# Patient Record
Sex: MALE | Race: WHITE | NOT HISPANIC OR LATINO | Employment: OTHER | URBAN - METROPOLITAN AREA
[De-identification: names, ages, dates, MRNs, and addresses within clinical notes are randomized per-mention and may not be internally consistent; named-entity substitution may affect disease eponyms.]

---

## 2017-01-03 ENCOUNTER — ANTICOAGULATION VISIT (OUTPATIENT)
Dept: PHARMACY | Facility: HOSPITAL | Age: 72
End: 2017-01-03

## 2017-01-03 DIAGNOSIS — I48.91 ATRIAL FIBRILLATION, UNSPECIFIED TYPE (HCC): ICD-10-CM

## 2017-01-03 LAB — INR PPP: 1.5 (ref 0.9–1.1)

## 2017-01-03 PROCEDURE — 36416 COLLJ CAPILLARY BLOOD SPEC: CPT

## 2017-01-03 PROCEDURE — G0463 HOSPITAL OUTPT CLINIC VISIT: HCPCS

## 2017-01-03 PROCEDURE — 85610 PROTHROMBIN TIME: CPT

## 2017-01-03 NOTE — PROGRESS NOTES
Anticoagulation Clinic Progress Note  Indication: atrial fibrillation  Referring Provider: del  Initial Warfarin Start Date: 2008  Goal INR: 2-3  Current Drug Interactions: fluoxetine, trazodone, glimepiride    Anticoagulation Clinic INR History:  Date 9/26 10/25 11/7 11/30 12/21 1/3      Total Weekly Dose 20mg held doses for procedure 20mg 20 mg 17.5 mg 12.5 mg (2 held doses)      INR 2.6 1.3 2.7 3.5 2.0 1.5          Clinic Interview:  Tablet Strength: pt has 5mg tablets  Current Dose: pt verified dose of 2.5mg daily        Plan:  1. INR is subtherapeutic today, likely due to 2 held doses last week (missed 2.5mg + 5mg). Will instruct pt to BOOST his dose today (5mg), then continue 2.5mg daily except 5mg on Wednesdays.  2. RTC in 2 weeks.  3. D/C'd amoxicillin from pt's med list -- no other changes reported.  4. Pt declines refills.  5. Verbal and written information provided. Pt expresses understanding and has no further questions at this time.      Ann Cowden, PharmD  1/3/2017  12:22 PM

## 2017-01-03 NOTE — MR AVS SNAPSHOT
Tony Mcclendonquiana   1/3/2017 11:45 AM   Anticoagulation Visit    Dept Phone:  [phone]   Encounter #:  [id number]    Provider:  [facility]   Department:  [facility] ANTICOAGULATION CLINIC                Your Full Care Plan              Today's Medication Changes          These changes are accurate as of: 1/3/17 12:33 PM.  If you have any questions, ask your nurse or doctor.               Stop taking medication(s)listed here:     amoxicillin 875 MG tablet   Commonly known as:  AMOXIL                      Your Updated Medication List          This list is accurate as of: 1/3/17 12:33 PM.  Always use your most recent med list.                amLODIPine 10 MG tablet   Commonly known as:  NORVASC   TAKE ONE TABLET BY MOUTH DAILY AS DIRECTED       colestipol 1 G tablet   Commonly known as:  COLESTID       FLUoxetine 40 MG capsule   Commonly known as:  PROzac       glimepiride 2 MG tablet   Commonly known as:  AMARYL   TAKE ONE TABLET BY MOUTH DAILY       hydrOXYzine 50 MG capsule   Commonly known as:  VISTARIL       lisinopril 40 MG tablet   Commonly known as:  PRINIVIL,ZESTRIL   TAKE ONE TABLET BY MOUTH DAILY       MELATONIN PO       metoprolol succinate  MG 24 hr tablet   Commonly known as:  TOPROL-XL   TAKE ONE TABLET BY MOUTH TWICE A DAY       pravastatin 20 MG tablet   Commonly known as:  PRAVACHOL   TAKE ONE TABLET BY MOUTH EVERY NIGHT AT BEDTIME       SITagliptin 100 MG tablet   Commonly known as:  JANUVIA       traZODone 50 MG tablet   Commonly known as:  DESYREL       Vitamin D-3 1000 UNITS capsule       warfarin 5 MG tablet   Commonly known as:  COUMADIN   Take 0.5 to 1 tablet by mouth daily as directed by the anticoagulation pharmacist.               We Performed the Following     POC INR       You Were Diagnosed With        Codes Comments    Atrial fibrillation, unspecified type     ICD-10-CM: I48.91  ICD-9-CM: 427.31       Instructions     None    Patient  Instructions History      Anticoagulation Summary as of 1/3/2017     INR goal 2.0-3.0   Today's INR 1.50!   Next INR check 2017    Indications   Atrial fibrillation [I48.91]         2017 Details    Sun Mon  Thu Fri Sat     1               2               3      5 mg   See details      4      5 mg         5      2.5 mg         6      2.5 mg         7      2.5 mg           8      2.5 mg         9      2.5 mg         10      2.5 mg         11      5 mg         12      2.5 mg         13      2.5 mg         14      2.5 mg           15      2.5 mg         16      2.5 mg         17      2.5 mg         18               19               20               21                 22               23               24               25               26               27               28                 29               30               31                    Date Details    This INR check       Date of next INR:  2017           Upcoming Appointments     Visit Type Date Time Department    ANTI COAG - FOLLOW UP 1/3/2017 11:45 AM  [facility]    ANTI COAG - FOLLOW UP 2017  9:30 AM  [facility]    FOLLOW UP 2017 11:30 AM [facility]    FOLLOW UP 2017  2:30 PM [facility]      [facility] Signup     [facility] CADsurf allows you to send messages to your doctor, view your test results, renew your prescriptions, schedule appointments, and more. To sign up, go to CTMG and click on the Sign Up Now link in the New User? box. Enter your CADsurf Activation Code exactly as it appears below along with the last four digits of your Social Security Number and your Date of Birth () to complete the sign-up process. If you do not sign up before the expiration date, you must request a new code.    CADsurf Activation Code: [email or web address]  Expires: 2017  2:23 PM    If you have questions, you can email [email or web address] or call [phone] to  talk to our MyChart staff. Remember, MyChart is NOT to be used for urgent needs. For medical emergencies, dial 911.               Other Info from Your Visit           Your Appointments     Jan 17, 2017  9:30 AM EST   Anti Coag - Follow Up with ANTI COAG CLINIC   [facility] ANTICOAGULATION CLINIC (--)    [address]  [facility] [address]   [phone]            Jan 23, 2017 11:30 AM EST   Follow Up with Kay Lopez DO   [facility] INTERNAL MEDICINE AND ENDOCRINOLOGY [place] (--)    [address]  [facility] [address]   [phone]           Arrive 15 minutes prior to appointment.            Aug 02, 2017  2:30 PM EDT   Follow Up with Sammy Lassiter MD   [facility] [place] CARDIOLOGY (--)    [address]  [facility] [address]   [phone]           Arrive 15 minutes prior to appointment.              Allergies     Aspirin      Gemfibrozil        Vital Signs     Smoking Status                   Never Smoker           Problems and Diagnoses Noted     Atrial fibrillation (irregular heartbeat)      Results     POC INR      Component Value Standard Range & Units    INR 1.50 0.9 - 1.1

## 2017-01-04 RX ORDER — PRAVASTATIN SODIUM 20 MG
20 TABLET ORAL EVERY EVENING
Qty: 30 TABLET | Refills: 11 | Status: SHIPPED | OUTPATIENT
Start: 2017-01-04 | End: 2018-01-04

## 2017-01-17 ENCOUNTER — ANTICOAGULATION VISIT (OUTPATIENT)
Dept: PHARMACY | Facility: HOSPITAL | Age: 72
End: 2017-01-17

## 2017-01-17 DIAGNOSIS — I48.91 ATRIAL FIBRILLATION, UNSPECIFIED TYPE (HCC): Primary | ICD-10-CM

## 2017-01-17 LAB — INR PPP: 2.3 (ref 0.9–1.1)

## 2017-01-17 PROCEDURE — 85610 PROTHROMBIN TIME: CPT

## 2017-01-17 PROCEDURE — G0463 HOSPITAL OUTPT CLINIC VISIT: HCPCS

## 2017-01-17 PROCEDURE — 36416 COLLJ CAPILLARY BLOOD SPEC: CPT

## 2017-01-17 NOTE — MR AVS SNAPSHOT
Tony Mcclendonquiana   1/17/2017 9:30 AM   Anticoagulation Visit    Dept Phone:  [phone]   Encounter #:  [id number]    Provider:  [facility]   Department:  [facility]TICO[facility]                Your Full Care Plan              Today's Medication Changes          These changes are accurate as of: 1/17/17 10:07 AM.  If you have any questions, ask your nurse or doctor.               Medication(s)that have changed:     pravastatin 20 MG tablet   Commonly known as:  PRAVACHOL   Take 1 tablet by mouth Every Evening.   What changed:  Another medication with the same name was removed. Continue taking this medication, and follow the directions you see here.                  Your Updated Medication List          This list is accurate as of: 1/17/17 10:07 AM.  Always use your most recent med list.                amLODIPine 10 MG tablet   Commonly known as:  NORVASC   TAKE ONE TABLET BY MOUTH DAILY AS DIRECTED       colestipol 1 G tablet   Commonly known as:  COLESTID       FLUoxetine 40 MG capsule   Commonly known as:  PROzac       glimepiride 2 MG tablet   Commonly known as:  AMARYL   TAKE ONE TABLET BY MOUTH DAILY       hydrOXYzine 50 MG capsule   Commonly known as:  VISTARIL       lisinopril 40 MG tablet   Commonly known as:  PRINIVIL,ZESTRIL   TAKE ONE TABLET BY MOUTH DAILY       MELATONIN PO       metoprolol succinate  MG 24 hr tablet   Commonly known as:  TOPROL-XL   TAKE ONE TABLET BY MOUTH TWICE A DAY       pravastatin 20 MG tablet   Commonly known as:  PRAVACHOL   Take 1 tablet by mouth Every Evening.       SITagliptin 100 MG tablet   Commonly known as:  JANUVIA       traZODone 50 MG tablet   Commonly known as:  DESYREL       Vitamin D-3 1000 UNITS capsule       warfarin 5 MG tablet   Commonly known as:  COUMADIN   Take 0.5 to 1 tablet by mouth daily as directed by the anticoagulation pharmacist.               We Performed the Following     POC INR          Instructions     None    Patient Instructions History      Anticoagulation Summary as of 1/17/2017     INR goal 2.0-3.0   Today's INR 2.30   Next INR check 2/7/2017    Indications   Atrial fibrillation [I48.91]         January 2017 Details    Sun Mon Tue Wed Thu Fri Sat     1               2               3               4               5               6               7                 8               9               10               11               12               13               14                 15               16               17      2.5 mg   See details      18      5 mg         19      2.5 mg         20      2.5 mg         21      2.5 mg           22      2.5 mg         23      2.5 mg         24      2.5 mg         25      5 mg         26      2.5 mg         27      2.5 mg         28      2.5 mg           29      2.5 mg         30      2.5 mg         31      2.5 mg              Date Details   01/17 This INR check                 February 2017 Details    Sun Mon Tue Wed Thu Fri Sat        1      5 mg         2      2.5 mg         3      2.5 mg         4      2.5 mg           5      2.5 mg         6      2.5 mg         7      2.5 mg         8               9               10               11                 12               13               14               15               16               17               18                 19               20               21               22               23               24               25                 26               27               28                    Date Details   No additional details    Date of next INR:  2/7/2017           Upcoming Appointments     Visit Type Date Time Department    ANTI COAG - FOLLOW UP 1/17/2017  9:30 AM  [facility]    FOLLOW UP 1/23/2017 11:30 AM [facility]    ANTI COAG - FOLLOW UP 2/7/2017  1:30 PM  [facility]    FOLLOW UP 8/2/2017  2:30 PM [facility]      [facility]  Loopt allows you to send messages to your doctor, view your test results, renew your prescriptions, schedule appointments, and more. To sign up, go to MadeClose and click on the Sign Up Now link in the New User? box. Enter your Loopt Activation Code exactly as it appears below along with the last four digits of your Social Security Number and your Date of Birth () to complete the sign-up process. If you do not sign up before the expiration date, you must request a new code.    Loopt Activation Code: [email or web address]  Expires: 2017  2:28 PM    If you have questions, you can email [email or web address] or call [phone] to talk to our Loopt staff. Remember, Loopt is NOT to be used for urgent needs. For medical emergencies, dial 911.               Other Info from Your Visit           Your Appointments     2017 11:30 AM EST   Follow Up with Kay Lopez DO   [facility] INTERNAL MEDICINE AND ENDOCRINOLOGY [place] (--)    [address]  [facility] [address] [phone]           Arrive 15 minutes prior to appointment.            2017  1:30 PM EST   Anti Coag - Follow Up with ANTI COAG CLINIC   [facility] ANTICOAGULATION CLINIC (--)    [address]  [place] [id number]   [phone]            Aug 02, 2017  2:30 PM EDT   Follow Up with Sammy Lassiter MD   [facility] [place] CARDIOLOGY (--)    [address]  [facility] [address] [phone]           Arrive 15 minutes prior to appointment.              Allergies     Aspirin      Gemfibrozil        Vital Signs     Smoking Status                   Never Smoker           Results     POC INR      Component Value Standard Range & Units    INR 2.30 0.9 - 1.1

## 2017-01-17 NOTE — PROGRESS NOTES
Anticoagulation Clinic Progress Note  Indication: atrial fibrillation  Referring Provider: del  Initial Warfarin Start Date: 2008  Goal INR: 2-3  Current Drug Interactions: fluoxetine, trazodone, glimepiride    Anticoagulation Clinic INR History:  Date 9/26 10/25 11/7 11/30 12/21 1/3 1/17     Total Weekly Dose 20mg held doses for procedure 20mg 20 mg 17.5 mg 12.5 mg (2 held doses) 20mg     INR 2.6 1.3 2.7 3.5 2.0 1.5 2.3         Clinic Interview:  Tablet Strength: pt has 5mg tablets  Current Dose: pt verified dose of 2.5mg daily  Patient Findings      Negatives Signs/symptoms of thrombosis, Signs/symptoms of bleeding, Laboratory test error suspected, Change in health, Change in alcohol use, Change in activity, Upcoming invasive procedure, Emergency department visit, Upcoming dental procedure, Missed doses, Extra doses, Change in medications, Change in diet/appetite, Hospital admission, Bruising, Other complaints     Comments Pt was on amoxicillin- stopped 3-4 days ago. Pt has completed his dental appointments associated with the tooth pull.            Plan:  1. INR is therapeutic today. Continue 2.5mg daily except 5mg on Wednesdays.  2. RTC in 3 weeks.  3. Pt was on amoxicillin but discontinued 3-4 days ago. Other pt medications have not changed. We discussed that the pt should call if have any changes in medications.   4. Pt declines refills. Pt will call if needs refills.   5. Verbal and written information provided. Pt expresses understanding and has no further questions at this time.      Ama Mccloud, PharmD  1/17/2017  9:54 AM

## 2017-01-23 ENCOUNTER — OFFICE VISIT (OUTPATIENT)
Dept: INTERNAL MEDICINE | Facility: CLINIC | Age: 72
End: 2017-01-23

## 2017-01-23 VITALS
WEIGHT: 249 LBS | OXYGEN SATURATION: 98 % | SYSTOLIC BLOOD PRESSURE: 120 MMHG | HEART RATE: 79 BPM | DIASTOLIC BLOOD PRESSURE: 80 MMHG | BODY MASS INDEX: 33.77 KG/M2

## 2017-01-23 DIAGNOSIS — Z11.59 NEED FOR HEPATITIS C SCREENING TEST: ICD-10-CM

## 2017-01-23 DIAGNOSIS — IMO0001 UNCONTROLLED TYPE 2 DIABETES MELLITUS WITHOUT COMPLICATION, WITHOUT LONG-TERM CURRENT USE OF INSULIN: Primary | ICD-10-CM

## 2017-01-23 DIAGNOSIS — F41.1 GENERALIZED ANXIETY DISORDER: ICD-10-CM

## 2017-01-23 DIAGNOSIS — I48.20 CHRONIC ATRIAL FIBRILLATION (HCC): ICD-10-CM

## 2017-01-23 DIAGNOSIS — I10 ESSENTIAL HYPERTENSION: ICD-10-CM

## 2017-01-23 DIAGNOSIS — E78.5 HYPERLIPIDEMIA, UNSPECIFIED HYPERLIPIDEMIA TYPE: ICD-10-CM

## 2017-01-23 DIAGNOSIS — K21.9 GASTROESOPHAGEAL REFLUX DISEASE, ESOPHAGITIS PRESENCE NOT SPECIFIED: ICD-10-CM

## 2017-01-23 DIAGNOSIS — G47.33 OBSTRUCTIVE SLEEP APNEA: ICD-10-CM

## 2017-01-23 LAB
ALBUMIN SERPL-MCNC: 4.4 G/DL (ref 3.2–4.8)
ALBUMIN/GLOB SERPL: 2 G/DL (ref 1.5–2.5)
ALP SERPL-CCNC: 59 U/L (ref 25–100)
ALT SERPL W P-5'-P-CCNC: 40 U/L (ref 7–40)
ANION GAP SERPL CALCULATED.3IONS-SCNC: 15 MMOL/L (ref 3–11)
ARTICHOKE IGE QN: 96 MG/DL (ref 0–130)
AST SERPL-CCNC: 36 U/L (ref 0–33)
BILIRUB SERPL-MCNC: 0.9 MG/DL (ref 0.3–1.2)
BUN BLD-MCNC: 11 MG/DL (ref 9–23)
BUN/CREAT SERPL: 13.8 (ref 7–25)
CALCIUM SPEC-SCNC: 9.7 MG/DL (ref 8.7–10.4)
CHLORIDE SERPL-SCNC: 97 MMOL/L (ref 99–109)
CHOLEST SERPL-MCNC: 165 MG/DL (ref 0–200)
CO2 SERPL-SCNC: 31 MMOL/L (ref 20–31)
CREAT BLD-MCNC: 0.8 MG/DL (ref 0.6–1.3)
GFR SERPL CREATININE-BSD FRML MDRD: 96 ML/MIN/1.73
GLOBULIN UR ELPH-MCNC: 2.2 GM/DL
GLUCOSE BLD-MCNC: 124 MG/DL (ref 70–100)
GLUCOSE BLDC GLUCOMTR-MCNC: 139 MG/DL (ref 70–130)
HBA1C MFR BLD: 7.1 %
HCV AB SER DONR QL: NORMAL
HDLC SERPL-MCNC: 38 MG/DL (ref 40–60)
POTASSIUM BLD-SCNC: 4.9 MMOL/L (ref 3.5–5.5)
PROT SERPL-MCNC: 6.6 G/DL (ref 5.7–8.2)
SODIUM BLD-SCNC: 143 MMOL/L (ref 132–146)
TRIGL SERPL-MCNC: 207 MG/DL (ref 0–150)

## 2017-01-23 PROCEDURE — 80061 LIPID PANEL: CPT | Performed by: INTERNAL MEDICINE

## 2017-01-23 PROCEDURE — 80053 COMPREHEN METABOLIC PANEL: CPT | Performed by: INTERNAL MEDICINE

## 2017-01-23 PROCEDURE — 99214 OFFICE O/P EST MOD 30 MIN: CPT | Performed by: INTERNAL MEDICINE

## 2017-01-23 PROCEDURE — 83036 HEMOGLOBIN GLYCOSYLATED A1C: CPT | Performed by: INTERNAL MEDICINE

## 2017-01-23 PROCEDURE — 82947 ASSAY GLUCOSE BLOOD QUANT: CPT | Performed by: INTERNAL MEDICINE

## 2017-01-23 PROCEDURE — 86803 HEPATITIS C AB TEST: CPT | Performed by: INTERNAL MEDICINE

## 2017-01-23 RX ORDER — GLIMEPIRIDE 2 MG/1
2 TABLET ORAL NIGHTLY
Qty: 30 TABLET | Refills: 3 | Status: SHIPPED | OUTPATIENT
Start: 2017-01-23 | End: 2017-07-18 | Stop reason: SDUPTHER

## 2017-01-23 NOTE — MR AVS SNAPSHOT
Tony Mcclendonquiana   1/23/2017 11:30 AM   Office Visit    Dept Phone:  [phone]   Encounter #:  [id number]    Provider:  Kay Lopez DO   Department:  [facility] INTERNAL MEDICINE AND ENDOCRINOLOGY [place]                Your Full Care Plan              Today's Medication Changes          These changes are accurate as of: 1/23/17 12:05 PM.  If you have any questions, ask your nurse or doctor.               Medication(s)that have changed:     glimepiride 2 MG tablet   Commonly known as:  AMARYL   Take 1 tablet by mouth Every Night.   What changed:  See the new instructions.   Changed by:  Kay Lopez DO         Stop taking medication(s)listed here:     MELATONIN PO   Stopped by:  Kay Lopez DO                Where to Get Your Medications      These medications were sent to [facility] [address] [place] [address] - [address] [facility] [phone] [facility] [phone]   [address] [facility] [address]    Hours:  24-hours Phone:  [phone]     glimepiride 2 MG tablet                  Your Updated Medication List          This list is accurate as of: 1/23/17 12:05 PM.  Always use your most recent med list.                amLODIPine 10 MG tablet   Commonly known as:  NORVASC   TAKE ONE TABLET BY MOUTH DAILY AS DIRECTED       colestipol 1 G tablet   Commonly known as:  COLESTID       FLUoxetine 40 MG capsule   Commonly known as:  PROzac       glimepiride 2 MG tablet   Commonly known as:  AMARYL   Take 1 tablet by mouth Every Night.       hydrOXYzine 50 MG capsule   Commonly known as:  VISTARIL       lisinopril 40 MG tablet   Commonly known as:  PRINIVIL,ZESTRIL   TAKE ONE TABLET BY MOUTH DAILY       metoprolol succinate  MG 24 hr tablet   Commonly known as:  TOPROL-XL   TAKE ONE TABLET BY MOUTH TWICE A DAY       pravastatin 20 MG tablet   Commonly known as:  PRAVACHOL   Take 1 tablet by mouth Every Evening.       SITagliptin 100 MG tablet   Commonly known as:  JANUVIA       traZODone 50 MG tablet   Commonly known as:  DESYREL       Vitamin D-3 1000 UNITS capsule       warfarin 5 MG tablet   Commonly known as:  COUMADIN   Take 0.5 to 1 tablet by mouth daily as directed by the anticoagulation pharmacist.               We Performed the Following     Comprehensive Metabolic Panel     Hepatitis C Antibody     Lipid Panel     POC Glucose Fingerstick     POC Glycated Hemoglobin, Total       You Were Diagnosed With        Codes Comments    Uncontrolled type 2 diabetes mellitus without complication, without long-term current use of insulin    -  Primary ICD-10-CM: E11.65  ICD-9-CM: 250.02     Chronic atrial fibrillation     ICD-10-CM: I48.2  ICD-9-CM: 427.31     Hyperlipidemia, unspecified hyperlipidemia type     ICD-10-CM: E78.5  ICD-9-CM: 272.4     Obstructive sleep apnea     ICD-10-CM: G47.33  ICD-9-CM: 327.23     Gastroesophageal reflux disease, esophagitis presence not specified     ICD-10-CM: K21.9  ICD-9-CM: 530.81     Generalized anxiety disorder     ICD-10-CM: F41.1  ICD-9-CM: 300.02     Need for hepatitis C screening test     ICD-10-CM: Z11.59  ICD-9-CM: V73.89     Essential hypertension     ICD-10-CM: I10  ICD-9-CM: 401.9       Instructions     None    Patient Instructions History      Upcoming Appointments     Visit Type Date Time Department    FOLLOW UP 1/23/2017 11:30 AM [facility]    ANTI COAG - FOLLOW UP 2/7/2017  1:30 PM [facility]    FOLLOW UP 4/24/2017 11:30 AM [facility]    FOLLOW UP 8/2/2017  2:30 PM [facility]      [facility] Signup     [facility] pluriSelect allows you to send messages to your doctor, view your test results, renew your prescriptions, schedule appointments, and more. To sign up, go to AsicAhead and click on the Sign Up Now link in the New User? box. Enter your pluriSelect Activation Code exactly as it appears below along with the last four digits of your  Social Security Number and your Date of Birth () to complete the sign-up process. If you do not sign up before the expiration date, you must request a new code.    BDNA Activation Code: [email or web address]  Expires: 2017  2:28 PM    If you have questions, you can email [email or web address] or call [phone] to talk to our [employer] staff. Remember, [employer] is NOT to be used for urgent needs. For medical emergencies, dial 911.               Other Info from Your Visit           Your Appointments     2017  1:30 PM EST   Anti Coag - Follow Up with ANTI COAG CLINIC   [facility] ANTICOAGULATION CLINIC (--)    [address]  [facility] [address]   [phone]            2017 11:30 AM EDT   Follow Up with Kay Lopez DO   [facility] INTERNAL MEDICINE AND ENDOCRINOLOGY [place] (--)    [address]  [facility] [address] [phone]           Arrive 15 minutes prior to appointment.            Aug 02, 2017  2:30 PM EDT   Follow Up with Sammy Lassiter MD   [facility] [place] CARDIOLOGY (--)    [address]  [facility] [id number] [phone]           Arrive 15 minutes prior to appointment.              Allergies     Aspirin      Gemfibrozil        Reason for Visit     Hypertension     Atrial Fibrillation     Heartburn     Sleep Apnea     Diabetes LAST A1C 6.9      Vital Signs     Blood Pressure Pulse Weight Oxygen Saturation Body Mass Index Smoking Status    120/80 79 249 lb (113 kg) 98% 33.77 kg/m2 Never Smoker      Problems and Diagnoses Noted     Atrial fibrillation (irregular heartbeat)    High blood pressure    Generalized anxiety disorder    Acid reflux disease    High cholesterol or triglycerides    Sleep apnea    Type II diabetes mellitus without control    Need for hepatitis C screening test          Results     POC Glucose Fingerstick      Component Value Standard Range & Units     Glucose 139 70 - 130 mg/dL                POC Glycated Hemoglobin, Total      Component Value Standard Range & Units    Hemoglobin A1C 7.1 %

## 2017-01-23 NOTE — PROGRESS NOTES
Subjective   Tony Wolf is a 71 y.o. male.   Chief Complaint   Patient presents with   • Hypertension   • Atrial Fibrillation   • Heartburn   • Sleep Apnea   • Diabetes     LAST A1C 6.9       Hypertension   This is a chronic problem. The current episode started more than 1 year ago. Pertinent negatives include no chest pain, headaches, neck pain, palpitations or shortness of breath. There are no compliance problems.  Identifiable causes of hypertension include sleep apnea.   Atrial Fibrillation   Presents for follow-up visit. Symptoms are negative for chest pain, palpitations, shortness of breath and syncope. Past medical history includes atrial fibrillation.   Heartburn   He reports no abdominal pain, no chest pain, no coughing, no nausea, no sore throat, no tooth decay, no water brash or no wheezing. This is a chronic problem. The current episode started more than 1 year ago. Pertinent negatives include no fatigue.   Sleep Apnea   This is a chronic problem. The current episode started more than 1 year ago. Pertinent negatives include no abdominal pain, arthralgias, chest pain, chills, congestion, coughing, diaphoresis, fatigue, fever, headaches, myalgias, nausea, neck pain, numbness, rash, sore throat or vomiting.   Diabetes   He presents for his follow-up diabetic visit. He has type 2 diabetes mellitus. Pertinent negatives for hypoglycemia include no confusion, headaches, nervousness/anxiousness, pallor, seizures or speech difficulty. Pertinent negatives for diabetes include no chest pain, no fatigue, no polydipsia and no polyphagia. An ACE inhibitor/angiotensin II receptor blocker is being taken. He does not see a podiatrist.Eye exam is current.        The following portions of the patient's history were reviewed and updated as appropriate: allergies, current medications, past family history, past medical history, past social history, past surgical history and problem list.    Review of Systems    Constitutional: Negative for activity change, appetite change, chills, diaphoresis, fatigue, fever and unexpected weight change.   HENT: Negative for congestion, ear discharge, ear pain, mouth sores, nosebleeds, sinus pressure, sneezing and sore throat.    Eyes: Negative for pain, discharge and itching.   Respiratory: Negative for cough, chest tightness, shortness of breath and wheezing.    Cardiovascular: Negative for chest pain, palpitations, leg swelling and syncope.   Gastrointestinal: Negative for abdominal pain, constipation, diarrhea, nausea and vomiting.   Endocrine: Negative for cold intolerance, heat intolerance, polydipsia and polyphagia.   Genitourinary: Negative for dysuria, flank pain, frequency, hematuria and urgency.   Musculoskeletal: Negative for arthralgias, back pain, gait problem, myalgias, neck pain and neck stiffness.   Skin: Negative for color change, pallor and rash.   Neurological: Negative for seizures, speech difficulty, numbness and headaches.   Psychiatric/Behavioral: Negative for agitation, confusion, decreased concentration and sleep disturbance. The patient is not nervous/anxious.      Visit Vitals   • /80   • Pulse 79   • Wt 249 lb (113 kg)   • SpO2 98%   • BMI 33.77 kg/m2       Objective   Physical Exam   Constitutional: He is oriented to person, place, and time. He appears well-developed.   HENT:   Head: Normocephalic.   Right Ear: External ear normal.   Left Ear: External ear normal.   Nose: Nose normal.   Mouth/Throat: Oropharynx is clear and moist.   Eyes: Conjunctivae are normal. Pupils are equal, round, and reactive to light.   Neck: No JVD present. No thyromegaly present.   Cardiovascular: Normal rate, regular rhythm and normal heart sounds.  Exam reveals no friction rub.    No murmur heard.  Pulmonary/Chest: Effort normal and breath sounds normal. No respiratory distress. He has no wheezes. He has no rales.   Abdominal: Soft. Bowel sounds are normal. He exhibits no  distension. There is no tenderness. There is no guarding.   Musculoskeletal: He exhibits no edema or tenderness.      During the foot exam he had a monofilament test performed.    Neurological Sensory Findings - Unaltered hot/cold right ankle/foot discrimination and unaltered hot/cold left ankle/foot discrimination. Unaltered sharp/dull left ankle/foot discrimination. No right ankle/foot altered proprioception and no left ankle/foot altered proprioception    Vascular Status -  His exam exhibits right foot vasculature normal. His exam exhibits no right foot edema. His exam exhibits left foot vasculature normal. His exam exhibits no left foot edema.   Foot Structure and Biomechanics -  His right foot has no hallux valgus and no pes cavus present. His left foot has no hallux valgus and no pes cavus.      Lymphadenopathy:     He has no cervical adenopathy.   Neurological: He is alert and oriented to person, place, and time. He has normal reflexes. He displays normal reflexes. No cranial nerve deficit.   Skin: No rash noted.   Psychiatric: He has a normal mood and affect. His behavior is normal.   Nursing note and vitals reviewed.      Assessment/Plan   Tony was seen today for hypertension, atrial fibrillation, heartburn, sleep apnea and diabetes.    Diagnoses and all orders for this visit:    Uncontrolled type 2 diabetes mellitus without complication, without long-term current use of insulin  -     POC Glucose Fingerstick  -     POC Glycated Hemoglobin, Total  A1c is 7.1 Eye  Exam UTD.   Chronic atrial fibrillation  Managed by cardio. 5mg coumadin on wed and 2.5 mg the rest of the week.  Hyperlipidemia, unspecified hyperlipidemia type  -     Lipid Panel  -     Comprehensive Metabolic Panel    Obstructive sleep apnea  stable  Gastroesophageal reflux disease, esophagitis presence not specified  stable  Generalized anxiety disorder  stable  Need for hepatitis C screening test  -     Hepatitis C Antibody    Other orders  -      glimepiride (AMARYL) 2 MG tablet; Take 1 tablet by mouth Every Night.

## 2017-01-25 ENCOUNTER — TELEPHONE (OUTPATIENT)
Dept: CARDIOLOGY | Facility: CLINIC | Age: 72
End: 2017-01-25

## 2017-01-25 NOTE — TELEPHONE ENCOUNTER
Patient called requesting clearance for a knee replacement. How many days do you want him to hold warfarin prior to surgery?                  Dr. Interiano  (f) [phone]

## 2017-02-02 RX ORDER — METOPROLOL SUCCINATE 100 MG/1
TABLET, EXTENDED RELEASE ORAL
Qty: 180 TABLET | Refills: 3 | Status: SHIPPED | OUTPATIENT
Start: 2017-02-02 | End: 2017-05-24

## 2017-02-07 ENCOUNTER — ANTICOAGULATION VISIT (OUTPATIENT)
Dept: PHARMACY | Facility: HOSPITAL | Age: 72
End: 2017-02-07

## 2017-02-07 DIAGNOSIS — I48.91 ATRIAL FIBRILLATION, UNSPECIFIED TYPE (HCC): ICD-10-CM

## 2017-02-07 LAB — INR PPP: 2.4 (ref 0.9–1.1)

## 2017-02-07 PROCEDURE — 85610 PROTHROMBIN TIME: CPT

## 2017-02-07 PROCEDURE — G0463 HOSPITAL OUTPT CLINIC VISIT: HCPCS

## 2017-02-07 PROCEDURE — 36416 COLLJ CAPILLARY BLOOD SPEC: CPT

## 2017-02-07 NOTE — PROGRESS NOTES
Anticoagulation Clinic Progress Note  Indication: atrial fibrillation  Referring Provider: del  Initial Warfarin Start Date: 2008  Goal INR: 2-3  Current Drug Interactions: fluoxetine, trazodone, glimepiride    Anticoagulation Clinic INR History:  Date 9/26 10/25 11/7 11/30 12/21 1/3 1/17 2/7    Total Weekly Dose 20mg held doses for procedure 20mg 20 mg 17.5 mg 12.5 mg (2 held doses) 20mg 20mg    INR 2.6 1.3 2.7 3.5 2.0 1.5 2.3 2.4        Clinic Interview:  Tablet Strength: pt has 5mg tablets  Current Dose: pt verified dose of 2.5mg daily except 5mg Wednesdays  Patient Findings      Positives Upcoming invasive procedure     Negatives Signs/symptoms of thrombosis, Signs/symptoms of bleeding, Laboratory test error suspected, Change in health, Change in alcohol use, Change in activity, Emergency department visit, Upcoming dental procedure, Missed doses, Extra doses, Change in medications, Change in diet/appetite, Hospital admission, Bruising, Other complaints     Comments TKA planned end of March, 3/28 -- will hold warfarin x4 days prior. No recent changes reported.         Plan:  1. INR is therapeutic again today. Continue 2.5mg daily except 5mg on Wednesdays.  2. RTC in 4 weeks.  3. No recent changes to medications. Provided pt with pill splitter (50mg sitagliptin from 100mg tab).  4. Pt declines refills.   5. Verbal and written information provided. Pt expresses understanding and has no further questions at this time.      Ann Cowden, PharmD  2/7/2017  1:12 PM

## 2017-02-23 ENCOUNTER — TELEPHONE (OUTPATIENT)
Dept: INTERNAL MEDICINE | Facility: CLINIC | Age: 72
End: 2017-02-23

## 2017-02-23 NOTE — TELEPHONE ENCOUNTER
----- Message from Ling Valadez sent at 2/23/2017  2:57 PM EST -----  PATIENT IS REQUESTING REFERRAL FOR ENT    PLEASE SEND TO [facility] #[phone]     PATIENT IS GOING TO HAVE EARS CLEANED OUT

## 2017-02-24 ENCOUNTER — TELEPHONE (OUTPATIENT)
Dept: INTERNAL MEDICINE | Facility: CLINIC | Age: 72
End: 2017-02-24

## 2017-02-24 NOTE — TELEPHONE ENCOUNTER
Amber called ENT and said he just needs to call and they will get him in, Amber called pt to let him know.

## 2017-02-24 NOTE — TELEPHONE ENCOUNTER
----- Message from Isabela Tineo sent at 2/24/2017  2:43 PM EST -----  Contact: PATIENT  PATIENT CALLED THE OTHER DAY WANTING A REFERRAL FOR ENT . HE HAS NOT HEARD ANYTHING BACK FROM [facility] AND IS WANTING TO GET SOMETHING SCHEDULED SOON WITH ENT. HE WANTS TO HAVE HIS EARS CLEANED BUT WANTS IT DONE BY A ENT DOCTOR. [facility] IS WHERE HE WANTS TO BE REFERRED. THEIR PHONE NUMBER [phone].  YOU CAN REACH HIM [phone]

## 2017-03-07 ENCOUNTER — ANTICOAGULATION VISIT (OUTPATIENT)
Dept: PHARMACY | Facility: HOSPITAL | Age: 72
End: 2017-03-07

## 2017-03-07 DIAGNOSIS — I48.91 ATRIAL FIBRILLATION, UNSPECIFIED TYPE (HCC): ICD-10-CM

## 2017-03-07 LAB
INR PPP: 2 (ref 0.91–1.09)
PROTHROMBIN TIME: 24.2 SECONDS (ref 10–13.8)

## 2017-03-07 PROCEDURE — G0463 HOSPITAL OUTPT CLINIC VISIT: HCPCS

## 2017-03-07 PROCEDURE — 36416 COLLJ CAPILLARY BLOOD SPEC: CPT

## 2017-03-07 PROCEDURE — 85610 PROTHROMBIN TIME: CPT

## 2017-03-07 NOTE — PROGRESS NOTES
Anticoagulation Clinic Progress Note  Indication: atrial fibrillation  Referring Provider: del  Initial Warfarin Start Date: 2008  Goal INR: 2-3  Current Drug Interactions: fluoxetine, trazodone, glimepiride    Anticoagulation Clinic INR History:  Date 9/26 10/25 11/7 11/30 12/21 1/3 1/17 2/7 3/7   Total Weekly Dose 20mg held doses for procedure 20mg 20 mg 17.5 mg 12.5 mg (2 held doses) 20mg 20mg 20mg   INR 2.6 1.3 2.7 3.5 2.0 1.5 2.3 2.4 2.0       Clinic Interview:  Tablet Strength: pt has 5mg tablets  Current Dose: pt verified dose of 2.5mg daily except 5mg Wednesdays  Patient Findings      Positives Upcoming invasive procedure     Negatives Signs/symptoms of thrombosis, Signs/symptoms of bleeding, Laboratory test error suspected, Change in health, Change in alcohol use, Change in activity, Emergency department visit, Upcoming dental procedure, Missed doses, Extra doses, Change in medications, Change in diet/appetite, Hospital admission, Bruising, Other complaints     Comments TKA scheduled 3/28 -- will hold warfarin x4 days prior. No recent changes reported.         Plan:  1. INR is therapeutic again today, at the low end of goal. Pt scheduled for TKA on 3/28. He will hold his warfarin for 4 days, then recommend BOOSTING his dose x2 days (7.5mg). Otherwise, continue warfarin 2.5mg daily except 5mg on Wednesdays.  2. RTC in 4 weeks (1 week after surgery). Pt may go to [facility] for a week or two after discharge following TKA, in which case we will provide orders for remote lab.  3. No recent changes to medications.  4. Pt declines refills.   5. Verbal and written information provided. Pt expresses understanding and has no further questions at this time.      Ann Cowden, PharmD  3/7/2017  12:19 PM

## 2017-03-21 ENCOUNTER — APPOINTMENT (OUTPATIENT)
Dept: PREADMISSION TESTING | Facility: HOSPITAL | Age: 72
End: 2017-03-21

## 2017-03-21 ENCOUNTER — HOSPITAL ENCOUNTER (OUTPATIENT)
Dept: GENERAL RADIOLOGY | Facility: HOSPITAL | Age: 72
Discharge: HOME | End: 2017-03-21
Admitting: ORTHOPAEDIC SURGERY

## 2017-03-21 VITALS — HEIGHT: 73 IN | BODY MASS INDEX: 33.1 KG/M2 | WEIGHT: 249.78 LBS

## 2017-03-21 LAB
ANION GAP SERPL CALCULATED.3IONS-SCNC: 3 MMOL/L (ref 3–11)
APTT PPP: 34.4 SECONDS (ref 24–31)
BACTERIA UR QL AUTO: ABNORMAL /HPF
BASOPHILS # BLD AUTO: 0.04 10*3/MM3 (ref 0–0.2)
BASOPHILS NFR BLD AUTO: 0.5 % (ref 0–1)
BILIRUB UR QL STRIP: NEGATIVE
BUN BLD-MCNC: 11 MG/DL (ref 9–23)
BUN/CREAT SERPL: 11 (ref 7–25)
CALCIUM SPEC-SCNC: 9.1 MG/DL (ref 8.7–10.4)
CHLORIDE SERPL-SCNC: 105 MMOL/L (ref 99–109)
CLARITY UR: CLEAR
CO2 SERPL-SCNC: 32 MMOL/L (ref 20–31)
COLOR UR: YELLOW
CREAT BLD-MCNC: 1 MG/DL (ref 0.6–1.3)
CRP SERPL-MCNC: 1.1 MG/DL (ref 0–10)
DEPRECATED RDW RBC AUTO: 48.8 FL (ref 37–54)
EOSINOPHIL # BLD AUTO: 0.19 10*3/MM3 (ref 0.1–0.3)
EOSINOPHIL NFR BLD AUTO: 2.2 % (ref 0–3)
ERYTHROCYTE [DISTWIDTH] IN BLOOD BY AUTOMATED COUNT: 13.9 % (ref 11.3–14.5)
ERYTHROCYTE [SEDIMENTATION RATE] IN BLOOD: 5 MM/HR (ref 0–20)
GFR SERPL CREATININE-BSD FRML MDRD: 74 ML/MIN/1.73
GLUCOSE BLD-MCNC: 254 MG/DL (ref 70–100)
GLUCOSE UR STRIP-MCNC: ABNORMAL MG/DL
HBA1C MFR BLD: 8.3 % (ref 4.8–5.6)
HCT VFR BLD AUTO: 48.9 % (ref 38.9–50.9)
HGB BLD-MCNC: 16.1 G/DL (ref 13.1–17.5)
HGB UR QL STRIP.AUTO: NEGATIVE
HYALINE CASTS UR QL AUTO: ABNORMAL /LPF
IMM GRANULOCYTES # BLD: 0.02 10*3/MM3 (ref 0–0.03)
IMM GRANULOCYTES NFR BLD: 0.2 % (ref 0–0.6)
INR PPP: 2.42
KETONES UR QL STRIP: NEGATIVE
LEUKOCYTE ESTERASE UR QL STRIP.AUTO: NEGATIVE
LYMPHOCYTES # BLD AUTO: 1.66 10*3/MM3 (ref 0.6–4.8)
LYMPHOCYTES NFR BLD AUTO: 19.5 % (ref 24–44)
MCH RBC QN AUTO: 31.2 PG (ref 27–31)
MCHC RBC AUTO-ENTMCNC: 32.9 G/DL (ref 32–36)
MCV RBC AUTO: 94.8 FL (ref 80–99)
MONOCYTES # BLD AUTO: 0.47 10*3/MM3 (ref 0–1)
MONOCYTES NFR BLD AUTO: 5.5 % (ref 0–12)
NEUTROPHILS # BLD AUTO: 6.13 10*3/MM3 (ref 1.5–8.3)
NEUTROPHILS NFR BLD AUTO: 72.1 % (ref 41–71)
NITRITE UR QL STRIP: NEGATIVE
PH UR STRIP.AUTO: 6 [PH] (ref 5–8)
PLATELET # BLD AUTO: 163 10*3/MM3 (ref 150–450)
PMV BLD AUTO: 11.6 FL (ref 6–12)
POTASSIUM BLD-SCNC: 3.7 MMOL/L (ref 3.5–5.5)
PROT UR QL STRIP: ABNORMAL
PROTHROMBIN TIME: 27.1 SECONDS (ref 9.6–11.5)
RBC # BLD AUTO: 5.16 10*6/MM3 (ref 4.2–5.76)
RBC # UR: ABNORMAL /HPF
REF LAB TEST METHOD: ABNORMAL
SODIUM BLD-SCNC: 140 MMOL/L (ref 132–146)
SP GR UR STRIP: 1.02 (ref 1–1.03)
SQUAMOUS #/AREA URNS HPF: ABNORMAL /HPF
UROBILINOGEN UR QL STRIP: ABNORMAL
WBC NRBC COR # BLD: 8.51 10*3/MM3 (ref 3.5–10.8)
WBC UR QL AUTO: ABNORMAL /HPF

## 2017-03-21 PROCEDURE — 85730 THROMBOPLASTIN TIME PARTIAL: CPT | Performed by: ORTHOPAEDIC SURGERY

## 2017-03-21 PROCEDURE — 36415 COLL VENOUS BLD VENIPUNCTURE: CPT

## 2017-03-21 PROCEDURE — 86140 C-REACTIVE PROTEIN: CPT | Performed by: ORTHOPAEDIC SURGERY

## 2017-03-21 PROCEDURE — 85025 COMPLETE CBC W/AUTO DIFF WBC: CPT | Performed by: ORTHOPAEDIC SURGERY

## 2017-03-21 PROCEDURE — 83036 HEMOGLOBIN GLYCOSYLATED A1C: CPT | Performed by: ORTHOPAEDIC SURGERY

## 2017-03-21 PROCEDURE — 81001 URINALYSIS AUTO W/SCOPE: CPT | Performed by: ORTHOPAEDIC SURGERY

## 2017-03-21 PROCEDURE — 85610 PROTHROMBIN TIME: CPT | Performed by: ORTHOPAEDIC SURGERY

## 2017-03-21 PROCEDURE — 71020 HC CHEST PA AND LATERAL: CPT

## 2017-03-21 PROCEDURE — 80048 BASIC METABOLIC PNL TOTAL CA: CPT | Performed by: ORTHOPAEDIC SURGERY

## 2017-03-21 PROCEDURE — 93005 ELECTROCARDIOGRAM TRACING: CPT

## 2017-03-21 PROCEDURE — 93010 ELECTROCARDIOGRAM REPORT: CPT | Performed by: INTERNAL MEDICINE

## 2017-03-21 PROCEDURE — 85652 RBC SED RATE AUTOMATED: CPT | Performed by: ORTHOPAEDIC SURGERY

## 2017-03-21 RX ORDER — CELECOXIB 200 MG/1
200 CAPSULE ORAL ONCE
Status: CANCELLED | OUTPATIENT
Start: 2017-03-28

## 2017-03-21 NOTE — PAT
T&s too early  Please draw in preop and do blood permit as well.      Pt plans to attend joint class today.       Measured for TEDS/SCDS in PAT-     calf measurement    16      Length measurement  22

## 2017-03-22 NOTE — PAT
Spoke with Dr. Interiano re: A1C 8.3; PT/INR 27.1/2.42; PTT 34.4.  Also, left message for office for Dr. Interiano to note CXR results.

## 2017-03-23 ENCOUNTER — TRANSCRIBE ORDERS (OUTPATIENT)
Dept: ADMINISTRATIVE | Facility: HOSPITAL | Age: 72
End: 2017-03-23

## 2017-03-23 ENCOUNTER — ANTICOAGULATION VISIT (OUTPATIENT)
Dept: PHARMACY | Facility: HOSPITAL | Age: 72
End: 2017-03-23

## 2017-03-23 DIAGNOSIS — I48.91 ATRIAL FIBRILLATION, UNSPECIFIED TYPE (HCC): ICD-10-CM

## 2017-03-23 DIAGNOSIS — J98.4 LUNG DENSITY ON X-RAY: Primary | ICD-10-CM

## 2017-03-23 NOTE — PROGRESS NOTES
Anticoagulation Clinic Progress Note  Indication: atrial fibrillation  Referring Provider: del  Initial Warfarin Start Date: 2008  Goal INR: 2-3  Current Drug Interactions: fluoxetine, trazodone, glimepiride    Anticoagulation Clinic INR History:  Date 9/26 10/25 11/7 11/30 12/21 1/3 1/17 2/7 3/7   Total Weekly Dose 20mg held doses for procedure 20mg 20 mg 17.5 mg 12.5 mg (2 held doses) 20mg 20mg 20mg   INR 2.6 1.3 2.7 3.5 2.0 1.5 2.3 2.4 2.0     Date 3/21           Total Weekly Dose 20mg           INR 2.42 (lab)             Clinic Interview:  Tablet Strength: pt has 5mg tablets  Current Dose: pt verified dose of 2.5mg daily except 5mg Wednesdays  Patient Findings      Negatives Signs/symptoms of thrombosis, Signs/symptoms of bleeding, Laboratory test error suspected, Change in health, Change in alcohol use, Change in activity, Upcoming invasive procedure, Emergency department visit, Upcoming dental procedure, Missed doses, Extra doses, Change in medications, Change in diet/appetite, Hospital admission, Bruising, Other complaints     Comments Pt was scheduled for TKA next Tuesday 3/28, but it has now been postponed 2/2 preop test results. Pt will keep our clinic updated with any additional changes.         Plan:  1. Lab completed in preop testing -- INR is therapeutic. Will instruct pt to continue warfarin 2.5mg daily except 5mg on Wednesdays.  2. RTC in 4 weeks, unless surgery rescheduled or other changes occur.  3. Verbal information provided over the phone. Pt expresses understanding and has no further questions at this time.      Ann Cowden, PharmD  3/23/2017  2:38 PM

## 2017-03-24 ENCOUNTER — TELEPHONE (OUTPATIENT)
Dept: INTERNAL MEDICINE | Facility: CLINIC | Age: 72
End: 2017-03-24

## 2017-03-24 ENCOUNTER — HOSPITAL ENCOUNTER (OUTPATIENT)
Dept: CT IMAGING | Facility: HOSPITAL | Age: 72
Discharge: HOME | End: 2017-03-24
Attending: ORTHOPAEDIC SURGERY | Admitting: ORTHOPAEDIC SURGERY

## 2017-03-24 DIAGNOSIS — J98.4 LUNG DENSITY ON X-RAY: ICD-10-CM

## 2017-03-24 PROCEDURE — 71250 CT THORAX DX C-: CPT

## 2017-03-24 NOTE — TELEPHONE ENCOUNTER
PT HAD PRE ADMISSION TESTING FOR TOTAL KNEE REPLACEMENT AND DID NOT PASS THE CHEST XRAY AND A1C WAS 8.3 AND NOW THEY WONT DO THE SURGERY, HE IS LIM[facility] FOR TODAY AT [facility] TO DO CT OF CHEST AND WANTED TO KNOW WHAT CAN BE DONE ABOUT HIS A1C, HE WAS ASKING TO SEE [facility], PER , THEY ARE BOOKED OUT 6 MONTHS AND WANTED HIM TO COME IN SO WE CAN ADJUST HIS MEDS, STATED HE WILL CALL BACK AND LET US KNOW IF HE WANTS TO MAKE AN APPOINTMENT, WIFE WAS SAYING IN THE BACKGROUND THEY WOULD MAKE THEIR OWN APPT WITH [facility]

## 2017-03-24 NOTE — TELEPHONE ENCOUNTER
----- Message from Salena Alford sent at 3/23/2017 10:32 AM EDT -----  Contact: PATIENT  PATIENT WOULD LIKE TO SPEAK WITH YOU REGARDING A CANCELED KNEE SURGERY. HE IS HAVING A CT SCAN TOMORROW. I THINK THE CONCERN WAS THAT HIS BLOOD SUGAR WS TOO HIGH. PLEASE RETURN CALL    CALL BACK #[phone]

## 2017-03-29 ENCOUNTER — OFFICE VISIT (OUTPATIENT)
Dept: INTERNAL MEDICINE | Facility: CLINIC | Age: 72
End: 2017-03-29

## 2017-03-29 VITALS
HEART RATE: 96 BPM | BODY MASS INDEX: 32.94 KG/M2 | WEIGHT: 249.7 LBS | OXYGEN SATURATION: 98 % | SYSTOLIC BLOOD PRESSURE: 130 MMHG | DIASTOLIC BLOOD PRESSURE: 100 MMHG

## 2017-03-29 DIAGNOSIS — IMO0001 UNCONTROLLED TYPE 2 DIABETES MELLITUS WITHOUT COMPLICATION, WITHOUT LONG-TERM CURRENT USE OF INSULIN: Primary | ICD-10-CM

## 2017-03-29 LAB — GLUCOSE BLDC GLUCOMTR-MCNC: 166 MG/DL (ref 70–130)

## 2017-03-29 PROCEDURE — 99213 OFFICE O/P EST LOW 20 MIN: CPT | Performed by: INTERNAL MEDICINE

## 2017-03-29 PROCEDURE — 82947 ASSAY GLUCOSE BLOOD QUANT: CPT | Performed by: INTERNAL MEDICINE

## 2017-03-29 NOTE — PROGRESS NOTES
Subjective   Tony Wolf is a 71 y.o. male.   Chief Complaint   Patient presents with   • Diabetes       Diabetes   He presents for his follow-up diabetic visit. He has type 2 diabetes mellitus. Pertinent negatives for hypoglycemia include no confusion, headaches, nervousness/anxiousness, pallor, seizures or speech difficulty. Pertinent negatives for diabetes include no chest pain, no fatigue, no polydipsia and no polyphagia. He is compliant with treatment most of the time. An ACE inhibitor/angiotensin II receptor blocker is being taken.      He was suppose to have knee surgery and his A1c was 7.1 in Jan and repeat in Marh for preop was 8.3 so Dr. Interiano put surgery on hold.  The following portions of the patient's history were reviewed and updated as appropriate: allergies, current medications, past family history, past medical history, past social history, past surgical history and problem list.    Review of Systems   Constitutional: Negative for activity change, appetite change, chills, diaphoresis, fatigue, fever and unexpected weight change.   HENT: Negative for congestion, ear discharge, ear pain, mouth sores, nosebleeds, sinus pressure, sneezing and sore throat.    Eyes: Negative for pain, discharge and itching.   Respiratory: Negative for cough, chest tightness, shortness of breath and wheezing.    Cardiovascular: Negative for chest pain, palpitations and leg swelling.   Gastrointestinal: Negative for abdominal pain, constipation, diarrhea, nausea and vomiting.   Endocrine: Negative for cold intolerance, heat intolerance, polydipsia and polyphagia.   Genitourinary: Negative for dysuria, flank pain, frequency, hematuria and urgency.   Musculoskeletal: Negative for arthralgias, back pain, gait problem, myalgias, neck pain and neck stiffness.        Knee pain   Skin: Negative for color change, pallor and rash.   Neurological: Negative for seizures, speech difficulty, numbness and headaches.    Psychiatric/Behavioral: Negative for agitation, confusion, decreased concentration and sleep disturbance. The patient is not nervous/anxious.      /100  Pulse 96  Wt 249 lb 11.2 oz (113 kg)  SpO2 98%  BMI 32.94 kg/m2    Objective   Physical Exam   Constitutional: He appears well-developed.   HENT:   Head: Normocephalic.   Right Ear: External ear normal.   Left Ear: External ear normal.   Nose: Nose normal.   Mouth/Throat: Oropharynx is clear and moist.   Eyes: Conjunctivae are normal. Pupils are equal, round, and reactive to light.   Neck: No JVD present. No thyromegaly present.   Cardiovascular: Normal rate, regular rhythm and normal heart sounds.  Exam reveals no friction rub.    No murmur heard.  Pulmonary/Chest: Effort normal and breath sounds normal. No respiratory distress. He has no wheezes. He has no rales.   Abdominal: Soft. Bowel sounds are normal. He exhibits no distension. There is no tenderness. There is no guarding.   Musculoskeletal: He exhibits no edema or tenderness.   Crepitus knees   Lymphadenopathy:     He has no cervical adenopathy.   Neurological: He displays normal reflexes. No cranial nerve deficit.   Skin: No rash noted.   Psychiatric: His behavior is normal.   Nursing note and vitals reviewed.      Assessment/Plan   Tony was seen today for diabetes.    Diagnoses and all orders for this visit:    Uncontrolled type 2 diabetes mellitus without complication, without long-term current use of insulin  -     POC Glucose Fingerstick    Will add lantus 5 units sq in addition to his amaryl and januivia.  Will go up 2 units q 3 days until BS < 150. 3 week f/u and recheck A1c at that visit.

## 2017-03-30 ENCOUNTER — TELEPHONE (OUTPATIENT)
Dept: INTERNAL MEDICINE | Facility: CLINIC | Age: 72
End: 2017-03-30

## 2017-03-30 NOTE — TELEPHONE ENCOUNTER
Spoke with pt and explained to him on how to use his pen needles with his insulin, he did not know the pen needles were in the box with the insulin.

## 2017-03-30 NOTE — TELEPHONE ENCOUNTER
----- Message from Nu SIMPSON MA sent at 3/30/2017 10:58 AM EDT -----      ----- Message -----     From: Jordan Longoria     Sent: 3/30/2017  10:52 AM       To: Amanda Gee [facility]    [phone]    PT IS ASKING TO SPEAK WITH A[facility]ULKADIR PT    HE WAS GIVEN SOME MEDS BUT UNSURE ON HOW TO START THEM

## 2017-04-18 ENCOUNTER — ANTICOAGULATION VISIT (OUTPATIENT)
Dept: PHARMACY | Facility: HOSPITAL | Age: 72
End: 2017-04-18

## 2017-04-18 LAB
INR PPP: 1.6 (ref 0.91–1.09)
PROTHROMBIN TIME: 18.8 SECONDS (ref 10–13.8)

## 2017-04-18 PROCEDURE — 85610 PROTHROMBIN TIME: CPT

## 2017-04-18 PROCEDURE — G0463 HOSPITAL OUTPT CLINIC VISIT: HCPCS

## 2017-04-18 PROCEDURE — 36416 COLLJ CAPILLARY BLOOD SPEC: CPT

## 2017-04-18 RX ORDER — INSULIN GLARGINE 100 [IU]/ML
8 INJECTION, SOLUTION SUBCUTANEOUS NIGHTLY
COMMUNITY
End: 2017-05-24

## 2017-04-18 NOTE — PROGRESS NOTES
Anticoagulation Clinic Progress Note  Indication: atrial fibrillation  Referring Provider: del  Initial Warfarin Start Date: 2008  Goal INR: 2-3  Current Drug Interactions: fluoxetine, trazodone, glimepiride      Anticoagulation Clinic INR History:  Date 9/26 10/25 11/7 11/30 12/21 1/3 1/17 2/7 3/7   Total Weekly Dose 20mg held doses for procedure 20mg 20 mg 17.5 mg 12.5 mg (2 held doses) 20mg 20mg 20mg   INR 2.6 1.3 2.7 3.5 2.0 1.5 2.3 2.4 2.0     Date 3/21 4/18          Total Weekly Dose 20mg 20mg          INR 2.42 (lab) 1.6          Notes  cooked spinach            Clinic Interview:  Tablet Strength: pt has 5mg tablets  Current Dose: pt verified dose of 2.5mg daily except 5mg Wednesdays  Patient Findings      Positives Change in diet/appetite     Negatives Signs/symptoms of thrombosis, Signs/symptoms of bleeding, Laboratory test error suspected, Change in health, Change in alcohol use, Change in activity, Upcoming invasive procedure, Emergency department visit, Upcoming dental procedure, Missed doses, Extra doses, Change in medications, Hospital admission, Bruising, Other complaints     Comments Pt has been eating more GLV recently (cooked spinach twice this past weekend/last night). Provided pt and his wife a list of foods and vitamin K content and discussed the importance of avoiding cooked leafy green vegetables due to risk of influence on INR -- they both verbalize understanding. Pt now on nightly Lantus in an effort to improve A1c prior to TKA. To see Dr Lopez this Friday for follow up.         Plan:  1. INR is subtherapeutic today, likely a result of recent diet (see above). Will instruct pt to BOOST his dose today (5mg), then continue warfarin 2.5mg daily except 5mg on Wednesdays.  2. RTC in 2 weeks to ensure INR back WNL.  3. Pt now on Lantus -- no other medication changes reported. Pt to let us know if TKA rescheduled once A1c improved.  4. Pt declines refills.   5. Verbal and written information  provided. Pt and wife express understanding and have no further questions at this time.      Ann Cowden, PharmD  4/18/2017  3:05 PM

## 2017-04-21 ENCOUNTER — OFFICE VISIT (OUTPATIENT)
Dept: INTERNAL MEDICINE | Facility: CLINIC | Age: 72
End: 2017-04-21

## 2017-04-21 VITALS
WEIGHT: 245.9 LBS | DIASTOLIC BLOOD PRESSURE: 80 MMHG | BODY MASS INDEX: 32.44 KG/M2 | HEART RATE: 77 BPM | OXYGEN SATURATION: 98 % | SYSTOLIC BLOOD PRESSURE: 100 MMHG

## 2017-04-21 DIAGNOSIS — IMO0001 UNCONTROLLED TYPE 2 DIABETES MELLITUS WITHOUT COMPLICATION, WITH LONG-TERM CURRENT USE OF INSULIN: Primary | ICD-10-CM

## 2017-04-21 LAB
GLUCOSE BLDC GLUCOMTR-MCNC: 72 MG/DL (ref 70–130)
HBA1C MFR BLD: 7.2 %

## 2017-04-21 PROCEDURE — 99213 OFFICE O/P EST LOW 20 MIN: CPT | Performed by: INTERNAL MEDICINE

## 2017-04-21 PROCEDURE — 83036 HEMOGLOBIN GLYCOSYLATED A1C: CPT | Performed by: INTERNAL MEDICINE

## 2017-04-21 PROCEDURE — 82947 ASSAY GLUCOSE BLOOD QUANT: CPT | Performed by: INTERNAL MEDICINE

## 2017-04-21 NOTE — PROGRESS NOTES
Subjective   Tony Wolf is a 71 y.o. male.   Chief Complaint   Patient presents with   • Diabetes       Diabetes   He presents for his follow-up diabetic visit. He has type 2 diabetes mellitus. Pertinent negatives for hypoglycemia include no confusion, headaches, nervousness/anxiousness, pallor, seizures or speech difficulty. Pertinent negatives for diabetes include no chest pain, no fatigue, no polydipsia and no polyphagia. His home blood glucose trend is decreasing steadily. An ACE inhibitor/angiotensin II receptor blocker is being taken. He does not see a podiatrist.Eye exam is current.        The following portions of the patient's history were reviewed and updated as appropriate: allergies, current medications, past family history, past medical history, past social history, past surgical history and problem list.  history    Review of Systems   Constitutional: Negative for activity change, appetite change, chills, diaphoresis, fatigue, fever and unexpected weight change.   HENT: Negative for congestion, ear discharge, ear pain, mouth sores, nosebleeds, sinus pressure, sneezing and sore throat.    Eyes: Negative for pain, discharge and itching.   Respiratory: Negative for cough, chest tightness, shortness of breath and wheezing.    Cardiovascular: Negative for chest pain, palpitations and leg swelling.   Gastrointestinal: Negative for abdominal pain, constipation, diarrhea, nausea and vomiting.   Endocrine: Negative for cold intolerance, heat intolerance, polydipsia and polyphagia.   Genitourinary: Negative for dysuria, flank pain, frequency, hematuria and urgency.   Musculoskeletal: Negative for arthralgias, back pain, gait problem, myalgias, neck pain and neck stiffness.        Knee pain   Skin: Negative for color change, pallor and rash.   Neurological: Negative for seizures, speech difficulty, numbness and headaches.   Psychiatric/Behavioral: Negative for agitation, confusion, decreased concentration and  sleep disturbance. The patient is not nervous/anxious.    /80  Pulse 77  Wt 245 lb 14.4 oz (112 kg)  SpO2 98%  BMI 32.44 kg/m2      Objective   Physical Exam   Constitutional: He is oriented to person, place, and time. He appears well-developed.   HENT:   Head: Normocephalic.   Right Ear: External ear normal.   Left Ear: External ear normal.   Nose: Nose normal.   Mouth/Throat: Oropharynx is clear and moist.   Eyes: Conjunctivae are normal. Pupils are equal, round, and reactive to light.   Neck: No JVD present. No thyromegaly present.   Cardiovascular: Normal rate, regular rhythm and normal heart sounds.  Exam reveals no friction rub.    No murmur heard.  Pulmonary/Chest: Effort normal and breath sounds normal. No respiratory distress. He has no wheezes. He has no rales.   Abdominal: Soft. Bowel sounds are normal. He exhibits no distension. There is no tenderness. There is no guarding.   Musculoskeletal: He exhibits no edema or tenderness.   Crepitus rt knee   Lymphadenopathy:     He has no cervical adenopathy.   Neurological: He is oriented to person, place, and time. He displays normal reflexes. No cranial nerve deficit.   Skin: No rash noted.   Psychiatric: His behavior is normal.   Nursing note and vitals reviewed.      Assessment/Plan   Tony was seen today for diabetes.    Diagnoses and all orders for this visit:    Uncontrolled type 2 diabetes mellitus without complication, with long-term current use of insulin  -     POC Glucose Fingerstick      On lantus 5 units at bedtime. Sugar today 72.  Increase lantus 8 units. A1c is now 7.2. May now proceed with knee surgery with Dr. Interiano.

## 2017-04-24 RX ORDER — AMLODIPINE BESYLATE 10 MG/1
TABLET ORAL
Qty: 30 TABLET | Refills: 11 | Status: SHIPPED | OUTPATIENT
Start: 2017-04-24 | End: 2017-05-24

## 2017-05-02 ENCOUNTER — APPOINTMENT (OUTPATIENT)
Dept: PHARMACY | Facility: HOSPITAL | Age: 72
End: 2017-05-02

## 2017-05-03 ENCOUNTER — ANTICOAGULATION VISIT (OUTPATIENT)
Dept: PHARMACY | Facility: HOSPITAL | Age: 72
End: 2017-05-03

## 2017-05-03 DIAGNOSIS — I48.91 ATRIAL FIBRILLATION, UNSPECIFIED TYPE (HCC): ICD-10-CM

## 2017-05-03 LAB
INR PPP: 2.4 (ref 0.91–1.09)
PROTHROMBIN TIME: 28.5 SECONDS (ref 10–13.8)

## 2017-05-03 PROCEDURE — 36416 COLLJ CAPILLARY BLOOD SPEC: CPT

## 2017-05-03 PROCEDURE — G0463 HOSPITAL OUTPT CLINIC VISIT: HCPCS

## 2017-05-03 PROCEDURE — 85610 PROTHROMBIN TIME: CPT

## 2017-05-08 RX ORDER — LISINOPRIL 40 MG/1
TABLET ORAL
Qty: 30 TABLET | Refills: 6 | Status: SHIPPED | OUTPATIENT
Start: 2017-05-08 | End: 2017-05-24

## 2017-05-24 ENCOUNTER — TELEPHONE (OUTPATIENT)
Dept: INTERNAL MEDICINE | Facility: CLINIC | Age: 72
End: 2017-05-24

## 2017-05-24 ENCOUNTER — APPOINTMENT (OUTPATIENT)
Dept: PREADMISSION TESTING | Facility: HOSPITAL | Age: 72
End: 2017-05-24

## 2017-05-24 VITALS — BODY MASS INDEX: 33.74 KG/M2 | HEIGHT: 72 IN | WEIGHT: 249.12 LBS

## 2017-05-24 LAB
ANION GAP SERPL CALCULATED.3IONS-SCNC: 2 MMOL/L (ref 3–11)
APTT PPP: 29.5 SECONDS (ref 24–31)
BACTERIA UR QL AUTO: ABNORMAL /HPF
BASOPHILS # BLD AUTO: 0.04 10*3/MM3 (ref 0–0.2)
BASOPHILS NFR BLD AUTO: 0.6 % (ref 0–1)
BILIRUB UR QL STRIP: NEGATIVE
BUN BLD-MCNC: 14 MG/DL (ref 9–23)
BUN/CREAT SERPL: 14 (ref 7–25)
CALCIUM SPEC-SCNC: 9.4 MG/DL (ref 8.7–10.4)
CHLORIDE SERPL-SCNC: 106 MMOL/L (ref 99–109)
CLARITY UR: CLEAR
CO2 SERPL-SCNC: 35 MMOL/L (ref 20–31)
COLOR UR: YELLOW
CREAT BLD-MCNC: 1 MG/DL (ref 0.6–1.3)
CRP SERPL-MCNC: 0.08 MG/DL (ref 0–1)
DEPRECATED RDW RBC AUTO: 51.1 FL (ref 37–54)
EOSINOPHIL # BLD AUTO: 0.17 10*3/MM3 (ref 0.1–0.3)
EOSINOPHIL NFR BLD AUTO: 2.5 % (ref 0–3)
ERYTHROCYTE [DISTWIDTH] IN BLOOD BY AUTOMATED COUNT: 14.6 % (ref 11.3–14.5)
ERYTHROCYTE [SEDIMENTATION RATE] IN BLOOD: 3 MM/HR (ref 0–20)
GFR SERPL CREATININE-BSD FRML MDRD: 74 ML/MIN/1.73
GLUCOSE BLD-MCNC: 225 MG/DL (ref 70–100)
GLUCOSE UR STRIP-MCNC: NEGATIVE MG/DL
HBA1C MFR BLD: 6.7 % (ref 4.8–5.6)
HCT VFR BLD AUTO: 50.1 % (ref 38.9–50.9)
HGB BLD-MCNC: 16.3 G/DL (ref 13.1–17.5)
HGB UR QL STRIP.AUTO: NEGATIVE
HYALINE CASTS UR QL AUTO: ABNORMAL /LPF
IMM GRANULOCYTES # BLD: 0.01 10*3/MM3 (ref 0–0.03)
IMM GRANULOCYTES NFR BLD: 0.1 % (ref 0–0.6)
INR PPP: 1.69
KETONES UR QL STRIP: NEGATIVE
LEUKOCYTE ESTERASE UR QL STRIP.AUTO: NEGATIVE
LYMPHOCYTES # BLD AUTO: 1.87 10*3/MM3 (ref 0.6–4.8)
LYMPHOCYTES NFR BLD AUTO: 27.2 % (ref 24–44)
MCH RBC QN AUTO: 30.8 PG (ref 27–31)
MCHC RBC AUTO-ENTMCNC: 32.5 G/DL (ref 32–36)
MCV RBC AUTO: 94.7 FL (ref 80–99)
MONOCYTES # BLD AUTO: 0.59 10*3/MM3 (ref 0–1)
MONOCYTES NFR BLD AUTO: 8.6 % (ref 0–12)
NEUTROPHILS # BLD AUTO: 4.19 10*3/MM3 (ref 1.5–8.3)
NEUTROPHILS NFR BLD AUTO: 61 % (ref 41–71)
NITRITE UR QL STRIP: NEGATIVE
PH UR STRIP.AUTO: 6.5 [PH] (ref 5–8)
PLATELET # BLD AUTO: 142 10*3/MM3 (ref 150–450)
PMV BLD AUTO: 11.5 FL (ref 6–12)
POTASSIUM BLD-SCNC: 4.3 MMOL/L (ref 3.5–5.5)
PROT UR QL STRIP: ABNORMAL
PROTHROMBIN TIME: 18.7 SECONDS (ref 9.6–11.5)
RBC # BLD AUTO: 5.29 10*6/MM3 (ref 4.2–5.76)
RBC # UR: ABNORMAL /HPF
REF LAB TEST METHOD: ABNORMAL
SODIUM BLD-SCNC: 143 MMOL/L (ref 132–146)
SP GR UR STRIP: 1.02 (ref 1–1.03)
SQUAMOUS #/AREA URNS HPF: ABNORMAL /HPF
UROBILINOGEN UR QL STRIP: ABNORMAL
WBC NRBC COR # BLD: 6.87 10*3/MM3 (ref 3.5–10.8)
WBC UR QL AUTO: ABNORMAL /HPF

## 2017-05-24 PROCEDURE — 85025 COMPLETE CBC W/AUTO DIFF WBC: CPT | Performed by: ORTHOPAEDIC SURGERY

## 2017-05-24 PROCEDURE — 85610 PROTHROMBIN TIME: CPT | Performed by: ORTHOPAEDIC SURGERY

## 2017-05-24 PROCEDURE — 83036 HEMOGLOBIN GLYCOSYLATED A1C: CPT | Performed by: ORTHOPAEDIC SURGERY

## 2017-05-24 PROCEDURE — 85652 RBC SED RATE AUTOMATED: CPT | Performed by: ORTHOPAEDIC SURGERY

## 2017-05-24 PROCEDURE — 86140 C-REACTIVE PROTEIN: CPT | Performed by: ORTHOPAEDIC SURGERY

## 2017-05-24 PROCEDURE — 81001 URINALYSIS AUTO W/SCOPE: CPT | Performed by: ORTHOPAEDIC SURGERY

## 2017-05-24 PROCEDURE — 36415 COLL VENOUS BLD VENIPUNCTURE: CPT

## 2017-05-24 PROCEDURE — 80048 BASIC METABOLIC PNL TOTAL CA: CPT | Performed by: ORTHOPAEDIC SURGERY

## 2017-05-24 PROCEDURE — 85730 THROMBOPLASTIN TIME PARTIAL: CPT | Performed by: ORTHOPAEDIC SURGERY

## 2017-05-24 RX ORDER — AMLODIPINE BESYLATE 10 MG/1
10 TABLET ORAL DAILY
COMMUNITY
End: 2018-05-03 | Stop reason: SDUPTHER

## 2017-05-24 RX ORDER — ACETAMINOPHEN 500 MG
1000 TABLET ORAL EVERY 6 HOURS PRN
COMMUNITY

## 2017-05-24 RX ORDER — METOPROLOL SUCCINATE 100 MG/1
100 TABLET, EXTENDED RELEASE ORAL 2 TIMES DAILY
COMMUNITY
End: 2018-02-23 | Stop reason: SDUPTHER

## 2017-05-24 RX ORDER — INSULIN GLARGINE 100 [IU]/ML
10 INJECTION, SOLUTION SUBCUTANEOUS NIGHTLY
Qty: 9 ML | Refills: 3 | Status: SHIPPED | OUTPATIENT
Start: 2017-05-24 | End: 2017-05-26

## 2017-05-24 RX ORDER — LISINOPRIL 40 MG/1
40 TABLET ORAL DAILY
COMMUNITY
End: 2018-01-10 | Stop reason: SDUPTHER

## 2017-05-25 DIAGNOSIS — I48.20 CHRONIC ATRIAL FIBRILLATION (HCC): ICD-10-CM

## 2017-05-25 RX ORDER — WARFARIN SODIUM 5 MG/1
TABLET ORAL
Qty: 60 TABLET | Refills: 2 | Status: SHIPPED | OUTPATIENT
Start: 2017-05-25 | End: 2017-06-02 | Stop reason: HOSPADM

## 2017-05-30 ENCOUNTER — ANESTHESIA EVENT (OUTPATIENT)
Dept: PERIOP | Facility: HOSPITAL | Age: 72
End: 2017-05-30

## 2017-05-30 ENCOUNTER — APPOINTMENT (OUTPATIENT)
Dept: GENERAL RADIOLOGY | Facility: HOSPITAL | Age: 72
End: 2017-05-30

## 2017-05-30 ENCOUNTER — ANESTHESIA (OUTPATIENT)
Dept: PERIOP | Facility: HOSPITAL | Age: 72
End: 2017-05-30

## 2017-05-30 ENCOUNTER — HOSPITAL ENCOUNTER (INPATIENT)
Facility: HOSPITAL | Age: 72
LOS: 3 days | Discharge: NURSING, REHAB OR LONG-TERM CARE | End: 2017-06-02
Attending: ORTHOPAEDIC SURGERY | Admitting: ORTHOPAEDIC SURGERY

## 2017-05-30 DIAGNOSIS — Z74.09 IMPAIRED MOBILITY AND ADLS: ICD-10-CM

## 2017-05-30 DIAGNOSIS — I48.20 CHRONIC ATRIAL FIBRILLATION (HCC): ICD-10-CM

## 2017-05-30 DIAGNOSIS — Z78.9 IMPAIRED MOBILITY AND ADLS: ICD-10-CM

## 2017-05-30 DIAGNOSIS — Z74.09 IMPAIRED FUNCTIONAL MOBILITY, BALANCE, GAIT, AND ENDURANCE: Primary | ICD-10-CM

## 2017-05-30 DIAGNOSIS — R13.12 OROPHARYNGEAL DYSPHAGIA: ICD-10-CM

## 2017-05-30 PROBLEM — Z96.651 STATUS POST RIGHT KNEE REPLACEMENT: Status: ACTIVE | Noted: 2017-05-30

## 2017-05-30 PROBLEM — M17.11 ARTHRITIS OF KNEE, RIGHT: Status: ACTIVE | Noted: 2017-05-30

## 2017-05-30 LAB
ABO GROUP BLD: NORMAL
BLD GP AB SCN SERPL QL: NEGATIVE
GLUCOSE BLDC GLUCOMTR-MCNC: 121 MG/DL (ref 70–130)
GLUCOSE BLDC GLUCOMTR-MCNC: 186 MG/DL (ref 70–130)
INR PPP: 1.36
POTASSIUM BLDA-SCNC: 4.21 MMOL/L (ref 3.5–5.3)
PROTHROMBIN TIME: 15 SECONDS (ref 9.6–11.5)
RH BLD: POSITIVE

## 2017-05-30 PROCEDURE — 25010000002 FENTANYL CITRATE (PF) 100 MCG/2ML SOLUTION: Performed by: NURSE ANESTHETIST, CERTIFIED REGISTERED

## 2017-05-30 PROCEDURE — 25010000002 DEXAMETHASONE PER 1 MG: Performed by: NURSE ANESTHETIST, CERTIFIED REGISTERED

## 2017-05-30 PROCEDURE — 84132 ASSAY OF SERUM POTASSIUM: CPT | Performed by: ANESTHESIOLOGY

## 2017-05-30 PROCEDURE — 82962 GLUCOSE BLOOD TEST: CPT

## 2017-05-30 PROCEDURE — 85610 PROTHROMBIN TIME: CPT | Performed by: ANESTHESIOLOGY

## 2017-05-30 PROCEDURE — C1713 ANCHOR/SCREW BN/BN,TIS/BN: HCPCS | Performed by: ORTHOPAEDIC SURGERY

## 2017-05-30 PROCEDURE — 25010000002 MORPHINE PER 10 MG: Performed by: ORTHOPAEDIC SURGERY

## 2017-05-30 PROCEDURE — 25010000002 PROPOFOL 10 MG/ML EMULSION: Performed by: NURSE ANESTHETIST, CERTIFIED REGISTERED

## 2017-05-30 PROCEDURE — 25010000002 ROPIVACAINE PER 1 MG: Performed by: ORTHOPAEDIC SURGERY

## 2017-05-30 PROCEDURE — 25010000003 MORPHINE PER 10 MG: Performed by: ORTHOPAEDIC SURGERY

## 2017-05-30 PROCEDURE — C1776 JOINT DEVICE (IMPLANTABLE): HCPCS | Performed by: ORTHOPAEDIC SURGERY

## 2017-05-30 PROCEDURE — 86901 BLOOD TYPING SEROLOGIC RH(D): CPT | Performed by: ORTHOPAEDIC SURGERY

## 2017-05-30 PROCEDURE — 73560 X-RAY EXAM OF KNEE 1 OR 2: CPT

## 2017-05-30 PROCEDURE — 0SRC0J9 REPLACEMENT OF RIGHT KNEE JOINT WITH SYNTHETIC SUBSTITUTE, CEMENTED, OPEN APPROACH: ICD-10-PCS | Performed by: ORTHOPAEDIC SURGERY

## 2017-05-30 PROCEDURE — 25010000003 CEFAZOLIN IN DEXTROSE 2-4 GM/100ML-% SOLUTION: Performed by: ORTHOPAEDIC SURGERY

## 2017-05-30 PROCEDURE — 25010000002 ONDANSETRON PER 1 MG: Performed by: NURSE ANESTHETIST, CERTIFIED REGISTERED

## 2017-05-30 PROCEDURE — 86900 BLOOD TYPING SEROLOGIC ABO: CPT | Performed by: ORTHOPAEDIC SURGERY

## 2017-05-30 PROCEDURE — 63710000001 INSULIN LISPRO (HUMAN) PER 5 UNITS: Performed by: NURSE PRACTITIONER

## 2017-05-30 PROCEDURE — 25010000002 ONDANSETRON PER 1 MG: Performed by: NURSE PRACTITIONER

## 2017-05-30 PROCEDURE — 86850 RBC ANTIBODY SCREEN: CPT | Performed by: ORTHOPAEDIC SURGERY

## 2017-05-30 PROCEDURE — 25010000002 NEOSTIGMINE 10 MG/10ML SOLUTION: Performed by: NURSE ANESTHETIST, CERTIFIED REGISTERED

## 2017-05-30 PROCEDURE — 25010000002 ROPIVACAINE PER 1 MG: Performed by: NURSE ANESTHETIST, CERTIFIED REGISTERED

## 2017-05-30 PROCEDURE — 25010000002 HYDROMORPHONE PER 4 MG: Performed by: NURSE ANESTHETIST, CERTIFIED REGISTERED

## 2017-05-30 DEVICE — ATTUNE PATELLA MEDIALIZED ANATOMIC 41MM CEMENTED AOX
Type: IMPLANTABLE DEVICE | Status: FUNCTIONAL
Brand: ATTUNE

## 2017-05-30 DEVICE — ATTUNE KNEE SYSTEM FEMORAL POSTERIOR STABILIZED SIZE 7 RIGHT CEMENTED
Type: IMPLANTABLE DEVICE | Status: FUNCTIONAL
Brand: ATTUNE

## 2017-05-30 DEVICE — ATTUNE KNEE SYSTEM TIBIAL BASE ROTATING PLATFORM SIZE 7 CEMENTED
Type: IMPLANTABLE DEVICE | Status: FUNCTIONAL
Brand: ATTUNE

## 2017-05-30 DEVICE — TOTL KN ATTUNE DEPUY 9527038: Type: IMPLANTABLE DEVICE | Status: FUNCTIONAL

## 2017-05-30 DEVICE — ATTUNE KNEE SYSTEM TIBIAL INSERT ROTATING PLATFORM POSTERIOR STABILIZED 7 8MM AOX
Type: IMPLANTABLE DEVICE | Status: FUNCTIONAL
Brand: ATTUNE

## 2017-05-30 DEVICE — CMT BONE SIMPLEX/P FULL DOSE 10/PK: Type: IMPLANTABLE DEVICE | Site: KNEE | Status: FUNCTIONAL

## 2017-05-30 RX ORDER — OXYCODONE HCL 10 MG/1
10 TABLET, FILM COATED, EXTENDED RELEASE ORAL ONCE
Status: COMPLETED | OUTPATIENT
Start: 2017-05-30 | End: 2017-05-30

## 2017-05-30 RX ORDER — BISACODYL 5 MG/1
10 TABLET, DELAYED RELEASE ORAL DAILY PRN
Status: DISCONTINUED | OUTPATIENT
Start: 2017-05-30 | End: 2017-06-02 | Stop reason: HOSPADM

## 2017-05-30 RX ORDER — SODIUM CHLORIDE 0.9 % (FLUSH) 0.9 %
1-10 SYRINGE (ML) INJECTION AS NEEDED
Status: DISCONTINUED | OUTPATIENT
Start: 2017-05-30 | End: 2017-06-02 | Stop reason: HOSPADM

## 2017-05-30 RX ORDER — GLYCOPYRROLATE 0.2 MG/ML
INJECTION INTRAMUSCULAR; INTRAVENOUS AS NEEDED
Status: DISCONTINUED | OUTPATIENT
Start: 2017-05-30 | End: 2017-05-30 | Stop reason: SURG

## 2017-05-30 RX ORDER — SENNA AND DOCUSATE SODIUM 50; 8.6 MG/1; MG/1
2 TABLET, FILM COATED ORAL 2 TIMES DAILY
Status: DISCONTINUED | OUTPATIENT
Start: 2017-05-30 | End: 2017-06-02 | Stop reason: HOSPADM

## 2017-05-30 RX ORDER — PROPOFOL 10 MG/ML
VIAL (ML) INTRAVENOUS AS NEEDED
Status: DISCONTINUED | OUTPATIENT
Start: 2017-05-30 | End: 2017-05-30 | Stop reason: SURG

## 2017-05-30 RX ORDER — HYDROMORPHONE HYDROCHLORIDE 1 MG/ML
0.5 INJECTION, SOLUTION INTRAMUSCULAR; INTRAVENOUS; SUBCUTANEOUS
Status: DISCONTINUED | OUTPATIENT
Start: 2017-05-30 | End: 2017-06-02 | Stop reason: HOSPADM

## 2017-05-30 RX ORDER — FENTANYL CITRATE 50 UG/ML
INJECTION, SOLUTION INTRAMUSCULAR; INTRAVENOUS AS NEEDED
Status: DISCONTINUED | OUTPATIENT
Start: 2017-05-30 | End: 2017-05-30 | Stop reason: SURG

## 2017-05-30 RX ORDER — ACETAMINOPHEN 500 MG
1000 TABLET ORAL ONCE
Status: COMPLETED | OUTPATIENT
Start: 2017-05-30 | End: 2017-05-30

## 2017-05-30 RX ORDER — MAGNESIUM HYDROXIDE 1200 MG/15ML
LIQUID ORAL AS NEEDED
Status: DISCONTINUED | OUTPATIENT
Start: 2017-05-30 | End: 2017-05-30 | Stop reason: HOSPADM

## 2017-05-30 RX ORDER — SODIUM CHLORIDE 0.9 % (FLUSH) 0.9 %
1-10 SYRINGE (ML) INJECTION AS NEEDED
Status: DISCONTINUED | OUTPATIENT
Start: 2017-05-30 | End: 2017-05-30 | Stop reason: HOSPADM

## 2017-05-30 RX ORDER — AMLODIPINE BESYLATE 10 MG/1
10 TABLET ORAL DAILY
Status: DISCONTINUED | OUTPATIENT
Start: 2017-05-31 | End: 2017-06-02 | Stop reason: HOSPADM

## 2017-05-30 RX ORDER — ONDANSETRON 2 MG/ML
INJECTION INTRAMUSCULAR; INTRAVENOUS AS NEEDED
Status: DISCONTINUED | OUTPATIENT
Start: 2017-05-30 | End: 2017-05-30 | Stop reason: SURG

## 2017-05-30 RX ORDER — ONDANSETRON 2 MG/ML
4 INJECTION INTRAMUSCULAR; INTRAVENOUS ONCE AS NEEDED
Status: DISCONTINUED | OUTPATIENT
Start: 2017-05-30 | End: 2017-05-30 | Stop reason: SDUPTHER

## 2017-05-30 RX ORDER — NALOXONE HCL 0.4 MG/ML
0.4 VIAL (ML) INJECTION
Status: DISCONTINUED | OUTPATIENT
Start: 2017-05-30 | End: 2017-06-02 | Stop reason: HOSPADM

## 2017-05-30 RX ORDER — NALOXONE HCL 0.4 MG/ML
0.1 VIAL (ML) INJECTION
Status: DISCONTINUED | OUTPATIENT
Start: 2017-05-30 | End: 2017-06-02 | Stop reason: HOSPADM

## 2017-05-30 RX ORDER — FAMOTIDINE 10 MG/ML
20 INJECTION, SOLUTION INTRAVENOUS ONCE
Status: CANCELLED | OUTPATIENT
Start: 2017-05-30 | End: 2017-05-30

## 2017-05-30 RX ORDER — NEOSTIGMINE METHYLSULFATE 1 MG/ML
INJECTION, SOLUTION INTRAVENOUS AS NEEDED
Status: DISCONTINUED | OUTPATIENT
Start: 2017-05-30 | End: 2017-05-30 | Stop reason: SURG

## 2017-05-30 RX ORDER — CEFAZOLIN SODIUM 2 G/100ML
2 INJECTION, SOLUTION INTRAVENOUS ONCE
Status: COMPLETED | OUTPATIENT
Start: 2017-05-30 | End: 2017-05-30

## 2017-05-30 RX ORDER — DEXTROSE MONOHYDRATE 25 G/50ML
25 INJECTION, SOLUTION INTRAVENOUS
Status: DISCONTINUED | OUTPATIENT
Start: 2017-05-30 | End: 2017-06-02 | Stop reason: HOSPADM

## 2017-05-30 RX ORDER — DEXTROSE MONOHYDRATE 25 G/50ML
25 INJECTION, SOLUTION INTRAVENOUS
Status: DISCONTINUED | OUTPATIENT
Start: 2017-05-30 | End: 2017-05-30 | Stop reason: SDUPTHER

## 2017-05-30 RX ORDER — WARFARIN SODIUM 5 MG/1
5 TABLET ORAL WEEKLY
Status: DISCONTINUED | OUTPATIENT
Start: 2017-05-31 | End: 2017-05-31

## 2017-05-30 RX ORDER — NICOTINE POLACRILEX 4 MG
15 LOZENGE BUCCAL
Status: DISCONTINUED | OUTPATIENT
Start: 2017-05-30 | End: 2017-06-02 | Stop reason: HOSPADM

## 2017-05-30 RX ORDER — DOCUSATE SODIUM 100 MG/1
100 CAPSULE, LIQUID FILLED ORAL 2 TIMES DAILY PRN
Status: DISCONTINUED | OUTPATIENT
Start: 2017-05-30 | End: 2017-06-02 | Stop reason: HOSPADM

## 2017-05-30 RX ORDER — ONDANSETRON 2 MG/ML
4 INJECTION INTRAMUSCULAR; INTRAVENOUS EVERY 6 HOURS PRN
Status: DISCONTINUED | OUTPATIENT
Start: 2017-05-30 | End: 2017-06-02 | Stop reason: HOSPADM

## 2017-05-30 RX ORDER — HYDRALAZINE HYDROCHLORIDE 20 MG/ML
10 INJECTION INTRAMUSCULAR; INTRAVENOUS EVERY 6 HOURS PRN
Status: DISCONTINUED | OUTPATIENT
Start: 2017-05-30 | End: 2017-06-02 | Stop reason: HOSPADM

## 2017-05-30 RX ORDER — SODIUM CHLORIDE, SODIUM LACTATE, POTASSIUM CHLORIDE, CALCIUM CHLORIDE 600; 310; 30; 20 MG/100ML; MG/100ML; MG/100ML; MG/100ML
100 INJECTION, SOLUTION INTRAVENOUS CONTINUOUS
Status: DISCONTINUED | OUTPATIENT
Start: 2017-05-30 | End: 2017-05-30 | Stop reason: SDUPTHER

## 2017-05-30 RX ORDER — ONDANSETRON 2 MG/ML
4 INJECTION INTRAMUSCULAR; INTRAVENOUS EVERY 6 HOURS PRN
Status: DISCONTINUED | OUTPATIENT
Start: 2017-05-30 | End: 2017-05-30 | Stop reason: SDUPTHER

## 2017-05-30 RX ORDER — FLUOXETINE HYDROCHLORIDE 20 MG/1
80 CAPSULE ORAL NIGHTLY
Status: DISCONTINUED | OUTPATIENT
Start: 2017-05-30 | End: 2017-06-02 | Stop reason: HOSPADM

## 2017-05-30 RX ORDER — HYDROCODONE BITARTRATE AND ACETAMINOPHEN 7.5; 325 MG/1; MG/1
1 TABLET ORAL EVERY 4 HOURS PRN
Status: DISCONTINUED | OUTPATIENT
Start: 2017-05-30 | End: 2017-06-02 | Stop reason: HOSPADM

## 2017-05-30 RX ORDER — SODIUM CHLORIDE, SODIUM LACTATE, POTASSIUM CHLORIDE, CALCIUM CHLORIDE 600; 310; 30; 20 MG/100ML; MG/100ML; MG/100ML; MG/100ML
9 INJECTION, SOLUTION INTRAVENOUS CONTINUOUS
Status: DISCONTINUED | OUTPATIENT
Start: 2017-05-30 | End: 2017-05-30

## 2017-05-30 RX ORDER — CEFAZOLIN SODIUM 2 G/100ML
2 INJECTION, SOLUTION INTRAVENOUS EVERY 8 HOURS
Status: COMPLETED | OUTPATIENT
Start: 2017-05-31 | End: 2017-05-31

## 2017-05-30 RX ORDER — SODIUM CHLORIDE 9 MG/ML
120 INJECTION, SOLUTION INTRAVENOUS CONTINUOUS
Status: DISCONTINUED | OUTPATIENT
Start: 2017-05-30 | End: 2017-06-02 | Stop reason: HOSPADM

## 2017-05-30 RX ORDER — LIDOCAINE HYDROCHLORIDE 10 MG/ML
0.5 INJECTION, SOLUTION EPIDURAL; INFILTRATION; INTRACAUDAL; PERINEURAL ONCE AS NEEDED
Status: COMPLETED | OUTPATIENT
Start: 2017-05-30 | End: 2017-05-30

## 2017-05-30 RX ORDER — NICOTINE POLACRILEX 4 MG
15 LOZENGE BUCCAL
Status: DISCONTINUED | OUTPATIENT
Start: 2017-05-30 | End: 2017-05-30 | Stop reason: SDUPTHER

## 2017-05-30 RX ORDER — MEPERIDINE HYDROCHLORIDE 25 MG/ML
12.5 INJECTION INTRAMUSCULAR; INTRAVENOUS; SUBCUTANEOUS
Status: ACTIVE | OUTPATIENT
Start: 2017-05-30 | End: 2017-05-31

## 2017-05-30 RX ORDER — LISINOPRIL 40 MG/1
40 TABLET ORAL DAILY
Status: DISCONTINUED | OUTPATIENT
Start: 2017-05-31 | End: 2017-06-02 | Stop reason: HOSPADM

## 2017-05-30 RX ORDER — FAMOTIDINE 20 MG/1
20 TABLET, FILM COATED ORAL ONCE
Status: COMPLETED | OUTPATIENT
Start: 2017-05-30 | End: 2017-05-30

## 2017-05-30 RX ORDER — TRAZODONE HYDROCHLORIDE 50 MG/1
25 TABLET ORAL NIGHTLY PRN
Status: DISCONTINUED | OUTPATIENT
Start: 2017-05-30 | End: 2017-06-02 | Stop reason: HOSPADM

## 2017-05-30 RX ORDER — ROPIVACAINE HYDROCHLORIDE 2 MG/ML
12 INJECTION, SOLUTION EPIDURAL; INFILTRATION CONTINUOUS
Status: DISCONTINUED | OUTPATIENT
Start: 2017-05-30 | End: 2017-06-02 | Stop reason: HOSPADM

## 2017-05-30 RX ORDER — LIDOCAINE HYDROCHLORIDE 10 MG/ML
INJECTION, SOLUTION EPIDURAL; INFILTRATION; INTRACAUDAL; PERINEURAL AS NEEDED
Status: DISCONTINUED | OUTPATIENT
Start: 2017-05-30 | End: 2017-05-30 | Stop reason: SURG

## 2017-05-30 RX ORDER — PREGABALIN 75 MG/1
75 CAPSULE ORAL ONCE
Status: COMPLETED | OUTPATIENT
Start: 2017-05-30 | End: 2017-05-30

## 2017-05-30 RX ORDER — METOPROLOL SUCCINATE 100 MG/1
100 TABLET, EXTENDED RELEASE ORAL DAILY
Status: DISCONTINUED | OUTPATIENT
Start: 2017-05-31 | End: 2017-06-02 | Stop reason: HOSPADM

## 2017-05-30 RX ORDER — ATRACURIUM BESYLATE 10 MG/ML
INJECTION, SOLUTION INTRAVENOUS AS NEEDED
Status: DISCONTINUED | OUTPATIENT
Start: 2017-05-30 | End: 2017-05-30 | Stop reason: SURG

## 2017-05-30 RX ORDER — DEXAMETHASONE SODIUM PHOSPHATE 4 MG/ML
INJECTION, SOLUTION INTRA-ARTICULAR; INTRALESIONAL; INTRAMUSCULAR; INTRAVENOUS; SOFT TISSUE AS NEEDED
Status: DISCONTINUED | OUTPATIENT
Start: 2017-05-30 | End: 2017-05-30 | Stop reason: SURG

## 2017-05-30 RX ORDER — WARFARIN SODIUM 2.5 MG/1
2.5 TABLET ORAL
Status: DISCONTINUED | OUTPATIENT
Start: 2017-05-30 | End: 2017-05-31 | Stop reason: DRUGHIGH

## 2017-05-30 RX ORDER — MORPHINE SULFATE 4 MG/ML
4 INJECTION, SOLUTION INTRAMUSCULAR; INTRAVENOUS
Status: DISCONTINUED | OUTPATIENT
Start: 2017-05-30 | End: 2017-06-02 | Stop reason: HOSPADM

## 2017-05-30 RX ORDER — BISACODYL 10 MG
10 SUPPOSITORY, RECTAL RECTAL DAILY PRN
Status: DISCONTINUED | OUTPATIENT
Start: 2017-05-30 | End: 2017-06-02 | Stop reason: HOSPADM

## 2017-05-30 RX ORDER — FENTANYL CITRATE 50 UG/ML
50 INJECTION, SOLUTION INTRAMUSCULAR; INTRAVENOUS
Status: DISCONTINUED | OUTPATIENT
Start: 2017-05-30 | End: 2017-06-02 | Stop reason: HOSPADM

## 2017-05-30 RX ORDER — INSULIN GLARGINE 100 [IU]/ML
10 INJECTION, SOLUTION SUBCUTANEOUS NIGHTLY
Status: DISCONTINUED | OUTPATIENT
Start: 2017-05-30 | End: 2017-05-31

## 2017-05-30 RX ADMIN — SODIUM CHLORIDE, POTASSIUM CHLORIDE, SODIUM LACTATE AND CALCIUM CHLORIDE: 600; 310; 30; 20 INJECTION, SOLUTION INTRAVENOUS at 15:48

## 2017-05-30 RX ADMIN — FENTANYL CITRATE 50 MCG: 50 INJECTION, SOLUTION INTRAMUSCULAR; INTRAVENOUS at 16:10

## 2017-05-30 RX ADMIN — HYDROMORPHONE HYDROCHLORIDE 0.5 MG: 1 INJECTION, SOLUTION INTRAMUSCULAR; INTRAVENOUS; SUBCUTANEOUS at 18:35

## 2017-05-30 RX ADMIN — SODIUM CHLORIDE 120 ML/HR: 9 INJECTION, SOLUTION INTRAVENOUS at 20:51

## 2017-05-30 RX ADMIN — FLUOXETINE HYDROCHLORIDE 80 MG: 20 CAPSULE ORAL at 21:56

## 2017-05-30 RX ADMIN — SODIUM CHLORIDE, POTASSIUM CHLORIDE, SODIUM LACTATE AND CALCIUM CHLORIDE 9 ML/HR: 600; 310; 30; 20 INJECTION, SOLUTION INTRAVENOUS at 14:00

## 2017-05-30 RX ADMIN — FENTANYL CITRATE 100 MCG: 50 INJECTION, SOLUTION INTRAMUSCULAR; INTRAVENOUS at 15:57

## 2017-05-30 RX ADMIN — ONDANSETRON 4 MG: 2 INJECTION INTRAMUSCULAR; INTRAVENOUS at 17:27

## 2017-05-30 RX ADMIN — HYDROMORPHONE HYDROCHLORIDE 0.5 MG: 1 INJECTION, SOLUTION INTRAMUSCULAR; INTRAVENOUS; SUBCUTANEOUS at 18:20

## 2017-05-30 RX ADMIN — FAMOTIDINE 20 MG: 20 TABLET, FILM COATED ORAL at 14:05

## 2017-05-30 RX ADMIN — PROPOFOL 150 MG: 10 INJECTION, EMULSION INTRAVENOUS at 15:57

## 2017-05-30 RX ADMIN — FENTANYL CITRATE 50 MCG: 50 INJECTION, SOLUTION INTRAMUSCULAR; INTRAVENOUS at 16:23

## 2017-05-30 RX ADMIN — DEXAMETHASONE SODIUM PHOSPHATE 8 MG: 4 INJECTION, SOLUTION INTRAMUSCULAR; INTRAVENOUS at 16:05

## 2017-05-30 RX ADMIN — FENTANYL CITRATE 50 MCG: 50 INJECTION INTRAMUSCULAR; INTRAVENOUS at 18:05

## 2017-05-30 RX ADMIN — FENTANYL CITRATE 50 MCG: 50 INJECTION INTRAMUSCULAR; INTRAVENOUS at 18:15

## 2017-05-30 RX ADMIN — CEFAZOLIN SODIUM 2 G: 2 INJECTION, SOLUTION INTRAVENOUS at 15:48

## 2017-05-30 RX ADMIN — ROPIVACAINE HYDROCHLORIDE 12 ML/HR: 2 INJECTION, SOLUTION EPIDURAL; INFILTRATION at 18:26

## 2017-05-30 RX ADMIN — LIDOCAINE HYDROCHLORIDE 50 MG: 10 INJECTION, SOLUTION EPIDURAL; INFILTRATION; INTRACAUDAL; PERINEURAL at 15:57

## 2017-05-30 RX ADMIN — MUPIROCIN: 20 OINTMENT TOPICAL at 14:22

## 2017-05-30 RX ADMIN — ONDANSETRON 4 MG: 2 INJECTION INTRAMUSCULAR; INTRAVENOUS at 21:09

## 2017-05-30 RX ADMIN — ROBINUL 0.4 MG: 0.2 INJECTION INTRAMUSCULAR; INTRAVENOUS at 17:27

## 2017-05-30 RX ADMIN — PREGABALIN 75 MG: 75 CAPSULE ORAL at 14:05

## 2017-05-30 RX ADMIN — ACETAMINOPHEN 1000 MG: 500 TABLET ORAL at 14:05

## 2017-05-30 RX ADMIN — NEOSTIGMINE METHYLSULFATE 3 MG: 1 INJECTION, SOLUTION INTRAVENOUS at 17:27

## 2017-05-30 RX ADMIN — TRANEXAMIC ACID 1000 MG: 100 INJECTION, SOLUTION INTRAVENOUS at 16:09

## 2017-05-30 RX ADMIN — FENTANYL CITRATE 50 MCG: 50 INJECTION, SOLUTION INTRAMUSCULAR; INTRAVENOUS at 16:15

## 2017-05-30 RX ADMIN — LIDOCAINE HYDROCHLORIDE 0.5 ML: 10 INJECTION, SOLUTION EPIDURAL; INFILTRATION; INTRACAUDAL; PERINEURAL at 14:00

## 2017-05-30 RX ADMIN — OXYCODONE HYDROCHLORIDE 10 MG: 10 TABLET, FILM COATED, EXTENDED RELEASE ORAL at 14:05

## 2017-05-30 RX ADMIN — WARFARIN SODIUM 2.5 MG: 2.5 TABLET ORAL at 21:56

## 2017-05-30 RX ADMIN — DOCUSATE SODIUM AND SENNOSIDES 2 TABLET: 8.6; 5 TABLET, FILM COATED ORAL at 21:56

## 2017-05-30 RX ADMIN — MORPHINE SULFATE 4 MG: 4 INJECTION, SOLUTION INTRAMUSCULAR; INTRAVENOUS at 23:48

## 2017-05-30 RX ADMIN — INSULIN LISPRO 2 UNITS: 100 INJECTION, SOLUTION INTRAVENOUS; SUBCUTANEOUS at 21:57

## 2017-05-30 RX ADMIN — ATRACURIUM BESYLATE 50 MG: 10 INJECTION, SOLUTION INTRAVENOUS at 15:57

## 2017-05-31 PROBLEM — D69.6 THROMBOCYTOPENIA: Status: ACTIVE | Noted: 2017-05-31

## 2017-05-31 PROBLEM — D72.829 LEUKOCYTOSIS: Status: ACTIVE | Noted: 2017-05-31

## 2017-05-31 LAB
ANION GAP SERPL CALCULATED.3IONS-SCNC: 6 MMOL/L (ref 3–11)
BUN BLD-MCNC: 13 MG/DL (ref 9–23)
BUN/CREAT SERPL: 14.4 (ref 7–25)
CALCIUM SPEC-SCNC: 8.7 MG/DL (ref 8.7–10.4)
CHLORIDE SERPL-SCNC: 106 MMOL/L (ref 99–109)
CO2 SERPL-SCNC: 28 MMOL/L (ref 20–31)
CREAT BLD-MCNC: 0.9 MG/DL (ref 0.6–1.3)
DEPRECATED RDW RBC AUTO: 53.2 FL (ref 37–54)
ERYTHROCYTE [DISTWIDTH] IN BLOOD BY AUTOMATED COUNT: 15.1 % (ref 11.3–14.5)
GFR SERPL CREATININE-BSD FRML MDRD: 84 ML/MIN/1.73
GLUCOSE BLD-MCNC: 225 MG/DL (ref 70–100)
GLUCOSE BLDC GLUCOMTR-MCNC: 200 MG/DL (ref 70–130)
GLUCOSE BLDC GLUCOMTR-MCNC: 203 MG/DL (ref 70–130)
GLUCOSE BLDC GLUCOMTR-MCNC: 229 MG/DL (ref 70–130)
GLUCOSE BLDC GLUCOMTR-MCNC: 268 MG/DL (ref 70–130)
HCT VFR BLD AUTO: 43.8 % (ref 38.9–50.9)
HGB BLD-MCNC: 14 G/DL (ref 13.1–17.5)
INR PPP: 1.22
MCH RBC QN AUTO: 30.8 PG (ref 27–31)
MCHC RBC AUTO-ENTMCNC: 32 G/DL (ref 32–36)
MCV RBC AUTO: 96.3 FL (ref 80–99)
PLATELET # BLD AUTO: 126 10*3/MM3 (ref 150–450)
PMV BLD AUTO: 11.8 FL (ref 6–12)
POTASSIUM BLD-SCNC: 4.5 MMOL/L (ref 3.5–5.5)
PROTHROMBIN TIME: 13.4 SECONDS (ref 9.6–11.5)
RBC # BLD AUTO: 4.55 10*6/MM3 (ref 4.2–5.76)
SODIUM BLD-SCNC: 140 MMOL/L (ref 132–146)
WBC NRBC COR # BLD: 14.32 10*3/MM3 (ref 3.5–10.8)

## 2017-05-31 PROCEDURE — 92610 EVALUATE SWALLOWING FUNCTION: CPT

## 2017-05-31 PROCEDURE — 97110 THERAPEUTIC EXERCISES: CPT

## 2017-05-31 PROCEDURE — 85027 COMPLETE CBC AUTOMATED: CPT | Performed by: ORTHOPAEDIC SURGERY

## 2017-05-31 PROCEDURE — 25010000003 CEFAZOLIN IN DEXTROSE 2-4 GM/100ML-% SOLUTION: Performed by: ORTHOPAEDIC SURGERY

## 2017-05-31 PROCEDURE — 94799 UNLISTED PULMONARY SVC/PX: CPT

## 2017-05-31 PROCEDURE — 82962 GLUCOSE BLOOD TEST: CPT

## 2017-05-31 PROCEDURE — 25010000002 MORPHINE PER 10 MG: Performed by: ORTHOPAEDIC SURGERY

## 2017-05-31 PROCEDURE — 85610 PROTHROMBIN TIME: CPT

## 2017-05-31 PROCEDURE — 25010000002 HYDROMORPHONE PER 4 MG: Performed by: ORTHOPAEDIC SURGERY

## 2017-05-31 PROCEDURE — 97165 OT EVAL LOW COMPLEX 30 MIN: CPT

## 2017-05-31 PROCEDURE — 97162 PT EVAL MOD COMPLEX 30 MIN: CPT

## 2017-05-31 PROCEDURE — 63710000001 INSULIN DETEMIR PER 5 UNITS: Performed by: NURSE PRACTITIONER

## 2017-05-31 PROCEDURE — 80048 BASIC METABOLIC PNL TOTAL CA: CPT | Performed by: NURSE PRACTITIONER

## 2017-05-31 PROCEDURE — 25010000002 ROPIVACAINE PER 1 MG: Performed by: NURSE ANESTHETIST, CERTIFIED REGISTERED

## 2017-05-31 PROCEDURE — 97116 GAIT TRAINING THERAPY: CPT

## 2017-05-31 PROCEDURE — P9612 CATHETERIZE FOR URINE SPEC: HCPCS

## 2017-05-31 RX ORDER — WARFARIN SODIUM 5 MG/1
5 TABLET ORAL
Status: DISCONTINUED | OUTPATIENT
Start: 2017-05-31 | End: 2017-06-02 | Stop reason: HOSPADM

## 2017-05-31 RX ORDER — TAMSULOSIN HYDROCHLORIDE 0.4 MG/1
0.4 CAPSULE ORAL DAILY
Status: DISCONTINUED | OUTPATIENT
Start: 2017-05-31 | End: 2017-06-02 | Stop reason: HOSPADM

## 2017-05-31 RX ORDER — POLYETHYLENE GLYCOL 3350 17 G/17G
17 POWDER, FOR SOLUTION ORAL DAILY
Status: DISCONTINUED | OUTPATIENT
Start: 2017-06-01 | End: 2017-06-02 | Stop reason: HOSPADM

## 2017-05-31 RX ORDER — INSULIN GLARGINE 100 [IU]/ML
15 INJECTION, SOLUTION SUBCUTANEOUS NIGHTLY
Status: DISCONTINUED | OUTPATIENT
Start: 2017-05-31 | End: 2017-06-02 | Stop reason: HOSPADM

## 2017-05-31 RX ADMIN — INSULIN LISPRO 3 UNITS: 100 INJECTION, SOLUTION INTRAVENOUS; SUBCUTANEOUS at 11:54

## 2017-05-31 RX ADMIN — DOCUSATE SODIUM AND SENNOSIDES 2 TABLET: 8.6; 5 TABLET, FILM COATED ORAL at 17:00

## 2017-05-31 RX ADMIN — SODIUM CHLORIDE 120 ML/HR: 9 INJECTION, SOLUTION INTRAVENOUS at 12:38

## 2017-05-31 RX ADMIN — HYDROCODONE BITARTRATE AND ACETAMINOPHEN 1 TABLET: 7.5; 325 TABLET ORAL at 09:21

## 2017-05-31 RX ADMIN — ROPIVACAINE HYDROCHLORIDE 12 ML/HR: 2 INJECTION, SOLUTION EPIDURAL; INFILTRATION at 20:14

## 2017-05-31 RX ADMIN — LINAGLIPTIN 5 MG: 5 TABLET, FILM COATED ORAL at 11:01

## 2017-05-31 RX ADMIN — FLUOXETINE HYDROCHLORIDE 80 MG: 20 CAPSULE ORAL at 21:15

## 2017-05-31 RX ADMIN — INSULIN DETEMIR 10 UNITS: 100 INJECTION, SOLUTION SUBCUTANEOUS at 21:17

## 2017-05-31 RX ADMIN — AMLODIPINE BESYLATE 10 MG: 10 TABLET ORAL at 08:06

## 2017-05-31 RX ADMIN — HYDROCODONE BITARTRATE AND ACETAMINOPHEN 1 TABLET: 7.5; 325 TABLET ORAL at 00:32

## 2017-05-31 RX ADMIN — INSULIN LISPRO 3 UNITS: 100 INJECTION, SOLUTION INTRAVENOUS; SUBCUTANEOUS at 22:35

## 2017-05-31 RX ADMIN — WARFARIN SODIUM 5 MG: 5 TABLET ORAL at 17:01

## 2017-05-31 RX ADMIN — INSULIN LISPRO 3 UNITS: 100 INJECTION, SOLUTION INTRAVENOUS; SUBCUTANEOUS at 08:05

## 2017-05-31 RX ADMIN — HYDROCODONE BITARTRATE AND ACETAMINOPHEN 1 TABLET: 7.5; 325 TABLET ORAL at 17:00

## 2017-05-31 RX ADMIN — DOCUSATE SODIUM AND SENNOSIDES 2 TABLET: 8.6; 5 TABLET, FILM COATED ORAL at 08:04

## 2017-05-31 RX ADMIN — HYDROCODONE BITARTRATE AND ACETAMINOPHEN 1 TABLET: 7.5; 325 TABLET ORAL at 12:59

## 2017-05-31 RX ADMIN — INSULIN LISPRO 3 UNITS: 100 INJECTION, SOLUTION INTRAVENOUS; SUBCUTANEOUS at 17:01

## 2017-05-31 RX ADMIN — HYDROMORPHONE HYDROCHLORIDE 0.5 MG: 1 INJECTION, SOLUTION INTRAMUSCULAR; INTRAVENOUS; SUBCUTANEOUS at 02:31

## 2017-05-31 RX ADMIN — CEFAZOLIN SODIUM 2 G: 2 INJECTION, SOLUTION INTRAVENOUS at 01:07

## 2017-05-31 RX ADMIN — ROPIVACAINE HYDROCHLORIDE 12 ML/HR: 2 INJECTION, SOLUTION EPIDURAL; INFILTRATION at 03:43

## 2017-05-31 RX ADMIN — HYDROCODONE BITARTRATE AND ACETAMINOPHEN 1 TABLET: 7.5; 325 TABLET ORAL at 21:15

## 2017-05-31 RX ADMIN — DOCUSATE SODIUM 100 MG: 100 CAPSULE, LIQUID FILLED ORAL at 17:00

## 2017-05-31 RX ADMIN — TAMSULOSIN HYDROCHLORIDE 0.4 MG: 0.4 CAPSULE ORAL at 21:14

## 2017-05-31 RX ADMIN — LISINOPRIL 40 MG: 40 TABLET ORAL at 08:04

## 2017-05-31 RX ADMIN — MORPHINE SULFATE 4 MG: 4 INJECTION, SOLUTION INTRAMUSCULAR; INTRAVENOUS at 06:33

## 2017-05-31 RX ADMIN — METOPROLOL SUCCINATE 100 MG: 100 TABLET, EXTENDED RELEASE ORAL at 08:07

## 2017-05-31 RX ADMIN — CEFAZOLIN SODIUM 2 G: 2 INJECTION, SOLUTION INTRAVENOUS at 08:05

## 2017-05-31 RX ADMIN — SODIUM CHLORIDE 120 ML/HR: 9 INJECTION, SOLUTION INTRAVENOUS at 04:17

## 2017-06-01 PROBLEM — N99.89 POSTOPERATIVE URINARY RETENTION: Status: ACTIVE | Noted: 2017-06-01

## 2017-06-01 PROBLEM — R33.8 POSTOPERATIVE URINARY RETENTION: Status: ACTIVE | Noted: 2017-06-01

## 2017-06-01 LAB
ANION GAP SERPL CALCULATED.3IONS-SCNC: 5 MMOL/L (ref 3–11)
BUN BLD-MCNC: 10 MG/DL (ref 9–23)
BUN/CREAT SERPL: 12.5 (ref 7–25)
CALCIUM SPEC-SCNC: 8.8 MG/DL (ref 8.7–10.4)
CHLORIDE SERPL-SCNC: 103 MMOL/L (ref 99–109)
CO2 SERPL-SCNC: 29 MMOL/L (ref 20–31)
CREAT BLD-MCNC: 0.8 MG/DL (ref 0.6–1.3)
DEPRECATED RDW RBC AUTO: 52.4 FL (ref 37–54)
ERYTHROCYTE [DISTWIDTH] IN BLOOD BY AUTOMATED COUNT: 15 % (ref 11.3–14.5)
GFR SERPL CREATININE-BSD FRML MDRD: 96 ML/MIN/1.73
GLUCOSE BLD-MCNC: 191 MG/DL (ref 70–100)
GLUCOSE BLDC GLUCOMTR-MCNC: 157 MG/DL (ref 70–130)
GLUCOSE BLDC GLUCOMTR-MCNC: 164 MG/DL (ref 70–130)
GLUCOSE BLDC GLUCOMTR-MCNC: 185 MG/DL (ref 70–130)
GLUCOSE BLDC GLUCOMTR-MCNC: 209 MG/DL (ref 70–130)
HCT VFR BLD AUTO: 40.2 % (ref 38.9–50.9)
HGB BLD-MCNC: 12.9 G/DL (ref 13.1–17.5)
INR PPP: 1.52
MCH RBC QN AUTO: 30.6 PG (ref 27–31)
MCHC RBC AUTO-ENTMCNC: 32.1 G/DL (ref 32–36)
MCV RBC AUTO: 95.3 FL (ref 80–99)
PLATELET # BLD AUTO: 108 10*3/MM3 (ref 150–450)
PMV BLD AUTO: 12.2 FL (ref 6–12)
POTASSIUM BLD-SCNC: 3.7 MMOL/L (ref 3.5–5.5)
PROTHROMBIN TIME: 16.8 SECONDS (ref 9.6–11.5)
RBC # BLD AUTO: 4.22 10*6/MM3 (ref 4.2–5.76)
SODIUM BLD-SCNC: 137 MMOL/L (ref 132–146)
WBC NRBC COR # BLD: 12.69 10*3/MM3 (ref 3.5–10.8)

## 2017-06-01 PROCEDURE — 80048 BASIC METABOLIC PNL TOTAL CA: CPT | Performed by: NURSE PRACTITIONER

## 2017-06-01 PROCEDURE — 85610 PROTHROMBIN TIME: CPT

## 2017-06-01 PROCEDURE — 97535 SELF CARE MNGMENT TRAINING: CPT

## 2017-06-01 PROCEDURE — 97530 THERAPEUTIC ACTIVITIES: CPT

## 2017-06-01 PROCEDURE — 97116 GAIT TRAINING THERAPY: CPT

## 2017-06-01 PROCEDURE — 97110 THERAPEUTIC EXERCISES: CPT

## 2017-06-01 PROCEDURE — 25010000002 HYDROMORPHONE PER 4 MG: Performed by: ORTHOPAEDIC SURGERY

## 2017-06-01 PROCEDURE — 85027 COMPLETE CBC AUTOMATED: CPT | Performed by: ORTHOPAEDIC SURGERY

## 2017-06-01 PROCEDURE — 82962 GLUCOSE BLOOD TEST: CPT

## 2017-06-01 RX ADMIN — METOPROLOL SUCCINATE 100 MG: 100 TABLET, EXTENDED RELEASE ORAL at 09:24

## 2017-06-01 RX ADMIN — INSULIN LISPRO 3 UNITS: 100 INJECTION, SOLUTION INTRAVENOUS; SUBCUTANEOUS at 12:00

## 2017-06-01 RX ADMIN — DOCUSATE SODIUM AND SENNOSIDES 2 TABLET: 8.6; 5 TABLET, FILM COATED ORAL at 09:24

## 2017-06-01 RX ADMIN — HYDROCODONE BITARTRATE AND ACETAMINOPHEN 1 TABLET: 7.5; 325 TABLET ORAL at 03:25

## 2017-06-01 RX ADMIN — LISINOPRIL 40 MG: 40 TABLET ORAL at 09:24

## 2017-06-01 RX ADMIN — INSULIN LISPRO 2 UNITS: 100 INJECTION, SOLUTION INTRAVENOUS; SUBCUTANEOUS at 16:57

## 2017-06-01 RX ADMIN — INSULIN LISPRO 2 UNITS: 100 INJECTION, SOLUTION INTRAVENOUS; SUBCUTANEOUS at 21:58

## 2017-06-01 RX ADMIN — INSULIN LISPRO 2 UNITS: 100 INJECTION, SOLUTION INTRAVENOUS; SUBCUTANEOUS at 09:25

## 2017-06-01 RX ADMIN — DOCUSATE SODIUM AND SENNOSIDES 2 TABLET: 8.6; 5 TABLET, FILM COATED ORAL at 16:57

## 2017-06-01 RX ADMIN — HYDROMORPHONE HYDROCHLORIDE 0.5 MG: 1 INJECTION, SOLUTION INTRAMUSCULAR; INTRAVENOUS; SUBCUTANEOUS at 12:00

## 2017-06-01 RX ADMIN — WARFARIN SODIUM 5 MG: 5 TABLET ORAL at 16:57

## 2017-06-01 RX ADMIN — SODIUM CHLORIDE 120 ML/HR: 9 INJECTION, SOLUTION INTRAVENOUS at 10:06

## 2017-06-01 RX ADMIN — POLYETHYLENE GLYCOL 3350 17 G: 17 POWDER, FOR SOLUTION ORAL at 09:25

## 2017-06-01 RX ADMIN — TAMSULOSIN HYDROCHLORIDE 0.4 MG: 0.4 CAPSULE ORAL at 09:24

## 2017-06-01 RX ADMIN — FLUOXETINE HYDROCHLORIDE 80 MG: 20 CAPSULE ORAL at 20:10

## 2017-06-01 RX ADMIN — HYDROCODONE BITARTRATE AND ACETAMINOPHEN 1 TABLET: 7.5; 325 TABLET ORAL at 10:27

## 2017-06-01 RX ADMIN — AMLODIPINE BESYLATE 10 MG: 10 TABLET ORAL at 09:24

## 2017-06-01 RX ADMIN — HYDROCODONE BITARTRATE AND ACETAMINOPHEN 1 TABLET: 7.5; 325 TABLET ORAL at 16:57

## 2017-06-01 RX ADMIN — LINAGLIPTIN 5 MG: 5 TABLET, FILM COATED ORAL at 09:24

## 2017-06-02 ENCOUNTER — APPOINTMENT (OUTPATIENT)
Dept: GENERAL RADIOLOGY | Facility: HOSPITAL | Age: 72
End: 2017-06-02
Attending: INTERNAL MEDICINE

## 2017-06-02 ENCOUNTER — TELEPHONE (OUTPATIENT)
Dept: INTERNAL MEDICINE | Facility: CLINIC | Age: 72
End: 2017-06-02

## 2017-06-02 VITALS
HEIGHT: 72 IN | BODY MASS INDEX: 33.72 KG/M2 | TEMPERATURE: 98 F | OXYGEN SATURATION: 95 % | HEART RATE: 95 BPM | RESPIRATION RATE: 18 BRPM | SYSTOLIC BLOOD PRESSURE: 143 MMHG | WEIGHT: 249 LBS | DIASTOLIC BLOOD PRESSURE: 99 MMHG

## 2017-06-02 PROBLEM — E87.6 HYPOKALEMIA: Status: ACTIVE | Noted: 2017-06-02

## 2017-06-02 LAB
ANION GAP SERPL CALCULATED.3IONS-SCNC: 2 MMOL/L (ref 3–11)
BUN BLD-MCNC: 9 MG/DL (ref 9–23)
BUN/CREAT SERPL: 12.9 (ref 7–25)
CALCIUM SPEC-SCNC: 9 MG/DL (ref 8.7–10.4)
CHLORIDE SERPL-SCNC: 102 MMOL/L (ref 99–109)
CO2 SERPL-SCNC: 33 MMOL/L (ref 20–31)
CREAT BLD-MCNC: 0.7 MG/DL (ref 0.6–1.3)
DEPRECATED RDW RBC AUTO: 51.7 FL (ref 37–54)
ERYTHROCYTE [DISTWIDTH] IN BLOOD BY AUTOMATED COUNT: 15 % (ref 11.3–14.5)
GFR SERPL CREATININE-BSD FRML MDRD: 112 ML/MIN/1.73
GLUCOSE BLD-MCNC: 163 MG/DL (ref 70–100)
GLUCOSE BLDC GLUCOMTR-MCNC: 150 MG/DL (ref 70–130)
GLUCOSE BLDC GLUCOMTR-MCNC: 223 MG/DL (ref 70–130)
HCT VFR BLD AUTO: 39.4 % (ref 38.9–50.9)
HGB BLD-MCNC: 13 G/DL (ref 13.1–17.5)
INR PPP: 1.78
MCH RBC QN AUTO: 31 PG (ref 27–31)
MCHC RBC AUTO-ENTMCNC: 33 G/DL (ref 32–36)
MCV RBC AUTO: 93.8 FL (ref 80–99)
PLATELET # BLD AUTO: 115 10*3/MM3 (ref 150–450)
PMV BLD AUTO: 12.4 FL (ref 6–12)
POTASSIUM BLD-SCNC: 3.4 MMOL/L (ref 3.5–5.5)
PROTHROMBIN TIME: 19.8 SECONDS (ref 9.6–11.5)
RBC # BLD AUTO: 4.2 10*6/MM3 (ref 4.2–5.76)
SODIUM BLD-SCNC: 137 MMOL/L (ref 132–146)
WBC NRBC COR # BLD: 11.78 10*3/MM3 (ref 3.5–10.8)

## 2017-06-02 PROCEDURE — 74240 X-RAY XM UPR GI TRC 1CNTRST: CPT

## 2017-06-02 PROCEDURE — 25010000002 HYDROMORPHONE PER 4 MG: Performed by: ORTHOPAEDIC SURGERY

## 2017-06-02 PROCEDURE — 97110 THERAPEUTIC EXERCISES: CPT

## 2017-06-02 PROCEDURE — 74230 X-RAY XM SWLNG FUNCJ C+: CPT

## 2017-06-02 PROCEDURE — 92611 MOTION FLUOROSCOPY/SWALLOW: CPT

## 2017-06-02 PROCEDURE — 63710000001 INSULIN LISPRO (HUMAN) PER 5 UNITS: Performed by: NURSE PRACTITIONER

## 2017-06-02 PROCEDURE — 82962 GLUCOSE BLOOD TEST: CPT

## 2017-06-02 PROCEDURE — 85610 PROTHROMBIN TIME: CPT

## 2017-06-02 PROCEDURE — 80048 BASIC METABOLIC PNL TOTAL CA: CPT | Performed by: NURSE PRACTITIONER

## 2017-06-02 PROCEDURE — 94799 UNLISTED PULMONARY SVC/PX: CPT

## 2017-06-02 PROCEDURE — 97116 GAIT TRAINING THERAPY: CPT

## 2017-06-02 PROCEDURE — 85027 COMPLETE CBC AUTOMATED: CPT | Performed by: ORTHOPAEDIC SURGERY

## 2017-06-02 RX ORDER — TAMSULOSIN HYDROCHLORIDE 0.4 MG/1
0.4 CAPSULE ORAL DAILY
Qty: 30 CAPSULE
Start: 2017-06-02 | End: 2017-06-02 | Stop reason: HOSPADM

## 2017-06-02 RX ORDER — POTASSIUM CHLORIDE 750 MG/1
40 CAPSULE, EXTENDED RELEASE ORAL ONCE
Status: COMPLETED | OUTPATIENT
Start: 2017-06-02 | End: 2017-06-02

## 2017-06-02 RX ORDER — PSEUDOEPHEDRINE HCL 30 MG
100 TABLET ORAL 2 TIMES DAILY PRN
Refills: 0
Start: 2017-06-02 | End: 2017-11-16

## 2017-06-02 RX ORDER — HYDROCODONE BITARTRATE AND ACETAMINOPHEN 7.5; 325 MG/1; MG/1
1 TABLET ORAL EVERY 4 HOURS PRN
Refills: 0
Start: 2017-06-02 | End: 2017-06-09

## 2017-06-02 RX ORDER — WARFARIN SODIUM 5 MG/1
TABLET ORAL
Start: 2017-06-02 | End: 2019-01-17 | Stop reason: ALTCHOICE

## 2017-06-02 RX ADMIN — LISINOPRIL 40 MG: 40 TABLET ORAL at 08:37

## 2017-06-02 RX ADMIN — POLYETHYLENE GLYCOL 3350 17 G: 17 POWDER, FOR SOLUTION ORAL at 08:37

## 2017-06-02 RX ADMIN — HYDROCODONE BITARTRATE AND ACETAMINOPHEN 1 TABLET: 7.5; 325 TABLET ORAL at 06:41

## 2017-06-02 RX ADMIN — INSULIN LISPRO 3 UNITS: 100 INJECTION, SOLUTION INTRAVENOUS; SUBCUTANEOUS at 12:11

## 2017-06-02 RX ADMIN — HYDROCODONE BITARTRATE AND ACETAMINOPHEN 1 TABLET: 7.5; 325 TABLET ORAL at 14:40

## 2017-06-02 RX ADMIN — BARIUM SULFATE 55 ML: 0.81 POWDER, FOR SUSPENSION ORAL at 14:09

## 2017-06-02 RX ADMIN — INSULIN LISPRO 2 UNITS: 100 INJECTION, SOLUTION INTRAVENOUS; SUBCUTANEOUS at 09:05

## 2017-06-02 RX ADMIN — POTASSIUM CHLORIDE 40 MEQ: 750 CAPSULE, EXTENDED RELEASE ORAL at 14:40

## 2017-06-02 RX ADMIN — DOCUSATE SODIUM AND SENNOSIDES 2 TABLET: 8.6; 5 TABLET, FILM COATED ORAL at 08:36

## 2017-06-02 RX ADMIN — AMLODIPINE BESYLATE 10 MG: 10 TABLET ORAL at 08:36

## 2017-06-02 RX ADMIN — BARIUM SULFATE 20 ML: 400 PASTE ORAL at 14:08

## 2017-06-02 RX ADMIN — HYDROMORPHONE HYDROCHLORIDE 0.5 MG: 1 INJECTION, SOLUTION INTRAMUSCULAR; INTRAVENOUS; SUBCUTANEOUS at 09:04

## 2017-06-02 RX ADMIN — BARIUM SULFATE 100 ML: 0.81 POWDER, FOR SUSPENSION ORAL at 14:08

## 2017-06-02 RX ADMIN — METOPROLOL SUCCINATE 100 MG: 100 TABLET, EXTENDED RELEASE ORAL at 08:36

## 2017-06-02 RX ADMIN — LINAGLIPTIN 5 MG: 5 TABLET, FILM COATED ORAL at 08:36

## 2017-06-02 NOTE — TELEPHONE ENCOUNTER
[facility] CALLED TO INFORM DR GUILLAUME THAT MR GREENE WAS ADMITTED FOR REHAB FOLLOWING RIGHT KNEE REPAIR.

## 2017-06-14 ENCOUNTER — TELEPHONE (OUTPATIENT)
Dept: PHARMACY | Facility: HOSPITAL | Age: 72
End: 2017-06-14

## 2017-06-14 DIAGNOSIS — I48.91 ATRIAL FIBRILLATION, UNSPECIFIED TYPE (HCC): Primary | ICD-10-CM

## 2017-06-14 NOTE — TELEPHONE ENCOUNTER
Mr. Wolf has been discharged from [facility] and is now home with . His last INR at [facility] 6/12/17 = 2.5. Will send order for pt to have labs per [facility] until he is discharged from their care.

## 2017-06-16 ENCOUNTER — TELEPHONE (OUTPATIENT)
Dept: INTERNAL MEDICINE | Facility: CLINIC | Age: 72
End: 2017-06-16

## 2017-06-16 NOTE — TELEPHONE ENCOUNTER
Zuleyma from [facility] called requesting order for a waker with wheels, pt had a right knee replacement.  Please fax to [phone].

## 2017-06-19 ENCOUNTER — ANTICOAGULATION VISIT (OUTPATIENT)
Dept: PHARMACY | Facility: HOSPITAL | Age: 72
End: 2017-06-19

## 2017-06-19 LAB — INR PPP: 4.5

## 2017-06-19 RX ORDER — HYDROCODONE BITARTRATE AND ACETAMINOPHEN 7.5; 325 MG/1; MG/1
1 TABLET ORAL EVERY 8 HOURS PRN
COMMUNITY
End: 2017-11-16

## 2017-06-19 NOTE — PROGRESS NOTES
Anticoagulation Clinic Progress Note  Indication: atrial fibrillation  Referring Provider: del  Initial Warfarin Start Date: 2008  Goal INR: 2-3  Current Drug Interactions: fluoxetine, trazodone, glimepiride, melatonin      Anticoagulation Clinic INR History:  Date 9/26 10/25 11/7 11/30 12/21 1/3 1/17 2/7 3/7   Total Weekly Dose 20mg held doses for procedure 20mg 20 mg 17.5 mg 12.5 mg (2 held doses) 20mg 20mg 20mg   INR 2.6 1.3 2.7 3.5 2.0 1.5 2.3 2.4 2.0     Date 3/21 4/18 5/3 6/19        Total Weekly Dose 20mg 20mg 20mg 20mg        INR 2.42 (lab) 1.6 2.4 4.5        Notes  cooked spinach            Clinic Interview:  Tablet Strength: pt has 5mg tablets  Current Dose: pt verified dose of 2.5mg daily except 5mg Wednesdays     Patient Findings      Negatives Signs/symptoms of thrombosis, Signs/symptoms of bleeding, Laboratory test error suspected, Change in health, Change in alcohol use, Change in activity, Upcoming invasive procedure, Emergency department visit, Upcoming dental procedure, Missed doses, Extra doses, Change in medications, Change in diet/appetite, Hospital admission, Bruising, Other complaints     Comments Updated med list -- added HC/APAP (new) + melatonin (not new). Pt reports no recent bleeding or other complications.          Plan:  1. INR is supratherapeutic today, uncertain the root cause -- pt's wife reports no recent significant med or diet changes. Will instruct pt to HOLD his warfarin tonight, then lower his dose to 2.5mg daily (12.5% dose decr.).  2. HH to repeat INR on Friday (@1500).  3. Pt has stopped insulin injections and is now on Lortab for pain. Added melatonin to his med list, although this is not a new medication.   4. Verbal information provided to HH RN and pt's wife over the phone. They express understanding and have no further questions at this time.       Ann Cowden, PharmD  6/19/2017  1:37 PM

## 2017-06-23 ENCOUNTER — TELEPHONE (OUTPATIENT)
Dept: PHARMACY | Facility: HOSPITAL | Age: 72
End: 2017-06-23

## 2017-06-23 ENCOUNTER — ANTICOAGULATION VISIT (OUTPATIENT)
Dept: PHARMACY | Facility: HOSPITAL | Age: 72
End: 2017-06-23

## 2017-06-23 LAB — INR PPP: 2.1 (ref 0.9–1.1)

## 2017-06-23 PROCEDURE — 36416 COLLJ CAPILLARY BLOOD SPEC: CPT | Performed by: PHARMACIST

## 2017-06-23 PROCEDURE — 85610 PROTHROMBIN TIME: CPT | Performed by: PHARMACIST

## 2017-06-23 NOTE — PROGRESS NOTES
Anticoagulation Clinic Progress Note  Indication: atrial fibrillation  Referring Provider: del  Initial Warfarin Start Date: 2008  Goal INR: 2-3  Current Drug Interactions: fluoxetine, trazodone, glimepiride, melatonin      Anticoagulation Clinic INR History:  Date 9/26 10/25 11/7 11/30 12/21 1/3 1/17 2/7 3/7   Total Weekly Dose 20mg held doses for procedure 20mg 20 mg 17.5 mg 12.5 mg (2 held doses) 20mg 20mg 20mg   INR 2.6 1.3 2.7 3.5 2.0 1.5 2.3 2.4 2.0     Date 3/21 4/18 5/3 6/19 6/23       Total Weekly Dose 20mg 20mg 20mg 20mg 15mg after held dose       INR 2.42 (lab) 1.6 2.4 4.5 2.1       Notes  cooked spinach  [facility]         Clinic Interview:  Tablet Strength: pt has 5mg tablets  Current Dose: pt verified dose of 2.5mg daily except 5mg Wednesdays    It is his birthday today and he is eating well--not just because of his birthday.    Plan:  1. INR is therapeutic today (2.1)  Will instruct pt to continue with warfarin 2.5 mg daily after HOLDing his warfarin on 6/19, and the dose reduction from 20 mg/week to 17.5 mg weekly due to a supratherapeutic INR on 6/19.  2. HH to repeat INR on Friday (@1500).  3.medications not reviewed  4. Verbal information provided to  RN. She expresses understanding and have no further questions at this time.     Simon Real [facility]  6/23/2017  4:26 PM

## 2017-06-27 ENCOUNTER — TELEPHONE (OUTPATIENT)
Dept: ENDOCRINOLOGY | Facility: CLINIC | Age: 72
End: 2017-06-27

## 2017-06-27 DIAGNOSIS — M25.561 CHRONIC PAIN OF RIGHT KNEE: Primary | ICD-10-CM

## 2017-06-27 DIAGNOSIS — G89.29 CHRONIC PAIN OF RIGHT KNEE: Primary | ICD-10-CM

## 2017-06-30 ENCOUNTER — ANTICOAGULATION VISIT (OUTPATIENT)
Dept: CARDIOLOGY | Facility: CLINIC | Age: 72
End: 2017-06-30

## 2017-06-30 LAB — INR PPP: 1.9

## 2017-07-06 ENCOUNTER — OFFICE VISIT (OUTPATIENT)
Dept: INTERNAL MEDICINE | Facility: CLINIC | Age: 72
End: 2017-07-06

## 2017-07-06 VITALS
WEIGHT: 242.2 LBS | DIASTOLIC BLOOD PRESSURE: 90 MMHG | BODY MASS INDEX: 32.85 KG/M2 | OXYGEN SATURATION: 98 % | HEART RATE: 76 BPM | SYSTOLIC BLOOD PRESSURE: 130 MMHG

## 2017-07-06 DIAGNOSIS — Z00.00 MEDICARE WELCOME EXAM: ICD-10-CM

## 2017-07-06 DIAGNOSIS — D36.9 TUBULAR ADENOMA: ICD-10-CM

## 2017-07-06 DIAGNOSIS — R35.0 URINARY FREQUENCY: ICD-10-CM

## 2017-07-06 DIAGNOSIS — E11.9 CONTROLLED TYPE 2 DIABETES MELLITUS WITHOUT COMPLICATION, WITHOUT LONG-TERM CURRENT USE OF INSULIN (HCC): Primary | ICD-10-CM

## 2017-07-06 LAB
BILIRUB BLD-MCNC: NEGATIVE MG/DL
CLARITY, POC: ABNORMAL
COLOR UR: YELLOW
GLUCOSE BLDC GLUCOMTR-MCNC: 113 MG/DL (ref 70–130)
GLUCOSE UR STRIP-MCNC: NEGATIVE MG/DL
KETONES UR QL: NEGATIVE
LEUKOCYTE EST, POC: NEGATIVE
NITRITE UR-MCNC: NEGATIVE MG/ML
PH UR: 6 [PH] (ref 5–8)
PROT UR STRIP-MCNC: ABNORMAL MG/DL
PSA SERPL-MCNC: 2.63 NG/ML (ref 0–4)
RBC # UR STRIP: NEGATIVE /UL
SP GR UR: 1.01 (ref 1–1.03)
UROBILINOGEN UR QL: NORMAL

## 2017-07-06 PROCEDURE — G0444 DEPRESSION SCREEN ANNUAL: HCPCS | Performed by: INTERNAL MEDICINE

## 2017-07-06 PROCEDURE — G0439 PPPS, SUBSEQ VISIT: HCPCS | Performed by: INTERNAL MEDICINE

## 2017-07-06 PROCEDURE — 81003 URINALYSIS AUTO W/O SCOPE: CPT | Performed by: INTERNAL MEDICINE

## 2017-07-06 PROCEDURE — 87086 URINE CULTURE/COLONY COUNT: CPT | Performed by: INTERNAL MEDICINE

## 2017-07-06 PROCEDURE — 82947 ASSAY GLUCOSE BLOOD QUANT: CPT | Performed by: INTERNAL MEDICINE

## 2017-07-06 PROCEDURE — 84153 ASSAY OF PSA TOTAL: CPT | Performed by: INTERNAL MEDICINE

## 2017-07-06 NOTE — PROGRESS NOTES
QUICK REFERENCE INFORMATION:  The ABCs of the Annual Wellness Visit    Subsequent Medicare Wellness Visit    HEALTH RISK ASSESSMENT    1945    Recent Hospitalizations:  Recently treated at the following:  [facility].        Current Medical Providers:  Patient Care Team:  Kay Lopez DO as PCP - General  Kay Lopez DO as PCP - Family Medicine  Gomez Fuentes MD as PCP - Claims Attributed  Elise Portillo [facility] as Pharmacist (Pharmacy)  Tona SwansonD as Pharmacist (Pharmacy)  Sammy Lassiter MD as Consulting Physician (Cardiology)        Smoking Status:  History   Smoking Status   • Never Smoker   Smokeless Tobacco   • Never Used       Alcohol Consumption:  History   Alcohol Use No       Depression Screen:   PHQ-9 Depression Screening 7/6/2017   Little interest or pleasure in doing things 0   Feeling down, depressed, or hopeless 1   Trouble falling or staying asleep, or sleeping too much -   Feeling tired or having little energy -   Poor appetite or overeating -   Feeling bad about yourself - or that you are a failure or have let yourself or your family down -   Trouble concentrating on things, such as reading the newspaper or watching television -   Moving or speaking so slowly that other people could have noticed. Or the opposite - being so fidgety or restless that you have been moving around a lot more than usual -   Thoughts that you would be better off dead, or of hurting yourself in some way -   PHQ-9 Total Score 1   If you checked off any problems, how difficult have these problems made it for you to do your work, take care of things at home, or get along with other people? -       Health Habits and Functional and Cognitive Screening:  Functional & Cognitive Status 7/6/2017   Do you have difficulty preparing food and eating? Yes   Do you have difficulty bathing yourself? No   Do you have difficulty getting dressed? No   Do you have difficulty using the toilet? No   Do  you have difficulty moving around from place to place? Yes   In the past year have you fallen or experienced a near fall? Yes   Do you need help using the phone?  No   Are you deaf or do you have serious difficulty hearing?  No   Do you need help with transportation? Yes   Do you need help shopping? No   Do you need help preparing meals?  Yes   Do you need help with housework?  Yes   Do you need help with laundry? No   Do you need help taking your medications? No   Do you need help managing money? No       Health Habits  Current Diet: Diabetic Diet  Dental Exam: Up to date  Eye Exam: Up to date  Exercise (times per week): 7 times per week  Current Exercise Activities Include: Weightlifting      Does the patient have evidence of cognitive impairment? Yes    Aspirin use counseling: Contraindicated from taking ASA      Recent Lab Results:  CMP:  Lab Results   Component Value Date    BUN 9 06/02/2017    CREATININE 0.70 06/02/2017    EGFRIFNONA 112 06/02/2017    BCR 12.9 06/02/2017     06/02/2017    K 3.4 (L) 06/02/2017    CO2 33.0 (H) 06/02/2017    CALCIUM 9.0 06/02/2017    ALBUMIN 4.40 01/23/2017    LABIL2 2.0 01/23/2017    BILITOT 0.9 01/23/2017    ALKPHOS 59 01/23/2017    AST 36 (H) 01/23/2017    ALT 40 01/23/2017     Lipid Panel:  Lab Results   Component Value Date    CHOL 165 01/23/2017    TRIG 207 (H) 01/23/2017    HDL 38 (L) 01/23/2017    LDLDIRECT 96 01/23/2017     HbA1c:  Lab Results   Component Value Date    HGBA1C 6.70 (H) 05/24/2017       Visual Acuity:  No exam data present    Age-appropriate Screening Schedule:  Refer to the list below for future screening recommendations based on patient's age, sex and/or medical conditions. Orders for these recommended tests are listed in the plan section. The patient has been provided with a written plan.    Health Maintenance   Topic Date Due   • TDAP/TD VACCINES (1 - Tdap) 06/23/1966   • COLONOSCOPY  01/20/2019 (Originally 7/28/2016)   • PNEUMOCOCCAL VACCINES  (65+ LOW/MEDIUM RISK) (1 of 2 - PCV13) 01/20/2020 (Originally 6/23/2012)   • ZOSTER VACCINE  01/20/2020 (Originally 7/26/2016)   • URINE MICROALBUMIN  07/28/2017   • INFLUENZA VACCINE  08/01/2017   • HEMOGLOBIN A1C  11/24/2017   • DIABETIC FOOT EXAM  01/23/2018   • LIPID PANEL  01/23/2018   • DIABETIC EYE EXAM  01/23/2018        Subjective   History of Present Illness    Tony Wolf is a 72 y.o. male who presents for an Subsequent Wellness Visit.    The following portions of the patient's history were reviewed and updated as appropriate: allergies, current medications, past family history, past medical history, past social history, past surgical history and problem list.    Outpatient Medications Prior to Visit   Medication Sig Dispense Refill   • acetaminophen (TYLENOL) 500 MG tablet Take 1,000 mg by mouth Every 6 (Six) Hours As Needed for Mild Pain (1-3).     • amLODIPine (NORVASC) 10 MG tablet Take 10 mg by mouth Daily.     • Cholecalciferol (VITAMIN D-3) 1000 UNITS capsule Take 1,000 Units by mouth Daily.     • colestipol (COLESTID) 1 G tablet Take 1 g by mouth Daily.     • docusate sodium 100 MG capsule Take 100 mg by mouth 2 (Two) Times a Day As Needed for Constipation.  0   • FLUoxetine (PROzac) 40 MG capsule Take 80 mg by mouth Daily.     • glimepiride (AMARYL) 2 MG tablet Take 1 tablet by mouth Every Night. 30 tablet 3   • HYDROcodone-acetaminophen (NORCO) 7.5-325 MG per tablet Take 1 tablet by mouth Every 8 (Eight) Hours As Needed for Moderate Pain (4-6).     • hydrOXYzine (VISTARIL) 50 MG capsule Take 50 mg by mouth Daily.     • lisinopril (PRINIVIL,ZESTRIL) 40 MG tablet Take 40 mg by mouth Daily.     • Melatonin 2.5 MG chewable tablet Chew 1 tablet At Night As Needed.     • metoprolol succinate XL (TOPROL-XL) 100 MG 24 hr tablet Take 100 mg by mouth Daily.     • pravastatin (PRAVACHOL) 20 MG tablet Take 1 tablet by mouth Every Evening. 30 tablet 11   • sitaGLIPtin (JANUVIA) 100 MG tablet Take 50 mg by  mouth Daily.     • traZODone (DESYREL) 50 MG tablet Take 100 mg by mouth Every Night.     • warfarin (COUMADIN) 5 MG tablet 1 tablet daily. INR sunday       No facility-administered medications prior to visit.        Patient Active Problem List   Diagnosis   • GERD (gastroesophageal reflux disease)   • Essential hypertension   • Obesity   • Obstructive sleep apnea   • Type 2 diabetes mellitus, uncontrolled   • Generalized anxiety disorder   • Atrial fibrillation   • Screening for prostate cancer   • PVC's (premature ventricular contractions)   • Hyperlipidemia   • Left ventricular hypertrophy   • Atypical atrial flutter   • Arthritis of knee, right   • Status post right knee replacement   • Leukocytosis, mild, likely reactive   • Thrombocytopenia   • Postoperative urinary retention   • Hypokalemia, replaced       Advance Care Planning:  has an advance directive - a copy has been provided and is in file    Identification of Risk Factors:  Risk factors include: weight , cardiovascular risk, inactivity, increased fall risk and chronic pain.    Review of Systems   Constitutional: Negative for activity change, appetite change, chills, diaphoresis, fatigue, fever and unexpected weight change.   HENT: Negative for congestion, ear discharge, ear pain, mouth sores, nosebleeds, sinus pressure, sneezing and sore throat.    Eyes: Negative for pain, discharge and itching.   Respiratory: Negative for cough, chest tightness, shortness of breath and wheezing.    Cardiovascular: Negative for chest pain, palpitations and leg swelling.   Gastrointestinal: Negative for abdominal pain, constipation, diarrhea, nausea and vomiting.   Endocrine: Negative for cold intolerance, heat intolerance, polydipsia and polyphagia.   Genitourinary: Negative for dysuria, flank pain, frequency, hematuria and urgency.   Musculoskeletal: Negative for arthralgias, back pain, gait problem, myalgias, neck pain and neck stiffness.        Chronic knee and hip  pain   Skin: Negative for color change, pallor and rash.   Neurological: Negative for seizures, speech difficulty, numbness and headaches.   Psychiatric/Behavioral: Negative for agitation, confusion, decreased concentration and sleep disturbance. The patient is not nervous/anxious.        Compared to one year ago, the patient feels his physical health is the same.  Compared to one year ago, the patient feels his mental health is the same.    Objective     Physical Exam   Constitutional: He appears well-developed.   HENT:   Head: Normocephalic.   Right Ear: External ear normal.   Left Ear: External ear normal.   Nose: Nose normal.   Mouth/Throat: Oropharynx is clear and moist.   Eyes: Conjunctivae are normal. Pupils are equal, round, and reactive to light.   Neck: No JVD present. No thyromegaly present.   Cardiovascular: Normal rate, regular rhythm and normal heart sounds.  Exam reveals no friction rub.    No murmur heard.  Pulmonary/Chest: Effort normal and breath sounds normal. No respiratory distress. He has no wheezes. He has no rales.   Abdominal: Soft. Bowel sounds are normal. He exhibits no distension. There is no tenderness. There is no guarding.   Musculoskeletal: He exhibits no edema or tenderness.   Lymphadenopathy:     He has no cervical adenopathy.   Neurological: He displays normal reflexes. No cranial nerve deficit.   Skin: No rash noted.   Psychiatric: His behavior is normal.   Nursing note and vitals reviewed.      Vitals:    07/06/17 0911   BP: 130/90   Pulse: 76   SpO2: 98%   Weight: 242 lb 3.2 oz (110 kg)       Body mass index is 32.85 kg/(m^2).  Discussed the patient's BMI with him. The BMI is above average; BMI management plan is completed.    Assessment/Plan   Patient Self-Management and Personalized Health Advice  The patient has been provided with information about: diet, exercise, weight management, prevention of cardiac or vascular disease and fall prevention and preventive services  including:   · Colorectal cancer screening, colonoscopy referral placed, Exercise counseling provided, Fall Risk assessment done, Fall Risk plan of care done.    Visit Diagnoses:    ICD-10-CM ICD-9-CM   1. Controlled type 2 diabetes mellitus without complication, without long-term current use of insulin E11.9 250.00   2. Tubular adenoma D36.9 229.9   3. Medicare welcome exam Z00.00 V70.0   4. Urinary frequency R35.0 788.41       Orders Placed This Encounter   Procedures   • PSA   • Ambulatory Referral For Screening Colonoscopy     Referral Priority:   Routine     Referral Type:   Diagnostic Medical     Referral Reason:   Specialty Services Required     Number of Visits Requested:   1   • POC Glucose Fingerstick       Outpatient Encounter Prescriptions as of 7/6/2017   Medication Sig Dispense Refill   • acetaminophen (TYLENOL) 500 MG tablet Take 1,000 mg by mouth Every 6 (Six) Hours As Needed for Mild Pain (1-3).     • amLODIPine (NORVASC) 10 MG tablet Take 10 mg by mouth Daily.     • Cholecalciferol (VITAMIN D-3) 1000 UNITS capsule Take 1,000 Units by mouth Daily.     • colestipol (COLESTID) 1 G tablet Take 1 g by mouth Daily.     • docusate sodium 100 MG capsule Take 100 mg by mouth 2 (Two) Times a Day As Needed for Constipation.  0   • FLUoxetine (PROzac) 40 MG capsule Take 80 mg by mouth Daily.     • glimepiride (AMARYL) 2 MG tablet Take 1 tablet by mouth Every Night. 30 tablet 3   • HYDROcodone-acetaminophen (NORCO) 7.5-325 MG per tablet Take 1 tablet by mouth Every 8 (Eight) Hours As Needed for Moderate Pain (4-6).     • hydrOXYzine (VISTARIL) 50 MG capsule Take 50 mg by mouth Daily.     • lisinopril (PRINIVIL,ZESTRIL) 40 MG tablet Take 40 mg by mouth Daily.     • Melatonin 2.5 MG chewable tablet Chew 1 tablet At Night As Needed.     • metoprolol succinate XL (TOPROL-XL) 100 MG 24 hr tablet Take 100 mg by mouth Daily.     • pravastatin (PRAVACHOL) 20 MG tablet Take 1 tablet by mouth Every Evening. 30 tablet 11    • sitaGLIPtin (JANUVIA) 100 MG tablet Take 50 mg by mouth Daily.     • traZODone (DESYREL) 50 MG tablet Take 100 mg by mouth Every Night.     • warfarin (COUMADIN) 5 MG tablet 1 tablet daily. INR sunday       No facility-administered encounter medications on file as of 7/6/2017.        Reviewed use of high risk medication in the elderly: yes  Reviewed for potential of harmful drug interactions in the elderly: yes    Follow Up:  Return in about 1 year (around 7/6/2018) for Medicare Wellness.     An After Visit Summary and PPPS with all of these plans were given to the patient.   3month ov f/u

## 2017-07-07 ENCOUNTER — ANTICOAGULATION VISIT (OUTPATIENT)
Dept: PHARMACY | Facility: HOSPITAL | Age: 72
End: 2017-07-07

## 2017-07-07 DIAGNOSIS — I48.91 ATRIAL FIBRILLATION, UNSPECIFIED TYPE (HCC): ICD-10-CM

## 2017-07-07 LAB — INR PPP: 2.8

## 2017-07-07 NOTE — PROGRESS NOTES
Anticoagulation Clinic Progress Note  Indication: atrial fibrillation  Referring Provider: del  Initial Warfarin Start Date: 2008  Goal INR: 2-3  Current Drug Interactions: fluoxetine, trazodone, glimepiride, melatonin      Anticoagulation Clinic INR History:  Date 9/26 10/25 11/7 11/30 12/21 1/3 1/17 2/7 3/7   Total Weekly Dose 20mg held doses for procedure 20mg 20 mg 17.5 mg 12.5 mg (2 held doses) 20mg 20mg 20mg   INR 2.6 1.3 2.7 3.5 2.0 1.5 2.3 2.4 2.0     Date 3/21 4/18 5/3 6/19 6/23 6/30 7/7     Total Weekly Dose 20mg 20mg 20mg 20mg 15mg after held dose 17.5 mg 20mg     INR 2.42 (lab) 1.6 2.4 4.5 2.1 1.9 2.8     Notes  cooked spinach  [facility]       Clinic Interview:  Tablet Strength: pt has 5mg tablets  Current Dose: [facility] RN, Helen verified dose of 2.5mg daily except 5mg Wednesdays        Plan:  1. INR is therapeutic today. Instructed Helen to continue pt's current dose: warfarin 2.5mg daily except 5mg Wednesdays.  2.  to repeat INR next Friday.  3. Mr Wolf saw PCP yesterday with no medication changes. Verified with Helen.  4. Verbal information provided to  RN. She expresses understanding and have no further questions at this time.       Elise Portillo [facility]  7/7/2017  2:14 PM

## 2017-07-08 LAB — BACTERIA SPEC AEROBE CULT: NORMAL

## 2017-07-14 ENCOUNTER — ANTICOAGULATION VISIT (OUTPATIENT)
Dept: PHARMACY | Facility: HOSPITAL | Age: 72
End: 2017-07-14

## 2017-07-14 DIAGNOSIS — I48.91 ATRIAL FIBRILLATION, UNSPECIFIED TYPE (HCC): ICD-10-CM

## 2017-07-14 LAB — INR PPP: 3.4

## 2017-07-14 NOTE — PROGRESS NOTES
Anticoagulation Clinic Progress Note  Indication: atrial fibrillation  Referring Provider: del  Initial Warfarin Start Date: 2008  Goal INR: 2-3  Current Drug Interactions: fluoxetine, trazodone, glimepiride, melatonin      Anticoagulation Clinic INR History:  Date 9/26 10/25 11/7 11/30 12/21 1/3 1/17 2/7 3/7   Total Weekly Dose 20mg held doses for procedure 20mg 20 mg 17.5 mg 12.5 mg (2 held doses) 20mg 20mg 20mg   INR 2.6 1.3 2.7 3.5 2.0 1.5 2.3 2.4 2.0     Date 3/21 4/18 5/3 6/19 6/23 6/30 7/7 7/14    Total Weekly Dose 20mg 20mg 20mg 20mg 15mg after held dose 17.5 mg 20mg 20mg 17.5 mg   INR 2.42 (lab) 1.6 2.4 4.5 2.1 1.9 2.8 3.4    Notes  cooked spinach  [facility]      Clinic Interview:  Tablet Strength: pt has 5mg tablets  Current Dose: [facility] RN, Helen verified dose of 2.5mg daily except 5mg Wednesdays        Plan:  1. INR is therapeutic today. Instructed Helen to continue pt's current dose: warfarin 2.5mg daily except 5mg Wednesdays.  2.  to repeat INR next Friday.  3. Mr Wolf saw PCP yesterday with no medication changes. Verified with Helen.  4. Verbal information provided to  RN. She expresses understanding and have no further questions at this time.       Elise Portillo [facility]  7/14/2017  2:38 PM

## 2017-07-17 ENCOUNTER — OUTSIDE FACILITY SERVICE (OUTPATIENT)
Dept: INTERNAL MEDICINE | Facility: CLINIC | Age: 72
End: 2017-07-17

## 2017-07-17 PROCEDURE — G0179 MD RECERTIFICATION HHA PT: HCPCS | Performed by: INTERNAL MEDICINE

## 2017-07-19 RX ORDER — GLIMEPIRIDE 2 MG/1
TABLET ORAL
Qty: 30 TABLET | Refills: 0 | Status: SHIPPED | OUTPATIENT
Start: 2017-07-19 | End: 2017-08-15 | Stop reason: SDUPTHER

## 2017-07-20 ENCOUNTER — ANTICOAGULATION VISIT (OUTPATIENT)
Dept: PHARMACY | Facility: HOSPITAL | Age: 72
End: 2017-07-20

## 2017-07-20 DIAGNOSIS — I48.91 ATRIAL FIBRILLATION, UNSPECIFIED TYPE (HCC): ICD-10-CM

## 2017-07-20 LAB — INR PPP: 2.7

## 2017-07-20 NOTE — PROGRESS NOTES
Anticoagulation Clinic Progress Note  Indication: atrial fibrillation  Referring Provider: del  Initial Warfarin Start Date: 2008  Goal INR: 2-3  Current Drug Interactions: fluoxetine, trazodone, glimepiride, melatonin      Anticoagulation Clinic INR History:  Date 9/26 10/25 11/7 11/30 12/21 1/3 1/17 2/7 3/7   Total Weekly Dose 20mg held doses for procedure 20mg 20 mg 17.5 mg 12.5 mg (2 held doses) 20mg 20mg 20mg   INR 2.6 1.3 2.7 3.5 2.0 1.5 2.3 2.4 2.0     Date 3/21 4/18 5/3 6/19 6/23 6/30 7/7 7/14 7/20   Total Weekly Dose 20mg 20mg 20mg 20mg 15mg after held dose 17.5 mg 20mg 20mg 17.5 mg   INR 2.42 (lab) 1.6 2.4 4.5 2.1 1.9 2.8 3.4 2.7   Notes  cooked spinach  [facility]     Clinic Interview:  Tablet Strength: pt has 5mg tablets  Current Dose: [facility] R[facility] verified dose of 2.5mg daily  Patient Findings      Negatives Signs/symptoms of thrombosis, Signs/symptoms of bleeding, Laboratory test error suspected, Change in health, Change in alcohol use, Change in activity, Upcoming invasive procedure, Emergency department visit, Upcoming dental procedure, Missed doses, Extra doses, Change in medications, Change in diet/appetite, Hospital admission, Bruising, Other complaints         Plan:  1. INR is therapeutic today. Instructed Dawood ([phone]) and Mrs. Wolf to continue pt's current dose, warfarin 2.5mg daily.  2. RTC in ~2 weeks, before appt with Dr Lassiter on 8/2.  3. Verbal information provided. Dawood and Mrs. Wolf express understanding and have no further questions at this time.       Elise Portillo [facility]  7/20/2017  3:46 PM

## 2017-07-31 ENCOUNTER — TELEPHONE (OUTPATIENT)
Dept: INTERNAL MEDICINE | Facility: CLINIC | Age: 72
End: 2017-07-31

## 2017-08-02 ENCOUNTER — OFFICE VISIT (OUTPATIENT)
Dept: CARDIOLOGY | Facility: CLINIC | Age: 72
End: 2017-08-02

## 2017-08-02 ENCOUNTER — ANTICOAGULATION VISIT (OUTPATIENT)
Dept: PHARMACY | Facility: HOSPITAL | Age: 72
End: 2017-08-02

## 2017-08-02 VITALS
HEIGHT: 72 IN | BODY MASS INDEX: 32.61 KG/M2 | SYSTOLIC BLOOD PRESSURE: 128 MMHG | DIASTOLIC BLOOD PRESSURE: 86 MMHG | WEIGHT: 240.8 LBS | HEART RATE: 90 BPM

## 2017-08-02 DIAGNOSIS — I48.4 ATYPICAL ATRIAL FLUTTER (HCC): Primary | ICD-10-CM

## 2017-08-02 DIAGNOSIS — I10 ESSENTIAL HYPERTENSION: ICD-10-CM

## 2017-08-02 LAB
INR PPP: 2.5 (ref 0.91–1.09)
PROTHROMBIN TIME: 29.6 SECONDS (ref 10–13.8)

## 2017-08-02 PROCEDURE — 36416 COLLJ CAPILLARY BLOOD SPEC: CPT

## 2017-08-02 PROCEDURE — 85610 PROTHROMBIN TIME: CPT

## 2017-08-02 PROCEDURE — G0463 HOSPITAL OUTPT CLINIC VISIT: HCPCS

## 2017-08-02 PROCEDURE — 99213 OFFICE O/P EST LOW 20 MIN: CPT | Performed by: INTERNAL MEDICINE

## 2017-08-02 NOTE — PROGRESS NOTES
Anticoagulation Clinic Progress Note  Indication: atrial fibrillation  Referring Provider: del  Initial Warfarin Start Date: 2008  Goal INR: 2-3  Current Drug Interactions: fluoxetine, trazodone, glimepiride, melatonin      Anticoagulation Clinic INR History:  Date 9/26 10/25 11/7 11/30 12/21 1/3 1/17 2/7 3/7   Total Weekly Dose 20mg held doses for procedure 20mg 20 mg 17.5 mg 12.5 mg (2 held doses) 20mg 20mg 20mg   INR 2.6 1.3 2.7 3.5 2.0 1.5 2.3 2.4 2.0     Date 3/21 4/18 5/3 6/19 6/23 6/30 7/7 7/14 7/20   Total Weekly Dose 20mg 20mg 20mg 20mg 15mg after held dose 17.5 mg 20mg 20mg 17.5 mg   INR 2.42 (lab) 1.6 2.4 4.5 2.1 1.9 2.8 3.4 2.7   Notes  cooked spinach  [facility]     Date 8/2           Total Weekly Dose 17.5mg           INR 2.5           Notes              Clinic Interview:  Tablet Strength: pt has 5mg tablets  Current Dose: Wife verified 2.5mg daily  Patient Findings      Positives Other complaints     Negatives Signs/symptoms of thrombosis, Signs/symptoms of bleeding, Laboratory test error suspected, Change in health, Change in alcohol use, Change in activity, Upcoming invasive procedure, Emergency department visit, Upcoming dental procedure, Missed doses, Extra doses, Change in medications, Change in diet/appetite, Hospital admission, Bruising     Comments Patient complains of constipation.       Plan:  1. INR is therapeutic today. Instructed patient and patient's wife to continue pt's current dose, warfarin 2.5mg daily.  2. RTC in ~3 weeks, before appt with Dr Lassiter on 8/2.  3. No medication changes at this time.  4. Pt declines refills.   5. Verbal and written information provided. Patient voiced understanding and has no further questions at this time.       Ama Mccloud, PharmD  8/2/2017  1:30 PM

## 2017-08-03 RX ORDER — LANCETS
EACH MISCELLANEOUS
Qty: 102 EACH | Refills: 1 | Status: SHIPPED | OUTPATIENT
Start: 2017-08-03 | End: 2018-01-08 | Stop reason: SDUPTHER

## 2017-08-03 RX ORDER — BLOOD-GLUCOSE METER
EACH MISCELLANEOUS
Qty: 1 KIT | Refills: 0 | Status: SHIPPED | OUTPATIENT
Start: 2017-08-03 | End: 2018-02-27 | Stop reason: SDUPTHER

## 2017-08-15 RX ORDER — GLIMEPIRIDE 2 MG/1
TABLET ORAL
Qty: 30 TABLET | Refills: 2 | Status: SHIPPED | OUTPATIENT
Start: 2017-08-15 | End: 2017-11-16 | Stop reason: SDUPTHER

## 2017-08-23 ENCOUNTER — ANTICOAGULATION VISIT (OUTPATIENT)
Dept: PHARMACY | Facility: HOSPITAL | Age: 72
End: 2017-08-23

## 2017-08-23 DIAGNOSIS — I48.91 ATRIAL FIBRILLATION, UNSPECIFIED TYPE (HCC): ICD-10-CM

## 2017-08-23 LAB
INR PPP: 2.3 (ref 0.91–1.09)
PROTHROMBIN TIME: 27.8 SECONDS (ref 10–13.8)

## 2017-08-23 PROCEDURE — 85610 PROTHROMBIN TIME: CPT

## 2017-08-23 PROCEDURE — 36416 COLLJ CAPILLARY BLOOD SPEC: CPT

## 2017-08-23 PROCEDURE — G0463 HOSPITAL OUTPT CLINIC VISIT: HCPCS

## 2017-08-23 RX ORDER — FLUOXETINE HYDROCHLORIDE 20 MG/1
80 CAPSULE ORAL DAILY
COMMUNITY
End: 2018-07-06 | Stop reason: ALTCHOICE

## 2017-08-23 NOTE — PROGRESS NOTES
Anticoagulation Clinic Progress Note  Indication: atrial fibrillation  Referring Provider: del  Initial Warfarin Start Date: 2008  Goal INR: 2-3  Current Drug Interactions: fluoxetine, trazodone, glimepiride, melatonin      Anticoagulation Clinic INR History:  Date 3/21 4/18 5/3 6/19 6/23 6/30 7/7 7/14 7/20   Total Weekly Dose 20mg 20mg 20mg 20mg 15mg after held dose 17.5 mg 20mg 20mg 17.5 mg   INR 2.42 (lab) 1.6 2.4 4.5 2.1 1.9 2.8 3.4 2.7   Notes  cooked spinach  [facility]     Date 8/2 8/23          Total Weekly Dose 17.5mg 17.5 mg          INR 2.5 2.3          Notes                Clinic Interview:  Tablet Strength: pt has 5mg tablets  Current Dose: 2.5mg daily  Patient Findings      Positives Change in medications, Other complaints     Negatives Signs/symptoms of thrombosis, Signs/symptoms of bleeding, Laboratory test error suspected, Change in health, Change in alcohol use, Change in activity, Upcoming invasive procedure, Emergency department visit, Upcoming dental procedure, Missed doses, Extra doses, Change in diet/appetite, Hospital admission, Bruising     Comments Mr. Wolf complains of recent hyperhydrosis. One of his other providers thought it might be related to his fluoxetine therapy, so about two weeks ago, she lowered his dose from 80mg daily to 20mg daily. He does not think that this has helped. He also complains of frequent urination but conversely reports dry/burning eyes + ongoing dry mouth. He complained last visit of constipation -- is on multiple medications that can contribute to anticholinergic/cholinergic side effects such as these (fluoxetine + hydroxyzine + trazodone + previous Norco). He has stopped taking Januvia, and he had taken some samples of Invokana but has run out of/stopped these, as well.       Plan:  1. INR is therapeutic again today. Instructed pt and his wife to continue current dose, warfarin 2.5mg daily.  2. RTC in 4 weeks.  3. See above re: medications.   4.  Pt declines warfarin refills.   5. Verbal and written information provided. Patient voiced understanding and has no further questions at this time.       Elise Portillo RPH  8/23/2017  1:27 PM

## 2017-09-20 ENCOUNTER — ANTICOAGULATION VISIT (OUTPATIENT)
Dept: PHARMACY | Facility: HOSPITAL | Age: 72
End: 2017-09-20

## 2017-09-20 DIAGNOSIS — I48.91 ATRIAL FIBRILLATION, UNSPECIFIED TYPE (HCC): ICD-10-CM

## 2017-09-20 LAB
INR PPP: 2.3 (ref 0.91–1.09)
PROTHROMBIN TIME: 27.4 SECONDS (ref 10–13.8)

## 2017-09-20 PROCEDURE — 85610 PROTHROMBIN TIME: CPT

## 2017-09-20 PROCEDURE — G0463 HOSPITAL OUTPT CLINIC VISIT: HCPCS

## 2017-09-20 PROCEDURE — 36416 COLLJ CAPILLARY BLOOD SPEC: CPT

## 2017-09-20 NOTE — PROGRESS NOTES
Anticoagulation Clinic Progress Note  Indication: atrial fibrillation  Referring Provider: del  Initial Warfarin Start Date: 2008  Goal INR: 2-3  Current Drug Interactions: fluoxetine, trazodone, glimepiride, melatonin    Anticoagulation Clinic INR History:  Date 3/21 4/18 5/3 6/19 6/23 6/30 7/7 7/14 7/20   Total Weekly Dose 20mg 20mg 20mg 20mg 15mg after held dose 17.5 mg 20mg 20mg 17.5 mg   INR 2.42 (lab) 1.6 2.4 4.5 2.1 1.9 2.8 3.4 2.7   Notes  cooked spinach  [facility]     Date 8/2 8/23 9/20         Total Weekly Dose 17.5mg 17.5 mg 17.5 mg         INR 2.5 2.3 2.3         Notes              Clinic Interview:  Tablet Strength: pt has 5mg tablets  Current Dose: 2.5mg daily  Patient Findings      Positives Change in medications     Negatives Signs/symptoms of thrombosis, Signs/symptoms of bleeding, Laboratory test error suspected, Change in health, Change in alcohol use, Change in activity, Upcoming invasive procedure, Emergency department visit, Upcoming dental procedure, Missed doses, Extra doses, Change in diet/appetite, Hospital admission, Bruising, Other complaints     Comments Still having issues with hyperhydrosis. Another provider has asked him to increase his trazodone to 100mg QHS, but he has not yet started this higher dose (although our records indicate he has been taking 2x 50mg tablets). Requested that he review our med list and reconcile with his med bottles at home -- let us know of changes that we need to make to our list at follow up.     Plan:  1. INR is therapeutic again today. Instructed Mr. Wolf to continue current dose, warfarin 2.5mg daily.  2. RTC in 3 weeks before appt with Dr. Interiano ([id number]).  3. See above re: medications.   4. Pt declines warfarin refills.   5. Verbal and written information provided. Patient voiced understanding and has no further questions at this time.     Elise Portillo, [facility]  9/20/2017  1:29 PM

## 2017-10-11 ENCOUNTER — ANTICOAGULATION VISIT (OUTPATIENT)
Dept: PHARMACY | Facility: HOSPITAL | Age: 72
End: 2017-10-11

## 2017-10-11 LAB
INR PPP: 2 (ref 0.91–1.09)
PROTHROMBIN TIME: 24.4 SECONDS (ref 10–13.8)

## 2017-10-11 PROCEDURE — 85610 PROTHROMBIN TIME: CPT

## 2017-10-11 PROCEDURE — G0463 HOSPITAL OUTPT CLINIC VISIT: HCPCS

## 2017-10-11 PROCEDURE — 36416 COLLJ CAPILLARY BLOOD SPEC: CPT

## 2017-10-11 NOTE — PROGRESS NOTES
Anticoagulation Clinic Progress Note  Indication: atrial fibrillation  Referring Provider: del  Initial Warfarin Start Date: 2008  Goal INR: 2-3  Current Drug Interactions: fluoxetine, trazodone, glimepiride, melatonin    Anticoagulation Clinic INR History:  Date 3/21 4/18 5/3 6/19 6/23 6/30 7/7 7/14 7/20   Total Weekly Dose 20mg 20mg 20mg 20mg 15mg after held dose 17.5 mg 20mg 20mg 17.5 mg   INR 2.42 (lab) 1.6 2.4 4.5 2.1 1.9 2.8 3.4 2.7   Notes  cooked spinach  [facility]     Date 8/2 8/23 9/20 10/11        Total Weekly Dose 17.5mg 17.5 mg 17.5 mg 17.5mg        INR 2.5 2.3 2.3 2.0        Notes              Clinic Interview:  Tablet Strength: pt has 5mg tablets  Current Dose: 2.5mg daily  Patient Findings      Negatives Signs/symptoms of thrombosis, Signs/symptoms of bleeding, Laboratory test error suspected, Change in health, Change in alcohol use, Change in activity, Upcoming invasive procedure, Emergency department visit, Upcoming dental procedure, Missed doses, Extra doses, Change in medications, Change in diet/appetite, Hospital admission, Bruising, Other complaints     Comments Still having issues with hyperhydrosis. Patient was told by his retail pharmacist that it could be trazodone, however, has not stopped yet. He is still taking fluoxetine and metoprolol and was told that these can also cause hyperhydrosis. Patient is taking 100mg of trazodone at night. Patient has lost weight 238lbs ( from 245lbs) according to appointment at endocrinologist office 3 days ago. Patient did not look at medication list with his pill bottles at home as instructed to at last appointment. Patient plans to today with the medication list we send him home with in his AVS. Patient will meet with Dr. Bhatti to determine if he will have to have injections in his back. Patient will call us if he is scheduled for an appointment.  Patient had broccoli last night, however, is typical for the patient.     Plan:  1. INR is  therapeutic again today however, have seen dose decrease steadily. Instructed Mr. Wolf to continue current dose, warfarin 2.5mg daily.  2. RTC in 4 weeks, per patient preference.  3. See above regarding medication changes.  4. Pt declines warfarin refills.   5. Verbal and written information provided. Patient voiced understanding and has no further questions at this time.     Ama Mccloud, PharmD  10/11/2017  2:30 PM

## 2017-10-13 ENCOUNTER — TELEPHONE (OUTPATIENT)
Dept: CARDIOLOGY | Facility: CLINIC | Age: 72
End: 2017-10-13

## 2017-10-16 ENCOUNTER — OFFICE VISIT (OUTPATIENT)
Dept: INTERNAL MEDICINE | Facility: CLINIC | Age: 72
End: 2017-10-16

## 2017-10-16 VITALS
OXYGEN SATURATION: 96 % | DIASTOLIC BLOOD PRESSURE: 87 MMHG | SYSTOLIC BLOOD PRESSURE: 139 MMHG | HEART RATE: 76 BPM | BODY MASS INDEX: 32.5 KG/M2 | WEIGHT: 239.6 LBS

## 2017-10-16 DIAGNOSIS — E78.5 HYPERLIPIDEMIA LDL GOAL <100: ICD-10-CM

## 2017-10-16 DIAGNOSIS — I10 ESSENTIAL HYPERTENSION: ICD-10-CM

## 2017-10-16 DIAGNOSIS — E11.9 CONTROLLED TYPE 2 DIABETES MELLITUS WITHOUT COMPLICATION, WITHOUT LONG-TERM CURRENT USE OF INSULIN (HCC): Primary | ICD-10-CM

## 2017-10-16 DIAGNOSIS — Z86.2 HISTORY OF LEUKOCYTOSIS: ICD-10-CM

## 2017-10-16 DIAGNOSIS — G47.33 OBSTRUCTIVE SLEEP APNEA: ICD-10-CM

## 2017-10-16 DIAGNOSIS — K21.9 GASTROESOPHAGEAL REFLUX DISEASE, ESOPHAGITIS PRESENCE NOT SPECIFIED: ICD-10-CM

## 2017-10-16 LAB
ALBUMIN SERPL-MCNC: 4.4 G/DL (ref 3.2–4.8)
ALBUMIN/GLOB SERPL: 2.2 G/DL (ref 1.5–2.5)
ALP SERPL-CCNC: 66 U/L (ref 25–100)
ALT SERPL W P-5'-P-CCNC: 25 U/L (ref 7–40)
ANION GAP SERPL CALCULATED.3IONS-SCNC: 5 MMOL/L (ref 3–11)
ARTICHOKE IGE QN: 109 MG/DL (ref 0–130)
AST SERPL-CCNC: 31 U/L (ref 0–33)
BASOPHILS # BLD AUTO: 0.08 10*3/MM3 (ref 0–0.2)
BASOPHILS NFR BLD AUTO: 0.9 % (ref 0–1)
BILIRUB SERPL-MCNC: 1.1 MG/DL (ref 0.3–1.2)
BUN BLD-MCNC: 11 MG/DL (ref 9–23)
BUN/CREAT SERPL: 12.2 (ref 7–25)
CALCIUM SPEC-SCNC: 9.5 MG/DL (ref 8.7–10.4)
CHLORIDE SERPL-SCNC: 105 MMOL/L (ref 99–109)
CHOLEST SERPL-MCNC: 166 MG/DL (ref 0–200)
CO2 SERPL-SCNC: 31 MMOL/L (ref 20–31)
CREAT BLD-MCNC: 0.9 MG/DL (ref 0.6–1.3)
DEPRECATED RDW RBC AUTO: 51.1 FL (ref 37–54)
EOSINOPHIL # BLD AUTO: 0.25 10*3/MM3 (ref 0–0.3)
EOSINOPHIL NFR BLD AUTO: 2.8 % (ref 0–3)
ERYTHROCYTE [DISTWIDTH] IN BLOOD BY AUTOMATED COUNT: 15.3 % (ref 11.3–14.5)
GFR SERPL CREATININE-BSD FRML MDRD: 83 ML/MIN/1.73
GLOBULIN UR ELPH-MCNC: 2 GM/DL
GLUCOSE BLD-MCNC: 96 MG/DL (ref 70–100)
GLUCOSE BLDC GLUCOMTR-MCNC: 116 MG/DL (ref 70–130)
HBA1C MFR BLD: 6.6 %
HCT VFR BLD AUTO: 48.4 % (ref 38.9–50.9)
HDLC SERPL-MCNC: 35 MG/DL (ref 40–60)
HGB BLD-MCNC: 16.1 G/DL (ref 13.1–17.5)
IMM GRANULOCYTES # BLD: 0.03 10*3/MM3 (ref 0–0.03)
IMM GRANULOCYTES NFR BLD: 0.3 % (ref 0–0.6)
LYMPHOCYTES # BLD AUTO: 2.48 10*3/MM3 (ref 0.6–4.8)
LYMPHOCYTES NFR BLD AUTO: 28.1 % (ref 24–44)
MCH RBC QN AUTO: 30 PG (ref 27–31)
MCHC RBC AUTO-ENTMCNC: 33.3 G/DL (ref 32–36)
MCV RBC AUTO: 90.3 FL (ref 80–99)
MONOCYTES # BLD AUTO: 0.91 10*3/MM3 (ref 0–1)
MONOCYTES NFR BLD AUTO: 10.3 % (ref 0–12)
NEUTROPHILS # BLD AUTO: 5.09 10*3/MM3 (ref 1.5–8.3)
NEUTROPHILS NFR BLD AUTO: 57.6 % (ref 41–71)
PLATELET # BLD AUTO: 135 10*3/MM3 (ref 150–450)
PMV BLD AUTO: 11.2 FL (ref 6–12)
POTASSIUM BLD-SCNC: 3.9 MMOL/L (ref 3.5–5.5)
PROT SERPL-MCNC: 6.4 G/DL (ref 5.7–8.2)
RBC # BLD AUTO: 5.36 10*6/MM3 (ref 4.2–5.76)
SODIUM BLD-SCNC: 141 MMOL/L (ref 132–146)
TRIGL SERPL-MCNC: 284 MG/DL (ref 0–150)
WBC NRBC COR # BLD: 8.84 10*3/MM3 (ref 3.5–10.8)

## 2017-10-16 PROCEDURE — 80061 LIPID PANEL: CPT | Performed by: INTERNAL MEDICINE

## 2017-10-16 PROCEDURE — 82947 ASSAY GLUCOSE BLOOD QUANT: CPT | Performed by: INTERNAL MEDICINE

## 2017-10-16 PROCEDURE — G0008 ADMIN INFLUENZA VIRUS VAC: HCPCS | Performed by: INTERNAL MEDICINE

## 2017-10-16 PROCEDURE — 99214 OFFICE O/P EST MOD 30 MIN: CPT | Performed by: INTERNAL MEDICINE

## 2017-10-16 PROCEDURE — 85025 COMPLETE CBC W/AUTO DIFF WBC: CPT | Performed by: INTERNAL MEDICINE

## 2017-10-16 PROCEDURE — 90662 IIV NO PRSV INCREASED AG IM: CPT | Performed by: INTERNAL MEDICINE

## 2017-10-16 PROCEDURE — 80053 COMPREHEN METABOLIC PANEL: CPT | Performed by: INTERNAL MEDICINE

## 2017-10-16 PROCEDURE — 83036 HEMOGLOBIN GLYCOSYLATED A1C: CPT | Performed by: INTERNAL MEDICINE

## 2017-10-16 NOTE — PROGRESS NOTES
Subjective   Tony Wolf is a 72 y.o. male.   Chief Complaint   Patient presents with   • Heartburn   • Hyperlipidemia   • Hypertension   • Diabetes     LAST A1C 6.7       Heartburn   He reports no abdominal pain, no chest pain, no coughing, no nausea, no sore throat, no stridor, no tooth decay, no water brash or no wheezing. This is a chronic problem. Pertinent negatives include no fatigue.   Hyperlipidemia   This is a chronic problem. The current episode started more than 1 year ago. Pertinent negatives include no chest pain, myalgias or shortness of breath.   Hypertension   This is a chronic problem. The current episode started more than 1 year ago. Pertinent negatives include no chest pain, headaches, neck pain, palpitations or shortness of breath.   Diabetes   He presents for his follow-up diabetic visit. He has type 2 diabetes mellitus. Pertinent negatives for hypoglycemia include no confusion, headaches, nervousness/anxiousness, pallor, seizures or speech difficulty. Pertinent negatives for diabetes include no chest pain, no fatigue, no polydipsia and no polyphagia. An ACE inhibitor/angiotensin II receptor blocker is being taken.        The following portions of the patient's history were reviewed and updated as appropriate: allergies, current medications, past family history, past medical history, past social history, past surgical history and problem list.    Review of Systems   Constitutional: Negative for activity change, appetite change, chills, diaphoresis, fatigue, fever and unexpected weight change.   HENT: Negative for congestion, ear discharge, ear pain, mouth sores, nosebleeds, sinus pressure, sneezing and sore throat.    Eyes: Negative for pain, discharge and itching.   Respiratory: Negative for cough, chest tightness, shortness of breath and wheezing.    Cardiovascular: Negative for chest pain, palpitations and leg swelling.   Gastrointestinal: Negative for abdominal pain, constipation,  diarrhea, nausea and vomiting.   Endocrine: Negative for cold intolerance, heat intolerance, polydipsia and polyphagia.   Genitourinary: Negative for dysuria, flank pain, frequency, hematuria and urgency.   Musculoskeletal: Negative for arthralgias, back pain, gait problem, myalgias, neck pain and neck stiffness.   Skin: Negative for color change, pallor and rash.   Neurological: Negative for seizures, speech difficulty, numbness and headaches.   Psychiatric/Behavioral: Negative for agitation, confusion, decreased concentration and sleep disturbance. The patient is not nervous/anxious.      /87  Pulse 76  Wt 239 lb 9.6 oz (109 kg)  SpO2 96%  BMI 32.5 kg/m2    Objective   Physical Exam   Constitutional: He appears well-developed.   HENT:   Head: Normocephalic.   Right Ear: External ear normal.   Left Ear: External ear normal.   Nose: Nose normal.   Mouth/Throat: Oropharynx is clear and moist.   Eyes: Conjunctivae are normal. Pupils are equal, round, and reactive to light.   Neck: No JVD present. No thyromegaly present.   Cardiovascular: Normal rate, regular rhythm and normal heart sounds.  Exam reveals no friction rub.    No murmur heard.  Pulmonary/Chest: Effort normal and breath sounds normal. No respiratory distress. He has no wheezes. He has no rales.   Abdominal: Soft. Bowel sounds are normal. He exhibits no distension. There is no tenderness. There is no guarding.   Musculoskeletal: He exhibits no edema or tenderness.   Lymphadenopathy:     He has no cervical adenopathy.   Neurological: He displays normal reflexes. No cranial nerve deficit.   Skin: No rash noted.   Psychiatric: He has a normal mood and affect. His behavior is normal.   Nursing note and vitals reviewed.      Assessment/Plan   Tony was seen today for heartburn, hyperlipidemia, hypertension and diabetes.    Diagnoses and all orders for this visit:    Controlled type 2 diabetes mellitus without complication, without long-term current use  of insulin  -     POC Glucose Fingerstick  -     POC Glycosylated Hemoglobin (Hb A1C)  -     Comprehensive Metabolic Panel  -     Lipid Panel  -     POC Microalbumin    Essential hypertension  -     Comprehensive Metabolic Panel  -     Lipid Panel    Hyperlipidemia LDL goal <100  Lipids today  Obstructive sleep apnea  Did not tolerate cpap  Gastroesophageal reflux disease, esophagitis presence not specified  stable  History of leukocytosis  -     CBC & Differential  -     Lipid Panel    Other orders  -     Flu Vaccine High Dose PF 65YR+ ([id number])    50% of visit spent counseling    Last colonoscopy about 10 years ago.

## 2017-10-16 NOTE — TELEPHONE ENCOUNTER
Pt. Wife aware of risk and ok to hold for 4 days only. She will have Dr. Bhatti's office call us if they need clearance.

## 2017-10-17 ENCOUNTER — TELEPHONE (OUTPATIENT)
Dept: INTERNAL MEDICINE | Facility: CLINIC | Age: 72
End: 2017-10-17

## 2017-10-17 ENCOUNTER — TELEPHONE (OUTPATIENT)
Dept: PHARMACY | Facility: HOSPITAL | Age: 72
End: 2017-10-17

## 2017-10-17 NOTE — TELEPHONE ENCOUNTER
Mr. Wolf is scheduled for a lumbar epidural steroid injection on 11/8 with Dr. Bhatti. He has been instructed to hold his warfarin x4 days, with goal INR 1.1 prior to the procedure. Mr. Wolf's CHADS-VASc = 3 (age, HTN, DM), and he had a similar procedure last October for which held warfarin x5 days prior, so he should not require perioperative bridge therapy. He will RTC for repeat INR on 11/7. Please advise of any additional recommendations.      Phone: [phone]  Fax: [phone] (Attn: Tasha)    Ann Cowden Mayer, PharmD  10/17/2017  3:48 PM

## 2017-10-17 NOTE — TELEPHONE ENCOUNTER
MR JC CALLED TO REQUEST A REFERRAL FOR UROLOGY. HE STATES HE FORGOT TO MENTION THIS DURING HIS APPT YESTERDAY. HE WOULD LIKE TO SEE SOMEONE NEAR THE [facility], IF POSSIBLE.    CALL BACK [phone]

## 2017-10-18 DIAGNOSIS — N40.1 BPH ASSOCIATED WITH NOCTURIA: Primary | ICD-10-CM

## 2017-10-18 DIAGNOSIS — R35.1 BPH ASSOCIATED WITH NOCTURIA: Primary | ICD-10-CM

## 2017-11-07 ENCOUNTER — ANTICOAGULATION VISIT (OUTPATIENT)
Dept: PHARMACY | Facility: HOSPITAL | Age: 72
End: 2017-11-07

## 2017-11-07 LAB
INR PPP: 1.2 (ref 0.91–1.09)
PROTHROMBIN TIME: 14.9 SECONDS (ref 10–13.8)

## 2017-11-07 PROCEDURE — G0463 HOSPITAL OUTPT CLINIC VISIT: HCPCS

## 2017-11-07 PROCEDURE — 85610 PROTHROMBIN TIME: CPT

## 2017-11-07 PROCEDURE — 36416 COLLJ CAPILLARY BLOOD SPEC: CPT

## 2017-11-07 NOTE — PROGRESS NOTES
Anticoagulation Clinic Progress Note  Indication: atrial fibrillation  Referring Provider: del  Initial Warfarin Start Date: 2008  Goal INR: 2-3  Current Drug Interactions: fluoxetine, trazodone, glimepiride, melatonin    Anticoagulation Clinic INR History:  Date 3/21 4/18 5/3 6/19 6/23 6/30 7/7 7/14 7/20   Total Weekly Dose 20mg 20mg 20mg 20mg 15mg after held dose 17.5 mg 20mg 20mg 17.5 mg   INR 2.42 (lab) 1.6 2.4 4.5 2.1 1.9 2.8 3.4 2.7   Notes  cooked spinach  [facility]     Date 8/2 8/23 9/20 10/11 11/7       Total Weekly Dose 17.5mg 17.5 mg 17.5 mg 17.5mg 10mg       INR 2.5 2.3 2.3 2.0 1.2       Notes     Pre-lumbar inj         Clinic Interview:  Tablet Strength: pt has 5mg tablets  Current Dose: 2.5mg daily  Patient Findings      Negatives Signs/symptoms of thrombosis, Signs/symptoms of bleeding, Laboratory test error suspected, Change in health, Change in alcohol use, Change in activity, Upcoming invasive procedure, Emergency department visit, Upcoming dental procedure, Missed doses, Extra doses, Change in medications, Change in diet/appetite, Hospital admission, Bruising, Other complaints     Comments Still having issues with hyperhydrosis. He is still taking fluoxetine and metoprolol and was told that these can also cause hyperhydrosis. Patient was told by his retail pharmacist that it could be trazodone, however, has not stopped yet. Patient is taking 100mg of trazodone at night. Patient did not look at medication list with his pill bottles at home as instructed to at last two appointments. Spoke with Patient's wife and she plans to do this within the next few days. Patient is scheduled for lumbar spinal injection tomorrow.          Previous plan for procedure: Mr. Wolf is scheduled for a lumbar epidural steroid injection on 11/8 with Dr. Bhatti. He has been instructed to hold his warfarin x4 days, with goal INR 1.1 prior to the procedure. Mr. Wolf's CHADS-VASc = 3 (age, HTN, DM), and he  had a similar procedure last October for which held warfarin x5 days prior, so he should not require perioperative bridge therapy. He will RTC for repeat INR on 11/7. Please advise of any additional recommendations.      Phone: [phone]  Fax: [phone] (Attn: Tasha)        Plan:  1. INR is SUBtherapeutic today (1.2) prior to lumbar epidural steroid injection tomorrow.  Patient was unstructed to have INR under 1.1 therefore will instruct Mr. Wolf to eat some spinach tonight and then hold dose per directions. Instructed him to reinitiate warfarin tomorrow- unless otherwise directed to 2.5mg then take 5mg ThuFri and resume previous dose of 2.5mg daily.  2. RTC in 1.5 weeks.  3. See above regarding medication changes.  4. Pt declines warfarin refills.   5. Verbal and written information provided. Patient voiced understanding and has no further questions at this time.     Ama Mccloud, PharmD  11/7/2017  1:15 PM

## 2017-11-08 ENCOUNTER — APPOINTMENT (OUTPATIENT)
Dept: LAB | Facility: HOSPITAL | Age: 72
End: 2017-11-08

## 2017-11-08 ENCOUNTER — TRANSCRIBE ORDERS (OUTPATIENT)
Dept: LAB | Facility: HOSPITAL | Age: 72
End: 2017-11-08

## 2017-11-08 ENCOUNTER — TELEPHONE (OUTPATIENT)
Dept: PHARMACY | Facility: HOSPITAL | Age: 72
End: 2017-11-08

## 2017-11-08 DIAGNOSIS — Z79.01 LONG TERM CURRENT USE OF ANTICOAGULANT THERAPY: ICD-10-CM

## 2017-11-08 DIAGNOSIS — Z79.899 DRUG THERAPY: ICD-10-CM

## 2017-11-08 DIAGNOSIS — M48.061 SPINAL STENOSIS, LUMBAR REGION, WITHOUT NEUROGENIC CLAUDICATION: Primary | ICD-10-CM

## 2017-11-08 LAB
INR PPP: 1.12
PROTHROMBIN TIME: 12.3 SECONDS (ref 9.6–11.5)

## 2017-11-08 PROCEDURE — 36415 COLL VENOUS BLD VENIPUNCTURE: CPT | Performed by: ORTHOPAEDIC SURGERY

## 2017-11-08 PROCEDURE — 85610 PROTHROMBIN TIME: CPT | Performed by: ORTHOPAEDIC SURGERY

## 2017-11-16 RX ORDER — GLIMEPIRIDE 2 MG/1
TABLET ORAL
Qty: 30 TABLET | Refills: 0 | Status: SHIPPED | OUTPATIENT
Start: 2017-11-16 | End: 2017-12-18 | Stop reason: SDUPTHER

## 2017-11-17 ENCOUNTER — ANTICOAGULATION VISIT (OUTPATIENT)
Dept: PHARMACY | Facility: HOSPITAL | Age: 72
End: 2017-11-17

## 2017-11-17 LAB
INR PPP: 1.7 (ref 0.91–1.09)
PROTHROMBIN TIME: 20.6 SECONDS (ref 10–13.8)

## 2017-11-17 PROCEDURE — G0463 HOSPITAL OUTPT CLINIC VISIT: HCPCS

## 2017-11-17 PROCEDURE — 36416 COLLJ CAPILLARY BLOOD SPEC: CPT

## 2017-11-17 PROCEDURE — 85610 PROTHROMBIN TIME: CPT

## 2017-11-17 NOTE — PROGRESS NOTES
Anticoagulation Clinic Progress Note  Indication: atrial fibrillation  Referring Provider: del  Initial Warfarin Start Date: 2008  Goal INR: 2-3  Current Drug Interactions: fluoxetine, trazodone, glimepiride, melatonin, Quetiapine?   CHADS-VASc = 3 (age, HTN, DM)    Anticoagulation Clinic INR History:  Date 3/21 4/18 5/3 6/19 6/23 6/30 7/7 7/14 7/20   Total Weekly Dose 20mg 20mg 20mg 20mg 15mg after held dose 17.5 mg 20mg 20mg 17.5 mg   INR 2.42 (lab) 1.6 2.4 4.5 2.1 1.9 2.8 3.4 2.7   Notes  cooked spinach   [facility]     Date 8/2 8/23 9/20 10/11 11/7 11/17      Total Weekly Dose 17.5mg 17.5 mg 17.5 mg 17.5mg 10mg 17.5      INR 2.5 2.3 2.3 2.0 1.2 1.7      Notes     Pre-lumbar inj Off quietiapine?        Clinic Interview:  Tablet Strength: pt has 5mg tablets  Current Dose: 2.5mg daily  Patient Findings      Negatives Signs/symptoms of thrombosis, Signs/symptoms of bleeding, Laboratory test error suspected, Change in health, Change in alcohol use, Change in activity, Upcoming invasive procedure, Emergency department visit, Upcoming dental procedure, Missed doses, Extra doses, Change in medications, Change in diet/appetite, Hospital admission, Bruising, Other complaints     Comments Still having issues with hyperhydrosis. He is still taking fluoxetine and metoprolol and was told that these can also cause hyperhydrosis. Patient was told by his retail pharmacist that it could be trazodone, however, has not stopped yet. Patient is taking 100mg of trazodone at night. Patient may start taking vistaril. Patient did not look at medication list with his pill bottles at home as instructed to at last three appointments. Patient states procedure went well but is still experiencing a little pain. Discussed cranberry's interact with warfarin.    Reviewed medications with pharmacist at [facility].She stated she is confused about if patient should take quetiapine or not. (Dr. Mary Downey- at [facility]).  Called  and spoke with Qian who sent message to Mary New- she will follow up with patient. Left message with qian and Mary new will call if she has any questions.     Phone: [phone]  Fax: [phone] (Attn: Tasha)    Plan:  1. INR is SUBtherapeutic today (1.7) after hold for lumbar injection.  Instructed patient to boost dose tonight to 5mg then resume previous dose of 2.5mg daily.  2. RTC in 1.5 weeks.  3. See above regarding medication changes.  4. Pt declines warfarin refills.   5. Verbal and written information provided. Patient voiced understanding and has no further questions at this time.     Ama Mccloud, PharmD  11/17/2017  1:29 PM

## 2017-11-28 ENCOUNTER — ANTICOAGULATION VISIT (OUTPATIENT)
Dept: PHARMACY | Facility: HOSPITAL | Age: 72
End: 2017-11-28

## 2017-11-28 LAB
INR PPP: 1.5 (ref 0.91–1.09)
PROTHROMBIN TIME: 18.5 SECONDS (ref 10–13.8)

## 2017-11-28 PROCEDURE — G0463 HOSPITAL OUTPT CLINIC VISIT: HCPCS

## 2017-11-28 PROCEDURE — 36416 COLLJ CAPILLARY BLOOD SPEC: CPT

## 2017-11-28 PROCEDURE — 85610 PROTHROMBIN TIME: CPT

## 2017-11-28 RX ORDER — TRAZODONE HYDROCHLORIDE 100 MG/1
TABLET ORAL
Refills: 2 | COMMUNITY
Start: 2017-09-30 | End: 2018-04-27 | Stop reason: ALTCHOICE

## 2017-11-28 NOTE — PROGRESS NOTES
Anticoagulation Clinic Progress Note  Indication: atrial fibrillation  Referring Provider: del  Initial Warfarin Start Date: 2008  Goal INR: 2-3  Current Drug Interactions: fluoxetine, trazodone, glimepiride, melatonin, Quetiapine?   CHADS-VASc = 3 (age, HTN, DM)    Anticoagulation Clinic INR History:  Date 3/21 4/18 5/3 6/19 6/23 6/30 7/7 7/14 7/20   Total Weekly Dose 20mg 20mg 20mg 20mg 15mg after held dose 17.5 mg 20mg 20mg 17.5 mg   INR 2.42 (lab) 1.6 2.4 4.5 2.1 1.9 2.8 3.4 2.7   Notes  cooked spinach  [facility]     Date 8/2 8/23 9/20 10/11 11/7 11/17 11/28     Total Weekly Dose 17.5mg 17.5 mg 17.5 mg 17.5mg 10mg 17.5 mg 17.5 mg 20mg    INR 2.5 2.3 2.3 2.0 1.2 1.7 1.5     Notes     Pre-lumbar inj         Clinic Interview:  Tablet Strength: pt has 5mg tablets  Current Dose: 2.5mg daily  Patient Findings      Negatives Signs/symptoms of thrombosis, Signs/symptoms of bleeding, Laboratory test error suspected, Change in health, Change in alcohol use, Change in activity, Upcoming invasive procedure, Emergency department visit, Upcoming dental procedure, Missed doses, Extra doses, Change in medications, Change in diet/appetite, Hospital admission, Bruising, Other complaints     Comments Mr. Wolf is still on trazodone for now, but his MD is wanting him to try switching to quetiapine. His wife thinks he has been on quetiapine before and doesn't think it suited him well, but he may try it again.      Phone: [phone]  Fax: [phone] (Attn: Tasha)    Plan:  1. INR is SUBtherapeutic again today. Instructed Mr. Wolf to increase his dose to warfarin 2.5mg daily except 5mg on Tuesdays.  2. RTC in 2 weeks to reassess.   3. See above regarding medications.  4. Verbal and written information provided in clinic.  and Mrs. Wolf voiced understanding with teach back and has no further questions at this time.     Elise Portillo [facility]  11/28/2017  1:09 PM

## 2017-12-04 ENCOUNTER — OFFICE VISIT (OUTPATIENT)
Dept: ORTHOPEDIC SURGERY | Facility: CLINIC | Age: 72
End: 2017-12-04

## 2017-12-04 VITALS
WEIGHT: 233.69 LBS | SYSTOLIC BLOOD PRESSURE: 137 MMHG | HEIGHT: 69 IN | HEART RATE: 68 BPM | DIASTOLIC BLOOD PRESSURE: 95 MMHG | BODY MASS INDEX: 34.61 KG/M2

## 2017-12-04 DIAGNOSIS — Z96.651 STATUS POST TOTAL RIGHT KNEE REPLACEMENT: Primary | ICD-10-CM

## 2017-12-04 DIAGNOSIS — Z09 POSTOPERATIVE EXAMINATION: ICD-10-CM

## 2017-12-04 PROCEDURE — 99213 OFFICE O/P EST LOW 20 MIN: CPT | Performed by: ORTHOPAEDIC SURGERY

## 2017-12-04 RX ORDER — NYSTATIN 100000 [USP'U]/G
POWDER TOPICAL
Refills: 2 | COMMUNITY
Start: 2017-11-30 | End: 2018-12-18

## 2017-12-04 RX ORDER — QUETIAPINE FUMARATE 25 MG/1
TABLET, FILM COATED ORAL
COMMUNITY
Start: 2017-10-12 | End: 2017-12-13

## 2017-12-04 NOTE — PROGRESS NOTES
[facility] [place] [facility] Note    Subjective     Chief Complaint   Patient presents with   • Right Knee - Follow-up     (R) Total Knee Arthroplasty 5/30/17        HPI    Tony Wolf is a 72 y.o. male. He follows up today for his right total knee arthroplasty.  He is now 6 months out from surgery, with mild pain in the knee, overall improved compared to his preoperative symptoms.      Patient Active Problem List   Diagnosis   • GERD (gastroesophageal reflux disease)   • Essential hypertension   • Obesity   • Obstructive sleep apnea   • Type 2 diabetes mellitus, uncontrolled   • Generalized anxiety disorder   • Atrial fibrillation   • Screening for prostate cancer   • PVC's (premature ventricular contractions)   • Hyperlipidemia LDL goal <100   • Left ventricular hypertrophy   • Atypical atrial flutter   • Arthritis of knee, right   • Status post right knee replacement   • Leukocytosis, mild, likely reactive   • Thrombocytopenia   • Postoperative urinary retention   • Hypokalemia, replaced     Past Medical History:   Diagnosis Date   • Acute bronchitis    • Anxiety    • Arthritis     right knee   • Atrial fibrillation    • Back pain    • Cancer     skin cancer removed from face    • Carpal tunnel syndrome    • Derangement, knee internal    • Diabetes mellitus     DX'D TYPE II APPROX 15 YEARS AGO, DOES NOT CHECK BLOOD SUGARS AT HOME, STARTED INSULIN IN MARCH AFTER SURGERY CANCELLED FOR ELEVATED A1C   • Drug dependence    • GERD (gastroesophageal reflux disease)    • Heart disease    • Hyperlipidemia 11/14/2016   • Hypertension    • IBS (irritable bowel syndrome)    • Knee pain, right    • Left ventricular hypertrophy 11/14/2016   • Neuropathy, lumbosacral (radicular)    • Osteoporosis    • Premature ventricular contractions    • PVC's (premature ventricular contractions) 11/14/2016   • Scoliosis    • Sinusitis    • Sleep apnea    • SOB (shortness of breath)    • Spinal stenosis, lumbar region with  neurogenic claudication    • Trigger middle finger of left hand    • Trigger middle finger of right hand    • Wears glasses     readers      Past Surgical History:   Procedure Laterality Date   • BACK SURGERY      injections for back    • CHOLECYSTECTOMY     • COLONOSCOPY      LESS THAN 5 YEARS    • ENDOSCOPY      x3   • FRACTURE SURGERY      right heel   • HERNIA REPAIR      both side inguinal    • LUMBAR EPIDURAL INJECTION     • [place] TOTAL KNEE ARTHROPLASTY Right 5/30/2017    Procedure: RIGHT TOTAL KNEE ARTHROPLASTY;  Surgeon: Simon Interiano MD;  Location: [facility];  Service: Orthopedics   • TOTAL KNEE ARTHROPLASTY REVISION      left   • WRIST SURGERY      carpal tunnel bilat       Family History   Problem Relation Age of Onset   • Cancer Other    • Anemia Other    • Heart attack Other    • Cancer Mother    • Heart disease Mother    • Hypertension Mother    • Heart attack Mother    • Diabetes Mother    • Deep vein thrombosis Father    • Cancer Other      Social History     Social History   • Marital status:      Spouse name: N/A   • Number of children: N/A   • Years of education: N/A     Occupational History   • Not on file.     Social History Main Topics   • Smoking status: Never Smoker   • Smokeless tobacco: Never Used   • Alcohol use No   • Drug use: No   • Sexual activity: Defer     Other Topics Concern   • Not on file     Social History Narrative      Current Outpatient Prescriptions on File Prior to Visit   Medication Sig Dispense Refill   • ACCU-CHEK FASTCLIX LANCETS misc Test 1-2 times daily 102 each 1   • acetaminophen (TYLENOL) 500 MG tablet Take 1,000 mg by mouth Every 6 (Six) Hours As Needed for Mild Pain (1-3).     • amLODIPine (NORVASC) 10 MG tablet Take 10 mg by mouth Daily.     • Blood Glucose Monitoring Suppl (ACCU-CHEK FREDERICK PLUS) W/DEVICE kit Use once daily as directed 1 kit 0   • Cholecalciferol (VITAMIN D-3) 1000 UNITS capsule Take 1,000 Units by mouth Daily.     • colestipol (COLESTID)  1 G tablet Take 1 g by mouth Daily.     • FLUoxetine (PROzac) 20 MG capsule Take 60 mg by mouth Daily.     • glimepiride (AMARYL) 2 MG tablet TAKE 1 TABLET BY MOUTH EVERY DAY 30 tablet 0   • glucose blood (ACCU-CHEK FREDERICK) test strip Test 1-2 times daily 100 each 1   • hydrOXYzine (ATARAX) 50 MG tablet Take  by mouth Take As Directed.     • lisinopril (PRINIVIL,ZESTRIL) 40 MG tablet Take 40 mg by mouth Daily.     • Melatonin 2.5 MG chewable tablet Chew 1 tablet At Night As Needed.     • metoprolol succinate XL (TOPROL-XL) 100 MG 24 hr tablet Take 100 mg by mouth 2 (Two) Times a Day.     • pravastatin (PRAVACHOL) 20 MG tablet Take 1 tablet by mouth Every Evening. 30 tablet 11   • traZODone (DESYREL) 100 MG tablet TK 1-2 T PO AT BEDTIME  2   • warfarin (COUMADIN) 5 MG tablet 1 tablet daily. INR sunday       No current facility-administered medications on file prior to visit.       Allergies   Allergen Reactions   • Aspirin Anaphylaxis     Other reaction(s): Hypotension        Review of Systems   Constitutional: Negative for activity change, appetite change, chills, diaphoresis, fatigue, fever and unexpected weight change.   HENT: Negative for congestion, dental problem, drooling, ear discharge, ear pain, facial swelling, hearing loss, mouth sores, nosebleeds, postnasal drip, rhinorrhea, sinus pressure, sneezing, sore throat, tinnitus, trouble swallowing and voice change.    Eyes: Negative for photophobia, pain, discharge, redness, itching and visual disturbance.   Respiratory: Negative for apnea, cough, choking, chest tightness, shortness of breath, wheezing and stridor.    Cardiovascular: Negative for chest pain, palpitations and leg swelling.   Gastrointestinal: Negative for abdominal distention, abdominal pain, anal bleeding, blood in stool, constipation, diarrhea, nausea, rectal pain and vomiting.   Endocrine: Negative for cold intolerance, heat intolerance, polydipsia, polyphagia and polyuria.   Genitourinary:  "Negative for decreased urine volume, difficulty urinating, dysuria, enuresis, flank pain, frequency, genital sores, hematuria and urgency.   Musculoskeletal: Negative for arthralgias, back pain, gait problem, joint swelling, myalgias, neck pain and neck stiffness.        Joint Pain    Skin: Negative for color change, pallor, rash and wound.   Allergic/Immunologic: Negative for environmental allergies, food allergies and immunocompromised state.   Neurological: Negative for dizziness, tremors, seizures, syncope, facial asymmetry, speech difficulty, weakness, light-headedness, numbness and headaches.   Hematological: Negative for adenopathy. Does not bruise/bleed easily.   Psychiatric/Behavioral: Negative for agitation, behavioral problems, confusion, decreased concentration, dysphoric mood, hallucinations, self-injury, sleep disturbance and suicidal ideas. The patient is not nervous/anxious and is not hyperactive.         Objective      Physical Exam  /95  Pulse 68  Ht 69.02\" (175.3 cm)  Wt 233 lb 11 oz (106 kg)  BMI 34.49 kg/m2    Body mass index is 34.49 kg/(m^2).    General:   Mental Status:  Alert   Appearance: Cooperative, in no acute distress   Build and Nutrition: Overweight male   Orientation: Alert and oriented to person, place and time   Posture: Normal   Gait: Walking with a cane for balance    Integument:   Right knee: Wound is well-healed with no signs of infection    Lower Extremities:   Right Knee:    Tenderness:  Mild medial and lateral joint line tenderness    Effusion:  None    Swelling: None    Crepitus:  None    Range of motion:  Extension: 5°       Flexion: 120°  Instability:  No varus laxity, no valgus laxity, negative anterior drawer  Deformities:  None      Imaging/Studies  Imaging Results (last 24 hours)     Procedure Component Value Units Date/Time    XR Knee 3+ View With [place] Right [[id number]] Resulted:  12/04/17 1358     Updated:  12/04/17 1359    Narrative:       Right Knee " Radiographs  Indication: status-post right total knee arthroplasty  Views: AP, lateral, and sunrise views of the right knee    Comparison: no change compared to prior study    Findings:   The components are well aligned, with no signs of loosening or failure.    Calcifications in the quadriceps tendon, as well as some mild heterotopic   bone posteriorly off of the tibia seen on the lateral view.            Assessment and Plan     Tony was seen today for follow-up.    Diagnoses and all orders for this visit:    Status post total right knee replacement  -     XR Knee 3+ View With [place] Right    Postoperative examination        I reviewed my findings with patient today.  His right total knee arthroplasties functioning well.  I'll see him back in 6 months with repeat x-rays, but sooner for any problems.  He is using a cane, but it's primarily for balance for other issues.    Return in about 6 months (around 6/4/2018) for Recheck with X-Rays.      Medical Decision Making  Data/Risk: radiology tests and independent visualization of imaging, lab tests, or EMG/NCV      Simon Interiano MD  12/04/17  2:04 PM

## 2017-12-13 ENCOUNTER — ANTICOAGULATION VISIT (OUTPATIENT)
Dept: PHARMACY | Facility: HOSPITAL | Age: 72
End: 2017-12-13

## 2017-12-13 LAB
INR PPP: 1.7 (ref 0.91–1.09)
PROTHROMBIN TIME: 20.4 SECONDS (ref 10–13.8)

## 2017-12-13 PROCEDURE — 36416 COLLJ CAPILLARY BLOOD SPEC: CPT

## 2017-12-13 PROCEDURE — G0463 HOSPITAL OUTPT CLINIC VISIT: HCPCS

## 2017-12-13 PROCEDURE — 85610 PROTHROMBIN TIME: CPT

## 2017-12-13 NOTE — PROGRESS NOTES
Anticoagulation Clinic Progress Note  Indication: atrial fibrillation  Referring Provider: del  Initial Warfarin Start Date: 2008  Goal INR: 2-3  Current Drug Interactions: fluoxetine, trazodone, glimepiride, melatonin  CHADS-VASc = 3 (age, HTN, DM)    Anticoagulation Clinic INR History:  Date 3/21 4/18 5/3 6/19 6/23 6/30 7/7 7/14 7/20   Total Weekly Dose 20mg 20mg 20mg 20mg 15mg after held dose 17.5 mg 20mg 20mg 17.5 mg   INR 2.42 (lab) 1.6 2.4 4.5 2.1 1.9 2.8 3.4 2.7   Notes  cooked spinach  [facility]     Date 8/2 8/23 9/20 10/11 11/7 11/17 11/28 12/13    Total Weekly Dose 17.5mg 17.5 mg 17.5 mg 17.5mg 10mg 17.5 mg 17.5 mg 20mg 22.5 mg   INR 2.5 2.3 2.3 2.0 1.2 1.7 1.5 1.7    Notes     Pre- lumbar inj         Clinic Interview:  Tablet Strength: pt has 5mg tablets  Current Dose: 2.5mg daily  Patient Findings      Positives Other complaints     Negatives Signs/symptoms of thrombosis, Signs/symptoms of bleeding, Laboratory test error suspected, Change in health, Change in alcohol use, Change in activity, Upcoming invasive procedure, Emergency department visit, Upcoming dental procedure, Missed doses, Extra doses, Change in medications, Change in diet/appetite, Hospital admission, Bruising     Comments Mr. Wolf complains of severe arthritis pain (hands, shoulders).     Phone: [phone]  Fax: [phone] (Attn: Tasha)    Plan:  1. INR is subtherapeutic again today despite recent dose increase -- question if 2/2 effects of trazodone dose changes (incr. from 25mg to 200mg over the last 5-6 months)? Instructed Mr. Wolf to increase his dose (12.5%) to warfarin 2.5mg daily except 5mg MonFri.   2. RTC in 2 weeks to reassess.   3. Verbal and written information provided in clinic.  and Mrs. Wolf voiced understanding with teach back and has no further questions at this time.     Elise Portillo [facility]  12/13/2017  1:14 PM

## 2017-12-18 RX ORDER — LISINOPRIL 40 MG/1
TABLET ORAL
Qty: 30 TABLET | Refills: 6 | Status: SHIPPED | OUTPATIENT
Start: 2017-12-18 | End: 2018-05-24 | Stop reason: SDUPTHER

## 2017-12-18 RX ORDER — GLIMEPIRIDE 2 MG/1
TABLET ORAL
Qty: 30 TABLET | Refills: 0 | Status: SHIPPED | OUTPATIENT
Start: 2017-12-18 | End: 2018-01-08 | Stop reason: SDUPTHER

## 2017-12-27 ENCOUNTER — ANTICOAGULATION VISIT (OUTPATIENT)
Dept: PHARMACY | Facility: HOSPITAL | Age: 72
End: 2017-12-27

## 2017-12-27 LAB
INR PPP: 2.6 (ref 0.91–1.09)
PROTHROMBIN TIME: 31.2 SECONDS (ref 10–13.8)

## 2017-12-27 PROCEDURE — G0463 HOSPITAL OUTPT CLINIC VISIT: HCPCS

## 2017-12-27 PROCEDURE — 36416 COLLJ CAPILLARY BLOOD SPEC: CPT

## 2017-12-27 PROCEDURE — 85610 PROTHROMBIN TIME: CPT

## 2017-12-27 NOTE — PROGRESS NOTES
Anticoagulation Clinic Progress Note  Indication: atrial fibrillation  Referring Provider: del  Initial Warfarin Start Date: 2008  Goal INR: 2-3  Current Drug Interactions: fluoxetine, trazodone, glimepiride, melatonin  CHADS-VASc = 3 (age, HTN, DM)    Anticoagulation Clinic INR History:  Date 3/21 4/18 5/3 6/19 6/23 6/30 7/7 7/14 7/20   Total Weekly Dose 20mg 20mg 20mg 20mg 15mg after held dose 17.5 mg 20mg 20mg 17.5 mg   INR 2.42 (lab) 1.6 2.4 4.5 2.1 1.9 2.8 3.4 2.7   Notes  cooked spinach  [facility]     Date 8/2 8/23 9/20 10/11 11/7 11/17 11/28 12/13 12/27   Total Weekly Dose 17.5mg 17.5 mg 17.5 mg 17.5mg 10mg 17.5 mg 17.5 mg 20mg 22.5 mg   INR 2.5 2.3 2.3 2.0 1.2 1.7 1.5 1.7 2.6   Notes     Pre- lumbar inj         Clinic Interview:  Tablet Strength: pt has 5mg tablets  Current Dose: 2.5mg daily  Patient Findings      Negatives Signs/symptoms of thrombosis, Signs/symptoms of bleeding, Laboratory test error suspected, Change in health, Change in alcohol use, Change in activity, Upcoming invasive procedure, Emergency department visit, Upcoming dental procedure, Missed doses, Extra doses, Change in medications, Change in diet/appetite, Hospital admission, Bruising, Other complaints     Phone: [phone]  Fax: [phone] (Attn: Tasha)    Plan:  1. INR is therapeutic today following dose increase. Will therefore instruct Mr. Wolf to continue warfarin 2.5mg daily except 5mg MonFri.   2. RTC in 2 weeks to reassess. Will consider pushing appointments out if remains WNL.  3. Verbal and written information provided in clinic.  and Mrs. Wolf voiced understanding with teach back and has no further questions at this time.     Ama Mccloud, PharmD  12/27/2017  1:12 PM

## 2018-01-08 RX ORDER — PRAVASTATIN SODIUM 20 MG
20 TABLET ORAL DAILY
Qty: 30 TABLET | Refills: 5 | Status: SHIPPED | OUTPATIENT
Start: 2018-01-08 | End: 2018-01-16 | Stop reason: SDUPTHER

## 2018-01-08 RX ORDER — LANCETS
EACH MISCELLANEOUS
Qty: 102 EACH | Refills: 1 | Status: SHIPPED | OUTPATIENT
Start: 2018-01-08 | End: 2018-02-27 | Stop reason: SDUPTHER

## 2018-01-08 RX ORDER — GLIMEPIRIDE 2 MG/1
2 TABLET ORAL DAILY
Qty: 30 TABLET | Refills: 0 | Status: SHIPPED | OUTPATIENT
Start: 2018-01-08 | End: 2018-01-22 | Stop reason: SDUPTHER

## 2018-01-10 ENCOUNTER — ANTICOAGULATION VISIT (OUTPATIENT)
Dept: PHARMACY | Facility: HOSPITAL | Age: 73
End: 2018-01-10

## 2018-01-10 DIAGNOSIS — I48.91 ATRIAL FIBRILLATION, UNSPECIFIED TYPE (HCC): Primary | ICD-10-CM

## 2018-01-10 LAB
INR PPP: 2.6 (ref 0.91–1.09)
PROTHROMBIN TIME: 31.4 SECONDS (ref 10–13.8)

## 2018-01-10 PROCEDURE — G0463 HOSPITAL OUTPT CLINIC VISIT: HCPCS

## 2018-01-10 PROCEDURE — 85610 PROTHROMBIN TIME: CPT

## 2018-01-10 PROCEDURE — 36416 COLLJ CAPILLARY BLOOD SPEC: CPT

## 2018-01-10 RX ORDER — WARFARIN SODIUM 2.5 MG/1
TABLET ORAL
Qty: 120 TABLET | Refills: 1 | OUTPATIENT
Start: 2018-01-10 | End: 2018-08-13 | Stop reason: SDUPTHER

## 2018-01-10 NOTE — PROGRESS NOTES
Anticoagulation Clinic Progress Note  Indication: atrial fibrillation  Referring Provider: del  Initial Warfarin Start Date: 2008  Goal INR: 2-3  Current Drug Interactions: fluoxetine, trazodone, glimepiride, melatonin  CHADS-VASc = 3 (age, HTN, DM)    Anticoagulation Clinic INR History:  Date 3/21 4/18 5/3 6/19 6/23 6/30 7/7 7/14 7/20   Total Weekly Dose 20mg 20mg 20mg 20mg 15mg after held dose 17.5 mg 20mg 20mg 17.5 mg   INR 2.42 (lab) 1.6 2.4 4.5 2.1 1.9 2.8 3.4 2.7   Notes  cooked spinach  [facility]     Date 8/2 8/23 9/20 10/11 11/7 11/17 11/28 12/13 12/27   Total Weekly Dose 17.5mg 17.5 mg 17.5 mg 17.5mg 10mg 17.5 mg 17.5 mg 20mg 22.5 mg   INR 2.5 2.3 2.3 2.0 1.2 1.7 1.5 1.7 2.6   Notes     Pre- lumbar inj         Date 1/10/18           Total Weekly Dose            INR            Notes              Clinic Interview:  Tablet Strength: pt has 5mg tablets  Current Dose: 2.5mg daily  Patient Findings      Positives Change in medications     Negatives Signs/symptoms of thrombosis, Signs/symptoms of bleeding, Laboratory test error suspected, Change in health, Change in alcohol use, Change in activity, Upcoming invasive procedure, Emergency department visit, Upcoming dental procedure, Missed doses, Extra doses, Change in diet/appetite, Hospital admission, Bruising, Other complaints     Comments Mr. Wolf has started using [place] Dream topical cream for arthritis related pain (right hand, wrist, and shoulder). He thinks it helped today, and plans to continue using it. Main ingredient: histamine dihydrochloride + non-active ingredients (glucosamine, chondroitin).      Phone: [phone]  Fax: [phone] (Attn: Tasha)    Plan:  1. INR is therapeutic again today, so instructed Mr. Wolf to continue warfarin 2.5mg daily except 5mg MonFri.   2. RTC in 3 weeks.  3. Verbal and written information provided in clinic.  and Mrs. Hamlyn voiced understanding with teach back and has no further questions  at this time.     Elise Portillo [facility]  1/10/2018  1:23 PM

## 2018-01-16 RX ORDER — PRAVASTATIN SODIUM 20 MG
20 TABLET ORAL DAILY
Qty: 30 TABLET | Refills: 3 | Status: SHIPPED | OUTPATIENT
Start: 2018-01-16 | End: 2018-05-15 | Stop reason: SDUPTHER

## 2018-01-22 ENCOUNTER — OFFICE VISIT (OUTPATIENT)
Dept: INTERNAL MEDICINE | Facility: CLINIC | Age: 73
End: 2018-01-22

## 2018-01-22 VITALS
DIASTOLIC BLOOD PRESSURE: 84 MMHG | HEIGHT: 69 IN | OXYGEN SATURATION: 98 % | HEART RATE: 75 BPM | SYSTOLIC BLOOD PRESSURE: 138 MMHG | WEIGHT: 239.3 LBS | BODY MASS INDEX: 35.44 KG/M2

## 2018-01-22 DIAGNOSIS — I10 ESSENTIAL HYPERTENSION: ICD-10-CM

## 2018-01-22 DIAGNOSIS — E78.5 HYPERLIPIDEMIA LDL GOAL <100: ICD-10-CM

## 2018-01-22 DIAGNOSIS — M89.9 DISORDER OF BONE: ICD-10-CM

## 2018-01-22 DIAGNOSIS — IMO0001 UNCONTROLLED DIABETES MELLITUS TYPE 2 WITHOUT COMPLICATIONS, UNSPECIFIED LONG TERM INSULIN USE STATUS: Primary | ICD-10-CM

## 2018-01-22 DIAGNOSIS — K21.9 GASTROESOPHAGEAL REFLUX DISEASE, ESOPHAGITIS PRESENCE NOT SPECIFIED: ICD-10-CM

## 2018-01-22 DIAGNOSIS — Z13.820 OSTEOPOROSIS SCREENING: ICD-10-CM

## 2018-01-22 LAB
ALBUMIN SERPL-MCNC: 4.5 G/DL (ref 3.2–4.8)
ALBUMIN/GLOB SERPL: 2.6 G/DL (ref 1.5–2.5)
ALP SERPL-CCNC: 57 U/L (ref 25–100)
ALT SERPL W P-5'-P-CCNC: 23 U/L (ref 7–40)
ANION GAP SERPL CALCULATED.3IONS-SCNC: 6 MMOL/L (ref 3–11)
ARTICHOKE IGE QN: 100 MG/DL (ref 0–130)
AST SERPL-CCNC: 36 U/L (ref 0–33)
BILIRUB SERPL-MCNC: 1 MG/DL (ref 0.3–1.2)
BUN BLD-MCNC: 15 MG/DL (ref 9–23)
BUN/CREAT SERPL: 12.5 (ref 7–25)
CALCIUM SPEC-SCNC: 9.3 MG/DL (ref 8.7–10.4)
CHLORIDE SERPL-SCNC: 105 MMOL/L (ref 99–109)
CHOLEST SERPL-MCNC: 145 MG/DL (ref 0–200)
CO2 SERPL-SCNC: 30 MMOL/L (ref 20–31)
CREAT BLD-MCNC: 1.2 MG/DL (ref 0.6–1.3)
GFR SERPL CREATININE-BSD FRML MDRD: 60 ML/MIN/1.73
GLOBULIN UR ELPH-MCNC: 1.7 GM/DL
GLUCOSE BLD-MCNC: 122 MG/DL (ref 70–100)
GLUCOSE BLDC GLUCOMTR-MCNC: 155 MG/DL (ref 70–130)
HBA1C MFR BLD: 6.3 %
HDLC SERPL-MCNC: 30 MG/DL (ref 40–60)
POTASSIUM BLD-SCNC: 4.5 MMOL/L (ref 3.5–5.5)
PROT SERPL-MCNC: 6.2 G/DL (ref 5.7–8.2)
SODIUM BLD-SCNC: 141 MMOL/L (ref 132–146)
TRIGL SERPL-MCNC: 419 MG/DL (ref 0–150)

## 2018-01-22 PROCEDURE — 82947 ASSAY GLUCOSE BLOOD QUANT: CPT | Performed by: INTERNAL MEDICINE

## 2018-01-22 PROCEDURE — 83036 HEMOGLOBIN GLYCOSYLATED A1C: CPT | Performed by: INTERNAL MEDICINE

## 2018-01-22 PROCEDURE — 99214 OFFICE O/P EST MOD 30 MIN: CPT | Performed by: INTERNAL MEDICINE

## 2018-01-22 PROCEDURE — 80053 COMPREHEN METABOLIC PANEL: CPT | Performed by: INTERNAL MEDICINE

## 2018-01-22 PROCEDURE — 80061 LIPID PANEL: CPT | Performed by: INTERNAL MEDICINE

## 2018-01-22 RX ORDER — GLIMEPIRIDE 2 MG/1
2 TABLET ORAL DAILY
Qty: 30 TABLET | Refills: 0 | Status: SHIPPED | OUTPATIENT
Start: 2018-01-22 | End: 2018-04-23 | Stop reason: SDUPTHER

## 2018-01-22 RX ORDER — FLUOXETINE HYDROCHLORIDE 40 MG/1
CAPSULE ORAL
COMMUNITY
Start: 2018-01-08 | End: 2018-01-31

## 2018-01-22 RX ORDER — MIRTAZAPINE 15 MG/1
TABLET, FILM COATED ORAL NIGHTLY
COMMUNITY
Start: 2018-01-18 | End: 2019-01-17 | Stop reason: ALTCHOICE

## 2018-01-22 NOTE — PROGRESS NOTES
Subjective   Tony Wolf is a 72 y.o. male.   Chief Complaint   Patient presents with   • Diabetes     F/u for type 2 diabetes, testing 1x qd (fasting)       Diabetes   He presents for his follow-up diabetic visit. He has type 2 diabetes mellitus. Pertinent negatives for hypoglycemia include no confusion, headaches, nervousness/anxiousness, pallor, seizures or speech difficulty. Pertinent negatives for diabetes include no chest pain, no fatigue, no polydipsia and no polyphagia. An ACE inhibitor/angiotensin II receptor blocker is being taken.   Heartburn   He reports no abdominal pain, no chest pain, no coughing, no nausea, no sore throat, no stridor, no tooth decay, no water brash or no wheezing. This is a chronic problem. Pertinent negatives include no fatigue.   Hyperlipidemia   This is a chronic problem. The current episode started more than 1 year ago. Pertinent negatives include no chest pain, myalgias or shortness of breath.   Hypertension   This is a chronic problem. The current episode started more than 1 year ago. Pertinent negatives include no chest pain, headaches, neck pain, palpitations or shortness of breath.        The following portions of the patient's history were reviewed and updated as appropriate: allergies, current medications, past family history, past medical history, past social history, past surgical history and problem list.    Review of Systems   Constitutional: Negative for activity change, appetite change, chills, diaphoresis, fatigue, fever and unexpected weight change.   HENT: Negative for congestion, ear discharge, ear pain, mouth sores, nosebleeds, sinus pressure, sneezing and sore throat.    Eyes: Negative for pain, discharge and itching.   Respiratory: Negative for cough, chest tightness, shortness of breath and wheezing.    Cardiovascular: Negative for chest pain, palpitations and leg swelling.   Gastrointestinal: Negative for abdominal pain, constipation, diarrhea, nausea and  "vomiting.   Endocrine: Negative for cold intolerance, heat intolerance, polydipsia and polyphagia.   Genitourinary: Negative for dysuria, flank pain, frequency, hematuria and urgency.   Musculoskeletal: Negative for arthralgias, back pain, gait problem, myalgias, neck pain and neck stiffness.   Skin: Negative for color change, pallor and rash.   Neurological: Negative for seizures, speech difficulty, numbness and headaches.   Psychiatric/Behavioral: Negative for agitation, confusion, decreased concentration and sleep disturbance. The patient is not nervous/anxious.      /84  Pulse 75  Ht 175.3 cm (69.02\")  Wt 109 kg (239 lb 4.8 oz)  SpO2 98%  BMI 35.32 kg/m2    Objective   Physical Exam   Constitutional: He appears well-developed.   HENT:   Head: Normocephalic.   Right Ear: External ear normal.   Left Ear: External ear normal.   Nose: Nose normal.   Mouth/Throat: Oropharynx is clear and moist.   Eyes: Conjunctivae are normal. Pupils are equal, round, and reactive to light.   Neck: No JVD present. No thyromegaly present.   Cardiovascular: Normal rate, regular rhythm and normal heart sounds.  Exam reveals no friction rub.    No murmur heard.  Pulmonary/Chest: Effort normal and breath sounds normal. No respiratory distress. He has no wheezes. He has no rales.   Abdominal: Soft. Bowel sounds are normal. He exhibits no distension. There is no tenderness. There is no guarding.   Musculoskeletal: He exhibits no edema or tenderness.   Lymphadenopathy:     He has no cervical adenopathy.   Neurological: He displays normal reflexes. No cranial nerve deficit.   Skin: No rash noted.   Psychiatric: He has a normal mood and affect. His behavior is normal.   Nursing note and vitals reviewed.      Assessment/Plan   Tony was seen today for heartburn, hyperlipidemia, hypertension and diabetes.    Diagnoses and all orders for this visit:    Controlled type 2 diabetes mellitus without complication, without long-term current " use of insulin  -     POC Glucose Fingerstick  -     POC Glycosylated Hemoglobin (Hb A1C)  -     Comprehensive Metabolic Panel  -     Lipid Panel  -     POC Microalbumin  6.3  Essential hypertension  -     Comprehensive Metabolic Panel  -     Lipid Panel    Hyperlipidemia LDL goal <100  Lipids today  Obstructive sleep apnea  Did not tolerate cpap  Gastroesophageal reflux disease, esophagitis presence not specified  stable  History of leukocytosis  -     CBC & Differential  -     Lipid Panel        50% of visit spent counseling

## 2018-01-31 ENCOUNTER — ANTICOAGULATION VISIT (OUTPATIENT)
Dept: PHARMACY | Facility: HOSPITAL | Age: 73
End: 2018-01-31

## 2018-01-31 LAB
INR PPP: 2.5 (ref 0.91–1.09)
PROTHROMBIN TIME: 29.9 SECONDS (ref 10–13.8)

## 2018-01-31 PROCEDURE — 36416 COLLJ CAPILLARY BLOOD SPEC: CPT

## 2018-01-31 PROCEDURE — G0463 HOSPITAL OUTPT CLINIC VISIT: HCPCS

## 2018-01-31 PROCEDURE — 85610 PROTHROMBIN TIME: CPT

## 2018-01-31 RX ORDER — ALFUZOSIN HYDROCHLORIDE 10 MG/1
10 TABLET, EXTENDED RELEASE ORAL DAILY
COMMUNITY
End: 2019-01-17 | Stop reason: ALTCHOICE

## 2018-01-31 NOTE — PROGRESS NOTES
Anticoagulation Clinic Progress Note  Indication: atrial fibrillation  Referring Provider: del  Initial Warfarin Start Date: 2008  Goal INR: 2-3  Current Drug Interactions: fluoxetine, trazodone, glimepiride, melatonin  CHADS-VASc = 3 (age, HTN, DM)    Anticoagulation Clinic INR History:  Date 8/2 8/23 9/20 10/11 11/7 11/17 11/28 12/13 12/27   Total Weekly Dose 17.5mg 17.5 mg 17.5 mg 17.5mg 10mg 17.5 mg 17.5 mg 20mg 22.5 mg   INR 2.5 2.3 2.3 2.0 1.2 1.7 1.5 1.7 2.6   Notes     Pre- lumbar inj         Date 1/10/18 1/31          Total Weekly Dose 22.5 mg 22.5 mg          INR 2.6 2.5          Notes              Clinic Interview:  Tablet Strength: pt has 5mg and 2.5mg tablets  Current Dose: 2.5mg daily except 5mg MonFri  Phone: [phone]  Fax: [phone] (Attn: Tasha)  Patient Findings      Positives Change in medications     Negatives Signs/symptoms of thrombosis, Signs/symptoms of bleeding, Laboratory test error suspected, Change in health, Change in alcohol use, Change in activity, Upcoming invasive procedure, Emergency department visit, Upcoming dental procedure, Missed doses, Extra doses, Change in diet/appetite, Hospital admission, Bruising, Other complaints     Comments Mr. Wolf is not taking trazodone right now, but he is instead taking mirtazapine 7.5mg QHS. He doesn't think this is working, however, and wants to consider switching back to trazodone. He has also started taking alfuzosin.     Of note: coadministration of mirtazapine (30 mg daily) with warfarin treatment caused a small but significant INR increase of 0.2. If mirtazapine is coadministered with warfarin, INR should be monitored closely.     Plan:  1. INR is therapeutic, so instructed Mr. Wolf to continue warfarin 2.5mg daily except 5mg MonFri.   2. RTC in 4 weeks.  3. Verbal and written information provided in clinic.  and Mrs. Wolf voiced understanding with teach back and has no further questions at this time.     Elise Portillo,  [facility]  1/31/2018  1:19 PM

## 2018-02-06 ENCOUNTER — HOSPITAL ENCOUNTER (OUTPATIENT)
Dept: BONE DENSITY | Facility: HOSPITAL | Age: 73
Discharge: HOME | End: 2018-02-06
Attending: INTERNAL MEDICINE | Admitting: INTERNAL MEDICINE

## 2018-02-06 DIAGNOSIS — Z13.820 OSTEOPOROSIS SCREENING: ICD-10-CM

## 2018-02-06 DIAGNOSIS — M89.9 DISORDER OF BONE: ICD-10-CM

## 2018-02-06 PROCEDURE — 77080 DXA BONE DENSITY AXIAL: CPT

## 2018-02-09 ENCOUNTER — TELEPHONE (OUTPATIENT)
Dept: INTERNAL MEDICINE | Facility: CLINIC | Age: 73
End: 2018-02-09

## 2018-02-23 RX ORDER — METOPROLOL SUCCINATE 100 MG/1
100 TABLET, EXTENDED RELEASE ORAL DAILY
Qty: 90 TABLET | Refills: 2 | Status: SHIPPED | OUTPATIENT
Start: 2018-02-23 | End: 2018-02-26 | Stop reason: SDUPTHER

## 2018-02-26 RX ORDER — METOPROLOL SUCCINATE 100 MG/1
100 TABLET, EXTENDED RELEASE ORAL 2 TIMES DAILY
Qty: 180 TABLET | Refills: 2 | Status: SHIPPED | OUTPATIENT
Start: 2018-02-26 | End: 2018-12-03 | Stop reason: SDUPTHER

## 2018-02-27 RX ORDER — BLOOD-GLUCOSE METER
EACH MISCELLANEOUS
Qty: 1 KIT | Refills: 0 | Status: SHIPPED | OUTPATIENT
Start: 2018-02-27 | End: 2018-04-11

## 2018-02-27 RX ORDER — LANCETS
EACH MISCELLANEOUS
Qty: 102 EACH | Refills: 1 | Status: SHIPPED | OUTPATIENT
Start: 2018-02-27 | End: 2018-04-11

## 2018-02-28 ENCOUNTER — ANTICOAGULATION VISIT (OUTPATIENT)
Dept: PHARMACY | Facility: HOSPITAL | Age: 73
End: 2018-02-28

## 2018-02-28 LAB
INR PPP: 2.1 (ref 0.91–1.09)
PROTHROMBIN TIME: 25.7 SECONDS (ref 10–13.8)

## 2018-02-28 PROCEDURE — 36416 COLLJ CAPILLARY BLOOD SPEC: CPT

## 2018-02-28 PROCEDURE — 85610 PROTHROMBIN TIME: CPT

## 2018-02-28 PROCEDURE — G0463 HOSPITAL OUTPT CLINIC VISIT: HCPCS

## 2018-02-28 NOTE — PROGRESS NOTES
Anticoagulation Clinic Progress Note  Indication: atrial fibrillation  Referring Provider: del  Initial Warfarin Start Date: 2008  Goal INR: 2-3  Current Drug Interactions: fluoxetine, trazodone, glimepiride, melatonin  CHADS-VASc = 3 (age, HTN, DM)    Anticoagulation Clinic INR History:  Date 8/2 8/23 9/20 10/11 11/7 11/17 11/28 12/13 12/27   Total Weekly Dose 17.5mg 17.5 mg 17.5 mg 17.5mg 10mg 17.5 mg 17.5 mg 20mg 22.5 mg   INR 2.5 2.3 2.3 2.0 1.2 1.7 1.5 1.7 2.6   Notes     Pre- lumbar inj         Date 1/10/18 1/31 2/28         Total Weekly Dose 22.5 mg 22.5 mg 22.5         INR 2.6 2.5 2.1         Notes              Clinic Interview:  Tablet Strength: pt has 5mg and 2.5mg tablets  Current Dose: 2.5mg daily except 5mg MonFri  Phone: [phone]  Fax: [phone] (Attn: Tasha)  Patient Findings      Negatives Signs/symptoms of thrombosis, Signs/symptoms of bleeding, Laboratory test error suspected, Change in health, Change in alcohol use, Change in activity, Upcoming invasive procedure, Emergency department visit, Upcoming dental procedure, Missed doses, Extra doses, Change in medications, Change in diet/appetite, Hospital admission, Bruising, Other complaints     Comments Mr. Wolf continues to take Mirtazipine QHS. He denies any changes in medication, health, or diet.     Plan:  1. INR is therapeutic again at 2.1, so instructed Mr. Wolf to continue warfarin 2.5mg daily except 5mg MonFri.   2. RTC in 4 weeks.  3. Patient declines refills of Warfarin.  4. Verbal and written information provided in clinic.  and Mrs. Wolf voiced understanding with teach back and has no further questions at this time.     Corrina Mendoza, Pharmacy Intern  2/28/2018  1:12 PM     Ama SOLO, PharmD, have reviewed the note in full and agree with the assessment and plan.  02/28/18  3:41 PM

## 2018-03-26 RX ORDER — GLIMEPIRIDE 2 MG/1
TABLET ORAL
Qty: 30 TABLET | Refills: 0 | Status: SHIPPED | OUTPATIENT
Start: 2018-03-26 | End: 2018-04-11 | Stop reason: SDUPTHER

## 2018-03-28 ENCOUNTER — ANTICOAGULATION VISIT (OUTPATIENT)
Dept: PHARMACY | Facility: HOSPITAL | Age: 73
End: 2018-03-28

## 2018-03-28 DIAGNOSIS — I48.91 ATRIAL FIBRILLATION, UNSPECIFIED TYPE (HCC): ICD-10-CM

## 2018-03-28 LAB
INR PPP: 2.4 (ref 0.91–1.09)
PROTHROMBIN TIME: 28.9 SECONDS (ref 10–13.8)

## 2018-03-28 PROCEDURE — 85610 PROTHROMBIN TIME: CPT

## 2018-03-28 PROCEDURE — 36416 COLLJ CAPILLARY BLOOD SPEC: CPT

## 2018-03-28 PROCEDURE — G0463 HOSPITAL OUTPT CLINIC VISIT: HCPCS

## 2018-03-28 NOTE — PROGRESS NOTES
Anticoagulation Clinic Progress Note  Indication: atrial fibrillation  Referring Provider: del  Initial Warfarin Start Date: 2008  Goal INR: 2-3  Current Drug Interactions: fluoxetine, trazodone, glimepiride, melatonin  CHADS-VASc: 3 (age, HTN, DM)    Anticoagulation Clinic INR History:  Date 8/2 8/23 9/20 10/11 11/7 11/17 11/28 12/13 12/27   Total Weekly Dose 17.5mg 17.5 mg 17.5 mg 17.5mg 10mg 17.5 mg 17.5 mg 20mg 22.5 mg   INR 2.5 2.3 2.3 2.0 1.2 1.7 1.5 1.7 2.6   Notes     Pre- lumbar inj         Date 1/10/18 1/31 2/28 3/28        Total Weekly Dose 22.5 mg 22.5 mg 22.5 mg 22.5 mg        INR 2.6 2.5 2.1 2.4        Notes              Clinic Interview:  Tablet Strength: pt has 5mg and 2.5mg tablets  Current Dose: 2.5mg daily except 5mg MonFri  Phone: [phone]  Fax: [phone] (Attn: Tasha)    Patient Findings:  Negatives Signs/symptoms of thrombosis, Signs/symptoms of bleeding, Laboratory test error suspected, Change in health, Change in alcohol use, Change in activity, Upcoming invasive procedure, Emergency department visit, Upcoming dental procedure, Missed doses, Extra doses, Change in medications, Change in diet/appetite, Hospital admission, Bruising, Other complaints     Plan:  1. INR is therapeutic again, so instructed Mr. Wolf to continue warfarin 2.5mg daily except 5mg MonFri.   2. RTC in 4 weeks.  3. Verbal and written information provided in clinic.  and Mrs. Wolf voiced understanding with teach back and has no further questions at this time.     Elise Portillo [facility]  3/28/2018  1:04 PM

## 2018-04-11 ENCOUNTER — OFFICE VISIT (OUTPATIENT)
Dept: CARDIOLOGY | Facility: CLINIC | Age: 73
End: 2018-04-11

## 2018-04-11 VITALS
HEIGHT: 72 IN | BODY MASS INDEX: 32.64 KG/M2 | WEIGHT: 241 LBS | DIASTOLIC BLOOD PRESSURE: 78 MMHG | SYSTOLIC BLOOD PRESSURE: 108 MMHG | HEART RATE: 85 BPM

## 2018-04-11 DIAGNOSIS — I49.3 PVC'S (PREMATURE VENTRICULAR CONTRACTIONS): ICD-10-CM

## 2018-04-11 DIAGNOSIS — I48.4 ATYPICAL ATRIAL FLUTTER (HCC): Primary | ICD-10-CM

## 2018-04-11 DIAGNOSIS — I10 ESSENTIAL HYPERTENSION: ICD-10-CM

## 2018-04-11 PROCEDURE — 99213 OFFICE O/P EST LOW 20 MIN: CPT | Performed by: INTERNAL MEDICINE

## 2018-04-11 NOTE — PROGRESS NOTES
Tony DUNHAM Maryann  1945  [phone]      04/11/2018    [facility] CARDIOLOGY     Kay Lopez, DO  [address] [id number]    Chief Complaint   Patient presents with   • Atrial Flutter     Problem List:  1.  Atrial fibrillation/atrial tachycardia/atrial flutter:  a. Diagnosed in June 2004 by physical examination. Coumadin initiated in June 2004.  b. Echocardiogram on 06/30/2004: LA 5.8 with normal LV size and function.  c. Nuclear GXT on 06/30/2004: Negative study.  d. External cardioversion and maintenance to normal sinus rhythm with sotalol on 08/30/2004.  e. Event recorder in March 2005 showing normal sinus rhythm with occasional PVCs.  f. Echocardiogram/bubble study on 04/08/2005: Septum 1.6, mild MR, trace to mild TR, PI, negative bubble study, EF 68%.  g. Holter monitor, September 2008, with 35 PVCs, 157 PACs.   h. Cardiolite, 12/29/2008, with no ischemia, EF 47%.  i. Tikosyn initiated, 01/04/2010.   j. Event recorder, 01/22/2010, with nonsustained atrial tachycardia and PACs.   k. Stress Cardiolite, 03/31/2011: LVEF (59%), no ischemia.   l. EP study with radiofrequency ablation of left atrial posterior wall atrial tachycardia, 04/28/2014.  m. BRIDGET-guided external cardioversion, 11/25/2014 with BRIDGET demonstrating normal LV size and function and mild MR/TR.   2. Premature ventricular contractions:  a. Event recorder, June 2007, consistent with PVCs with subsequent increase of sotalol therapy to 120 mg p.o. b.i.d.    3. History of dyspnea:  a. Dobutamine Cardiolite in September 1998 with inferolateral reversibility.   b. Left heart catheterization on 02/29/2000, showing normal coronary arteries, normal EF.  c. Echocardiogram, March 2010: Normal LV size and function, moderate left ventricular hypertrophy, mild MR, and AI.   d. Echocardiogram, 01/20/2016: EF 55% to 60%. Mild MR. Mild aortic cusp sclerosis. Trace TR.  e. CT scan of the chest with and  without contrast, 01/20/2016, shows cardiomegaly; no pulmonary embolism, mild pulmonary vascular congestion.  4. Hypertension.  5. Hyperlipidemia.  6. Concentric left ventricular hypertrophy.  7. Diabetes mellitus.  8. Surgical history:  a. Left knee replacement in 2002.  b. Left hand carpal tunnel in 2000.                C.   Double hernia in 1966.    Allergies  Allergies   Allergen Reactions   • Aspirin Anaphylaxis     Other reaction(s): Hypotension       Current Medications    Current Outpatient Prescriptions:   •  acetaminophen (TYLENOL) 500 MG tablet, Take 1,000 mg by mouth Every 6 (Six) Hours As Needed for Mild Pain (1-3)., Disp: , Rfl:   •  alfuzosin (UROXATRAL) 10 MG 24 hr tablet, Take 10 mg by mouth Daily., Disp: , Rfl:   •  amLODIPine (NORVASC) 10 MG tablet, Take 10 mg by mouth Daily., Disp: , Rfl:   •  Cholecalciferol (VITAMIN D-3) 1000 UNITS capsule, Take 1,000 Units by mouth Daily., Disp: , Rfl:   •  colestipol (COLESTID) 1 G tablet, Take 1 g by mouth Daily., Disp: , Rfl:   •  FLUoxetine (PROzac) 20 MG capsule, Take 60 mg by mouth Daily., Disp: , Rfl:   •  glimepiride (AMARYL) 2 MG tablet, Take 1 tablet by mouth Daily., Disp: 30 tablet, Rfl: 0  •  Histamine Dihydrochloride (AUSTRALIAN DREAM ARTHRITIS) 0.025 % cream, Apply  topically Daily., Disp: , Rfl:   •  lisinopril (PRINIVIL,ZESTRIL) 40 MG tablet, TAKE 1 TABLET BY MOUTH EVERY DAY, Disp: 30 tablet, Rfl: 6  •  Melatonin 2.5 MG chewable tablet, Chew 1 tablet At Night As Needed., Disp: , Rfl:   •  metoprolol succinate XL (TOPROL-XL) 100 MG 24 hr tablet, Take 1 tablet by mouth 2 (Two) Times a Day., Disp: 180 tablet, Rfl: 2  •  mirtazapine (REMERON) 15 MG tablet, 15 mg., Disp: , Rfl:   •  NYSTATIN 630057 UNIT/GM powder, , Disp: , Rfl: 2  •  pravastatin (PRAVACHOL) 20 MG tablet, Take 1 tablet by mouth Daily., Disp: 30 tablet, Rfl: 3  •  traZODone (DESYREL) 100 MG tablet, TK 1-2 T PO AT BEDTIME, Disp: , Rfl: 2  •  warfarin (COUMADIN) 2.5 MG tablet, Take 1  "to 2 tablet(s) by mouth daily, as directed by the anticoagulation pharmacist., Disp: 120 tablet, Rfl: 1  •  warfarin (COUMADIN) 5 MG tablet, 1 tablet daily. INR sunday, Disp: , Rfl:     History of Present Illness   HPI    Pt presents for follow up of AF/HTN. Since we last saw the pt, pt denies any palps, SOB, CP, LH, and dizziness. Denies any ER visits, bleeding, or TIA/CVA symptoms. Overall feels well. Does not check BP at home. Had right knee replacement in May    ROS:  General:  + fatigue, No weight gain or loss  Cardiovascular:  Denies CP, PND, syncope, near syncope, edema or palpitations.  Pulmonary:  + mild chronic JOSEPH, No cough, or wheezing      Vitals:    04/11/18 1438   BP: 108/78   BP Location: Right arm   Patient Position: Sitting   Pulse: 85   Weight: 109 kg (241 lb)   Height: 182.9 cm (72\")     Body mass index is 32.69 kg/m².  PE:  General: NAD  Neck: no JVD, no carotid bruits, no TM  Heart Irreg irreg rate and rhythm NL S1, S2, , no rubs, murmurs  Lungs: CTA, no wheezes, rhonchi, or rales  Abd: soft, non-tender, NL BS  Ext: No musculoskeletal deformities, no edema, cyanosis, or clubbing  Psych: normal mood and affect    Diagnostic Data:      Procedures    1. Atypical atrial flutter    2. Essential hypertension    3. PVC's (premature ventricular contractions)          Plan:  1) AFlutter- persistent and asymptomatic, rate controlled on Metoprolol.  Continue present medications.   2) Anticoagulation  Continue warfarin.   3) HTN controlled  Wt loss, exercise, salt reduction    F/up in 9 months      "

## 2018-04-23 ENCOUNTER — OFFICE VISIT (OUTPATIENT)
Dept: INTERNAL MEDICINE | Facility: CLINIC | Age: 73
End: 2018-04-23

## 2018-04-23 VITALS
BODY MASS INDEX: 32.69 KG/M2 | OXYGEN SATURATION: 97 % | SYSTOLIC BLOOD PRESSURE: 120 MMHG | HEART RATE: 73 BPM | DIASTOLIC BLOOD PRESSURE: 86 MMHG | WEIGHT: 241 LBS

## 2018-04-23 DIAGNOSIS — R53.83 OTHER FATIGUE: ICD-10-CM

## 2018-04-23 DIAGNOSIS — E11.9 CONTROLLED TYPE 2 DIABETES MELLITUS WITHOUT COMPLICATION, WITHOUT LONG-TERM CURRENT USE OF INSULIN (HCC): ICD-10-CM

## 2018-04-23 DIAGNOSIS — G47.33 OBSTRUCTIVE SLEEP APNEA: ICD-10-CM

## 2018-04-23 DIAGNOSIS — K21.9 GASTROESOPHAGEAL REFLUX DISEASE, ESOPHAGITIS PRESENCE NOT SPECIFIED: ICD-10-CM

## 2018-04-23 DIAGNOSIS — E78.5 HYPERLIPIDEMIA LDL GOAL <100: ICD-10-CM

## 2018-04-23 DIAGNOSIS — I48.20 CHRONIC ATRIAL FIBRILLATION (HCC): ICD-10-CM

## 2018-04-23 DIAGNOSIS — I10 ESSENTIAL HYPERTENSION: Primary | ICD-10-CM

## 2018-04-23 LAB
ALBUMIN SERPL-MCNC: 4.5 G/DL (ref 3.2–4.8)
ALBUMIN/GLOB SERPL: 2.1 G/DL (ref 1.5–2.5)
ALP SERPL-CCNC: 68 U/L (ref 25–100)
ALT SERPL W P-5'-P-CCNC: 40 U/L (ref 7–40)
ANION GAP SERPL CALCULATED.3IONS-SCNC: 9 MMOL/L (ref 3–11)
ARTICHOKE IGE QN: 117 MG/DL (ref 0–130)
AST SERPL-CCNC: 45 U/L (ref 0–33)
BASOPHILS # BLD AUTO: 0.05 10*3/MM3 (ref 0–0.2)
BASOPHILS NFR BLD AUTO: 0.5 % (ref 0–1)
BILIRUB SERPL-MCNC: 1.4 MG/DL (ref 0.3–1.2)
BUN BLD-MCNC: 17 MG/DL (ref 9–23)
BUN/CREAT SERPL: 15.5 (ref 7–25)
CALCIUM SPEC-SCNC: 9.4 MG/DL (ref 8.7–10.4)
CHLORIDE SERPL-SCNC: 104 MMOL/L (ref 99–109)
CHOLEST SERPL-MCNC: 169 MG/DL (ref 0–200)
CO2 SERPL-SCNC: 27 MMOL/L (ref 20–31)
CREAT BLD-MCNC: 1.1 MG/DL (ref 0.6–1.3)
DEPRECATED RDW RBC AUTO: 48.9 FL (ref 37–54)
EOSINOPHIL # BLD AUTO: 0.18 10*3/MM3 (ref 0–0.3)
EOSINOPHIL NFR BLD AUTO: 1.7 % (ref 0–3)
ERYTHROCYTE [DISTWIDTH] IN BLOOD BY AUTOMATED COUNT: 14.3 % (ref 11.3–14.5)
GFR SERPL CREATININE-BSD FRML MDRD: 66 ML/MIN/1.73
GLOBULIN UR ELPH-MCNC: 2.1 GM/DL
GLUCOSE BLD-MCNC: 78 MG/DL (ref 70–100)
GLUCOSE BLDC GLUCOMTR-MCNC: 73 MG/DL (ref 70–130)
HBA1C MFR BLD: 6.3 %
HCT VFR BLD AUTO: 50.2 % (ref 38.9–50.9)
HDLC SERPL-MCNC: 34 MG/DL (ref 40–60)
HGB BLD-MCNC: 16.9 G/DL (ref 13.1–17.5)
IMM GRANULOCYTES # BLD: 0.04 10*3/MM3 (ref 0–0.03)
IMM GRANULOCYTES NFR BLD: 0.4 % (ref 0–0.6)
LYMPHOCYTES # BLD AUTO: 3.53 10*3/MM3 (ref 0.6–4.8)
LYMPHOCYTES NFR BLD AUTO: 33.8 % (ref 24–44)
MCH RBC QN AUTO: 31.6 PG (ref 27–31)
MCHC RBC AUTO-ENTMCNC: 33.7 G/DL (ref 32–36)
MCV RBC AUTO: 94 FL (ref 80–99)
MONOCYTES # BLD AUTO: 1.21 10*3/MM3 (ref 0–1)
MONOCYTES NFR BLD AUTO: 11.6 % (ref 0–12)
NEUTROPHILS # BLD AUTO: 5.46 10*3/MM3 (ref 1.5–8.3)
NEUTROPHILS NFR BLD AUTO: 52.4 % (ref 41–71)
PLATELET # BLD AUTO: 162 10*3/MM3 (ref 150–450)
PMV BLD AUTO: 12.8 FL (ref 6–12)
POTASSIUM BLD-SCNC: 4.2 MMOL/L (ref 3.5–5.5)
PROT SERPL-MCNC: 6.6 G/DL (ref 5.7–8.2)
RBC # BLD AUTO: 5.34 10*6/MM3 (ref 4.2–5.76)
SODIUM BLD-SCNC: 140 MMOL/L (ref 132–146)
TRIGL SERPL-MCNC: 206 MG/DL (ref 0–150)
WBC NRBC COR # BLD: 10.43 10*3/MM3 (ref 3.5–10.8)

## 2018-04-23 PROCEDURE — 80053 COMPREHEN METABOLIC PANEL: CPT | Performed by: INTERNAL MEDICINE

## 2018-04-23 PROCEDURE — 84443 ASSAY THYROID STIM HORMONE: CPT | Performed by: INTERNAL MEDICINE

## 2018-04-23 PROCEDURE — 99214 OFFICE O/P EST MOD 30 MIN: CPT | Performed by: INTERNAL MEDICINE

## 2018-04-23 PROCEDURE — 82947 ASSAY GLUCOSE BLOOD QUANT: CPT | Performed by: INTERNAL MEDICINE

## 2018-04-23 PROCEDURE — 83036 HEMOGLOBIN GLYCOSYLATED A1C: CPT | Performed by: INTERNAL MEDICINE

## 2018-04-23 PROCEDURE — 85025 COMPLETE CBC W/AUTO DIFF WBC: CPT | Performed by: INTERNAL MEDICINE

## 2018-04-23 PROCEDURE — 80061 LIPID PANEL: CPT | Performed by: INTERNAL MEDICINE

## 2018-04-23 RX ORDER — GLIMEPIRIDE 2 MG/1
2 TABLET ORAL DAILY
Qty: 30 TABLET | Refills: 3 | Status: SHIPPED | OUTPATIENT
Start: 2018-04-23 | End: 2018-08-21 | Stop reason: SDUPTHER

## 2018-04-23 NOTE — PROGRESS NOTES
Subjective   Tony Wolf is a 72 y.o. male.   No chief complaint on file.      Heartburn   He reports no abdominal pain, no chest pain, no coughing, no nausea, no sore throat, no stridor, no tooth decay, no water brash or no wheezing. This is a chronic problem. Associated symptoms include fatigue.   Hyperlipidemia   This is a chronic problem. The current episode started more than 1 year ago. Pertinent negatives include no chest pain, myalgias or shortness of breath.   Hypertension   This is a chronic problem. The current episode started more than 1 year ago. Pertinent negatives include no chest pain, headaches, neck pain, palpitations or shortness of breath.   Diabetes   He presents for his follow-up diabetic visit. He has type 2 diabetes mellitus. Pertinent negatives for hypoglycemia include no confusion, headaches, nervousness/anxiousness, pallor, seizures or speech difficulty. Associated symptoms include fatigue. Pertinent negatives for diabetes include no chest pain, no polydipsia and no polyphagia. An ACE inhibitor/angiotensin II receptor blocker is being taken.        The following portions of the patient's history were reviewed and updated as appropriate: allergies, current medications, past family history, past medical history, past social history, past surgical history and problem list.    Review of Systems   Constitutional: Positive for fatigue. Negative for activity change, appetite change, chills, diaphoresis, fever and unexpected weight change.   HENT: Negative for congestion, ear discharge, ear pain, mouth sores, nosebleeds, sinus pressure, sneezing and sore throat.    Eyes: Negative for pain, discharge and itching.   Respiratory: Negative for cough, chest tightness, shortness of breath and wheezing.    Cardiovascular: Negative for chest pain, palpitations and leg swelling.   Gastrointestinal: Negative for abdominal pain, constipation, diarrhea, nausea and vomiting.   Endocrine: Negative for cold  intolerance, heat intolerance, polydipsia and polyphagia.   Genitourinary: Negative for dysuria, flank pain, frequency, hematuria and urgency.   Musculoskeletal: Negative for arthralgias, back pain, gait problem, myalgias, neck pain and neck stiffness.   Skin: Negative for color change, pallor and rash.   Neurological: Negative for seizures, speech difficulty, numbness and headaches.   Psychiatric/Behavioral: Negative for agitation, confusion, decreased concentration and sleep disturbance. The patient is not nervous/anxious.      There were no vitals taken for this visit.    Objective   Physical Exam   Constitutional: He appears well-developed.   HENT:   Head: Normocephalic.   Right Ear: External ear normal.   Left Ear: External ear normal.   Nose: Nose normal.   Mouth/Throat: Oropharynx is clear and moist.   Eyes: Conjunctivae are normal. Pupils are equal, round, and reactive to light.   Neck: No JVD present. No thyromegaly present.   Cardiovascular: Normal rate, regular rhythm and normal heart sounds.  Exam reveals no friction rub.    No murmur heard.  Pulmonary/Chest: Effort normal and breath sounds normal. No respiratory distress. He has no wheezes. He has no rales.   Abdominal: Soft. Bowel sounds are normal. He exhibits no distension. There is no tenderness. There is no guarding.   Musculoskeletal: He exhibits no edema or tenderness.   Lymphadenopathy:     He has no cervical adenopathy.   Neurological: He displays normal reflexes. No cranial nerve deficit.   Skin: No rash noted.   Psychiatric: He has a normal mood and affect. His behavior is normal.   Nursing note and vitals reviewed.      Assessment/Plan   Tony was seen today for heartburn, hyperlipidemia, hypertension and diabetes.    Diagnoses and all orders for this visit:    Controlled type 2 diabetes mellitus without complication, without long-term current use of insulin  -     POC Glucose Fingerstick  -     POC Glycosylated Hemoglobin (Hb A1C)  -      Comprehensive Metabolic Panel  -     Lipid Panel  -     POC Microalbumin    Essential hypertension  -     Comprehensive Metabolic Panel  -     Lipid Panel    Hyperlipidemia LDL goal <100  Lipids today  Obstructive sleep apnea  Did not tolerate cpap  Gastroesophageal reflux disease, esophagitis presence not specified  stable  History of leukocytosis  -     CBC & Differential  -     Lipid Panel        50% of visit spent counseling

## 2018-04-25 ENCOUNTER — TELEPHONE (OUTPATIENT)
Dept: INTERNAL MEDICINE | Facility: CLINIC | Age: 73
End: 2018-04-25

## 2018-04-25 DIAGNOSIS — R17 ELEVATED BILIRUBIN: Primary | ICD-10-CM

## 2018-04-25 LAB — TSH SERPL DL<=0.05 MIU/L-ACNC: 2.05 MIU/ML (ref 0.35–5.35)

## 2018-04-27 ENCOUNTER — ANTICOAGULATION VISIT (OUTPATIENT)
Dept: PHARMACY | Facility: HOSPITAL | Age: 73
End: 2018-04-27

## 2018-04-27 DIAGNOSIS — I48.20 CHRONIC ATRIAL FIBRILLATION (HCC): ICD-10-CM

## 2018-04-27 LAB
INR PPP: 2.4 (ref 0.91–1.09)
PROTHROMBIN TIME: 29.2 SECONDS (ref 10–13.8)

## 2018-04-27 PROCEDURE — 36416 COLLJ CAPILLARY BLOOD SPEC: CPT

## 2018-04-27 PROCEDURE — G0463 HOSPITAL OUTPT CLINIC VISIT: HCPCS

## 2018-04-27 PROCEDURE — 85610 PROTHROMBIN TIME: CPT

## 2018-04-27 NOTE — PROGRESS NOTES
Anticoagulation Clinic Progress Note  Indication: atrial fibrillation  Referring Provider: del  Initial Warfarin Start Date: 2008  Goal INR: 2-3  Current Drug Interactions: fluoxetine, trazodone, glimepiride, melatonin  CHADS-VASc: 3 (age, HTN, DM)    Anticoagulation Clinic INR History:  Date 8/2 8/23 9/20 10/11 11/7 11/17 11/28 12/13 12/27   Total Weekly Dose 17.5mg 17.5 mg 17.5 mg 17.5mg 10mg 17.5 mg 17.5 mg 20mg 22.5 mg   INR 2.5 2.3 2.3 2.0 1.2 1.7 1.5 1.7 2.6   Notes     Pre- lumbar inj         Date 1/10/18 1/31 2/28 3/28 4/27       Total Weekly Dose 22.5 mg 22.5 mg 22.5 mg 22.5 mg 22.5 mg       INR 2.6 2.5 2.1 2.4 2.4       Notes              Clinic Interview:  Tablet Strength: pt has 5mg and 2.5mg tablets  Current Dose: 2.5mg daily except 5mg MonFri  Phone: [phone]  Fax: [phone] (Attn: Tasha)    Patient Findings:  Positives Change in medications, Other complaints   Negatives Signs/symptoms of thrombosis, Signs/symptoms of bleeding, Laboratory test error suspected, Change in health, Change in alcohol use, Change in activity, Upcoming invasive procedure, Emergency department visit, Upcoming dental procedure, Missed doses, Extra doses, Change in diet/appetite, Hospital admission, Bruising   Comments Mr. Wolf has seen a provider again recently about his antidepressant / anti-anxiety medications. She had suggested consideration for changing from Prozac to Pristiq, and he inquired about differences between the two. She noted fewer DDIs with Pristiq, although it appears they do have similar side effect profiles. Reviewed this in detail with Mr. Wolf -- he is complaining most about ongoing hyperhydrosis / diaphoresis (meds have been adjusted with no improvement) + difficulty sleeping (pt currently taking only mirtazapine, off trazodone) + constant fatigue and hunger. Recommended non-pharmacologic, sleep hygiene, which he reports he has tried in the past. Also suggested improving his exercise  routine (walking) to improve sleep and mood. For now, he has resumed his higher-dose Prozac (80mg) -- should he decided to switch, advised him that he needs to taper off Prozac, rather than stopping all at once.      Plan:  1. INR is therapeutic, so instructed Mr. Wolf to continue warfarin 2.5mg daily except 5mg MonFri.   2. RTC in 4.5 weeks.  3. Verbal and written information provided in clinic.  and Mrs. Wolf voiced understanding with teach back and has no further questions at this time.     Elise Portillo [facility]  4/27/2018  1:18 PM

## 2018-05-03 RX ORDER — AMLODIPINE BESYLATE 10 MG/1
TABLET ORAL
Qty: 30 TABLET | Refills: 6 | Status: SHIPPED | OUTPATIENT
Start: 2018-05-03 | End: 2018-11-28 | Stop reason: SDUPTHER

## 2018-05-04 ENCOUNTER — LAB (OUTPATIENT)
Dept: INTERNAL MEDICINE | Facility: CLINIC | Age: 73
End: 2018-05-04

## 2018-05-04 DIAGNOSIS — R17 ELEVATED BILIRUBIN: ICD-10-CM

## 2018-05-04 LAB
BILIRUB CONJ SERPL-MCNC: 0.3 MG/DL (ref 0–0.2)
BILIRUB INDIRECT SERPL-MCNC: 1 MG/DL (ref 0.1–1.1)
BILIRUB SERPL-MCNC: 1.3 MG/DL (ref 0.3–1.2)

## 2018-05-04 PROCEDURE — 82248 BILIRUBIN DIRECT: CPT | Performed by: INTERNAL MEDICINE

## 2018-05-04 PROCEDURE — 82247 BILIRUBIN TOTAL: CPT | Performed by: INTERNAL MEDICINE

## 2018-05-09 ENCOUNTER — TELEPHONE (OUTPATIENT)
Dept: INTERNAL MEDICINE | Facility: CLINIC | Age: 73
End: 2018-05-09

## 2018-05-15 RX ORDER — PRAVASTATIN SODIUM 20 MG
TABLET ORAL
Qty: 30 TABLET | Refills: 2 | Status: SHIPPED | OUTPATIENT
Start: 2018-05-15 | End: 2018-08-13 | Stop reason: SDUPTHER

## 2018-05-24 RX ORDER — LISINOPRIL 40 MG/1
TABLET ORAL
Qty: 90 TABLET | Refills: 4 | Status: SHIPPED | OUTPATIENT
Start: 2018-05-24 | End: 2018-12-19 | Stop reason: SDUPTHER

## 2018-05-29 ENCOUNTER — OFFICE VISIT (OUTPATIENT)
Dept: INTERNAL MEDICINE | Facility: CLINIC | Age: 73
End: 2018-05-29

## 2018-05-29 VITALS
SYSTOLIC BLOOD PRESSURE: 120 MMHG | DIASTOLIC BLOOD PRESSURE: 80 MMHG | HEART RATE: 73 BPM | OXYGEN SATURATION: 97 % | WEIGHT: 242 LBS | BODY MASS INDEX: 32.82 KG/M2

## 2018-05-29 DIAGNOSIS — R17 HIGH BILIRUBIN: Primary | ICD-10-CM

## 2018-05-29 PROCEDURE — 82247 BILIRUBIN TOTAL: CPT | Performed by: INTERNAL MEDICINE

## 2018-05-29 PROCEDURE — 99213 OFFICE O/P EST LOW 20 MIN: CPT | Performed by: INTERNAL MEDICINE

## 2018-05-29 PROCEDURE — 82248 BILIRUBIN DIRECT: CPT | Performed by: INTERNAL MEDICINE

## 2018-05-29 NOTE — PROGRESS NOTES
Subjective   Tony Wolf is a 72 y.o. male.   Chief Complaint   Patient presents with   • Follow up on abnormal bilirubin levels       History of Present Illness   Bilirubin has been high. Needs repeat levls. No nausea, vomiting, or abdominal pain.  No yellowing of the skin.  The following portions of the patient's history were reviewed and updated as appropriate: allergies, current medications, past family history, past medical history, past social history, past surgical history and problem list.    Review of Systems   Constitutional: Negative for activity change, appetite change, chills, diaphoresis, fatigue, fever and unexpected weight change.   HENT: Negative for congestion, ear discharge, ear pain, mouth sores, nosebleeds, sinus pressure, sneezing and sore throat.    Eyes: Negative for pain, discharge and itching.   Respiratory: Negative for cough, chest tightness, shortness of breath and wheezing.    Cardiovascular: Negative for chest pain, palpitations and leg swelling.   Gastrointestinal: Negative for abdominal pain, constipation, diarrhea, nausea and vomiting.   Endocrine: Negative for cold intolerance, heat intolerance, polydipsia and polyphagia.   Genitourinary: Negative for dysuria, flank pain, frequency, hematuria and urgency.   Musculoskeletal: Negative for arthralgias, back pain, gait problem, myalgias, neck pain and neck stiffness.   Skin: Negative for color change, pallor and rash.   Neurological: Negative for seizures, speech difficulty, numbness and headaches.   Psychiatric/Behavioral: Negative for agitation, confusion, decreased concentration and sleep disturbance. The patient is not nervous/anxious.      /80   Pulse 73   Wt 110 kg (242 lb)   SpO2 97%   BMI 32.82 kg/m²     Objective   Physical Exam   Constitutional: He appears well-developed.   HENT:   Head: Normocephalic.   Right Ear: External ear normal.   Left Ear: External ear normal.   Nose: Nose normal.   Mouth/Throat:  Oropharynx is clear and moist.   Eyes: Conjunctivae are normal. Pupils are equal, round, and reactive to light.   Neck: No JVD present. No thyromegaly present.   Cardiovascular: Normal rate, regular rhythm and normal heart sounds.  Exam reveals no friction rub.    No murmur heard.  Pulmonary/Chest: Effort normal and breath sounds normal. No respiratory distress. He has no wheezes. He has no rales.   Abdominal: Soft. Bowel sounds are normal. He exhibits no distension. There is no tenderness. There is no guarding.   Musculoskeletal: He exhibits no edema or tenderness.   Lymphadenopathy:     He has no cervical adenopathy.   Neurological: He displays normal reflexes. No cranial nerve deficit.   Skin: No rash noted.   Psychiatric: His behavior is normal.   Nursing note and vitals reviewed.      Assessment/Plan   There are no diagnoses linked to this encounter.

## 2018-05-30 ENCOUNTER — ANTICOAGULATION VISIT (OUTPATIENT)
Dept: PHARMACY | Facility: HOSPITAL | Age: 73
End: 2018-05-30

## 2018-05-30 DIAGNOSIS — I48.20 CHRONIC ATRIAL FIBRILLATION (HCC): ICD-10-CM

## 2018-05-30 LAB
INR PPP: 2.6 (ref 0.91–1.09)
PROTHROMBIN TIME: 30.9 SECONDS (ref 10–13.8)

## 2018-05-30 PROCEDURE — 36416 COLLJ CAPILLARY BLOOD SPEC: CPT

## 2018-05-30 PROCEDURE — 85610 PROTHROMBIN TIME: CPT

## 2018-05-30 PROCEDURE — G0463 HOSPITAL OUTPT CLINIC VISIT: HCPCS

## 2018-05-30 NOTE — PROGRESS NOTES
Anticoagulation Clinic Progress Note  Indication: atrial fibrillation  Referring Provider: del  Initial Warfarin Start Date: 2008  Goal INR: 2-3  Current Drug Interactions: fluoxetine, glimepiride, melatonin (PRN)  CHADS-VASc: 3 (age, HTN, DM)    Anticoagulation Clinic INR History:  Date 8/2 8/23 9/20 10/11 11/7 11/17 11/28 12/13 12/27   Total Weekly Dose 17.5mg 17.5 mg 17.5 mg 17.5mg 10mg 17.5 mg 17.5 mg 20mg 22.5 mg   INR 2.5 2.3 2.3 2.0 1.2 1.7 1.5 1.7 2.6   Notes     Pre- lumbar inj         Date 1/10/18 1/31 2/28 3/28 4/27 5/30      Total Weekly Dose 22.5 mg 22.5 mg 22.5 mg 22.5 mg 22.5 mg 22.5mg      INR 2.6 2.5 2.1 2.4 2.4 2.6      Notes              Clinic Interview:  Tablet Strength: pt has 5mg and 2.5mg tablets  Current Dose: 2.5mg daily except 5mg MonFri  Phone: [phone]  Fax: [phone] (Attn: Tasha)    Patient Findings   Positives:   Other complaints   Negatives:   Signs/symptoms of thrombosis, Signs/symptoms of bleeding, Laboratory test error suspected, Change in health, Change in alcohol use, Change in activity, Upcoming invasive procedure, Emergency department visit, Upcoming dental procedure, Missed doses, Extra doses, Change in medications, Change in diet/appetite, Hospital admission, Bruising   Comments:   Mr. Wolf reports he is still taking prozac and has not changed any medications at this time. He reports he is sleeping better.      Plan:  1. INR is therapeutic. Instructed Mr. Wolf to continue warfarin 2.5mg daily except 5mg MonFri.   2. RTC in 4 weeks on 6/27- of note- patient's 51th wedding anniversary is on 6/28.  3. Verbal and written information provided in clinic.  and Mrs. Wolf voiced understanding with teach back and has no further questions at this time.     Ama Mccloud, PharmD  5/30/2018  1:13 PM

## 2018-06-06 DIAGNOSIS — R17 ELEVATED BILIRUBIN: Primary | ICD-10-CM

## 2018-06-11 ENCOUNTER — TELEPHONE (OUTPATIENT)
Dept: INTERNAL MEDICINE | Facility: CLINIC | Age: 73
End: 2018-06-11

## 2018-06-11 NOTE — TELEPHONE ENCOUNTER
PATIENT CALLED TO SEE IF HE REALLY NEEDS TO COME IN FOR NEXT SCHEDULED APPT ON 7/23/18. HE STATES THAT HE IS CURRENTLY SEEING AN ENDOCRINOLOGIST AND IS UNSURE WHY HE WOULD NEED TO SEE DR GUILLAUME SO SOON AFTER HIS LAST APPT. HE WOULD ALSO LIKE A RETURN CALL TO FOLLOW UP ON THE APPT WITH DR IVONE ARMSTRONG THAT WAS SUPPOSED TO BE SCHEDULED.    CLL BACK [phone]

## 2018-06-12 NOTE — TELEPHONE ENCOUNTER
Tried calling pt, no answer or vm, can cecily his appointment out 3 months, number for Dr Hawthorne [phone], referral has been made, just needs to call Dr Hawthorne if he hasnt heard anything

## 2018-06-18 ENCOUNTER — OFFICE VISIT (OUTPATIENT)
Dept: ORTHOPEDIC SURGERY | Facility: CLINIC | Age: 73
End: 2018-06-18

## 2018-06-18 VITALS — OXYGEN SATURATION: 95 % | BODY MASS INDEX: 32.25 KG/M2 | HEART RATE: 47 BPM | HEIGHT: 72 IN | WEIGHT: 238.1 LBS

## 2018-06-18 DIAGNOSIS — Z09 POSTOPERATIVE EXAMINATION: ICD-10-CM

## 2018-06-18 DIAGNOSIS — Z96.651 STATUS POST TOTAL RIGHT KNEE REPLACEMENT: Primary | ICD-10-CM

## 2018-06-18 PROCEDURE — 99213 OFFICE O/P EST LOW 20 MIN: CPT | Performed by: ORTHOPAEDIC SURGERY

## 2018-06-18 NOTE — PROGRESS NOTES
[facility] [place] [facility]opa[facility] Surgery Clinic Note    Subjective     Chief Complaint   Patient presents with   • Right Knee - Follow-up     Right Total Knee Arthroplasty 5/30/17; 6 month f/u        HPI    Tony Wolf is a 72 y.o. male. He follows up today for his right total knee arthroplasty.  He's doing well today.  No pain.  He uses a cane because of his back.  He is pleased with results.  95% improvement compared to his preoperative symptoms.      Patient Active Problem List   Diagnosis   • GERD (gastroesophageal reflux disease)   • Essential hypertension   • Obesity   • Obstructive sleep apnea   • Type 2 diabetes mellitus, uncontrolled   • Generalized anxiety disorder   • Atrial fibrillation   • Screening for prostate cancer   • PVC's (premature ventricular contractions)   • Hyperlipidemia LDL goal <100   • Left ventricular hypertrophy   • Atypical atrial flutter   • Arthritis of knee, right   • Status post right knee replacement   • Leukocytosis, mild, likely reactive   • Thrombocytopenia   • Postoperative urinary retention   • Hypokalemia, replaced     Past Medical History:   Diagnosis Date   • Acute bronchitis    • Anxiety    • Arthritis     right knee   • Atrial fibrillation    • Back pain    • Cancer     skin cancer removed from face    • Carpal tunnel syndrome    • Derangement, knee internal    • Diabetes mellitus     DX'D TYPE II APPROX 15 YEARS AGO, DOES NOT CHECK BLOOD SUGARS AT HOME, STARTED INSULIN IN MARCH AFTER SURGERY CANCELLED FOR ELEVATED A1C   • Drug dependence    • GERD (gastroesophageal reflux disease)    • Heart disease    • Hyperlipidemia 11/14/2016   • Hypertension    • IBS (irritable bowel syndrome)    • Knee pain, right    • Left ventricular hypertrophy 11/14/2016   • Neuropathy, lumbosacral (radicular)    • Osteoporosis    • Premature ventricular contractions    • PVC's (premature ventricular contractions) 11/14/2016   • Scoliosis    • Sinusitis    • Sleep apnea    • SOB (shortness of breath)     • Spinal stenosis, lumbar region with neurogenic claudication    • Trigger middle finger of left hand    • Trigger middle finger of right hand    • Wears glasses     readers      Past Surgical History:   Procedure Laterality Date   • BACK SURGERY      injections for back    • CHOLECYSTECTOMY     • COLONOSCOPY      LESS THAN 5 YEARS    • ENDOSCOPY      x3   • FRACTURE SURGERY      right heel   • HERNIA REPAIR      both side inguinal    • LUMBAR EPIDURAL INJECTION     • ND TOTAL KNEE ARTHROPLASTY Right 5/30/2017    Procedure: RIGHT TOTAL KNEE ARTHROPLASTY;  Surgeon: Simon Interiano MD;  Location: [facility];  Service: Orthopedics   • TOTAL KNEE ARTHROPLASTY REVISION      left   • WRIST SURGERY      carpal tunnel bilat       Family History   Problem Relation Age of Onset   • Cancer Other    • Anemia Other    • Heart attack Other    • Cancer Mother    • Heart disease Mother    • Hypertension Mother    • Heart attack Mother    • Diabetes Mother    • Deep vein thrombosis Father    • Cancer Other      Social History     Social History   • Marital status:      Spouse name: N/A   • Number of children: N/A   • Years of education: N/A     Occupational History   • Not on file.     Social History Main Topics   • Smoking status: Never Smoker   • Smokeless tobacco: Never Used   • Alcohol use No   • Drug use: No   • Sexual activity: Defer     Other Topics Concern   • Not on file     Social History Narrative   • No narrative on file      Current Outpatient Prescriptions on File Prior to Visit   Medication Sig Dispense Refill   • acetaminophen (TYLENOL) 500 MG tablet Take 1,000 mg by mouth Every 6 (Six) Hours As Needed for Mild Pain (1-3).     • alfuzosin (UROXATRAL) 10 MG 24 hr tablet Take 10 mg by mouth Daily.     • amLODIPine (NORVASC) 10 MG tablet TAKE ONE TABLET BY MOUTH DAILY AS DIRECTED 30 tablet 6   • Cholecalciferol (VITAMIN D-3) 1000 UNITS capsule Take 1,000 Units by mouth Daily.     • colestipol (COLESTID) 1 G  tablet Take 1 g by mouth Daily.     • FLUoxetine (PROzac) 20 MG capsule Take 80 mg by mouth Daily.     • glimepiride (AMARYL) 2 MG tablet Take 1 tablet by mouth Daily. 30 tablet 3   • Histamine Dihydrochloride (AUSTRALIAN DREAM ARTHRITIS) 0.025 % cream Apply  topically Daily.     • lisinopril (PRINIVIL,ZESTRIL) 40 MG tablet TAKE ONE TABLET BY MOUTH DAILY 90 tablet 4   • Melatonin 2.5 MG chewable tablet Chew 1 tablet At Night As Needed.     • metoprolol succinate XL (TOPROL-XL) 100 MG 24 hr tablet Take 1 tablet by mouth 2 (Two) Times a Day. 180 tablet 2   • mirtazapine (REMERON) 15 MG tablet Take  by mouth Every Night. 7.5mg to 15mg     • NYSTATIN 243912 UNIT/GM powder   2   • pravastatin (PRAVACHOL) 20 MG tablet TAKE ONE TABLET BY MOUTH DAILY 30 tablet 2   • warfarin (COUMADIN) 2.5 MG tablet Take 1 to 2 tablet(s) by mouth daily, as directed by the anticoagulation pharmacist. 120 tablet 1   • warfarin (COUMADIN) 5 MG tablet 1 tablet daily. INR sunday       No current facility-administered medications on file prior to visit.       Allergies   Allergen Reactions   • Aspirin Anaphylaxis     Other reaction(s): Hypotension        Review of Systems   Constitutional: Negative for activity change, appetite change, chills, diaphoresis, fatigue, fever and unexpected weight change.   HENT: Negative for congestion, dental problem, drooling, ear discharge, ear pain, facial swelling, hearing loss, mouth sores, nosebleeds, postnasal drip, rhinorrhea, sinus pressure, sneezing, sore throat, tinnitus, trouble swallowing and voice change.    Eyes: Negative for photophobia, pain, discharge, redness, itching and visual disturbance.   Respiratory: Negative for apnea, cough, choking, chest tightness, shortness of breath, wheezing and stridor.    Cardiovascular: Negative for chest pain, palpitations and leg swelling.   Gastrointestinal: Negative for abdominal distention, abdominal pain, anal bleeding, blood in stool, constipation, diarrhea,  "nausea, rectal pain and vomiting.   Endocrine: Negative for cold intolerance, heat intolerance, polydipsia, polyphagia and polyuria.   Genitourinary: Negative for decreased urine volume, difficulty urinating, dysuria, enuresis, flank pain, frequency, genital sores, hematuria and urgency.   Musculoskeletal: Positive for joint swelling. Negative for arthralgias, back pain, gait problem, myalgias, neck pain and neck stiffness.   Skin: Negative for color change, pallor, rash and wound.   Allergic/Immunologic: Negative for environmental allergies, food allergies and immunocompromised state.   Neurological: Negative for dizziness, tremors, seizures, syncope, facial asymmetry, speech difficulty, weakness, light-headedness, numbness and headaches.   Hematological: Negative for adenopathy. Does not bruise/bleed easily.   Psychiatric/Behavioral: Negative for agitation, behavioral problems, confusion, decreased concentration, dysphoric mood, hallucinations, self-injury, sleep disturbance and suicidal ideas. The patient is not nervous/anxious and is not hyperactive.         Objective      Physical Exam  Pulse (!) 47   Ht 182.9 cm (72.01\")   Wt 108 kg (238 lb 1.6 oz)   SpO2 95%   BMI 32.28 kg/m²     Body mass index is 32.28 kg/m².    General:   Mental Status:  Alert   Appearance: Cooperative, in no acute distress   Build and Nutrition: Well-nourished and well developed male   Orientation: Alert and oriented to person, place and time   Posture: Normal   Gait: Slow and cautious with a cane    Integument:   Right knee: Wound is well-healed with no signs of infection    Lower Extremities:   Right Knee:    Tenderness:  None    Effusion:  None    Swelling: None    Crepitus:  None    Range of motion:  Extension: 0°       Flexion: 120°  Instability:  No varus laxity, no valgus laxity, negative anterior drawer  Deformities:  None    Imaging/Studies  Imaging Results (last 24 hours)     Procedure Component Value Units Date/Time    XR " Knee 3+ View With Fairborn Right [[id number]] Resulted:  06/18/18 1531     Updated:  06/18/18 1534    Narrative:       Right Knee Radiographs  Indication: status-post right total knee arthroplasty  Views: AP, lateral, and sunrise views of the right knee    Comparison: 12/4/2017    Findings:   Components are well aligned, with no gross signs of loosening or failure.    Calcification is seen in the quadricep, as well as at the proximal pole of   the patella, with no significant migration of the patella proximally or   distally.            Assessment and Plan     Tony was seen today for follow-up.    Diagnoses and all orders for this visit:    Status post total right knee replacement  -     XR Knee 3+ View With Fairborn Right    Postoperative examination        I reviewed my findings with patient and his wife.  His right total knee arthroplasty is functioning well.  I will see him back in 6 months with an x-ray, but sooner for any problems.  That will be a one-year follow-up.    Return in about 6 months (around 12/18/2018) for Recheck with X-Rays.      Medical Decision Making  Data/Risk: radiology tests and independent visualization of imaging, lab tests, or EMG/NCV      Simon Interiano MD  06/18/18  3:59 PM

## 2018-06-27 ENCOUNTER — ANTICOAGULATION VISIT (OUTPATIENT)
Dept: PHARMACY | Facility: HOSPITAL | Age: 73
End: 2018-06-27

## 2018-06-27 LAB
INR PPP: 3.5 (ref 0.91–1.09)
PROTHROMBIN TIME: 42.5 SECONDS (ref 10–13.8)

## 2018-06-27 PROCEDURE — 85610 PROTHROMBIN TIME: CPT

## 2018-06-27 PROCEDURE — 36416 COLLJ CAPILLARY BLOOD SPEC: CPT

## 2018-06-27 PROCEDURE — G0463 HOSPITAL OUTPT CLINIC VISIT: HCPCS

## 2018-06-27 NOTE — PROGRESS NOTES
Anticoagulation Clinic Progress Note  Indication: atrial fibrillation  Referring Provider: Sravani  Initial Warfarin Start Date: 2008  Goal INR: 2-3  Current Drug Interactions: fluoxetine, glimepiride, melatonin (PRN)  CHADS-VASc: 3 (age, HTN, DM)    Anticoagulation Clinic INR History:  Date 8/2 8/23 9/20 10/11 11/7 11/17 11/28 12/13 12/27   Total Weekly Dose 17.5mg 17.5 mg 17.5 mg 17.5mg 10mg 17.5 mg 17.5 mg 20mg 22.5 mg   INR 2.5 2.3 2.3 2.0 1.2 1.7 1.5 1.7 2.6   Notes     Pre- lumbar inj         Date 1/10/18 1/31 2/28 3/28 4/27 5/30 6/27     Total Weekly Dose 22.5 mg 22.5 mg 22.5 mg 22.5 mg 22.5 mg 22.5 mg 22.5 mg     INR 2.6 2.5 2.1 2.4 2.4 2.6 3.5     Notes              Clinic Interview:  Verbal Release Authorization signed on 6/27/18 -- may speak with Sussy (wife), Nikolai (son)  Tablet Strength: pt has 5mg and 2.5mg tablets  Current Dose: 2.5mg daily except 5mg MonFri  Phone: [phone]  Fax: [phone] (Attn: Tasha)    Patient Findings   Positives:   Other complaints   Negatives:   Signs/symptoms of thrombosis, Signs/symptoms of bleeding, Laboratory test error suspected, Change in health, Change in alcohol use, Change in activity, Upcoming invasive procedure, Emergency department visit, Upcoming dental procedure, Missed doses, Extra doses, Change in medications, Change in diet/appetite, Hospital admission, Bruising   Comments:   Mr. Wolf reports he is still taking Prozac and has not changed any medications at this time. He reports he is sleeping better. Patient denies any changes.      Plan:  1. INR is supratherapeutic today, uncertain the cause. Given recent stability, instructed Mr. Wolf to decrease Friday's (6/29) dose to 2.5mg (11% dose decr.), and otherwise continue warfarin 2.5mg daily except 5mg MonFri.   2. RTC in 2 weeks to ensure INR back WNL. If elevated again, will reduce maintenance dose at that time.   3. Verbal and written information was provided in clinic. Mr. Wolf voiced  understanding with teach back and has no further questions at this time.     Job Ambriz, Pharmacy Intern  6/27/2018  1:24 PM     IElise, [facility], have reviewed the note in full and agree with the assessment and plan.  06/27/18  2:08 PM

## 2018-07-05 ENCOUNTER — TELEPHONE (OUTPATIENT)
Dept: INTERNAL MEDICINE | Facility: CLINIC | Age: 73
End: 2018-07-05

## 2018-07-05 NOTE — TELEPHONE ENCOUNTER
PT WANTED TO KNOW IF DR GUILLAUME WOULD CALL IN PHENERGAN AND ALSO AN OINTMENT TO HELP CLEAR UP CUTS-SINCE HE IS A DIABETIC   PT USES [facility]    PTS NUMBER [phone]

## 2018-07-06 ENCOUNTER — OFFICE VISIT (OUTPATIENT)
Dept: INTERNAL MEDICINE | Facility: CLINIC | Age: 73
End: 2018-07-06

## 2018-07-06 VITALS
DIASTOLIC BLOOD PRESSURE: 100 MMHG | OXYGEN SATURATION: 98 % | HEART RATE: 71 BPM | BODY MASS INDEX: 33.06 KG/M2 | SYSTOLIC BLOOD PRESSURE: 130 MMHG | WEIGHT: 243.8 LBS

## 2018-07-06 DIAGNOSIS — N39.41 BENIGN PROSTATIC HYPERPLASIA (BPH) WITH URINARY URGE INCONTINENCE: Primary | ICD-10-CM

## 2018-07-06 DIAGNOSIS — N40.1 BENIGN PROSTATIC HYPERPLASIA (BPH) WITH URINARY URGE INCONTINENCE: Primary | ICD-10-CM

## 2018-07-06 DIAGNOSIS — L03.115 CELLULITIS OF RIGHT FOOT: Primary | ICD-10-CM

## 2018-07-06 PROCEDURE — 99214 OFFICE O/P EST MOD 30 MIN: CPT | Performed by: INTERNAL MEDICINE

## 2018-07-06 RX ORDER — FLUOXETINE HYDROCHLORIDE 40 MG/1
40 CAPSULE ORAL 2 TIMES DAILY
COMMUNITY
Start: 2018-06-21 | End: 2020-05-11

## 2018-07-06 RX ORDER — CEPHALEXIN 500 MG/1
500 CAPSULE ORAL 2 TIMES DAILY
Qty: 20 CAPSULE | Refills: 0 | Status: SHIPPED | OUTPATIENT
Start: 2018-07-06 | End: 2018-08-01

## 2018-07-06 RX ORDER — ONDANSETRON 4 MG/1
4 TABLET, FILM COATED ORAL EVERY 8 HOURS PRN
Qty: 20 TABLET | Refills: 0 | Status: SHIPPED | OUTPATIENT
Start: 2018-07-06 | End: 2018-12-18

## 2018-07-06 NOTE — PROGRESS NOTES
Subjective   Tony Wolf is a 73 y.o. male.   Chief Complaint   Patient presents with   • red foot       History of Present Illness   2 weeks hx of red on top of right foot. No fever or chills. Not getting any better.   The following portions of the patient's history were reviewed and updated as appropriate: allergies, current medications, past family history, past medical history, past social history, past surgical history and problem list.    Review of Systems   Constitutional: Negative for activity change, appetite change, chills, diaphoresis, fatigue, fever and unexpected weight change.   HENT: Negative for congestion, ear discharge, ear pain, mouth sores, nosebleeds, sinus pressure, sneezing and sore throat.    Eyes: Negative for pain, discharge and itching.   Respiratory: Negative for cough, chest tightness, shortness of breath and wheezing.    Cardiovascular: Negative for chest pain, palpitations and leg swelling.   Gastrointestinal: Negative for abdominal pain, constipation, diarrhea, nausea and vomiting.   Endocrine: Negative for cold intolerance, heat intolerance, polydipsia and polyphagia.   Genitourinary: Negative for dysuria, flank pain, frequency, hematuria and urgency.   Musculoskeletal: Negative for arthralgias, back pain, gait problem, myalgias, neck pain and neck stiffness.   Skin: Negative for color change, pallor and rash.        Red foot   Neurological: Negative for seizures, speech difficulty, numbness and headaches.   Psychiatric/Behavioral: Negative for agitation, confusion, decreased concentration and sleep disturbance. The patient is not nervous/anxious.      /100   Pulse 71   Wt 111 kg (243 lb 12.8 oz)   SpO2 98%   BMI 33.06 kg/m²     Objective   Physical Exam   Constitutional: He is oriented to person, place, and time. He appears well-developed and well-nourished.   HENT:   Head: Normocephalic and atraumatic.   Right Ear: External ear normal.   Left Ear: External ear normal.    Nose: Nose normal.   Mouth/Throat: Oropharynx is clear and moist.   Eyes: Conjunctivae and EOM are normal. Pupils are equal, round, and reactive to light.   Neck: Normal range of motion. Neck supple. No JVD present. No thyromegaly present.   Cardiovascular: Normal rate, regular rhythm, normal heart sounds and intact distal pulses.  Exam reveals no gallop and no friction rub.    No murmur heard.  Pulmonary/Chest: Effort normal and breath sounds normal. No respiratory distress. He has no wheezes. He has no rales. He exhibits no tenderness.   Abdominal: Soft. Bowel sounds are normal. He exhibits no distension and no mass. There is no tenderness. There is no rebound and no guarding. No hernia.   Genitourinary: Rectum normal, prostate normal and penis normal.        Lymphadenopathy:     He has no cervical adenopathy.   Neurological: He is oriented to person, place, and time. He displays normal reflexes. No cranial nerve deficit. He exhibits normal muscle tone. Coordination normal.   Skin: No rash noted. No erythema. No pallor.   Psychiatric: He has a normal mood and affect.       Assessment/Plan   Diagnoses and all orders for this visit:    Cellulitis of right foot  -     cephalexin (KEFLEX) 500 MG capsule; Take 1 capsule by mouth 2 (Two) Times a Day.

## 2018-07-06 NOTE — TELEPHONE ENCOUNTER
What kind of cuts. Those need to be looked at. Would prefer zofran 4mg po q 8 prn nausea 20 no refills.

## 2018-07-11 ENCOUNTER — ANTICOAGULATION VISIT (OUTPATIENT)
Dept: PHARMACY | Facility: HOSPITAL | Age: 73
End: 2018-07-11

## 2018-07-11 LAB
INR PPP: 3.4 (ref 0.91–1.09)
PROTHROMBIN TIME: 41.3 SECONDS (ref 10–13.8)

## 2018-07-11 PROCEDURE — 85610 PROTHROMBIN TIME: CPT

## 2018-07-11 PROCEDURE — G0463 HOSPITAL OUTPT CLINIC VISIT: HCPCS

## 2018-07-11 PROCEDURE — 36416 COLLJ CAPILLARY BLOOD SPEC: CPT

## 2018-07-11 NOTE — PROGRESS NOTES
Anticoagulation Clinic Progress Note  Indication: atrial fibrillation  Referring Provider: Sravani  Initial Warfarin Start Date: 2008  Goal INR: 2-3  Current Drug Interactions: fluoxetine, glimepiride, melatonin (PRN)  CHADS-VASc: 3 (age, HTN, DM)    Anticoagulation Clinic INR History:  Date 8/2 8/23 9/20 10/11 11/7 11/17 11/28 12/13 12/27   Total Weekly Dose 17.5mg 17.5 mg 17.5 mg 17.5mg 10mg 17.5 mg 17.5 mg 20mg 22.5 mg   INR 2.5 2.3 2.3 2.0 1.2 1.7 1.5 1.7 2.6   Notes     Pre- lumbar inj         Date 1/10/18 1/31 2/28 3/28 4/27 5/30 6/27 7/11    Total Weekly Dose 22.5 mg 22.5 mg 22.5 mg 22.5 mg 22.5 mg 22.5 mg 22.5 mg 22.5 mg    INR 2.6 2.5 2.1 2.4 2.4 2.6 3.5 3.4    Notes        Keflex      Clinic Interview:  Verbal Release Authorization signed on 6/27/18 -- may speak with Sussy (wife), Nikolai (son)  Tablet Strength: pt has 2.5mg tablets  Current Dose: 2.5mg daily except 5mg MonFri  Phone: [phone]  Fax: [phone] (Attn: Tasha)    Patient Findings:    Positives:   Change in medications   Negatives:   Signs/symptoms of thrombosis, Signs/symptoms of bleeding, Laboratory test error suspected, Change in health, Change in alcohol use, Change in activity, Upcoming invasive procedure, Emergency department visit, Upcoming dental procedure, Missed doses, Extra doses, Change in diet/appetite, Hospital admission, Bruising, Other complaints   Comments:   Patient stated he started taking Keflex starting on 7/6 for diabetic foot ulcer. Pt was prescribed 10 day course, and started taking on 7/7. Patient also states that he had been taking fish oil occasionally.     Plan:  1. INR is supratherapeutic again today, likely affected by patient starting Keflex on 7/7. Due to supratherapetuic result and interaction with Keflex, instructed Mr. Wolf to decrease today's (7/11) dose to 1.25 mg and decrease Friday's (7/13) dose to 3.75 mg (12% dose decrease). Patient will otherwise continue warfarin 2.5 mg daily except 5 mg  Kari.  2. RTC on 7/23 (1.5 weeks) to ensure INR back WNL.  3. Verbal and written information was provided in clinic. Mr. Wolf voiced understanding with teach back and has no further questions at this time.     Kerwin Perez [facility]  7/11/2018  2:15 PM

## 2018-07-11 NOTE — PROGRESS NOTES
Anticoagulation Clinic Progress Note  Indication: atrial fibrillation  Referring Provider: Sravani  Initial Warfarin Start Date: 2008  Goal INR: 2-3  Current Drug Interactions: fluoxetine, glimepiride, melatonin (PRN)  CHADS-VASc: 3 (age, HTN, DM)    Anticoagulation Clinic INR History:  Date 8/2 8/23 9/20 10/11 11/7 11/17 11/28 12/13 12/27   Total Weekly Dose 17.5mg 17.5 mg 17.5 mg 17.5mg 10mg 17.5 mg 17.5 mg 20mg 22.5 mg   INR 2.5 2.3 2.3 2.0 1.2 1.7 1.5 1.7 2.6   Notes     Pre- lumbar inj         Date 1/10/18 1/31 2/28 3/28 4/27 5/30 6/27     Total Weekly Dose 22.5 mg 22.5 mg 22.5 mg 22.5 mg 22.5 mg 22.5 mg 22.5 mg     INR 2.6 2.5 2.1 2.4 2.4 2.6 3.5     Notes              Clinic Interview:  Verbal Release Authorization signed on 6/27/18 -- may speak with Sussy (wife), Nikolai (son)  Tablet Strength: pt has 5mg and 2.5mg tablets  Current Dose: 2.5mg daily except 5mg MonFri  Phone: [phone]  Fax: [phone] (Attn: Tasha)    Patient Findings   Positives:   Other complaints   Negatives:   Signs/symptoms of thrombosis, Signs/symptoms of bleeding, Laboratory test error suspected, Change in health, Change in alcohol use, Change in activity, Upcoming invasive procedure, Emergency department visit, Upcoming dental procedure, Missed doses, Extra doses, Change in medications, Change in diet/appetite, Hospital admission, Bruising   Comments:   Mr. Wolf reports he is still taking Prozac and has not changed any medications at this time. He reports he is sleeping better. Patient denies any changes.      Plan:  1. INR is supratherapeutic today, uncertain the cause. Given recent stability, instructed Mr. Wolf to decrease Friday's (6/29) dose to 2.5mg (11% dose decr.), and otherwise continue warfarin 2.5mg daily except 5mg MonFri.   2. RTC in 2 weeks to ensure INR back WNL. If elevated again, will reduce maintenance dose at that time.   3. Verbal and written information was provided in clinic. Mr. Wolf voiced  understanding with teach back and has no further questions at this time.     Ama Mccloud, PharmD  7/11/2018  1:16 PM     I, Ama Mccloud, PharmD, have reviewed the note in full and agree with the assessment and plan.  07/11/18  1:16 PM

## 2018-07-13 ENCOUNTER — TELEPHONE (OUTPATIENT)
Dept: INTERNAL MEDICINE | Facility: CLINIC | Age: 73
End: 2018-07-13

## 2018-07-13 NOTE — TELEPHONE ENCOUNTER
PATIENT CALLED TO REPORT THAT THE CEPHALEXIN PRESCRIBED BY DR GUILLAUME HAS CAUSED AN UPSET STOMACH AND HE IS UNABLE TO EAT. HE WOULD LIKE TO KNOW IF SOMETHING ELSE SHOULD BE ORDERED OR JUST TO STOP TAKING THE MEDICATION. HE REPORTS THAT HE HAS SIX PILLS REMAINING.    CALL BACK [phone]

## 2018-07-13 NOTE — TELEPHONE ENCOUNTER
Patient stated that he is eating when taking the medication and he does not want to come back and be seen since he was just here to see Dr. Lopez.

## 2018-07-16 NOTE — TELEPHONE ENCOUNTER
Spoke with patients wife, she stated that patient is feeling better, and his leg is much better as well.

## 2018-07-17 ENCOUNTER — TELEPHONE (OUTPATIENT)
Dept: INTERNAL MEDICINE | Facility: CLINIC | Age: 73
End: 2018-07-17

## 2018-07-17 ENCOUNTER — TRANSCRIBE ORDERS (OUTPATIENT)
Dept: ADMINISTRATIVE | Facility: HOSPITAL | Age: 73
End: 2018-07-17

## 2018-07-17 DIAGNOSIS — R74.01 ELEVATED SGOT (AST): Primary | ICD-10-CM

## 2018-07-17 NOTE — TELEPHONE ENCOUNTER
JUANY WITH DR. ARMSTRONG'S OFFICE CALLED IN REGARDS TO PT... HE WAS SEEN TODAY AND THEY ARE WONDERING IF PATIENT HAS HAD A HEPATITIS PANEL. IF SO, PLEASE FAX RESULTS [phone]. THEY HAVE LOOKED IN PATIENTS CHART AND SEE HE HAD THE HEP C- BUT DIDN'T KNOW IF THEY OVERLOOKED ANYTHING. PLEASE ADVISE!

## 2018-07-23 ENCOUNTER — ANTICOAGULATION VISIT (OUTPATIENT)
Dept: PHARMACY | Facility: HOSPITAL | Age: 73
End: 2018-07-23

## 2018-07-23 LAB
INR PPP: 3.6 (ref 0.91–1.09)
PROTHROMBIN TIME: 43.7 SECONDS (ref 10–13.8)

## 2018-07-23 PROCEDURE — 85610 PROTHROMBIN TIME: CPT

## 2018-07-23 PROCEDURE — 36416 COLLJ CAPILLARY BLOOD SPEC: CPT

## 2018-07-23 PROCEDURE — G0463 HOSPITAL OUTPT CLINIC VISIT: HCPCS

## 2018-07-23 NOTE — PROGRESS NOTES
Anticoagulation Clinic Progress Note  Indication: atrial fibrillation  Referring Provider: Sravani  Initial Warfarin Start Date: 2008  Goal INR: 2-3  Current Drug Interactions: fluoxetine, glimepiride, melatonin (PRN)  CHADS-VASc: 3 (age, HTN, DM)    Anticoagulation Clinic INR History:  Date 8/2 8/23 9/20 10/11 11/7 11/17 11/28 12/13 12/27   Total Weekly Dose 17.5mg 17.5 mg 17.5 mg 17.5mg 10mg 17.5 mg 17.5 mg 20mg 22.5 mg   INR 2.5 2.3 2.3 2.0 1.2 1.7 1.5 1.7 2.6   Notes     Pre- lumbar inj         Date 1/10/18 1/31 2/28 3/28 4/27 5/30 6/27 7/11 7/23   Total Weekly Dose 22.5 mg 22.5 mg 22.5 mg 22.5 mg 22.5 mg 22.5 mg 22.5 mg 22.5 mg 22.5mg   INR 2.6 2.5 2.1 2.4 2.4 2.6 3.5 3.4 3.6   Notes        Keflex      Clinic Interview:  Verbal Release Authorization signed on 6/27/18 -- may speak with Sussy (wife), Nikolai (son)  Tablet Strength: pt has 2.5mg tablets  Current Dose: 2.5mg daily except 5mg MonFri  Phone: [phone]  Fax: [phone] (Attn: Tasha)    Patient Findings     Positives:   Change in activity, Change in diet/appetite   Negatives:   Signs/symptoms of thrombosis, Signs/symptoms of bleeding, Laboratory test error suspected, Change in health, Change in alcohol use, Upcoming invasive procedure, Emergency department visit, Upcoming dental procedure, Missed doses, Extra doses, Change in medications, Hospital admission, Bruising, Other complaints   Comments:   Patient may have been stressed due to power outage for past 2 days. Patient has increase intake of GLV lately. He has pain, possibly due to arthritis, in his pointer finger. Reminded him to take APAP for pain if any OTC pain relievers.      Plan:  1. INR is SUPRA therapeutic again today. Given supratherapeutic trend, instructed patient to decrease warfarin to 2.5 mg daily except 5 mg Fri (11% decrease).  2. RTC on 8/1 (1.5 weeks) to ensure INR back WNL. Will come in after appt at [facility].  3. Verbal and written information was provided in  clinic. Mr. Wolf voiced understanding with teach back and has no further questions at this time.     Job Ambriz, Pharmacy Intern  7/23/2018  1:17 PM     ISariah, PharmD, have reviewed the note in full and agree with the assessment and plan.  Mr. Wolf was reportedly experiencing n/v with cephalexin which may have affected oral intake.  Cephalexin therapy should have been complete on  7/17.  07/23/18  3:39 PM

## 2018-08-01 ENCOUNTER — ANTICOAGULATION VISIT (OUTPATIENT)
Dept: PHARMACY | Facility: HOSPITAL | Age: 73
End: 2018-08-01

## 2018-08-01 ENCOUNTER — HOSPITAL ENCOUNTER (OUTPATIENT)
Dept: ULTRASOUND IMAGING | Facility: HOSPITAL | Age: 73
Discharge: HOME | End: 2018-08-01
Admitting: INTERNAL MEDICINE

## 2018-08-01 DIAGNOSIS — R74.01 ELEVATED SGOT (AST): ICD-10-CM

## 2018-08-01 LAB
INR PPP: 4.9 (ref 0.91–1.09)
INR PPP: 5.3 (ref 0.91–1.09)
PROTHROMBIN TIME: 58.5 SECONDS (ref 10–13.8)
PROTHROMBIN TIME: 63.9 SECONDS (ref 10–13.8)

## 2018-08-01 PROCEDURE — 36416 COLLJ CAPILLARY BLOOD SPEC: CPT

## 2018-08-01 PROCEDURE — G0463 HOSPITAL OUTPT CLINIC VISIT: HCPCS

## 2018-08-01 PROCEDURE — 85610 PROTHROMBIN TIME: CPT

## 2018-08-01 PROCEDURE — 76705 ECHO EXAM OF ABDOMEN: CPT

## 2018-08-01 RX ORDER — CALCIUM CARBONATE 200(500)MG
2 TABLET,CHEWABLE ORAL DAILY PRN
COMMUNITY
End: 2022-03-09

## 2018-08-01 NOTE — PROGRESS NOTES
Anticoagulation Clinic Progress Note  Indication: atrial fibrillation  Referring Provider: Sravani  Initial Warfarin Start Date: 2008  Goal INR: 2-3  Current Drug Interactions: fluoxetine, glimepiride, melatonin (PRN)  CHADS-VASc: 3 (age, HTN, DM)  EtOH: none    Anticoagulation Clinic INR History:  Date 8/2 8/23 9/20 10/11 11/7 11/17 11/28 12/13 12/27   Total Weekly Dose 17.5mg 17.5 mg 17.5 mg 17.5mg 10mg 17.5 mg 17.5 mg 20mg 22.5 mg   INR 2.5 2.3 2.3 2.0 1.2 1.7 1.5 1.7 2.6   Notes     Pre- lumbar inj         Date 1/10/18 1/31 2/28 3/28 4/27 5/30 6/27 7/11 7/23 8/1   Total Weekly Dose 22.5 mg 22.5 mg 22.5 mg 22.5 mg 22.5 mg 22.5 mg 22.5 mg 22.5 mg 22.5 mg 20mg   INR 2.6 2.5 2.1 2.4 2.4 2.6 3.5 3.4 3.6 5.3, 4.9   Notes        Keflex       Clinic Interview:  Verbal Release Authorization signed on 6/27/18 -- may speak with Sussy (wife), Nikolai (son)  Tablet Strength: pt has 2.5mg tablets  Current Dose: 2.5mg daily except 5mg MonFri  Phone: [phone]  Fax: [phone] (Attn: Tasha)    Patient Findings:  Positives:   Change in medications, Change in diet/appetite   Negatives:   Signs/symptoms of thrombosis, Signs/symptoms of bleeding, Laboratory test error suspected, Change in health, Change in alcohol use, Change in activity, Upcoming invasive procedure, Emergency department visit, Upcoming dental procedure, Missed doses, Extra doses, Hospital admission, Bruising, Other complaints   Comments:   Mr. Wolf's stomach has been bothering him -- he has had some diarrhea, and he has maybe been eating less than usual the past week or so. He denies signs of blood in his stool, and he denies having multiple, watery BMs. He had a liver study today, and he will likely be scheduled for a colonoscopy this fall. He has also taken some Tums, Tylenol Sinus, and Zantac recently.     Plan:  1. INR is supratherapeutic again today -- increased further despite dose decrease. Instructed Mr. Wolf to HOLD his warfarin x2 nights, then  take warfarin 2.5mg daily until recheck.  2. RTC on Monday to ensure INR is back WNL.   3. Verbal and written information was provided in clinic. Mr. Wolf voiced understanding with teach back and has no further questions at this time.     Elise Portillo [facility]  8/1/2018  12:37 PM

## 2018-08-06 ENCOUNTER — ANTICOAGULATION VISIT (OUTPATIENT)
Dept: PHARMACY | Facility: HOSPITAL | Age: 73
End: 2018-08-06

## 2018-08-06 DIAGNOSIS — I48.91 ATRIAL FIBRILLATION, UNSPECIFIED TYPE (HCC): ICD-10-CM

## 2018-08-06 LAB
INR PPP: 1.9 (ref 0.91–1.09)
PROTHROMBIN TIME: 23.1 SECONDS (ref 10–13.8)

## 2018-08-06 PROCEDURE — G0463 HOSPITAL OUTPT CLINIC VISIT: HCPCS | Performed by: PHARMACIST

## 2018-08-06 PROCEDURE — 36416 COLLJ CAPILLARY BLOOD SPEC: CPT

## 2018-08-06 PROCEDURE — 85610 PROTHROMBIN TIME: CPT

## 2018-08-06 NOTE — PROGRESS NOTES
Anticoagulation Clinic Progress Note  Indication: atrial fibrillation  Referring Provider: Sravani  Initial Warfarin Start Date: 2008  Goal INR: 2-3  Current Drug Interactions: fluoxetine, glimepiride, melatonin (PRN)  CHADS-VASc: 3 (age, HTN, DM)  EtOH: none    Anticoagulation Clinic INR History:  Date 8/2 8/23 9/20 10/11 11/7 11/17 11/28 12/13 12/27   Total Weekly Dose 17.5mg 17.5 mg 17.5 mg 17.5mg 10mg 17.5 mg 17.5 mg 20mg 22.5 mg   INR 2.5 2.3 2.3 2.0 1.2 1.7 1.5 1.7 2.6   Notes     Pre- lumbar inj         Date 1/10/18 1/31 2/28 3/28 4/27 5/30 6/27 7/11 7/23 8/1 8/6   Total Weekly Dose 22.5 mg 22.5 mg 22.5 mg 22.5 mg 22.5 mg 22.5 mg 22.5 mg 22.5 mg 22.5 mg 20mg 12.5 mg   INR 2.6 2.5 2.1 2.4 2.4 2.6 3.5 3.4 3.6 5.3, 4.9 1.9   Notes        Keflex        Clinic Interview:  Verbal Release Authorization signed on 6/27/18 -- may speak with Sussy (wife), Nikolai (son)  Tablet Strength: pt has 2.5mg tablets  Current Dose: 2.5mg daily except 5mg MonFri  Phone: [phone]  Fax: [phone] (Attn: Tasha)    Patient Findings     Negatives:   Signs/symptoms of thrombosis, Signs/symptoms of bleeding, Laboratory test error suspected, Change in health, Change in alcohol use, Change in activity, Upcoming invasive procedure, Emergency department visit, Upcoming dental procedure, Missed doses, Extra doses, Change in medications, Change in diet/appetite, Hospital admission, Bruising, Other complaints   Comments:   Broccoli and cauliflower steamed yesterday with meatloaf. He usually does this about twice a week.       Plan:  1. INR is subtherapeutic today -- at 1.9 after a two day hold last week. Recommend him to avoid the green vegetables until next week. Instructed Mr. Wolf to continue take warfarin 2.5mg daily until recheck. Anticipate that this is the low spot for his INR, so will continue with the 2.5 mg and will not boost for now.  2. RTC on Monday to ensure INR is back WNL.   3. Verbal and written information was  provided in clinic. Mr. Wolf voiced understanding with teach back and has no further questions at this time.     Simon Real RP  8/6/2018  1:09 PM

## 2018-08-13 ENCOUNTER — ANTICOAGULATION VISIT (OUTPATIENT)
Dept: PHARMACY | Facility: HOSPITAL | Age: 73
End: 2018-08-13

## 2018-08-13 DIAGNOSIS — I48.91 ATRIAL FIBRILLATION, UNSPECIFIED TYPE (HCC): ICD-10-CM

## 2018-08-13 LAB
INR PPP: 2.7 (ref 0.91–1.09)
PROTHROMBIN TIME: 32.4 SECONDS (ref 10–13.8)

## 2018-08-13 PROCEDURE — 85610 PROTHROMBIN TIME: CPT

## 2018-08-13 PROCEDURE — 36416 COLLJ CAPILLARY BLOOD SPEC: CPT

## 2018-08-13 PROCEDURE — G0463 HOSPITAL OUTPT CLINIC VISIT: HCPCS

## 2018-08-13 RX ORDER — PRAVASTATIN SODIUM 20 MG
TABLET ORAL
Qty: 30 TABLET | Refills: 1 | Status: SHIPPED | OUTPATIENT
Start: 2018-08-13 | End: 2018-11-13 | Stop reason: SDUPTHER

## 2018-08-13 RX ORDER — WARFARIN SODIUM 2.5 MG/1
TABLET ORAL
Qty: 120 TABLET | Refills: 0 | Status: SHIPPED | OUTPATIENT
Start: 2018-08-13 | End: 2019-03-27 | Stop reason: SDUPTHER

## 2018-08-13 NOTE — PROGRESS NOTES
Anticoagulation Clinic Progress Note  Indication: atrial fibrillation  Referring Provider: Sravani  Initial Warfarin Start Date: 2008  Goal INR: 2-3  Current Drug Interactions: fluoxetine, glimepiride, melatonin (PRN)  CHADS-VASc: 3 (age, HTN, DM)  EtOH: none    Anticoagulation Clinic INR History:  Date 8/2 8/23 9/20 10/11 11/7 11/17 11/28 12/13 12/27   Total Weekly Dose 17.5mg 17.5 mg 17.5 mg 17.5mg 10mg 17.5 mg 17.5 mg 20mg 22.5 mg   INR 2.5 2.3 2.3 2.0 1.2 1.7 1.5 1.7 2.6   Notes     Pre- lumbar inj         Date 1/10/18 1/31 2/28 3/28 4/27 5/30 6/27 7/11 7/23 8/1 8/6   Total Weekly Dose 22.5 mg 22.5 mg 22.5 mg 22.5 mg 22.5 mg 22.5 mg 22.5 mg 22.5 mg 22.5 mg 20mg 12.5 mg   INR 2.6 2.5 2.1 2.4 2.4 2.6 3.5 3.4 3.6 5.3, 4.9 1.9   Notes        Keflex        Clinic Interview:  Verbal Release Authorization signed on 6/27/18 -- may speak with Sussy (wife), Nikolai (son)  Tablet Strength: pt has 2.5mg tablets  Current Dose: 2.5mg daily except 5mg MonFri  Phone: [phone]  Fax: [phone] (Attn: Tasha)    Patient Findings     Negatives:   Signs/symptoms of thrombosis, Signs/symptoms of bleeding, Laboratory test error suspected, Change in health, Change in alcohol use, Change in activity, Upcoming invasive procedure, Emergency department visit, Upcoming dental procedure, Missed doses, Extra doses, Change in medications, Change in diet/appetite, Hospital admission, Bruising, Other complaints   Comments:   Broccoli and cauliflower steamed yesterday with meatloaf. He usually does this about twice a week.       Plan:  1. INR is subtherapeutic today -- at 1.9 after a two day hold last week. Recommend him to avoid the green vegetables until next week. Instructed Mr. Wolf to continue take warfarin 2.5mg daily until recheck. Anticipate that this is the low spot for his INR, so will continue with the 2.5 mg and will not boost for now.  2. RTC on Monday to ensure INR remains WNL.   3. Verbal and written information was  provided in clinic. Mr. Wolf voiced understanding with teach back and has no further questions at this time.     Ama Mccloud, PharmD  8/13/2018  1:38 PM

## 2018-08-13 NOTE — PROGRESS NOTES
Anticoagulation Clinic Progress Note  Indication: atrial fibrillation  Referring Provider: Sravani  Initial Warfarin Start Date: 2008  Goal INR: 2-3  Current Drug Interactions: fluoxetine, glimepiride, melatonin (PRN)  CHADS-VASc: 3 (age, HTN, DM)  EtOH: none    Anticoagulation Clinic INR History:  Date 8/2 8/23 9/20 10/11 11/7 11/17 11/28 12/13 12/27   Total Weekly Dose 17.5mg 17.5 mg 17.5 mg 17.5mg 10mg 17.5 mg 17.5 mg 20mg 22.5 mg   INR 2.5 2.3 2.3 2.0 1.2 1.7 1.5 1.7 2.6   Notes     Pre- lumbar inj         Date 1/10/18 1/31 2/28 3/28 4/27 5/30 6/27 7/11 7/23 8/1 8/6 8/13   Total Weekly Dose 22.5 mg 22.5 mg 22.5 mg 22.5 mg 22.5 mg 22.5 mg 22.5 mg 22.5 mg 22.5 mg 20mg 12.5 mg 17.5 mg   INR 2.6 2.5 2.1 2.4 2.4 2.6 3.5 3.4 3.6 5.3, 4.9 1.9 2.7   Notes        Keflex         Clinic Interview:  Verbal Release Authorization signed on 6/27/18 -- may speak with Sussy (wife), Nikolai (son)  Tablet Strength: pt has 2.5mg tablets  Current Dose: 2.5mg daily except 5mg MonFri  Phone: [phone]  Fax: [phone] (Attn: Tasha)    Patient Findings   Positives:   Signs/symptoms of bleeding, Other complaints   Negatives:   Signs/symptoms of thrombosis, Laboratory test error suspected, Change in health, Change in alcohol use, Change in activity, Upcoming invasive procedure, Emergency department visit, Upcoming dental procedure, Missed doses, Extra doses, Change in medications, Change in diet/appetite, Hospital admission, Bruising   Comments:   While speaking with Dr. Wolf in clinic he said that he has had a small amount of BRBPR. He said that this has subsided but I told him to make sure his GI doctor is aware of this. I also urged him to contact his provider or the anticaog clinic if this occurs again or is a concerning amount. He has a colonoscopy scheduled for October. His doctor is suggesting a 3 day Warfarin hold before the procedure. Patient also has complaints of bladder frequency (he has to use the restroom very  often) and night sweats (from diabetes). He has an appointment with his PCP to discuss these issues and I encouraged him to let the antico clinic know if there are any medication changes made. He denied any missed doses, extra doses, changes in medications, and changes in diet since he was last week.      Plan:  1. INR is therapeutic today -- at 2.7. Instructed Mr. Wolf to continue take warfarin 2.5mg daily.  2. RTC on Monday (8/20/18) to ensure a 2nd therapeutic INR. Will evaluate less frequent INR checks after next Monday's reading.   3. Verbal and written information was provided in clinic. Mr. Wolf voiced understanding with teach back and has no further questions at this time.     Precious Carter, Pharmacy Intern  8/13/2018  1:45 PM     I, Ama Mccloud, PharmD, have reviewed the note in full and agree with the assessment and plan.  08/13/18  5:10 PM

## 2018-08-20 ENCOUNTER — ANTICOAGULATION VISIT (OUTPATIENT)
Dept: PHARMACY | Facility: HOSPITAL | Age: 73
End: 2018-08-20

## 2018-08-20 LAB
INR PPP: 2.3 (ref 0.91–1.09)
PROTHROMBIN TIME: 27.3 SECONDS (ref 10–13.8)

## 2018-08-20 PROCEDURE — 85610 PROTHROMBIN TIME: CPT

## 2018-08-20 PROCEDURE — 36416 COLLJ CAPILLARY BLOOD SPEC: CPT

## 2018-08-20 PROCEDURE — G0463 HOSPITAL OUTPT CLINIC VISIT: HCPCS

## 2018-08-20 NOTE — PROGRESS NOTES
Anticoagulation Clinic Progress Note  Indication: atrial fibrillation  Referring Provider: Sravani  Initial Warfarin Start Date: 2008  Goal INR: 2 - 3  Current Drug Interactions: fluoxetine, glimepiride, melatonin (PRN)  CHADS-VASc: 3 (age, HTN, DM)  EtOH: none    Anticoagulation Clinic INR History:  Date 8/2 8/23 9/20 10/11 11/7 11/17 11/28 12/13 12/27   Total Weekly Dose 17.5mg 17.5 mg 17.5 mg 17.5mg 10mg 17.5 mg 17.5 mg 20mg 22.5 mg   INR 2.5 2.3 2.3 2.0 1.2 1.7 1.5 1.7 2.6   Notes     Pre- lumbar inj         Date 1/10/18 1/31 2/28 3/28 4/27 5/30 6/27 7/11 7/23 8/1 8/6 8/13   Total Weekly Dose 22.5 mg 22.5 mg 22.5 mg 22.5 mg 22.5 mg 22.5 mg 22.5 mg 22.5 mg 22.5 mg 20mg 12.5 mg 17.5 mg   INR 2.6 2.5 2.1 2.4 2.4 2.6 3.5 3.4 3.6 5.3, 4.9 1.9 2.7   Notes        Keflex         Date 8/20/18            Total Weekly Dose 17.5 mg            INR 2.3            Notes               Clinic Interview:  Verbal Release Authorization signed on 6/27/18 -- may speak with Sussy (wife), Nikolai (son)  Tablet Strength: pt has 2.5mg tablets  Current Dose: 2.5mg daily except 5mg MonFri  Phone: [phone]  Fax: [phone] (Attn: Tasha)    Patient Findings   Positives:   Change in medications   Comments:   He was started on an antibiotic a couple weeks ago but stopped taking due to stomach issues. He denied any missed doses, extra doses, changes in medications, and changes in diet since he was last week.     Of note: He has a colonoscopy scheduled for October. His doctor is suggesting a 3 day Warfarin hold before the procedure.        Plan:  1. INR is therapeutic today at 2.3. Instructed Mr. Wolf to continue take warfarin 2.5mg daily.  2. RTC on Tuesday (9/4/18). He would like to get back to q4week INR check.   3. Verbal and written information was provided in clinic. Mr. Wolf voiced understanding with teach back and has no further questions at this time.     Sariah Almodovar, PharmD  8/20/2018  1:33 PM

## 2018-08-21 RX ORDER — GLIMEPIRIDE 2 MG/1
TABLET ORAL
Qty: 30 TABLET | Refills: 2 | Status: SHIPPED | OUTPATIENT
Start: 2018-08-21 | End: 2018-11-20 | Stop reason: SDUPTHER

## 2018-09-04 ENCOUNTER — ANTICOAGULATION VISIT (OUTPATIENT)
Dept: PHARMACY | Facility: HOSPITAL | Age: 73
End: 2018-09-04

## 2018-09-04 ENCOUNTER — TELEPHONE (OUTPATIENT)
Dept: PHARMACY | Facility: HOSPITAL | Age: 73
End: 2018-09-04

## 2018-09-04 LAB
INR PPP: 2.3 (ref 0.91–1.09)
PROTHROMBIN TIME: 27 SECONDS (ref 10–13.8)

## 2018-09-04 PROCEDURE — 85610 PROTHROMBIN TIME: CPT

## 2018-09-04 PROCEDURE — G0463 HOSPITAL OUTPT CLINIC VISIT: HCPCS

## 2018-09-04 PROCEDURE — 36416 COLLJ CAPILLARY BLOOD SPEC: CPT

## 2018-09-04 NOTE — TELEPHONE ENCOUNTER
Dr. Lassiter,    Mr. Wolf is scheduled for a colonoscopy with Dr. Hawthorne on 10/8/2018. He was instructed by Dr. Hawthorne's office to hold his warfarin 3 days prior to procedure. Will plan to boost patient x 2 days starting night of colonoscopy unless otherwise instructed by Dr. Hawthorne.     Please advise if you are agreeable with Mr. Wolf's anticoagulation plan.    Thank you,    Ama Mccloud, PharmD  [facility] Anticoagulation clinic

## 2018-09-04 NOTE — PROGRESS NOTES
Anticoagulation Clinic Progress Note  Indication: atrial fibrillation  Referring Provider: Sravani  Initial Warfarin Start Date: 2008  Goal INR: 2 - 3  Current Drug Interactions: fluoxetine, glimepiride, melatonin (PRN)  CHADS-VASc: 3 (age, HTN, DM)  EtOH: none    Anticoagulation Clinic INR History:  Date 8/2 8/23 9/20 10/11 11/7 11/17 11/28 12/13 12/27   Total Weekly Dose 17.5mg 17.5 mg 17.5 mg 17.5mg 10mg 17.5 mg 17.5 mg 20mg 22.5 mg   INR 2.5 2.3 2.3 2.0 1.2 1.7 1.5 1.7 2.6   Notes     Pre- lumbar inj         Date 1/10/18 1/31 2/28 3/28 4/27 5/30 6/27 7/11 7/23 8/1 8/6 8/13   Total Weekly Dose 22.5 mg 22.5 mg 22.5 mg 22.5 mg 22.5 mg 22.5 mg 22.5 mg 22.5 mg 22.5 mg 20mg 12.5 mg 17.5 mg   INR 2.6 2.5 2.1 2.4 2.4 2.6 3.5 3.4 3.6 5.3, 4.9 1.9 2.7   Notes        Keflex         Date 8/20/18 9/4           Total Weekly Dose 17.5 mg 17.5mg           INR 2.3 2.3           Notes               Clinic Interview:  Verbal Release Authorization signed on 6/27/18 -- may speak with Sussy (wife), Nikolai (son)  Tablet Strength: pt has 2.5mg tablets  Current Dose: 2.5mg daily except 5mg MonFri  Phone: [phone]  Fax: [phone] (Attn: Tasha)    Patient Findings   Positives:   Upcoming invasive procedure   Negatives:   Signs/symptoms of thrombosis, Signs/symptoms of bleeding, Laboratory test error suspected, Change in health, Change in alcohol use, Change in activity, Emergency department visit, Upcoming dental procedure, Missed doses, Extra doses, Change in medications, Change in diet/appetite, Hospital admission, Bruising, Other complaints   Comments:   Of note: He has a colonoscopy scheduled for October 8th with Dr. Hawthorne. His doctor is suggesting a 3 day Warfarin hold before the procedure. Will clear with Dr. Lassiter.    Mr. Wolf may start taking Co-Q10- discussed DDI and he will plan to call us to discuss if he starts this.      Plan:  1. INR is therapeutic today at 2.3. Instructed Mr. Wolf to continue take  warfarin 2.5mg daily.  2. Would prefer Mr. Wolf come back in 3 weeks however, he prefers 4 weeks. Will comply- he voices understanding of supra/sub therapeutic INR.  3. Verbal and written information was provided in clinic. Mr. Wolf voiced understanding with teach back and has no further questions at this time.     Ama Mccloud, PharmD  9/4/2018  1:06 PM

## 2018-09-14 ENCOUNTER — OFFICE VISIT (OUTPATIENT)
Dept: INTERNAL MEDICINE | Facility: CLINIC | Age: 73
End: 2018-09-14

## 2018-09-14 VITALS
RESPIRATION RATE: 18 BRPM | OXYGEN SATURATION: 97 % | SYSTOLIC BLOOD PRESSURE: 122 MMHG | BODY MASS INDEX: 33.16 KG/M2 | TEMPERATURE: 98.3 F | HEART RATE: 83 BPM | DIASTOLIC BLOOD PRESSURE: 74 MMHG | WEIGHT: 244.8 LBS | HEIGHT: 72 IN

## 2018-09-14 DIAGNOSIS — Z00.00 MEDICARE ANNUAL WELLNESS VISIT, SUBSEQUENT: Primary | ICD-10-CM

## 2018-09-14 DIAGNOSIS — Z23 NEED FOR HEPATITIS A VACCINATION: ICD-10-CM

## 2018-09-14 DIAGNOSIS — Z23 NEED FOR INFLUENZA VACCINATION: ICD-10-CM

## 2018-09-14 PROCEDURE — G0008 ADMIN INFLUENZA VIRUS VAC: HCPCS | Performed by: INTERNAL MEDICINE

## 2018-09-14 PROCEDURE — 90632 HEPA VACCINE ADULT IM: CPT | Performed by: INTERNAL MEDICINE

## 2018-09-14 PROCEDURE — 90662 IIV NO PRSV INCREASED AG IM: CPT | Performed by: INTERNAL MEDICINE

## 2018-09-14 PROCEDURE — 90471 IMMUNIZATION ADMIN: CPT | Performed by: INTERNAL MEDICINE

## 2018-09-14 PROCEDURE — G0439 PPPS, SUBSEQ VISIT: HCPCS | Performed by: INTERNAL MEDICINE

## 2018-09-14 NOTE — PROGRESS NOTES
QUICK REFERENCE INFORMATION:  The ABCs of the Annual Wellness Visit    Subsequent Medicare Wellness Visit    HEALTH RISK ASSESSMENT    1945    Recent Hospitalizations:  No hospitalization(s) within the last year..        Current Medical Providers:  Patient Care Team:  Kay Lopez DO as PCP - General  Kay Lopez DO as PCP - Family Medicine  Elise Portillo, RPH as Pharmacist (Pharmacy)  Ama Mccloud, PharmD as Pharmacist (Pharmacy)  Sammy Lassiter MD as Consulting Physician (Cardiology)        Smoking Status:  History   Smoking Status   • Never Smoker   Smokeless Tobacco   • Never Used       Alcohol Consumption:  History   Alcohol Use No       Depression Screen:   PHQ-2/PHQ-9 Depression Screening 9/14/2018   Little interest or pleasure in doing things 0   Feeling down, depressed, or hopeless 0   Trouble falling or staying asleep, or sleeping too much -   Feeling tired or having little energy -   Poor appetite or overeating -   Feeling bad about yourself - or that you are a failure or have let yourself or your family down -   Trouble concentrating on things, such as reading the newspaper or watching television -   Moving or speaking so slowly that other people could have noticed. Or the opposite - being so fidgety or restless that you have been moving around a lot more than usual -   Thoughts that you would be better off dead, or of hurting yourself in some way -   Total Score 0   If you checked off any problems, how difficult have these problems made it for you to do your work, take care of things at home, or get along with other people? -       Health Habits and Functional and Cognitive Screening:  Functional & Cognitive Status 9/14/2018   Do you have difficulty preparing food and eating? No   Do you have difficulty bathing yourself, getting dressed or grooming yourself? No   Do you have difficulty using the toilet? No   Do you have difficulty moving around from place to place? No   Do  you have trouble with steps or getting out of a bed or a chair? Yes   In the past year have you fallen or experienced a near fall? No   Current Diet Well Balanced Diet   Dental Exam Up to date   Eye Exam Up to date   Exercise (times per week) 7 times per week   Current Exercise Activities Include Weightlifting   Do you need help using the phone?  No   Are you deaf or do you have serious difficulty hearing?  Yes   Do you need help with transportation? Yes   Do you need help shopping? No   Do you need help preparing meals?  No   Do you need help with housework?  No   Do you need help with laundry? No   Do you need help taking your medications? No   Do you need help managing money? No   Do you ever drive or ride in a car without wearing a seat belt? No   Have you felt unusual stress, anger or loneliness in the last month? No   Who do you live with? Spouse   If you need help, do you have trouble finding someone available to you? No   Have you been bothered in the last four weeks by sexual problems? No   Do you have difficulty concentrating, remembering or making decisions? No           Does the patient have evidence of cognitive impairment? No    Aspirin use counseling: Contraindicated from taking ASA      Recent Lab Results:  CMP:  Lab Results   Component Value Date    BUN 17 04/23/2018    CREATININE 1.10 04/23/2018    EGFRIFNONA 66 04/23/2018    BCR 15.5 04/23/2018     04/23/2018    K 4.2 04/23/2018    CO2 27.0 04/23/2018    CALCIUM 9.4 04/23/2018    ALBUMIN 4.50 04/23/2018    BILITOT 1.3 (H) 05/29/2018    ALKPHOS 68 04/23/2018    AST 45 (H) 04/23/2018    ALT 40 04/23/2018     Lipid Panel:  Lab Results   Component Value Date    CHOL 169 04/23/2018    TRIG 206 (H) 04/23/2018    HDL 34 (L) 04/23/2018     HbA1c:  Lab Results   Component Value Date    HGBA1C 6.3 04/23/2018       Visual Acuity:  No exam data present    Age-appropriate Screening Schedule:  Refer to the list below for future screening recommendations  based on patient's age, sex and/or medical conditions. Orders for these recommended tests are listed in the plan section. The patient has been provided with a written plan.    Health Maintenance   Topic Date Due   • COLONOSCOPY  01/20/2019 (Originally 7/28/2016)   • PNEUMOCOCCAL VACCINES (65+ LOW/MEDIUM RISK) (1 of 2 - PCV13) 01/20/2020 (Originally 6/23/2012)   • ZOSTER VACCINE (1 of 2) 01/20/2020 (Originally 6/23/1997)   • URINE MICROALBUMIN  10/16/2018   • HEMOGLOBIN A1C  10/23/2018   • DIABETIC EYE EXAM  02/03/2019   • DIABETIC FOOT EXAM  04/23/2019   • LIPID PANEL  04/23/2019   • DXA SCAN  02/06/2020   • TDAP/TD VACCINES (2 - Td) 07/06/2027   • INFLUENZA VACCINE  Completed        Subjective   History of Present Illness    Tony Wolf is a 73 y.o. male who presents for an Subsequent Wellness Visit.    The following portions of the patient's history were reviewed and updated as appropriate: allergies, current medications, past family history, past medical history, past social history, past surgical history and problem list.    Outpatient Medications Prior to Visit   Medication Sig Dispense Refill   • acetaminophen (TYLENOL) 500 MG tablet Take 1,000 mg by mouth Every 6 (Six) Hours As Needed for Mild Pain (1-3).     • alfuzosin (UROXATRAL) 10 MG 24 hr tablet Take 10 mg by mouth Daily.     • amLODIPine (NORVASC) 10 MG tablet TAKE ONE TABLET BY MOUTH DAILY AS DIRECTED 30 tablet 6   • calcium carbonate (TUMS) 500 MG chewable tablet Chew 2 tablets Daily As Needed for Indigestion or Heartburn.     • Cholecalciferol (VITAMIN D-3) 1000 UNITS capsule Take 1,000 Units by mouth Daily.     • colestipol (COLESTID) 1 G tablet Take 1 g by mouth Daily.     • FLUoxetine (PROzac) 40 MG capsule      • glimepiride (AMARYL) 2 MG tablet TAKE ONE TABLET BY MOUTH DAILY 30 tablet 2   • lisinopril (PRINIVIL,ZESTRIL) 40 MG tablet TAKE ONE TABLET BY MOUTH DAILY 90 tablet 4   • Melatonin 2.5 MG chewable tablet Chew 1 tablet At Night As  Needed.     • metoprolol succinate XL (TOPROL-XL) 100 MG 24 hr tablet Take 1 tablet by mouth 2 (Two) Times a Day. 180 tablet 2   • Mirabegron ER (MYRBETRIQ) 25 MG tablet sustained-release 24 hour 24 hr tablet Take 1 tablet by mouth Daily. 30 tablet 0   • mirtazapine (REMERON) 15 MG tablet Take  by mouth Every Night. 7.5mg to 15mg     • pravastatin (PRAVACHOL) 20 MG tablet TAKE ONE TABLET BY MOUTH DAILY 30 tablet 1   • Pseudoephedrine-Acetaminophen (TYLENOL SINUS MAX ST PO) Take  by mouth Daily As Needed.     • warfarin (COUMADIN) 2.5 MG tablet TAKE ONE TO TWO TABLETS BY MOUTH DAILY AS DIRECTED BY ANTICOAGULATION PHARMACIST 120 tablet 0   • Histamine Dihydrochloride (AUSTRALIAN DREAM ARTHRITIS) 0.025 % cream Apply  topically Daily.     • NYSTATIN [id number] UNIT/GM powder   2   • ondansetron (ZOFRAN) 4 MG tablet Take 1 tablet by mouth Every 8 (Eight) Hours As Needed for Nausea or Vomiting. 20 tablet 0   • RaNITidine HCl (RANITIDINE ACID REDUCER PO) Take  by mouth 2 (Two) Times a Day As Needed.     • warfarin (COUMADIN) 5 MG tablet 1 tablet daily. INR sunday       No facility-administered medications prior to visit.        Patient Active Problem List   Diagnosis   • GERD (gastroesophageal reflux disease)   • Essential hypertension   • Obesity   • Obstructive sleep apnea   • Type 2 diabetes mellitus, uncontrolled (CMS/HCC)   • Generalized anxiety disorder   • Atrial fibrillation (CMS/HCC)   • Screening for prostate cancer   • PVC's (premature ventricular contractions)   • Hyperlipidemia LDL goal <100   • Left ventricular hypertrophy   • Atypical atrial flutter (CMS/HCC)   • Arthritis of knee, right   • Status post right knee replacement   • Leukocytosis, mild, likely reactive   • Thrombocytopenia (CMS/HCC)   • Postoperative urinary retention   • Hypokalemia, replaced       Advance Care Planning:  has an advance directive - a copy has been provided and is in file    Identification of Risk Factors:  Risk factors include:  "weight , unhealthy diet and inactivity.    Review of Systems   Constitutional: Negative for activity change, appetite change, chills, diaphoresis, fatigue, fever and unexpected weight change.   HENT: Negative for congestion, ear discharge, ear pain, mouth sores, nosebleeds, sinus pressure, sneezing and sore throat.    Eyes: Negative for pain, discharge and itching.   Respiratory: Negative for cough, chest tightness, shortness of breath and wheezing.    Cardiovascular: Negative for chest pain, palpitations and leg swelling.   Gastrointestinal: Negative for abdominal pain, constipation, diarrhea, nausea and vomiting.   Endocrine: Negative for cold intolerance, heat intolerance, polydipsia and polyphagia.   Genitourinary: Negative for dysuria, flank pain, frequency, hematuria and urgency.   Musculoskeletal: Positive for arthralgias. Negative for back pain, gait problem, myalgias, neck pain and neck stiffness.   Skin: Negative for color change, pallor and rash.   Neurological: Negative for seizures, speech difficulty, numbness and headaches.   Psychiatric/Behavioral: Negative for agitation, confusion, decreased concentration and sleep disturbance. The patient is not nervous/anxious.        Compared to one year ago, the patient feels his physical health is the same.  Compared to one year ago, the patient feels his mental health is the same.  /74 (BP Location: Left arm, Patient Position: Sitting, Cuff Size: Adult)   Pulse 83   Temp 98.3 °F (36.8 °C) (Temporal Artery )   Resp 18   Ht 182.9 cm (72.01\")   Wt 111 kg (244 lb 12.8 oz)   SpO2 97%   BMI 33.19 kg/m²     Objective     Physical Exam   Constitutional: He appears well-developed and well-nourished.   HENT:   Head: Normocephalic and atraumatic.   Right Ear: External ear normal.   Left Ear: External ear normal.   Nose: Nose normal.   Mouth/Throat: Oropharynx is clear and moist.   Eyes: Pupils are equal, round, and reactive to light. Conjunctivae and EOM are " "normal.   Neck: Normal range of motion. Neck supple. No JVD present. No thyromegaly present.   Cardiovascular: Normal rate, regular rhythm, normal heart sounds and intact distal pulses.  Exam reveals no gallop and no friction rub.    No murmur heard.  Pulmonary/Chest: Effort normal and breath sounds normal. No respiratory distress. He has no wheezes. He has no rales. He exhibits no tenderness.   Abdominal: Soft. Bowel sounds are normal. He exhibits no distension and no mass. There is no tenderness. There is no rebound and no guarding. No hernia.   Genitourinary: Rectum normal, prostate normal and penis normal.   Musculoskeletal:   Cane use   Lymphadenopathy:     He has no cervical adenopathy.   Neurological: He displays normal reflexes. No cranial nerve deficit. He exhibits normal muscle tone. Coordination normal.   Skin: No rash noted. No erythema. No pallor.   Psychiatric: He has a normal mood and affect.       Vitals:    09/14/18 1338   BP: 122/74   BP Location: Left arm   Patient Position: Sitting   Cuff Size: Adult   Pulse: 83   Resp: 18   Temp: 98.3 °F (36.8 °C)   TempSrc: Temporal Artery    SpO2: 97%   Weight: 111 kg (244 lb 12.8 oz)   Height: 182.9 cm (72.01\")       Patient's Body mass index is 33.19 kg/m². BMI is above normal parameters. Recommendations include: educational material and exercise counseling.      Assessment/Plan   Patient Self-Management and Personalized Health Advice  The patient has been provided with information about: diet, exercise, weight management and fall prevention and preventive services including:   · Fall Risk assessment done, Fall Risk plan of care done, Influenza vaccine.    Visit Diagnoses:    ICD-10-CM ICD-9-CM   1. Medicare annual wellness visit, subsequent Z00.00 V70.0   2. Need for influenza vaccination Z23 V04.81   3. Need for hepatitis A vaccination Z23 V05.3       Orders Placed This Encounter   Procedures   • Flu Vaccine High Dose PF 65YR+ ([id number])   • Hepatitis A " Vaccine Adult IM       Outpatient Encounter Prescriptions as of 9/14/2018   Medication Sig Dispense Refill   • acetaminophen (TYLENOL) 500 MG tablet Take 1,000 mg by mouth Every 6 (Six) Hours As Needed for Mild Pain (1-3).     • alfuzosin (UROXATRAL) 10 MG 24 hr tablet Take 10 mg by mouth Daily.     • amLODIPine (NORVASC) 10 MG tablet TAKE ONE TABLET BY MOUTH DAILY AS DIRECTED 30 tablet 6   • calcium carbonate (TUMS) 500 MG chewable tablet Chew 2 tablets Daily As Needed for Indigestion or Heartburn.     • Cholecalciferol (VITAMIN D-3) 1000 UNITS capsule Take 1,000 Units by mouth Daily.     • colestipol (COLESTID) 1 G tablet Take 1 g by mouth Daily.     • FLUoxetine (PROzac) 40 MG capsule      • glimepiride (AMARYL) 2 MG tablet TAKE ONE TABLET BY MOUTH DAILY 30 tablet 2   • lisinopril (PRINIVIL,ZESTRIL) 40 MG tablet TAKE ONE TABLET BY MOUTH DAILY 90 tablet 4   • Melatonin 2.5 MG chewable tablet Chew 1 tablet At Night As Needed.     • metoprolol succinate XL (TOPROL-XL) 100 MG 24 hr tablet Take 1 tablet by mouth 2 (Two) Times a Day. 180 tablet 2   • Mirabegron ER (MYRBETRIQ) 25 MG tablet sustained-release 24 hour 24 hr tablet Take 1 tablet by mouth Daily. 30 tablet 0   • mirtazapine (REMERON) 15 MG tablet Take  by mouth Every Night. 7.5mg to 15mg     • pravastatin (PRAVACHOL) 20 MG tablet TAKE ONE TABLET BY MOUTH DAILY 30 tablet 1   • Pseudoephedrine-Acetaminophen (TYLENOL SINUS MAX ST PO) Take  by mouth Daily As Needed.     • warfarin (COUMADIN) 2.5 MG tablet TAKE ONE TO TWO TABLETS BY MOUTH DAILY AS DIRECTED BY ANTICOAGULATION PHARMACIST 120 tablet 0   • Histamine Dihydrochloride (AUSTRALIAN DREAM ARTHRITIS) 0.025 % cream Apply  topically Daily.     • NYSTATIN [id number] UNIT/GM powder   2   • ondansetron (ZOFRAN) 4 MG tablet Take 1 tablet by mouth Every 8 (Eight) Hours As Needed for Nausea or Vomiting. 20 tablet 0   • RaNITidine HCl (RANITIDINE ACID REDUCER PO) Take  by mouth 2 (Two) Times a Day As Needed.     •  warfarin (COUMADIN) 5 MG tablet 1 tablet daily. INR sunday       No facility-administered encounter medications on file as of 9/14/2018.        Reviewed use of high risk medication in the elderly: yes  Reviewed for potential of harmful drug interactions in the elderly: no  Reviewed for potential harmful drug interactions in the elederly  Follow Up:  Return in about 1 year (around 9/14/2019) for Medicare Wellness.     An After Visit Summary and PPPS with all of these plans were given to the patient.    Now sees Dav for his diabetes management. Has colonoscopy mary next month with Dr. Hawthorne

## 2018-10-02 ENCOUNTER — TELEPHONE (OUTPATIENT)
Dept: INTERNAL MEDICINE | Facility: CLINIC | Age: 73
End: 2018-10-02

## 2018-10-02 ENCOUNTER — ANTICOAGULATION VISIT (OUTPATIENT)
Dept: PHARMACY | Facility: HOSPITAL | Age: 73
End: 2018-10-02

## 2018-10-02 DIAGNOSIS — I48.91 ATRIAL FIBRILLATION, UNSPECIFIED TYPE (HCC): ICD-10-CM

## 2018-10-02 LAB
INR PPP: 2.2 (ref 0.91–1.09)
PROTHROMBIN TIME: 26.4 SECONDS (ref 10–13.8)

## 2018-10-02 PROCEDURE — G0463 HOSPITAL OUTPT CLINIC VISIT: HCPCS | Performed by: PHARMACIST

## 2018-10-02 PROCEDURE — 36416 COLLJ CAPILLARY BLOOD SPEC: CPT

## 2018-10-02 PROCEDURE — 85610 PROTHROMBIN TIME: CPT

## 2018-10-02 NOTE — PROGRESS NOTES
Anticoagulation Clinic Progress Note  Indication: atrial fibrillation  Referring Provider: Sravani  Initial Warfarin Start Date: 2008  Goal INR: 2 - 3  Current Drug Interactions: fluoxetine, glimepiride, melatonin (PRN)  CHADS-VASc: 3 (age, HTN, DM)  EtOH: none    Anticoagulation Clinic INR History:  Date 8/2 8/23 9/20 10/11 11/7 11/17 11/28 12/13 12/27   Total Weekly Dose 17.5mg 17.5 mg 17.5 mg 17.5mg 10mg 17.5 mg 17.5 mg 20mg 22.5 mg   INR 2.5 2.3 2.3 2.0 1.2 1.7 1.5 1.7 2.6   Notes     Pre- lumbar inj         Date 1/10/18 1/31 2/28 3/28 4/27 5/30 6/27 7/11 7/23 8/1 8/6 8/13   Total Weekly Dose 22.5 mg 22.5 mg 22.5 mg 22.5 mg 22.5 mg 22.5 mg 22.5 mg 22.5 mg 22.5 mg 20mg 12.5 mg 17.5 mg   INR 2.6 2.5 2.1 2.4 2.4 2.6 3.5 3.4 3.6 5.3, 4.9 1.9 2.7   Notes        Keflex         Date 8/20/18 9/4 10/2          Total Weekly Dose 17.5 mg 17.5mg 17.5mg          INR 2.3 2.3 2.2          Notes               Clinic Interview:  Verbal Release Authorization signed on 6/27/18 -- may speak with Sussy (wife), Nikolai (son)  Tablet Strength: pt has 2.5mg tablets  Current Dose: 2.5mg daily except 5mg MonFri  Phone: [phone]  Fax: [phone] (Attn: Tasha)    Patient Findings   Positives:   Change in health   Negatives:   Signs/symptoms of thrombosis, Signs/symptoms of bleeding, Laboratory test error suspected, Change in alcohol use, Change in activity, Upcoming invasive procedure, Emergency department visit, Upcoming dental procedure, Missed doses, Extra doses, Change in medications, Change in diet/appetite, Hospital admission, Bruising, Other complaints   Comments:   Pt has new onset hip pain that has worsened. Considering canceling his upcoming colonoscopy on the 10th, and pursuing treatment for his hip.       Plan:  1. INR is therapeutic today at 2.2. Pt has upcoming colonoscopy on 10/10, is not aware of what GI would like him to do in regards to his warfarin, but is also unsure if he will keep the appointment given his new  hip pain. Asked Mr. Laureano to call us as soon as he decides so we can make a plan for his warfarin. Instructed Mr. Wolf to continue take warfarin 2.5mg daily.  2. Consider contacting  GI to determine plan for scope.  3. RTC in 4 weeks, 10/30 unless pt has colonoscopy 10/10, will need to see pt back sooner.  4. Verbal and written information was provided in clinic. Mr. Wolf voiced understanding with teach back and has no further questions at this time.     Nikolai David, PharmD Candidate 2019  10/2/2018  1:14 PM     a note from PCP indicates new date of colonoscopy is 11/2/18.  I, Simon Real, [facility], have reviewed the note in full and agree with the assessment and plan.  10/02/18  4:14 PM

## 2018-10-02 NOTE — TELEPHONE ENCOUNTER
PATIENT WANTED YOU TO BE AWARE THAT HE RESCHEDULED HIS COLONOSCOPY APPOINTMENT 10/10/2018 AND RESCHEDULED THAT TO 11/02/2018. HE IS HAVING BACK AND HIP ISSUES THAT HE IS TRYING TO TAKE CARE OF.   I OFFERED PATIENT AN APPOINTMENT TO GET HIS BACK AND HIP EVALUATED.HE WILL SEE ROSSI POWER 10/03/2018

## 2018-10-03 ENCOUNTER — TELEPHONE (OUTPATIENT)
Dept: PHARMACY | Facility: HOSPITAL | Age: 73
End: 2018-10-03

## 2018-10-03 ENCOUNTER — OFFICE VISIT (OUTPATIENT)
Dept: INTERNAL MEDICINE | Facility: CLINIC | Age: 73
End: 2018-10-03

## 2018-10-03 VITALS
OXYGEN SATURATION: 98 % | DIASTOLIC BLOOD PRESSURE: 82 MMHG | BODY MASS INDEX: 33.05 KG/M2 | WEIGHT: 244 LBS | HEIGHT: 72 IN | HEART RATE: 72 BPM | SYSTOLIC BLOOD PRESSURE: 126 MMHG

## 2018-10-03 DIAGNOSIS — M54.50 ACUTE RIGHT-SIDED LOW BACK PAIN WITHOUT SCIATICA: Primary | ICD-10-CM

## 2018-10-03 DIAGNOSIS — M25.551 PAIN OF BOTH HIP JOINTS: ICD-10-CM

## 2018-10-03 DIAGNOSIS — M25.552 PAIN OF BOTH HIP JOINTS: ICD-10-CM

## 2018-10-03 DIAGNOSIS — E11.65 TYPE 2 DIABETES MELLITUS WITH HYPERGLYCEMIA, WITHOUT LONG-TERM CURRENT USE OF INSULIN (HCC): ICD-10-CM

## 2018-10-03 PROCEDURE — 99213 OFFICE O/P EST LOW 20 MIN: CPT | Performed by: NURSE PRACTITIONER

## 2018-10-03 RX ORDER — TRAMADOL HYDROCHLORIDE 50 MG/1
50 TABLET ORAL EVERY 6 HOURS PRN
Qty: 12 TABLET | Refills: 0 | Status: SHIPPED | OUTPATIENT
Start: 2018-10-03 | End: 2018-12-18

## 2018-10-03 NOTE — PROGRESS NOTES
Subjective   Tony Wolf is a 73 y.o. male.   Chief Complaint   Patient presents with   • Back Pain   • Hip Pain      History of Present Illness Back and right hip pain.  Onset 3-4 days ago. Having trouble walking RT pain.  No falls.  No dysuria.  No fever or chills.  Mild SOA-no worse than usual.  No CP, abd pain.  No blood in urine or stool.  Today is using a walker to help with ambulation-usually uses a caen.       The following portions of the patient's history were reviewed and updated as appropriate: allergies, current medications, past family history, past medical history, past social history, past surgical history and problem list.    Current Outpatient Prescriptions:   •  acetaminophen (TYLENOL) 500 MG tablet, Take 1,000 mg by mouth Every 6 (Six) Hours As Needed for Mild Pain (1-3)., Disp: , Rfl:   •  alfuzosin (UROXATRAL) 10 MG 24 hr tablet, Take 10 mg by mouth Daily., Disp: , Rfl:   •  amLODIPine (NORVASC) 10 MG tablet, TAKE ONE TABLET BY MOUTH DAILY AS DIRECTED, Disp: 30 tablet, Rfl: 6  •  calcium carbonate (TUMS) 500 MG chewable tablet, Chew 2 tablets Daily As Needed for Indigestion or Heartburn., Disp: , Rfl:   •  Cholecalciferol (VITAMIN D-3) 1000 UNITS capsule, Take 1,000 Units by mouth Daily., Disp: , Rfl:   •  colestipol (COLESTID) 1 G tablet, Take 1 g by mouth Daily., Disp: , Rfl:   •  FLUoxetine (PROzac) 40 MG capsule, , Disp: , Rfl:   •  glimepiride (AMARYL) 2 MG tablet, TAKE ONE TABLET BY MOUTH DAILY, Disp: 30 tablet, Rfl: 2  •  Histamine Dihydrochloride (AUSTRALIAN DREAM ARTHRITIS) 0.025 % cream, Apply  topically Daily., Disp: , Rfl:   •  lisinopril (PRINIVIL,ZESTRIL) 40 MG tablet, TAKE ONE TABLET BY MOUTH DAILY, Disp: 90 tablet, Rfl: 4  •  Melatonin 2.5 MG chewable tablet, Chew 1 tablet At Night As Needed., Disp: , Rfl:   •  metoprolol succinate XL (TOPROL-XL) 100 MG 24 hr tablet, Take 1 tablet by mouth 2 (Two) Times a Day., Disp: 180 tablet, Rfl: 2  •  Mirabegron ER (MYRBETRIQ) 25 MG  "tablet sustained-release 24 hour 24 hr tablet, Take 1 tablet by mouth Daily., Disp: 30 tablet, Rfl: 0  •  mirtazapine (REMERON) 15 MG tablet, Take  by mouth Every Night. 7.5mg to 15mg, Disp: , Rfl:   •  NYSTATIN [id number] UNIT/GM powder, , Disp: , Rfl: 2  •  ondansetron (ZOFRAN) 4 MG tablet, Take 1 tablet by mouth Every 8 (Eight) Hours As Needed for Nausea or Vomiting., Disp: 20 tablet, Rfl: 0  •  pravastatin (PRAVACHOL) 20 MG tablet, TAKE ONE TABLET BY MOUTH DAILY, Disp: 30 tablet, Rfl: 1  •  Pseudoephedrine-Acetaminophen (TYLENOL SINUS MAX ST PO), Take  by mouth Daily As Needed., Disp: , Rfl:   •  RaNITidine HCl (RANITIDINE ACID REDUCER PO), Take  by mouth 2 (Two) Times a Day As Needed., Disp: , Rfl:   •  warfarin (COUMADIN) 2.5 MG tablet, TAKE ONE TO TWO TABLETS BY MOUTH DAILY AS DIRECTED BY ANTICOAGULATION PHARMACIST, Disp: 120 tablet, Rfl: 0  •  warfarin (COUMADIN) 5 MG tablet, 1 tablet daily. INR sunday, Disp: , Rfl:   •  traMADol (ULTRAM) 50 MG tablet, Take 1 tablet by mouth Every 6 (Six) Hours As Needed for Moderate Pain ., Disp: 12 tablet, Rfl: 0    Review of Systems Consitutional, HEENT, Respiratory, CV, GI, , Skin, Musculoskeletal, Neuro-mental, Endocrinological, Hematological were reviewed.  Positives were discussed in the HPI, otherwise ROS was negative   /82   Pulse 72   Ht 182.9 cm (72\")   Wt 111 kg (244 lb)   SpO2 98%   BMI 33.09 kg/m²     Objective   Allergies   Allergen Reactions   • Aspirin Anaphylaxis     Other reaction(s): Hypotension       Physical Exam   Constitutional: He is oriented to person, place, and time. He appears well-developed and well-nourished.   Appears chronically ill and uncomfortable.   Neck: Neck supple.   Cardiovascular: Normal rate, normal heart sounds and intact distal pulses.  Exam reveals no gallop and no friction rub.    No murmur heard.  Irregular rhythm consistent with atrial fibrillation   Pulmonary/Chest: Effort normal and breath sounds normal. No " respiratory distress.   Abdominal: Soft. There is no tenderness.   Musculoskeletal:   He is tender to both hips and lower back midline.  There is no step-off or deformity. Decreased range of motion in both strength and back.  Had trouble getting up from sitting to standing position   Lymphadenopathy:     He has no cervical adenopathy.   Neurological: He is alert and oriented to person, place, and time.   Skin: Skin is warm and dry. Capillary refill takes less than 2 seconds.   Nursing note and vitals reviewed.      Procedures    Assessment/Plan   Tony was seen today for back pain and hip pain.    Diagnoses and all orders for this visit:    Acute right-sided low back pain without sciatica  -     XR Spine Lumbar 4+ View  -     XR Hip With or Without Pelvis 2 - 3 View Right  -     Urinalysis With Microscopic - Urine, Clean Catch  -     CBC & Differential  -     traMADol (ULTRAM) 50 MG tablet; Take 1 tablet by mouth Every 6 (Six) Hours As Needed for Moderate Pain .    Type 2 diabetes mellitus with hyperglycemia, without long-term current use of insulin (CMS/[facility])  -     Comprehensive metabolic panel    Pain of both hip joints          Patient Instructions   X-rays and labs as discussed.  We will x-ray the right hip only as he says that is when he is have the most trouble with.  Controlled substance agreement obtained.  Aris is appropriate.  We will use tramadol for short-term use.  He is to follow up with Dr. Lopez if he is not improving.      EMR Dragon/transcription disclaimer:  Please note that portions of this note were completed with a voice recognition program.  Electronic transcription of the voice recognition program may permit erroneous words or phrases to be inadvertently transcribed.  Although I have reviewed the note for such errors, some may still exist in this documentation   Agustina Reina, YUDITH

## 2018-10-03 NOTE — TELEPHONE ENCOUNTER
Patient called to report he has rescheduled his colonoscopy with Dr Leo Hawthorne until November, at this time pt couldn't remember exactly what day.    Spoke with staff at [facility], they report his colonoscopy has been rescheduled to 11/5@0800    Also speaking to Dr Hawthorne assistant, Sita, she states that patient would need to hold warfarin 3 days prior to procedure and resume evening of procedure if no polyps are found or evening after if polyps are found. Dr Hawthorne will tell patient when to restart warfarin upon discharge.

## 2018-10-04 ENCOUNTER — HOSPITAL ENCOUNTER (OUTPATIENT)
Dept: GENERAL RADIOLOGY | Facility: HOSPITAL | Age: 73
Discharge: HOME | End: 2018-10-04
Admitting: NURSE PRACTITIONER

## 2018-10-04 ENCOUNTER — APPOINTMENT (OUTPATIENT)
Dept: LAB | Facility: HOSPITAL | Age: 73
End: 2018-10-04

## 2018-10-04 LAB
ALBUMIN SERPL-MCNC: 4.39 G/DL (ref 3.2–4.8)
ALBUMIN/GLOB SERPL: 2.4 G/DL (ref 1.5–2.5)
ALP SERPL-CCNC: 63 U/L (ref 25–100)
ALT SERPL W P-5'-P-CCNC: 28 U/L (ref 7–40)
ANION GAP SERPL CALCULATED.3IONS-SCNC: 12 MMOL/L (ref 3–11)
AST SERPL-CCNC: 35 U/L (ref 0–33)
BASOPHILS # BLD AUTO: 0.04 10*3/MM3 (ref 0–0.2)
BASOPHILS NFR BLD AUTO: 0.4 % (ref 0–1)
BILIRUB SERPL-MCNC: 1.1 MG/DL (ref 0.3–1.2)
BUN BLD-MCNC: 18 MG/DL (ref 9–23)
BUN/CREAT SERPL: 16.1 (ref 7–25)
CALCIUM SPEC-SCNC: 9 MG/DL (ref 8.7–10.4)
CHLORIDE SERPL-SCNC: 103 MMOL/L (ref 99–109)
CO2 SERPL-SCNC: 26 MMOL/L (ref 20–31)
CREAT BLD-MCNC: 1.12 MG/DL (ref 0.6–1.3)
DEPRECATED RDW RBC AUTO: 51.7 FL (ref 37–54)
EOSINOPHIL # BLD AUTO: 0.23 10*3/MM3 (ref 0–0.3)
EOSINOPHIL NFR BLD AUTO: 2.2 % (ref 0–3)
ERYTHROCYTE [DISTWIDTH] IN BLOOD BY AUTOMATED COUNT: 14.8 % (ref 11.3–14.5)
GFR SERPL CREATININE-BSD FRML MDRD: 65 ML/MIN/1.73
GLOBULIN UR ELPH-MCNC: 1.8 GM/DL
GLUCOSE BLD-MCNC: 76 MG/DL (ref 70–100)
HCT VFR BLD AUTO: 50.3 % (ref 38.9–50.9)
HGB BLD-MCNC: 16.6 G/DL (ref 13.1–17.5)
IMM GRANULOCYTES # BLD: 0.03 10*3/MM3 (ref 0–0.03)
IMM GRANULOCYTES NFR BLD: 0.3 % (ref 0–0.6)
LYMPHOCYTES # BLD AUTO: 2.33 10*3/MM3 (ref 0.6–4.8)
LYMPHOCYTES NFR BLD AUTO: 22.2 % (ref 24–44)
MCH RBC QN AUTO: 31.4 PG (ref 27–31)
MCHC RBC AUTO-ENTMCNC: 33 G/DL (ref 32–36)
MCV RBC AUTO: 95.3 FL (ref 80–99)
MONOCYTES # BLD AUTO: 1.11 10*3/MM3 (ref 0–1)
MONOCYTES NFR BLD AUTO: 10.6 % (ref 0–12)
NEUTROPHILS # BLD AUTO: 6.77 10*3/MM3 (ref 1.5–8.3)
NEUTROPHILS NFR BLD AUTO: 64.6 % (ref 41–71)
PLATELET # BLD AUTO: 177 10*3/MM3 (ref 150–450)
PMV BLD AUTO: 12.6 FL (ref 6–12)
POTASSIUM BLD-SCNC: 4 MMOL/L (ref 3.5–5.5)
PROT SERPL-MCNC: 6.2 G/DL (ref 5.7–8.2)
RBC # BLD AUTO: 5.28 10*6/MM3 (ref 4.2–5.76)
SODIUM BLD-SCNC: 141 MMOL/L (ref 132–146)
WBC NRBC COR # BLD: 10.48 10*3/MM3 (ref 3.5–10.8)

## 2018-10-04 PROCEDURE — 36415 COLL VENOUS BLD VENIPUNCTURE: CPT | Performed by: NURSE PRACTITIONER

## 2018-10-04 PROCEDURE — 85025 COMPLETE CBC W/AUTO DIFF WBC: CPT | Performed by: NURSE PRACTITIONER

## 2018-10-04 PROCEDURE — 72110 X-RAY EXAM L-2 SPINE 4/>VWS: CPT

## 2018-10-04 PROCEDURE — 80053 COMPREHEN METABOLIC PANEL: CPT | Performed by: NURSE PRACTITIONER

## 2018-10-04 PROCEDURE — 73502 X-RAY EXAM HIP UNI 2-3 VIEWS: CPT

## 2018-10-04 NOTE — PROGRESS NOTES
Please call patient random sugar was normal at 76.  Your kidney function is normal.  Your liver enzymes except for your AST was slightly elevated.  We want it to be less than 33 and your's was 35.  Nothing to do at this point except monitor.  Your CBC reveals a normal white blood count and hemoglobin and hematocrit.  Please remember to follow up with Dr. Lopez

## 2018-10-04 NOTE — TELEPHONE ENCOUNTER
Dr. Lassiter,    We were recently informed that Tony Wolf is undergoing a colonoscopy on 5 November 2018 with Leo Hawthorne MD, at [facility]. Dr. Hawthorne requested that the patient hold warfarin 3 days prior to procedure and resume warfarin evening of procedure if no polyps are found or likely the next evening if polyps are found. Dr Hawthorne will instruct the patient when exactly to restart warfarin upon discharge.    Tony Wolf is a 73 y.o. male on warfarin for atrial fibrillation, therefore bridge therapy is unnecessary.    CHADS-VASc = 3 (age, HTN, DM)    11/2: Hold warfarin 3 days  11/5: procedure w/Dr Hawthorne  resume warfarin** BOOST 5mg  11/6: BOOST warfarin 5mg  11/7: resume maintenance dose of warfarin    11/12: Recheck INR in clinic    **If no polyps are found, otherwise Dr Hawthorne will instruct patient when to resume warfarin    Please advise if you are agreeable to plan above or if you prefer an alternative approach to Tony Wolf's anticoagulation plan for the upcoming procedure.    Thank you,    Simon Boggs    [facility] Anticoagulation Team  (t) [phone]  (f) [phone]

## 2018-10-04 NOTE — PATIENT INSTRUCTIONS
X-rays and labs as discussed.  We will x-ray the right hip only as he says that is when he is have the most trouble with.  Controlled substance agreement obtained.  Aris is appropriate.  We will use tramadol for short-term use.  He is to follow up with Dr. Lopez if he is not improving.

## 2018-10-05 ENCOUNTER — TELEPHONE (OUTPATIENT)
Dept: INTERNAL MEDICINE | Facility: CLINIC | Age: 73
End: 2018-10-05

## 2018-10-05 NOTE — TELEPHONE ENCOUNTER
Call him and see if has any metal in body? He will need either mri or ct to determine if compression deformity of the vertebrae is new or old. Any recent Falls or trauma to the back?

## 2018-10-05 NOTE — TELEPHONE ENCOUNTER
Spoke with patient he stated he has a knee Replacement and not sure if it has metal or not but he stated he was find with either one

## 2018-10-05 NOTE — TELEPHONE ENCOUNTER
Please call patient back x-ray shows advanced degenerative changes.  There is evidence of an age indeterminate compression deformity of the T11.  Please make sure you follow up with Dr. Lopez in the next week or so to discuss

## 2018-10-08 ENCOUNTER — APPOINTMENT (OUTPATIENT)
Dept: LAB | Facility: HOSPITAL | Age: 73
End: 2018-10-08

## 2018-10-08 ENCOUNTER — HOSPITAL ENCOUNTER (OUTPATIENT)
Dept: CT IMAGING | Facility: HOSPITAL | Age: 73
Discharge: HOME | End: 2018-10-08
Attending: INTERNAL MEDICINE | Admitting: NURSE PRACTITIONER

## 2018-10-08 DIAGNOSIS — S22.000A THORACIC COMPRESSION FRACTURE, CLOSED, INITIAL ENCOUNTER (HCC): Primary | ICD-10-CM

## 2018-10-08 LAB
BACTERIA UR QL AUTO: NORMAL /HPF
BILIRUB UR QL STRIP: NEGATIVE
CLARITY UR: CLEAR
COLOR UR: ABNORMAL
GLUCOSE UR STRIP-MCNC: NEGATIVE MG/DL
HGB UR QL STRIP.AUTO: ABNORMAL
HYALINE CASTS UR QL AUTO: NORMAL /LPF
KETONES UR QL STRIP: NEGATIVE
LEUKOCYTE ESTERASE UR QL STRIP.AUTO: NEGATIVE
NITRITE UR QL STRIP: NEGATIVE
PH UR STRIP.AUTO: 7.5 [PH] (ref 5–8)
PROT UR QL STRIP: NEGATIVE
RBC # UR: NORMAL /HPF
REF LAB TEST METHOD: NORMAL
SP GR UR STRIP: <=1.005 (ref 1–1.03)
SQUAMOUS #/AREA URNS HPF: NORMAL /HPF
UROBILINOGEN UR QL STRIP: ABNORMAL
WBC UR QL AUTO: NORMAL /HPF

## 2018-10-08 PROCEDURE — 81001 URINALYSIS AUTO W/SCOPE: CPT | Performed by: NURSE PRACTITIONER

## 2018-10-08 PROCEDURE — 72128 CT CHEST SPINE W/O DYE: CPT

## 2018-10-09 ENCOUNTER — TELEPHONE (OUTPATIENT)
Dept: INTERNAL MEDICINE | Facility: CLINIC | Age: 73
End: 2018-10-09

## 2018-10-09 NOTE — TELEPHONE ENCOUNTER
PT HAD A CT SCAN DONE YESTERDAY AND WOULD LIKE TO KNOW IF HIS RESULTS ARE BACK YET; IF SO PLEASE CALL AND DISCUSS

## 2018-10-10 NOTE — TELEPHONE ENCOUNTER
PT WANTS TO KNOW SINCE NO FRACTURE SHOWED UP ON CT SCAN AND HE IS STILL HAVING PAIN, WHAT DOES  RECOMMEND NOW; PLEASE CALL AND ADVISE PT [phone]

## 2018-10-11 DIAGNOSIS — M54.50 ACUTE MIDLINE LOW BACK PAIN WITHOUT SCIATICA: Primary | ICD-10-CM

## 2018-10-24 ENCOUNTER — TELEPHONE (OUTPATIENT)
Dept: PHARMACY | Facility: HOSPITAL | Age: 73
End: 2018-10-24

## 2018-10-24 NOTE — TELEPHONE ENCOUNTER
PATIENT WANTED TO GIVE DR. GUILLAUME AN UPDATE. DR. POLLACK FOUND A SMALL FRACTURE AT HIS HIP AND TAILBONE. HE HAS TO GO OFF HIS COUMADIN FOR 4 DAYS PRIOR TO SURGERY ON 11/07/2018

## 2018-10-24 NOTE — TELEPHONE ENCOUNTER
Dr. Lassiter,    Mr. Wolf is a 73yoM on warfarin for afib with CHADS-VASc of 3 (age, HTN, DM). He has been having a lot of back pain and has discovered he has a vertebral fracture. As a first step to help with this, he has been scheduled for a lumbar epidural steroid injection on 11/7 with Dr. Bhatti. He had this same procedure exactly one year ago, for which he was approved to hold his warfarin x4 days.     Are you agreeable to this plan, without Lovenox bridge? Please advise.     Thank you,  Ann Cowden Mayer, PharmD  [facility]

## 2018-10-29 ENCOUNTER — ANTICOAGULATION VISIT (OUTPATIENT)
Dept: PHARMACY | Facility: HOSPITAL | Age: 73
End: 2018-10-29

## 2018-10-29 DIAGNOSIS — I48.91 ATRIAL FIBRILLATION, UNSPECIFIED TYPE (HCC): ICD-10-CM

## 2018-10-29 LAB
INR PPP: 2.2 (ref 0.91–1.09)
PROTHROMBIN TIME: 26.5 SECONDS (ref 10–13.8)

## 2018-10-29 PROCEDURE — 36416 COLLJ CAPILLARY BLOOD SPEC: CPT

## 2018-10-29 PROCEDURE — G0463 HOSPITAL OUTPT CLINIC VISIT: HCPCS | Performed by: PHARMACIST

## 2018-10-29 PROCEDURE — 85610 PROTHROMBIN TIME: CPT

## 2018-10-29 NOTE — PROGRESS NOTES
Anticoagulation Clinic Progress Note  Indication: atrial fibrillation  Referring Provider: Sravani  Initial Warfarin Start Date: 2008  Goal INR: 2 - 3  Current Drug Interactions: fluoxetine, glimepiride, melatonin (PRN)  CHADS-VASc: 3 (age, HTN, DM)  EtOH: none    Anticoagulation Clinic INR History:  Date 8/2 8/23 9/20 10/11 11/7 11/17 11/28 12/13 12/27   Total Weekly Dose 17.5mg 17.5 mg 17.5 mg 17.5mg 10mg 17.5 mg 17.5 mg 20mg 22.5 mg   INR 2.5 2.3 2.3 2.0 1.2 1.7 1.5 1.7 2.6   Notes     Pre- lumbar inj         Date 1/10/18 1/31 2/28 3/28 4/27 5/30 6/27 7/11 7/23 8/1 8/6 8/13   Total Weekly Dose 22.5 mg 22.5 mg 22.5 mg 22.5 mg 22.5 mg 22.5 mg 22.5 mg 22.5 mg 22.5 mg 20mg 12.5 mg 17.5 mg   INR 2.6 2.5 2.1 2.4 2.4 2.6 3.5 3.4 3.6 5.3, 4.9 1.9 2.7   Notes        Keflex         Date 8/20/18 9/4 10/2          Total Weekly Dose 17.5 mg 17.5mg 17.5mg          INR 2.3 2.3 2.2          Notes               Clinic Interview:  Verbal Release Authorization signed on 6/27/18 -- may speak with Sussy (wife), Nikolai (son)  Tablet Strength: pt has 2.5mg tablets  Current Dose: 2.5mg daily except 5mg MonFri  Phone: [phone]  Fax: [phone] (Attn: Tasha)    Patient Findings     Negatives:   Signs/symptoms of thrombosis, Signs/symptoms of bleeding, Laboratory test error suspected, Change in health, Change in alcohol use, Change in activity, Upcoming invasive procedure, Emergency department visit, Upcoming dental procedure, Missed doses, Extra doses, Change in medications, Change in diet/appetite, Hospital admission, Bruising, Other complaints   Comments:   He will have the lumbar epidural on 11/7. And recheck INR on 11/6 to verify it is < 1.3       Plan:  1. INR is therapeutic today at 2.2. Pt has upcoming epidural on 11/7.  Instructed Mr. Wolf to continue take warfarin 2.5mg daily until Friday and then begin holding warfarin.  2.. RTC in next week 11/6, to verify INR is baseline for epidural by Dr. Bhatti on 11/7. We will  need to fax the result to Dr. Bhatti's office.  4. Verbal and written information was provided in clinic. Mr. Wolf voiced understanding with teach back and has no further questions at this time.     Simon Real Rph  10/29/2018  1:38 PM

## 2018-10-30 ENCOUNTER — APPOINTMENT (OUTPATIENT)
Dept: PHARMACY | Facility: HOSPITAL | Age: 73
End: 2018-10-30

## 2018-11-06 ENCOUNTER — ANTICOAGULATION VISIT (OUTPATIENT)
Dept: PHARMACY | Facility: HOSPITAL | Age: 73
End: 2018-11-06

## 2018-11-06 DIAGNOSIS — I48.91 ATRIAL FIBRILLATION, UNSPECIFIED TYPE (HCC): ICD-10-CM

## 2018-11-06 LAB
INR PPP: 1.3 (ref 0.91–1.09)
PROTHROMBIN TIME: 15.1 SECONDS (ref 10–13.8)

## 2018-11-06 PROCEDURE — 36416 COLLJ CAPILLARY BLOOD SPEC: CPT

## 2018-11-06 PROCEDURE — 85610 PROTHROMBIN TIME: CPT

## 2018-11-06 PROCEDURE — G0463 HOSPITAL OUTPT CLINIC VISIT: HCPCS

## 2018-11-06 NOTE — PROGRESS NOTES
Anticoagulation Clinic Progress Note  Indication: atrial fibrillation  Referring Provider: Sravani  Initial Warfarin Start Date: 2008  Goal INR: 2 - 3  Current Drug Interactions: fluoxetine, glimepiride, melatonin (PRN)  CHADS-VASc: 3 (age, HTN, DM)  EtOH: none    Anticoagulation Clinic INR History:  Date 8/2 8/23 9/20 10/11 11/7 11/17 11/28 12/13 12/27   Total Weekly Dose 17.5mg 17.5 mg 17.5 mg 17.5mg 10mg 17.5 mg 17.5 mg 20mg 22.5 mg   INR 2.5 2.3 2.3 2.0 1.2 1.7 1.5 1.7 2.6   Notes     Pre- lumbar inj         Date 1/10/18 1/31 2/28 3/28 4/27 5/30 6/27 7/11 7/23 8/1 8/6 8/13   Total Weekly Dose 22.5 mg 22.5 mg 22.5 mg 22.5 mg 22.5 mg 22.5 mg 22.5 mg 22.5 mg 22.5 mg 20mg 12.5 mg 17.5 mg   INR 2.6 2.5 2.1 2.4 2.4 2.6 3.5 3.4 3.6 5.3, 4.9 1.9 2.7   Notes        Keflex         Date 8/20/18 9/4 10/2 11/6         Total Weekly Dose 17.5 mg 17.5mg 17.5mg 10mg         INR 2.3 2.3 2.2 1.3         Notes    Pre- epidural           Clinic Interview:  Verbal Release Authorization signed on 6/27/18 -- may speak with Sussy (wife), Nikolai (son)  Tablet Strength: pt has 2.5mg tablets  Current Dose: 2.5mg daily except 5mg MonFri  Phone: [phone]  Fax: [phone] (Attn: Tasha)    Patient Findings     Negatives:   Signs/symptoms of thrombosis, Signs/symptoms of bleeding, Laboratory test error suspected, Change in health, Change in alcohol use, Change in activity, Upcoming invasive procedure, Emergency department visit, Upcoming dental procedure, Missed doses, Extra doses, Change in medications, Change in diet/appetite, Hospital admission, Bruising, Other complaints   Comments:   He will have the lumbar epidural on 11/7. And recheck INR on 11/6 to verify it is < 1.3     Plan:  1. INR is baseline today prior to epidural with Dr. Bhatti. Instructed Mr. Wolf to resume warfarin tomorrow after epidural of 5mg x 2 doses then continue 2.5mg daily.  2. RTC in next Thur 11/15.  Faxed result to Dr. Bhatti's office. Spoke with Tasha.  Fax: [phone]  3. Verbal and written information was provided in clinic. Mr. Wolf voiced understanding with teach back and has no further questions at this time.     Ama Mccloud, PharmD  11/6/2018  9:29 AM

## 2018-11-07 ENCOUNTER — TRANSCRIBE ORDERS (OUTPATIENT)
Dept: LAB | Facility: HOSPITAL | Age: 73
End: 2018-11-07

## 2018-11-07 ENCOUNTER — LAB (OUTPATIENT)
Dept: LAB | Facility: HOSPITAL | Age: 73
End: 2018-11-07

## 2018-11-07 DIAGNOSIS — Z3A.32 32 WEEKS GESTATION OF PREGNANCY: Primary | ICD-10-CM

## 2018-11-07 DIAGNOSIS — D68.9 BLOOD CLOTTING DISORDER (HCC): Primary | ICD-10-CM

## 2018-11-07 DIAGNOSIS — D68.9 BLOOD CLOTTING DISORDER (HCC): ICD-10-CM

## 2018-11-07 DIAGNOSIS — Z34.83 PRENATAL CARE, SUBSEQUENT PREGNANCY, THIRD TRIMESTER: ICD-10-CM

## 2018-11-07 LAB
INR PPP: 1.12 (ref 0.91–1.09)
PROTHROMBIN TIME: 11.8 SECONDS (ref 9.6–11.5)

## 2018-11-07 PROCEDURE — 36415 COLL VENOUS BLD VENIPUNCTURE: CPT

## 2018-11-07 PROCEDURE — 85610 PROTHROMBIN TIME: CPT

## 2018-11-13 RX ORDER — PRAVASTATIN SODIUM 20 MG
TABLET ORAL
Qty: 30 TABLET | Refills: 1 | Status: SHIPPED | OUTPATIENT
Start: 2018-11-13 | End: 2019-01-08 | Stop reason: SDUPTHER

## 2018-11-15 ENCOUNTER — ANTICOAGULATION VISIT (OUTPATIENT)
Dept: PHARMACY | Facility: HOSPITAL | Age: 73
End: 2018-11-15

## 2018-11-15 LAB
INR PPP: 2.3 (ref 0.91–1.09)
PROTHROMBIN TIME: 27.3 SECONDS (ref 10–13.8)

## 2018-11-15 PROCEDURE — G0463 HOSPITAL OUTPT CLINIC VISIT: HCPCS

## 2018-11-15 PROCEDURE — 85610 PROTHROMBIN TIME: CPT

## 2018-11-15 PROCEDURE — 36416 COLLJ CAPILLARY BLOOD SPEC: CPT

## 2018-11-15 NOTE — PROGRESS NOTES
Anticoagulation Clinic Progress Note  Indication: atrial fibrillation  Referring Provider: Sravani  Initial Warfarin Start Date: 2008  Goal INR: 2 - 3  Current Drug Interactions: fluoxetine, glimepiride, melatonin (PRN)  CHADS-VASc: 3 (age, HTN, DM)  EtOH: none    Anticoagulation Clinic INR History:  Date 8/2 8/23 9/20 10/11 11/7 11/17 11/28 12/13 12/27   Total Weekly Dose 17.5mg 17.5 mg 17.5 mg 17.5mg 10mg 17.5 mg 17.5 mg 20mg 22.5 mg   INR 2.5 2.3 2.3 2.0 1.2 1.7 1.5 1.7 2.6   Notes     Pre- lumbar inj         Date 1/10/18 1/31 2/28 3/28 4/27 5/30 6/27 7/11 7/23 8/1 8/6 8/13   Total Weekly Dose 22.5 mg 22.5 mg 22.5 mg 22.5 mg 22.5 mg 22.5 mg 22.5 mg 22.5 mg 22.5 mg 20mg 12.5 mg 17.5 mg   INR 2.6 2.5 2.1 2.4 2.4 2.6 3.5 3.4 3.6 5.3, 4.9 1.9 2.7   Notes        Keflex         Date 8/20/18 9/4 10/2 11/6 11/15        Total Weekly Dose 17.5 mg 17.5mg 17.5mg 10mg 20mg        INR 2.3 2.3 2.2 1.3 2.3        Notes    Pre- epidural           Clinic Interview:  Verbal Release Authorization signed on 6/27/18 -- may speak with Sussy (wife), Nikolai (son)  Tablet Strength: pt has 2.5mg tablets  Current Dose: 2.5mg daily except 5mg MonFri  Phone: [phone]  Fax: [phone] (Attn: Tasha)    Patient Findings   Negatives:  Signs/symptoms of thrombosis, Signs/symptoms of bleeding, Laboratory test error suspected, Change in health, Change in alcohol use, Change in activity, Upcoming invasive procedure, Emergency department visit, Upcoming dental procedure, Missed doses, Extra doses, Change in medications, Change in diet/appetite, Hospital admission, Bruising, Other complaints   Comments:  He had a lumbar epidural on 11/7. He reports he is feeling slightly better. He denies future procedures.      Plan:  1. INR is baseline today prior to epidural with Dr. Bhatti. Instructed Mr. Wolf to resume warfarin tomorrow after epidural of 5mg x 2 doses then continue 2.5mg daily.  2. RTC in two weeks. If patient returns therapeutic,  consider repeat INR in 4 weeks.  3. Verbal and written information was provided in clinic. Mr. Wolf voiced understanding with teach back and has no further questions at this time.     Ama Mccloud, PharmD  11/15/2018  1:38 PM

## 2018-11-20 RX ORDER — GLIMEPIRIDE 2 MG/1
TABLET ORAL
Qty: 30 TABLET | Refills: 1 | Status: SHIPPED | OUTPATIENT
Start: 2018-11-20 | End: 2018-12-14 | Stop reason: SDUPTHER

## 2018-11-28 RX ORDER — AMLODIPINE BESYLATE 10 MG/1
TABLET ORAL
Qty: 30 TABLET | Refills: 5 | Status: SHIPPED | OUTPATIENT
Start: 2018-11-28 | End: 2019-05-29 | Stop reason: SDUPTHER

## 2018-11-29 ENCOUNTER — ANTICOAGULATION VISIT (OUTPATIENT)
Dept: PHARMACY | Facility: HOSPITAL | Age: 73
End: 2018-11-29

## 2018-11-29 LAB
INR PPP: 2.2 (ref 0.91–1.09)
PROTHROMBIN TIME: 26.3 SECONDS (ref 10–13.8)

## 2018-11-29 PROCEDURE — 36416 COLLJ CAPILLARY BLOOD SPEC: CPT

## 2018-11-29 PROCEDURE — G0463 HOSPITAL OUTPT CLINIC VISIT: HCPCS

## 2018-11-29 PROCEDURE — 85610 PROTHROMBIN TIME: CPT

## 2018-11-29 NOTE — PROGRESS NOTES
Anticoagulation Clinic Progress Note  Indication: atrial fibrillation  Referring Provider: Sravani  Initial Warfarin Start Date: 2008  Goal INR: 2 - 3  Current Drug Interactions: fluoxetine, glimepiride, melatonin (PRN)  CHADS-VASc: 3 (age, HTN, DM)  EtOH: none    Anticoagulation Clinic INR History:  Date 8/2 8/23 9/20 10/11 11/7 11/17 11/28 12/13 12/27   Total Weekly Dose 17.5mg 17.5 mg 17.5 mg 17.5mg 10mg 17.5 mg 17.5 mg 20mg 22.5 mg   INR 2.5 2.3 2.3 2.0 1.2 1.7 1.5 1.7 2.6   Notes     Pre- lumbar inj         Date 1/10/18 1/31 2/28 3/28 4/27 5/30 6/27 7/11 7/23 8/1 8/6 8/13   Total Weekly Dose 22.5 mg 22.5 mg 22.5 mg 22.5 mg 22.5 mg 22.5 mg 22.5 mg 22.5 mg 22.5 mg 20mg 12.5 mg 17.5 mg   INR 2.6 2.5 2.1 2.4 2.4 2.6 3.5 3.4 3.6 5.3, 4.9 1.9 2.7   Notes        Keflex         Date 8/20/18 9/4 10/2 11/6 11/15 11/29       Total Weekly Dose 17.5 mg 17.5mg 17.5mg 10mg 20mg        INR 2.3 2.3 2.2 1.3 2.3 2.2       Notes    Pre- epidural           Clinic Interview:  Verbal Release Authorization signed on 6/27/18 -- may speak with Sussy (wife), Nikolai (son)  Tablet Strength: pt has 2.5mg tablets  Phone: [phone]  Fax: [phone] (Attn: Tasha)    Patient Findings   Negatives:  Signs/symptoms of thrombosis, Signs/symptoms of bleeding, Laboratory test error suspected, Change in health, Change in alcohol use, Change in activity, Upcoming invasive procedure, Emergency department visit, Upcoming dental procedure, Missed doses, Extra doses, Change in medications, Change in diet/appetite, Hospital admission, Bruising, Other complaints   Comments:  Mr. Wolf denies recent changes. His sister has recently passed away from breast cancer.      Plan:  1. INR is therapeutic today so instructed Mr. Wolf to continue warfarin 2.5mg daily.   2. RTC in 4 weeks.  3. Verbal and written information was provided in clinic. Mr. Wolf voiced understanding with teach back and has no further questions at this time.     Ann Cowden Mayer,  PharmD  11/29/2018  12:31 PM

## 2018-12-04 RX ORDER — METOPROLOL SUCCINATE 100 MG/1
TABLET, EXTENDED RELEASE ORAL
Qty: 180 TABLET | Refills: 1 | Status: SHIPPED | OUTPATIENT
Start: 2018-12-04 | End: 2019-06-03 | Stop reason: SDUPTHER

## 2018-12-05 RX ORDER — LANCETS
EACH MISCELLANEOUS
Qty: 100 EACH | Refills: 3 | Status: SHIPPED | OUTPATIENT
Start: 2018-12-05 | End: 2020-06-30

## 2018-12-14 DIAGNOSIS — E11.9 TYPE 2 DIABETES MELLITUS WITHOUT COMPLICATION, WITHOUT LONG-TERM CURRENT USE OF INSULIN (HCC): Primary | ICD-10-CM

## 2018-12-14 RX ORDER — GLIMEPIRIDE 2 MG/1
2 TABLET ORAL DAILY
Qty: 30 TABLET | Refills: 2 | Status: SHIPPED | OUTPATIENT
Start: 2018-12-14 | End: 2019-03-18 | Stop reason: SDUPTHER

## 2018-12-18 ENCOUNTER — OFFICE VISIT (OUTPATIENT)
Dept: INTERNAL MEDICINE | Facility: CLINIC | Age: 73
End: 2018-12-18

## 2018-12-18 VITALS
WEIGHT: 244.7 LBS | HEART RATE: 55 BPM | SYSTOLIC BLOOD PRESSURE: 100 MMHG | DIASTOLIC BLOOD PRESSURE: 80 MMHG | OXYGEN SATURATION: 98 % | BODY MASS INDEX: 33.19 KG/M2

## 2018-12-18 DIAGNOSIS — K21.9 GASTROESOPHAGEAL REFLUX DISEASE, ESOPHAGITIS PRESENCE NOT SPECIFIED: ICD-10-CM

## 2018-12-18 DIAGNOSIS — Z12.5 PROSTATE CANCER SCREENING: ICD-10-CM

## 2018-12-18 DIAGNOSIS — E78.5 HYPERLIPIDEMIA LDL GOAL <100: ICD-10-CM

## 2018-12-18 DIAGNOSIS — I48.20 CHRONIC ATRIAL FIBRILLATION (HCC): ICD-10-CM

## 2018-12-18 DIAGNOSIS — I10 ESSENTIAL HYPERTENSION: Primary | ICD-10-CM

## 2018-12-18 DIAGNOSIS — E11.9 CONTROLLED TYPE 2 DIABETES MELLITUS WITHOUT COMPLICATION, WITHOUT LONG-TERM CURRENT USE OF INSULIN (HCC): ICD-10-CM

## 2018-12-18 DIAGNOSIS — D69.6 THROMBOCYTOPENIA (HCC): ICD-10-CM

## 2018-12-18 LAB
ALBUMIN SERPL-MCNC: 4.52 G/DL (ref 3.2–4.8)
ALBUMIN/GLOB SERPL: 2.9 G/DL (ref 1.5–2.5)
ALP SERPL-CCNC: 80 U/L (ref 25–100)
ALT SERPL W P-5'-P-CCNC: 36 U/L (ref 7–40)
ANION GAP SERPL CALCULATED.3IONS-SCNC: 9 MMOL/L (ref 3–11)
ARTICHOKE IGE QN: 113 MG/DL (ref 0–130)
AST SERPL-CCNC: 43 U/L (ref 0–33)
BILIRUB SERPL-MCNC: 1.1 MG/DL (ref 0.3–1.2)
BUN BLD-MCNC: 13 MG/DL (ref 9–23)
BUN/CREAT SERPL: 13.8 (ref 7–25)
CALCIUM SPEC-SCNC: 9.5 MG/DL (ref 8.7–10.4)
CHLORIDE SERPL-SCNC: 103 MMOL/L (ref 99–109)
CHOLEST SERPL-MCNC: 169 MG/DL (ref 0–200)
CO2 SERPL-SCNC: 28 MMOL/L (ref 20–31)
CREAT BLD-MCNC: 0.94 MG/DL (ref 0.6–1.3)
GFR SERPL CREATININE-BSD FRML MDRD: 79 ML/MIN/1.73
GLOBULIN UR ELPH-MCNC: 1.6 GM/DL
GLUCOSE BLD-MCNC: 109 MG/DL (ref 70–100)
GLUCOSE BLDC GLUCOMTR-MCNC: 123 MG/DL (ref 70–130)
HBA1C MFR BLD: 6.9 %
HDLC SERPL-MCNC: 34 MG/DL (ref 40–60)
POTASSIUM BLD-SCNC: 4.2 MMOL/L (ref 3.5–5.5)
PROT SERPL-MCNC: 6.1 G/DL (ref 5.7–8.2)
PSA SERPL-MCNC: 0.69 NG/ML (ref 0–4)
SODIUM BLD-SCNC: 140 MMOL/L (ref 132–146)
TRIGL SERPL-MCNC: 232 MG/DL (ref 0–150)

## 2018-12-18 PROCEDURE — 82947 ASSAY GLUCOSE BLOOD QUANT: CPT | Performed by: INTERNAL MEDICINE

## 2018-12-18 PROCEDURE — G0103 PSA SCREENING: HCPCS | Performed by: INTERNAL MEDICINE

## 2018-12-18 PROCEDURE — 83036 HEMOGLOBIN GLYCOSYLATED A1C: CPT | Performed by: INTERNAL MEDICINE

## 2018-12-18 PROCEDURE — 99214 OFFICE O/P EST MOD 30 MIN: CPT | Performed by: INTERNAL MEDICINE

## 2018-12-18 PROCEDURE — 80061 LIPID PANEL: CPT | Performed by: INTERNAL MEDICINE

## 2018-12-18 PROCEDURE — 80053 COMPREHEN METABOLIC PANEL: CPT | Performed by: INTERNAL MEDICINE

## 2018-12-18 NOTE — PROGRESS NOTES
Subjective   Tony Wolf is a 73 y.o. male.   Chief Complaint   Patient presents with   • Diabetes     Follow Up   • Heartburn   • Hyperlipidemia   • Hypertension   • Generalized Anxiety Disorder       Heartburn   He reports no abdominal pain, no chest pain, no coughing, no nausea, no sore throat, no stridor, no tooth decay, no water brash or no wheezing. This is a chronic problem. Pertinent negatives include no fatigue.   Hyperlipidemia   This is a chronic problem. The current episode started more than 1 year ago. Pertinent negatives include no chest pain, myalgias or shortness of breath.   Hypertension   This is a chronic problem. The current episode started more than 1 year ago. Pertinent negatives include no chest pain, headaches, neck pain, palpitations or shortness of breath.   Diabetes   He presents for his follow-up diabetic visit. He has type 2 diabetes mellitus. Pertinent negatives for hypoglycemia include no confusion, headaches, nervousness/anxiousness, pallor, seizures or speech difficulty. Pertinent negatives for diabetes include no chest pain, no fatigue, no polydipsia and no polyphagia. An ACE inhibitor/angiotensin II receptor blocker is being taken.      Mild decrease in platelets  The following portions of the patient's history were reviewed and updated as appropriate: allergies, current medications, past family history, past medical history, past social history, past surgical history and problem list.    Review of Systems   Constitutional: Negative for activity change, appetite change, chills, diaphoresis, fatigue, fever and unexpected weight change.   HENT: Negative for congestion, ear discharge, ear pain, mouth sores, nosebleeds, sinus pressure, sneezing and sore throat.    Eyes: Negative for pain, discharge and itching.   Respiratory: Negative for cough, chest tightness, shortness of breath and wheezing.    Cardiovascular: Negative for chest pain, palpitations and leg swelling.    Gastrointestinal: Negative for abdominal pain, constipation, diarrhea, nausea and vomiting.   Endocrine: Negative for cold intolerance, heat intolerance, polydipsia and polyphagia.   Genitourinary: Negative for dysuria, flank pain, frequency, hematuria and urgency.   Musculoskeletal: Positive for back pain. Negative for arthralgias, gait problem, myalgias, neck pain and neck stiffness.   Skin: Negative for color change, pallor and rash.   Neurological: Negative for seizures, speech difficulty, numbness and headaches.   Psychiatric/Behavioral: Negative for agitation, confusion, decreased concentration and sleep disturbance. The patient is not nervous/anxious.      /80   Pulse 55   Wt 111 kg (244 lb 11.2 oz)   SpO2 98%   BMI 33.19 kg/m²     Objective   Physical Exam   Constitutional: He appears well-developed.   HENT:   Head: Normocephalic.   Right Ear: External ear normal.   Left Ear: External ear normal.   Nose: Nose normal.   Mouth/Throat: Oropharynx is clear and moist.   Eyes: Conjunctivae are normal. Pupils are equal, round, and reactive to light.   Neck: No JVD present. No thyromegaly present.   Cardiovascular: Normal rate, regular rhythm and normal heart sounds. Exam reveals no friction rub.   No murmur heard.  Pulmonary/Chest: Effort normal and breath sounds normal. No respiratory distress. He has no wheezes. He has no rales.   Abdominal: Soft. Bowel sounds are normal. He exhibits no distension. There is no tenderness. There is no guarding.   Musculoskeletal: He exhibits no edema or tenderness.   Lymphadenopathy:     He has no cervical adenopathy.   Neurological: He displays normal reflexes. No cranial nerve deficit.   Skin: No rash noted.   Psychiatric: He has a normal mood and affect. His behavior is normal.   Nursing note and vitals reviewed.      Assessment/Plan   Tony was seen today for heartburn, hyperlipidemia, hypertension and diabetes.    Diagnoses and all orders for this  visit:    Controlled type 2 diabetes mellitus without complication, without long-term current use of insulin  -     POC Glucose Fingerstick  -     POC Glycosylated Hemoglobin (Hb A1C)  -     Comprehensive Metabolic Panel  -     Lipid Panel    A1c 6.9. Now seeing Endo  Essential hypertension  -     Comprehensive Metabolic Panel  -     Lipid Panel    Hyperlipidemia LDL goal <100  Lipids today  Obstructive sleep apnea  Did not tolerate cpap  Gastroesophageal reflux disease, esophagitis presence not specified  stable  Thrombocytopenia  -     CBC & Differential  -     Lipid Panel  Prostate screen  psa level. No longer seeing Urology

## 2018-12-19 ENCOUNTER — OFFICE VISIT (OUTPATIENT)
Dept: ORTHOPEDIC SURGERY | Facility: CLINIC | Age: 73
End: 2018-12-19

## 2018-12-19 VITALS — WEIGHT: 244.71 LBS | OXYGEN SATURATION: 95 % | HEART RATE: 63 BPM | HEIGHT: 72 IN | BODY MASS INDEX: 33.15 KG/M2

## 2018-12-19 DIAGNOSIS — Z96.651 STATUS POST TOTAL RIGHT KNEE REPLACEMENT: Primary | ICD-10-CM

## 2018-12-19 DIAGNOSIS — Z09 POSTOPERATIVE EXAMINATION: ICD-10-CM

## 2018-12-19 PROCEDURE — 99213 OFFICE O/P EST LOW 20 MIN: CPT | Performed by: ORTHOPAEDIC SURGERY

## 2018-12-19 RX ORDER — LISINOPRIL 40 MG/1
TABLET ORAL
Qty: 30 TABLET | Refills: 11 | Status: SHIPPED | OUTPATIENT
Start: 2018-12-19 | End: 2019-12-12 | Stop reason: SDUPTHER

## 2018-12-19 NOTE — PROGRESS NOTES
[facility] Note    Subjective     Chief Complaint   Patient presents with   • Right Knee - Follow-up     6 months- 19 months s/p (R) TKA 05/30/2017        HPI    Tony Wolf is a 73 y.o. male.  He follows up today for his right total knee arthroplasty.  He is doing well today, with 80% relief compared to his preoperative symptoms.  He is ambulatory using a walker, secondary to his back.      Patient Active Problem List   Diagnosis   • GERD (gastroesophageal reflux disease)   • Essential hypertension   • Obesity   • Obstructive sleep apnea   • Controlled type 2 diabetes mellitus without complication, without long-term current use of insulin (CMS/HCC)   • Generalized anxiety disorder   • Chronic atrial fibrillation (CMS/HCC)   • Screening for prostate cancer   • PVC's (premature ventricular contractions)   • Hyperlipidemia LDL goal <100   • Left ventricular hypertrophy   • Atypical atrial flutter (CMS/HCC)   • Arthritis of knee, right   • Status post right knee replacement   • Leukocytosis, mild, likely reactive   • Thrombocytopenia (CMS/HCC)   • Postoperative urinary retention   • Hypokalemia, replaced     Past Medical History:   Diagnosis Date   • Acute bronchitis    • Anxiety    • Arthritis     right knee   • Atrial fibrillation (CMS/HCC)    • Back pain    • Cancer (CMS/HCC)     skin cancer removed from face    • Carpal tunnel syndrome    • Derangement, knee internal    • Diabetes mellitus (CMS/HCC)     DX'D TYPE II APPROX 15 YEARS AGO, DOES NOT CHECK BLOOD SUGARS AT HOME, STARTED INSULIN IN MARCH AFTER SURGERY CANCELLED FOR ELEVATED A1C   • Drug dependence (CMS/HCC)    • GERD (gastroesophageal reflux disease)    • Heart disease    • Hyperlipidemia 11/14/2016   • Hypertension    • IBS (irritable bowel syndrome)    • Knee pain, right    • Left ventricular hypertrophy 11/14/2016   • Neuropathy, lumbosacral (radicular)    • Osteoporosis    • Premature ventricular contractions    • PVC's  (premature ventricular contractions) 11/14/2016   • Scoliosis    • Sinusitis    • Sleep apnea    • SOB (shortness of breath)    • Spinal stenosis, lumbar region with neurogenic claudication    • Trigger middle finger of left hand    • Trigger middle finger of right hand    • Wears glasses     readers      Past Surgical History:   Procedure Laterality Date   • BACK SURGERY      injections for back    • CHOLECYSTECTOMY     • COLONOSCOPY      LESS THAN 5 YEARS    • ENDOSCOPY      x3   • FRACTURE SURGERY      right heel   • HERNIA REPAIR      both side inguinal    • LUMBAR EPIDURAL INJECTION     • IL TOTAL KNEE ARTHROPLASTY Right 5/30/2017    Procedure: RIGHT TOTAL KNEE ARTHROPLASTY;  Surgeon: Simon Interiano MD;  Location: [facility];  Service: Orthopedics   • TOTAL KNEE ARTHROPLASTY REVISION      left   • WRIST SURGERY      carpal tunnel bilat       Family History   Problem Relation Age of Onset   • Cancer Other    • Anemia Other    • Heart attack Other    • Cancer Mother    • Heart disease Mother    • Hypertension Mother    • Heart attack Mother    • Diabetes Mother    • Deep vein thrombosis Father    • Cancer Other      Social History     Socioeconomic History   • Marital status:      Spouse name: Not on file   • Number of children: Not on file   • Years of education: Not on file   • Highest education level: Not on file   Social Needs   • Financial resource strain: Not on file   • Food insecurity - worry: Not on file   • Food insecurity - inability: Not on file   • Transportation needs - medical: Not on file   • Transportation needs - non-medical: Not on file   Occupational History   • Not on file   Tobacco Use   • Smoking status: Never Smoker   • Smokeless tobacco: Never Used   Substance and Sexual Activity   • Alcohol use: No   • Drug use: No   • Sexual activity: Defer   Other Topics Concern   • Not on file   Social History Narrative   • Not on file      Current Outpatient Medications on File Prior to Visit    Medication Sig Dispense Refill   • ACCU-CHEK SOFTCLIX LANCETS lancets TEST THREE TIMES A  each 3   • acetaminophen (TYLENOL) 500 MG tablet Take 1,000 mg by mouth Every 6 (Six) Hours As Needed for Mild Pain (1-3).     • alfuzosin (UROXATRAL) 10 MG 24 hr tablet Take 10 mg by mouth Daily.     • amLODIPine (NORVASC) 10 MG tablet TAKE ONE TABLET BY MOUTH DAILY AS DIRECTED 30 tablet 5   • calcium carbonate (TUMS) 500 MG chewable tablet Chew 2 tablets Daily As Needed for Indigestion or Heartburn.     • Cholecalciferol (VITAMIN D-3) 1000 UNITS capsule Take 1,000 Units by mouth Daily.     • colestipol (COLESTID) 1 G tablet Take 1 g by mouth Daily.     • FLUoxetine (PROzac) 40 MG capsule      • glimepiride (AMARYL) 2 MG tablet Take 1 tablet by mouth Daily. 30 tablet 2   • glucose blood (ACCU-CHEK FREDERICK PLUS) test strip TEST THREE TIMES A  each 1   • Histamine Dihydrochloride (AUSTRALIAN DREAM ARTHRITIS) 0.025 % cream Apply  topically Daily.     • Melatonin 2.5 MG chewable tablet Chew 1 tablet At Night As Needed.     • metoprolol succinate XL (TOPROL-XL) 100 MG 24 hr tablet TAKE ONE TABLET BY MOUTH TWICE A  tablet 1   • mirtazapine (REMERON) 15 MG tablet Take  by mouth Every Night. 7.5mg to 15mg     • pravastatin (PRAVACHOL) 20 MG tablet TAKE ONE TABLET BY MOUTH DAILY 30 tablet 1   • warfarin (COUMADIN) 2.5 MG tablet TAKE ONE TO TWO TABLETS BY MOUTH DAILY AS DIRECTED BY ANTICOAGULATION PHARMACIST 120 tablet 0   • warfarin (COUMADIN) 5 MG tablet 1 tablet daily. INR sunday     • [DISCONTINUED] lisinopril (PRINIVIL,ZESTRIL) 40 MG tablet TAKE ONE TABLET BY MOUTH DAILY 90 tablet 4     No current facility-administered medications on file prior to visit.       Allergies   Allergen Reactions   • Aspirin Anaphylaxis     Other reaction(s): Hypotension        Review of Systems   Constitutional: Negative.  Negative for activity change, appetite change, chills, diaphoresis, fatigue, fever and unexpected weight  "change.   HENT: Negative.  Negative for congestion, dental problem, drooling, ear discharge, ear pain, facial swelling, hearing loss, mouth sores, nosebleeds, postnasal drip, rhinorrhea, sinus pressure, sneezing, sore throat, tinnitus, trouble swallowing and voice change.    Eyes: Negative.  Negative for photophobia, pain, discharge, redness, itching and visual disturbance.   Respiratory: Negative.  Negative for apnea, cough, choking, chest tightness, shortness of breath, wheezing and stridor.    Cardiovascular: Negative.  Negative for chest pain, palpitations and leg swelling.   Gastrointestinal: Negative.  Negative for abdominal distention, abdominal pain, anal bleeding, blood in stool, constipation, diarrhea, nausea, rectal pain and vomiting.   Endocrine: Negative.  Negative for cold intolerance, heat intolerance, polydipsia, polyphagia and polyuria.   Genitourinary: Negative.  Negative for decreased urine volume, difficulty urinating, dysuria, enuresis, flank pain, frequency, genital sores, hematuria and urgency.   Musculoskeletal: Positive for arthralgias. Negative for back pain, gait problem, joint swelling, myalgias, neck pain and neck stiffness.   Skin: Negative.  Negative for color change, pallor, rash and wound.   Allergic/Immunologic: Negative.  Negative for environmental allergies, food allergies and immunocompromised state.   Neurological: Negative.  Negative for dizziness, tremors, seizures, syncope, facial asymmetry, speech difficulty, weakness, light-headedness, numbness and headaches.   Hematological: Negative.  Negative for adenopathy. Does not bruise/bleed easily.   Psychiatric/Behavioral: Negative.  Negative for agitation, behavioral problems, confusion, decreased concentration, dysphoric mood, hallucinations, self-injury, sleep disturbance and suicidal ideas. The patient is not nervous/anxious and is not hyperactive.         Objective      Physical Exam  Pulse 63   Ht 182.9 cm (72.01\")   Wt " 111 kg (244 lb 11.4 oz)   SpO2 95%   BMI 33.18 kg/m²     Body mass index is 33.18 kg/m².    General:   Mental Status:  Alert   Appearance: Cooperative, in no acute distress   Build and Nutrition: Well-nourished and well developed male   Orientation: Alert and oriented to person, place and time   Posture: Normal   Gait: Normal, with a walker    Integument:   Right knee: Wound is well-healed with no signs of infection    Lower Extremities:   Right Knee:    Tenderness:  None    Effusion:  None    Swelling: None    Crepitus:  None    Range of motion:  Extension: 0°       Flexion: 120°  Instability:  No varus laxity, no valgus laxity, negative anterior drawer  Deformities:  None    Imaging/Studies      Imaging Results (last 24 hours)     Procedure Component Value Units Date/Time    XR Knee 3+ View With Hilton Right [[id number]] Resulted:  12/19/18 1440     Updated:  12/19/18 1440    Narrative:       Right Knee Radiographs  Indication: status-post right total knee arthroplasty  Views: AP, lateral, and sunrise views of the right knee    Comparison: no change compared to prior study, 6/18/2018    Findings:   The components are well aligned, with no signs of loosening or failure.          Assessment and Plan     Tony was seen today for follow-up.    Diagnoses and all orders for this visit:    Status post total right knee replacement  -     XR Knee 3+ View With Hilton Right    Postoperative examination        I reviewed my findings with patient today.  His right total knee arthroplasty is functioning well, and he is pleased with results.  I will see him back in 4 years with an x-ray, but sooner for any problems.    Return in about 4 years (around 12/19/2022).      Medical Decision Making  Data/Risk: radiology tests and independent visualization of imaging, lab tests, or EMG/NCV      Simon Interiano MD  12/19/18  2:52 PM

## 2018-12-27 ENCOUNTER — APPOINTMENT (OUTPATIENT)
Dept: PHARMACY | Facility: HOSPITAL | Age: 73
End: 2018-12-27

## 2018-12-31 ENCOUNTER — ANTICOAGULATION VISIT (OUTPATIENT)
Dept: PHARMACY | Facility: HOSPITAL | Age: 73
End: 2018-12-31

## 2018-12-31 DIAGNOSIS — I48.20 CHRONIC ATRIAL FIBRILLATION (HCC): ICD-10-CM

## 2018-12-31 LAB
INR PPP: 1.7 (ref 0.91–1.09)
PROTHROMBIN TIME: 20.2 SECONDS (ref 10–13.8)

## 2018-12-31 PROCEDURE — 85610 PROTHROMBIN TIME: CPT

## 2018-12-31 PROCEDURE — 36416 COLLJ CAPILLARY BLOOD SPEC: CPT

## 2018-12-31 PROCEDURE — G0463 HOSPITAL OUTPT CLINIC VISIT: HCPCS

## 2018-12-31 NOTE — PROGRESS NOTES
Anticoagulation Clinic Progress Note  Indication: atrial fibrillation  Referring Provider: Sravani  Initial Warfarin Start Date: 2008  Goal INR: 2 - 3  Current Drug Interactions: fluoxetine, glimepiride, melatonin (PRN)  CHADS-VASc: 3 (age, HTN, DM)  EtOH: none    Anticoagulation Clinic INR History:  Date 8/2 8/23 9/20 10/11 11/7 11/17 11/28 12/13 12/27   Total Weekly Dose 17.5mg 17.5 mg 17.5 mg 17.5mg 10mg 17.5 mg 17.5 mg 20mg 22.5 mg   INR 2.5 2.3 2.3 2.0 1.2 1.7 1.5 1.7 2.6   Notes     Pre- lumbar inj         Date 1/10/18 1/31 2/28 3/28 4/27 5/30 6/27 7/11 7/23 8/1 8/6 8/13   Total Weekly Dose 22.5 mg 22.5 mg 22.5 mg 22.5 mg 22.5 mg 22.5 mg 22.5 mg 22.5 mg 22.5 mg 20mg 12.5 mg 17.5 mg   INR 2.6 2.5 2.1 2.4 2.4 2.6 3.5 3.4 3.6 5.3, 4.9 1.9 2.7   Notes        Keflex         Date 8/20/18 9/4 10/2 11/6 11/15 11/29 12/31      Total Weekly Dose 17.5 mg 17.5mg 17.5mg 10mg 20mg 17.5mg 17.5mg      INR 2.3 2.3 2.2 1.3 2.3 2.2 1.7      Notes    pre- epidural   missed dose?        Clinic Interview:  Verbal Release Authorization signed on 6/27/18 -- may speak with Sussy (wife), Nikolai (son)  Tablet Strength: pt has 2.5mg tablets  Phone: [phone] (Home), [phone] <- [facility]  Fax: [phone] (Attn: Tasha)    Patient Findings   Positives:  Missed doses, Change in medications, Change in diet/appetite   Negatives:  Signs/symptoms of thrombosis, Signs/symptoms of bleeding, Laboratory test error suspected, Change in health, Change in alcohol use, Change in activity, Upcoming invasive procedure, Emergency department visit, Upcoming dental procedure, Extra doses, Hospital admission, Bruising, Other complaints   Comments:  Patient states that his wife thinks he missed a dose in the past week, but is unsure what day. Eaten more GLV than normal, had asparagus last night and more greens over the holidays. Patient taking mirtazapine 45mg daily instead of just 15mg daily, increased from last time.     Plan:    1. INR is subtherapeutic today at 1.7 so instructed Mr. Wolf and Mrs. Wolf to increase his dose to 3.75mg today and continue warfarin 2.5mg daily, as he has 2.5mg tablets at home.  2. RTC in 1 week.   3. Verbal and written information was provided in clinic. Mr. Wolf voiced understanding with teach back and has no further questions at this time.     Stella Lam, Pharmacy Intern  12/31/2018  1:05 PM     IElise, [facility], have reviewed the note in full and agree with the assessment and plan.  12/31/18  4:36 PM

## 2019-01-07 ENCOUNTER — ANTICOAGULATION VISIT (OUTPATIENT)
Dept: PHARMACY | Facility: HOSPITAL | Age: 74
End: 2019-01-07

## 2019-01-07 DIAGNOSIS — I48.20 CHRONIC ATRIAL FIBRILLATION (HCC): ICD-10-CM

## 2019-01-07 LAB
INR PPP: 1.9 (ref 0.91–1.09)
PROTHROMBIN TIME: 22.3 SECONDS (ref 10–13.8)

## 2019-01-07 PROCEDURE — G0463 HOSPITAL OUTPT CLINIC VISIT: HCPCS | Performed by: PHARMACIST

## 2019-01-07 PROCEDURE — 85610 PROTHROMBIN TIME: CPT

## 2019-01-07 PROCEDURE — 36416 COLLJ CAPILLARY BLOOD SPEC: CPT

## 2019-01-07 RX ORDER — UBIDECARENONE 100 MG
100 CAPSULE ORAL DAILY
COMMUNITY
End: 2019-02-26

## 2019-01-07 NOTE — PROGRESS NOTES
Anticoagulation Clinic Progress Note  Indication: atrial fibrillation  Referring Provider: Sravani  Initial Warfarin Start Date: 2008  Goal INR: 2 - 3  Current Drug Interactions: fluoxetine, glimepiride, melatonin (PRN)  CHADS-VASc: 3 (age, HTN, DM)  EtOH: none    Anticoagulation Clinic INR History:  Date 8/2 8/23 9/20 10/11 11/7 11/17 11/28 12/13 12/27   Total Weekly Dose 17.5mg 17.5 mg 17.5 mg 17.5mg 10mg 17.5 mg 17.5 mg 20mg 22.5 mg   INR 2.5 2.3 2.3 2.0 1.2 1.7 1.5 1.7 2.6   Notes     Pre- lumbar inj         Date 1/10/18 1/31 2/28 3/28 4/27 5/30 6/27 7/11 7/23 8/1 8/6 8/13   Total Weekly Dose 22.5 mg 22.5 mg 22.5 mg 22.5 mg 22.5 mg 22.5 mg 22.5 mg 22.5 mg 22.5 mg 20mg 12.5 mg 17.5 mg   INR 2.6 2.5 2.1 2.4 2.4 2.6 3.5 3.4 3.6 5.3, 4.9 1.9 2.7   Notes        Keflex         Date 8/20/18 9/4 10/2 11/6 11/15 11/29 12/31 1/7/19     Total Weekly Dose 17.5 mg 17.5mg 17.5mg 10mg 20mg 17.5mg 17.5mg 18.75 mg     INR 2.3 2.3 2.2 1.3 2.3 2.2 1.7 1.9     Notes    pre- epidural   missed dose?        Clinic Interview:  Verbal Release Authorization signed on 6/27/18 -- may speak with Sussy (wife), Nikolai (son)  Tablet Strength: pt has 2.5mg tablets  Phone: [phone] (Home), [phone] <- [facility]  Fax: [phone] (Attn: Tasha)    Patient Findings   Positives:  Extra doses   Negatives:  Signs/symptoms of thrombosis, Signs/symptoms of bleeding, Laboratory test error suspected, Change in health, Change in alcohol use, Change in activity, Upcoming invasive procedure, Emergency department visit, Upcoming dental procedure, Missed doses, Change in medications, Change in diet/appetite, Hospital admission, Bruising, Other complaints   Comments:  Took the instructed dose of 3.75 mg last Monday.   He says that he is always hungry even 15-30 minutes after eating; last A1C = 6.9       Plan:   1. INR is subtherapeutic today at 1.9 so instructed Mr. Wolf and Mrs. Wolf to increase his dose to 3.75mg today and  continue warfarin 2.5mg daily, as he has 2.5mg tablets at home. Will boost again considering it almost yielded a goal range INR after last week's BOOSt.  2. RTC in 1.5 weeks to accompany his appointment with Dr. Lassiter.   3. Verbal and written information was provided in clinic. Mr. Wolf voiced understanding with teach back and has no further questions at this time.     Simon eRal Rph  1/7/2019  1:11 PM

## 2019-01-09 RX ORDER — PRAVASTATIN SODIUM 20 MG
TABLET ORAL
Qty: 30 TABLET | Refills: 0 | Status: SHIPPED | OUTPATIENT
Start: 2019-01-09 | End: 2019-02-07 | Stop reason: SDUPTHER

## 2019-01-16 ENCOUNTER — APPOINTMENT (OUTPATIENT)
Dept: PHARMACY | Facility: HOSPITAL | Age: 74
End: 2019-01-16

## 2019-01-17 ENCOUNTER — ANTICOAGULATION VISIT (OUTPATIENT)
Dept: PHARMACY | Facility: HOSPITAL | Age: 74
End: 2019-01-17

## 2019-01-17 ENCOUNTER — OFFICE VISIT (OUTPATIENT)
Dept: CARDIOLOGY | Facility: CLINIC | Age: 74
End: 2019-01-17

## 2019-01-17 VITALS
HEART RATE: 52 BPM | DIASTOLIC BLOOD PRESSURE: 80 MMHG | HEIGHT: 72 IN | SYSTOLIC BLOOD PRESSURE: 108 MMHG | BODY MASS INDEX: 33.35 KG/M2 | OXYGEN SATURATION: 95 % | WEIGHT: 246.2 LBS

## 2019-01-17 DIAGNOSIS — I48.4 ATYPICAL ATRIAL FLUTTER (HCC): ICD-10-CM

## 2019-01-17 DIAGNOSIS — I48.20 CHRONIC ATRIAL FIBRILLATION (HCC): Primary | ICD-10-CM

## 2019-01-17 DIAGNOSIS — I10 ESSENTIAL HYPERTENSION: ICD-10-CM

## 2019-01-17 DIAGNOSIS — R06.09 DYSPNEA ON EXERTION: ICD-10-CM

## 2019-01-17 DIAGNOSIS — I49.3 PVC'S (PREMATURE VENTRICULAR CONTRACTIONS): ICD-10-CM

## 2019-01-17 DIAGNOSIS — I48.20 CHRONIC ATRIAL FIBRILLATION (HCC): ICD-10-CM

## 2019-01-17 LAB
INR PPP: 1.9 (ref 0.91–1.09)
PROTHROMBIN TIME: 23 SECONDS (ref 10–13.8)

## 2019-01-17 PROCEDURE — 36416 COLLJ CAPILLARY BLOOD SPEC: CPT

## 2019-01-17 PROCEDURE — 99213 OFFICE O/P EST LOW 20 MIN: CPT | Performed by: NURSE PRACTITIONER

## 2019-01-17 PROCEDURE — 85610 PROTHROMBIN TIME: CPT

## 2019-01-17 PROCEDURE — G0463 HOSPITAL OUTPT CLINIC VISIT: HCPCS

## 2019-01-17 RX ORDER — MIRTAZAPINE 45 MG/1
45 TABLET, ORALLY DISINTEGRATING ORAL NIGHTLY
COMMUNITY
End: 2019-03-27

## 2019-01-17 NOTE — PROGRESS NOTES
Anticoagulation Clinic Progress Note  Indication: atrial fibrillation  Referring Provider: Sravani  Initial Warfarin Start Date: 2008  Goal INR: 2 - 3  Current Drug Interactions: fluoxetine, glimepiride, melatonin (PRN)  CHADS-VASc: 3 (age, HTN, DM)  EtOH: none    Anticoagulation Clinic INR History:  Date 8/2 8/23 9/20 10/11 11/7 11/17 11/28 12/13 12/27   Total Weekly Dose 17.5mg 17.5 mg 17.5 mg 17.5mg 10mg 17.5 mg 17.5 mg 20mg 22.5 mg   INR 2.5 2.3 2.3 2.0 1.2 1.7 1.5 1.7 2.6   Notes     Pre- lumbar inj         Date 1/10/18 1/31 2/28 3/28 4/27 5/30 6/27 7/11 7/23 8/1 8/6 8/13   Total Weekly Dose 22.5 mg 22.5 mg 22.5 mg 22.5 mg 22.5 mg 22.5 mg 22.5 mg 22.5 mg 22.5 mg 20mg 12.5 mg 17.5 mg   INR 2.6 2.5 2.1 2.4 2.4 2.6 3.5 3.4 3.6 5.3, 4.9 1.9 2.7   Notes        Keflex         Date 8/20/18 9/4 10/2 11/6 11/15 11/29 12/31 1/7/19 1/17    Total Weekly Dose 17.5 mg 17.5mg 17.5mg 10mg 20mg 17.5mg 17.5mg 18.75 mg 17.5 mg 18.75mg   INR 2.3 2.3 2.2 1.3 2.3 2.2 1.7 1.9 1.9    Notes    pre- epidural   missed dose?        Clinic Interview:  Verbal Release Authorization signed on 6/27/18 -- may speak with Sussy (wife), Nikolai (son)  Tablet Strength: pt has 2.5mg tablets  Phone: [phone] (Home), [phone] <- [facility]  Fax: [phone] (Attn: Tasha)    Patient Findings:  Positives:  Change in medications   Negatives:  Signs/symptoms of thrombosis, Signs/symptoms of bleeding, Laboratory test error suspected, Change in health, Change in alcohol use, Change in activity, Upcoming invasive procedure, Emergency department visit, Upcoming dental procedure, Missed doses, Extra doses, Change in diet/appetite, Hospital admission, Bruising, Other complaints   Comments:  His wife brought a medication list for review.  He is no longer taking alfuzosin or colestipol and has not taken in quite some time.  His dose of mirtazipine was increased to 45 mg oral nightly a month or two ago. (DDI: may result in an increased  INR)   He states that he tries to be consistent with his intake of GLV.        Plan:   1. INR is subtherapeutic again today at 1.9 so instructed Mr. Wolf and Mrs. Wolf to increase his dose to 3.75mg today and then adjust his maintenance warfarin oral 2.5 mg daily except warfarin 3.75 mg qThursdays.  (he has 2.5mg tablets at home).  (7.1% increase weekly)  2. RTC in 1.5 weeks on 1/29   3. Verbal and written information was provided in clinic. Mr. Wolf voiced understanding with teach back and has no further questions at this time.     Sariah Almodovar, PharmD  1/17/2019  2:12 PM

## 2019-01-22 ENCOUNTER — TELEPHONE (OUTPATIENT)
Dept: INTERNAL MEDICINE | Facility: CLINIC | Age: 74
End: 2019-01-22

## 2019-01-22 NOTE — TELEPHONE ENCOUNTER
Pt states he went to cardiology the other day and they would like for him to have PFT's , can you order this for him?

## 2019-01-22 NOTE — TELEPHONE ENCOUNTER
PATIENT CALLED AND WOULD LIKE FOR SERJIO TO GIVE HIM A CALL WHEN SHE CAN. PATIENT DID NOT STATE WHAT IT WAS IN REFERENCE TOO. PLEASE CALL [phone]

## 2019-01-23 DIAGNOSIS — R06.09 DYSPNEA ON EXERTION: Primary | ICD-10-CM

## 2019-01-29 ENCOUNTER — ANTICOAGULATION VISIT (OUTPATIENT)
Dept: PHARMACY | Facility: HOSPITAL | Age: 74
End: 2019-01-29

## 2019-01-29 DIAGNOSIS — I48.20 CHRONIC ATRIAL FIBRILLATION (HCC): ICD-10-CM

## 2019-01-29 LAB
INR PPP: 1.9 (ref 0.91–1.09)
PROTHROMBIN TIME: 22.8 SECONDS (ref 10–13.8)

## 2019-01-29 PROCEDURE — 36416 COLLJ CAPILLARY BLOOD SPEC: CPT

## 2019-01-29 PROCEDURE — G0463 HOSPITAL OUTPT CLINIC VISIT: HCPCS

## 2019-01-29 PROCEDURE — 85610 PROTHROMBIN TIME: CPT

## 2019-01-29 NOTE — PROGRESS NOTES
Anticoagulation Clinic Progress Note  Indication: atrial fibrillation  Referring Provider: Sravani  Initial Warfarin Start Date: 2008  Goal INR: 2 - 3  Current Drug Interactions: fluoxetine, glimepiride, melatonin (PRN)  CHADS-VASc: 3 (age, HTN, DM)  EtOH: none    Anticoagulation Clinic INR History:  Date 8/2 8/23 9/20 10/11 11/7 11/17 11/28 12/13 12/27   Total Weekly Dose 17.5mg 17.5 mg 17.5 mg 17.5mg 10mg 17.5 mg 17.5 mg 20mg 22.5 mg   INR 2.5 2.3 2.3 2.0 1.2 1.7 1.5 1.7 2.6   Notes     Pre- lumbar inj         Date 1/10/18 1/31 2/28 3/28 4/27 5/30 6/27 7/11 7/23 8/1 8/6 8/13   Total Weekly Dose 22.5 mg 22.5 mg 22.5 mg 22.5 mg 22.5 mg 22.5 mg 22.5 mg 22.5 mg 22.5 mg 20mg 12.5 mg 17.5 mg   INR 2.6 2.5 2.1 2.4 2.4 2.6 3.5 3.4 3.6 5.3, 4.9 1.9 2.7   Notes        Keflex         Date 8/20/18 9/4 10/2 11/6 11/15 11/29 12/31 1/7/19 1/17 1/29    Total Weekly Dose 17.5 mg 17.5mg 17.5mg 10mg 20mg 17.5mg 17.5mg 18.75 mg 17.5 mg 18.75 mg 20mg   INR 2.3 2.3 2.2 1.3 2.3 2.2 1.7 1.9 1.9 1.9    Notes    pre- epidural   missed dose?         Clinic Interview:  Verbal Release Authorization signed on 6/27/18 -- may speak with Sussy (wife), Nikolai (son)  Tablet Strength: pt has 2.5mg tablets  Phone: [phone] (Home), [phone]   Fax: [phone] (Attn: Tasha) [facility]    Patient Findings:  Negatives:  Signs/symptoms of thrombosis, Signs/symptoms of bleeding, Laboratory test error suspected, Change in health, Change in alcohol use, Change in activity, Upcoming invasive procedure, Emergency department visit, Upcoming dental procedure, Missed doses, Extra doses, Change in medications, Change in diet/appetite, Hospital admission, Bruising, Other complaints     Plan:   1. INR is subtherapeutic again today. Instructed Mr. Wolf to increase his dose (6.7%) to warfarin 2.5 mg daily except warfarin 3.75 mg TuesThurs.  2. RTC in 2 weeks to reassess.   3. Verbal and written information was provided in clinic. Mr. Wolf  voiced understanding with teach back and has no further questions at this time.     Ann Cowden Mayer, PharmD  1/29/2019  11:10 AM

## 2019-02-07 RX ORDER — PRAVASTATIN SODIUM 20 MG
TABLET ORAL
Qty: 30 TABLET | Refills: 2 | Status: SHIPPED | OUTPATIENT
Start: 2019-02-07 | End: 2019-05-13 | Stop reason: SDUPTHER

## 2019-02-11 ENCOUNTER — OFFICE VISIT (OUTPATIENT)
Dept: PULMONOLOGY | Facility: CLINIC | Age: 74
End: 2019-02-11

## 2019-02-11 DIAGNOSIS — R06.09 DYSPNEA ON EXERTION: ICD-10-CM

## 2019-02-11 PROCEDURE — 94375 RESPIRATORY FLOW VOLUME LOOP: CPT | Performed by: INTERNAL MEDICINE

## 2019-02-11 PROCEDURE — 94729 DIFFUSING CAPACITY: CPT | Performed by: INTERNAL MEDICINE

## 2019-02-11 PROCEDURE — 94726 PLETHYSMOGRAPHY LUNG VOLUMES: CPT | Performed by: INTERNAL MEDICINE

## 2019-02-12 ENCOUNTER — ANTICOAGULATION VISIT (OUTPATIENT)
Dept: PHARMACY | Facility: HOSPITAL | Age: 74
End: 2019-02-12

## 2019-02-12 DIAGNOSIS — I48.20 CHRONIC ATRIAL FIBRILLATION (HCC): ICD-10-CM

## 2019-02-12 LAB
INR PPP: 2 (ref 0.91–1.09)
PROTHROMBIN TIME: 24.4 SECONDS (ref 10–13.8)

## 2019-02-12 PROCEDURE — 85610 PROTHROMBIN TIME: CPT

## 2019-02-12 PROCEDURE — 36416 COLLJ CAPILLARY BLOOD SPEC: CPT

## 2019-02-12 PROCEDURE — G0463 HOSPITAL OUTPT CLINIC VISIT: HCPCS

## 2019-02-12 NOTE — PROGRESS NOTES
Anticoagulation Clinic Progress Note  Indication: atrial fibrillation  Referring Provider: Sravani  Initial Warfarin Start Date: 2008  Goal INR: 2 - 3  Current Drug Interactions: fluoxetine, glimepiride, melatonin (PRN)  CHADS-VASc: 3 (age, HTN, DM)  EtOH: none    Anticoagulation Clinic INR History:  Date 8/2 8/23 9/20 10/11 11/7 11/17 11/28 12/13 12/27   Total Weekly Dose 17.5mg 17.5 mg 17.5 mg 17.5mg 10mg 17.5 mg 17.5 mg 20mg 22.5 mg   INR 2.5 2.3 2.3 2.0 1.2 1.7 1.5 1.7 2.6   Notes     Pre- lumbar inj         Date 1/10/18 1/31 2/28 3/28 4/27 5/30 6/27 7/11 7/23 8/1 8/6 8/13   Total Weekly Dose 22.5 mg 22.5 mg 22.5 mg 22.5 mg 22.5 mg 22.5 mg 22.5 mg 22.5 mg 22.5 mg 20mg 12.5 mg 17.5 mg   INR 2.6 2.5 2.1 2.4 2.4 2.6 3.5 3.4 3.6 5.3, 4.9 1.9 2.7   Notes        Keflex         Date 8/20/18 9/4 10/2 11/6 11/15 11/29 12/31 1/7/19 1/17 1/29 2/12   Total Weekly Dose 17.5 mg 17.5mg 17.5mg 10mg 20mg 17.5mg 17.5mg 18.75 mg 17.5 mg 18.75 mg 20mg   INR 2.3 2.3 2.2 1.3 2.3 2.2 1.7 1.9 1.9 1.9 2.0   Notes    pre- epidural   missed dose?         Clinic Interview:  Verbal Release Authorization signed on 6/27/18 -- may speak with Sussy (wife), Nikolai (son)  Tablet Strength: pt has 2.5mg tablets  Phone: [phone] (Home), [phone]   Fax: [phone] (Attn: Tasha) [facility]    Patient Findings:  Negatives:  Signs/symptoms of thrombosis, Signs/symptoms of bleeding, Laboratory test error suspected, Change in health, Change in alcohol use, Change in activity, Upcoming invasive procedure, Emergency department visit, Upcoming dental procedure, Missed doses, Extra doses, Change in medications, Change in diet/appetite, Hospital admission, Bruising, Other complaints   Comments:  No changes.        Plan:   1. INR is therapeutic today at 2.0. Instructed Mr. Wolf to continue warfarin 2.5 mg daily except warfarin 3.75 mg TuesThurs.  2. RTC in 2 weeks 2/27 to reassess. If therapeutic consider retesting in 3-4 weeks per  patient request  3. Verbal and written information was provided in clinic. Mr. Wolf voiced understanding with teach back and has no further questions at this time.       Forest Ross, PharmD Candidate 2019  2/12/2019  11:51 AM     I, Ama Mccloud, PharmD, have reviewed the note in full and agree with the assessment and plan.  02/12/19  2:05 PM

## 2019-02-20 ENCOUNTER — TELEPHONE (OUTPATIENT)
Dept: INTERNAL MEDICINE | Facility: CLINIC | Age: 74
End: 2019-02-20

## 2019-02-26 ENCOUNTER — OFFICE VISIT (OUTPATIENT)
Dept: INTERNAL MEDICINE | Facility: CLINIC | Age: 74
End: 2019-02-26

## 2019-02-26 VITALS
HEIGHT: 72 IN | TEMPERATURE: 98 F | WEIGHT: 246 LBS | DIASTOLIC BLOOD PRESSURE: 76 MMHG | SYSTOLIC BLOOD PRESSURE: 124 MMHG | BODY MASS INDEX: 33.32 KG/M2 | OXYGEN SATURATION: 98 % | HEART RATE: 63 BPM

## 2019-02-26 DIAGNOSIS — R05.9 COUGH: ICD-10-CM

## 2019-02-26 DIAGNOSIS — J00 COMMON COLD: Primary | ICD-10-CM

## 2019-02-26 LAB
EXPIRATION DATE: NORMAL
FLUAV AG NPH QL: NEGATIVE
FLUBV AG NPH QL: NEGATIVE
INTERNAL CONTROL: NORMAL
Lab: NORMAL

## 2019-02-26 PROCEDURE — 87804 INFLUENZA ASSAY W/OPTIC: CPT | Performed by: NURSE PRACTITIONER

## 2019-02-26 PROCEDURE — 99213 OFFICE O/P EST LOW 20 MIN: CPT | Performed by: NURSE PRACTITIONER

## 2019-02-26 RX ORDER — FLUTICASONE PROPIONATE 50 MCG
2 SPRAY, SUSPENSION (ML) NASAL DAILY
Qty: 1 BOTTLE | Refills: 0 | Status: SHIPPED | OUTPATIENT
Start: 2019-02-26 | End: 2019-03-18

## 2019-02-26 RX ORDER — BENZONATATE 100 MG/1
100 CAPSULE ORAL 3 TIMES DAILY PRN
Qty: 30 CAPSULE | Refills: 0 | Status: SHIPPED | OUTPATIENT
Start: 2019-02-26 | End: 2019-03-18

## 2019-02-26 NOTE — PROGRESS NOTES
Chief Complaint   Patient presents with   • Cough   • sinus congestion       History of Present Illness    Tony Wolf is a pleasant 73 y.o. male who is here for a 4 day history of HA, Sinus pressure, sneezing, watery eyes, and non productive cough. He had a sore throat initially but has since improved. He denies fevers, chills, body aches, runny nose, N/V/D, anorexia. Took tylenol w/o relief of symptoms, has not tried any other OTC meds up to his point. No sick contacts recently. Checking FSBG at home with ranges in 90s, has continued taking prescribed medications at indicated.       Review of Systems  Review of Systems   Constitutional: Negative for appetite change, chills, fatigue and fever.   HENT: Positive for congestion, sinus pressure and sneezing. Negative for ear pain, facial swelling, postnasal drip, rhinorrhea and sore throat.    Eyes: Positive for itching.   Respiratory: Positive for cough. Negative for chest tightness, shortness of breath and wheezing.    Cardiovascular: Negative for chest pain.   Gastrointestinal: Negative for abdominal pain, diarrhea, nausea and vomiting.   Musculoskeletal: Negative for myalgias.   Neurological: Positive for headache. Negative for dizziness and confusion.       PMSFH  The following portions of the patient's history were reviewed and updated as appropriate: allergies, current medications, past family history, past medical history, past social history, past surgical history and problem list.       Past Medical History:   Diagnosis Date   • Acute bronchitis    • Anxiety    • Arthritis     right knee   • Atrial fibrillation (CMS/HCC)    • Back pain    • Cancer (CMS/HCC)     skin cancer removed from face    • Carpal tunnel syndrome    • Derangement, knee internal    • Diabetes mellitus (CMS/HCC)     DX'D TYPE II APPROX 15 YEARS AGO, DOES NOT CHECK BLOOD SUGARS AT HOME, STARTED INSULIN IN MARCH AFTER SURGERY CANCELLED FOR ELEVATED A1C   • Drug dependence (CMS/HCC)    •  GERD (gastroesophageal reflux disease)    • Heart disease    • Hyperlipidemia 11/14/2016   • Hypertension    • IBS (irritable bowel syndrome)    • Knee pain, right    • Left ventricular hypertrophy 11/14/2016   • Neuropathy, lumbosacral (radicular)    • Osteoporosis    • Premature ventricular contractions    • PVC's (premature ventricular contractions) 11/14/2016   • Scoliosis    • Sinusitis    • Sleep apnea    • SOB (shortness of breath)    • Spinal stenosis, lumbar region with neurogenic claudication    • Trigger middle finger of left hand    • Trigger middle finger of right hand    • Wears glasses     readers        Allergies   Allergen Reactions   • Aspirin Anaphylaxis     Other reaction(s): Hypotension        Current Outpatient Medications:   •  ACCU-CHEK SOFTCLIX LANCETS lancets, TEST THREE TIMES A DAY, Disp: 100 each, Rfl: 3  •  acetaminophen (TYLENOL) 500 MG tablet, Take 1,000 mg by mouth Every 6 (Six) Hours As Needed for Mild Pain (1-3)., Disp: , Rfl:   •  amLODIPine (NORVASC) 10 MG tablet, TAKE ONE TABLET BY MOUTH DAILY AS DIRECTED, Disp: 30 tablet, Rfl: 5  •  calcium carbonate (TUMS) 500 MG chewable tablet, Chew 2 tablets Daily As Needed for Indigestion or Heartburn., Disp: , Rfl:   •  Cholecalciferol (VITAMIN D-3) 1000 UNITS capsule, Take 1,000 Units by mouth Daily., Disp: , Rfl:   •  FLUoxetine (PROzac) 40 MG capsule, , Disp: , Rfl:   •  glimepiride (AMARYL) 2 MG tablet, Take 1 tablet by mouth Daily., Disp: 30 tablet, Rfl: 2  •  glucose blood (ACCU-CHEK FREDERICK PLUS) test strip, TEST THREE TIMES A DAY, Disp: 100 each, Rfl: 1  •  Histamine Dihydrochloride (AUSTRALIAN DREAM ARTHRITIS) 0.025 % cream, Apply  topically Daily., Disp: , Rfl:   •  lisinopril (PRINIVIL,ZESTRIL) 40 MG tablet, TAKE ONE TABLET BY MOUTH DAILY, Disp: 30 tablet, Rfl: 11  •  Melatonin 2.5 MG chewable tablet, Chew 1 tablet At Night As Needed., Disp: , Rfl:   •  metoprolol succinate XL (TOPROL-XL) 100 MG 24 hr tablet, TAKE ONE TABLET BY  "MOUTH TWICE A DAY, Disp: 180 tablet, Rfl: 1  •  mirtazapine (REMERON SOL-TAB) 45 MG disintegrating tablet, Take 45 mg by mouth Every Night., Disp: , Rfl:   •  pravastatin (PRAVACHOL) 20 MG tablet, TAKE ONE TABLET BY MOUTH DAILY, Disp: 30 tablet, Rfl: 2  •  warfarin (COUMADIN) 2.5 MG tablet, TAKE ONE TO TWO TABLETS BY MOUTH DAILY AS DIRECTED BY ANTICOAGULATION PHARMACIST, Disp: 120 tablet, Rfl: 0    Vitals:  Vitals:    02/26/19 1414   BP: 124/76   Pulse: 63   Temp: 98 °F (36.7 °C)   SpO2: 98%  Comment: ra   Weight: 112 kg (246 lb)   Height: 182.9 cm (72\")   PainSc:   2   PainLoc: Back       Physical Exam  Physical Exam   Constitutional: He is oriented to person, place, and time. He appears well-developed and well-nourished.   HENT:   Head: Normocephalic and atraumatic.   Right Ear: Tympanic membrane and ear canal normal.   Left Ear: Tympanic membrane and ear canal normal.   Nose: Mucosal edema present. No rhinorrhea. Right sinus exhibits no maxillary sinus tenderness and no frontal sinus tenderness. Left sinus exhibits no maxillary sinus tenderness and no frontal sinus tenderness.   Mouth/Throat: Uvula is midline, oropharynx is clear and moist and mucous membranes are normal.   Eyes: Conjunctivae are normal. Pupils are equal, round, and reactive to light.   Neck: Trachea normal. Neck supple.   Cardiovascular: Normal rate, regular rhythm and normal heart sounds.   Pulmonary/Chest: Effort normal and breath sounds normal. No respiratory distress. He has no decreased breath sounds. He has no wheezes.   Abdominal: Soft. Bowel sounds are normal. There is no tenderness.   Lymphadenopathy:     He has no cervical adenopathy.   Neurological: He is alert and oriented to person, place, and time.   Skin: Skin is warm and dry.   Psychiatric: He has a normal mood and affect. His behavior is normal.   Nursing note and vitals reviewed.      Results  Results for orders placed or performed in visit on 02/26/19   POC Influenza A / B "   Result Value Ref Range    Rapid Influenza A Ag Negative Negative    Rapid Influenza B Ag Negative Negative    Internal Control Passed Passed    Lot Number [id number]     Expiration Date 9/21/21        Assessment/Plan    Tony was seen today for cough and sinus congestion.    Diagnoses and all orders for this visit:    Common cold  -     fluticasone (FLONASE) 50 MCG/ACT nasal spray; 2 sprays into the nostril(s) as directed by provider Daily.  -     benzonatate (TESSALON PERLES) 100 MG capsule; Take 1 capsule by mouth 3 (Three) Times a Day As Needed for Cough.    Cough  -     POC Influenza A / B    Discussed Viral vs. Bacterial etiology. Signs and symptoms consistent with viral infection at this time. Advised in symptomatic relief at this time with prescribed medications this visit. Encouraged rest and hydration. Salt water gargles/chloraseptic spray for sore throat prn. Tylenol prn for fevers, HA, body aches. Advised in hygiene measures and time frame of 7-10 days for resolution of viral symptoms. Follow-up with PCP for new, worsening, or persistent symptoms.      Follow-Up  Return if symptoms worsen or fail to improve.      Plan of care reviewed with patient at conclusion of today's visit. Patient education was provided regarding diagnosis, management, and prescribed or recommended OTC medications. Patient was informed to notify office of any new, worsening, or persistent symptoms. Patient verbalized understanding and agreement with plan of care.     Electronically Signed By:  YUDITH Florez

## 2019-02-27 ENCOUNTER — ANTICOAGULATION VISIT (OUTPATIENT)
Dept: PHARMACY | Facility: HOSPITAL | Age: 74
End: 2019-02-27

## 2019-02-27 DIAGNOSIS — I48.20 CHRONIC ATRIAL FIBRILLATION (HCC): ICD-10-CM

## 2019-02-27 LAB
INR PPP: 2.2 (ref 0.91–1.09)
PROTHROMBIN TIME: 26.5 SECONDS (ref 10–13.8)

## 2019-02-27 PROCEDURE — G0463 HOSPITAL OUTPT CLINIC VISIT: HCPCS

## 2019-02-27 PROCEDURE — 85610 PROTHROMBIN TIME: CPT

## 2019-02-27 PROCEDURE — 36416 COLLJ CAPILLARY BLOOD SPEC: CPT

## 2019-02-27 NOTE — PROGRESS NOTES
Anticoagulation Clinic Progress Note  Indication: atrial fibrillation  Referring Provider: Sravani  Initial Warfarin Start Date: 2008  Goal INR: 2 - 3  Current Drug Interactions: fluoxetine, glimepiride, melatonin (PRN)  CHADS-VASc: 3 (age, HTN, DM)  EtOH: none    Anticoagulation Clinic INR History:  Date 8/2 8/23 9/20 10/11 11/7 11/17 11/28 12/13 12/27   Total Weekly Dose 17.5mg 17.5 mg 17.5 mg 17.5mg 10mg 17.5 mg 17.5 mg 20mg 22.5 mg   INR 2.5 2.3 2.3 2.0 1.2 1.7 1.5 1.7 2.6   Notes     Pre- lumbar inj         Date 1/10/18 1/31 2/28 3/28 4/27 5/30 6/27 7/11 7/23 8/1 8/6 8/13   Total Weekly Dose 22.5 mg 22.5 mg 22.5 mg 22.5 mg 22.5 mg 22.5 mg 22.5 mg 22.5 mg 22.5 mg 20mg 12.5 mg 17.5 mg   INR 2.6 2.5 2.1 2.4 2.4 2.6 3.5 3.4 3.6 5.3, 4.9 1.9 2.7   Notes        Keflex         Date 8/20/18 9/4 10/2 11/6 11/15 11/29 12/31 1/7/19 1/17 1/29 2/12   Total Weekly Dose 17.5 mg 17.5mg 17.5mg 10mg 20mg 17.5mg 17.5mg 18.75 mg 17.5 mg 18.75 mg 20mg   INR 2.3 2.3 2.2 1.3 2.3 2.2 1.7 1.9 1.9 1.9 2.0   Notes    pre- epidural   missed dose?           Date 2/27             Total Weekly Dose 20mg             INR 2.2             Notes sick                 Clinic Interview:  Verbal Release Authorization signed on 6/27/18 -- may speak with Sussy (wife), Nikolai (son)  Tablet Strength: pt has 2.5mg tablets  Phone: [phone] (Home), [phone]   Fax: [phone] (Attn: Tasha) [facility]    Patient Findings   Positives:  Change in health, Change in medications   Negatives:  Signs/symptoms of thrombosis, Signs/symptoms of bleeding, Laboratory test error suspected, Change in alcohol use, Change in activity, Upcoming invasive procedure, Emergency department visit, Upcoming dental procedure, Missed doses, Extra doses, Change in diet/appetite, Hospital admission, Bruising, Other complaints   Comments:  Patient has viral infection which started about 4 days ago (2/23). He was seen at the clinic and started benzonatate,  flonase, and loratadine to help with the symptoms of the infection. We talked about how if he does not improve and is started on an antibiotic to call us and let us know.      Plan:   1. INR is therapeutic today at 2.2. Instructed Mr. Wolf to continue warfarin 2.5 mg daily except warfarin 3.75 mg TuesThurs.  2. RTC in 4 weeks 3/27 to reassess per patient request  3. Verbal and written information was provided in clinic. Mr. Wolf voiced understanding with teach back and has no further questions at this time.     Forest Ross, PharmD Candidate 2019  2/27/2019  11:34 AM     I, Ama Mccloud, PharmD, have reviewed the note in full and agree with the assessment and plan.  02/27/19  2:12 PM

## 2019-03-01 ENCOUNTER — TELEPHONE (OUTPATIENT)
Dept: PULMONOLOGY | Facility: CLINIC | Age: 74
End: 2019-03-01

## 2019-03-01 NOTE — TELEPHONE ENCOUNTER
Patient called wanting to talk to someone about PFT results was wanting to know how they turned out can you give him a call thanks

## 2019-03-18 ENCOUNTER — OFFICE VISIT (OUTPATIENT)
Dept: INTERNAL MEDICINE | Facility: CLINIC | Age: 74
End: 2019-03-18

## 2019-03-18 VITALS
SYSTOLIC BLOOD PRESSURE: 100 MMHG | OXYGEN SATURATION: 98 % | HEART RATE: 54 BPM | DIASTOLIC BLOOD PRESSURE: 70 MMHG | BODY MASS INDEX: 33.63 KG/M2 | WEIGHT: 248 LBS

## 2019-03-18 DIAGNOSIS — G47.33 OBSTRUCTIVE SLEEP APNEA: ICD-10-CM

## 2019-03-18 DIAGNOSIS — F41.1 GENERALIZED ANXIETY DISORDER: ICD-10-CM

## 2019-03-18 DIAGNOSIS — E11.9 TYPE 2 DIABETES MELLITUS WITHOUT COMPLICATION, WITHOUT LONG-TERM CURRENT USE OF INSULIN (HCC): ICD-10-CM

## 2019-03-18 DIAGNOSIS — I48.20 CHRONIC ATRIAL FIBRILLATION (HCC): ICD-10-CM

## 2019-03-18 DIAGNOSIS — E11.9 CONTROLLED TYPE 2 DIABETES MELLITUS WITHOUT COMPLICATION, WITHOUT LONG-TERM CURRENT USE OF INSULIN (HCC): ICD-10-CM

## 2019-03-18 DIAGNOSIS — K21.9 GASTROESOPHAGEAL REFLUX DISEASE, ESOPHAGITIS PRESENCE NOT SPECIFIED: ICD-10-CM

## 2019-03-18 DIAGNOSIS — R53.83 OTHER FATIGUE: ICD-10-CM

## 2019-03-18 DIAGNOSIS — E78.5 HYPERLIPIDEMIA LDL GOAL <100: ICD-10-CM

## 2019-03-18 DIAGNOSIS — Z12.11 COLON CANCER SCREENING: ICD-10-CM

## 2019-03-18 DIAGNOSIS — I10 ESSENTIAL HYPERTENSION: ICD-10-CM

## 2019-03-18 DIAGNOSIS — B37.9 CANDIDIASIS: Primary | ICD-10-CM

## 2019-03-18 PROBLEM — R21 RASH: Status: ACTIVE | Noted: 2019-03-18

## 2019-03-18 LAB
BASOPHILS # BLD AUTO: 0.05 10*3/MM3 (ref 0–0.2)
BASOPHILS NFR BLD AUTO: 0.6 % (ref 0–1)
DEPRECATED RDW RBC AUTO: 51.3 FL (ref 37–54)
EOSINOPHIL # BLD AUTO: 0.26 10*3/MM3 (ref 0–0.3)
EOSINOPHIL NFR BLD AUTO: 3.1 % (ref 0–3)
ERYTHROCYTE [DISTWIDTH] IN BLOOD BY AUTOMATED COUNT: 14.5 % (ref 11.3–14.5)
GLUCOSE BLDC GLUCOMTR-MCNC: 115 MG/DL (ref 70–130)
HBA1C MFR BLD: 6.3 %
HCT VFR BLD AUTO: 49.6 % (ref 38.9–50.9)
HGB BLD-MCNC: 16.3 G/DL (ref 13.1–17.5)
IMM GRANULOCYTES # BLD AUTO: 0.02 10*3/MM3 (ref 0–0.05)
IMM GRANULOCYTES NFR BLD AUTO: 0.2 % (ref 0–0.6)
LYMPHOCYTES # BLD AUTO: 2.5 10*3/MM3 (ref 0.6–4.8)
LYMPHOCYTES NFR BLD AUTO: 29.4 % (ref 24–44)
MCH RBC QN AUTO: 31.5 PG (ref 27–31)
MCHC RBC AUTO-ENTMCNC: 32.9 G/DL (ref 32–36)
MCV RBC AUTO: 95.9 FL (ref 80–99)
MONOCYTES # BLD AUTO: 0.84 10*3/MM3 (ref 0–1)
MONOCYTES NFR BLD AUTO: 9.9 % (ref 0–12)
NEUTROPHILS # BLD AUTO: 4.82 10*3/MM3 (ref 1.5–8.3)
NEUTROPHILS NFR BLD AUTO: 56.8 % (ref 41–71)
PLATELET # BLD AUTO: 160 10*3/MM3 (ref 150–450)
PMV BLD AUTO: 12.7 FL (ref 6–12)
RBC # BLD AUTO: 5.17 10*6/MM3 (ref 4.2–5.76)
TSH SERPL DL<=0.05 MIU/L-ACNC: 1.92 MIU/ML (ref 0.35–5.35)
WBC NRBC COR # BLD: 8.49 10*3/MM3 (ref 3.5–10.8)

## 2019-03-18 PROCEDURE — 99213 OFFICE O/P EST LOW 20 MIN: CPT | Performed by: INTERNAL MEDICINE

## 2019-03-18 PROCEDURE — 83036 HEMOGLOBIN GLYCOSYLATED A1C: CPT | Performed by: INTERNAL MEDICINE

## 2019-03-18 PROCEDURE — 84443 ASSAY THYROID STIM HORMONE: CPT | Performed by: INTERNAL MEDICINE

## 2019-03-18 PROCEDURE — 82962 GLUCOSE BLOOD TEST: CPT | Performed by: INTERNAL MEDICINE

## 2019-03-18 PROCEDURE — 85025 COMPLETE CBC W/AUTO DIFF WBC: CPT | Performed by: INTERNAL MEDICINE

## 2019-03-18 RX ORDER — GLIMEPIRIDE 2 MG/1
2 TABLET ORAL DAILY
Qty: 90 TABLET | Refills: 0 | Status: SHIPPED | OUTPATIENT
Start: 2019-03-18 | End: 2019-07-18 | Stop reason: SDUPTHER

## 2019-03-18 RX ORDER — NYSTATIN 100000 [USP'U]/G
POWDER TOPICAL 4 TIMES DAILY
COMMUNITY
End: 2019-03-18 | Stop reason: SDUPTHER

## 2019-03-18 RX ORDER — NYSTATIN 100000 [USP'U]/G
POWDER TOPICAL 4 TIMES DAILY
Qty: 60 G | Refills: 1 | Status: SHIPPED | OUTPATIENT
Start: 2019-03-18 | End: 2019-11-06

## 2019-03-18 NOTE — PROGRESS NOTES
Subjective   Tony Wolf is a 73 y.o. male.   Chief Complaint   Patient presents with   • Diabetes     LAST A1C 6.9   • Hyperlipidemia   • Hypertension     FOLLOW UP   • Sleep Apnea   • Heartburn       Diabetes   He presents for his follow-up diabetic visit. He has type 2 diabetes mellitus. Pertinent negatives for hypoglycemia include no confusion, headaches, nervousness/anxiousness, pallor, seizures or speech difficulty. Associated symptoms include fatigue. Pertinent negatives for diabetes include no chest pain, no polydipsia and no polyphagia. An ACE inhibitor/angiotensin II receptor blocker is being taken.   Heartburn   He reports no abdominal pain, no chest pain, no coughing, no nausea, no sore throat, no stridor, no tooth decay, no water brash or no wheezing. This is a chronic problem. Associated symptoms include fatigue.   Hyperlipidemia   This is a chronic problem. The current episode started more than 1 year ago. Pertinent negatives include no chest pain, myalgias or shortness of breath.   Hypertension   This is a chronic problem. The current episode started more than 1 year ago. Pertinent negatives include no chest pain, headaches, neck pain, palpitations or shortness of breath. Identifiable causes of hypertension include sleep apnea.   Sleep Apnea   Associated symptoms include fatigue and a rash. Pertinent negatives include no abdominal pain, arthralgias, chest pain, chills, congestion, coughing, diaphoresis, fever, headaches, myalgias, nausea, neck pain, numbness, sore throat or vomiting.      Chronic atrial fib that has been stable.  Anxiety stable with current med. Gerd is doing okay. Watching diet helps. Yeast in groin folds    The following portions of the patient's history were reviewed and updated as appropriate: allergies, current medications, past family history, past medical history, past social history, past surgical history and problem list.    Review of Systems   Constitutional: Positive  for fatigue. Negative for activity change, appetite change, chills, diaphoresis, fever and unexpected weight change.   HENT: Negative for congestion, ear discharge, ear pain, mouth sores, nosebleeds, sinus pressure, sneezing and sore throat.    Eyes: Negative for pain, discharge and itching.   Respiratory: Negative for cough, chest tightness, shortness of breath and wheezing.    Cardiovascular: Negative for chest pain, palpitations and leg swelling.   Gastrointestinal: Negative for abdominal pain, constipation, diarrhea, nausea and vomiting.   Endocrine: Negative for cold intolerance, heat intolerance, polydipsia and polyphagia.   Genitourinary: Negative for dysuria, flank pain, frequency, hematuria and urgency.   Musculoskeletal: Negative for arthralgias, back pain, gait problem, myalgias, neck pain and neck stiffness.   Skin: Positive for rash. Negative for color change and pallor.   Neurological: Negative for seizures, speech difficulty, numbness and headaches.   Psychiatric/Behavioral: Negative for agitation, confusion, decreased concentration and sleep disturbance. The patient is not nervous/anxious.      /70   Pulse 54   Wt 112 kg (248 lb)   SpO2 98%   BMI 33.63 kg/m²     Objective   Physical Exam   Constitutional: He is oriented to person, place, and time. He appears well-developed.   HENT:   Head: Normocephalic.   Right Ear: External ear normal.   Left Ear: External ear normal.   Nose: Nose normal.   Mouth/Throat: Oropharynx is clear and moist.   Eyes: Conjunctivae are normal. Pupils are equal, round, and reactive to light.   Neck: No JVD present. No thyromegaly present.   Cardiovascular: Normal rate, regular rhythm and normal heart sounds. Exam reveals no friction rub.   No murmur heard.  Pulmonary/Chest: Effort normal and breath sounds normal. No respiratory distress. He has no wheezes. He has no rales.   Abdominal: Soft. Bowel sounds are normal. He exhibits no distension. There is no tenderness.  There is no guarding.   Musculoskeletal: He exhibits no edema or tenderness.   Lymphadenopathy:     He has no cervical adenopathy.   Neurological: He is oriented to person, place, and time. He displays normal reflexes. No cranial nerve deficit.   Skin: No rash noted.   satellite lesions in groin folds.    Psychiatric: He has a normal mood and affect. His behavior is normal.   Nursing note and vitals reviewed.      Assessment/Plan   Tony was seen today for heartburn, hyperlipidemia, hypertension and diabetes.    Diagnoses and all orders for this visit:    Controlled type 2 diabetes mellitus without complication, without long-term current use of insulin  -     POC Glucose Fingerstick  -     POC Glycosylated Hemoglobin (Hb A1C)  -     Comprehensive Metabolic Panel  -     Lipid Panel  Follows with Endo.   Essential hypertension  -     Comprehensive Metabolic Panel  -     Lipid Panel    Hyperlipidemia LDL goal <100  Lipids today  Obstructive sleep apnea  Did not tolerate cpap  Gastroesophageal reflux disease, esophagitis presence not specified  stable  Anxiety  stable  Chronic a fib  Stable. Coumadin managed by Cardiolog

## 2019-03-27 ENCOUNTER — ANTICOAGULATION VISIT (OUTPATIENT)
Dept: PHARMACY | Facility: HOSPITAL | Age: 74
End: 2019-03-27

## 2019-03-27 ENCOUNTER — TELEPHONE (OUTPATIENT)
Dept: PHARMACY | Facility: HOSPITAL | Age: 74
End: 2019-03-27

## 2019-03-27 DIAGNOSIS — I48.91 ATRIAL FIBRILLATION, UNSPECIFIED TYPE (HCC): ICD-10-CM

## 2019-03-27 DIAGNOSIS — I48.20 CHRONIC ATRIAL FIBRILLATION (HCC): ICD-10-CM

## 2019-03-27 LAB
INR PPP: 1.8 (ref 0.91–1.09)
PROTHROMBIN TIME: 21.8 SECONDS (ref 10–13.8)

## 2019-03-27 PROCEDURE — 85610 PROTHROMBIN TIME: CPT

## 2019-03-27 PROCEDURE — G0463 HOSPITAL OUTPT CLINIC VISIT: HCPCS

## 2019-03-27 PROCEDURE — 36416 COLLJ CAPILLARY BLOOD SPEC: CPT

## 2019-03-27 RX ORDER — MIRTAZAPINE 45 MG/1
45 TABLET, FILM COATED ORAL NIGHTLY
COMMUNITY
Start: 2019-03-04

## 2019-03-27 RX ORDER — WARFARIN SODIUM 2.5 MG/1
TABLET ORAL
Qty: 120 TABLET | Refills: 1 | Status: SHIPPED | OUTPATIENT
Start: 2019-03-27 | End: 2019-10-07 | Stop reason: SDUPTHER

## 2019-03-27 NOTE — TELEPHONE ENCOUNTER
Dr. Lassiter,    Mr. Wolf is a 73yoM on warfarin for afib with a CHADS-VASc of 3 (age, HTN, DM). He has been (re)scheduled for a colonoscopy with Dr. Hawthorne at [facility] on Monday 4/15. Dr. Hawthorne has requested that the patient hold warfarin 3 days pre-scope -- he will be cleared to resume warfarin the evening of his colonoscopy (4/15) if no polyps are found, or the evening after (4/16) if polyps are found.     You have previously approved of this plan (10/3/18), but please let us know if you have any alternative instructions at this time.    Thank you,  Elise

## 2019-03-27 NOTE — PROGRESS NOTES
Anticoagulation Clinic Progress Note  Indication: atrial fibrillation  Referring Provider: Sravani  Initial Warfarin Start Date: 2008  Goal INR: 2 - 3  Current Drug Interactions: fluoxetine, glimepiride, melatonin (PRN)  CHADS-VASc: 3 (age, HTN, DM)  EtOH: none    Anticoagulation Clinic INR History:  Date 8/2 8/23 9/20 10/11 11/7 11/17 11/28 12/13 12/27   Total Weekly Dose 17.5mg 17.5 mg 17.5 mg 17.5mg 10mg 17.5 mg 17.5 mg 20mg 22.5 mg   INR 2.5 2.3 2.3 2.0 1.2 1.7 1.5 1.7 2.6   Notes     Pre- lumbar inj         Date 1/10/18 1/31 2/28 3/28 4/27 5/30 6/27 7/11 7/23 8/1 8/6 8/13   Total Weekly Dose 22.5 mg 22.5 mg 22.5 mg 22.5 mg 22.5 mg 22.5 mg 22.5 mg 22.5 mg 22.5 mg 20mg 12.5 mg 17.5 mg   INR 2.6 2.5 2.1 2.4 2.4 2.6 3.5 3.4 3.6 5.3, 4.9 1.9 2.7   Notes        Keflex         Date 8/20 9/4 10/2 11/6 11/15 11/29 12/31 1/7/19 1/17 1/29 2/12   Total Weekly Dose 17.5  mg 17.5 mg 17.5 mg 10mg 20mg 17.5mg 17.5mg 18.75 mg 17.5 mg 18.75 mg 20mg   INR 2.3 2.3 2.2 1.3 2.3 2.2 1.7 1.9 1.9 1.9 2.0   Notes    pre- epidural   missed dose?           Date 2/27 3/27            Total Weekly Dose 20mg 20mg 21.25 mg           INR 2.2 1.8            Notes sick               Clinic Interview:  Verbal Release Authorization signed on 6/27/18 -- may speak with Sussy (wife), Nikolai (son)  Tablet Strength: pt has 2.5mg tablets  Phone: [phone] (Home), [phone]   Fax: [phone] (Attn: Tasha) [facility]    Patient Findings:  Positives:  Upcoming invasive procedure   Negatives:  Signs/symptoms of thrombosis, Signs/symptoms of bleeding, Laboratory test error suspected, Change in health, Change in alcohol use, Change in activity, Emergency department visit, Upcoming dental procedure, Missed doses, Extra doses, Change in medications, Change in diet/appetite, Hospital admission, Bruising, Other complaints   Comments:  Mr. Wolf has not had as much broccoli as usual in the past few weeks, although this would be more likely  to increase his INR. He has otherwise been scheduled for a colonoscopy on Monday 4/15 -- based on instructions discussed in October, he will need to hold his warfarin x 3 days prior. Will clear a plan through Nan Hawthorne and Sravani.     Plan:   1. INR is slightly subtherapeutic today. Based on recent / historical trend, instructed Mr. Wolf to increase his dose (6%) to warfarin 2.5 mg daily except warfarin 3.75 mg TuesThursSat.  2. RTC in two weeks (4/10) to reassess. Mr. Wolf is scheduled for a colonoscopy the following Monday, 4/15. He will likely need to hold his warfarin x3 days prior -- will verify with Dr. Hawthorne and Dr. Lassiter and provide further instructions to the Connie at follow up.  3. Verbal and written information was provided in clinic. Mr. Wolf voiced understanding with teach back and has no further questions at this time.   4. Refills e-prescribed for warfarin 2.5mg tabs.    Ann Cowden Mayer, PharmD  3/27/2019  11:45 AM

## 2019-04-10 ENCOUNTER — ANTICOAGULATION VISIT (OUTPATIENT)
Dept: PHARMACY | Facility: HOSPITAL | Age: 74
End: 2019-04-10

## 2019-04-10 DIAGNOSIS — I48.20 CHRONIC ATRIAL FIBRILLATION (HCC): ICD-10-CM

## 2019-04-10 LAB
INR PPP: 2.5 (ref 0.91–1.09)
PROTHROMBIN TIME: 29.5 SECONDS (ref 10–13.8)

## 2019-04-10 PROCEDURE — 36416 COLLJ CAPILLARY BLOOD SPEC: CPT

## 2019-04-10 PROCEDURE — G0463 HOSPITAL OUTPT CLINIC VISIT: HCPCS | Performed by: PHARMACIST

## 2019-04-10 PROCEDURE — 85610 PROTHROMBIN TIME: CPT

## 2019-04-10 NOTE — PROGRESS NOTES
Anticoagulation Clinic Progress Note  Indication: atrial fibrillation  Referring Provider: Sravani  Initial Warfarin Start Date: 2008  Goal INR: 2 - 3  Current Drug Interactions: fluoxetine, glimepiride, melatonin (PRN)  CHADS-VASc: 3 (age, HTN, DM)  EtOH: none    Anticoagulation Clinic INR History:  Date 8/2 8/23 9/20 10/11 11/7 11/17 11/28 12/13 12/27   Total Weekly Dose 17.5mg 17.5 mg 17.5 mg 17.5mg 10mg 17.5 mg 17.5 mg 20mg 22.5 mg   INR 2.5 2.3 2.3 2.0 1.2 1.7 1.5 1.7 2.6   Notes     Pre- lumbar inj         Date 1/10/18 1/31 2/28 3/28 4/27 5/30 6/27 7/11 7/23 8/1 8/6 8/13   Total Weekly Dose 22.5 mg 22.5 mg 22.5 mg 22.5 mg 22.5 mg 22.5 mg 22.5 mg 22.5 mg 22.5 mg 20mg 12.5 mg 17.5 mg   INR 2.6 2.5 2.1 2.4 2.4 2.6 3.5 3.4 3.6 5.3, 4.9 1.9 2.7   Notes        Keflex         Date 8/20 9/4 10/2 11/6 11/15 11/29 12/31 1/7/19 1/17 1/29 2/12   Total Weekly Dose 17.5  mg 17.5 mg 17.5 mg 10mg 20mg 17.5mg 17.5mg 18.75 mg 17.5 mg 18.75 mg 20mg   INR 2.3 2.3 2.2 1.3 2.3 2.2 1.7 1.9 1.9 1.9 2.0   Notes    pre- epidural   missed dose?           Date 2/27 3/27 4/10           Total Weekly Dose 20mg 20mg 21.25 mg           INR 2.2 1.8 2.5           Notes sick               Clinic Interview:  Verbal Release Authorization signed on 6/27/18 -- may speak with Sussy (wife), Nikolai (son)  Tablet Strength: pt has 2.5mg tablets  Phone: [phone] (Home), [phone]   Fax: [phone] (Attn: Tasha) [facility]    Patient Findings:  Positives:  Upcoming invasive procedure   Negatives:  Signs/symptoms of thrombosis, Signs/symptoms of bleeding, Laboratory test error suspected, Change in health, Change in alcohol use, Change in activity, Emergency department visit, Upcoming dental procedure, Missed doses, Extra doses, Change in medications, Change in diet/appetite, Hospital admission, Bruising, Other complaints   Comments:  Has colonoscopy scheduled for 4/15 with three day hold prior.       Plan:   1. INR is therapeutic  today at 2.5. He has a colonoscopy scheduled for 4/15 with a preceding 3 day hold. Recommend to continue increased dose of warfarin 2.5 mg daily except warfarin 3.75 mg TuesThursSat. He will resume warfarin on the evening of colonoscopy if acceptable with Dr. Hawthorne.  2. RTC in two weeks (4/23) to reassess.  3. Verbal and written information was provided in clinic. Mr. Wolf voiced understanding with teach back and has no further questions at this time.       Simon Real, PharmD  4/10/2019  1:11 PM

## 2019-04-11 ENCOUNTER — TELEPHONE (OUTPATIENT)
Dept: INTERNAL MEDICINE | Facility: CLINIC | Age: 74
End: 2019-04-11

## 2019-04-11 DIAGNOSIS — R11.0 NAUSEA: Primary | ICD-10-CM

## 2019-04-11 NOTE — TELEPHONE ENCOUNTER
PATIENT STATES THAT HE IS SCHEDULED TO HAVE HIS COLONOSCOPY Monday MORNING 04/15/2019 AND WOULD LIKE TO HAVE DR GUILLAUME CALL HIM IN SOME PHENERGAN BEFORE HE HAS IT DONE TO HIS LOCAL PHARM THAT IS ON FILE.

## 2019-04-12 RX ORDER — PROMETHAZINE HYDROCHLORIDE 12.5 MG/1
12.5 TABLET ORAL EVERY 6 HOURS PRN
Qty: 12 TABLET | Refills: 0 | Status: SHIPPED | OUTPATIENT
Start: 2019-04-12 | End: 2019-05-28

## 2019-04-23 ENCOUNTER — ANTICOAGULATION VISIT (OUTPATIENT)
Dept: PHARMACY | Facility: HOSPITAL | Age: 74
End: 2019-04-23

## 2019-04-23 DIAGNOSIS — I48.20 CHRONIC ATRIAL FIBRILLATION (HCC): ICD-10-CM

## 2019-04-23 LAB
INR PPP: 1.8 (ref 0.91–1.09)
PROTHROMBIN TIME: 21.4 SECONDS (ref 10–13.8)

## 2019-04-23 PROCEDURE — G0463 HOSPITAL OUTPT CLINIC VISIT: HCPCS

## 2019-04-23 PROCEDURE — 36416 COLLJ CAPILLARY BLOOD SPEC: CPT

## 2019-04-23 PROCEDURE — 85610 PROTHROMBIN TIME: CPT

## 2019-04-23 NOTE — PROGRESS NOTES
Anticoagulation Clinic Progress Note  Indication: atrial fibrillation  Referring Provider: Sravani  Initial Warfarin Start Date: 2008  Goal INR: 2 - 3  Current Drug Interactions: fluoxetine, glimepiride, melatonin (PRN)  CHADS-VASc: 3 (age, HTN, DM)  EtOH: none    Anticoagulation Clinic INR History:  Date 8/2 8/23 9/20 10/11 11/7 11/17 11/28 12/13 12/27   Total Weekly Dose 17.5mg 17.5 mg 17.5 mg 17.5mg 10mg 17.5 mg 17.5 mg 20mg 22.5 mg   INR 2.5 2.3 2.3 2.0 1.2 1.7 1.5 1.7 2.6   Notes     Pre- lumbar inj         Date 1/10/18 1/31 2/28 3/28 4/27 5/30 6/27 7/11 7/23 8/1 8/6 8/13   Total Weekly Dose 22.5 mg 22.5 mg 22.5 mg 22.5 mg 22.5 mg 22.5 mg 22.5 mg 22.5 mg 22.5 mg 20mg 12.5 mg 17.5 mg   INR 2.6 2.5 2.1 2.4 2.4 2.6 3.5 3.4 3.6 5.3, 4.9 1.9 2.7   Notes        Keflex         Date 8/20 9/4 10/2 11/6 11/15 11/29 12/31 1/7/19 1/17 1/29 2/12   Total Weekly Dose 17.5  mg 17.5 mg 17.5 mg 10mg 20mg 17.5mg 17.5mg 18.75 mg 17.5 mg 18.75 mg 20mg   INR 2.3 2.3 2.2 1.3 2.3 2.2 1.7 1.9 1.9 1.9 2.0   Notes    pre- epidural   missed dose?           Date 2/27 3/27 4/10 4/23          Total Weekly Dose 20mg 20mg 21.25 mg 21.25 mg          INR 2.2 1.8 2.5 1.8          Notes sick   post- scope            Clinic Interview:  Verbal Release Authorization signed on 6/27/18 -- may speak with Sussy (wife), Nikolai (son)  Tablet Strength: pt has 2.5mg tablets  Phone: [phone] (Home), [phone]   Fax: [phone] (Attn: Tasha) [facility] [place]    Patient Findings:    Plan:   1. INR is slightly subtherapeutic one week post-scope. For now, recommend that Mr. Wolf continue warfarin 2.5 mg daily except warfarin 3.75 mg TuesThursSat.   2. RTC in two weeks.  3. Verbal and written information was provided in clinic. Mr. Wolf voiced understanding with teach back and has no further questions at this time.     Ann Cowden Mayer, PharmD  4/23/2019  1:22 PM

## 2019-05-07 ENCOUNTER — ANTICOAGULATION VISIT (OUTPATIENT)
Dept: PHARMACY | Facility: HOSPITAL | Age: 74
End: 2019-05-07

## 2019-05-07 DIAGNOSIS — I48.20 CHRONIC ATRIAL FIBRILLATION (HCC): ICD-10-CM

## 2019-05-07 PROCEDURE — G0463 HOSPITAL OUTPT CLINIC VISIT: HCPCS

## 2019-05-07 PROCEDURE — 85610 PROTHROMBIN TIME: CPT

## 2019-05-07 PROCEDURE — 36416 COLLJ CAPILLARY BLOOD SPEC: CPT

## 2019-05-07 RX ORDER — LORATADINE 10 MG/1
1 CAPSULE, LIQUID FILLED ORAL DAILY
COMMUNITY

## 2019-05-07 NOTE — PROGRESS NOTES
Anticoagulation Clinic Progress Note  Indication: atrial fibrillation  Referring Provider: Sravani  Initial Warfarin Start Date: 2008  Goal INR: 2 - 3  Current Drug Interactions: fluoxetine, glimepiride, melatonin (PRN)  CHADS-VASc: 3 (age, HTN, DM)  EtOH: none    Anticoagulation Clinic INR History:  Date 8/2 8/23 9/20 10/11 11/7 11/17 11/28 12/13 12/27   Total Weekly Dose 17.5mg 17.5 mg 17.5 mg 17.5mg 10mg 17.5 mg 17.5 mg 20mg 22.5 mg   INR 2.5 2.3 2.3 2.0 1.2 1.7 1.5 1.7 2.6   Notes     Pre- lumbar inj         Date 1/10/18 1/31 2/28 3/28 4/27 5/30 6/27 7/11 7/23 8/1 8/6 8/13   Total Weekly Dose 22.5 mg 22.5 mg 22.5 mg 22.5 mg 22.5 mg 22.5 mg 22.5 mg 22.5 mg 22.5 mg 20mg 12.5 mg 17.5 mg   INR 2.6 2.5 2.1 2.4 2.4 2.6 3.5 3.4 3.6 5.3, 4.9 1.9 2.7   Notes        Keflex         Date 8/20 9/4 10/2 11/6 11/15 11/29 12/31 1/7/19 1/17 1/29 2/12   Total Weekly Dose 17.5  mg 17.5 mg 17.5 mg 10mg 20mg 17.5mg 17.5mg 18.75 mg 17.5 mg 18.75 mg 20mg   INR 2.3 2.3 2.2 1.3 2.3 2.2 1.7 1.9 1.9 1.9 2.0   Notes    pre- epidural   missed dose?         Date 2/27 3/27 4/10 4/23 5/7         Total Weekly Dose 20mg 20mg 21.25 mg 21.25 mg 21.25 mg         INR 2.2 1.8 2.5 1.8 2.4         Notes sick   post- scope            Clinic Interview:  Verbal Release Authorization signed on 6/27/18 -- may speak with Sussy (wife), Nikolai (son)  Tablet Strength: pt has 2.5mg tablets  Phone: [phone] (Home), [phone]   Fax: [phone] (Attn: Tasha) [facility]    Patient Findings:  Negatives:  Signs/symptoms of thrombosis, Signs/symptoms of bleeding, Laboratory test error suspected, Change in health, Change in alcohol use, Change in activity, Upcoming invasive procedure, Emergency department visit, Upcoming dental procedure, Missed doses, Extra doses, Change in medications, Change in diet/appetite, Hospital admission, Bruising, Other complaints   Comments:  Mr. Wolf has started eating some juan pablo candies due to their perceived  effects on his stomach. Advised that higher doses of juan pablo may affect platelet aggregative and thereby increase his risk of bleeding -- he notes not such changes / signs at this time.     Plan:   1. INR is therapeutic today, so instructed Mr. Wolf to continue warfarin 2.5 mg daily except warfarin 3.75 mg TuesThursSat.   2. RTC in three weeks to reassess.   3. Verbal and written information was provided in clinic. Mr. Wolf voiced understanding with teach back and has no further questions at this time.     Ann Cowden Mayer, PharmD  5/7/2019  1:04 PM

## 2019-05-08 LAB
INR PPP: 2.4 (ref 0.91–1.09)
PROTHROMBIN TIME: 28.3 SECONDS (ref 10–13.8)

## 2019-05-13 RX ORDER — PRAVASTATIN SODIUM 20 MG
TABLET ORAL
Qty: 30 TABLET | Refills: 1 | Status: SHIPPED | OUTPATIENT
Start: 2019-05-13 | End: 2019-07-11 | Stop reason: SDUPTHER

## 2019-05-28 ENCOUNTER — ANTICOAGULATION VISIT (OUTPATIENT)
Dept: PHARMACY | Facility: HOSPITAL | Age: 74
End: 2019-05-28

## 2019-05-28 DIAGNOSIS — I48.20 CHRONIC ATRIAL FIBRILLATION (HCC): ICD-10-CM

## 2019-05-28 LAB
INR PPP: 2.4 (ref 0.91–1.09)
PROTHROMBIN TIME: 29.3 SECONDS (ref 10–13.8)

## 2019-05-28 PROCEDURE — 85610 PROTHROMBIN TIME: CPT

## 2019-05-28 PROCEDURE — 36416 COLLJ CAPILLARY BLOOD SPEC: CPT

## 2019-05-28 PROCEDURE — G0463 HOSPITAL OUTPT CLINIC VISIT: HCPCS

## 2019-05-28 NOTE — PROGRESS NOTES
Anticoagulation Clinic Progress Note  Indication: atrial fibrillation  Referring Provider: Sravani  Initial Warfarin Start Date: 2008  Goal INR: 2 - 3  Current Drug Interactions: fluoxetine, glimepiride, melatonin (PRN), mirtazepine  CHADS-VASc: 3 (age, HTN, DM)  EtOH: none    Anticoagulation Clinic INR History:  Date 8/2 8/23 9/20 10/11 11/7 11/17 11/28 12/13 12/27   Total Weekly Dose 17.5mg 17.5 mg 17.5 mg 17.5mg 10mg 17.5 mg 17.5 mg 20mg 22.5 mg   INR 2.5 2.3 2.3 2.0 1.2 1.7 1.5 1.7 2.6   Notes     Pre- lumbar inj         Date 1/10/18 1/31 2/28 3/28 4/27 5/30 6/27 7/11 7/23 8/1 8/6 8/13   Total Weekly Dose 22.5 mg 22.5 mg 22.5 mg 22.5 mg 22.5 mg 22.5 mg 22.5 mg 22.5 mg 22.5 mg 20mg 12.5 mg 17.5 mg   INR 2.6 2.5 2.1 2.4 2.4 2.6 3.5 3.4 3.6 5.3, 4.9 1.9 2.7   Notes        Keflex         Date 8/20 9/4 10/2 11/6 11/15 11/29 12/31 1/7/19 1/17 1/29 2/12   Total Weekly Dose 17.5  mg 17.5 mg 17.5 mg 10mg 20mg 17.5mg 17.5mg 18.75 mg 17.5 mg 18.75 mg 20mg   INR 2.3 2.3 2.2 1.3 2.3 2.2 1.7 1.9 1.9 1.9 2.0   Notes    pre- epidural   missed dose?         Date 2/27 3/27 4/10 4/23 5/7 5/28        Total Weekly Dose 20mg 20mg 21.25 mg 21.25 mg 21.25 mg 21.25mg        INR 2.2 1.8 2.5 1.8 2.4 2.4        Notes sick   post- scope            Clinic Interview:  Verbal Release Authorization signed on 6/27/18 -- may speak with Sussy (wife)Nikolai (son)  Tablet Strength: pt has 2.5mg tablets  Phone: [phone] (Home), [phone]   Fax: [phone] (Attn: Tasha) [facility]    Patient Findings   Negatives:  Signs/symptoms of thrombosis, Signs/symptoms of bleeding, Laboratory test error suspected, Change in health, Change in alcohol use, Change in activity, Upcoming invasive procedure, Emergency department visit, Upcoming dental procedure, Missed doses, Extra doses, Change in medications, Change in diet/appetite, Hospital admission, Bruising, Other complaints     Plan:   1. INR is therapeutic today, so instructed   Maryann to continue warfarin 2.5 mg daily except warfarin 3.75 mg TuesThursSat.   2. RTC in four weeks to reassess.  3. Verbal and written information was provided in clinic. Mr. Wolf voiced understanding with teach back and has no further questions at this time.     Ama Mccloud, PharmD  5/28/2019  1:08 PM

## 2019-05-29 RX ORDER — AMLODIPINE BESYLATE 10 MG/1
TABLET ORAL
Qty: 30 TABLET | Refills: 5 | Status: SHIPPED | OUTPATIENT
Start: 2019-05-29 | End: 2019-11-27 | Stop reason: SDUPTHER

## 2019-06-03 RX ORDER — METOPROLOL SUCCINATE 100 MG/1
TABLET, EXTENDED RELEASE ORAL
Qty: 180 TABLET | Refills: 3 | Status: SHIPPED | OUTPATIENT
Start: 2019-06-03 | End: 2020-05-26

## 2019-06-13 ENCOUNTER — OFFICE VISIT (OUTPATIENT)
Dept: INTERNAL MEDICINE | Facility: CLINIC | Age: 74
End: 2019-06-13

## 2019-06-13 VITALS
BODY MASS INDEX: 32.91 KG/M2 | HEART RATE: 65 BPM | TEMPERATURE: 98.5 F | RESPIRATION RATE: 20 BRPM | DIASTOLIC BLOOD PRESSURE: 86 MMHG | SYSTOLIC BLOOD PRESSURE: 138 MMHG | HEIGHT: 72 IN | OXYGEN SATURATION: 95 % | WEIGHT: 243 LBS

## 2019-06-13 DIAGNOSIS — J06.9 ACUTE URI: Primary | ICD-10-CM

## 2019-06-13 PROCEDURE — 99213 OFFICE O/P EST LOW 20 MIN: CPT | Performed by: NURSE PRACTITIONER

## 2019-06-13 NOTE — PATIENT INSTRUCTIONS
Drink plenty of fluids.  Tylenol for pain.  Robitussin DM for cough unless otherwise directed.  See Dr. Lopez if not steadily improving. Pt verbalizes understanding and agreement with plan of care.

## 2019-06-13 NOTE — PROGRESS NOTES
Subjective   Tony Wolf is a 73 y.o. male.   Chief Complaint   Patient presents with   • Nasal Congestion   • Sore Throat   • Cough   • Headache      History of Present Illness  As above.  Onset last night.  No fever, ear pain.  Ears are stopped up.   Cough is NP.  No shortness of air, chest pain, abdominal pain, nausea vomiting or diarrhea.  Taking over-the-counter without resolution.    The following portions of the patient's history were reviewed and updated as appropriate: allergies, current medications, past family history, past medical history, past social history, past surgical history and problem list.    Current Outpatient Medications:   •  ACCU-CHEK SOFTCLIX LANCETS lancets, TEST THREE TIMES A DAY, Disp: 100 each, Rfl: 3  •  acetaminophen (TYLENOL) 500 MG tablet, Take 1,000 mg by mouth Every 6 (Six) Hours As Needed for Mild Pain (1-3)., Disp: , Rfl:   •  amLODIPine (NORVASC) 10 MG tablet, TAKE ONE TABLET BY MOUTH DAILY AS DIRECTED, Disp: 30 tablet, Rfl: 5  •  calcium carbonate (TUMS) 500 MG chewable tablet, Chew 2 tablets Daily As Needed for Indigestion or Heartburn., Disp: , Rfl:   •  Cholecalciferol (VITAMIN D-3) 1000 UNITS capsule, Take 1,000 Units by mouth Daily., Disp: , Rfl:   •  FLUoxetine (PROzac) 40 MG capsule, , Disp: , Rfl:   •  glimepiride (AMARYL) 2 MG tablet, Take 1 tablet by mouth Daily., Disp: 90 tablet, Rfl: 0  •  glucose blood (ACCU-CHEK FREDERICK PLUS) test strip, TEST THREE TIMES A DAY, Disp: 100 each, Rfl: 1  •  Histamine Dihydrochloride (AUSTRALIAN DREAM ARTHRITIS) 0.025 % cream, Apply  topically Daily., Disp: , Rfl:   •  lisinopril (PRINIVIL,ZESTRIL) 40 MG tablet, TAKE ONE TABLET BY MOUTH DAILY, Disp: 30 tablet, Rfl: 11  •  Loratadine (CLARITIN) 10 MG capsule, Take 1 capsule by mouth Daily., Disp: , Rfl:   •  Melatonin 2.5 MG chewable tablet, Chew 1 tablet At Night As Needed., Disp: , Rfl:   •  metoprolol succinate XL (TOPROL-XL) 100 MG 24 hr tablet, TAKE ONE TABLET BY MOUTH TWICE A  "DAY, Disp: 180 tablet, Rfl: 3  •  mirtazapine (REMERON) 45 MG tablet, Take 45 mg by mouth Every Night., Disp: , Rfl:   •  nystatin (nystatin) 177539 UNIT/GM powder, Apply  topically to the appropriate area as directed 4 (Four) Times a Day., Disp: 60 g, Rfl: 1  •  pravastatin (PRAVACHOL) 20 MG tablet, TAKE ONE TABLET BY MOUTH DAILY, Disp: 30 tablet, Rfl: 1  •  warfarin (COUMADIN) 2.5 MG tablet, Take 1 to 1.5 tablet(s) by mouth daily, as directed by the anticoagulation clinic., Disp: 120 tablet, Rfl: 1    Review of Systems Consitutional, HEENT, Respiratory, CV, GI, , Skin, Musculoskeletal, Neuro-mental, Endocrinological, Hematological were reviewed.  Positives were discussed in the HPI, otherwise ROS was negative   /86   Pulse 65   Temp 98.5 °F (36.9 °C)   Resp 20   Ht 182.9 cm (72\")   Wt 110 kg (243 lb)   SpO2 95%   BMI 32.96 kg/m²     Objective   Allergies   Allergen Reactions   • Aspirin Anaphylaxis     Other reaction(s): Hypotension       Physical Exam   Constitutional: He is oriented to person, place, and time. He appears well-developed and well-nourished. No distress.   HENT:   Head: Normocephalic.   Right Ear: External ear normal.   Left Ear: External ear normal.   Nose: Nose normal.   Mouth/Throat: Oropharynx is clear and moist.   Eyes: Right eye exhibits no discharge. Left eye exhibits no discharge. No scleral icterus.   Neck: Neck supple.   Cardiovascular: Normal rate, regular rhythm, normal heart sounds and intact distal pulses. Exam reveals no gallop and no friction rub.   No murmur heard.  Pulmonary/Chest: Effort normal and breath sounds normal. No stridor. No respiratory distress. He has no wheezes. He has no rales.   Lymphadenopathy:     He has no cervical adenopathy.   Neurological: He is alert and oriented to person, place, and time.   Skin: Skin is warm and dry.   Is pink, no rash    Nursing note and vitals reviewed.      Procedures    LABS  Results for orders placed or performed in " visit on 05/28/19   POC Protime / INR   Result Value Ref Range    Protime 29.3 (H) 10.0 - 13.8 seconds    INR 2.4 (H) 0.91 - 1.09       Assessment/Plan   Tony was seen today for nasal congestion, sore throat, cough and headache.    Diagnoses and all orders for this visit:    Acute URI      Patient Instructions   Drink plenty of fluids.  Tylenol for pain.  Robitussin DM for cough unless otherwise directed.  See Dr. Lopez if not steadily improving. Pt verbalizes understanding and agreement with plan of care.       EMR Dragon/transcription disclaimer:  Please note that portions of this note were completed with a voice recognition program.  Electronic transcription of the voice recognition program may permit erroneous words or phrases to be inadvertently transcribed.  Although I have reviewed the note for such errors, some may still exist in this documentation     Agustina Reina, YUDITH

## 2019-06-21 ENCOUNTER — OFFICE VISIT (OUTPATIENT)
Dept: INTERNAL MEDICINE | Facility: CLINIC | Age: 74
End: 2019-06-21

## 2019-06-21 VITALS
DIASTOLIC BLOOD PRESSURE: 80 MMHG | HEART RATE: 65 BPM | SYSTOLIC BLOOD PRESSURE: 140 MMHG | OXYGEN SATURATION: 98 % | BODY MASS INDEX: 32.9 KG/M2 | WEIGHT: 242.6 LBS

## 2019-06-21 DIAGNOSIS — F41.1 GENERALIZED ANXIETY DISORDER: ICD-10-CM

## 2019-06-21 DIAGNOSIS — I10 ESSENTIAL HYPERTENSION: Primary | ICD-10-CM

## 2019-06-21 DIAGNOSIS — E78.5 HYPERLIPIDEMIA LDL GOAL <100: ICD-10-CM

## 2019-06-21 DIAGNOSIS — E11.9 CONTROLLED TYPE 2 DIABETES MELLITUS WITHOUT COMPLICATION, WITHOUT LONG-TERM CURRENT USE OF INSULIN (HCC): ICD-10-CM

## 2019-06-21 DIAGNOSIS — I48.20 CHRONIC ATRIAL FIBRILLATION (HCC): ICD-10-CM

## 2019-06-21 DIAGNOSIS — K21.00 GASTROESOPHAGEAL REFLUX DISEASE WITH ESOPHAGITIS: ICD-10-CM

## 2019-06-21 PROCEDURE — 99213 OFFICE O/P EST LOW 20 MIN: CPT | Performed by: INTERNAL MEDICINE

## 2019-06-21 NOTE — PROGRESS NOTES
Subjective   Tony Wolf is a 73 y.o. male.   Chief Complaint   Patient presents with   • Hyperlipidemia     Follow Up   • Hypertension   • Heartburn   • Anxiety   • Sleep Apnea       Diabetes   He presents for his follow-up diabetic visit. He has type 2 diabetes mellitus. Pertinent negatives for hypoglycemia include no confusion, headaches, nervousness/anxiousness, pallor, seizures or speech difficulty. Pertinent negatives for diabetes include no chest pain, no fatigue, no polydipsia and no polyphagia. An ACE inhibitor/angiotensin II receptor blocker is being taken.   Hyperlipidemia   This is a chronic problem. The current episode started more than 1 year ago. Pertinent negatives include no chest pain, myalgias or shortness of breath.   Hypertension   This is a chronic problem. The current episode started more than 1 year ago. Associated symptoms include anxiety. Pertinent negatives include no chest pain, headaches, neck pain, palpitations or shortness of breath. Identifiable causes of hypertension include sleep apnea.   Sleep Apnea   Associated symptoms include a rash. Pertinent negatives include no abdominal pain, arthralgias, chest pain, chills, congestion, coughing, diaphoresis, fatigue, fever, headaches, myalgias, nausea, neck pain, numbness, sore throat or vomiting.   Heartburn   He reports no abdominal pain, no chest pain, no coughing, no nausea, no sore throat, no stridor, no tooth decay, no water brash or no wheezing. This is a chronic problem. Pertinent negatives include no fatigue.   Anxiety   Patient reports no chest pain, confusion, decreased concentration, nausea, nervous/anxious behavior, palpitations or shortness of breath.          Chronic atrial fib that has been stable.  Anxiety stable with current med. Gerd is doing okay. Watching diet helps. Yeast in groin folds    The following portions of the patient's history were reviewed and updated as appropriate: allergies, current medications, past  family history, past medical history, past social history, past surgical history and problem list.    Review of Systems   Constitutional: Negative for activity change, appetite change, chills, diaphoresis, fatigue, fever and unexpected weight change.   HENT: Negative for congestion, ear discharge, ear pain, mouth sores, nosebleeds, sinus pressure, sneezing and sore throat.    Eyes: Negative for pain, discharge and itching.   Respiratory: Negative for cough, chest tightness, shortness of breath and wheezing.    Cardiovascular: Negative for chest pain, palpitations and leg swelling.   Gastrointestinal: Negative for abdominal pain, constipation, diarrhea, nausea and vomiting.   Endocrine: Negative for cold intolerance, heat intolerance, polydipsia and polyphagia.   Genitourinary: Negative for dysuria, flank pain, frequency, hematuria and urgency.   Musculoskeletal: Negative for arthralgias, back pain, gait problem, myalgias, neck pain and neck stiffness.   Skin: Positive for rash. Negative for color change and pallor.   Neurological: Negative for seizures, speech difficulty, numbness and headaches.   Psychiatric/Behavioral: Negative for agitation, confusion, decreased concentration and sleep disturbance. The patient is not nervous/anxious.      /80   Pulse 65   Wt 110 kg (242 lb 9.6 oz)   SpO2 98%   BMI 32.90 kg/m²     Objective   Physical Exam   Constitutional: He is oriented to person, place, and time. He appears well-developed.   HENT:   Head: Normocephalic.   Right Ear: External ear normal.   Left Ear: External ear normal.   Nose: Nose normal.   Mouth/Throat: Oropharynx is clear and moist.   Eyes: Conjunctivae are normal. Pupils are equal, round, and reactive to light.   Neck: No JVD present. No thyromegaly present.   Cardiovascular: Normal rate, regular rhythm and normal heart sounds. Exam reveals no friction rub.   No murmur heard.  Pulmonary/Chest: Effort normal and breath sounds normal. No respiratory  distress. He has no wheezes. He has no rales.   Abdominal: Soft. Bowel sounds are normal. He exhibits no distension. There is no tenderness. There is no guarding.   Musculoskeletal: He exhibits no edema or tenderness.   Lymphadenopathy:     He has no cervical adenopathy.   Neurological: He is oriented to person, place, and time. He displays normal reflexes. No cranial nerve deficit.   Skin: No rash noted.   satellite lesions in groin folds.    Psychiatric: He has a normal mood and affect. His behavior is normal.   Nursing note and vitals reviewed.      Assessment/Plan   Tony was seen today for heartburn, hyperlipidemia, hypertension and diabetes.    Diagnoses and all orders for this visit:    Controlled type 2 diabetes mellitus without complication, without long-term current use of insulin  -     POC Glucose Fingerstick  -     POC Glycosylated Hemoglobin (Hb A1C)  -     Comprehensive Metabolic Panel  -     Lipid Panel  Follows with Endo.   Essential hypertension  -     Comprehensive Metabolic Panel  -     Lipid Panel    Hyperlipidemia LDL goal <100  Lipids today  Obstructive sleep apnea  Did not tolerate cpap  Gastroesophageal reflux disease, esophagitis presence not specified  stable  Anxiety  stable  Chronic a fib  Stable. Coumadin managed by Cardiolog             Subjective   Tony Wolf is a 73 y.o. male.   Chief Complaint   Patient presents with   • Hyperlipidemia     Follow Up   • Hypertension   • Heartburn   • Anxiety   • Sleep Apnea       Diabetes   He presents for his follow-up diabetic visit. He has type 2 diabetes mellitus. Pertinent negatives for hypoglycemia include no confusion, headaches, nervousness/anxiousness, pallor, seizures or speech difficulty. Associated symptoms include fatigue. Pertinent negatives for diabetes include no chest pain, no polydipsia and no polyphagia. An ACE inhibitor/angiotensin II receptor blocker is being taken.   Heartburn   He reports no abdominal pain, no chest pain,  no coughing, no nausea, no sore throat, no stridor, no tooth decay, no water brash or no wheezing. This is a chronic problem. Associated symptoms include fatigue.   Hyperlipidemia   This is a chronic problem. The current episode started more than 1 year ago. Pertinent negatives include no chest pain, myalgias or shortness of breath.   Hypertension   This is a chronic problem. The current episode started more than 1 year ago. Pertinent negatives include no chest pain, headaches, neck pain, palpitations or shortness of breath. Identifiable causes of hypertension include sleep apnea.   Sleep Apnea   Associated symptoms include fatigue and a rash. Pertinent negatives include no abdominal pain, arthralgias, chest pain, chills, congestion, coughing, diaphoresis, fever, headaches, myalgias, nausea, neck pain, numbness, sore throat or vomiting.      Chronic atrial fib that has been stable.  Anxiety stable with current med. Gerd is doing okay. Watching diet helps. Yeast in groin folds    The following portions of the patient's history were reviewed and updated as appropriate: allergies, current medications, past family history, past medical history, past social history, past surgical history and problem list.    Review of Systems   Constitutional: Positive for fatigue. Negative for activity change, appetite change, chills, diaphoresis, fever and unexpected weight change.   HENT: Negative for congestion, ear discharge, ear pain, mouth sores, nosebleeds, sinus pressure, sneezing and sore throat.    Eyes: Negative for pain, discharge and itching.   Respiratory: Negative for cough, chest tightness, shortness of breath and wheezing.    Cardiovascular: Negative for chest pain, palpitations and leg swelling.   Gastrointestinal: Negative for abdominal pain, constipation, diarrhea, nausea and vomiting.   Endocrine: Negative for cold intolerance, heat intolerance, polydipsia and polyphagia.   Genitourinary: Negative for dysuria, flank  pain, frequency, hematuria and urgency.   Musculoskeletal: Negative for arthralgias, back pain, gait problem, myalgias, neck pain and neck stiffness.   Skin: Positive for rash. Negative for color change and pallor.   Neurological: Negative for seizures, speech difficulty, numbness and headaches.   Psychiatric/Behavioral: Negative for agitation, confusion, decreased concentration and sleep disturbance. The patient is not nervous/anxious.      /80   Pulse 65   Wt 110 kg (242 lb 9.6 oz)   SpO2 98%   BMI 32.90 kg/m²     Objective   Physical Exam   Constitutional: He is oriented to person, place, and time. He appears well-developed.   HENT:   Head: Normocephalic.   Right Ear: External ear normal.   Left Ear: External ear normal.   Nose: Nose normal.   Mouth/Throat: Oropharynx is clear and moist.   Eyes: Conjunctivae are normal. Pupils are equal, round, and reactive to light.   Neck: No JVD present. No thyromegaly present.   Cardiovascular: Normal rate, regular rhythm and normal heart sounds. Exam reveals no friction rub.   No murmur heard.  Pulmonary/Chest: Effort normal and breath sounds normal. No respiratory distress. He has no wheezes. He has no rales.   Abdominal: Soft. Bowel sounds are normal. He exhibits no distension. There is no tenderness. There is no guarding.   Musculoskeletal: He exhibits no edema or tenderness.   Lymphadenopathy:     He has no cervical adenopathy.   Neurological: He is oriented to person, place, and time. He displays normal reflexes. No cranial nerve deficit.   Skin: No rash noted.   satellite lesions in groin folds.    Psychiatric: He has a normal mood and affect. His behavior is normal.   Nursing note and vitals reviewed.      Assessment/Plan   Tony was seen today for heartburn, hyperlipidemia, hypertension and diabetes.    Diagnoses and all orders for this visit:    Controlled type 2 diabetes mellitus without complication, without long-term current use of insulin      Follows  with Endo.   Essential hypertension  stable    Hyperlipidemia LDL goal <100  Lipids done 6/10 at Endo at goal  Obstructive sleep apnea  Did not tolerate cpap  Gastroesophageal reflux disease, esophagitis presence not specified  stable  Anxiety  stable  Chronic a fib  Stable. Coumadin managed by Cardiolog

## 2019-06-24 ENCOUNTER — TELEPHONE (OUTPATIENT)
Dept: INTERNAL MEDICINE | Facility: CLINIC | Age: 74
End: 2019-06-24

## 2019-06-24 NOTE — TELEPHONE ENCOUNTER
PATIENT IS CALLING WANTING TO TALK TO YOU ABOUT WHAT WAS DISCUSSED AT HIS LAST APPOINTMENT. HE WOULD NOT DISCLOSE WHAT HE WANTS TO TALK TO YOU ABOUT BUT STATES YOU WOULD KNOW WHY HE IS CALLING AND WOULD LIKE A CALL BACK [phone]

## 2019-06-25 ENCOUNTER — ANTICOAGULATION VISIT (OUTPATIENT)
Dept: PHARMACY | Facility: HOSPITAL | Age: 74
End: 2019-06-25

## 2019-06-25 DIAGNOSIS — I48.20 CHRONIC ATRIAL FIBRILLATION (HCC): ICD-10-CM

## 2019-06-25 LAB
INR PPP: 2.5 (ref 0.91–1.09)
PROTHROMBIN TIME: 30.1 SECONDS (ref 10–13.8)

## 2019-06-25 PROCEDURE — 85610 PROTHROMBIN TIME: CPT

## 2019-06-25 PROCEDURE — 36416 COLLJ CAPILLARY BLOOD SPEC: CPT

## 2019-06-25 PROCEDURE — G0463 HOSPITAL OUTPT CLINIC VISIT: HCPCS

## 2019-06-25 NOTE — PROGRESS NOTES
Anticoagulation Clinic Progress Note  Indication: atrial fibrillation  Referring Provider: Sravani  Initial Warfarin Start Date: 2008  Goal INR: 2 - 3  Current Drug Interactions: fluoxetine, glimepiride, melatonin (PRN), mirtazepine  CHADS-VASc: 3 (age, HTN, DM)  EtOH: none    Anticoagulation Clinic INR History:  Date 8/2 8/23 9/20 10/11 11/7 11/17 11/28 12/13 12/27   Total Weekly Dose 17.5mg 17.5 mg 17.5 mg 17.5mg 10mg 17.5 mg 17.5 mg 20mg 22.5 mg   INR 2.5 2.3 2.3 2.0 1.2 1.7 1.5 1.7 2.6   Notes     Pre- lumbar inj         Date 1/10/18 1/31 2/28 3/28 4/27 5/30 6/27 7/11 7/23 8/1 8/6 8/13   Total Weekly Dose 22.5 mg 22.5 mg 22.5 mg 22.5 mg 22.5 mg 22.5 mg 22.5 mg 22.5 mg 22.5 mg 20mg 12.5 mg 17.5 mg   INR 2.6 2.5 2.1 2.4 2.4 2.6 3.5 3.4 3.6 5.3, 4.9 1.9 2.7   Notes        Keflex         Date 8/20 9/4 10/2 11/6 11/15 11/29 12/31 1/7/19 1/17 1/29 2/12   Total Weekly Dose 17.5  mg 17.5 mg 17.5 mg 10mg 20mg 17.5mg 17.5mg 18.75 mg 17.5 mg 18.75 mg 20mg   INR 2.3 2.3 2.2 1.3 2.3 2.2 1.7 1.9 1.9 1.9 2.0   Notes    pre- epidural   missed dose?         Date 2/27 3/27 4/10 4/23 5/7 5/28 6/25       Total Weekly Dose 20mg 20mg 21.25 mg 21.25 mg 21.25 mg 21.25mg 21.25mg       INR 2.2 1.8 2.5 1.8 2.4 2.4 2.5       Notes sick   post- scope            Clinic Interview:  Verbal Release Authorization signed on 6/27/18 -- may speak with Sussy (wife), Nikolai (son)  Tablet Strength: pt has 2.5mg tablets  Phone: [phone] (Home), [phone]   Fax: [phone] (Attn: Tasha) [facility]    Patient Findings   Negatives:  Signs/symptoms of thrombosis, Signs/symptoms of bleeding, Laboratory test error suspected, Change in health, Change in alcohol use, Change in activity, Upcoming invasive procedure, Emergency department visit, Upcoming dental procedure, Missed doses, Extra doses, Change in medications, Change in diet/appetite, Hospital admission, Bruising, Other complaints   Comments:  He saw his endo and PCP recently.   Good reports with no changes.   He and his wife are celebrating their 50th wedding anniversary on Friday.     Plan:   1. INR is therapeutic today at 2.5, so instructed Mr. Wolf to continue warfarin 2.5 mg oral daily except warfarin 3.75 mg TuesThursSat.   2. RTC in four weeks on 7/23.  3. Verbal and written information was provided in clinic. Mr. Wolf voiced understanding with teach back and has no further questions at this time.     Sariah Almodovar, PharmD  6/25/2019  1:02 PM

## 2019-06-26 NOTE — TELEPHONE ENCOUNTER
Spoke with Tony about getting Sussy in to be seen by Dr Lopez on Friday @ 604 or with PA or NP next week for vertigo and nausea, she was outside and he will call back after talking with her and give us a call back

## 2019-07-11 RX ORDER — PRAVASTATIN SODIUM 20 MG
TABLET ORAL
Qty: 30 TABLET | Refills: 3 | Status: SHIPPED | OUTPATIENT
Start: 2019-07-11 | End: 2019-11-12 | Stop reason: SDUPTHER

## 2019-07-18 DIAGNOSIS — E11.9 TYPE 2 DIABETES MELLITUS WITHOUT COMPLICATION, WITHOUT LONG-TERM CURRENT USE OF INSULIN (HCC): ICD-10-CM

## 2019-07-19 RX ORDER — GLIMEPIRIDE 2 MG/1
TABLET ORAL
Qty: 30 TABLET | Refills: 5 | Status: SHIPPED | OUTPATIENT
Start: 2019-07-19 | End: 2020-01-10 | Stop reason: SDUPTHER

## 2019-07-23 ENCOUNTER — APPOINTMENT (OUTPATIENT)
Dept: PHARMACY | Facility: HOSPITAL | Age: 74
End: 2019-07-23

## 2019-07-30 ENCOUNTER — ANTICOAGULATION VISIT (OUTPATIENT)
Dept: PHARMACY | Facility: HOSPITAL | Age: 74
End: 2019-07-30

## 2019-07-30 DIAGNOSIS — I48.20 CHRONIC ATRIAL FIBRILLATION (HCC): ICD-10-CM

## 2019-07-30 LAB
INR PPP: 2.7 (ref 0.91–1.09)
PROTHROMBIN TIME: 32.8 SECONDS (ref 10–13.8)

## 2019-07-30 PROCEDURE — 85610 PROTHROMBIN TIME: CPT

## 2019-07-30 PROCEDURE — G0463 HOSPITAL OUTPT CLINIC VISIT: HCPCS

## 2019-07-30 PROCEDURE — 36416 COLLJ CAPILLARY BLOOD SPEC: CPT

## 2019-07-30 NOTE — PROGRESS NOTES
Anticoagulation Clinic Progress Note  Indication: atrial fibrillation  Referring Provider: Sravani  Initial Warfarin Start Date: 2008  Goal INR: 2 - 3  Current Drug Interactions: fluoxetine, glimepiride, melatonin (PRN), mirtazepine  CHADS-VASc: 3 (age, HTN, DM)  EtOH: none    Anticoagulation Clinic INR History:  Date 8/2 8/23 9/20 10/11 11/7 11/17 11/28 12/13 12/27   Total Weekly Dose 17.5mg 17.5 mg 17.5 mg 17.5mg 10mg 17.5 mg 17.5 mg 20mg 22.5 mg   INR 2.5 2.3 2.3 2.0 1.2 1.7 1.5 1.7 2.6   Notes     Pre- lumbar inj         Date 1/10/18 1/31 2/28 3/28 4/27 5/30 6/27 7/11 7/23 8/1 8/6 8/13   Total Weekly Dose 22.5 mg 22.5 mg 22.5 mg 22.5 mg 22.5 mg 22.5 mg 22.5 mg 22.5 mg 22.5 mg 20mg 12.5 mg 17.5 mg   INR 2.6 2.5 2.1 2.4 2.4 2.6 3.5 3.4 3.6 5.3, 4.9 1.9 2.7   Notes        Keflex         Date 8/20 9/4 10/2 11/6 11/15 11/29 12/31 1/7/19 1/17 1/29 2/12   Total Weekly Dose 17.5  mg 17.5 mg 17.5 mg 10mg 20mg 17.5mg 17.5mg 18.75 mg 17.5 mg 18.75 mg 20mg   INR 2.3 2.3 2.2 1.3 2.3 2.2 1.7 1.9 1.9 1.9 2.0   Notes    pre- epidural   missed dose?         Date 2/27 3/27 4/10 4/23 5/7 5/28 6/25 7/30      Total Weekly Dose 20mg 20mg 21.25 mg 21.25 mg 21.25 mg 21.25mg 21.25mg 21.25mg      INR 2.2 1.8 2.5 1.8 2.4 2.4 2.5 2.7      Notes sick   post- scope            Clinic Interview:  Verbal Release Authorization signed on 6/27/18 -- may speak with Sussy (wife), Nikolai (son)  Tablet Strength: pt has 2.5mg tablets  Phone: [phone] (Home), [phone]   Fax: [phone] (Attn: Tasha) [facility] [place]    Patient Findings:   Negatives:  Signs/symptoms of thrombosis, Signs/symptoms of bleeding, Laboratory test error suspected, Change in health, Change in alcohol use, Change in activity, Upcoming invasive procedure, Emergency department visit, Upcoming dental procedure, Missed doses, Extra doses, Change in medications, Change in diet/appetite, Hospital admission, Bruising, Other complaints   Comments:  No changes since  last encounter. His wife has been having issue with vertigo.      Plan:   1. INR is therapeutic today at 2.7. Instructed Mr. Wolf to continue warfarin 2.5 mg oral daily except warfarin 3.75 mg TuesThursSat.   2. RTC in four weeks on 8/28.  3. Verbal and written information was provided in clinic. Mr. Wolf voiced understanding with teach back and has no further questions at this time.   4. Patient denies the need for refills at this time.     Patti Arechiga, PharmD  7/30/2019  1:24 PM

## 2019-08-28 ENCOUNTER — ANTICOAGULATION VISIT (OUTPATIENT)
Dept: PHARMACY | Facility: HOSPITAL | Age: 74
End: 2019-08-28

## 2019-08-28 DIAGNOSIS — I48.20 CHRONIC ATRIAL FIBRILLATION (HCC): ICD-10-CM

## 2019-08-28 LAB
INR PPP: 2.6 (ref 0.91–1.09)
PROTHROMBIN TIME: 30.8 SECONDS (ref 10–13.8)

## 2019-08-28 PROCEDURE — G0463 HOSPITAL OUTPT CLINIC VISIT: HCPCS

## 2019-08-28 PROCEDURE — 85610 PROTHROMBIN TIME: CPT

## 2019-08-28 PROCEDURE — 36416 COLLJ CAPILLARY BLOOD SPEC: CPT

## 2019-08-28 NOTE — PROGRESS NOTES
Anticoagulation Clinic Progress Note  Indication: atrial fibrillation  Referring Provider: Sravani  Initial Warfarin Start Date: 2008  Goal INR: 2 - 3  Current Drug Interactions: fluoxetine, glimepiride, melatonin (PRN), mirtazepine  CHADS-VASc: 3 (age, HTN, DM)  EtOH: none    Anticoagulation Clinic INR History:  Date 8/2 8/23 9/20 10/11 11/7 11/17 11/28 12/13 12/27   Total Weekly Dose 17.5mg 17.5 mg 17.5 mg 17.5mg 10mg 17.5 mg 17.5 mg 20mg 22.5 mg   INR 2.5 2.3 2.3 2.0 1.2 1.7 1.5 1.7 2.6   Notes     Pre- lumbar inj         Date 1/10/18 1/31 2/28 3/28 4/27 5/30 6/27 7/11 7/23 8/1 8/6 8/13   Total Weekly Dose 22.5 mg 22.5 mg 22.5 mg 22.5 mg 22.5 mg 22.5 mg 22.5 mg 22.5 mg 22.5 mg 20mg 12.5 mg 17.5 mg   INR 2.6 2.5 2.1 2.4 2.4 2.6 3.5 3.4 3.6 5.3, 4.9 1.9 2.7   Notes        Keflex         Date 8/20 9/4 10/2 11/6 11/15 11/29 12/31 1/7/19 1/17 1/29 2/12   Total Weekly Dose 17.5  mg 17.5 mg 17.5 mg 10mg 20mg 17.5mg 17.5mg 18.75 mg 17.5 mg 18.75 mg 20mg   INR 2.3 2.3 2.2 1.3 2.3 2.2 1.7 1.9 1.9 1.9 2.0   Notes    pre- epidural   missed dose?         Date 2/27 3/27 4/10 4/23 5/7 5/28 6/25 7/30 8/28     Total Weekly Dose 20mg 20mg 21.25 mg 21.25 mg 21.25 mg 21.25mg 21.25mg 21.25mg 21.25mg     INR 2.2 1.8 2.5 1.8 2.4 2.4 2.5 2.7 2.6     Notes sick   post- scope            Clinic Interview:  Verbal Release Authorization signed on 6/27/18 -- may speak with Sussy (wife), Nikolai (son)  Tablet Strength: pt has 2.5mg tablets  Phone: [phone] (Home), [phone]   Fax: [phone] (Attn: Tasha) [facility] [place]    Patient Findings   Negatives:  Signs/symptoms of thrombosis, Signs/symptoms of bleeding, Laboratory test error suspected, Change in health, Change in alcohol use, Change in activity, Upcoming invasive procedure, Emergency department visit, Upcoming dental procedure, Missed doses, Extra doses, Change in medications, Change in diet/appetite, Hospital admission, Bruising, Other complaints   Comments:  No  changes since last encounter. Mrs. Wolf reports that she has been experiencing vertigo and weight loss/ decrease appetite. He has started loratadine.      Plan:   1. INR is therapeutic today at 2.6. Instructed Mr. Wolf to continue warfarin 2.5 mg oral daily except warfarin 3.75 mg TuesThursSat.   2. RTC in four weeks on 9/25.  3. Verbal and written information was provided in clinic. Mr. Wolf voiced understanding with teach back and has no further questions at this time.   4. Patient denies the need for refills at this time.     Ama Mccloud, PharmD  8/28/2019  4:06 PM

## 2019-09-25 ENCOUNTER — ANTICOAGULATION VISIT (OUTPATIENT)
Dept: PHARMACY | Facility: HOSPITAL | Age: 74
End: 2019-09-25

## 2019-09-25 DIAGNOSIS — I48.20 CHRONIC ATRIAL FIBRILLATION (HCC): ICD-10-CM

## 2019-09-25 LAB
INR PPP: 3 (ref 0.91–1.09)
PROTHROMBIN TIME: 36.3 SECONDS (ref 10–13.8)

## 2019-09-25 PROCEDURE — 36416 COLLJ CAPILLARY BLOOD SPEC: CPT

## 2019-09-25 PROCEDURE — G0463 HOSPITAL OUTPT CLINIC VISIT: HCPCS

## 2019-09-25 PROCEDURE — 85610 PROTHROMBIN TIME: CPT

## 2019-09-25 NOTE — PROGRESS NOTES
Anticoagulation Clinic Progress Note  Indication: atrial fibrillation  Referring Provider: Sravani  Initial Warfarin Start Date: 2008  Goal INR: 2 - 3  Current Drug Interactions: fluoxetine, glimepiride, melatonin (PRN), mirtazepine  CHADS-VASc: 3 (age, HTN, DM)  EtOH: none    Anticoagulation Clinic INR History:  Date 8/2 8/23 9/20 10/11 11/7 11/17 11/28 12/13 12/27   Total Weekly Dose 17.5mg 17.5 mg 17.5 mg 17.5mg 10mg 17.5 mg 17.5 mg 20mg 22.5 mg   INR 2.5 2.3 2.3 2.0 1.2 1.7 1.5 1.7 2.6   Notes     Pre- lumbar inj         Date 1/10/18 1/31 2/28 3/28 4/27 5/30 6/27 7/11 7/23 8/1 8/6 8/13   Total Weekly Dose 22.5 mg 22.5 mg 22.5 mg 22.5 mg 22.5 mg 22.5 mg 22.5 mg 22.5 mg 22.5 mg 20mg 12.5 mg 17.5 mg   INR 2.6 2.5 2.1 2.4 2.4 2.6 3.5 3.4 3.6 5.3, 4.9 1.9 2.7   Notes        Keflex         Date 8/20 9/4 10/2 11/6 11/15 11/29 12/31 1/7/19 1/17 1/29 2/12   Total Weekly Dose 17.5  mg 17.5 mg 17.5 mg 10mg 20mg 17.5mg 17.5mg 18.75 mg 17.5 mg 18.75 mg 20mg   INR 2.3 2.3 2.2 1.3 2.3 2.2 1.7 1.9 1.9 1.9 2.0   Notes    pre- epidural   missed dose?         Date 2/27 3/27 4/10 4/23 5/7 5/28 6/25 7/30 8/28 9/25    Total Weekly Dose 20mg 20mg 21.25 mg 21.25 mg 21.25 mg 21.25mg 21.25mg 21.25mg 21.25mg 21.25mg    INR 2.2 1.8 2.5 1.8 2.4 2.4 2.5 2.7 2.6 3.0    Notes sick   post- scope            Clinic Interview:  Verbal Release Authorization signed on 6/27/18 -- may speak with Sussy (wife), Nikolai (son)  Tablet Strength: pt has 2.5mg tablets  Phone: [phone] (Home), [phone]   Fax: [phone] (Attn: Tasha) [facility] [place]    Patient Findings   Negatives:  Signs/symptoms of thrombosis, Signs/symptoms of bleeding, Laboratory test error suspected, Change in health, Change in alcohol use, Change in activity, Upcoming invasive procedure, Emergency department visit, Upcoming dental procedure, Missed doses, Extra doses, Change in medications, Change in diet/appetite, Hospital admission, Bruising, Other complaints      Plan:   1. INR is therapeutic today at 3.0. Instructed Mr. Wolf to continue warfarin 2.5 mg oral daily except warfarin 3.75 mg TuesThursSat.   2. RTC in four weeks  3. Verbal and written information was provided in clinic. Mr. Wolf voiced understanding with teach back and has no further questions at this time.   4. Patient denies the need for refills at this time.     Teena Boykin, TonaD.  09/25/19   1:22 PM

## 2019-10-07 DIAGNOSIS — I48.91 ATRIAL FIBRILLATION, UNSPECIFIED TYPE (HCC): ICD-10-CM

## 2019-10-07 RX ORDER — WARFARIN SODIUM 2.5 MG/1
TABLET ORAL
Qty: 120 TABLET | Refills: 1 | Status: SHIPPED | OUTPATIENT
Start: 2019-10-07 | End: 2019-10-07 | Stop reason: SDUPTHER

## 2019-10-07 RX ORDER — WARFARIN SODIUM 2.5 MG/1
TABLET ORAL
Qty: 120 TABLET | Refills: 1 | Status: SHIPPED | OUTPATIENT
Start: 2019-10-07 | End: 2020-01-03 | Stop reason: SDUPTHER

## 2019-10-16 ENCOUNTER — OFFICE VISIT (OUTPATIENT)
Dept: INTERNAL MEDICINE | Facility: CLINIC | Age: 74
End: 2019-10-16

## 2019-10-16 VITALS
WEIGHT: 243.2 LBS | DIASTOLIC BLOOD PRESSURE: 84 MMHG | HEART RATE: 72 BPM | SYSTOLIC BLOOD PRESSURE: 136 MMHG | HEIGHT: 72 IN | BODY MASS INDEX: 32.94 KG/M2 | OXYGEN SATURATION: 95 %

## 2019-10-16 DIAGNOSIS — R53.83 OTHER FATIGUE: ICD-10-CM

## 2019-10-16 DIAGNOSIS — M17.11 ARTHRITIS OF KNEE, RIGHT: ICD-10-CM

## 2019-10-16 DIAGNOSIS — R06.09 DYSPNEA ON EXERTION: ICD-10-CM

## 2019-10-16 DIAGNOSIS — I10 ESSENTIAL HYPERTENSION: ICD-10-CM

## 2019-10-16 DIAGNOSIS — I49.3 PVC'S (PREMATURE VENTRICULAR CONTRACTIONS): ICD-10-CM

## 2019-10-16 DIAGNOSIS — I48.20 CHRONIC ATRIAL FIBRILLATION (HCC): ICD-10-CM

## 2019-10-16 DIAGNOSIS — Z96.651 STATUS POST RIGHT KNEE REPLACEMENT: ICD-10-CM

## 2019-10-16 DIAGNOSIS — E11.9 CONTROLLED TYPE 2 DIABETES MELLITUS WITHOUT COMPLICATION, WITHOUT LONG-TERM CURRENT USE OF INSULIN (HCC): ICD-10-CM

## 2019-10-16 DIAGNOSIS — F41.1 GENERALIZED ANXIETY DISORDER: ICD-10-CM

## 2019-10-16 DIAGNOSIS — K21.9 GASTROESOPHAGEAL REFLUX DISEASE, ESOPHAGITIS PRESENCE NOT SPECIFIED: ICD-10-CM

## 2019-10-16 DIAGNOSIS — E78.5 HYPERLIPIDEMIA LDL GOAL <100: ICD-10-CM

## 2019-10-16 DIAGNOSIS — I10 HYPERTENSION, ESSENTIAL: ICD-10-CM

## 2019-10-16 DIAGNOSIS — Z12.5 PROSTATE CANCER SCREENING: ICD-10-CM

## 2019-10-16 DIAGNOSIS — Z23 NEEDS FLU SHOT: ICD-10-CM

## 2019-10-16 DIAGNOSIS — I51.7 LEFT VENTRICULAR HYPERTROPHY: ICD-10-CM

## 2019-10-16 DIAGNOSIS — G47.33 OBSTRUCTIVE SLEEP APNEA: ICD-10-CM

## 2019-10-16 DIAGNOSIS — Z00.00 MEDICARE ANNUAL WELLNESS VISIT, SUBSEQUENT: ICD-10-CM

## 2019-10-16 DIAGNOSIS — D69.6 THROMBOCYTOPENIA (HCC): ICD-10-CM

## 2019-10-16 DIAGNOSIS — I48.4 ATYPICAL ATRIAL FLUTTER (HCC): ICD-10-CM

## 2019-10-16 PROBLEM — E87.6 HYPOKALEMIA: Status: RESOLVED | Noted: 2017-06-02 | Resolved: 2019-10-16

## 2019-10-16 PROBLEM — N99.89 POSTOPERATIVE URINARY RETENTION: Status: RESOLVED | Noted: 2017-06-01 | Resolved: 2019-10-16

## 2019-10-16 PROBLEM — D72.829 LEUKOCYTOSIS: Status: RESOLVED | Noted: 2017-05-31 | Resolved: 2019-10-16

## 2019-10-16 PROBLEM — R21 RASH: Status: RESOLVED | Noted: 2019-03-18 | Resolved: 2019-10-16

## 2019-10-16 PROBLEM — R33.8 POSTOPERATIVE URINARY RETENTION: Status: RESOLVED | Noted: 2017-06-01 | Resolved: 2019-10-16

## 2019-10-16 LAB
GLUCOSE BLDC GLUCOMTR-MCNC: 180 MG/DL (ref 70–130)
HBA1C MFR BLD: 5.9 %

## 2019-10-16 PROCEDURE — G0439 PPPS, SUBSEQ VISIT: HCPCS | Performed by: INTERNAL MEDICINE

## 2019-10-16 PROCEDURE — G0103 PSA SCREENING: HCPCS | Performed by: INTERNAL MEDICINE

## 2019-10-16 PROCEDURE — G0008 ADMIN INFLUENZA VIRUS VAC: HCPCS | Performed by: INTERNAL MEDICINE

## 2019-10-16 PROCEDURE — 90653 IIV ADJUVANT VACCINE IM: CPT | Performed by: INTERNAL MEDICINE

## 2019-10-16 PROCEDURE — 85025 COMPLETE CBC W/AUTO DIFF WBC: CPT | Performed by: INTERNAL MEDICINE

## 2019-10-16 PROCEDURE — 82947 ASSAY GLUCOSE BLOOD QUANT: CPT | Performed by: INTERNAL MEDICINE

## 2019-10-16 PROCEDURE — 83036 HEMOGLOBIN GLYCOSYLATED A1C: CPT | Performed by: INTERNAL MEDICINE

## 2019-10-16 PROCEDURE — 80053 COMPREHEN METABOLIC PANEL: CPT | Performed by: INTERNAL MEDICINE

## 2019-10-16 PROCEDURE — 80061 LIPID PANEL: CPT | Performed by: INTERNAL MEDICINE

## 2019-10-16 PROCEDURE — 84443 ASSAY THYROID STIM HORMONE: CPT | Performed by: INTERNAL MEDICINE

## 2019-10-16 NOTE — PROGRESS NOTES
The ABCs of the Annual Wellness Visit  Subsequent Medicare Wellness Visit    Chief Complaint   Patient presents with   • Medicare Wellness-subsequent       Subjective   History of Present Illness:  Tony Wolf is a 74 y.o. male who presents for a Subsequent Medicare Wellness Visit.    HEALTH RISK ASSESSMENT    Recent Hospitalizations:  No hospitalization(s) within the last year.    Current Medical Providers:  Patient Care Team:  Kay Lopez DO as PCP - General  Kay Lopez DO as PCP - Family Medicine  Elise Portillo [facility] as Pharmacist (Pharmacy)  Ama Mccloud, PharmD as Pharmacist (Pharmacy)  Sammy Lassiter MD as Consulting Physician (Cardiology)    Smoking Status:  Social History     Tobacco Use   Smoking Status Never Smoker   Smokeless Tobacco Never Used       Alcohol Consumption:  Social History     Substance and Sexual Activity   Alcohol Use No       Depression Screen:   PHQ-2/PHQ-9 Depression Screening 10/16/2019   Little interest or pleasure in doing things 0   Feeling down, depressed, or hopeless 0   Trouble falling or staying asleep, or sleeping too much -   Feeling tired or having little energy -   Poor appetite or overeating -   Feeling bad about yourself - or that you are a failure or have let yourself or your family down -   Trouble concentrating on things, such as reading the newspaper or watching television -   Moving or speaking so slowly that other people could have noticed. Or the opposite - being so fidgety or restless that you have been moving around a lot more than usual -   Thoughts that you would be better off dead, or of hurting yourself in some way -   Total Score 0   If you checked off any problems, how difficult have these problems made it for you to do your work, take care of things at home, or get along with other people? -       Fall Risk Screen:  STEADI Fall Risk Assessment was completed, and patient is at MODERATE risk for falls. Assessment completed  on:10/16/2019    Health Habits and Functional and Cognitive Screening:  Functional & Cognitive Status 10/16/2019   Do you have difficulty preparing food and eating? No   Do you have difficulty bathing yourself, getting dressed or grooming yourself? No   Do you have difficulty using the toilet? No   Do you have difficulty moving around from place to place? No   Do you have trouble with steps or getting out of a bed or a chair? No   Current Diet Well Balanced Diet   Dental Exam Up to date   Eye Exam Up to date   Exercise (times per week) 1 times per week   Current Exercise Activities Include Walking   Do you need help using the phone?  -   Are you deaf or do you have serious difficulty hearing?  No   Do you need help with transportation? Yes   Do you need help shopping? No   Do you need help preparing meals?  Yes   Do you need help with housework?  No   Do you need help with laundry? Yes   Do you need help taking your medications? Yes   Do you need help managing money? Yes   Do you ever drive or ride in a car without wearing a seat belt? Yes   Have you felt unusual stress, anger or loneliness in the last month? No   Who do you live with? Spouse   If you need help, do you have trouble finding someone available to you? No   Have you been bothered in the last four weeks by sexual problems? No   Do you have difficulty concentrating, remembering or making decisions? No         Does the patient have evidence of cognitive impairment? No    Asprin use counseling:Contraindicated from taking ASA    Age-appropriate Screening Schedule:  Refer to the list below for future screening recommendations based on patient's age, sex and/or medical conditions. Orders for these recommended tests are listed in the plan section. The patient has been provided with a written plan.    Health Maintenance   Topic Date Due   • PNEUMOCOCCAL VACCINES (65+ LOW/MEDIUM RISK) (1 of 2 - PCV13) 01/20/2020 (Originally 6/23/2012)   • ZOSTER VACCINE (1 of 2)  01/20/2020 (Originally 6/23/1997)   • URINE MICROALBUMIN  11/14/2019   • DXA SCAN  02/06/2020   • HEMOGLOBIN A1C  04/16/2020   • DIABETIC EYE EXAM  05/06/2020   • DIABETIC FOOT EXAM  10/16/2020   • LIPID PANEL  10/16/2020   • TDAP/TD VACCINES (2 - Td) 07/06/2027   • COLONOSCOPY  04/15/2029   • INFLUENZA VACCINE  Completed          The following portions of the patient's history were reviewed and updated as appropriate: allergies, current medications, past family history, past medical history, past social history, past surgical history and problem list.    Outpatient Medications Prior to Visit   Medication Sig Dispense Refill   • ACCU-CHEK SOFTCLIX LANCETS lancets TEST THREE TIMES A  each 3   • acetaminophen (TYLENOL) 500 MG tablet Take 1,000 mg by mouth Every 6 (Six) Hours As Needed for Mild Pain (1-3).     • amLODIPine (NORVASC) 10 MG tablet TAKE ONE TABLET BY MOUTH DAILY AS DIRECTED 30 tablet 5   • calcium carbonate (TUMS) 500 MG chewable tablet Chew 2 tablets Daily As Needed for Indigestion or Heartburn.     • Cholecalciferol (VITAMIN D-3) 1000 UNITS capsule Take 1,000 Units by mouth Daily.     • FLUoxetine (PROzac) 40 MG capsule      • glimepiride (AMARYL) 2 MG tablet TAKE ONE TABLET BY MOUTH DAILY 30 tablet 5   • glucose blood (ACCU-CHEK FREDERICK PLUS) test strip TEST THREE TIMES A  each 1   • Histamine Dihydrochloride (AUSTRALIAN DREAM ARTHRITIS) 0.025 % cream Apply  topically Daily.     • lisinopril (PRINIVIL,ZESTRIL) 40 MG tablet TAKE ONE TABLET BY MOUTH DAILY 30 tablet 11   • Loratadine (CLARITIN) 10 MG capsule Take 1 capsule by mouth Daily.     • Melatonin 2.5 MG chewable tablet Chew 1 tablet At Night As Needed.     • metoprolol succinate XL (TOPROL-XL) 100 MG 24 hr tablet TAKE ONE TABLET BY MOUTH TWICE A  tablet 3   • mirtazapine (REMERON) 45 MG tablet Take 45 mg by mouth Every Night.     • nystatin (nystatin) 947830 UNIT/GM powder Apply  topically to the appropriate area as directed 4  (Four) Times a Day. 60 g 1   • pravastatin (PRAVACHOL) 20 MG tablet TAKE ONE TABLET BY MOUTH DAILY 30 tablet 3   • warfarin (COUMADIN) 2.5 MG tablet Take 1 to 1.5 tablet(s) by mouth daily, as directed by the anticoagulation clinic. 120 tablet 1     No facility-administered medications prior to visit.        Patient Active Problem List   Diagnosis   • GERD (gastroesophageal reflux disease)   • Essential hypertension   • Obesity   • Obstructive sleep apnea   • Controlled type 2 diabetes mellitus without complication, without long-term current use of insulin (CMS/[facility])   • Generalized anxiety disorder   • Chronic atrial fibrillation   • PVC's (premature ventricular contractions)   • Hyperlipidemia LDL goal <100   • Left ventricular hypertrophy   • Atypical atrial flutter (CMS/HCC)   • Arthritis of knee, right   • Status post right knee replacement   • Thrombocytopenia (CMS/[facility])   • Dyspnea on exertion       Advanced Care Planning:  Patient has an advance directive - a copy has been provided and is visible in patient header    Review of Systems   Constitutional: Positive for fatigue. Negative for activity change, appetite change, chills, diaphoresis, fever and unexpected weight change.   HENT: Negative for congestion, ear discharge, ear pain, mouth sores, nosebleeds, sinus pressure, sneezing and sore throat.    Eyes: Negative for pain, discharge and itching.   Respiratory: Negative for cough, chest tightness, shortness of breath and wheezing.    Cardiovascular: Negative for chest pain, palpitations and leg swelling.   Gastrointestinal: Negative for abdominal pain, constipation, diarrhea, nausea and vomiting.   Endocrine: Negative for cold intolerance, heat intolerance, polydipsia and polyphagia.   Genitourinary: Negative for dysuria, flank pain, frequency, hematuria and urgency.   Musculoskeletal: Negative for arthralgias, back pain, gait problem, myalgias, neck pain and neck stiffness.   Skin: Negative for color change,  "pallor and rash.   Neurological: Negative for seizures, speech difficulty, numbness and headaches.   Psychiatric/Behavioral: Negative for agitation, confusion, decreased concentration and sleep disturbance. The patient is not nervous/anxious.        Compared to one year ago, the patient feels his physical health is the same.  Compared to one year ago, the patient feels his mental health is the same.    Reviewed chart for potential of high risk medication in the elderly: yes  Reviewed chart for potential of harmful drug interactions in the elderly:yes    Objective         Vitals:    10/16/19 1509   BP: 136/84   Pulse: 72   SpO2: 95%   Weight: 110 kg (243 lb 3.2 oz)   Height: 182.9 cm (72\")   PainSc:   2   PainLoc: Hip       Body mass index is 32.98 kg/m².  Discussed the patient's BMI with him. The BMI is in the acceptable range.    Physical Exam   Constitutional: He is oriented to person, place, and time. He appears well-developed and well-nourished.   HENT:   Head: Normocephalic and atraumatic.   Right Ear: External ear normal.   Left Ear: External ear normal.   Nose: Nose normal.   Mouth/Throat: Oropharynx is clear and moist.   Eyes: Conjunctivae and EOM are normal. Pupils are equal, round, and reactive to light.   Neck: Normal range of motion. Neck supple. No JVD present. No thyromegaly present.   Cardiovascular: Normal rate, regular rhythm, normal heart sounds and intact distal pulses. Exam reveals no gallop and no friction rub.   No murmur heard.  Pulmonary/Chest: Effort normal and breath sounds normal. No respiratory distress. He has no wheezes. He has no rales. He exhibits no tenderness.   Abdominal: Soft. Bowel sounds are normal. He exhibits no distension and no mass. There is no tenderness. There is no rebound and no guarding. No hernia.   Genitourinary: Rectum normal, prostate normal and penis normal.   Lymphadenopathy:     He has no cervical adenopathy.   Neurological: He is alert and oriented to person, " place, and time. He displays normal reflexes. No cranial nerve deficit. He exhibits normal muscle tone. Coordination normal.   Skin: No rash noted. No erythema. No pallor.   Psychiatric: He has a normal mood and affect.       Lab Results   Component Value Date    TRIG 263 (H) 10/16/2019    HDL 30 (L) 10/16/2019    LDL 79 10/16/2019    VLDL 52.6 (H) 10/16/2019    HGBA1C 5.9 10/16/2019        Assessment/Plan   Medicare Risks and Personalized Health Plan  CMS Preventative Services Quick Reference  Obesity/Overweight     The above risks/problems have been discussed with the patient.  Pertinent information has been shared with the patient in the After Visit Summary.  Follow up plans and orders are seen below in the Assessment/Plan Section.    Diagnoses and all orders for this visit:    1. Medicare annual wellness visit, subsequent  Done today  2. Hypertension, essential  -     Lipid Panel  -     Comprehensive Metabolic Panel    3. Atypical atrial flutter (CMS/HCC)  stable  4. Chronic atrial fibrillation  stable  5. Essential hypertension  stable  6. Hyperlipidemia LDL goal <100  stable  7. Left ventricular hypertrophy  stable  8. Prostate cancer screening  -     Lipid Panel  -     PSA Screen  -     Cancel: PSA Screen    9. PVC's (premature ventricular contractions)  stable  10. Dyspnea on exertion  stable  11. Gastroesophageal reflux disease, esophagitis presence not specified  stable  12. Obstructive sleep apnea  stable  13. Controlled type 2 diabetes mellitus without complication, without long-term current use of insulin (CMS/[facility])  -     POC Glycosylated Hemoglobin (Hb A1C)  -     POCT Glucose    14. Arthritis of knee, right  stable  15. Thrombocytopenia (CMS/[facility])  -     CBC & Differential  -     CBC Auto Differential    16. Generalized anxiety disorder  stable  17. Status post right knee replacement  stable  18. Other fatigue  -     TSH    19. Needs flu shot  -     Fluad Quad >65 years (5904-0859)      Follow  Up:  Return in about 1 year (around 10/16/2020) for Medicare Wellness.     An After Visit Summary and PPPS were given to the patient.

## 2019-10-17 LAB
ALBUMIN SERPL-MCNC: 4.1 G/DL (ref 3.5–5.2)
ALBUMIN/GLOB SERPL: 1.5 G/DL
ALP SERPL-CCNC: 56 U/L (ref 39–117)
ALT SERPL W P-5'-P-CCNC: 27 U/L (ref 1–41)
ANION GAP SERPL CALCULATED.3IONS-SCNC: 12.9 MMOL/L (ref 5–15)
AST SERPL-CCNC: 31 U/L (ref 1–40)
BASOPHILS # BLD AUTO: 0.08 10*3/MM3 (ref 0–0.2)
BASOPHILS NFR BLD AUTO: 0.9 % (ref 0–1.5)
BILIRUB SERPL-MCNC: 1 MG/DL (ref 0.2–1.2)
BUN BLD-MCNC: 13 MG/DL (ref 8–23)
BUN/CREAT SERPL: 12.3 (ref 7–25)
CALCIUM SPEC-SCNC: 9.2 MG/DL (ref 8.6–10.5)
CHLORIDE SERPL-SCNC: 104 MMOL/L (ref 98–107)
CHOLEST SERPL-MCNC: 162 MG/DL (ref 0–200)
CO2 SERPL-SCNC: 30.1 MMOL/L (ref 22–29)
CREAT BLD-MCNC: 1.06 MG/DL (ref 0.76–1.27)
DEPRECATED RDW RBC AUTO: 49.1 FL (ref 37–54)
EOSINOPHIL # BLD AUTO: 0.16 10*3/MM3 (ref 0–0.4)
EOSINOPHIL NFR BLD AUTO: 1.9 % (ref 0.3–6.2)
ERYTHROCYTE [DISTWIDTH] IN BLOOD BY AUTOMATED COUNT: 13.8 % (ref 12.3–15.4)
GFR SERPL CREATININE-BSD FRML MDRD: 69 ML/MIN/1.73
GLOBULIN UR ELPH-MCNC: 2.7 GM/DL
GLUCOSE BLD-MCNC: 134 MG/DL (ref 65–99)
HCT VFR BLD AUTO: 50.6 % (ref 37.5–51)
HDLC SERPL-MCNC: 30 MG/DL (ref 40–60)
HGB BLD-MCNC: 16.8 G/DL (ref 13–17.7)
IMM GRANULOCYTES # BLD AUTO: 0.03 10*3/MM3 (ref 0–0.05)
IMM GRANULOCYTES NFR BLD AUTO: 0.4 % (ref 0–0.5)
LDLC SERPL CALC-MCNC: 79 MG/DL (ref 0–100)
LDLC/HDLC SERPL: 2.65 {RATIO}
LYMPHOCYTES # BLD AUTO: 1.93 10*3/MM3 (ref 0.7–3.1)
LYMPHOCYTES NFR BLD AUTO: 22.5 % (ref 19.6–45.3)
MCH RBC QN AUTO: 31.8 PG (ref 26.6–33)
MCHC RBC AUTO-ENTMCNC: 33.2 G/DL (ref 31.5–35.7)
MCV RBC AUTO: 95.7 FL (ref 79–97)
MONOCYTES # BLD AUTO: 0.67 10*3/MM3 (ref 0.1–0.9)
MONOCYTES NFR BLD AUTO: 7.8 % (ref 5–12)
NEUTROPHILS # BLD AUTO: 5.69 10*3/MM3 (ref 1.7–7)
NEUTROPHILS NFR BLD AUTO: 66.5 % (ref 42.7–76)
NRBC BLD AUTO-RTO: 0 /100 WBC (ref 0–0.2)
PLATELET # BLD AUTO: 150 10*3/MM3 (ref 140–450)
PMV BLD AUTO: 11.8 FL (ref 6–12)
POTASSIUM BLD-SCNC: 4.2 MMOL/L (ref 3.5–5.2)
PROT SERPL-MCNC: 6.8 G/DL (ref 6–8.5)
PSA SERPL-MCNC: 0.63 NG/ML (ref 0–4)
RBC # BLD AUTO: 5.29 10*6/MM3 (ref 4.14–5.8)
SODIUM BLD-SCNC: 147 MMOL/L (ref 136–145)
TRIGL SERPL-MCNC: 263 MG/DL (ref 0–150)
TSH SERPL DL<=0.05 MIU/L-ACNC: 1.88 UIU/ML (ref 0.27–4.2)
VLDLC SERPL-MCNC: 52.6 MG/DL (ref 5–40)
WBC NRBC COR # BLD: 8.56 10*3/MM3 (ref 3.4–10.8)

## 2019-10-23 ENCOUNTER — ANTICOAGULATION VISIT (OUTPATIENT)
Dept: PHARMACY | Facility: HOSPITAL | Age: 74
End: 2019-10-23

## 2019-10-23 DIAGNOSIS — I48.20 CHRONIC ATRIAL FIBRILLATION (HCC): ICD-10-CM

## 2019-10-23 LAB
INR PPP: 2.9 (ref 0.91–1.09)
PROTHROMBIN TIME: 35 SECONDS (ref 10–13.8)

## 2019-10-23 PROCEDURE — 36416 COLLJ CAPILLARY BLOOD SPEC: CPT

## 2019-10-23 PROCEDURE — G0463 HOSPITAL OUTPT CLINIC VISIT: HCPCS

## 2019-10-23 PROCEDURE — 85610 PROTHROMBIN TIME: CPT

## 2019-10-23 NOTE — PROGRESS NOTES
Anticoagulation Clinic Progress Note  Indication: atrial fibrillation  Referring Provider: Sravani  Initial Warfarin Start Date: 2008  Goal INR: 2 - 3  Current Drug Interactions: fluoxetine, glimepiride, melatonin (PRN), mirtazepine  CHADS-VASc: 3 (age, HTN, DM)  EtOH: none    Anticoagulation Clinic INR History:  Date 8/2 8/23 9/20 10/11 11/7 11/17 11/28 12/13 12/27   Total Weekly Dose 17.5mg 17.5 mg 17.5 mg 17.5mg 10mg 17.5 mg 17.5 mg 20mg 22.5 mg   INR 2.5 2.3 2.3 2.0 1.2 1.7 1.5 1.7 2.6   Notes     Pre- lumbar inj         Date 1/10/18 1/31 2/28 3/28 4/27 5/30 6/27 7/11 7/23 8/1 8/6 8/13   Total Weekly Dose 22.5 mg 22.5 mg 22.5 mg 22.5 mg 22.5 mg 22.5 mg 22.5 mg 22.5 mg 22.5 mg 20mg 12.5 mg 17.5 mg   INR 2.6 2.5 2.1 2.4 2.4 2.6 3.5 3.4 3.6 5.3, 4.9 1.9 2.7   Notes        Keflex         Date 8/20 9/4 10/2 11/6 11/15 11/29 12/31 1/7/19 1/17 1/29 2/12   Total Weekly Dose 17.5  mg 17.5 mg 17.5 mg 10mg 20mg 17.5mg 17.5mg 18.75 mg 17.5 mg 18.75 mg 20mg   INR 2.3 2.3 2.2 1.3 2.3 2.2 1.7 1.9 1.9 1.9 2.0   Notes    pre- epidural   missed dose?         Date 2/27 3/27 4/10 4/23 5/7 5/28 6/25 7/30 8/28 9/25 10/23   Total Weekly Dose 20mg 20mg 21.25 mg 21.25 mg 21.25 mg 21.25mg 21.25mg 21.25mg 21.25mg 21.25mg 21.25mg   INR 2.2 1.8 2.5 1.8 2.4 2.4 2.5 2.7 2.6 3.0 2.9   Notes sick   post- scope            Clinic Interview:  Verbal Release Authorization signed on 6/27/18 -- may speak with Sussy (wife), Nikolai (son)  Tablet Strength: pt has 2.5mg tablets  Phone: [phone] (Home), [phone]   Fax: [phone] (Attn: Tasha) [facility] [place]    Patient Findings   Negatives:  Signs/symptoms of thrombosis, Signs/symptoms of bleeding, Laboratory test error suspected, Change in health, Change in alcohol use, Change in activity, Upcoming invasive procedure, Emergency department visit, Upcoming dental procedure, Missed doses, Extra doses, Change in medications, Change in diet/appetite, Hospital admission, Bruising, Other  complaints   Comments:  Denies all patient findings. He did see Dr. Lopez and everything has remained the same.     Plan:   1. INR is therapeutic today at 2.9. Instructed Mr. Wolf to continue warfarin 2.5 mg oral daily except warfarin 3.75 mg TuesThursSat.   2. RTC in 2 weeks with appointment with Dr. Lassiter.  3. Verbal and written information was provided in clinic. Mr. Wolf voiced understanding with teach back and has no further questions at this time.   4. Patient denies the need for refills at this time.     Ama Mccloud, PharmD  10/23/19   1:28 PM

## 2019-11-05 ENCOUNTER — TRANSCRIBE ORDERS (OUTPATIENT)
Dept: PHARMACY | Facility: HOSPITAL | Age: 74
End: 2019-11-05

## 2019-11-05 DIAGNOSIS — I48.91 ATRIAL FIBRILLATION, UNSPECIFIED TYPE (HCC): Primary | ICD-10-CM

## 2019-11-06 ENCOUNTER — ANTICOAGULATION VISIT (OUTPATIENT)
Dept: PHARMACY | Facility: HOSPITAL | Age: 74
End: 2019-11-06

## 2019-11-06 ENCOUNTER — OFFICE VISIT (OUTPATIENT)
Dept: CARDIOLOGY | Facility: CLINIC | Age: 74
End: 2019-11-06

## 2019-11-06 VITALS
HEIGHT: 72 IN | BODY MASS INDEX: 32.78 KG/M2 | OXYGEN SATURATION: 93 % | SYSTOLIC BLOOD PRESSURE: 126 MMHG | HEART RATE: 56 BPM | DIASTOLIC BLOOD PRESSURE: 64 MMHG | WEIGHT: 242 LBS

## 2019-11-06 DIAGNOSIS — I48.4 ATYPICAL ATRIAL FLUTTER (HCC): ICD-10-CM

## 2019-11-06 DIAGNOSIS — I48.20 CHRONIC ATRIAL FIBRILLATION (HCC): Primary | ICD-10-CM

## 2019-11-06 DIAGNOSIS — I10 ESSENTIAL HYPERTENSION: ICD-10-CM

## 2019-11-06 DIAGNOSIS — I48.20 CHRONIC ATRIAL FIBRILLATION (HCC): ICD-10-CM

## 2019-11-06 LAB
INR PPP: 4.2 (ref 0.91–1.09)
PROTHROMBIN TIME: 50 SECONDS (ref 10–13.8)

## 2019-11-06 PROCEDURE — G0463 HOSPITAL OUTPT CLINIC VISIT: HCPCS

## 2019-11-06 PROCEDURE — 99213 OFFICE O/P EST LOW 20 MIN: CPT | Performed by: NURSE PRACTITIONER

## 2019-11-06 PROCEDURE — 85610 PROTHROMBIN TIME: CPT

## 2019-11-06 PROCEDURE — 36416 COLLJ CAPILLARY BLOOD SPEC: CPT

## 2019-11-06 NOTE — PROGRESS NOTES
Tony Wolf  1945    There is no work phone number on file.      11/06/2019    [facility] CARDIOLOGY     Kay Lopez, DO  [address] [id number]    Chief Complaint   Patient presents with   • Atrial Fibrillation       Problem List:   1.  Atrial fibrillation/atrial tachycardia/atrial flutter:  a. Diagnosed in June 2004 by physical examination. Coumadin initiated in June 2004.  b. Echocardiogram on 06/30/2004: LA 5.8 with normal LV size and function.  c. Nuclear GXT on 06/30/2004: Negative study.  d. External cardioversion and maintenance to normal sinus rhythm with sotalol on 08/30/2004.  e. Event recorder in March 2005 showing normal sinus rhythm with occasional PVCs.  f. Echocardiogram/bubble study on 04/08/2005: Septum 1.6, mild MR, trace to mild TR, PI, negative bubble study, EF 68%.  g. Holter monitor, September 2008, with 35 PVCs, 157 PACs.   h. Cardiolite, 12/29/2008, with no ischemia, EF 47%.  i. Tikosyn initiated, 01/04/2010.   j. Event recorder, 01/22/2010, with nonsustained atrial tachycardia and PACs.   k. Stress Cardiolite, 03/31/2011: LVEF (59%), no ischemia.   l. EP study with radiofrequency ablation of left atrial posterior wall atrial tachycardia, 04/28/2014.  m. BRIDGET-guided external cardioversion, 11/25/2014 with BRIDGET demonstrating normal LV size and function and mild MR/TR.   2. Premature ventricular contractions:  a. Event recorder, June 2007, consistent with PVCs with subsequent increase of sotalol therapy to 120 mg p.o. b.i.d.    3. History of dyspnea:  a. Dobutamine Cardiolite in September 1998 with inferolateral reversibility.   b. Left heart catheterization on 02/29/2000, showing normal coronary arteries, normal EF.  c. Echocardiogram, March 2010: Normal LV size and function, moderate left ventricular hypertrophy, mild MR, and AI.   d. Echocardiogram, 01/20/2016: EF 55% to 60%. Mild MR. Mild aortic cusp sclerosis. Trace  TRJorden garcia. CT scan of the chest with and without contrast, 01/20/2016, shows cardiomegaly; no pulmonary embolism, mild pulmonary vascular congestion.  4. Hypertension.  5. Hyperlipidemia.  6. Concentric left ventricular hypertrophy.  7. Diabetes mellitus.  8. Surgical history:  a. Left knee replacement in 2002.  b. Left hand carpal tunnel in 2000.                                                                       C.   Double hernia in 1966.    Allergies  Allergies   Allergen Reactions   • Aspirin Anaphylaxis     Other reaction(s): Hypotension       Current Medications    Current Outpatient Medications:   •  ACCU-CHEK SOFTCLIX LANCETS lancets, TEST THREE TIMES A DAY, Disp: 100 each, Rfl: 3  •  acetaminophen (TYLENOL) 500 MG tablet, Take 1,000 mg by mouth Every 6 (Six) Hours As Needed for Mild Pain (1-3)., Disp: , Rfl:   •  amLODIPine (NORVASC) 10 MG tablet, TAKE ONE TABLET BY MOUTH DAILY AS DIRECTED, Disp: 30 tablet, Rfl: 5  •  calcium carbonate (TUMS) 500 MG chewable tablet, Chew 2 tablets Daily As Needed for Indigestion or Heartburn., Disp: , Rfl:   •  Cholecalciferol (VITAMIN D-3) 1000 UNITS capsule, Take 1,000 Units by mouth Daily., Disp: , Rfl:   •  FLUoxetine (PROzac) 40 MG capsule, Take 40 mg by mouth 2 (Two) Times a Day., Disp: , Rfl:   •  glimepiride (AMARYL) 2 MG tablet, TAKE ONE TABLET BY MOUTH DAILY, Disp: 30 tablet, Rfl: 5  •  glucose blood (ACCU-CHEK FREDERICK PLUS) test strip, TEST THREE TIMES A DAY, Disp: 100 each, Rfl: 1  •  Histamine Dihydrochloride (AUSTRALIAN DREAM ARTHRITIS) 0.025 % cream, Apply  topically Daily., Disp: , Rfl:   •  lisinopril (PRINIVIL,ZESTRIL) 40 MG tablet, TAKE ONE TABLET BY MOUTH DAILY, Disp: 30 tablet, Rfl: 11  •  Loratadine (CLARITIN) 10 MG capsule, Take 1 capsule by mouth Daily., Disp: , Rfl:   •  Melatonin 2.5 MG chewable tablet, Chew 1 tablet At Night As Needed., Disp: , Rfl:   •  metoprolol succinate XL (TOPROL-XL) 100 MG 24 hr tablet, TAKE ONE TABLET BY MOUTH TWICE A DAY,  "Disp: 180 tablet, Rfl: 3  •  mirtazapine (REMERON) 45 MG tablet, Take 45 mg by mouth Every Night., Disp: , Rfl:   •  pravastatin (PRAVACHOL) 20 MG tablet, TAKE ONE TABLET BY MOUTH DAILY, Disp: 30 tablet, Rfl: 3  •  warfarin (COUMADIN) 2.5 MG tablet, Take 1 to 1.5 tablet(s) by mouth daily, as directed by the anticoagulation clinic., Disp: 120 tablet, Rfl: 1    History of Present Illness   HPI    Pt presents for follow up of atrial fibrillation, HTN. Since we last saw the pt, pt denies any awareness of AF episodes, CP, LH, or dizziness.  Reports he had normal PFT's last year.  SOB has improved.  Denies any hospitalizations, ER visits, bleeding, or TIA/CVA symptoms. Overall feels well.  Not routinely checking BP or HR at home.      ROS:  General:  Denies fatigue, weight gain or loss  Cardiovascular:  Denies CP, PND, syncope, near syncope, edema or palpitations.  Pulmonary:  No JOSEPH, no cough, or wheezing      Vitals:    11/06/19 1432   BP: 126/64   BP Location: Right arm   Patient Position: Sitting   Pulse: 56   SpO2: 93%   Weight: 110 kg (242 lb)   Height: 182.9 cm (72\")     PE:  General: NAD  Neck: no JVD, no carotid bruits, no TM  Heart: Irreg irreg, NL S1, S2, no rubs, murmurs  Lungs: CTA, no wheezes, rhonchi, or rales  Abd: soft, non-tender, NL BS  Ext: No musculoskeletal deformities, no edema, cyanosis, or clubbing  Psych: normal mood and affect    Diagnostic Data:      Procedures    1. Chronic atrial fibrillation    2. Atypical atrial flutter (CMS/HCC)    3. Essential hypertension          Plan:  1) Chronic atrial fibrillation:  - Chronic in nature and asymptomatic.  Rate controlled.  - Continue present medications.   2) Anticoagulation:  - Continue warfarin  3) HTN:  - Well controlled on amlodipine, lisinopril  - Wt loss, exercise, salt reduction    F/up in 9 months    YUDITH [facility]  11/6/2019  3:36 PM          "

## 2019-11-06 NOTE — PROGRESS NOTES
Anticoagulation Clinic Progress Note  Indication: atrial fibrillation  Referring Provider: Sravani  Initial Warfarin Start Date: 2008  Goal INR: 2 - 3  Current Drug Interactions: fluoxetine, glimepiride, melatonin (PRN), mirtazepine  CHADS-VASc: 3 (age, HTN, DM)  EtOH: none    Anticoagulation Clinic INR History:  Date 8/2 8/23 9/20 10/11 11/7 11/17 11/28 12/13 12/27   Total Weekly Dose 17.5mg 17.5 mg 17.5 mg 17.5mg 10mg 17.5 mg 17.5 mg 20mg 22.5 mg   INR 2.5 2.3 2.3 2.0 1.2 1.7 1.5 1.7 2.6   Notes     Pre- lumbar inj         Date 1/10/18 1/31 2/28 3/28 4/27 5/30 6/27 7/11 7/23 8/1 8/6 8/13   Total Weekly Dose 22.5 mg 22.5 mg 22.5 mg 22.5 mg 22.5 mg 22.5 mg 22.5 mg 22.5 mg 22.5 mg 20mg 12.5 mg 17.5 mg   INR 2.6 2.5 2.1 2.4 2.4 2.6 3.5 3.4 3.6 5.3, 4.9 1.9 2.7   Notes        Keflex         Date 8/20 9/4 10/2 11/6 11/15 11/29 12/31 1/7/19 1/17 1/29 2/12   Total Weekly Dose 17.5  mg 17.5 mg 17.5 mg 10mg 20mg 17.5mg 17.5mg 18.75 mg 17.5 mg 18.75 mg 20mg   INR 2.3 2.3 2.2 1.3 2.3 2.2 1.7 1.9 1.9 1.9 2.0   Notes    pre- epidural   missed dose?         Date 2/27 3/27 4/10 4/23 5/7 5/28 6/25 7/30 8/28 9/25 10/23   Total Weekly Dose 20mg 20mg 21.25 mg 21.25 mg 21.25 mg 21.25mg 21.25mg 21.25mg 21.25mg 21.25mg 21.25mg   INR 2.2 1.8 2.5 1.8 2.4 2.4 2.5 2.7 2.6 3.0 2.9   Notes sick   post- scope            Date  11/6             Total Weekly Dose 21.25 mg             INR 4.2             Notes apap                 Clinic Interview:  Verbal Release Authorization signed on 6/27/18 -- may speak with Sussy (wife), Nikolai (son)  Tablet Strength: pt has 2.5mg tablets  Phone: [phone] (Home), [phone]   Fax: [phone] (Attn: Tasha) [facility]    Patient Findings   Positives:  Change in medications   Negatives:  Signs/symptoms of thrombosis, Signs/symptoms of bleeding, Laboratory test error suspected, Change in health, Change in alcohol use, Change in activity, Upcoming invasive procedure, Emergency department  visit, Upcoming dental procedure, Missed doses, Extra doses, Change in diet/appetite, Hospital admission, Bruising, Other complaints   Comments:  He has been eating the same things as usual.  He had a huge salmon salad and some broccoli over the past week.   He has been taking acetaminophen the last few days for ear pain that radiates to his jaw. He is seeing Dr. Lassiter immediately after this and will let him know.   Otherwise, above findings negative     Plan:   1. INR is SUPRA therapeutic today at 4.2. Instructed Mr. Wolf to HOLD warfarin today, then tomorrow resume warfarin 2.5 mg oral daily except warfarin 3.75 mg TuesThursSat.   2. RTC in 1 week, 11/13   3. Verbal and written information was provided in clinic. Mr. Wolf voiced understanding with teach back and has no further questions at this time.   4. Patient denies the need for refills at this time.     Sariah Almodovar, PharmD  11/06/19   1:50 PM

## 2019-11-12 RX ORDER — PRAVASTATIN SODIUM 20 MG
TABLET ORAL
Qty: 30 TABLET | Refills: 2 | Status: SHIPPED | OUTPATIENT
Start: 2019-11-12 | End: 2020-01-10 | Stop reason: SDUPTHER

## 2019-11-13 ENCOUNTER — ANTICOAGULATION VISIT (OUTPATIENT)
Dept: PHARMACY | Facility: HOSPITAL | Age: 74
End: 2019-11-13

## 2019-11-13 DIAGNOSIS — I48.20 CHRONIC ATRIAL FIBRILLATION (HCC): ICD-10-CM

## 2019-11-13 LAB
INR PPP: 3.2 (ref 0.91–1.09)
PROTHROMBIN TIME: 37.8 SECONDS (ref 10–13.8)

## 2019-11-13 PROCEDURE — 85610 PROTHROMBIN TIME: CPT

## 2019-11-13 PROCEDURE — G0463 HOSPITAL OUTPT CLINIC VISIT: HCPCS

## 2019-11-13 PROCEDURE — 36416 COLLJ CAPILLARY BLOOD SPEC: CPT

## 2019-11-13 NOTE — PROGRESS NOTES
Anticoagulation Clinic Progress Note  Indication: atrial fibrillation  Referring Provider: Sravani  Initial Warfarin Start Date: 2008  Goal INR: 2 - 3  Current Drug Interactions: fluoxetine, glimepiride, melatonin (PRN), mirtazepine  CHADS-VASc: 3 (age, HTN, DM)  EtOH: none    Anticoagulation Clinic INR History:  Date 8/2 8/23 9/20 10/11 11/7 11/17 11/28 12/13 12/27   Total Weekly Dose 17.5mg 17.5 mg 17.5 mg 17.5mg 10mg 17.5 mg 17.5 mg 20mg 22.5 mg   INR 2.5 2.3 2.3 2.0 1.2 1.7 1.5 1.7 2.6   Notes     Pre- lumbar inj         Date 1/10/18 1/31 2/28 3/28 4/27 5/30 6/27 7/11 7/23 8/1 8/6 8/13   Total Weekly Dose 22.5 mg 22.5 mg 22.5 mg 22.5 mg 22.5 mg 22.5 mg 22.5 mg 22.5 mg 22.5 mg 20mg 12.5 mg 17.5 mg   INR 2.6 2.5 2.1 2.4 2.4 2.6 3.5 3.4 3.6 5.3, 4.9 1.9 2.7   Notes        Keflex         Date 8/20 9/4 10/2 11/6 11/15 11/29 12/31 1/7/19 1/17 1/29 2/12   Total Weekly Dose 17.5  mg 17.5 mg 17.5 mg 10mg 20mg 17.5mg 17.5mg 18.75 mg 17.5 mg 18.75 mg 20mg   INR 2.3 2.3 2.2 1.3 2.3 2.2 1.7 1.9 1.9 1.9 2.0   Notes    pre- epidural   missed dose?         Date 2/27 3/27 4/10 4/23 5/7 5/28 6/25 7/30 8/28 9/25 10/23   Total Weekly Dose 20mg 20mg 21.25 mg 21.25 mg 21.25 mg 21.25mg 21.25mg 21.25mg 21.25mg 21.25mg 21.25mg   INR 2.2 1.8 2.5 1.8 2.4 2.4 2.5 2.7 2.6 3.0 2.9   Notes sick   post- scope            Date  11/6 11/13            Total Weekly Dose 21.25 mg 18.75 mg            INR 4.2 3.2            Notes apap Hold x1, less GLV                Clinic Interview:  Verbal Release Authorization signed on 6/27/18 -- may speak with Sussy (wife), Nikolai (son)  Tablet Strength: pt has 2.5mg tablets  Phone: [phone] (Home), [phone]   Fax: [phone] (Attn: Tasha) [facility] [place]    Patient Findings   Negatives:  Signs/symptoms of thrombosis, Signs/symptoms of bleeding, Laboratory test error suspected, Change in health, Change in alcohol use, Change in activity, Upcoming invasive procedure, Emergency department  visit, Upcoming dental procedure, Missed doses, Extra doses, Change in medications, Change in diet/appetite, Hospital admission, Bruising, Other complaints   Comments:  His jaw pain has improved.   He had less GLV over the past week as he only had one salad     Plan:   1. INR is SUPRA therapeutic today at 3.2, although improved from last week. Instructed Mr. Wolf to take warfarin 2.5 mg oral daily except warfarin 3.75 mg TuesThursSat. He plans to resume his usual intake of GLV  2. RTC in 3 weeks, 12/4.  They decline to come any sooner.  His INR has been relatively stable on this dose since April.  They agreed to contact the clinic with any changes.  3. Verbal and written information was provided in clinic. Mr. Wolf voiced understanding with teach back and has no further questions at this time.   4. Patient denies the need for refills at this time.     Sariah Almodovar, PharmD  11/13/19   3:18 PM

## 2019-11-27 RX ORDER — AMLODIPINE BESYLATE 10 MG/1
TABLET ORAL
Qty: 30 TABLET | Refills: 11 | Status: SHIPPED | OUTPATIENT
Start: 2019-11-27 | End: 2020-11-13 | Stop reason: SDUPTHER

## 2019-12-04 ENCOUNTER — ANTICOAGULATION VISIT (OUTPATIENT)
Dept: PHARMACY | Facility: HOSPITAL | Age: 74
End: 2019-12-04

## 2019-12-04 DIAGNOSIS — I48.20 CHRONIC ATRIAL FIBRILLATION (HCC): ICD-10-CM

## 2019-12-04 LAB
INR PPP: 2.7 (ref 0.91–1.09)
PROTHROMBIN TIME: 32.1 SECONDS (ref 10–13.8)

## 2019-12-04 PROCEDURE — G0463 HOSPITAL OUTPT CLINIC VISIT: HCPCS

## 2019-12-04 PROCEDURE — 85610 PROTHROMBIN TIME: CPT

## 2019-12-04 PROCEDURE — 36416 COLLJ CAPILLARY BLOOD SPEC: CPT

## 2019-12-04 NOTE — PROGRESS NOTES
Anticoagulation Clinic Progress Note  Indication: atrial fibrillation  Referring Provider: Sravani  Initial Warfarin Start Date: 2008  Goal INR: 2 - 3  Current Drug Interactions: fluoxetine, glimepiride, melatonin (PRN), mirtazepine  CHADS-VASc: 3 (age, HTN, DM)  EtOH: none    Anticoagulation Clinic INR History:  Date 8/2 8/23 9/20 10/11 11/7 11/17 11/28 12/13 12/27   Total Weekly Dose 17.5mg 17.5 mg 17.5 mg 17.5mg 10mg 17.5 mg 17.5 mg 20mg 22.5 mg   INR 2.5 2.3 2.3 2.0 1.2 1.7 1.5 1.7 2.6   Notes     Pre- lumbar inj         Date 1/10/18 1/31 2/28 3/28 4/27 5/30 6/27 7/11 7/23 8/1 8/6 8/13   Total Weekly Dose 22.5 mg 22.5 mg 22.5 mg 22.5 mg 22.5 mg 22.5 mg 22.5 mg 22.5 mg 22.5 mg 20mg 12.5 mg 17.5 mg   INR 2.6 2.5 2.1 2.4 2.4 2.6 3.5 3.4 3.6 5.3, 4.9 1.9 2.7   Notes        Keflex         Date 8/20 9/4 10/2 11/6 11/15 11/29 12/31 1/7/19 1/17 1/29 2/12   Total Weekly Dose 17.5  mg 17.5 mg 17.5 mg 10mg 20mg 17.5mg 17.5mg 18.75 mg 17.5 mg 18.75 mg 20mg   INR 2.3 2.3 2.2 1.3 2.3 2.2 1.7 1.9 1.9 1.9 2.0   Notes    pre- epidural   missed dose?         Date 2/27 3/27 4/10 4/23 5/7 5/28 6/25 7/30 8/28 9/25 10/23   Total Weekly Dose 20mg 20mg 21.25 mg 21.25 mg 21.25 mg 21.25mg 21.25mg 21.25mg 21.25mg 21.25mg 21.25mg   INR 2.2 1.8 2.5 1.8 2.4 2.4 2.5 2.7 2.6 3.0 2.9   Notes sick   post- scope            Date  11/6 11/13 12/4           Total Weekly Dose 21.25 mg 18.75 mg 21.25 mg           INR 4.2 3.2 2.7           Notes apap Hold x1, less GLV Inc in GLV               Clinic Interview:  Verbal Release Authorization signed on 6/27/18 -- may speak with Sussy (wife), Nikolai (son)  Tablet Strength: pt has 2.5mg tablets  Phone: [phone] (Home), [phone]   Fax: [phone] (Attn: Tasha) [facility] [place]    Patient Findings   Negatives:  Signs/symptoms of thrombosis, Signs/symptoms of bleeding, Laboratory test error suspected, Change in health, Change in alcohol use, Change in activity, Upcoming invasive procedure,  Emergency department visit, Upcoming dental procedure, Missed doses, Extra doses, Change in medications, Change in diet/appetite, Hospital admission, Bruising, Other complaints   Comments:  He did have some broccoli and cooked greens over the past few days.      Plan:   1. INR is therapeutic today at 2.7. Instructed Mr. Wolf to continue warfarin 2.5 mg oral daily except warfarin 3.75 mg TuesThursSat.   2. RTC in 4 weeks per patient preference on 1/3/2020. They decline to come any sooner.  They agreed to contact the clinic with any changes.  3. Verbal and written information was provided in clinic. Mr. Wolf voiced understanding with teach back and has no further questions at this time.   4. Patient denies the need for refills at this time.     Ama Mccloud, PharmD  12/04/19   11:31 AM

## 2019-12-10 ENCOUNTER — TELEPHONE (OUTPATIENT)
Dept: PHARMACY | Facility: HOSPITAL | Age: 74
End: 2019-12-10

## 2019-12-10 NOTE — TELEPHONE ENCOUNTER
Mr. Wolf contacted the clinic as he had begun drinking blueberry and pomegranate juice.    Due to potential interactions and the fact that he is not scheduled to RTC until 1/3/2020; recommended that he schedule an appt sooner or hold off until 3 or 4 days prior to appointment.    He verbalized understanding of interactions and will hold off for now.    Sariah Almodovar, PharmD

## 2019-12-12 RX ORDER — LISINOPRIL 40 MG/1
TABLET ORAL
Qty: 30 TABLET | Refills: 10 | Status: SHIPPED | OUTPATIENT
Start: 2019-12-12 | End: 2020-04-09 | Stop reason: SDUPTHER

## 2020-01-03 ENCOUNTER — ANTICOAGULATION VISIT (OUTPATIENT)
Dept: PHARMACY | Facility: HOSPITAL | Age: 75
End: 2020-01-03

## 2020-01-03 DIAGNOSIS — I48.20 CHRONIC ATRIAL FIBRILLATION (HCC): ICD-10-CM

## 2020-01-03 DIAGNOSIS — I48.91 ATRIAL FIBRILLATION, UNSPECIFIED TYPE (HCC): ICD-10-CM

## 2020-01-03 LAB — INR PPP: 2.2 (ref 0.9–1.1)

## 2020-01-03 PROCEDURE — G0463 HOSPITAL OUTPT CLINIC VISIT: HCPCS

## 2020-01-03 PROCEDURE — 85610 PROTHROMBIN TIME: CPT

## 2020-01-03 PROCEDURE — 36416 COLLJ CAPILLARY BLOOD SPEC: CPT

## 2020-01-03 RX ORDER — WARFARIN SODIUM 2.5 MG/1
TABLET ORAL
Qty: 120 TABLET | Refills: 1 | Status: SHIPPED | OUTPATIENT
Start: 2020-01-03 | End: 2020-07-29 | Stop reason: SDUPTHER

## 2020-01-03 NOTE — PROGRESS NOTES
Anticoagulation Clinic Progress Note  Indication: atrial fibrillation  Referring Provider: Sravani  Initial Warfarin Start Date: 2008  Goal INR: 2 - 3  Current Drug Interactions: fluoxetine, glimepiride, melatonin (PRN), mirtazepine  CHADS-VASc: 3 (age, HTN, DM)  EtOH: none    Anticoagulation Clinic INR History:  Date 8/2 8/23 9/20 10/11 11/7 11/17 11/28 12/13 12/27   Total Weekly Dose 17.5mg 17.5 mg 17.5 mg 17.5mg 10mg 17.5 mg 17.5 mg 20mg 22.5 mg   INR 2.5 2.3 2.3 2.0 1.2 1.7 1.5 1.7 2.6   Notes     Pre- lumbar inj         Date 1/10/18 1/31 2/28 3/28 4/27 5/30 6/27 7/11 7/23 8/1 8/6 8/13   Total Weekly Dose 22.5 mg 22.5 mg 22.5 mg 22.5 mg 22.5 mg 22.5 mg 22.5 mg 22.5 mg 22.5 mg 20mg 12.5 mg 17.5 mg   INR 2.6 2.5 2.1 2.4 2.4 2.6 3.5 3.4 3.6 5.3, 4.9 1.9 2.7   Notes        Keflex         Date 8/20 9/4 10/2 11/6 11/15 11/29 12/31 1/7/19 1/17 1/29 2/12   Total Weekly Dose 17.5  mg 17.5 mg 17.5 mg 10mg 20mg 17.5mg 17.5mg 18.75 mg 17.5 mg 18.75 mg 20mg   INR 2.3 2.3 2.2 1.3 2.3 2.2 1.7 1.9 1.9 1.9 2.0   Notes    pre- epidural   missed dose?         Date 2/27 3/27 4/10 4/23 5/7 5/28 6/25 7/30 8/28 9/25 10/23   Total Weekly Dose 20mg 20mg 21.25 mg 21.25 mg 21.25 mg 21.25mg 21.25mg 21.25mg 21.25mg 21.25mg 21.25mg   INR 2.2 1.8 2.5 1.8 2.4 2.4 2.5 2.7 2.6 3.0 2.9   Notes sick   post- scope            Date  11/6 11/13 12/4 1/3          Total Weekly Dose 21.25 mg 18.75 mg 21.25 mg 21.25 mg          INR 4.2 3.2 2.7 2.2          Notes apap Hold x1, less GLV Inc in GLV               Clinic Interview:  Verbal Release Authorization signed on 6/27/18 -- may speak with Sussy (wife), Nikolai (son)  Tablet Strength: pt has 2.5mg tablets  Phone: [phone] (Home), [phone]   Fax: [phone] (Attn: Tasha) [facility]    Patient Findings     Positives:  Change in medications   Negatives:  Signs/symptoms of thrombosis, Signs/symptoms of bleeding, Laboratory test error suspected, Change in health, Change in alcohol  use, Change in activity, Upcoming invasive procedure, Emergency department visit, Upcoming dental procedure, Missed doses, Extra doses, Change in diet/appetite, Hospital admission, Bruising, Other complaints   Comments:  Mr Wolf is having some hip pain and plans to take APAP. Sees PCP Friday, will inform us of any medication changes       Plan:   1. INR is therapeutic today at 2.2. Instructed Mr. Wolf to continue warfarin 2.5 mg oral daily except warfarin 3.75 mg TuesThursSat.   2. RTC in 4 weeks per patient preference on 1/31/2020  3. Verbal and written information was provided in clinic. Mr. Wolf voiced understanding with teach back and has no further questions at this time.   4. Patient requests 2.5mg tablets sent in- sent e-Rx    Teena Boykin, TonaD.  01/03/20   12:08 PM

## 2020-01-10 ENCOUNTER — TELEPHONE (OUTPATIENT)
Dept: PHARMACY | Facility: HOSPITAL | Age: 75
End: 2020-01-10

## 2020-01-10 ENCOUNTER — OFFICE VISIT (OUTPATIENT)
Dept: INTERNAL MEDICINE | Facility: CLINIC | Age: 75
End: 2020-01-10

## 2020-01-10 VITALS
BODY MASS INDEX: 32.78 KG/M2 | DIASTOLIC BLOOD PRESSURE: 70 MMHG | HEART RATE: 62 BPM | OXYGEN SATURATION: 98 % | WEIGHT: 242 LBS | HEIGHT: 72 IN | SYSTOLIC BLOOD PRESSURE: 130 MMHG

## 2020-01-10 DIAGNOSIS — F41.1 GENERALIZED ANXIETY DISORDER: ICD-10-CM

## 2020-01-10 DIAGNOSIS — E11.9 TYPE 2 DIABETES MELLITUS WITHOUT COMPLICATION, WITHOUT LONG-TERM CURRENT USE OF INSULIN (HCC): ICD-10-CM

## 2020-01-10 DIAGNOSIS — K21.9 GASTROESOPHAGEAL REFLUX DISEASE WITHOUT ESOPHAGITIS: ICD-10-CM

## 2020-01-10 DIAGNOSIS — J20.9 ACUTE BRONCHITIS, UNSPECIFIED ORGANISM: ICD-10-CM

## 2020-01-10 DIAGNOSIS — E78.5 HYPERLIPIDEMIA LDL GOAL <100: ICD-10-CM

## 2020-01-10 DIAGNOSIS — I10 ESSENTIAL HYPERTENSION: Primary | ICD-10-CM

## 2020-01-10 DIAGNOSIS — G47.33 OBSTRUCTIVE SLEEP APNEA: ICD-10-CM

## 2020-01-10 PROCEDURE — 99214 OFFICE O/P EST MOD 30 MIN: CPT | Performed by: INTERNAL MEDICINE

## 2020-01-10 RX ORDER — PRAVASTATIN SODIUM 20 MG
20 TABLET ORAL DAILY
Qty: 90 TABLET | Refills: 3 | Status: SHIPPED | OUTPATIENT
Start: 2020-01-10 | End: 2021-01-04

## 2020-01-10 RX ORDER — DOXYCYCLINE HYCLATE 100 MG
100 TABLET ORAL 2 TIMES DAILY
Qty: 20 TABLET | Refills: 0 | Status: SHIPPED | OUTPATIENT
Start: 2020-01-10 | End: 2020-01-20

## 2020-01-10 RX ORDER — GLIMEPIRIDE 2 MG/1
2 TABLET ORAL DAILY
Qty: 90 TABLET | Refills: 3 | Status: SHIPPED | OUTPATIENT
Start: 2020-01-10 | End: 2021-01-04

## 2020-01-10 NOTE — TELEPHONE ENCOUNTER
Patient called to report he will start taking Doxycycline 100mg BID x10 days starting today. Patient should be due to take last dose on 1/20. Patient is agreeable to come into clinic on Wed, 1/15, due to warfarin-doxycyline interaction. Have discussed s/sx to be aware of and request he stay consistent with GLV intake.    Per [employer]:     Warning: Concurrent use of WARFARIN and DOXYCYCLINE may result in an increased risk of bleeding.    Clinical Management: In patients receiving oral anticoagulant therapy with warfarin, the prothrombin time ratio or international normalized ratio (INR) should be monitored with the addition and withdrawal of treatment with doxycycline, and should be reassessed periodically during concurrent therapy. Adjustments of the warfarin dose may be necessary in order to maintain the desired level of anticoagulation.

## 2020-01-10 NOTE — PROGRESS NOTES
Subjective   Tony Wolf is a 74 y.o. male.   No chief complaint on file.      Hyperlipidemia   This is a chronic problem. The current episode started more than 1 year ago. Pertinent negatives include no chest pain, myalgias or shortness of breath.   Hypertension   This is a chronic problem. The current episode started more than 1 year ago. Associated symptoms include anxiety. Pertinent negatives include no chest pain, headaches, neck pain, palpitations or shortness of breath. Identifiable causes of hypertension include sleep apnea.   Heartburn   He reports no abdominal pain, no chest pain, no coughing, no nausea, no sore throat, no stridor, no tooth decay, no water brash or no wheezing. This is a chronic problem. Pertinent negatives include no fatigue.   Anxiety   Patient reports no chest pain, confusion, decreased concentration, nausea, nervous/anxious behavior, palpitations or shortness of breath.       Sleep Apnea   Associated symptoms include a rash. Pertinent negatives include no abdominal pain, arthralgias, chest pain, chills, congestion, coughing, diaphoresis, fatigue, fever, headaches, myalgias, nausea, neck pain, numbness, sore throat or vomiting.   Diabetes   He presents for his follow-up diabetic visit. He has type 2 diabetes mellitus. Pertinent negatives for hypoglycemia include no confusion, headaches, nervousness/anxiousness, pallor, seizures or speech difficulty. Pertinent negatives for diabetes include no chest pain, no fatigue, no polydipsia and no polyphagia. An ACE inhibitor/angiotensin II receptor blocker is being taken.      Productive cough x 3 weeks. No fever or chills. .  Anxiety stable with current med. Gerd is doing okay. Watching diet helps. Yeast in groin folds    The following portions of the patient's history were reviewed and updated as appropriate: allergies, current medications, past family history, past medical history, past social history, past surgical history and problem  list.    Review of Systems   Constitutional: Negative for activity change, appetite change, chills, diaphoresis, fatigue, fever and unexpected weight change.   HENT: Negative for congestion, ear discharge, ear pain, mouth sores, nosebleeds, sinus pressure, sneezing and sore throat.    Eyes: Negative for pain, discharge and itching.   Respiratory: Negative for cough, chest tightness, shortness of breath and wheezing.    Cardiovascular: Negative for chest pain, palpitations and leg swelling.   Gastrointestinal: Negative for abdominal pain, constipation, diarrhea, nausea and vomiting.   Endocrine: Negative for cold intolerance, heat intolerance, polydipsia and polyphagia.   Genitourinary: Negative for dysuria, flank pain, frequency, hematuria and urgency.   Musculoskeletal: Negative for arthralgias, back pain, gait problem, myalgias, neck pain and neck stiffness.   Skin: Positive for rash. Negative for color change and pallor.   Neurological: Negative for seizures, speech difficulty, numbness and headaches.   Psychiatric/Behavioral: Negative for agitation, confusion, decreased concentration and sleep disturbance. The patient is not nervous/anxious.      There were no vitals taken for this visit.    Objective   Physical Exam   Constitutional: He is oriented to person, place, and time. He appears well-developed.   HENT:   Head: Normocephalic.   Right Ear: External ear normal.   Left Ear: External ear normal.   Nose: Nose normal.   Mouth/Throat: Oropharynx is clear and moist.   Eyes: Pupils are equal, round, and reactive to light. Conjunctivae are normal.   Neck: No JVD present. No thyromegaly present.   Cardiovascular: Normal rate, regular rhythm and normal heart sounds. Exam reveals no friction rub.   No murmur heard.  Pulmonary/Chest: Effort normal and breath sounds normal. No respiratory distress. He has no wheezes. He has no rales.   Abdominal: Soft. Bowel sounds are normal. He exhibits no distension. There is no  tenderness. There is no guarding.   Musculoskeletal: He exhibits no edema or tenderness.   Lymphadenopathy:     He has no cervical adenopathy.   Neurological: He is oriented to person, place, and time. He displays normal reflexes. No cranial nerve deficit.   Skin: No rash noted.   satellite lesions in groin folds.    Psychiatric: He has a normal mood and affect. His behavior is normal.   Nursing note and vitals reviewed.      Assessment/Plan   Tony was seen today for heartburn, hyperlipidemia, hypertension and diabetes.    Diagnoses and all orders for this visit:    Controlled type 2 diabetes mellitus without complication, without long-term current use of insulin  -     POC Glucose Fingerstick  -     POC Glycosylated Hemoglobin (Hb A1C)  -     Comprehensive Metabolic Panel  -     Lipid Panel  Follows with Endo.   Essential hypertension  -     Comprehensive Metabolic Panel  -     Lipid Panel    Hyperlipidemia LDL goal <100  Lipids today  Obstructive sleep apnea  Did not tolerate cpap  Gastroesophageal reflux disease, esophagitis presence not specified  stable  Anxiety  stable  Chronic a fib  Stable. Coumadin managed by Cardiolog             Subjective   Tony Wolf is a 74 y.o. male.   No chief complaint on file.      Diabetes   He presents for his follow-up diabetic visit. He has type 2 diabetes mellitus. Pertinent negatives for hypoglycemia include no confusion, headaches, nervousness/anxiousness, pallor, seizures or speech difficulty. Associated symptoms include fatigue. Pertinent negatives for diabetes include no chest pain, no polydipsia and no polyphagia. An ACE inhibitor/angiotensin II receptor blocker is being taken.   Heartburn   He reports no abdominal pain, no chest pain, no coughing, no nausea, no sore throat, no stridor, no tooth decay, no water brash or no wheezing. This is a chronic problem. Associated symptoms include fatigue.   Hyperlipidemia   This is a chronic problem. The current episode  started more than 1 year ago. Pertinent negatives include no chest pain, myalgias or shortness of breath.   Hypertension   This is a chronic problem. The current episode started more than 1 year ago. Pertinent negatives include no chest pain, headaches, neck pain, palpitations or shortness of breath. Identifiable causes of hypertension include sleep apnea.   Sleep Apnea   Associated symptoms include fatigue and a rash. Pertinent negatives include no abdominal pain, arthralgias, chest pain, chills, congestion, coughing, diaphoresis, fever, headaches, myalgias, nausea, neck pain, numbness, sore throat or vomiting.      Chronic atrial fib that has been stable.  Anxiety stable with current med. Gerd is doing okay. Watching diet helps. Yeast in groin folds    The following portions of the patient's history were reviewed and updated as appropriate: allergies, current medications, past family history, past medical history, past social history, past surgical history and problem list.    Review of Systems   Constitutional: Positive for fatigue. Negative for activity change, appetite change, chills, diaphoresis, fever and unexpected weight change.   HENT: Negative for congestion, ear discharge, ear pain, mouth sores, nosebleeds, sinus pressure, sneezing and sore throat.    Eyes: Negative for pain, discharge and itching.   Respiratory: Negative for cough, chest tightness, shortness of breath and wheezing.    Cardiovascular: Negative for chest pain, palpitations and leg swelling.   Gastrointestinal: Negative for abdominal pain, constipation, diarrhea, nausea and vomiting.   Endocrine: Negative for cold intolerance, heat intolerance, polydipsia and polyphagia.   Genitourinary: Negative for dysuria, flank pain, frequency, hematuria and urgency.   Musculoskeletal: Negative for arthralgias, back pain, gait problem, myalgias, neck pain and neck stiffness.   Skin: Positive for rash. Negative for color change and pallor.    Neurological: Negative for seizures, speech difficulty, numbness and headaches.   Psychiatric/Behavioral: Negative for agitation, confusion, decreased concentration and sleep disturbance. The patient is not nervous/anxious.      There were no vitals taken for this visit.    Objective   Physical Exam   Constitutional: He is oriented to person, place, and time. He appears well-developed.   HENT:   Head: Normocephalic.   Right Ear: External ear normal.   Left Ear: External ear normal.   Nose: Nose normal.   Mouth/Throat: Oropharynx is clear and moist.   Eyes: Conjunctivae are normal. Pupils are equal, round, and reactive to light.   Neck: No JVD present. No thyromegaly present.   Cardiovascular: Normal rate, regular rhythm and normal heart sounds. Exam reveals no friction rub.   No murmur heard.  Pulmonary/Chest: Effort normal and breath sounds normal. No respiratory distress. He has no wheezes. He has no rales.   Abdominal: Soft. Bowel sounds are normal. He exhibits no distension. There is no tenderness. There is no guarding.   Musculoskeletal: He exhibits no edema or tenderness.   Lymphadenopathy:     He has no cervical adenopathy.   Neurological: He is oriented to person, place, and time. He displays normal reflexes. No cranial nerve deficit.   Skin: No rash noted.   satellite lesions in groin folds.    Psychiatric: He has a normal mood and affect. His behavior is normal.   Nursing note and vitals reviewed.      Assessment/Plan   Tony was seen today for heartburn, hyperlipidemia, hypertension and diabetes.    Diagnoses and all orders for this visit:    Controlled type 2 diabetes mellitus without complication, without long-term current use of insulin      Follows with Endo.   Essential hypertension  stable    Hyperlipidemia LDL goal <100  Lipids done 6/10 at Endo at goal  Obstructive sleep apnea  Did not tolerate cpap  Gastroesophageal reflux disease, esophagitis presence not  specified  stable  Anxiety  stable  Chronic a fib  Stable. Coumadin managed by Cardiolog

## 2020-01-15 ENCOUNTER — ANTICOAGULATION VISIT (OUTPATIENT)
Dept: PHARMACY | Facility: HOSPITAL | Age: 75
End: 2020-01-15

## 2020-01-15 DIAGNOSIS — I48.20 CHRONIC ATRIAL FIBRILLATION (HCC): ICD-10-CM

## 2020-01-15 LAB
INR PPP: 3.1 (ref 0.91–1.09)
PROTHROMBIN TIME: 37.6 SECONDS (ref 10–13.8)

## 2020-01-15 PROCEDURE — 36416 COLLJ CAPILLARY BLOOD SPEC: CPT

## 2020-01-15 PROCEDURE — G0463 HOSPITAL OUTPT CLINIC VISIT: HCPCS

## 2020-01-15 PROCEDURE — 85610 PROTHROMBIN TIME: CPT

## 2020-01-15 NOTE — PROGRESS NOTES
Anticoagulation Clinic Progress Note  Indication: atrial fibrillation  Referring Provider: Sravani  Initial Warfarin Start Date: 2008  Goal INR: 2 - 3  Current Drug Interactions: fluoxetine, glimepiride, melatonin (PRN), mirtazepine  CHADS-VASc: 3 (age, HTN, DM)  EtOH: none    Anticoagulation Clinic INR History:  Date 8/2 8/23 9/20 10/11 11/7 11/17 11/28 12/13 12/27   Total Weekly Dose 17.5mg 17.5 mg 17.5 mg 17.5mg 10mg 17.5 mg 17.5 mg 20mg 22.5 mg   INR 2.5 2.3 2.3 2.0 1.2 1.7 1.5 1.7 2.6   Notes     Pre- lumbar inj         Date 1/10/18 1/31 2/28 3/28 4/27 5/30 6/27 7/11 7/23 8/1 8/6 8/13   Total Weekly Dose 22.5 mg 22.5 mg 22.5 mg 22.5 mg 22.5 mg 22.5 mg 22.5 mg 22.5 mg 22.5 mg 20mg 12.5 mg 17.5 mg   INR 2.6 2.5 2.1 2.4 2.4 2.6 3.5 3.4 3.6 5.3, 4.9 1.9 2.7   Notes        Keflex         Date 8/20 9/4 10/2 11/6 11/15 11/29 12/31 1/7/19 1/17 1/29 2/12   Total Weekly Dose 17.5  mg 17.5 mg 17.5 mg 10mg 20mg 17.5mg 17.5mg 18.75 mg 17.5 mg 18.75 mg 20mg   INR 2.3 2.3 2.2 1.3 2.3 2.2 1.7 1.9 1.9 1.9 2.0   Notes    pre- epidural   missed dose?         Date 2/27 3/27 4/10 4/23 5/7 5/28 6/25 7/30 8/28 9/25 10/23   Total Weekly Dose 20mg 20mg 21.25 mg 21.25 mg 21.25 mg 21.25mg 21.25mg 21.25mg 21.25mg 21.25mg 21.25mg   INR 2.2 1.8 2.5 1.8 2.4 2.4 2.5 2.7 2.6 3.0 2.9   Notes sick   post- scope            Date  11/6 11/13 12/4 1/3 1/15         Total Weekly Dose 21.25 mg 18.75 mg 21.25 mg 21.25 mg 21.25 mg         INR 4.2 3.2 2.7 2.2 3.1         Notes apap Hold x1, less [employer] in GLV  doxy             Clinic Interview:  Verbal Release Authorization signed on 6/27/18 -- may speak with Sussy (wife), Nikolia (son)  Tablet Strength: pt has 2.5mg tablets  Phone: [phone] (Home), [phone]   Fax: [phone] (Attn: Tasha) [facility]    Patient Findings     Positives:  Change in health, Change in medications, Change in diet/appetite   Negatives:  Signs/symptoms of thrombosis, Signs/symptoms of bleeding,  Laboratory test error suspected, Change in alcohol use, Change in activity, Upcoming invasive procedure, Emergency department visit, Upcoming dental procedure, Missed doses, Extra doses, Hospital admission, Bruising, Other complaints   Comments:  Tony Wolf denies s/sx of bleeding or bruising out of the ordinary. He stated that his diet has decreased while being sick. He reports that he started doxycycline 100 mg BID on 1/10 and will finish the course on 1/20. He is also being weaned off fluoxetine and beginning venlafaxine (both have an increased risk of bleeding). He has been compliant with his warfarin since the last encounter.         Mr. Wolf was seen by Kay Lopez on 1/10/20 for HTN and URI. Here is the assessment and plan: Tony was seen today for heartburn, hyperlipidemia, hypertension and diabetes.     Diagnoses and all orders for this visit:     Controlled type 2 diabetes mellitus without complication, without long-term current use of insulin  -     POC Glucose Fingerstick  -     POC Glycosylated Hemoglobin (Hb A1C)  -     Comprehensive Metabolic Panel  -     Lipid Panel  Follows with Endo.   Essential hypertension  -     Comprehensive Metabolic Panel  -     Lipid Panel     Hyperlipidemia LDL goal <100  Lipids today  Obstructive sleep apnea  Did not tolerate cpap  Gastroesophageal reflux disease, esophagitis presence not specified  stable  Anxiety  stable  Chronic a fib  Stable. Coumadin managed by Cardiolog      Plan:   1. INR is therapeutic today at 3.1. Instructed Mr. Wolf to continue warfarin 2.5 mg oral daily except warfarin 3.75 mg TuesThursSat and have an extra serving of GLV.   2. RTC in 4 days on 1/20 due to ABX  3. Verbal and written information was provided in clinic. Mr. Wolf voiced understanding with teach back and has no further questions at this time.       Sergei Mckeon, Pharmacy Intern   01/15/20   1:30 PM    Considering low CHADs-VASc, INR over ULN and doxy initiation,  prefer to change Thursday's dose to 2.5mg. Called patient and updated tracker.    I, Teena Boykin [facility], have reviewed the note in full and agree with the assessment and plan.  01/15/20  3:04 PM

## 2020-01-20 ENCOUNTER — ANTICOAGULATION VISIT (OUTPATIENT)
Dept: PHARMACY | Facility: HOSPITAL | Age: 75
End: 2020-01-20

## 2020-01-20 DIAGNOSIS — I48.20 CHRONIC ATRIAL FIBRILLATION (HCC): ICD-10-CM

## 2020-01-20 LAB
INR PPP: 3.1 (ref 0.91–1.09)
PROTHROMBIN TIME: 37.2 SECONDS (ref 10–13.8)

## 2020-01-20 PROCEDURE — 36416 COLLJ CAPILLARY BLOOD SPEC: CPT

## 2020-01-20 PROCEDURE — G0463 HOSPITAL OUTPT CLINIC VISIT: HCPCS

## 2020-01-20 PROCEDURE — 85610 PROTHROMBIN TIME: CPT

## 2020-01-31 ENCOUNTER — ANTICOAGULATION VISIT (OUTPATIENT)
Dept: PHARMACY | Facility: HOSPITAL | Age: 75
End: 2020-01-31

## 2020-01-31 DIAGNOSIS — I48.20 CHRONIC ATRIAL FIBRILLATION (HCC): ICD-10-CM

## 2020-01-31 LAB
INR PPP: 2.9 (ref 0.91–1.09)
INR PPP: 2.9 (ref 2–3)
PROTHROMBIN TIME: 34.5 SECONDS (ref 10–13.8)

## 2020-01-31 PROCEDURE — 36416 COLLJ CAPILLARY BLOOD SPEC: CPT

## 2020-01-31 PROCEDURE — 85610 PROTHROMBIN TIME: CPT

## 2020-01-31 PROCEDURE — G0463 HOSPITAL OUTPT CLINIC VISIT: HCPCS

## 2020-01-31 NOTE — PROGRESS NOTES
Anticoagulation Clinic Progress Note  Indication: atrial fibrillation  Referring Provider: Sravani  Initial Warfarin Start Date: 2008  Goal INR: 2 - 3  Current Drug Interactions: fluoxetine, glimepiride, melatonin (PRN), mirtazepine  CHADS-VASc: 3 (age, HTN, DM)  EtOH: none  GLV: Salad (chopped kale salad or garden salad) and serving of broccoli once a week    Anticoagulation Clinic INR History:  Date 8/2 8/23 9/20 10/11 11/7 11/17 11/28 12/13 12/27   Total Weekly Dose 17.5mg 17.5 mg 17.5 mg 17.5mg 10mg 17.5 mg 17.5 mg 20mg 22.5 mg   INR 2.5 2.3 2.3 2.0 1.2 1.7 1.5 1.7 2.6   Notes     Pre- lumbar inj         Date 1/10/18 1/31 2/28 3/28 4/27 5/30 6/27 7/11 7/23 8/1 8/6 8/13   Total Weekly Dose 22.5 mg 22.5 mg 22.5 mg 22.5 mg 22.5 mg 22.5 mg 22.5 mg 22.5 mg 22.5 mg 20mg 12.5 mg 17.5 mg   INR 2.6 2.5 2.1 2.4 2.4 2.6 3.5 3.4 3.6 5.3, 4.9 1.9 2.7   Notes        Keflex         Date 8/20 9/4 10/2 11/6 11/15 11/29 12/31 1/7/19 1/17 1/29 2/12   Total Weekly Dose 17.5  mg 17.5 mg 17.5 mg 10mg 20mg 17.5mg 17.5mg 18.75 mg 17.5 mg 18.75 mg 20mg   INR 2.3 2.3 2.2 1.3 2.3 2.2 1.7 1.9 1.9 1.9 2.0   Notes    pre- epidural   missed dose?         Date 2/27 3/27 4/10 4/23 5/7 5/28 6/25 7/30 8/28 9/25 10/23   Total Weekly Dose 20mg 20mg 21.25 mg 21.25 mg 21.25 mg 21.25mg 21.25mg 21.25mg 21.25mg 21.25mg 21.25mg   INR 2.2 1.8 2.5 1.8 2.4 2.4 2.5 2.7 2.6 3.0 2.9   Notes sick   post- scope            Date  11/6 11/13 12/4 1/3 1/15 1/20 1/31       Total Weekly Dose 21.25 mg 18.75 mg 21.25 mg 21.25 mg 21.25 mg 20 mg 21.25       INR 4.2 3.2 2.7 2.2 3.1 3.1 2.9       Notes apap Hold x1, less [employer] in GLV  doxy doxy            Clinic Interview:  Verbal Release Authorization signed on 6/27/18 -- may speak with Sussy (wife), Nikolai (son)  Tablet Strength: pt has 2.5mg tablets  Phone: [phone] (Home), [phone]   Fax: [phone] (Attn: Tasha) [facility]    Patient Findings     Negatives:  Signs/symptoms of thrombosis,  Signs/symptoms of bleeding, Laboratory test error suspected, Change in health, Change in alcohol use, Change in activity, Upcoming invasive procedure, Emergency department visit, Upcoming dental procedure, Missed doses, Extra doses, Change in medications, Change in diet/appetite, Hospital admission, Bruising, Other complaints   Comments:  Mr. Wolf has been taking warfarin as directed. His antibiotics are complete and he is feeling better. He denies any changes since his last visit. His wife notes he may require dental work in the near future, but will call us with more details.      Plan:   1. INR is therapeutic today at 2.9. Instructed Mr. Wolf to continue warfarin 2.5 mg oral daily except warfarin 3.75 mg TuesThursSat    2. RTC in 4 weeks.  3. Verbal and written information was provided in clinic. Mr. Wolf voiced understanding with teach back and has no further questions at this time.     Kelechi James, [facility]   01/31/20   11:55 AM

## 2020-02-04 ENCOUNTER — TELEPHONE (OUTPATIENT)
Dept: ORTHOPEDIC SURGERY | Facility: CLINIC | Age: 75
End: 2020-02-04

## 2020-02-04 ENCOUNTER — TELEPHONE (OUTPATIENT)
Dept: PHARMACY | Facility: HOSPITAL | Age: 75
End: 2020-02-04

## 2020-02-04 RX ORDER — CEPHALEXIN 500 MG/1
CAPSULE ORAL
Qty: 4 CAPSULE | Refills: 0 | Status: SHIPPED | OUTPATIENT
Start: 2020-02-04 | End: 2020-02-21 | Stop reason: SDUPTHER

## 2020-02-04 NOTE — TELEPHONE ENCOUNTER
Sent the prescription to the patient's pharmacy on file and spoke with him to advise him of this.     Cristela

## 2020-02-04 NOTE — TELEPHONE ENCOUNTER
Mr Wolf called the clinic to let us know he has a dental cleaning tomorrow and wanted to know if he needed to be on an antibiotic. Advised Mr Wolf that he would need to speak with his DMD as it is ultimately up to them on whether or not he needs antibiotics for dental cleanings but told him I doubted it.    He also mentioned they will be putting a crown on his tooth 2/19. Advised Mr Wolf to make sure the DMD office is aware he is on a blood thinner and to ask them if he needs to hold his warfarin. He will call the clinic tomorrow with an update.

## 2020-02-05 ENCOUNTER — TELEPHONE (OUTPATIENT)
Dept: PHARMACY | Facility: HOSPITAL | Age: 75
End: 2020-02-05

## 2020-02-05 NOTE — TELEPHONE ENCOUNTER
Pt taking cephalexin 500mg 4 po 1 hour prior to crown being placed today. Will repeat INR tomorrow, then may need to reschedule next follow up currently for 2/28

## 2020-02-06 ENCOUNTER — APPOINTMENT (OUTPATIENT)
Dept: PHARMACY | Facility: HOSPITAL | Age: 75
End: 2020-02-06

## 2020-02-06 NOTE — TELEPHONE ENCOUNTER
Mr. Wolf had a family emergency and was unable to keep appointment today, rescheduled for Monday, 2/10.

## 2020-02-10 ENCOUNTER — ANTICOAGULATION VISIT (OUTPATIENT)
Dept: PHARMACY | Facility: HOSPITAL | Age: 75
End: 2020-02-10

## 2020-02-10 DIAGNOSIS — I48.20 CHRONIC ATRIAL FIBRILLATION (HCC): ICD-10-CM

## 2020-02-10 LAB
INR PPP: 3.5 (ref 0.91–1.09)
PROTHROMBIN TIME: 41.9 SECONDS (ref 10–13.8)

## 2020-02-10 PROCEDURE — 85610 PROTHROMBIN TIME: CPT

## 2020-02-10 PROCEDURE — 36416 COLLJ CAPILLARY BLOOD SPEC: CPT

## 2020-02-10 PROCEDURE — G0463 HOSPITAL OUTPT CLINIC VISIT: HCPCS

## 2020-02-10 NOTE — PROGRESS NOTES
Anticoagulation Clinic Progress Note  Indication: atrial fibrillation  Referring Provider: Sravani  Initial Warfarin Start Date: 2008  Goal INR: 2 - 3  Current Drug Interactions: fluoxetine, glimepiride, melatonin (PRN), mirtazepine  CHADS-VASc: 3 (age, HTN, DM)  EtOH: none  GLV: Salad (chopped kale salad or garden salad) and serving of broccoli once a week    Anticoagulation Clinic INR History:  Date 8/2 8/23 9/20 10/11 11/7 11/17 11/28 12/13 12/27   Total Weekly Dose 17.5mg 17.5 mg 17.5 mg 17.5mg 10mg 17.5 mg 17.5 mg 20mg 22.5 mg   INR 2.5 2.3 2.3 2.0 1.2 1.7 1.5 1.7 2.6   Notes     Pre- lumbar inj         Date 1/10/18 1/31 2/28 3/28 4/27 5/30 6/27 7/11 7/23 8/1 8/6 8/13   Total Weekly Dose 22.5 mg 22.5 mg 22.5 mg 22.5 mg 22.5 mg 22.5 mg 22.5 mg 22.5 mg 22.5 mg 20mg 12.5 mg 17.5 mg   INR 2.6 2.5 2.1 2.4 2.4 2.6 3.5 3.4 3.6 5.3, 4.9 1.9 2.7   Notes        Keflex         Date 8/20 9/4 10/2 11/6 11/15 11/29 12/31 1/7/19 1/17 1/29 2/12   Total Weekly Dose 17.5  mg 17.5 mg 17.5 mg 10mg 20mg 17.5mg 17.5mg 18.75 mg 17.5 mg 18.75 mg 20mg   INR 2.3 2.3 2.2 1.3 2.3 2.2 1.7 1.9 1.9 1.9 2.0   Notes    pre- epidural   missed dose?         Date 2/27 3/27 4/10 4/23 5/7 5/28 6/25 7/30 8/28 9/25 10/23   Total Weekly Dose 20mg 20mg 21.25 mg 21.25 mg 21.25 mg 21.25mg 21.25mg 21.25mg 21.25mg 21.25mg 21.25mg   INR 2.2 1.8 2.5 1.8 2.4 2.4 2.5 2.7 2.6 3.0 2.9   Notes sick   post- scope            Date  11/6 11/13 12/4 1/3 1/15 1/20 1/31 2/10      Total Weekly Dose 21.25 mg 18.75 mg 21.25 mg 21.25 mg 21.25 mg 20 mg 21.25 21.25      INR 4.2 3.2 2.7 2.2 3.1 3.1 2.9 3.5      Notes apap Hold x1, less [employer] in GLV  doxy doxy  1x dose cephalexin          Clinic Interview:  Verbal Release Authorization signed on 6/27/18 -- may speak with Sussy (wife), Nikolai (son)  Tablet Strength: pt has 2.5mg tablets  Phone: [phone] (Home), [phone]   Fax: [phone] (Attn: Tasha) [facility]    Patient Findings     Positives:   Upcoming dental procedure, Change in medications   Negatives:  Signs/symptoms of thrombosis, Signs/symptoms of bleeding, Laboratory test error suspected, Change in health, Change in alcohol use, Change in activity, Upcoming invasive procedure, Emergency department visit, Missed doses, Extra doses, Change in diet/appetite, Hospital admission, Bruising, Other complaints   Comments:  Took 2g cephalexin prior to dental appt on 2/5, will have a crown placed on 2/12 and will take same pre-meds. No bleeding with dental procedue 2/5. Checking with ortho surgeon Dr Interiano to see how long he will require pre abx meds.           Plan:   1. INR is supratherapeutic today at 3.5. Instructed Mr. Wolf to decrease tonight's dose to 1.25 mg then continue warfarin 2.5 mg oral daily except warfarin 3.75 mg TuesThursSat    2. RTC in on 2/28  3. Verbal and written information was provided in clinic. Mr. Wolf voiced understanding with teach back and has no further questions at this time.     Teena Boykin, PharmD.  02/10/20   2:07 PM

## 2020-02-21 ENCOUNTER — TELEPHONE (OUTPATIENT)
Dept: ORTHOPEDIC SURGERY | Facility: CLINIC | Age: 75
End: 2020-02-21

## 2020-02-21 RX ORDER — CEPHALEXIN 500 MG/1
CAPSULE ORAL
Qty: 4 CAPSULE | Refills: 0 | Status: SHIPPED | OUTPATIENT
Start: 2020-02-21 | End: 2020-05-11

## 2020-02-21 NOTE — TELEPHONE ENCOUNTER
PATIENT CALLED ASKING FOR A ANTIBIOTIC SENT TO [facility] ON [address]. PATIENT IS HAVING A DENTAL PROCEDURE DONE ON Wednesday 2/26/20. PATIENT WOULD LIKE A CALL BACK [phone]

## 2020-02-21 NOTE — TELEPHONE ENCOUNTER
I left a voicemail letting the patient know that his antibiotic has been sent to his pharmacy.    Cristela

## 2020-02-25 ENCOUNTER — TELEPHONE (OUTPATIENT)
Dept: PHARMACY | Facility: HOSPITAL | Age: 75
End: 2020-02-25

## 2020-02-25 NOTE — TELEPHONE ENCOUNTER
Patient called to notify us that he will be taking 2g Keflex tomorrow prior to dental appt. On 2/5, this ABX dose caused patient's INR to jump to 3.5(range 2-3). I have instructed Mr. Wolf to take warfarin 2.5mg oral daily except 3.75mg Thurs until his clinic visit on Friday, 2/28.  This is a 5.8% decrease from his maintenance regimen. He RBV dosing instructions and did not have any further questions/concerns.

## 2020-02-28 ENCOUNTER — ANTICOAGULATION VISIT (OUTPATIENT)
Dept: PHARMACY | Facility: HOSPITAL | Age: 75
End: 2020-02-28

## 2020-02-28 DIAGNOSIS — I48.20 CHRONIC ATRIAL FIBRILLATION (HCC): ICD-10-CM

## 2020-02-28 LAB
INR PPP: 2.4 (ref 0.91–1.09)
PROTHROMBIN TIME: 28.5 SECONDS (ref 10–13.8)

## 2020-02-28 PROCEDURE — 85610 PROTHROMBIN TIME: CPT

## 2020-02-28 PROCEDURE — 36416 COLLJ CAPILLARY BLOOD SPEC: CPT

## 2020-02-28 PROCEDURE — G0463 HOSPITAL OUTPT CLINIC VISIT: HCPCS

## 2020-02-28 NOTE — PROGRESS NOTES
Anticoagulation Clinic Progress Note  Indication: atrial fibrillation  Referring Provider: Sravani  Initial Warfarin Start Date: 2008  Goal INR: 2 - 3  Current Drug Interactions: fluoxetine, glimepiride, melatonin (PRN), mirtazepine  CHADS-VASc: 3 (age, HTN, DM)  EtOH: none  GLV: Salad (chopped kale salad or garden salad) and serving of broccoli once a week    Anticoagulation Clinic INR History:  Date 8/2 8/23 9/20 10/11 11/7 11/17 11/28 12/13 12/27   Total Weekly Dose 17.5mg 17.5 mg 17.5 mg 17.5mg 10mg 17.5 mg 17.5 mg 20mg 22.5 mg   INR 2.5 2.3 2.3 2.0 1.2 1.7 1.5 1.7 2.6   Notes     Pre- lumbar inj         Date 1/10/18 1/31 2/28 3/28 4/27 5/30 6/27 7/11 7/23 8/1 8/6 8/13   Total Weekly Dose 22.5 mg 22.5 mg 22.5 mg 22.5 mg 22.5 mg 22.5 mg 22.5 mg 22.5 mg 22.5 mg 20mg 12.5 mg 17.5 mg   INR 2.6 2.5 2.1 2.4 2.4 2.6 3.5 3.4 3.6 5.3, 4.9 1.9 2.7   Notes        Keflex         Date 8/20 9/4 10/2 11/6 11/15 11/29 12/31 1/7/19 1/17 1/29 2/12   Total Weekly Dose 17.5  mg 17.5 mg 17.5 mg 10mg 20mg 17.5mg 17.5mg 18.75 mg 17.5 mg 18.75 mg 20mg   INR 2.3 2.3 2.2 1.3 2.3 2.2 1.7 1.9 1.9 1.9 2.0   Notes    pre- epidural   missed dose?         Date 2/27 3/27 4/10 4/23 5/7 5/28 6/25 7/30 8/28 9/25 10/23   Total Weekly Dose 20mg 20mg 21.25 mg 21.25 mg 21.25 mg 21.25mg 21.25mg 21.25mg 21.25mg 21.25mg 21.25mg   INR 2.2 1.8 2.5 1.8 2.4 2.4 2.5 2.7 2.6 3.0 2.9   Notes sick   post- scope            Date  11/6 11/13 12/4 1/3 1/15 1/20 1/31 2/10 2/28     Total Weekly Dose 21.25 mg 18.75 mg 21.25 mg 21.25 mg 21.25 mg 20 mg 21.25 21.25 20mg     INR 4.2 3.2 2.7 2.2 3.1 3.1 2.9 3.5 2.4     Notes apap Hold x1, less GLV Inc in GLV  doxy doxy  1x dose cephalexin 1x dose dec; keflex       Clinic Interview:  Verbal Release Authorization signed on 6/27/18 -- may speak with Sussy (wife), Nikolai (son)  Tablet Strength: pt has 2.5mg tablets  Phone: [phone] (Home), [phone]   Fax: [phone] (Attn: Tasha) [facility]    Patient  Findings   Negatives:  Signs/symptoms of thrombosis, Signs/symptoms of bleeding, Laboratory test error suspected, Change in health, Change in alcohol use, Change in activity, Upcoming invasive procedure, Emergency department visit, Upcoming dental procedure, Missed doses, Extra doses, Change in medications, Change in diet/appetite, Hospital admission, Bruising, Other complaints   Comments:  He did take 2g of Cephalexin on Tuesday with crown placement. On March 31st he is going to have another teeth cleaning where he will need 2g cephalexin dose. They report his diet and appetite are normal.      Plan:   1. INR is therapeutic today following dose decrease and cephalexin 2g dose x1. Instructed Mr. Wolf to continue warfarin 2.5 mg oral daily except warfarin 3.75 mg TuesThursSat    2. RTC in on 3/13.   3. Verbal and written information was provided in clinic. Mr. Wolf voiced understanding with teach back and has no further questions at this time.     Ama Mccloud, PharmD  02/28/20   12:03 PM

## 2020-03-09 ENCOUNTER — TELEPHONE (OUTPATIENT)
Dept: PHARMACY | Facility: HOSPITAL | Age: 75
End: 2020-03-09

## 2020-03-13 ENCOUNTER — APPOINTMENT (OUTPATIENT)
Dept: PHARMACY | Facility: HOSPITAL | Age: 75
End: 2020-03-13

## 2020-03-20 ENCOUNTER — APPOINTMENT (OUTPATIENT)
Dept: PHARMACY | Facility: HOSPITAL | Age: 75
End: 2020-03-20

## 2020-04-09 RX ORDER — LISINOPRIL 40 MG/1
40 TABLET ORAL DAILY
Qty: 90 TABLET | Refills: 3 | Status: SHIPPED | OUTPATIENT
Start: 2020-04-09 | End: 2021-04-08

## 2020-04-10 ENCOUNTER — TELEPHONE (OUTPATIENT)
Dept: PHARMACY | Facility: HOSPITAL | Age: 75
End: 2020-04-10

## 2020-04-14 ENCOUNTER — ANTICOAGULATION VISIT (OUTPATIENT)
Dept: PHARMACY | Facility: HOSPITAL | Age: 75
End: 2020-04-14

## 2020-04-14 DIAGNOSIS — I48.20 CHRONIC ATRIAL FIBRILLATION (HCC): ICD-10-CM

## 2020-04-14 LAB
INR PPP: 2.3 (ref 0.91–1.09)
PROTHROMBIN TIME: 27.2 SECONDS (ref 10–13.8)

## 2020-04-14 PROCEDURE — 85610 PROTHROMBIN TIME: CPT

## 2020-04-14 PROCEDURE — G0463 HOSPITAL OUTPT CLINIC VISIT: HCPCS

## 2020-04-14 PROCEDURE — 36416 COLLJ CAPILLARY BLOOD SPEC: CPT

## 2020-04-14 NOTE — PROGRESS NOTES
Anticoagulation Clinic Progress Note  Indication: atrial fibrillation  Referring Provider: Sravani  Initial Warfarin Start Date: 2008  Goal INR: 2 - 3  Current Drug Interactions: fluoxetine, glimepiride, melatonin (PRN), mirtazepine  CHADS-VASc: 3 (age, HTN, DM)  EtOH: none  GLV: Salad (chopped kale salad or garden salad) and serving of broccoli once a week    Anticoagulation Clinic INR History:  Date 8/2 8/23 9/20 10/11 11/7 11/17 11/28 12/13 12/27   Total Weekly Dose 17.5mg 17.5 mg 17.5 mg 17.5mg 10mg 17.5 mg 17.5 mg 20mg 22.5 mg   INR 2.5 2.3 2.3 2.0 1.2 1.7 1.5 1.7 2.6   Notes     Pre- lumbar inj         Date 1/10/18 1/31 2/28 3/28 4/27 5/30 6/27 7/11 7/23 8/1 8/6 8/13   Total Weekly Dose 22.5 mg 22.5 mg 22.5 mg 22.5 mg 22.5 mg 22.5 mg 22.5 mg 22.5 mg 22.5 mg 20mg 12.5 mg 17.5 mg   INR 2.6 2.5 2.1 2.4 2.4 2.6 3.5 3.4 3.6 5.3, 4.9 1.9 2.7   Notes        Keflex         Date 8/20 9/4 10/2 11/6 11/15 11/29 12/31 1/7/19 1/17 1/29 2/12   Total Weekly Dose 17.5  mg 17.5 mg 17.5 mg 10mg 20mg 17.5mg 17.5mg 18.75 mg 17.5 mg 18.75 mg 20mg   INR 2.3 2.3 2.2 1.3 2.3 2.2 1.7 1.9 1.9 1.9 2.0   Notes    pre- epidural   missed dose?         Date 2/27 3/27 4/10 4/23 5/7 5/28 6/25 7/30 8/28 9/25 10/23   Total Weekly Dose 20mg 20mg 21.25 mg 21.25 mg 21.25 mg 21.25mg 21.25mg 21.25mg 21.25mg 21.25mg 21.25mg   INR 2.2 1.8 2.5 1.8 2.4 2.4 2.5 2.7 2.6 3.0 2.9   Notes sick   post- scope            Date  11/6 11/13 12/4 1/3 1/15 1/20 1/31 2/10 2/28 4/14    Total Weekly Dose 21.25 mg 18.75 mg 21.25 mg 21.25 mg 21.25 mg 20 mg 21.25 21.25 20mg 21.25mg    INR 4.2 3.2 2.7 2.2 3.1 3.1 2.9 3.5 2.4 2.3    Notes apap Hold x1, less GLV Inc in GLV  doxy doxy  1x dose cephalexin 1x dose dec; keflex       Clinic Interview:  Verbal Release Authorization signed on 6/27/18 -- may speak with Sussy (wife), Nikolai (son)  Tablet Strength: pt has 2.5mg tablets  Phone: [phone] (Home), [phone]   Fax: [phone] (Attn: Tasha) [facility]  [place]    Patient Findings     Negatives:  Signs/symptoms of thrombosis, Signs/symptoms of bleeding, Laboratory test error suspected, Change in health, Change in alcohol use, Change in activity, Upcoming invasive procedure, Emergency department visit, Upcoming dental procedure, Missed doses, Extra doses, Change in medications, Change in diet/appetite, Hospital admission, Bruising, Other complaints   Comments:  continues broccoli once weekly, has avoided all public places         Plan:   1. INR is therapeutic today. Instructed Mr. Wolf to continue warfarin 2.5 mg oral daily except warfarin 3.75 mg TuesThursSat    2. RTC in 6 weeks due to COVID19 concerns 5/26  3. Verbal and written information was provided from [employer]. Mr. Wolf voiced understanding with teach back and has no further questions at this time.     Teena Boykin, PharmD.  04/14/20   10:28

## 2020-05-11 ENCOUNTER — OFFICE VISIT (OUTPATIENT)
Dept: INTERNAL MEDICINE | Facility: CLINIC | Age: 75
End: 2020-05-11

## 2020-05-11 ENCOUNTER — APPOINTMENT (OUTPATIENT)
Dept: LAB | Facility: HOSPITAL | Age: 75
End: 2020-05-11

## 2020-05-11 VITALS
HEART RATE: 70 BPM | WEIGHT: 249 LBS | OXYGEN SATURATION: 97 % | SYSTOLIC BLOOD PRESSURE: 135 MMHG | HEIGHT: 72 IN | DIASTOLIC BLOOD PRESSURE: 80 MMHG | TEMPERATURE: 96.2 F | BODY MASS INDEX: 33.72 KG/M2

## 2020-05-11 DIAGNOSIS — I10 ESSENTIAL HYPERTENSION: ICD-10-CM

## 2020-05-11 DIAGNOSIS — K21.9 GASTROESOPHAGEAL REFLUX DISEASE, ESOPHAGITIS PRESENCE NOT SPECIFIED: ICD-10-CM

## 2020-05-11 DIAGNOSIS — F41.1 GENERALIZED ANXIETY DISORDER: ICD-10-CM

## 2020-05-11 DIAGNOSIS — I48.20 CHRONIC ATRIAL FIBRILLATION (HCC): Primary | ICD-10-CM

## 2020-05-11 DIAGNOSIS — E11.9 CONTROLLED TYPE 2 DIABETES MELLITUS WITHOUT COMPLICATION, WITHOUT LONG-TERM CURRENT USE OF INSULIN (HCC): ICD-10-CM

## 2020-05-11 DIAGNOSIS — M79.641 RIGHT HAND PAIN: ICD-10-CM

## 2020-05-11 DIAGNOSIS — E78.5 HYPERLIPIDEMIA LDL GOAL <100: ICD-10-CM

## 2020-05-11 DIAGNOSIS — G47.33 OBSTRUCTIVE SLEEP APNEA: ICD-10-CM

## 2020-05-11 LAB
ALBUMIN SERPL-MCNC: 4.1 G/DL (ref 3.5–5.2)
ALBUMIN/GLOB SERPL: 2 G/DL
ALP SERPL-CCNC: 54 U/L (ref 39–117)
ALT SERPL W P-5'-P-CCNC: 23 U/L (ref 1–41)
ANION GAP SERPL CALCULATED.3IONS-SCNC: 12.6 MMOL/L (ref 5–15)
AST SERPL-CCNC: 35 U/L (ref 1–40)
BILIRUB SERPL-MCNC: 0.9 MG/DL (ref 0.2–1.2)
BUN BLD-MCNC: 11 MG/DL (ref 8–23)
BUN/CREAT SERPL: 10.9 (ref 7–25)
CALCIUM SPEC-SCNC: 8.8 MG/DL (ref 8.6–10.5)
CHLORIDE SERPL-SCNC: 102 MMOL/L (ref 98–107)
CHOLEST SERPL-MCNC: 144 MG/DL (ref 0–200)
CHROMATIN AB SERPL-ACNC: <10 IU/ML (ref 0–14)
CO2 SERPL-SCNC: 25.4 MMOL/L (ref 22–29)
CREAT BLD-MCNC: 1.01 MG/DL (ref 0.76–1.27)
ERYTHROCYTE [SEDIMENTATION RATE] IN BLOOD: 3 MM/HR (ref 0–20)
GFR SERPL CREATININE-BSD FRML MDRD: 73 ML/MIN/1.73
GLOBULIN UR ELPH-MCNC: 2.1 GM/DL
GLUCOSE BLD-MCNC: 104 MG/DL (ref 65–99)
HDLC SERPL-MCNC: 29 MG/DL (ref 40–60)
LDLC SERPL CALC-MCNC: 77 MG/DL (ref 0–100)
LDLC/HDLC SERPL: 2.66 {RATIO}
POTASSIUM BLD-SCNC: 3.9 MMOL/L (ref 3.5–5.2)
PROT SERPL-MCNC: 6.2 G/DL (ref 6–8.5)
SODIUM BLD-SCNC: 140 MMOL/L (ref 136–145)
TRIGL SERPL-MCNC: 189 MG/DL (ref 0–150)
VLDLC SERPL-MCNC: 37.8 MG/DL (ref 5–40)

## 2020-05-11 PROCEDURE — 86431 RHEUMATOID FACTOR QUANT: CPT | Performed by: INTERNAL MEDICINE

## 2020-05-11 PROCEDURE — 85652 RBC SED RATE AUTOMATED: CPT | Performed by: INTERNAL MEDICINE

## 2020-05-11 PROCEDURE — 99214 OFFICE O/P EST MOD 30 MIN: CPT | Performed by: INTERNAL MEDICINE

## 2020-05-11 PROCEDURE — 80053 COMPREHEN METABOLIC PANEL: CPT | Performed by: INTERNAL MEDICINE

## 2020-05-11 PROCEDURE — 80061 LIPID PANEL: CPT | Performed by: INTERNAL MEDICINE

## 2020-05-11 RX ORDER — VENLAFAXINE HYDROCHLORIDE 75 MG/1
75 CAPSULE, EXTENDED RELEASE ORAL DAILY
COMMUNITY
Start: 2021-02-22

## 2020-05-11 RX ORDER — VENLAFAXINE HYDROCHLORIDE 75 MG/1
CAPSULE, EXTENDED RELEASE ORAL
COMMUNITY
End: 2020-05-11

## 2020-05-11 RX ORDER — CEPHALEXIN 500 MG/1
CAPSULE ORAL
COMMUNITY
End: 2020-05-11

## 2020-05-11 RX ORDER — DOXYCYCLINE HYCLATE 100 MG
TABLET ORAL
COMMUNITY
End: 2020-05-11

## 2020-05-11 RX ORDER — VENLAFAXINE HYDROCHLORIDE 37.5 MG/1
CAPSULE, EXTENDED RELEASE ORAL
COMMUNITY
End: 2020-05-11

## 2020-05-11 NOTE — PROGRESS NOTES
Subjective   Tony Wolf is a 74 y.o. male.   Chief Complaint   Patient presents with   • Hypertension     follow up   • Hand Pain     Right       Hyperlipidemia   This is a chronic problem. The current episode started more than 1 year ago. Pertinent negatives include no chest pain, myalgias or shortness of breath.   Hypertension   This is a chronic problem. The current episode started more than 1 year ago. Associated symptoms include anxiety. Pertinent negatives include no chest pain, headaches, neck pain, palpitations or shortness of breath. Identifiable causes of hypertension include sleep apnea.   Heartburn   He reports no abdominal pain, no chest pain, no coughing, no nausea, no sore throat, no stridor, no tooth decay, no water brash or no wheezing. This is a chronic problem. Pertinent negatives include no fatigue.   Anxiety   Patient reports no chest pain, confusion, decreased concentration, nausea, nervous/anxious behavior, palpitations or shortness of breath.       Sleep Apnea   Pertinent negatives include no abdominal pain, arthralgias, chest pain, chills, congestion, coughing, diaphoresis, fatigue, fever, headaches, myalgias, nausea, neck pain, numbness, rash, sore throat or vomiting.   Diabetes   He presents for his follow-up diabetic visit. He has type 2 diabetes mellitus. Pertinent negatives for hypoglycemia include no confusion, headaches, nervousness/anxiousness, pallor, seizures or speech difficulty. Pertinent negatives for diabetes include no chest pain, no fatigue, no polydipsia and no polyphagia. An ACE inhibitor/angiotensin II receptor blocker is being taken.      Right hand pain  For few weeks. Topical  meds not helping. No known trauma.     The following portions of the patient's history were reviewed and updated as appropriate: allergies, current medications, past family history, past medical history, past social history, past surgical history and problem list.    Review of Systems  "  Constitutional: Negative for activity change, appetite change, chills, diaphoresis, fatigue, fever and unexpected weight change.   HENT: Negative for congestion, ear discharge, ear pain, mouth sores, nosebleeds, sinus pressure, sneezing and sore throat.    Eyes: Negative for pain, discharge and itching.   Respiratory: Negative for cough, chest tightness, shortness of breath and wheezing.    Cardiovascular: Negative for chest pain, palpitations and leg swelling.   Gastrointestinal: Negative for abdominal pain, constipation, diarrhea, nausea and vomiting.   Endocrine: Negative for cold intolerance, heat intolerance, polydipsia and polyphagia.   Genitourinary: Negative for dysuria, flank pain, frequency, hematuria and urgency.   Musculoskeletal: Negative for arthralgias, back pain, gait problem, myalgias, neck pain and neck stiffness.        Hand  pain   Skin: Negative for color change, pallor and rash.   Neurological: Negative for seizures, speech difficulty, numbness and headaches.   Psychiatric/Behavioral: Negative for agitation, confusion, decreased concentration and sleep disturbance. The patient is not nervous/anxious.      /80   Pulse 70   Temp 96.2 °F (35.7 °C)   Ht 182.9 cm (72\")   Wt 113 kg (249 lb)   SpO2 97%   BMI 33.77 kg/m²     Objective   Physical Exam   Constitutional: He is oriented to person, place, and time. He appears well-developed.   HENT:   Head: Normocephalic.   Right Ear: External ear normal.   Left Ear: External ear normal.   Nose: Nose normal.   Mouth/Throat: Oropharynx is clear and moist.   Eyes: Pupils are equal, round, and reactive to light. Conjunctivae are normal.   Neck: No JVD present. No thyromegaly present.   Cardiovascular: Normal rate, regular rhythm and normal heart sounds. Exam reveals no friction rub.   No murmur heard.  Pulmonary/Chest: Effort normal and breath sounds normal. No respiratory distress. He has no wheezes. He has no rales.   Abdominal: Soft. Bowel " sounds are normal. He exhibits no distension. There is no tenderness. There is no guarding.   Musculoskeletal: He exhibits no edema or tenderness.   Lymphadenopathy:     He has no cervical adenopathy.   Neurological: He is oriented to person, place, and time. He displays normal reflexes. No cranial nerve deficit.   Skin: No rash noted.   satellite lesions in groin folds.    Psychiatric: He has a normal mood and affect. His behavior is normal.   Nursing note and vitals reviewed.      Assessment/Plan   Tony was seen today for heartburn, hyperlipidemia, hypertension and diabetes.    Diagnoses and all orders for this visit:    Controlled type 2 diabetes mellitus without complication, without long-term current use of insulin  -     Comprehensive Metabolic Panel  -     Lipid Panel  Follows with Endo. Eye exam cecily  Essential hypertension  -     Comprehensive Metabolic Panel  -     Lipid Panel    Hyperlipidemia LDL goal <100  Lipids today  Obstructive sleep apnea  Did not tolerate cpap  Gastroesophageal reflux disease, esophagitis presence not specified  stable  Anxiety  stable  Chronic a fib  Stable. Coumadin managed by Cardiolog    Right hand pain  Sed and RF level today

## 2020-05-19 ENCOUNTER — TELEPHONE (OUTPATIENT)
Dept: INTERNAL MEDICINE | Facility: CLINIC | Age: 75
End: 2020-05-19

## 2020-05-19 DIAGNOSIS — M79.641 RIGHT HAND PAIN: Primary | ICD-10-CM

## 2020-05-20 ENCOUNTER — HOSPITAL ENCOUNTER (OUTPATIENT)
Dept: GENERAL RADIOLOGY | Facility: HOSPITAL | Age: 75
Discharge: HOME | End: 2020-05-20
Admitting: INTERNAL MEDICINE

## 2020-05-20 DIAGNOSIS — M79.641 RIGHT HAND PAIN: ICD-10-CM

## 2020-05-20 PROCEDURE — 73130 X-RAY EXAM OF HAND: CPT

## 2020-05-26 ENCOUNTER — ANTICOAGULATION VISIT (OUTPATIENT)
Dept: PHARMACY | Facility: HOSPITAL | Age: 75
End: 2020-05-26

## 2020-05-26 DIAGNOSIS — I48.20 CHRONIC ATRIAL FIBRILLATION (HCC): ICD-10-CM

## 2020-05-26 LAB
INR PPP: 2.5 (ref 0.91–1.09)
PROTHROMBIN TIME: 29.9 SECONDS (ref 10–13.8)

## 2020-05-26 PROCEDURE — 85610 PROTHROMBIN TIME: CPT

## 2020-05-26 PROCEDURE — G0463 HOSPITAL OUTPT CLINIC VISIT: HCPCS

## 2020-05-26 PROCEDURE — 36416 COLLJ CAPILLARY BLOOD SPEC: CPT

## 2020-05-26 RX ORDER — METOPROLOL SUCCINATE 100 MG/1
TABLET, EXTENDED RELEASE ORAL
Qty: 180 TABLET | Refills: 3 | Status: SHIPPED | OUTPATIENT
Start: 2020-05-26 | End: 2021-05-27

## 2020-05-26 NOTE — PROGRESS NOTES
Anticoagulation Clinic Progress Note  Indication: atrial fibrillation  Referring Provider: Sravani  Initial Warfarin Start Date: 2008  Goal INR: 2 - 3  Current Drug Interactions: fluoxetine, glimepiride, melatonin (PRN), mirtazepine  CHADS-VASc: 3 (age, HTN, DM)  EtOH: none  GLV: Salad (chopped kale salad or garden salad) and serving of broccoli once a week    Anticoagulation Clinic INR History:  Date 8/2 8/23 9/20 10/11 11/7 11/17 11/28 12/13 12/27   Total Weekly Dose 17.5mg 17.5 mg 17.5 mg 17.5mg 10mg 17.5 mg 17.5 mg 20mg 22.5 mg   INR 2.5 2.3 2.3 2.0 1.2 1.7 1.5 1.7 2.6   Notes     Pre- lumbar inj         Date 1/10/18 1/31 2/28 3/28 4/27 5/30 6/27 7/11 7/23 8/1 8/6 8/13   Total Weekly Dose 22.5 mg 22.5 mg 22.5 mg 22.5 mg 22.5 mg 22.5 mg 22.5 mg 22.5 mg 22.5 mg 20mg 12.5 mg 17.5 mg   INR 2.6 2.5 2.1 2.4 2.4 2.6 3.5 3.4 3.6 5.3, 4.9 1.9 2.7   Notes        Keflex         Date 8/20 9/4 10/2 11/6 11/15 11/29 12/31 1/7/19 1/17 1/29 2/12   Total Weekly Dose 17.5  mg 17.5 mg 17.5 mg 10mg 20mg 17.5mg 17.5mg 18.75 mg 17.5 mg 18.75 mg 20mg   INR 2.3 2.3 2.2 1.3 2.3 2.2 1.7 1.9 1.9 1.9 2.0   Notes    pre- epidural   missed dose?         Date 2/27 3/27 4/10 4/23 5/7 5/28 6/25 7/30 8/28 9/25 10/23   Total Weekly Dose 20mg 20mg 21.25 mg 21.25 mg 21.25 mg 21.25mg 21.25mg 21.25mg 21.25mg 21.25mg 21.25mg   INR 2.2 1.8 2.5 1.8 2.4 2.4 2.5 2.7 2.6 3.0 2.9   Notes sick   post- scope            Date  11/6 11/13 12/4 1/3 1/15 1/20 1/31 2/10 2/28 4/14 5/26   Total Weekly Dose 21.25 mg 18.75 mg 21.25 mg 21.25 mg 21.25 mg 20 mg 21.25 21.25 20mg 21.25mg 21.25mg   INR 4.2 3.2 2.7 2.2 3.1 3.1 2.9 3.5 2.4 2.3 2.5   Notes apap Hold x1, less GLV Inc in GLV  doxy doxy  1x dose cephalexin 1x dose dec; keflex       Clinic Interview:  Verbal Release Authorization signed on 6/27/18 -- may speak with Sussy (wife), Nikolai (son)  Tablet Strength: pt has 2.5mg tablets  Phone: [phone] (Home), [phone]   Fax: [phone] (Attn: Tasha)  [facility]    Patient Findings   Negatives:  Signs/symptoms of thrombosis, Signs/symptoms of bleeding, Laboratory test error suspected, Change in health, Change in alcohol use, Change in activity, Upcoming invasive procedure, Emergency department visit, Upcoming dental procedure, Missed doses, Extra doses, Change in medications, Change in diet/appetite, Hospital admission, Bruising, Other complaints     Plan:   1. INR is therapeutic today. Instructed Mr. Wolf to continue warfarin 2.5 mg oral daily except warfarin 3.75 mg TuesThursSat    2. RTC in 4 weeks to ensure WNL.   3. Verbal and written information was provided from [employer]. Mr. Wolf voiced understanding with teach back and has no further questions at this time.     Ama Mccloud, PharmD  05/26/20   10:12

## 2020-06-24 ENCOUNTER — ANTICOAGULATION VISIT (OUTPATIENT)
Dept: PHARMACY | Facility: HOSPITAL | Age: 75
End: 2020-06-24

## 2020-06-24 ENCOUNTER — TELEPHONE (OUTPATIENT)
Dept: PHARMACY | Facility: HOSPITAL | Age: 75
End: 2020-06-24

## 2020-06-24 DIAGNOSIS — I48.20 CHRONIC ATRIAL FIBRILLATION (HCC): ICD-10-CM

## 2020-06-24 LAB
INR PPP: 2.4 (ref 0.91–1.09)
PROTHROMBIN TIME: 29 SECONDS (ref 10–13.8)

## 2020-06-24 PROCEDURE — 85610 PROTHROMBIN TIME: CPT

## 2020-06-24 PROCEDURE — G0463 HOSPITAL OUTPT CLINIC VISIT: HCPCS

## 2020-06-24 PROCEDURE — 36416 COLLJ CAPILLARY BLOOD SPEC: CPT

## 2020-06-24 NOTE — TELEPHONE ENCOUNTER
I tried 3 phone numbers we have on Mr. Wolf: [phone], [phone], and [phone]. I removed [phone] from anticoag progress note, this is the wrong number. I left a VM on his home phone to contact us

## 2020-06-24 NOTE — PROGRESS NOTES
Anticoagulation Clinic Progress Note  Indication: atrial fibrillation  Referring Provider: Sravani  Initial Warfarin Start Date: 2008  Goal INR: 2 - 3  Current Drug Interactions: fluoxetine, glimepiride, melatonin (PRN), mirtazepine  CHADS-VASc: 3 (age, HTN, DM)  EtOH: none  GLV: Salad (chopped kale salad or garden salad) and serving of broccoli once a week    Anticoagulation Clinic INR History:  Date 8/2 8/23 9/20 10/11 11/7 11/17 11/28 12/13 12/27   Total Weekly Dose 17.5mg 17.5 mg 17.5 mg 17.5mg 10mg 17.5 mg 17.5 mg 20mg 22.5 mg   INR 2.5 2.3 2.3 2.0 1.2 1.7 1.5 1.7 2.6   Notes     Pre- lumbar inj         Date 1/10/18 1/31 2/28 3/28 4/27 5/30 6/27 7/11 7/23 8/1 8/6 8/13   Total Weekly Dose 22.5 mg 22.5 mg 22.5 mg 22.5 mg 22.5 mg 22.5 mg 22.5 mg 22.5 mg 22.5 mg 20mg 12.5 mg 17.5 mg   INR 2.6 2.5 2.1 2.4 2.4 2.6 3.5 3.4 3.6 5.3, 4.9 1.9 2.7   Notes        Keflex         Date 8/20 9/4 10/2 11/6 11/15 11/29 12/31 1/7/19 1/17 1/29 2/12   Total Weekly Dose 17.5  mg 17.5 mg 17.5 mg 10mg 20mg 17.5mg 17.5mg 18.75 mg 17.5 mg 18.75 mg 20mg   INR 2.3 2.3 2.2 1.3 2.3 2.2 1.7 1.9 1.9 1.9 2.0   Notes    pre- epidural   missed dose?         Date 2/27 3/27 4/10 4/23 5/7 5/28 6/25 7/30 8/28 9/25 10/23   Total Weekly Dose 20mg 20mg 21.25 mg 21.25 mg 21.25 mg 21.25mg 21.25mg 21.25mg 21.25mg 21.25mg 21.25mg   INR 2.2 1.8 2.5 1.8 2.4 2.4 2.5 2.7 2.6 3.0 2.9   Notes sick   post- scope            Date  11/6 11/13 12/4 1/3 1/15 1/20 1/31 2/10 2/28 4/14 5/26   Total Weekly Dose 21.25 mg 18.75 mg 21.25 mg 21.25 mg 21.25 mg 20 mg 21.25 21.25 20mg 21.25mg 21.25mg   INR 4.2 3.2 2.7 2.2 3.1 3.1 2.9 3.5 2.4 2.3 2.5   Notes apap Hold x1, less GLV Inc in GLV  doxy doxy  1x dose cephalexin 1x dose dec; keflex       Date  6/24             Total Weekly Dose 21.25mg             INR 2.4             Notes                Clinic Interview:  Verbal Release Authorization signed on 6/27/18 -- may speak with Sussy (wife), Nikolai (son)  Tablet Strength: pt  has 2.5mg tablets  Phone: [phone] (Home), [phone]   Fax: [phone] (Attn: Tasha) [facility]    Patient Findings   Negatives:  Signs/symptoms of thrombosis, Signs/symptoms of bleeding, Laboratory test error suspected, Change in health, Change in alcohol use, Change in activity, Upcoming invasive procedure, Emergency department visit, Upcoming dental procedure, Missed doses, Extra doses, Change in medications, Change in diet/appetite, Hospital admission, Bruising, Other complaints     Plan:   1. INR is therapeutic today. Instructed Mr. Wolf to continue warfarin 2.5 mg oral daily except warfarin 3.75 mg TuesThursSat    2. RTC in 4 weeks to ensure WNL.   3. Verbal and written information was provided from [employer]. Mr. Wolf voiced understanding with teach back and has no further questions at this time.     Shante Santoyo, Pharmacy Intern  06/24/20   09:57

## 2020-06-30 RX ORDER — LANCETS
EACH MISCELLANEOUS
Qty: 100 EACH | Refills: 2 | Status: SHIPPED | OUTPATIENT
Start: 2020-06-30 | End: 2022-11-07 | Stop reason: SDUPTHER

## 2020-07-02 ENCOUNTER — TELEPHONE (OUTPATIENT)
Dept: INTERNAL MEDICINE | Facility: CLINIC | Age: 75
End: 2020-07-02

## 2020-07-22 ENCOUNTER — ANTICOAGULATION VISIT (OUTPATIENT)
Dept: PHARMACY | Facility: HOSPITAL | Age: 75
End: 2020-07-22

## 2020-07-22 DIAGNOSIS — I48.20 CHRONIC ATRIAL FIBRILLATION (HCC): ICD-10-CM

## 2020-07-22 LAB
INR PPP: 2.4 (ref 0.91–1.09)
PROTHROMBIN TIME: 29.2 SECONDS (ref 10–13.8)

## 2020-07-22 PROCEDURE — G0463 HOSPITAL OUTPT CLINIC VISIT: HCPCS

## 2020-07-22 PROCEDURE — 36416 COLLJ CAPILLARY BLOOD SPEC: CPT

## 2020-07-22 PROCEDURE — 85610 PROTHROMBIN TIME: CPT

## 2020-07-22 NOTE — PROGRESS NOTES
Anticoagulation Clinic Progress Note  Indication: atrial fibrillation  Referring Provider: Sravani  Initial Warfarin Start Date: 2008  Goal INR: 2 - 3  Current Drug Interactions: fluoxetine, glimepiride, melatonin (PRN), mirtazepine  CHADS-VASc: 3 (age, HTN, DM)  EtOH: none  GLV: Salad (chopped kale salad or garden salad) and serving of broccoli once a week    Anticoagulation Clinic INR History:  Date 8/2 8/23 9/20 10/11 11/7 11/17 11/28 12/13 12/27   Total Weekly Dose 17.5mg 17.5 mg 17.5 mg 17.5mg 10mg 17.5 mg 17.5 mg 20mg 22.5 mg   INR 2.5 2.3 2.3 2.0 1.2 1.7 1.5 1.7 2.6   Notes     Pre- lumbar inj         Date 1/10/18 1/31 2/28 3/28 4/27 5/30 6/27 7/11 7/23 8/1 8/6 8/13   Total Weekly Dose 22.5 mg 22.5 mg 22.5 mg 22.5 mg 22.5 mg 22.5 mg 22.5 mg 22.5 mg 22.5 mg 20mg 12.5 mg 17.5 mg   INR 2.6 2.5 2.1 2.4 2.4 2.6 3.5 3.4 3.6 5.3, 4.9 1.9 2.7   Notes        Keflex         Date 8/20 9/4 10/2 11/6 11/15 11/29 12/31 1/7/19 1/17 1/29 2/12   Total Weekly Dose 17.5  mg 17.5 mg 17.5 mg 10mg 20mg 17.5mg 17.5mg 18.75 mg 17.5 mg 18.75 mg 20mg   INR 2.3 2.3 2.2 1.3 2.3 2.2 1.7 1.9 1.9 1.9 2.0   Notes    pre- epidural   missed dose?         Date 2/27 3/27 4/10 4/23 5/7 5/28 6/25 7/30 8/28 9/25 10/23   Total Weekly Dose 20mg 20mg 21.25 mg 21.25 mg 21.25 mg 21.25mg 21.25mg 21.25mg 21.25mg 21.25mg 21.25mg   INR 2.2 1.8 2.5 1.8 2.4 2.4 2.5 2.7 2.6 3.0 2.9   Notes sick   post- scope            Date  11/6 11/13 12/4 1/3 1/15 1/20 1/31 2/10 2/28 4/14 5/26   Total Weekly Dose 21.25 mg 18.75 mg 21.25 mg 21.25 mg 21.25 mg 20 mg 21.25 21.25 20mg 21.25mg 21.25mg   INR 4.2 3.2 2.7 2.2 3.1 3.1 2.9 3.5 2.4 2.3 2.5   Notes apap Hold x1, less GLV Inc in GLV  doxy doxy  1x dose cephalexin 1x dose dec; keflex       Date  6/24 7/22            Total Weekly Dose 21.25mg 21.25            INR 2.4 2.4            Notes                Clinic Interview:  Verbal Release Authorization signed on 6/27/18 -- may speak with Sussy (wife), Nikolai (son)  Tablet  Strength: pt has 2.5mg tablets  Phone: [phone] (Home), [phone]   Fax: [phone] (Attn: Tasha) [facility]    Patient Findings   Negatives:  Signs/symptoms of thrombosis, Signs/symptoms of bleeding, Laboratory test error suspected, Change in health, Change in alcohol use, Change in activity, Upcoming invasive procedure, Emergency department visit, Upcoming dental procedure, Missed doses, Extra doses, Change in medications, Change in diet/appetite, Hospital admission, Bruising, Other complaints   Comments:  Mr Wolf has been taking warfarin as directed. He reports no changes in diet, recently started on latanoprost.        Plan:   1. INR is therapeutic today. Instructed Mr. Wolf to continue warfarin 2.5 mg oral daily except warfarin 3.75 mg TuesThursSat    2. RTC in 4 weeks to ensure WNL.   3. [facility] Mr. Wolf voiced understanding with teach back and has no further questions at this time.     [facility]  7/22/2020  13:31

## 2020-07-29 DIAGNOSIS — I48.91 ATRIAL FIBRILLATION, UNSPECIFIED TYPE (HCC): ICD-10-CM

## 2020-07-29 RX ORDER — WARFARIN SODIUM 2.5 MG/1
TABLET ORAL
Qty: 120 TABLET | Refills: 1 | Status: SHIPPED | OUTPATIENT
Start: 2020-07-29 | End: 2021-02-22 | Stop reason: SDUPTHER

## 2020-07-30 DIAGNOSIS — B37.9 CANDIDIASIS: ICD-10-CM

## 2020-07-30 RX ORDER — NYSTATIN 100000 [USP'U]/G
POWDER TOPICAL
Qty: 60 EACH | Refills: 0 | OUTPATIENT
Start: 2020-07-30

## 2020-08-03 ENCOUNTER — TELEPHONE (OUTPATIENT)
Dept: INTERNAL MEDICINE | Facility: CLINIC | Age: 75
End: 2020-08-03

## 2020-08-03 NOTE — TELEPHONE ENCOUNTER
PT WOULD LIKE DR GUILLAUME TO REFILL HIS NYSTOP NYSTATIN TOPICAL POWDER 100 USP  ORIGINALLY FILLED BY DR ASH UROLOGIST

## 2020-08-12 ENCOUNTER — OFFICE VISIT (OUTPATIENT)
Dept: CARDIOLOGY | Facility: CLINIC | Age: 75
End: 2020-08-12

## 2020-08-12 VITALS
SYSTOLIC BLOOD PRESSURE: 128 MMHG | OXYGEN SATURATION: 97 % | BODY MASS INDEX: 33.97 KG/M2 | HEART RATE: 64 BPM | DIASTOLIC BLOOD PRESSURE: 72 MMHG | HEIGHT: 72 IN | WEIGHT: 250.8 LBS | TEMPERATURE: 98.9 F

## 2020-08-12 DIAGNOSIS — I49.3 PVC'S (PREMATURE VENTRICULAR CONTRACTIONS): ICD-10-CM

## 2020-08-12 DIAGNOSIS — I10 ESSENTIAL HYPERTENSION: ICD-10-CM

## 2020-08-12 DIAGNOSIS — I48.21 PERMANENT ATRIAL FIBRILLATION (HCC): Primary | ICD-10-CM

## 2020-08-12 PROCEDURE — 99213 OFFICE O/P EST LOW 20 MIN: CPT | Performed by: INTERNAL MEDICINE

## 2020-08-12 PROCEDURE — 93000 ELECTROCARDIOGRAM COMPLETE: CPT | Performed by: INTERNAL MEDICINE

## 2020-08-12 RX ORDER — LATANOPROST 50 UG/ML
1 SOLUTION/ DROPS OPHTHALMIC NIGHTLY
COMMUNITY

## 2020-08-12 NOTE — PROGRESS NOTES
Tony CHARLA Wolf  1945  [phone]    08/12/2020    [facility] CARDIOLOGY     Kay Lopez DO  [address] [address]    Chief Complaint   Patient presents with   • Atrial Fibrillation       Problem List:   1.  Atrial fibrillation/atrial tachycardia/atrial flutter:  a. Diagnosed in June 2004 by physical examination. Coumadin initiated in June 2004.  b. Echocardiogram on 06/30/2004: LA 5.8 with normal LV size and function.  c. Nuclear GXT on 06/30/2004: Negative study.  d. External cardioversion and maintenance to normal sinus rhythm with sotalol on 08/30/2004.  e. Event recorder in March 2005 showing normal sinus rhythm with occasional PVCs.  f. Echocardiogram/bubble study on 04/08/2005: Septum 1.6, mild MR, trace to mild TR, PI, negative bubble study, EF 68%.  g. Holter monitor, September 2008, with 35 PVCs, 157 PACs.   h. Cardiolite, 12/29/2008, with no ischemia, EF 47%.  i. Tikosyn initiated, 01/04/2010.   j. Event recorder, 01/22/2010, with nonsustained atrial tachycardia and PACs.   k. Stress Cardiolite, 03/31/2011: LVEF (59%), no ischemia.   l. EP study with radiofrequency ablation of left atrial posterior wall atrial tachycardia, 04/28/2014.  m. BRIDGET-guided external cardioversion, 11/25/2014 with BRIDGET demonstrating normal LV size and function and mild MR/TR.   2. Premature ventricular contractions:  a. Event recorder, June 2007, consistent with PVCs with subsequent increase of sotalol therapy to 120 mg p.o. b.i.d.    3. History of dyspnea:  a. Dobutamine Cardiolite in September 1998 with inferolateral reversibility.   b. Left heart catheterization on 02/29/2000, showing normal coronary arteries, normal EF.  c. Echocardiogram, March 2010: Normal LV size and function, moderate left ventricular hypertrophy, mild MR, and AI.   d. Echocardiogram, 01/20/2016: EF 55% to 60%. Mild MR. Mild aortic cusp sclerosis. Trace TR.  e. CT scan of the chest with and without  contrast, 01/20/2016, shows cardiomegaly; no pulmonary embolism, mild pulmonary vascular congestion.  4. Hypertension.  5. Hyperlipidemia.  6. Concentric left ventricular hypertrophy.  7. Diabetes mellitus.  8. Surgical history:  a. Left knee replacement in 2002.  b. Left hand carpal tunnel in 2000.                                                                       C.   Double hernia in 1966.     Allergies  Allergies   Allergen Reactions   • Aspirin Anaphylaxis     Other reaction(s): Hypotension       Current Medications    Current Outpatient Medications:   •  Accu-Chek Softclix Lancets lancets, USE TO TEST BLOOD SUGAR THREE TIMES A DAY, Disp: 100 each, Rfl: 2  •  acetaminophen (TYLENOL) 500 MG tablet, Take 1,000 mg by mouth Every 6 (Six) Hours As Needed for Mild Pain (1-3)., Disp: , Rfl:   •  amLODIPine (NORVASC) 10 MG tablet, TAKE ONE TABLET BY MOUTH DAILY AS DIRECTED, Disp: 30 tablet, Rfl: 11  •  calcium carbonate (TUMS) 500 MG chewable tablet, Chew 2 tablets Daily As Needed for Indigestion or Heartburn., Disp: , Rfl:   •  Cholecalciferol (VITAMIN D-3) 1000 UNITS capsule, Take 1,000 Units by mouth Daily., Disp: , Rfl:   •  glimepiride (AMARYL) 2 MG tablet, Take 1 tablet by mouth Daily., Disp: 90 tablet, Rfl: 3  •  glucose blood (Accu-Chek Kandis Plus) test strip, USE ONE STRIP TO TEST THREE TIMES A DAY, Disp: 100 each, Rfl: 11  •  Histamine Dihydrochloride (AUSTRALIAN DREAM ARTHRITIS) 0.025 % cream, Apply  topically Daily., Disp: , Rfl:   •  latanoprost (XALATAN) 0.005 % ophthalmic solution, Administer 1 drop to both eyes Every Night., Disp: , Rfl:   •  lisinopril (PRINIVIL,ZESTRIL) 40 MG tablet, Take 1 tablet by mouth Daily., Disp: 90 tablet, Rfl: 3  •  Loratadine (CLARITIN) 10 MG capsule, Take 1 capsule by mouth Daily., Disp: , Rfl:   •  Melatonin 2.5 MG chewable tablet, Chew 1 tablet At Night As Needed., Disp: , Rfl:   •  metoprolol succinate XL (TOPROL-XL) 100 MG 24 hr tablet, TAKE ONE TABLET BY MOUTH TWICE  "A DAY, Disp: 180 tablet, Rfl: 3  •  mirtazapine (REMERON) 45 MG tablet, Take 45 mg by mouth Every Night., Disp: , Rfl:   •  pravastatin (PRAVACHOL) 20 MG tablet, Take 1 tablet by mouth Daily., Disp: 90 tablet, Rfl: 3  •  venlafaxine XR (EFFEXOR-XR) 75 MG 24 hr capsule, , Disp: , Rfl:   •  warfarin (COUMADIN) 2.5 MG tablet, Take 1 to 1.5 tablets by mouth daily or as directed by the anticoagulation clinic., Disp: 120 tablet, Rfl: 1    History of Present Illness     Pt presents for follow up of AF/HTN/PVC. Since we last saw the pt, pt denies any sustained palps, SOB, CP, LH, and dizziness. Denies any hospitalizations, ER visits, bleeding, or TIA/CVA symptoms. Overall feels well. PT INR have been stable.  BP and HR stable at home    ROS:  General:  Denies fatigue, + weight gain  Cardiovascular:  Denies CP, PND, syncope, near syncope, + edema no palpitations.  Pulmonary:  Denies JOSEPH, cough, or wheezing      Vitals:    08/12/20 1329   BP: 128/72   BP Location: Left arm   Patient Position: Sitting   Pulse: 64   Temp: 98.9 °F (37.2 °C)   SpO2: 97%   Weight: 114 kg (250 lb 12.8 oz)   Height: 182.9 cm (72\")     Body mass index is 34.01 kg/m².  PE:  General: NAD  Neck: no JVD, no carotid bruits, no TM  Heart irreg irreg rate and rhythm NL S1, S2,  no rubs, murmurs  Lungs: CTA, no wheezes, rhonchi, or rales  Abd: soft, non-tender, NL BS  Ext: No musculoskeletal deformities,+TR edema, no cyanosis, or clubbing  Psych: normal mood and affect    Diagnostic Data:        ECG 12 Lead  Date/Time: 8/12/2020 2:01 PM  Performed by: Sammy Lassiter MD  Authorized by: Sammy Lassiter MD   Comparison: compared with previous ECG from 3/21/2017  Similar to previous ECG  Rhythm: atrial fibrillation  BPM: 64              1. Permanent atrial fibrillation (CMS/HCC)    2. Essential hypertension    3. PVC's (premature ventricular contractions)          Plan:  1) Chronic atrial fibrillation:  - Chronic in nature and asymptomatic.  Rate " controlled.  - Continue present medications.   2) Anticoagulation:  - Continue warfarin  3) HTN:  - Well controlled on amlodipine, lisinopril  - Wt loss, exercise, salt reduction  4) pvc: ASYMPTOMATIC     F/up in 12 months

## 2020-08-19 ENCOUNTER — APPOINTMENT (OUTPATIENT)
Dept: PHARMACY | Facility: HOSPITAL | Age: 75
End: 2020-08-19

## 2020-08-24 ENCOUNTER — ANTICOAGULATION VISIT (OUTPATIENT)
Dept: PHARMACY | Facility: HOSPITAL | Age: 75
End: 2020-08-24

## 2020-08-24 DIAGNOSIS — I48.20 CHRONIC ATRIAL FIBRILLATION (HCC): ICD-10-CM

## 2020-08-24 LAB
INR PPP: 3 (ref 0.91–1.09)
PROTHROMBIN TIME: 35.6 SECONDS (ref 10–13.8)

## 2020-08-24 PROCEDURE — 36416 COLLJ CAPILLARY BLOOD SPEC: CPT

## 2020-08-24 PROCEDURE — G0463 HOSPITAL OUTPT CLINIC VISIT: HCPCS

## 2020-08-24 PROCEDURE — 85610 PROTHROMBIN TIME: CPT

## 2020-08-24 NOTE — PROGRESS NOTES
Anticoagulation Clinic Progress Note  Indication: atrial fibrillation  Referring Provider: Sravani  Initial Warfarin Start Date: 2008  Goal INR: 2 - 3  Current Drug Interactions: fluoxetine, glimepiride, melatonin (PRN), mirtazepine  CHADS-VASc: 3 (age, HTN, DM)  EtOH: none  GLV: Salad (chopped kale salad or garden salad) and serving of broccoli once a week    Anticoagulation Clinic INR History:  Date 8/2 8/23 9/20 10/11 11/7 11/17 11/28 12/13 12/27   Total Weekly Dose 17.5mg 17.5 mg 17.5 mg 17.5mg 10mg 17.5 mg 17.5 mg 20mg 22.5 mg   INR 2.5 2.3 2.3 2.0 1.2 1.7 1.5 1.7 2.6   Notes     Pre- lumbar inj         Date 1/10/18 1/31 2/28 3/28 4/27 5/30 6/27 7/11 7/23 8/1 8/6 8/13   Total Weekly Dose 22.5 mg 22.5 mg 22.5 mg 22.5 mg 22.5 mg 22.5 mg 22.5 mg 22.5 mg 22.5 mg 20mg 12.5 mg 17.5 mg   INR 2.6 2.5 2.1 2.4 2.4 2.6 3.5 3.4 3.6 5.3, 4.9 1.9 2.7   Notes        Keflex         Date 8/20 9/4 10/2 11/6 11/15 11/29 12/31 1/7/19 1/17 1/29 2/12   Total Weekly Dose 17.5  mg 17.5 mg 17.5 mg 10mg 20mg 17.5mg 17.5mg 18.75 mg 17.5 mg 18.75 mg 20mg   INR 2.3 2.3 2.2 1.3 2.3 2.2 1.7 1.9 1.9 1.9 2.0   Notes    pre- epidural   missed dose?         Date 2/27 3/27 4/10 4/23 5/7 5/28 6/25 7/30 8/28 9/25 10/23   Total Weekly Dose 20mg 20mg 21.25 mg 21.25 mg 21.25 mg 21.25mg 21.25mg 21.25mg 21.25mg 21.25mg 21.25mg   INR 2.2 1.8 2.5 1.8 2.4 2.4 2.5 2.7 2.6 3.0 2.9   Notes sick   post- scope            Date  11/6 11/13 12/4 1/3 1/15 1/20 1/31 2/10 2/28 4/14 5/26   Total Weekly Dose 21.25 mg 18.75 mg 21.25 mg 21.25 mg 21.25 mg 20 mg 21.25 21.25 20mg 21.25mg 21.25mg   INR 4.2 3.2 2.7 2.2 3.1 3.1 2.9 3.5 2.4 2.3 2.5   Notes apap Hold x1, less GLV Inc in GLV  doxy doxy  1x dose cephalexin 1x dose dec; keflex       Date  6/24 7/22 8/24           Total Weekly Dose 21.25mg 21.25 21.25mg           INR 2.4 2.4 3.0           Notes                Clinic Interview:  Verbal Release Authorization signed on 6/27/18 -- may speak with Sussy (wife)Nikolai  (son)  Tablet Strength: pt has 2.5mg tablets  Phone: [phone] (Home), [phone]   Fax: [phone] (Attn: Tasha) [facility]    Patient Findings   Positives:  Change in diet/appetite   Negatives:  Signs/symptoms of thrombosis, Signs/symptoms of bleeding, Laboratory test error suspected, Change in health, Change in alcohol use, Change in activity, Upcoming invasive procedure, Emergency department visit, Upcoming dental procedure, Missed doses, Extra doses, Change in medications, Hospital admission, Bruising, Other complaints   Comments:  Mr. Wolf reported that his wife helps him managing medications, and has been taking warfarin as directed.   He has been having GLV ~ 2- 3x servings, green beans, broccoli weekly.   Have been very sweaty and felt slippery with his hands and feet with diabetes, and is less active due to the fear of falling. Has a walker with him. Have instructed him to walk some steps with the help of walker if he can. Should he fell with a head injury, directed him seek emergency help asap even if he feels ok.     Otherwise, denies all other findings.       Plan:   1. INR is therapeutic today at 3.0, though right at the upper limit of his goal range. Instructed Mr. Wolf to continue warfarin 2.5 mg oral daily except warfarin 3.75 mg TuesThursSat, and encouraged more GLV intakes today with kale, spinach, etc.    2. RTC in 2.5 weeks to ensure WNL, 9/10.   3. Verbal and written information was provided from "[employer]. Mr. Wolf voiced understanding with teach back and has no further questions at this time.     Mena Wilson, Pharmacy Intern  8/24/2020  11:13     I, Teena Boykin, PharmD, have reviewed the note in full and agree with the assessment and plan.  08/24/20  15:53

## 2020-09-10 ENCOUNTER — ANTICOAGULATION VISIT (OUTPATIENT)
Dept: PHARMACY | Facility: HOSPITAL | Age: 75
End: 2020-09-10

## 2020-09-10 DIAGNOSIS — I48.20 CHRONIC ATRIAL FIBRILLATION (HCC): ICD-10-CM

## 2020-09-10 PROBLEM — L97.509 DIABETIC FOOT ULCER: Status: ACTIVE | Noted: 2018-07-10

## 2020-09-10 PROBLEM — E11.621 DIABETIC FOOT ULCER: Status: ACTIVE | Noted: 2018-07-10

## 2020-09-10 LAB
INR PPP: 2.8 (ref 0.91–1.09)
PROTHROMBIN TIME: 33 SECONDS (ref 10–13.8)

## 2020-09-10 PROCEDURE — 85610 PROTHROMBIN TIME: CPT

## 2020-09-10 PROCEDURE — G0463 HOSPITAL OUTPT CLINIC VISIT: HCPCS

## 2020-09-10 PROCEDURE — 36416 COLLJ CAPILLARY BLOOD SPEC: CPT

## 2020-09-10 RX ORDER — KETOCONAZOLE 20 MG/G
CREAM TOPICAL
COMMUNITY
Start: 2020-08-31 | End: 2020-12-15

## 2020-09-10 NOTE — PROGRESS NOTES
Anticoagulation Clinic Progress Note  Indication: atrial fibrillation  Referring Provider: Sravani  Initial Warfarin Start Date: 2008  Goal INR: 2 - 3  Current Drug Interactions: fluoxetine, glimepiride, melatonin (PRN), mirtazepine  CHADS-VASc: 3 (age, HTN, DM)  EtOH: none  GLV: Salad (chopped kale salad or garden salad) and serving of broccoli once a week    Anticoagulation Clinic INR History:  Date 8/2 8/23 9/20 10/11 11/7 11/17 11/28 12/13 12/27   Total Weekly Dose 17.5mg 17.5 mg 17.5 mg 17.5mg 10mg 17.5 mg 17.5 mg 20mg 22.5 mg   INR 2.5 2.3 2.3 2.0 1.2 1.7 1.5 1.7 2.6   Notes     Pre- lumbar inj         Date 1/10/18 1/31 2/28 3/28 4/27 5/30 6/27 7/11 7/23 8/1 8/6 8/13   Total Weekly Dose 22.5 mg 22.5 mg 22.5 mg 22.5 mg 22.5 mg 22.5 mg 22.5 mg 22.5 mg 22.5 mg 20mg 12.5 mg 17.5 mg   INR 2.6 2.5 2.1 2.4 2.4 2.6 3.5 3.4 3.6 5.3, 4.9 1.9 2.7   Notes        Keflex         Date 8/20 9/4 10/2 11/6 11/15 11/29 12/31 1/7/19 1/17 1/29 2/12   Total Weekly Dose 17.5  mg 17.5 mg 17.5 mg 10mg 20mg 17.5mg 17.5mg 18.75 mg 17.5 mg 18.75 mg 20mg   INR 2.3 2.3 2.2 1.3 2.3 2.2 1.7 1.9 1.9 1.9 2.0   Notes    pre- epidural   missed dose?         Date 2/27 3/27 4/10 4/23 5/7 5/28 6/25 7/30 8/28 9/25 10/23   Total Weekly Dose 20mg 20mg 21.25 mg 21.25 mg 21.25 mg 21.25mg 21.25mg 21.25mg 21.25mg 21.25mg 21.25mg   INR 2.2 1.8 2.5 1.8 2.4 2.4 2.5 2.7 2.6 3.0 2.9   Notes sick   post- scope            Date  11/6 11/13 12/4 1/3 1/15 1/20 1/31 2/10 2/28 4/14 5/26   Total Weekly Dose 21.25 mg 18.75 mg 21.25 mg 21.25 mg 21.25 mg 20 mg 21.25 21.25 20mg 21.25mg 21.25mg   INR 4.2 3.2 2.7 2.2 3.1 3.1 2.9 3.5 2.4 2.3 2.5   Notes apap Hold x1, less GLV Inc in GLV  doxy doxy  1x dose cephalexin 1x dose dec; keflex       Date  6/24 7/22 8/24 9/10          Total Weekly Dose 21.25mg 21.25 21.25mg 21.25mg          INR 2.4 2.4 3.0 2.8          Notes                Clinic Interview:  Verbal Release Authorization signed on 6/27/18 -- may speak with Sussy  (wife), Nikolai (son)  Tablet Strength: pt has 2.5mg tablets  Phone: [phone] (Home), [phone]   Fax: [phone] (Attn: Tasha) [facility]    Patient Findings   Negatives:  Signs/symptoms of thrombosis, Signs/symptoms of bleeding, Laboratory test error suspected, Change in health, Change in alcohol use, Change in activity, Upcoming invasive procedure, Emergency department visit, Upcoming dental procedure, Missed doses, Extra doses, Change in medications, Change in diet/appetite, Hospital admission, Bruising, Other complaints   Comments:  Mr Meyer reported following the dosing direction and denies missed or extra doses.   Continued 1x cup of brocolli steamed at least every week, occasionally has some greenbeans, pretty consistent with diet.     Otherwise, above findings negative.       Plan:   1. INR is therapeutic today at 2.8. Instructed Mr. Wolf to continue warfarin 2.5 mg oral daily except warfarin 3.75 mg TuesThursSat.    2. RTC in 4 weeks to ensure WNL, 10/8.   3. [facility]. Mr. Wolf voiced understanding with teach back and has no further questions at this time.     Mena Wilson, Pharmacy Intern  9/10/2020  11:11       I, Herbie Robert, [facility], have reviewed the note in full and agree with the assessment and plan.  09/10/20  12:32

## 2020-09-11 ENCOUNTER — OFFICE VISIT (OUTPATIENT)
Dept: INTERNAL MEDICINE | Facility: CLINIC | Age: 75
End: 2020-09-11

## 2020-09-11 ENCOUNTER — LAB (OUTPATIENT)
Dept: LAB | Facility: HOSPITAL | Age: 75
End: 2020-09-11

## 2020-09-11 VITALS
SYSTOLIC BLOOD PRESSURE: 128 MMHG | BODY MASS INDEX: 33.48 KG/M2 | HEIGHT: 72 IN | HEART RATE: 74 BPM | OXYGEN SATURATION: 97 % | WEIGHT: 247.2 LBS | DIASTOLIC BLOOD PRESSURE: 80 MMHG

## 2020-09-11 DIAGNOSIS — I10 ESSENTIAL HYPERTENSION: ICD-10-CM

## 2020-09-11 DIAGNOSIS — E78.5 HYPERLIPIDEMIA LDL GOAL <100: ICD-10-CM

## 2020-09-11 DIAGNOSIS — R61 EXCESSIVE SWEATING: ICD-10-CM

## 2020-09-11 DIAGNOSIS — K21.9 GASTROESOPHAGEAL REFLUX DISEASE, ESOPHAGITIS PRESENCE NOT SPECIFIED: ICD-10-CM

## 2020-09-11 DIAGNOSIS — F41.1 GENERALIZED ANXIETY DISORDER: ICD-10-CM

## 2020-09-11 DIAGNOSIS — M79.641 RIGHT HAND PAIN: ICD-10-CM

## 2020-09-11 DIAGNOSIS — I10 ESSENTIAL HYPERTENSION: Primary | ICD-10-CM

## 2020-09-11 DIAGNOSIS — E11.9 CONTROLLED TYPE 2 DIABETES MELLITUS WITHOUT COMPLICATION, WITHOUT LONG-TERM CURRENT USE OF INSULIN (HCC): ICD-10-CM

## 2020-09-11 LAB
ALBUMIN SERPL-MCNC: 4.3 G/DL (ref 3.5–5.2)
ALBUMIN/GLOB SERPL: 2 G/DL
ALP SERPL-CCNC: 55 U/L (ref 39–117)
ALT SERPL W P-5'-P-CCNC: 29 U/L (ref 1–41)
ANION GAP SERPL CALCULATED.3IONS-SCNC: 9.7 MMOL/L (ref 5–15)
AST SERPL-CCNC: 40 U/L (ref 1–40)
BASOPHILS # BLD AUTO: 0.07 10*3/MM3 (ref 0–0.2)
BASOPHILS NFR BLD AUTO: 1.1 % (ref 0–1.5)
BILIRUB SERPL-MCNC: 0.9 MG/DL (ref 0–1.2)
BUN SERPL-MCNC: 11 MG/DL (ref 8–23)
BUN/CREAT SERPL: 10.8 (ref 7–25)
CALCIUM SPEC-SCNC: 8.7 MG/DL (ref 8.6–10.5)
CHLORIDE SERPL-SCNC: 104 MMOL/L (ref 98–107)
CHOLEST SERPL-MCNC: 147 MG/DL (ref 0–200)
CO2 SERPL-SCNC: 27.3 MMOL/L (ref 22–29)
CREAT SERPL-MCNC: 1.02 MG/DL (ref 0.76–1.27)
DEPRECATED RDW RBC AUTO: 46.7 FL (ref 37–54)
EOSINOPHIL # BLD AUTO: 0.2 10*3/MM3 (ref 0–0.4)
EOSINOPHIL NFR BLD AUTO: 3 % (ref 0.3–6.2)
ERYTHROCYTE [DISTWIDTH] IN BLOOD BY AUTOMATED COUNT: 13.7 % (ref 12.3–15.4)
GFR SERPL CREATININE-BSD FRML MDRD: 72 ML/MIN/1.73
GLOBULIN UR ELPH-MCNC: 2.1 GM/DL
GLUCOSE SERPL-MCNC: 128 MG/DL (ref 65–99)
HCT VFR BLD AUTO: 50.1 % (ref 37.5–51)
HDLC SERPL-MCNC: 29 MG/DL (ref 40–60)
HGB BLD-MCNC: 16.9 G/DL (ref 13–17.7)
IMM GRANULOCYTES # BLD AUTO: 0.02 10*3/MM3 (ref 0–0.05)
IMM GRANULOCYTES NFR BLD AUTO: 0.3 % (ref 0–0.5)
LDLC SERPL CALC-MCNC: 87 MG/DL (ref 0–100)
LDLC/HDLC SERPL: 3 {RATIO}
LYMPHOCYTES # BLD AUTO: 2.18 10*3/MM3 (ref 0.7–3.1)
LYMPHOCYTES NFR BLD AUTO: 33.1 % (ref 19.6–45.3)
MCH RBC QN AUTO: 31.1 PG (ref 26.6–33)
MCHC RBC AUTO-ENTMCNC: 33.7 G/DL (ref 31.5–35.7)
MCV RBC AUTO: 92.3 FL (ref 79–97)
MONOCYTES # BLD AUTO: 0.66 10*3/MM3 (ref 0.1–0.9)
MONOCYTES NFR BLD AUTO: 10 % (ref 5–12)
NEUTROPHILS NFR BLD AUTO: 3.45 10*3/MM3 (ref 1.7–7)
NEUTROPHILS NFR BLD AUTO: 52.5 % (ref 42.7–76)
NRBC BLD AUTO-RTO: 0 /100 WBC (ref 0–0.2)
PLATELET # BLD AUTO: 147 10*3/MM3 (ref 140–450)
PMV BLD AUTO: 12.3 FL (ref 6–12)
POTASSIUM SERPL-SCNC: 4.3 MMOL/L (ref 3.5–5.2)
PROT SERPL-MCNC: 6.4 G/DL (ref 6–8.5)
RBC # BLD AUTO: 5.43 10*6/MM3 (ref 4.14–5.8)
SODIUM SERPL-SCNC: 141 MMOL/L (ref 136–145)
TRIGL SERPL-MCNC: 155 MG/DL (ref 0–150)
TSH SERPL DL<=0.05 MIU/L-ACNC: 2.17 UIU/ML (ref 0.27–4.2)
VLDLC SERPL-MCNC: 31 MG/DL (ref 5–40)
WBC # BLD AUTO: 6.58 10*3/MM3 (ref 3.4–10.8)

## 2020-09-11 PROCEDURE — 85025 COMPLETE CBC W/AUTO DIFF WBC: CPT | Performed by: INTERNAL MEDICINE

## 2020-09-11 PROCEDURE — 80061 LIPID PANEL: CPT | Performed by: INTERNAL MEDICINE

## 2020-09-11 PROCEDURE — 84443 ASSAY THYROID STIM HORMONE: CPT | Performed by: INTERNAL MEDICINE

## 2020-09-11 PROCEDURE — 80053 COMPREHEN METABOLIC PANEL: CPT | Performed by: INTERNAL MEDICINE

## 2020-09-11 PROCEDURE — 99214 OFFICE O/P EST MOD 30 MIN: CPT | Performed by: INTERNAL MEDICINE

## 2020-09-11 NOTE — PROGRESS NOTES
Subjective   Tony Wolf is a 75 y.o. male.   Chief Complaint   Patient presents with   • Hypertension   • Diabetes   • Hyperlipidemia   • Excessive Sweating   • Hand Pain     R hand pain       Hyperlipidemia   This is a chronic problem. The current episode started more than 1 year ago. Pertinent negatives include no chest pain, myalgias or shortness of breath.   Hypertension   This is a chronic problem. The current episode started more than 1 year ago. Associated symptoms include anxiety. Pertinent negatives include no chest pain, headaches, neck pain, palpitations or shortness of breath. Identifiable causes of hypertension include sleep apnea.   Heartburn   He reports no abdominal pain, no chest pain, no coughing, no nausea, no sore throat, no stridor, no tooth decay, no water brash or no wheezing. This is a chronic problem. Pertinent negatives include no fatigue.   Anxiety   Patient reports no chest pain, confusion, decreased concentration, nausea, nervous/anxious behavior, palpitations or shortness of breath.       Sleep Apnea   Associated symptoms include a rash. Pertinent negatives include no abdominal pain, arthralgias, chest pain, chills, congestion, coughing, diaphoresis, fatigue, fever, headaches, myalgias, nausea, neck pain, numbness, sore throat or vomiting.   Diabetes   He presents for his follow-up diabetic visit. He has type 2 diabetes mellitus. Pertinent negatives for hypoglycemia include no confusion, headaches, nervousness/anxiousness, pallor, seizures or speech difficulty. Pertinent negatives for diabetes include no chest pain, no fatigue, no polydipsia and no polyphagia. An ACE inhibitor/angiotensin II receptor blocker is being taken.   Hand Pain    Pertinent negatives include no chest pain or numbness.        Right hand pain x months. OTC meds help.   The following portions of the patient's history were reviewed and updated as appropriate: allergies, current medications, past family history,  "past medical history, past social history, past surgical history and problem list.    Review of Systems   Constitutional: Negative for activity change, appetite change, chills, diaphoresis, fatigue, fever and unexpected weight change.   HENT: Negative for congestion, ear discharge, ear pain, mouth sores, nosebleeds, sinus pressure, sneezing and sore throat.    Eyes: Negative for pain, discharge and itching.   Respiratory: Negative for cough, chest tightness, shortness of breath and wheezing.    Cardiovascular: Negative for chest pain, palpitations and leg swelling.   Gastrointestinal: Negative for abdominal pain, constipation, diarrhea, nausea and vomiting.   Endocrine: Negative for cold intolerance, heat intolerance, polydipsia and polyphagia.        Sweating   Genitourinary: Negative for dysuria, flank pain, frequency, hematuria and urgency.   Musculoskeletal: Negative for arthralgias, back pain, gait problem, myalgias, neck pain and neck stiffness.        Hand pain   Skin: Positive for rash. Negative for color change and pallor.   Neurological: Negative for seizures, speech difficulty, numbness and headaches.   Psychiatric/Behavioral: Negative for agitation, confusion, decreased concentration and sleep disturbance. The patient is not nervous/anxious.      /80   Pulse 74   Ht 182.9 cm (72\")   Wt 112 kg (247 lb 3.2 oz)   SpO2 97%   BMI 33.53 kg/m²     Objective   Physical Exam   Constitutional: He is oriented to person, place, and time. He appears well-developed.   HENT:   Head: Normocephalic.   Right Ear: External ear normal.   Left Ear: External ear normal.   Nose: Nose normal.   Mouth/Throat: Oropharynx is clear and moist.   Eyes: Pupils are equal, round, and reactive to light. Conjunctivae are normal.   Neck: No JVD present. No thyromegaly present.   Cardiovascular: Normal rate, regular rhythm and normal heart sounds. Exam reveals no friction rub.   No murmur heard.  Pulmonary/Chest: Effort normal " and breath sounds normal. No respiratory distress. He has no wheezes. He has no rales.   Abdominal: Soft. Bowel sounds are normal. He exhibits no distension. There is no tenderness. There is no guarding.   Musculoskeletal: He exhibits no edema or tenderness.   Lymphadenopathy:     He has no cervical adenopathy.   Neurological: He is oriented to person, place, and time. He displays normal reflexes. No cranial nerve deficit.   Skin: No rash noted.   Psychiatric: He has a normal mood and affect. His behavior is normal.   Nursing note and vitals reviewed.      Assessment/Plan   Tony was seen today for heartburn, hyperlipidemia, hypertension and diabetes.    Diagnoses and all orders for this visit:    Controlled type 2 diabetes mellitus without complication, without long-term current use of insulin    Follows with Endo.   Essential hypertension  -     Comprehensive Metabolic Panel  -     Lipid Panel    Hyperlipidemia LDL goal <100  Lipids today  Obstructive sleep apnea  Did not tolerate cpap  Gastroesophageal reflux disease, esophagitis presence not specified  stable  Anxiety  stable  Chronic a fib  Stable. Coumadin managed by Cardiolog    Right hand pain  OTC meds  Excessive sweating  Cbc, tsh

## 2020-10-08 ENCOUNTER — ANTICOAGULATION VISIT (OUTPATIENT)
Dept: PHARMACY | Facility: HOSPITAL | Age: 75
End: 2020-10-08

## 2020-10-08 DIAGNOSIS — I48.20 CHRONIC ATRIAL FIBRILLATION (HCC): ICD-10-CM

## 2020-10-08 LAB
INR PPP: 3 (ref 0.91–1.09)
PROTHROMBIN TIME: 35.6 SECONDS (ref 10–13.8)

## 2020-10-08 PROCEDURE — 85610 PROTHROMBIN TIME: CPT

## 2020-10-08 PROCEDURE — G0463 HOSPITAL OUTPT CLINIC VISIT: HCPCS

## 2020-10-08 PROCEDURE — 36416 COLLJ CAPILLARY BLOOD SPEC: CPT

## 2020-10-08 NOTE — PROGRESS NOTES
Anticoagulation Clinic Progress Note  Indication: atrial fibrillation  Referring Provider: Sravani  Initial Warfarin Start Date: 2008  Goal INR: 2 - 3  Current Drug Interactions: fluoxetine, glimepiride, melatonin (PRN), mirtazepine  CHADS-VASc: 3 (age, HTN, DM)  EtOH: none  GLV: Salad (chopped kale salad or garden salad) and serving of broccoli once a week    Anticoagulation Clinic INR History:  Date 8/2 8/23 9/20 10/11 11/7 11/17 11/28 12/13 12/27   Total Weekly Dose 17.5mg 17.5 mg 17.5 mg 17.5mg 10mg 17.5 mg 17.5 mg 20mg 22.5 mg   INR 2.5 2.3 2.3 2.0 1.2 1.7 1.5 1.7 2.6   Notes     Pre- lumbar inj         Date 1/10/18 1/31 2/28 3/28 4/27 5/30 6/27 7/11 7/23 8/1 8/6 8/13   Total Weekly Dose 22.5 mg 22.5 mg 22.5 mg 22.5 mg 22.5 mg 22.5 mg 22.5 mg 22.5 mg 22.5 mg 20mg 12.5 mg 17.5 mg   INR 2.6 2.5 2.1 2.4 2.4 2.6 3.5 3.4 3.6 5.3, 4.9 1.9 2.7   Notes        Keflex         Date 8/20 9/4 10/2 11/6 11/15 11/29 12/31 1/7/19 1/17 1/29 2/12   Total Weekly Dose 17.5  mg 17.5 mg 17.5 mg 10mg 20mg 17.5mg 17.5mg 18.75 mg 17.5 mg 18.75 mg 20mg   INR 2.3 2.3 2.2 1.3 2.3 2.2 1.7 1.9 1.9 1.9 2.0   Notes    pre- epidural   missed dose?         Date 2/27 3/27 4/10 4/23 5/7 5/28 6/25 7/30 8/28 9/25 10/23   Total Weekly Dose 20mg 20mg 21.25 mg 21.25 mg 21.25 mg 21.25mg 21.25mg 21.25mg 21.25mg 21.25mg 21.25mg   INR 2.2 1.8 2.5 1.8 2.4 2.4 2.5 2.7 2.6 3.0 2.9   Notes sick   post- scope            Date  11/6 11/13 12/4 1/3 1/15 1/20 1/31 2/10 2/28 4/14 5/26   Total Weekly Dose 21.25 mg 18.75 mg 21.25 mg 21.25 mg 21.25 mg 20 mg 21.25 21.25 20mg 21.25mg 21.25mg   INR 4.2 3.2 2.7 2.2 3.1 3.1 2.9 3.5 2.4 2.3 2.5   Notes apap Hold x1, less GLV Inc in GLV  doxy doxy  1x dose cephalexin 1x dose dec; keflex       Date  6/24 7/22 8/24 9/10 10/8         Total Weekly Dose 21.25mg 21.25 21.25mg 21.25mg 21.25mg         INR 2.4 2.4 3.0 2.8 3.0         Notes                Clinic Interview:  Verbal Release Authorization signed on 6/27/18 -- may speak  with Sussy (wife), Nikolai (son)  Tablet Strength: pt has 2.5mg tablets  Phone: [phone] (Home), [phone]   Fax: [phone] (Attn: Tasha) [facility]    Patient Findings     Negatives:  Signs/symptoms of thrombosis, Signs/symptoms of bleeding, Laboratory test error suspected, Change in health, Change in alcohol use, Change in activity, Upcoming invasive procedure, Emergency department visit, Upcoming dental procedure, Missed doses, Extra doses, Change in medications, Change in diet/appetite, Hospital admission, Bruising, Other complaints   Comments:  Plans to add a spinach salad to weekly diet.          Plan:   1. INR is therapeutic today at 3.0 ([facility]). Instructed Mr. Wolf to add one serving GLV weekly and continue warfarin 2.5 mg oral daily except warfarin 3.75 mg TuesThursSat.    2. RTC in 4 weeks to ensure WNL.   3. [facility]bal [facility]. Mr. Wolf voiced understanding with teach back and has no further questions at this time.       Teena Boykin, PharmD.  10/08/20   11:28 EDT

## 2020-10-30 ENCOUNTER — OFFICE VISIT (OUTPATIENT)
Dept: ENDOCRINOLOGY | Facility: CLINIC | Age: 75
End: 2020-10-30

## 2020-10-30 VITALS
SYSTOLIC BLOOD PRESSURE: 136 MMHG | DIASTOLIC BLOOD PRESSURE: 80 MMHG | BODY MASS INDEX: 32.68 KG/M2 | HEART RATE: 67 BPM | HEIGHT: 73 IN | WEIGHT: 246.6 LBS | OXYGEN SATURATION: 97 % | TEMPERATURE: 96.6 F

## 2020-10-30 DIAGNOSIS — I10 ESSENTIAL HYPERTENSION: ICD-10-CM

## 2020-10-30 DIAGNOSIS — E11.9 TYPE 2 DIABETES MELLITUS WITHOUT COMPLICATION, WITHOUT LONG-TERM CURRENT USE OF INSULIN (HCC): Primary | ICD-10-CM

## 2020-10-30 LAB
EXPIRATION DATE: NORMAL
HBA1C MFR BLD: 6.3 %
Lab: NORMAL

## 2020-10-30 PROCEDURE — 99213 OFFICE O/P EST LOW 20 MIN: CPT | Performed by: INTERNAL MEDICINE

## 2020-10-30 PROCEDURE — 83036 HEMOGLOBIN GLYCOSYLATED A1C: CPT | Performed by: INTERNAL MEDICINE

## 2020-10-30 NOTE — PROGRESS NOTES
"     Office Note      Date: 10/30/2020  Patient Name: Tony Wolf  MRN: [id number]  : 1945    Chief Complaint   Patient presents with   • Follow-up   • Diabetes       History of Present Illness:   Tony Wolf is a 75 y.o. male who presents for Diabetes type 2. Diagnosed in: . Treated in past with oral agents. Current treatments: glimepiride. Number of insulin shots per day: none. Checks blood sugar 1 times a day. Has low blood sugar: no. Aspirin use: No - on warfarin. Statin use: Yes. ACE-I/ARB use: Yes. Changes in health since last visit: none. Last eye exam .    Subjective      Diabetic Complications:  Eyes: No  Kidneys: No  Feet: No  Heart: No    Diet and Exercise:  Meals per day: 3  Minutes of exercise per week: 0 mins.    Review of Systems:   Review of Systems   Constitutional: Positive for diaphoresis.   Respiratory: Positive for shortness of breath.    Cardiovascular: Positive for palpitations.   Gastrointestinal: Negative.    Endocrine: Negative.        The following portions of the patient's history were reviewed and updated as appropriate: allergies, current medications, past family history, past medical history, past social history, past surgical history and problem list.    Objective       Visit Vitals  /80   Pulse 67   Temp 96.6 °F (35.9 °C) (Infrared)   Ht 185.4 cm (73\")   Wt 112 kg (246 lb 9.6 oz)   SpO2 97%   BMI 32.53 kg/m²       Physical Exam:  Physical Exam  Constitutional:       Appearance: Normal appearance.   Neurological:      Mental Status: He is alert.         Labs:    HbA1c  Lab Results   Component Value Date    HGBA1C 6.3 10/30/2020       CMP  Lab Results   Component Value Date    GLUCOSE 128 (H) 2020    BUN 11 2020    CREATININE 1.02 2020    EGFRIFNONA 72 2020    BCR 10.8 2020    K 4.3 2020    CO2 27.3 2020    CALCIUM 8.7 2020    AST 40 2020    ALT 29 2020        Lipid Panel  Lab Results   Component " Value Date    CHLPL 172 04/19/2016    HDL 29 (L) 09/11/2020    LDL 87 09/11/2020    TRIG 155 (H) 09/11/2020        TSH  Lab Results   Component Value Date    TSH 2.170 09/11/2020    FREET4 0.98 06/09/2015        Hemoglobin A1C  Lab Results   Component Value Date    HGBA1C 6.3 10/30/2020        Microalbumin/Creatinine  No results found for: MALBCRERATIO, CREATINIURIN, MICROALBUR        Assessment / Plan      Assessment & Plan:  Problem List Items Addressed This Visit        Cardiovascular and Mediastinum    Essential hypertension    Current Assessment & Plan     Hypertension is unchanged.  Continue current treatment regimen.  Blood pressure will be reassessed in 3 months.         Relevant Medications    amLODIPine (NORVASC) 10 MG tablet    lisinopril (PRINIVIL,ZESTRIL) 40 MG tablet    metoprolol succinate XL (TOPROL-XL) 100 MG 24 hr tablet       Endocrine    Type 2 diabetes mellitus without complication, without long-term current use of insulin (CMS/[facility]) - Primary    Current Assessment & Plan     Diabetes is unchanged.   Continue current treatment regimen.  Diabetes will be reassessed in 3 months.         Relevant Medications    glimepiride (AMARYL) 2 MG tablet    Other Relevant Orders    POC Glycosylated Hemoglobin (Hb A1C) (Completed)           Return in about 6 months (around 4/30/2021) for Recheck with A1c.    Brown Gallo MD   10/30/2020

## 2020-11-05 ENCOUNTER — ANTICOAGULATION VISIT (OUTPATIENT)
Dept: PHARMACY | Facility: HOSPITAL | Age: 75
End: 2020-11-05

## 2020-11-05 DIAGNOSIS — I48.20 CHRONIC ATRIAL FIBRILLATION (HCC): ICD-10-CM

## 2020-11-05 LAB
INR PPP: 3.1 (ref 0.91–1.09)
PROTHROMBIN TIME: 37 SECONDS (ref 10–13.8)

## 2020-11-05 PROCEDURE — 36416 COLLJ CAPILLARY BLOOD SPEC: CPT

## 2020-11-05 PROCEDURE — G0463 HOSPITAL OUTPT CLINIC VISIT: HCPCS

## 2020-11-05 PROCEDURE — 85610 PROTHROMBIN TIME: CPT

## 2020-11-05 NOTE — PROGRESS NOTES
Anticoagulation Clinic Progress Note  Indication: atrial fibrillation  Referring Provider: Sravani  Initial Warfarin Start Date: 2008  Goal INR: 2 - 3  Current Drug Interactions: fluoxetine, glimepiride, melatonin (PRN), mirtazepine  CHADS-VASc: 3 (age, HTN, DM)  EtOH: none  GLV: Salad (Spinach salad 1x weekly or garden salad) and serving of broccoli once a week    Anticoagulation Clinic INR History:  Date 8/2 8/23 9/20 10/11 11/7 11/17 11/28 12/13 12/27   Total Weekly Dose 17.5mg 17.5 mg 17.5 mg 17.5mg 10mg 17.5 mg 17.5 mg 20mg 22.5 mg   INR 2.5 2.3 2.3 2.0 1.2 1.7 1.5 1.7 2.6   Notes     Pre- lumbar inj         Date 1/10/18 1/31 2/28 3/28 4/27 5/30 6/27 7/11 7/23 8/1 8/6 8/13   Total Weekly Dose 22.5 mg 22.5 mg 22.5 mg 22.5 mg 22.5 mg 22.5 mg 22.5 mg 22.5 mg 22.5 mg 20mg 12.5 mg 17.5 mg   INR 2.6 2.5 2.1 2.4 2.4 2.6 3.5 3.4 3.6 5.3, 4.9 1.9 2.7   Notes        Keflex         Date 8/20 9/4 10/2 11/6 11/15 11/29 12/31 1/7/19 1/17 1/29 2/12   Total Weekly Dose 17.5  mg 17.5 mg 17.5 mg 10mg 20mg 17.5mg 17.5mg 18.75 mg 17.5 mg 18.75 mg 20mg   INR 2.3 2.3 2.2 1.3 2.3 2.2 1.7 1.9 1.9 1.9 2.0   Notes    pre- epidural   missed dose?         Date 2/27 3/27 4/10 4/23 5/7 5/28 6/25 7/30 8/28 9/25 10/23   Total Weekly Dose 20mg 20mg 21.25 mg 21.25 mg 21.25 mg 21.25mg 21.25mg 21.25mg 21.25mg 21.25mg 21.25mg   INR 2.2 1.8 2.5 1.8 2.4 2.4 2.5 2.7 2.6 3.0 2.9   Notes sick   post- scope            Date  11/6 11/13 12/4 1/3 1/15 1/20 1/31 2/10 2/28 4/14 5/26   Total Weekly Dose 21.25 mg 18.75 mg 21.25 mg 21.25 mg 21.25 mg 20 mg 21.25 21.25 20mg 21.25mg 21.25mg   INR 4.2 3.2 2.7 2.2 3.1 3.1 2.9 3.5 2.4 2.3 2.5   Notes apap Hold x1, less GLV Inc in GLV  doxy doxy  1x dose cephalexin 1x dose dec; keflex       Date  6/24 7/22 8/24 9/10 10/8 11/5        Total Weekly Dose 21.25mg 21.25 21.25mg 21.25mg 21.25mg 21.25mg        INR 2.4 2.4 3.0 2.8 3.0 3.1        Notes      Inc. GLV          Clinic Interview:  Verbal Release Authorization  signed on 6/27/18 -- may speak with Sussy (wife), Nikolai (son)  Tablet Strength: pt has 2.5mg tablets  Phone: [phone] (Home), [phone]   Fax: [phone] (Attn: Tasha) [facility]    Patient Findings   Positives:  Change in diet/appetite   Negatives:  Signs/symptoms of thrombosis, Signs/symptoms of bleeding, Laboratory test error suspected, Change in health, Change in alcohol use, Change in activity, Upcoming invasive procedure, Emergency department visit, Upcoming dental procedure, Missed doses, Extra doses, Change in medications, Hospital admission, Bruising, Other complaints   Comments:  Mr. Wolf added a weekly spinach salad to his diet since last clinic appointment and INR continued to rise to 3.1 this visit. He and Mrs. Wolf stated he has also been eating his 1 cup of broccoli each week as well as several smaller garden salads. Mr. Wolf as a prescription for acetaminophen that he takes as needed for back pain. He does not recollect of his intake of this has changed so advised him to be more aware of this going forward as this can interact with warfarin.      Plan:   1. INR is slightly SUPRAtherapeutic today at 3.1. Mr. Wolf was compliant with adding one serving GLV weekly and his INR continued to rise. Instructed Mr. Wolf and his spouse to decrease warfarin 2.5 mg oral daily except warfarin 3.75 mg TuesSat (5.9% decrease).   2. RTC in 2 weeks, 11/18. May need to consider this as new maintenance regimen since his INR continued to rise with increased GLV intake.  3. Verbal and written information was provided to Mr. Wolf and Mrs. Wolf in clinic. Mr. Wolf voiced understanding with teach back and has no further questions at this time. Provided a copy of the Vitamin K content of GLV to ensure they are keeping this intake consistent.    Anahi Mcgarry, PharmD  Pharmacy Resident  11/5/2020  11:35 EST

## 2020-11-13 RX ORDER — AMLODIPINE BESYLATE 10 MG/1
10 TABLET ORAL DAILY
Qty: 30 TABLET | Refills: 11 | Status: SHIPPED | OUTPATIENT
Start: 2020-11-13 | End: 2020-11-19

## 2020-11-18 ENCOUNTER — ANTICOAGULATION VISIT (OUTPATIENT)
Dept: PHARMACY | Facility: HOSPITAL | Age: 75
End: 2020-11-18

## 2020-11-18 DIAGNOSIS — I48.20 CHRONIC ATRIAL FIBRILLATION (HCC): ICD-10-CM

## 2020-11-18 LAB
INR PPP: 2.5 (ref 0.91–1.09)
PROTHROMBIN TIME: 30.3 SECONDS (ref 10–13.8)

## 2020-11-18 PROCEDURE — G0463 HOSPITAL OUTPT CLINIC VISIT: HCPCS

## 2020-11-18 PROCEDURE — 85610 PROTHROMBIN TIME: CPT

## 2020-11-18 PROCEDURE — 36416 COLLJ CAPILLARY BLOOD SPEC: CPT

## 2020-11-18 NOTE — PROGRESS NOTES
Anticoagulation Clinic Progress Note  Indication: atrial fibrillation  Referring Provider: Sravani  Initial Warfarin Start Date: 2008  Goal INR: 2 - 3  Current Drug Interactions: fluoxetine, glimepiride, melatonin (PRN), mirtazepine  CHADS-VASc: 3 (age, HTN, DM)  EtOH: none  GLV: Salad (Spinach salad 1x weekly or garden salad) and serving of broccoli once a week    Anticoagulation Clinic INR History:  Date 8/2 8/23 9/20 10/11 11/7 11/17 11/28 12/13 12/27   Total Weekly Dose 17.5mg 17.5 mg 17.5 mg 17.5mg 10mg 17.5 mg 17.5 mg 20mg 22.5 mg   INR 2.5 2.3 2.3 2.0 1.2 1.7 1.5 1.7 2.6   Notes     Pre- lumbar inj         Date 1/10/18 1/31 2/28 3/28 4/27 5/30 6/27 7/11 7/23 8/1 8/6 8/13   Total Weekly Dose 22.5 mg 22.5 mg 22.5 mg 22.5 mg 22.5 mg 22.5 mg 22.5 mg 22.5 mg 22.5 mg 20mg 12.5 mg 17.5 mg   INR 2.6 2.5 2.1 2.4 2.4 2.6 3.5 3.4 3.6 5.3, 4.9 1.9 2.7   Notes        Keflex         Date 8/20 9/4 10/2 11/6 11/15 11/29 12/31 1/7/19 1/17 1/29 2/12   Total Weekly Dose 17.5  mg 17.5 mg 17.5 mg 10mg 20mg 17.5mg 17.5mg 18.75 mg 17.5 mg 18.75 mg 20mg   INR 2.3 2.3 2.2 1.3 2.3 2.2 1.7 1.9 1.9 1.9 2.0   Notes    pre- epidural   missed dose?         Date 2/27 3/27 4/10 4/23 5/7 5/28 6/25 7/30 8/28 9/25 10/23   Total Weekly Dose 20mg 20mg 21.25 mg 21.25 mg 21.25 mg 21.25mg 21.25mg 21.25mg 21.25mg 21.25mg 21.25mg   INR 2.2 1.8 2.5 1.8 2.4 2.4 2.5 2.7 2.6 3.0 2.9   Notes sick   post- scope            Date  11/6 11/13 12/4 1/3 1/15 1/20 1/31 2/10 2/28 4/14 5/26   Total Weekly Dose 21.25 mg 18.75 mg 21.25 mg 21.25 mg 21.25 mg 20 mg 21.25 21.25 20mg 21.25mg 21.25mg   INR 4.2 3.2 2.7 2.2 3.1 3.1 2.9 3.5 2.4 2.3 2.5   Notes apap Hold x1, less GLV Inc in GLV  doxy doxy  1x dose cephalexin 1x dose dec; keflex       Date  6/24 7/22 8/24 9/10 10/8 11/5 11/18 12/2      Total Weekly Dose 21.25mg 21.25 21.25mg 21.25mg 21.25mg 21.25mg 20mg       INR 2.4 2.4 3.0 2.8 3.0 3.1 2.5       Notes      Inc. GLV Dose dec         Clinic Interview:  Verbal  Release Authorization signed on 6/27/18 -- may speak with Sussy (wife), Nikolai (son)  Tablet Strength: pt has 2.5mg tablets  Phone: [phone] (Home), [phone]   Fax: [phone] (Attn: Tasha) [facility]    Patient Findings  Negatives:  Signs/symptoms of thrombosis, Signs/symptoms of bleeding, Laboratory test error suspected, Change in health, Change in alcohol use, Change in activity, Upcoming invasive procedure, Emergency department visit, Upcoming dental procedure, Missed doses, Extra doses, Change in medications, Change in diet/appetite, Hospital admission, Bruising, Other complaints   Comments:  Reports APAP use is less than once a week, just as needed for back pain.     Continues to eat spinach salad once weekly, peas/green beans 1-2x week. This is consistent with previous encounter.     Denies all other findings.      Plan:   1. INR is therapeutic today at 2.5 (range 2.0-3.0). Mr. Wolf continued to add one serving spinach (raw) weekly with reduced dose and returned to WNL. Instructed Mr. Wofl and his spouse to continued reduced maintenance dose of warfarin 2.5 mg oral daily except warfarin 3.75 mg TuesSat (5.9% decrease, ) until re-check. Previously stable on 21.25mg per week. Expected need for increased dose with increase GLV intake, however patient's INR increased.    2. RTC in 2 weeks, 12/2, to ensure WNL. Patient requests we consider extending to 3-4 weeks at next encounter if WNL.   3. Verbal and written information was provided to Mr. Wolf and Mrs. Wolf in clinic. Mr. Wolf voiced understanding with teach back and has no further questions at this time.     Laila Rangel, PharmD Candidate 2021 11/18/2020  11:59 EST    Since patient has been on this dose for 2 weeks will continue with this dose.  IAma, PharmD, have reviewed the note in full and agree with the assessment and plan.  11/18/20  12:23 EST

## 2020-11-19 RX ORDER — AMLODIPINE BESYLATE 10 MG/1
TABLET ORAL
Qty: 30 TABLET | Refills: 10 | Status: SHIPPED | OUTPATIENT
Start: 2020-11-19 | End: 2021-12-14

## 2020-11-19 RX ORDER — AMLODIPINE BESYLATE 10 MG/1
TABLET ORAL
Qty: 30 TABLET | Refills: 10 | Status: SHIPPED | OUTPATIENT
Start: 2020-11-19 | End: 2020-11-19

## 2020-12-02 ENCOUNTER — ANTICOAGULATION VISIT (OUTPATIENT)
Dept: PHARMACY | Facility: HOSPITAL | Age: 75
End: 2020-12-02

## 2020-12-02 DIAGNOSIS — I48.20 CHRONIC ATRIAL FIBRILLATION (HCC): ICD-10-CM

## 2020-12-02 LAB
INR PPP: 2.5 (ref 0.91–1.09)
PROTHROMBIN TIME: 30 SECONDS (ref 10–13.8)

## 2020-12-02 PROCEDURE — 36416 COLLJ CAPILLARY BLOOD SPEC: CPT

## 2020-12-02 PROCEDURE — G0463 HOSPITAL OUTPT CLINIC VISIT: HCPCS

## 2020-12-02 PROCEDURE — 85610 PROTHROMBIN TIME: CPT

## 2020-12-02 NOTE — PROGRESS NOTES
Anticoagulation Clinic Progress Note  Indication: atrial fibrillation  Referring Provider: Sravani  Initial Warfarin Start Date: 2008  Goal INR: 2 - 3  Current Drug Interactions: fluoxetine, glimepiride, melatonin (PRN), mirtazepine  CHADS-VASc: 3 (age, HTN, DM)  EtOH: none  GLV: Salad (Spinach salad 1x weekly or garden salad) and serving of broccoli once a week    OTC Pain Relief: Uses APAP prn (~1x/week)    Anticoagulation Clinic INR History:  Date 8/2 8/23 9/20 10/11 11/7 11/17 11/28 12/13 12/27   Total Weekly Dose 17.5mg 17.5 mg 17.5 mg 17.5mg 10mg 17.5 mg 17.5 mg 20mg 22.5 mg   INR 2.5 2.3 2.3 2.0 1.2 1.7 1.5 1.7 2.6   Notes     Pre- lumbar inj         Date 1/10/18 1/31 2/28 3/28 4/27 5/30 6/27 7/11 7/23 8/1 8/6 8/13   Total Weekly Dose 22.5 mg 22.5 mg 22.5 mg 22.5 mg 22.5 mg 22.5 mg 22.5 mg 22.5 mg 22.5 mg 20mg 12.5 mg 17.5 mg   INR 2.6 2.5 2.1 2.4 2.4 2.6 3.5 3.4 3.6 5.3, 4.9 1.9 2.7   Notes        Keflex         Date 8/20 9/4 10/2 11/6 11/15 11/29 12/31 1/7/19 1/17 1/29 2/12   Total Weekly Dose 17.5  mg 17.5 mg 17.5 mg 10mg 20mg 17.5mg 17.5mg 18.75 mg 17.5 mg 18.75 mg 20mg   INR 2.3 2.3 2.2 1.3 2.3 2.2 1.7 1.9 1.9 1.9 2.0   Notes    pre- epidural   missed dose?         Date 2/27 3/27 4/10 4/23 5/7 5/28 6/25 7/30 8/28 9/25 10/23   Total Weekly Dose 20mg 20mg 21.25 mg 21.25 mg 21.25 mg 21.25mg 21.25mg 21.25mg 21.25mg 21.25mg 21.25mg   INR 2.2 1.8 2.5 1.8 2.4 2.4 2.5 2.7 2.6 3.0 2.9   Notes sick   post- scope            Date  11/6 11/13 12/4 1/3 1/15 1/20 1/31 2/10 2/28 4/14 5/26   Total Weekly Dose 21.25 mg 18.75 mg 21.25 mg 21.25 mg 21.25 mg 20 mg 21.25 21.25 20mg 21.25mg 21.25mg   INR 4.2 3.2 2.7 2.2 3.1 3.1 2.9 3.5 2.4 2.3 2.5   Notes apap Hold x1, less GLV Inc in GLV  doxy doxy  1x dose cephalexin 1x dose dec; keflex       Date  6/24 7/22 8/24 9/10 10/8 11/5 11/18 12/2      Total Weekly Dose 21.25mg 21.25 21.25mg 21.25mg 21.25mg 21.25mg 20mg 20mg      INR 2.4 2.4 3.0 2.8 3.0 3.1 2.5 2.5      Notes       Inc. GLV Dose dec         Clinic Interview:  Verbal Release Authorization signed on 6/27/18 -- may speak with Sussy (wife), Nikolai (son)  Tablet Strength: pt has 2.5mg tablets  Phone: [phone] (Home), [phone]   Fax: [phone] (Attn: Tasha) [facility]    Patient Findings    Negatives:  Signs/symptoms of thrombosis, Signs/symptoms of bleeding, Laboratory test error suspected, Change in health, Change in alcohol use, Change in activity, Upcoming invasive procedure, Emergency department visit, Upcoming dental procedure, Missed doses, Extra doses, Change in medications, Change in diet/appetite, Hospital admission, Bruising, Other complaints   Comments:  Mrs. Wolf accompanied Mr. Wolf to appointment. Denies all findings. He is still eating 1 serving of broccoli and 1 spinach salad each week. No other changes.      Plan:   1. INR is therapeutic again today at 2.5 (range 2.0-3.0).  Instructed Mr. Wolf and his spouse to continue reduced maintenance dose of warfarin 2.5 mg oral daily except warfarin 3.75 mg TuesSat until re-check.     2. RTC in 4 weeks, 12/30, per patient preference.   3. Verbal and written information was provided to Mr. Wolf and Mrs. Wolf in clinic. Mr. Wolf voiced understanding with teach back and has no further questions at this time.   4. Declines refills on warfarin at this time.     Laila Rangel, PharmD Candidate 2021 12/02/2020  11:45 Teena SANCHEZ, PharmD, have reviewed the note in full and agree with the assessment and plan.  12/02/20  14:51 EST

## 2020-12-15 ENCOUNTER — OFFICE VISIT (OUTPATIENT)
Dept: INTERNAL MEDICINE | Facility: CLINIC | Age: 75
End: 2020-12-15

## 2020-12-15 VITALS
SYSTOLIC BLOOD PRESSURE: 122 MMHG | BODY MASS INDEX: 32.74 KG/M2 | HEIGHT: 73 IN | DIASTOLIC BLOOD PRESSURE: 80 MMHG | OXYGEN SATURATION: 95 % | WEIGHT: 247 LBS | HEART RATE: 80 BPM

## 2020-12-15 DIAGNOSIS — E78.5 HYPERLIPIDEMIA LDL GOAL <100: ICD-10-CM

## 2020-12-15 DIAGNOSIS — K21.00 GASTROESOPHAGEAL REFLUX DISEASE WITH ESOPHAGITIS WITHOUT HEMORRHAGE: ICD-10-CM

## 2020-12-15 DIAGNOSIS — G47.33 OBSTRUCTIVE SLEEP APNEA: ICD-10-CM

## 2020-12-15 DIAGNOSIS — I10 ESSENTIAL HYPERTENSION: Primary | ICD-10-CM

## 2020-12-15 DIAGNOSIS — E11.9 CONTROLLED TYPE 2 DIABETES MELLITUS WITHOUT COMPLICATION, WITHOUT LONG-TERM CURRENT USE OF INSULIN (HCC): ICD-10-CM

## 2020-12-15 DIAGNOSIS — Z23 NEED FOR PNEUMOCOCCAL VACCINATION: ICD-10-CM

## 2020-12-15 PROCEDURE — G0009 ADMIN PNEUMOCOCCAL VACCINE: HCPCS | Performed by: INTERNAL MEDICINE

## 2020-12-15 PROCEDURE — 99214 OFFICE O/P EST MOD 30 MIN: CPT | Performed by: INTERNAL MEDICINE

## 2020-12-15 PROCEDURE — 90732 PPSV23 VACC 2 YRS+ SUBQ/IM: CPT | Performed by: INTERNAL MEDICINE

## 2020-12-15 NOTE — PROGRESS NOTES
Subjective   Tony Wolf is a 75 y.o. male.   Chief Complaint   Patient presents with   • Hypertension       Diabetes  He presents for his follow-up diabetic visit. He has type 2 diabetes mellitus. Pertinent negatives for hypoglycemia include no confusion, headaches, nervousness/anxiousness, pallor, seizures or speech difficulty. Pertinent negatives for diabetes include no chest pain, no fatigue, no polydipsia and no polyphagia. An ACE inhibitor/angiotensin II receptor blocker is being taken.   Heartburn  He reports no abdominal pain, no chest pain, no coughing, no nausea, no sore throat, no stridor, no tooth decay, no water brash or no wheezing. This is a chronic problem. Pertinent negatives include no fatigue.   Hyperlipidemia  This is a chronic problem. The current episode started more than 1 year ago. Pertinent negatives include no chest pain, myalgias or shortness of breath.   Hypertension  This is a chronic problem. The current episode started more than 1 year ago. Pertinent negatives include no chest pain, headaches, neck pain, palpitations or shortness of breath.        The following portions of the patient's history were reviewed and updated as appropriate: allergies, current medications, past family history, past medical history, past social history, past surgical history and problem list.    Review of Systems   Constitutional: Negative for activity change, appetite change, chills, diaphoresis, fatigue, fever and unexpected weight change.   HENT: Negative for congestion, ear discharge, ear pain, mouth sores, nosebleeds, sinus pressure, sneezing and sore throat.    Eyes: Negative for pain, discharge and itching.   Respiratory: Negative for cough, chest tightness, shortness of breath and wheezing.    Cardiovascular: Negative for chest pain, palpitations and leg swelling.   Gastrointestinal: Negative for abdominal pain, constipation, diarrhea, nausea and vomiting.   Endocrine: Negative for cold  "intolerance, heat intolerance, polydipsia and polyphagia.   Genitourinary: Negative for dysuria, flank pain, frequency, hematuria and urgency.   Musculoskeletal: Negative for arthralgias, back pain, gait problem, myalgias, neck pain and neck stiffness.   Skin: Negative for color change, pallor and rash.   Neurological: Negative for seizures, speech difficulty, numbness and headaches.   Psychiatric/Behavioral: Negative for agitation, confusion, decreased concentration and sleep disturbance. The patient is not nervous/anxious.      /80   Pulse 80   Ht 185.4 cm (73\")   Wt 112 kg (247 lb)   SpO2 95%   BMI 32.59 kg/m²     Objective   Physical Exam   Constitutional: He appears well-developed.   HENT:   Head: Normocephalic.   Right Ear: External ear normal.   Left Ear: External ear normal.   Nose: Nose normal.   Eyes: Pupils are equal, round, and reactive to light. Conjunctivae are normal.   Neck: No JVD present. No thyromegaly present.   Cardiovascular: Normal rate, regular rhythm and normal heart sounds. Exam reveals no friction rub.   No murmur heard.  Pulmonary/Chest: Effort normal and breath sounds normal. No respiratory distress. He has no wheezes. He has no rales.   Abdominal: Soft. Bowel sounds are normal. He exhibits no distension. There is no abdominal tenderness. There is no guarding.   Musculoskeletal: No tenderness.   Lymphadenopathy:     He has no cervical adenopathy.   Neurological: He displays normal reflexes. No cranial nerve deficit.   Skin: No rash noted.   Psychiatric: His behavior is normal.   Nursing note and vitals reviewed.      Assessment/Plan   Tony was seen today for heartburn, hyperlipidemia, hypertension and diabetes.    Diagnoses and all orders for this visit:    Controlled type 2 diabetes mellitus without complication, without long-term current use of insulin  On amaryl and seeing Endo now  Essential hypertension  Stable on lisinopril and norvasc    Hyperlipidemia LDL goal " <100  Lipids today  Obstructive sleep apnea  Did not tolerate cpap  Gastroesophageal reflux disease, esophagitis presence not specified  Stable with reflux precautions            shoulder pain/injury

## 2020-12-30 ENCOUNTER — ANTICOAGULATION VISIT (OUTPATIENT)
Dept: PHARMACY | Facility: HOSPITAL | Age: 75
End: 2020-12-30

## 2020-12-30 DIAGNOSIS — I48.20 CHRONIC ATRIAL FIBRILLATION (HCC): ICD-10-CM

## 2020-12-30 LAB
INR PPP: 2.5 (ref 0.91–1.09)
PROTHROMBIN TIME: 30.2 SECONDS (ref 10–13.8)

## 2020-12-30 PROCEDURE — 36416 COLLJ CAPILLARY BLOOD SPEC: CPT

## 2020-12-30 PROCEDURE — 85610 PROTHROMBIN TIME: CPT

## 2020-12-30 PROCEDURE — G0463 HOSPITAL OUTPT CLINIC VISIT: HCPCS
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
## 2020-12-30 NOTE — PROGRESS NOTES
Anticoagulation Clinic Progress Note  Indication: atrial fibrillation  Referring Provider: Sravani  Initial Warfarin Start Date: 2008  Goal INR: 2 - 3  Current Drug Interactions: fluoxetine, glimepiride, melatonin (PRN), mirtazepine  CHADS-VASc: 3 (age, HTN, DM)  EtOH: none  GLV: Salad (Spinach salad 1x weekly or garden salad) and serving of broccoli once a week 12/30/2020    OTC Pain Relief: Uses APAP prn (~1x/week)    Anticoagulation Clinic INR History:  Date 8/2 8/23 9/20 10/11 11/7 11/17 11/28 12/13 12/27   Total Weekly Dose 17.5mg 17.5 mg 17.5 mg 17.5mg 10mg 17.5 mg 17.5 mg 20mg 22.5 mg   INR 2.5 2.3 2.3 2.0 1.2 1.7 1.5 1.7 2.6   Notes     Pre- lumbar inj         Date 1/10/18 1/31 2/28 3/28 4/27 5/30 6/27 7/11 7/23 8/1 8/6 8/13   Total Weekly Dose 22.5 mg 22.5 mg 22.5 mg 22.5 mg 22.5 mg 22.5 mg 22.5 mg 22.5 mg 22.5 mg 20mg 12.5 mg 17.5 mg   INR 2.6 2.5 2.1 2.4 2.4 2.6 3.5 3.4 3.6 5.3, 4.9 1.9 2.7   Notes        Keflex         Date 8/20 9/4 10/2 11/6 11/15 11/29 12/31 1/7/19 1/17 1/29 2/12   Total Weekly Dose 17.5  mg 17.5 mg 17.5 mg 10mg 20mg 17.5mg 17.5mg 18.75 mg 17.5 mg 18.75 mg 20mg   INR 2.3 2.3 2.2 1.3 2.3 2.2 1.7 1.9 1.9 1.9 2.0   Notes    pre- epidural   missed dose?         Date 2/27 3/27 4/10 4/23 5/7 5/28 6/25 7/30 8/28 9/25 10/23   Total Weekly Dose 20mg 20mg 21.25 mg 21.25 mg 21.25 mg 21.25mg 21.25mg 21.25mg 21.25mg 21.25mg 21.25mg   INR 2.2 1.8 2.5 1.8 2.4 2.4 2.5 2.7 2.6 3.0 2.9   Notes sick   post- scope            Date  11/6 11/13 12/4 1/3 1/15 1/20 1/31 2/10 2/28 4/14 5/26   Total Weekly Dose 21.25 mg 18.75 mg 21.25 mg 21.25 mg 21.25 mg 20 mg 21.25 21.25 20mg 21.25mg 21.25mg   INR 4.2 3.2 2.7 2.2 3.1 3.1 2.9 3.5 2.4 2.3 2.5   Notes apap Hold x1, less GLV Inc in GLV  doxy doxy  1x dose cephalexin 1x dose dec; keflex       Date  6/24 7/22 8/24 9/10 10/8 11/5 11/18 12/2 12/30     Total Weekly Dose 21.25mg 21.25 21.25mg 21.25mg 21.25mg 21.25mg 20mg 20mg 20mg     INR 2.4 2.4 3.0 2.8 3.0 3.1 2.5  2.5 2.5     Notes      Inc. GLV Dose dec         Clinic Interview:  Verbal Release Authorization signed on 6/27/18 -- may speak with Sussy (wife), Nikolai (son)  Tablet Strength: pt has 2.5mg tablets  Phone: [phone] (Home), [phone]   Fax: [phone] (Attn: Tasha) [facility]    Patient Findings  Negatives:  Signs/symptoms of thrombosis, Signs/symptoms of bleeding, Laboratory test error suspected, Change in health, Change in alcohol use, Change in activity, Upcoming invasive procedure, Emergency department visit, Upcoming dental procedure, Missed doses, Extra doses, Change in medications, Change in diet/appetite, Hospital admission, Bruising, Other complaints   Comments:  Mrs. Wolf accompanied Mr. Wolf to appointment. Denies all findings. He is still eating 1 serving of broccoli and 1 spinach salad each week. No other changes.      Plan:   1. INR is therapeutic again today at 2.5 (range 2.0-3.0).  Instructed Mr. Wolf and his spouse to continue maintenance dose of warfarin 2.5 mg oral daily except warfarin 3.75 mg TuesSat until re-check.     2. RTC in 4 weeks.  3. Verbal and written information was provided to Mr. Wolf and Mrs. Wolf in clinic. Mr. Wolf voiced understanding with teach back and has no further questions at this time.   4. Declines refills on warfarin at this time.     Ama Mccloud, PharmD  12/30/20  11:17 EST

## 2021-01-04 DIAGNOSIS — E11.9 TYPE 2 DIABETES MELLITUS WITHOUT COMPLICATION, WITHOUT LONG-TERM CURRENT USE OF INSULIN (HCC): ICD-10-CM

## 2021-01-04 RX ORDER — PRAVASTATIN SODIUM 20 MG
TABLET ORAL
Qty: 90 TABLET | Refills: 2 | Status: SHIPPED | OUTPATIENT
Start: 2021-01-04 | End: 2021-10-01 | Stop reason: SDUPTHER

## 2021-01-04 RX ORDER — GLIMEPIRIDE 2 MG/1
TABLET ORAL
Qty: 90 TABLET | Refills: 2 | Status: SHIPPED | OUTPATIENT
Start: 2021-01-04 | End: 2021-10-05 | Stop reason: SDUPTHER

## 2021-01-04 NOTE — TELEPHONE ENCOUNTER
Last Office Visit: 12/15/20  Next Office Visit: 03/16/21    Labs completed in past 6 months? yes  Labs completed in past year? yes    Last Refill Date: 01/10/20  Quantity:90  Refills:3    Pharmacy:

## 2021-01-27 ENCOUNTER — ANTICOAGULATION VISIT (OUTPATIENT)
Dept: PHARMACY | Facility: HOSPITAL | Age: 76
End: 2021-01-27

## 2021-01-27 DIAGNOSIS — I48.20 CHRONIC ATRIAL FIBRILLATION (HCC): ICD-10-CM

## 2021-01-27 LAB
INR PPP: 2.6 (ref 0.91–1.09)
PROTHROMBIN TIME: 31.3 SECONDS (ref 10–13.8)

## 2021-01-27 PROCEDURE — 36416 COLLJ CAPILLARY BLOOD SPEC: CPT

## 2021-01-27 PROCEDURE — G0463 HOSPITAL OUTPT CLINIC VISIT: HCPCS

## 2021-01-27 PROCEDURE — 85610 PROTHROMBIN TIME: CPT

## 2021-01-27 NOTE — PROGRESS NOTES
Anticoagulation Clinic Progress Note  Indication: atrial fibrillation  Referring Provider: Sravani  Initial Warfarin Start Date: 2008  Goal INR: 2 - 3  Current Drug Interactions: fluoxetine, glimepiride, melatonin (PRN), mirtazepine  CHADS-VASc: 3 (age, HTN, DM)  EtOH: none  GLV: Salad (Spinach salad 1x weekly or garden salad) and serving of broccoli once a week 1/27/2021    OTC Pain Relief: Uses APAP prn (~1x/week)    Anticoagulation Clinic INR History:  Date 8/2 8/23 9/20 10/11 11/7 11/17 11/28 12/13 12/27   Total Weekly Dose 17.5mg 17.5 mg 17.5 mg 17.5mg 10mg 17.5 mg 17.5 mg 20mg 22.5 mg   INR 2.5 2.3 2.3 2.0 1.2 1.7 1.5 1.7 2.6   Notes     Pre- lumbar inj         Date 1/10/18 1/31 2/28 3/28 4/27 5/30 6/27 7/11 7/23 8/1 8/6 8/13   Total Weekly Dose 22.5 mg 22.5 mg 22.5 mg 22.5 mg 22.5 mg 22.5 mg 22.5 mg 22.5 mg 22.5 mg 20mg 12.5 mg 17.5 mg   INR 2.6 2.5 2.1 2.4 2.4 2.6 3.5 3.4 3.6 5.3, 4.9 1.9 2.7   Notes        Keflex         Date 8/20 9/4 10/2 11/6 11/15 11/29 12/31 1/7/19 1/17 1/29 2/12   Total Weekly Dose 17.5  mg 17.5 mg 17.5 mg 10mg 20mg 17.5mg 17.5mg 18.75 mg 17.5 mg 18.75 mg 20mg   INR 2.3 2.3 2.2 1.3 2.3 2.2 1.7 1.9 1.9 1.9 2.0   Notes    pre- epidural   missed dose?         Date 2/27 3/27 4/10 4/23 5/7 5/28 6/25 7/30 8/28 9/25 10/23   Total Weekly Dose 20mg 20mg 21.25 mg 21.25 mg 21.25 mg 21.25mg 21.25mg 21.25mg 21.25mg 21.25mg 21.25mg   INR 2.2 1.8 2.5 1.8 2.4 2.4 2.5 2.7 2.6 3.0 2.9   Notes sick   post- scope            Date  11/6 11/13 12/4 1/3 1/15 1/20 1/31 2/10 2/28 4/14 5/26   Total Weekly Dose 21.25 mg 18.75 mg 21.25 mg 21.25 mg 21.25 mg 20 mg 21.25 21.25 20mg 21.25mg 21.25mg   INR 4.2 3.2 2.7 2.2 3.1 3.1 2.9 3.5 2.4 2.3 2.5   Notes apap Hold x1, less GLV Inc in GLV  doxy doxy  1x dose cephalexin 1x dose dec; keflex       Date  6/24 7/22 8/24 9/10 10/8 11/5 11/18 12/2 12/30 1/26    Total Weekly Dose 21.25mg 21.25 21.25mg 21.25mg 21.25mg 21.25mg 20mg 20mg 20mg 20mg     INR 2.4 2.4 3.0 2.8 3.0  3.1 2.5 2.5 2.5 2.6    Notes      Inc. GLV Dose dec         Clinic Interview:  Verbal Release Authorization signed on 6/27/18 -- may speak with Sussy (wife), Nikolai (son)  Tablet Strength: pt has 2.5mg tablets  Phone: [phone] (Home), [phone]   Fax: [phone] (Attn: Tasha) [facility]    Patient Findings  Negatives:  Signs/symptoms of thrombosis, Signs/symptoms of bleeding, Laboratory test error suspected, Change in health, Change in alcohol use, Change in activity, Upcoming invasive procedure, Emergency department visit, Upcoming dental procedure, Missed doses, Extra doses, Change in medications, Change in diet/appetite, Hospital admission, Bruising, Other complaints     Plan:   1. INR is therapeutic again today at 2.6 (range 2.0-3.0).  Instructed Mr. Wolf and his spouse to continue maintenance dose of warfarin 2.5 mg oral daily except warfarin 3.75 mg TuesSat until re-check.     2. RTC on 2/23 to ensure INR remains therapeutic.  3. Verbal and written information was provided to Mr. Wolf and Mrs. Wolf in clinic. Mr. Wolf voiced understanding with teach back and has no further questions at this time.   4. Declines refills on warfarin at this time.     Mague Mendoza, PharmD  Pharmacy Resident   1/27/2021 11:08 EST

## 2021-01-28 ENCOUNTER — TELEPHONE (OUTPATIENT)
Dept: PHARMACY | Facility: HOSPITAL | Age: 76
End: 2021-01-28

## 2021-01-28 ENCOUNTER — TELEPHONE (OUTPATIENT)
Dept: INTERNAL MEDICINE | Facility: CLINIC | Age: 76
End: 2021-01-28

## 2021-01-28 RX ORDER — DESVENLAFAXINE SUCCINATE 50 MG/1
50 TABLET, EXTENDED RELEASE ORAL DAILY
COMMUNITY
Start: 2021-01-21 | End: 2021-04-30 | Stop reason: SDDI

## 2021-01-28 NOTE — TELEPHONE ENCOUNTER
PATIENT CALLED TO ADVISE HE HAS BEEN PLACED ON A NEW MEDICATION.  HE IS TAKING desvenlafaxine (PRISTIQ) 50 MG 24 hr tablet.    HE WAS SCHEDULED TO DO A PRO-TIME.  THEY SAID IT COULD INTERACT WITH THE PRO-TIME NUMBERS SO HE HAS TO GO BACK IN 2 WEEKS.    PLEASE CONTACT PATIENT IF YOU HAVE ANY QUESTIONS.    CALLBACK:  [phone]

## 2021-01-28 NOTE — TELEPHONE ENCOUNTER
Patient called to report that approx. 1 week ago he started taking Desvenlafaxine 50mg QD. Have discussed warfarin-desvenlafaxine interaction and s/sx to be aware of. Per Teena Boykin, PharmD, instructed patient to continue with current warfarin dosing but he will RTC on 2/11 instead of previous date of 2/23. Patient voiced understanding and had no further questions at this time.

## 2021-01-29 ENCOUNTER — IMMUNIZATION (OUTPATIENT)
Dept: VACCINE CLINIC | Facility: HOSPITAL | Age: 76
End: 2021-01-29

## 2021-01-29 PROCEDURE — 0001A: CPT | Performed by: INTERNAL MEDICINE

## 2021-01-29 PROCEDURE — 91300 HC SARSCOV02 VAC 30MCG/0.3ML IM: CPT | Performed by: INTERNAL MEDICINE

## 2021-02-11 ENCOUNTER — APPOINTMENT (OUTPATIENT)
Dept: PHARMACY | Facility: HOSPITAL | Age: 76
End: 2021-02-11

## 2021-02-12 ENCOUNTER — APPOINTMENT (OUTPATIENT)
Dept: PHARMACY | Facility: HOSPITAL | Age: 76
End: 2021-02-12
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
## 2021-02-22 ENCOUNTER — ANTICOAGULATION VISIT (OUTPATIENT)
Dept: PHARMACY | Facility: HOSPITAL | Age: 76
End: 2021-02-22

## 2021-02-22 DIAGNOSIS — I48.20 CHRONIC ATRIAL FIBRILLATION (HCC): ICD-10-CM

## 2021-02-22 DIAGNOSIS — I48.91 ATRIAL FIBRILLATION, UNSPECIFIED TYPE (HCC): ICD-10-CM

## 2021-02-22 LAB
INR PPP: 2.9 (ref 0.91–1.09)
PROTHROMBIN TIME: 34.9 SECONDS (ref 10–13.8)

## 2021-02-22 PROCEDURE — 36416 COLLJ CAPILLARY BLOOD SPEC: CPT

## 2021-02-22 PROCEDURE — 85610 PROTHROMBIN TIME: CPT

## 2021-02-22 PROCEDURE — G0463 HOSPITAL OUTPT CLINIC VISIT: HCPCS

## 2021-02-22 RX ORDER — WARFARIN SODIUM 2.5 MG/1
TABLET ORAL
Qty: 120 TABLET | Refills: 1 | Status: SHIPPED | OUTPATIENT
Start: 2021-02-22 | End: 2021-09-29 | Stop reason: SDUPTHER

## 2021-02-22 NOTE — PROGRESS NOTES
Anticoagulation Clinic Progress Note  Indication: atrial fibrillation  Referring Provider: Sravani (8/24/20)  Initial Warfarin Start Date: 2008  Goal INR: 2 - 3  Current Drug Interactions: fluoxetine, glimepiride, melatonin (PRN), mirtazepine  CHADS-VASc: 3 (age, HTN, DM)    EtOH: none  GLV: Salad (Spinach salad 1x weekly or garden salad) and serving of broccoli once a week 1/27/2021  OTC Pain Relief: Uses APAP prn (~1x/week)    Anticoagulation Clinic INR History:  Date 8/2 8/23 9/20 10/11 11/7 11/17 11/28 12/13 12/27   Total Weekly Dose 17.5mg 17.5 mg 17.5 mg 17.5mg 10mg 17.5 mg 17.5 mg 20mg 22.5 mg   INR 2.5 2.3 2.3 2.0 1.2 1.7 1.5 1.7 2.6   Notes     Pre- lumbar inj         Date 1/10/18 1/31 2/28 3/28 4/27 5/30 6/27 7/11 7/23 8/1 8/6 8/13   Total Weekly Dose 22.5 mg 22.5 mg 22.5 mg 22.5 mg 22.5 mg 22.5 mg 22.5 mg 22.5 mg 22.5 mg 20mg 12.5 mg 17.5 mg   INR 2.6 2.5 2.1 2.4 2.4 2.6 3.5 3.4 3.6 5.3, 4.9 1.9 2.7   Notes        Keflex         Date 8/20 9/4 10/2 11/6 11/15 11/29 12/31 1/7/19 1/17 1/29 2/12   Total Weekly Dose 17.5  mg 17.5 mg 17.5 mg 10mg 20mg 17.5mg 17.5mg 18.75 mg 17.5 mg 18.75 mg 20mg   INR 2.3 2.3 2.2 1.3 2.3 2.2 1.7 1.9 1.9 1.9 2.0   Notes    pre- epidural   missed dose?         Date 2/27 3/27 4/10 4/23 5/7 5/28 6/25 7/30 8/28 9/25 10/23   Total Weekly Dose 20mg 20mg 21.25 mg 21.25 mg 21.25 mg 21.25mg 21.25mg 21.25mg 21.25mg 21.25mg 21.25mg   INR 2.2 1.8 2.5 1.8 2.4 2.4 2.5 2.7 2.6 3.0 2.9   Notes sick   post- scope            Date  11/6 11/13 12/4 1/3 1/15 1/20 1/31 2/10 2/28 4/14 5/26   Total Weekly Dose 21.25 mg 18.75 mg 21.25 mg 21.25 mg 21.25 mg 20 mg 21.25 21.25 20mg 21.25mg 21.25mg   INR 4.2 3.2 2.7 2.2 3.1 3.1 2.9 3.5 2.4 2.3 2.5   Notes apap Hold x1, less GLV Inc in GLV  doxy doxy  1x dose cephalexin 1x dose dec; keflex       Date  6/24 7/22 8/24 9/10 10/8 11/5 11/18 12/2 12/30 1/26 2/22   Total Weekly Dose 21.25mg 21.25 21.25mg 21.25mg 21.25mg 21.25mg 20mg 20mg 20mg 20mg  20mg   INR  2.4 2.4 3.0 2.8 3.0 3.1 2.5 2.5 2.5 2.6 2.9   Notes      Inc. GLV Dose dec    desvenlafaxine       Clinic Interview:  Verbal Release Authorization signed on 6/27/18 -- may speak with Sussy (wife), Nikolai (son)  Tablet Strength: pt has 2.5mg tablets  Phone: [phone] (Home), [phone]   Fax: [phone] (Attn: Tasha) [facility]    Patient Findings  Positives:  Change in medications   Negatives:  Signs/symptoms of thrombosis, Signs/symptoms of bleeding, Laboratory test error suspected, Change in health, Change in alcohol use, Change in activity, Upcoming invasive procedure, Emergency department visit, Upcoming dental procedure, Missed doses, Extra doses, Change in diet/appetite, Hospital admission, Bruising, Other complaints   Comments:  Started Desvenlafaxine 50mg QD end of January, stopped on Sunday, resumes venlafaxine 75mg qd today which he was on prior to desvenlafaxine. Continues normal GLV       Plan:   1. INR is therapeutic again today at 2.9 (range 2.0-3.0).  Instructed Mr. Wolf and his spouse to continue maintenance dose of warfarin 2.5 mg oral daily except warfarin 3.75 mg TuesSat until re-check.     2. RTC in 4 weeks  3. Verbal and written information was provided to Mr. Wolf and Mrs. Wolf in clinic. Mr. Wolf voiced understanding with teach back and has no further questions at this time.   4. Refill sent per pt request    Teena Boykin, TonaD.  02/22/21   14:31 EST

## 2021-02-24 ENCOUNTER — IMMUNIZATION (OUTPATIENT)
Dept: VACCINE CLINIC | Facility: HOSPITAL | Age: 76
End: 2021-02-24

## 2021-02-24 PROCEDURE — 0002A: CPT | Performed by: INTERNAL MEDICINE

## 2021-02-24 PROCEDURE — 91300 HC SARSCOV02 VAC 30MCG/0.3ML IM: CPT | Performed by: INTERNAL MEDICINE

## 2021-03-16 ENCOUNTER — LAB (OUTPATIENT)
Dept: LAB | Facility: HOSPITAL | Age: 76
End: 2021-03-16

## 2021-03-16 ENCOUNTER — OFFICE VISIT (OUTPATIENT)
Dept: INTERNAL MEDICINE | Facility: CLINIC | Age: 76
End: 2021-03-16

## 2021-03-16 VITALS
SYSTOLIC BLOOD PRESSURE: 124 MMHG | DIASTOLIC BLOOD PRESSURE: 80 MMHG | HEIGHT: 73 IN | TEMPERATURE: 97.5 F | BODY MASS INDEX: 32.47 KG/M2 | HEART RATE: 77 BPM | WEIGHT: 245 LBS | OXYGEN SATURATION: 96 %

## 2021-03-16 DIAGNOSIS — E78.5 HYPERLIPIDEMIA LDL GOAL <100: ICD-10-CM

## 2021-03-16 DIAGNOSIS — I48.20 CHRONIC ATRIAL FIBRILLATION (HCC): ICD-10-CM

## 2021-03-16 DIAGNOSIS — K21.00 GASTROESOPHAGEAL REFLUX DISEASE WITH ESOPHAGITIS WITHOUT HEMORRHAGE: ICD-10-CM

## 2021-03-16 DIAGNOSIS — R26.81 GAIT INSTABILITY: ICD-10-CM

## 2021-03-16 DIAGNOSIS — F41.1 GENERALIZED ANXIETY DISORDER: ICD-10-CM

## 2021-03-16 DIAGNOSIS — I10 ESSENTIAL HYPERTENSION: Primary | ICD-10-CM

## 2021-03-16 DIAGNOSIS — I10 ESSENTIAL HYPERTENSION: ICD-10-CM

## 2021-03-16 LAB
ALBUMIN SERPL-MCNC: 4.3 G/DL (ref 3.5–5.2)
ALBUMIN/GLOB SERPL: 1.8 G/DL
ALP SERPL-CCNC: 55 U/L (ref 39–117)
ALT SERPL W P-5'-P-CCNC: 34 U/L (ref 1–41)
ANION GAP SERPL CALCULATED.3IONS-SCNC: 9.1 MMOL/L (ref 5–15)
AST SERPL-CCNC: 42 U/L (ref 1–40)
BASOPHILS # BLD AUTO: 0.07 10*3/MM3 (ref 0–0.2)
BASOPHILS NFR BLD AUTO: 1 % (ref 0–1.5)
BILIRUB SERPL-MCNC: 1 MG/DL (ref 0–1.2)
BUN SERPL-MCNC: 13 MG/DL (ref 8–23)
BUN/CREAT SERPL: 13.4 (ref 7–25)
CALCIUM SPEC-SCNC: 9.1 MG/DL (ref 8.6–10.5)
CHLORIDE SERPL-SCNC: 103 MMOL/L (ref 98–107)
CHOLEST SERPL-MCNC: 160 MG/DL (ref 0–200)
CO2 SERPL-SCNC: 29.9 MMOL/L (ref 22–29)
CREAT SERPL-MCNC: 0.97 MG/DL (ref 0.76–1.27)
DEPRECATED RDW RBC AUTO: 47.2 FL (ref 37–54)
EOSINOPHIL # BLD AUTO: 0.17 10*3/MM3 (ref 0–0.4)
EOSINOPHIL NFR BLD AUTO: 2.4 % (ref 0.3–6.2)
ERYTHROCYTE [DISTWIDTH] IN BLOOD BY AUTOMATED COUNT: 13.7 % (ref 12.3–15.4)
GFR SERPL CREATININE-BSD FRML MDRD: 76 ML/MIN/1.73
GLOBULIN UR ELPH-MCNC: 2.4 GM/DL
GLUCOSE SERPL-MCNC: 116 MG/DL (ref 65–99)
HCT VFR BLD AUTO: 52.6 % (ref 37.5–51)
HDLC SERPL-MCNC: 29 MG/DL (ref 40–60)
HGB BLD-MCNC: 17.5 G/DL (ref 13–17.7)
IMM GRANULOCYTES # BLD AUTO: 0.02 10*3/MM3 (ref 0–0.05)
IMM GRANULOCYTES NFR BLD AUTO: 0.3 % (ref 0–0.5)
LDLC SERPL CALC-MCNC: 91 MG/DL (ref 0–100)
LDLC/HDLC SERPL: 2.88 {RATIO}
LYMPHOCYTES # BLD AUTO: 2.36 10*3/MM3 (ref 0.7–3.1)
LYMPHOCYTES NFR BLD AUTO: 33.9 % (ref 19.6–45.3)
MCH RBC QN AUTO: 31.3 PG (ref 26.6–33)
MCHC RBC AUTO-ENTMCNC: 33.3 G/DL (ref 31.5–35.7)
MCV RBC AUTO: 93.9 FL (ref 79–97)
MONOCYTES # BLD AUTO: 0.75 10*3/MM3 (ref 0.1–0.9)
MONOCYTES NFR BLD AUTO: 10.8 % (ref 5–12)
NEUTROPHILS NFR BLD AUTO: 3.6 10*3/MM3 (ref 1.7–7)
NEUTROPHILS NFR BLD AUTO: 51.6 % (ref 42.7–76)
NRBC BLD AUTO-RTO: 0 /100 WBC (ref 0–0.2)
PLATELET # BLD AUTO: 144 10*3/MM3 (ref 140–450)
PMV BLD AUTO: 12.2 FL (ref 6–12)
POTASSIUM SERPL-SCNC: 4.3 MMOL/L (ref 3.5–5.2)
PROT SERPL-MCNC: 6.7 G/DL (ref 6–8.5)
RBC # BLD AUTO: 5.6 10*6/MM3 (ref 4.14–5.8)
SODIUM SERPL-SCNC: 142 MMOL/L (ref 136–145)
TRIGL SERPL-MCNC: 237 MG/DL (ref 0–150)
VLDLC SERPL-MCNC: 40 MG/DL (ref 5–40)
WBC # BLD AUTO: 6.97 10*3/MM3 (ref 3.4–10.8)

## 2021-03-16 PROCEDURE — 80053 COMPREHEN METABOLIC PANEL: CPT

## 2021-03-16 PROCEDURE — 85025 COMPLETE CBC W/AUTO DIFF WBC: CPT

## 2021-03-16 PROCEDURE — 99214 OFFICE O/P EST MOD 30 MIN: CPT | Performed by: INTERNAL MEDICINE

## 2021-03-16 PROCEDURE — 80061 LIPID PANEL: CPT

## 2021-03-16 RX ORDER — BIMATOPROST 0.01 %
1 DROPS OPHTHALMIC (EYE) NIGHTLY
COMMUNITY

## 2021-03-16 NOTE — PROGRESS NOTES
Subjective   Tony Wolf is a 75 y.o. male.   Chief Complaint   Patient presents with   • Hypertension   • Hyperlipidemia   • Anxiety   • Atrial Fibrillation       History of Present Illness   Here for f/u on chronic conditions: htn, hlp, anxiety, and a fib. Htn has been controlled with norvasc and lisinopril. No CP or SOA. No HA.  Anxiety has been stable with effexor.  Afib is controlled with b blocker and coumadin. No nosebleeds or bleeding from gums.   Gait instability for last few years. More at home in his bathrom, has to grab on to things for stability. Using a a walker.   The following portions of the patient's history were reviewed and updated as appropriate: allergies, current medications, past family history, past medical history, past social history, past surgical history and problem list.  Past Medical History:   Diagnosis Date   • Acute bronchitis    • Anxiety    • Arthritis     right knee   • Atrial fibrillation (CMS/HCC)    • Back pain    • Cancer (CMS/HCC)     skin cancer removed from face    • Carpal tunnel syndrome    • Derangement, knee internal    • Diabetes mellitus (CMS/HCC)     DX'D TYPE II APPROX 15 YEARS AGO, DOES NOT CHECK BLOOD SUGARS AT HOME, STARTED INSULIN IN MARCH AFTER SURGERY CANCELLED FOR ELEVATED A1C   • Drug dependence (CMS/HCC)    • GERD (gastroesophageal reflux disease)    • Glaucoma    • Heart disease    • Hyperlipidemia 11/14/2016   • Hypertension    • Hypokalemia, replaced 6/2/2017   • IBS (irritable bowel syndrome)    • Knee pain, right    • Left ventricular hypertrophy 11/14/2016   • Leukocytosis, mild, likely reactive 5/31/2017   • Neuropathy, lumbosacral (radicular)    • Osteoporosis    • Postoperative urinary retention 6/1/2017   • Premature ventricular contractions    • PVC's (premature ventricular contractions) 11/14/2016   • Rash 3/18/2019   • Scoliosis    • Screening for prostate cancer 7/28/2016   • Sinusitis    • Sleep apnea    • SOB (shortness of breath)    •  Spinal stenosis, lumbar region with neurogenic claudication    • Trigger middle finger of left hand    • Trigger middle finger of right hand    • Wears glasses     readers     Past Surgical History:   Procedure Laterality Date   • BACK SURGERY      injections for back    • CHOLECYSTECTOMY     • COLONOSCOPY      LESS THAN 5 YEARS    • ENDOSCOPY      x3   • FRACTURE SURGERY      right heel   • HERNIA REPAIR      both side inguinal    • LUMBAR EPIDURAL INJECTION     • ME TOTAL KNEE ARTHROPLASTY Right 5/30/2017    Procedure: RIGHT TOTAL KNEE ARTHROPLASTY;  Surgeon: Simon Interiano MD;  [facility];  Service: Orthopedics   • TOTAL KNEE ARTHROPLASTY REVISION      left   • WRIST SURGERY      carpal tunnel bilat      .  Family History   Problem Relation Age of Onset   • Cancer Other    • Anemia Other    • Heart attack Other    • Cancer Mother    • Heart disease Mother    • Hypertension Mother    • Heart attack Mother    • Diabetes Mother    • Deep vein thrombosis Father    • Cancer Other      .smed    Review of Systems   Constitutional: Negative for activity change, appetite change, chills, diaphoresis, fatigue, fever and unexpected weight change.   HENT: Negative for congestion, dental problem, drooling, ear discharge, ear pain, facial swelling, hearing loss, mouth sores, nosebleeds, postnasal drip, rhinorrhea, sinus pressure, sneezing, sore throat, tinnitus, trouble swallowing and voice change.    Eyes: Negative for photophobia, pain, discharge, itching and visual disturbance.   Respiratory: Negative for cough, choking, chest tightness, shortness of breath, wheezing and stridor.    Cardiovascular: Negative for palpitations and leg swelling.   Gastrointestinal: Negative for abdominal distention, abdominal pain, anal bleeding, blood in stool, constipation, diarrhea, nausea and vomiting.   Endocrine: Negative for cold intolerance, heat intolerance, polydipsia and polyphagia.   Genitourinary: Negative for decreased  "urine volume, discharge, dysuria, enuresis, flank pain, frequency, genital sores, hematuria, scrotal swelling, testicular pain and urgency.   Musculoskeletal: Positive for arthralgias and gait problem. Negative for back pain, joint swelling, myalgias, neck pain and neck stiffness.   Skin: Negative for color change, pallor, rash and wound.   Allergic/Immunologic: Negative for environmental allergies, food allergies and immunocompromised state.   Neurological: Negative for dizziness, tremors, seizures, syncope, facial asymmetry, speech difficulty, weakness, light-headedness, numbness and headaches.   Hematological: Negative for adenopathy. Does not bruise/bleed easily.   Psychiatric/Behavioral: Negative for agitation, behavioral problems, confusion, dysphoric mood, hallucinations, sleep disturbance and suicidal ideas. The patient is not nervous/anxious and is not hyperactive.    /80   Pulse 77   Temp 97.5 °F (36.4 °C)   Ht 185.4 cm (73\")   Wt 111 kg (245 lb)   SpO2 96%   BMI 32.32 kg/m²       Objective   Physical Exam  Vitals and nursing note reviewed.   Constitutional:       Appearance: Normal appearance. He is well-developed.   HENT:      Head: Normocephalic and atraumatic.      Right Ear: External ear normal.      Left Ear: External ear normal.      Nose: Nose normal.   Eyes:      Conjunctiva/sclera: Conjunctivae normal.      Pupils: Pupils are equal, round, and reactive to light.   Neck:      Thyroid: No thyromegaly.      Vascular: No JVD.   Cardiovascular:      Rate and Rhythm: Normal rate and regular rhythm.      Heart sounds: Normal heart sounds. No murmur. No friction rub. No gallop.    Pulmonary:      Effort: No respiratory distress.      Breath sounds: No wheezing or rales.   Chest:      Chest wall: No tenderness.   Abdominal:      General: There is no distension.      Palpations: Abdomen is soft. There is no mass.      Tenderness: There is no abdominal tenderness. There is no guarding or " rebound.      Hernia: No hernia is present.   Genitourinary:     Penis: Normal.       Prostate: Normal.      Rectum: Normal.   Musculoskeletal:      Cervical back: Normal range of motion and neck supple.   Lymphadenopathy:      Cervical: No cervical adenopathy.   Skin:     Coloration: Skin is not pale.      Findings: No erythema or rash.   Neurological:      General: No focal deficit present.      Mental Status: He is alert and oriented to person, place, and time.      Cranial Nerves: No cranial nerve deficit.      Motor: No abnormal muscle tone.      Coordination: Coordination normal.      Deep Tendon Reflexes: Reflexes normal.   Psychiatric:         Mood and Affect: Mood normal.         Behavior: Behavior normal.         Assessment/Plan   Diagnoses and all orders for this visit:    1. Essential hypertension (Primary)  -     Comprehensive Metabolic Panel; Future  -     CBC & Differential; Future  Controlled with lisinopril and norvasc.   2. Hyperlipidemia LDL goal <100  -     Lipid Panel; Future  Controlled with Pravastatin 20 mg po qhs.   3. Chronic atrial fibrillation (CMS/HCC)  On coumadin 2.5 mg po qd, coumadin now managed in anticoagulation clinic. Continue metoprolol. 100 mg po qd.  4. Gastroesophageal reflux disease with esophagitis without hemorrhage  Controlled with Protonix 40 mg po qd  5. Generalized anxiety disorder  Stable on Effexor 75 mg po qd.   Other orders  -     Cancel: POC Glycosylated Hemoglobin (Hb A1C)

## 2021-03-22 ENCOUNTER — ANTICOAGULATION VISIT (OUTPATIENT)
Dept: PHARMACY | Facility: HOSPITAL | Age: 76
End: 2021-03-22

## 2021-03-22 DIAGNOSIS — I48.20 CHRONIC ATRIAL FIBRILLATION (HCC): ICD-10-CM

## 2021-03-22 LAB
INR PPP: 2 (ref 0.91–1.09)
PROTHROMBIN TIME: 24.6 SECONDS (ref 10–13.8)

## 2021-03-22 PROCEDURE — 85610 PROTHROMBIN TIME: CPT

## 2021-03-22 PROCEDURE — G0463 HOSPITAL OUTPT CLINIC VISIT: HCPCS

## 2021-03-22 PROCEDURE — 36416 COLLJ CAPILLARY BLOOD SPEC: CPT

## 2021-03-22 NOTE — PROGRESS NOTES
Anticoagulation Clinic Progress Note  Indication: atrial fibrillation  Referring Provider: Sravani (8/24/20)  Initial Warfarin Start Date: 2008  Goal INR: 2 - 3  Current Drug Interactions: fluoxetine, glimepiride, melatonin (PRN), mirtazepine  CHADS-VASc: 3 (age, HTN, DM)    EtOH: none  GLV: Salad (Spinach salad 1x weekly or garden salad) and serving of broccoli once a week 1/27/2021  OTC Pain Relief: Uses APAP prn (~1x/week)    Anticoagulation Clinic INR History:  Date 8/2 8/23 9/20 10/11 11/7 11/17 11/28 12/13 12/27   Total Weekly Dose 17.5mg 17.5 mg 17.5 mg 17.5mg 10mg 17.5 mg 17.5 mg 20mg 22.5 mg   INR 2.5 2.3 2.3 2.0 1.2 1.7 1.5 1.7 2.6   Notes     Pre- lumbar inj         Date 1/10/18 1/31 2/28 3/28 4/27 5/30 6/27 7/11 7/23 8/1 8/6 8/13   Total Weekly Dose 22.5 mg 22.5 mg 22.5 mg 22.5 mg 22.5 mg 22.5 mg 22.5 mg 22.5 mg 22.5 mg 20mg 12.5 mg 17.5 mg   INR 2.6 2.5 2.1 2.4 2.4 2.6 3.5 3.4 3.6 5.3, 4.9 1.9 2.7   Notes        Keflex         Date 8/20 9/4 10/2 11/6 11/15 11/29 12/31 1/7/19 1/17 1/29 2/12   Total Weekly Dose 17.5  mg 17.5 mg 17.5 mg 10mg 20mg 17.5mg 17.5mg 18.75 mg 17.5 mg 18.75 mg 20mg   INR 2.3 2.3 2.2 1.3 2.3 2.2 1.7 1.9 1.9 1.9 2.0   Notes    pre- epidural   missed dose?         Date 2/27 3/27 4/10 4/23 5/7 5/28 6/25 7/30 8/28 9/25 10/23   Total Weekly Dose 20mg 20mg 21.25 mg 21.25 mg 21.25 mg 21.25mg 21.25mg 21.25mg 21.25mg 21.25mg 21.25mg   INR 2.2 1.8 2.5 1.8 2.4 2.4 2.5 2.7 2.6 3.0 2.9   Notes sick   post- scope            Date  11/6 11/13 12/4 1/3 1/15 1/20 1/31 2/10 2/28 4/14 5/26   Total Weekly Dose 21.25 mg 18.75 mg 21.25 mg 21.25 mg 21.25 mg 20 mg 21.25 21.25 20mg 21.25mg 21.25mg   INR 4.2 3.2 2.7 2.2 3.1 3.1 2.9 3.5 2.4 2.3 2.5   Notes apap Hold x1, less GLV Inc in GLV  doxy doxy  1x dose cephalexin 1x dose dec; keflex       Date  6/24 7/22 8/24 9/10 10/8 11/5 11/18 12/2 12/30 1/26 2/22 3/22    Total Weekly Dose 21.25mg 21.25 21.25mg 21.25mg 21.25mg 21.25mg 20mg 20mg 20mg 20mg  20mg 20  mg    INR 2.4 2.4 3.0 2.8 3.0 3.1 2.5 2.5 2.5 2.6 2.9  2    Notes      Inc. GLV Dose dec    desvenlafaxine broccoli        Clinic Interview:  Verbal Release Authorization signed on 6/27/18 -- may speak with Sussy (wife), Nikolai (son)  Tablet Strength: pt has 2.5mg tablets  Phone: [phone] (Home), [phone]   Fax: [phone] (Attn: Tasha) [facility]    Patient Findings  Positives:  Change in medications, Change in diet/appetite   Negatives:  Signs/symptoms of thrombosis, Signs/symptoms of bleeding, Laboratory test error suspected, Change in health, Change in alcohol use, Change in activity, Upcoming invasive procedure, Emergency department visit, Upcoming dental procedure, Missed doses, Extra doses, Hospital admission, Bruising, Other complaints   Comments:  He may have had an extra serving of broccoli.  He did not have any spinach salad last week.   He took acetaminophen for pain in his neck and shoulder.   He has completed his COVID19 vaccination.   Otherwise, above findings negative     Plan:   1. INR is therapeutic today at 2 (range 2 to 3) (LLN).  Instructed Mr. Wolf and his spouse to continue warfarin 2.5 mg oral daily except warfarin 3.75 mg TuesSat until re-check.   He will resume usual intake of GLV  2. RTC in 4 weeks, 4/19  3. Verbal and written information was provided to Mr. Wolf and Mrs. Wolf in clinic. Mr. Wolf voiced understanding with teach back and has no further questions at this time.     Sariah Almodovar, PharmD  03/22/21   11:37 EDT

## 2021-03-26 ENCOUNTER — TELEPHONE (OUTPATIENT)
Dept: INTERNAL MEDICINE | Facility: CLINIC | Age: 76
End: 2021-03-26

## 2021-03-26 NOTE — TELEPHONE ENCOUNTER
PATIENT STATED HE IS SCHEDULED TO SEE PHYSICAL THERAPY 4/5 @ 930A.      PATIENT STATED HE IS WONDERING IF HE SHOULD HAVE AN XRAY DONE.     PATIENT STATED HAVING SEVERE RIGHT SHOULDER PAIN, NO BALANCE, FEET SWEATING; FEELS LIKE HIS FEET ARE SLIPPING AROUND.  PATIENT STATED DIFFICULTY USING RIGHT ARM DUE TO THE SEVERITY OF PAIN IN HIS SHOULDER.    PATIENT THINKING MIGHT BE ARTHRITIS PAIN.          PLEASE ADVISE:  [phone]

## 2021-03-27 DIAGNOSIS — M25.511 ACUTE PAIN OF RIGHT SHOULDER: Primary | ICD-10-CM

## 2021-03-29 ENCOUNTER — HOSPITAL ENCOUNTER (OUTPATIENT)
Dept: GENERAL RADIOLOGY | Facility: HOSPITAL | Age: 76
Discharge: HOME | End: 2021-03-29
Admitting: INTERNAL MEDICINE

## 2021-03-29 DIAGNOSIS — M25.511 ACUTE PAIN OF RIGHT SHOULDER: ICD-10-CM

## 2021-03-29 PROCEDURE — 73030 X-RAY EXAM OF SHOULDER: CPT

## 2021-03-30 DIAGNOSIS — M25.511 ACUTE PAIN OF RIGHT SHOULDER: Primary | ICD-10-CM

## 2021-04-08 RX ORDER — LISINOPRIL 40 MG/1
TABLET ORAL
Qty: 90 TABLET | Refills: 2 | Status: SHIPPED | OUTPATIENT
Start: 2021-04-08 | End: 2022-01-03

## 2021-04-19 ENCOUNTER — ANTICOAGULATION VISIT (OUTPATIENT)
Dept: PHARMACY | Facility: HOSPITAL | Age: 76
End: 2021-04-19

## 2021-04-19 DIAGNOSIS — I48.20 CHRONIC ATRIAL FIBRILLATION (HCC): Primary | ICD-10-CM

## 2021-04-19 LAB
INR PPP: 2.2
INR PPP: 2.2 (ref 0.91–1.09)
PROTHROMBIN TIME: 26.3 SECONDS (ref 10–13.8)

## 2021-04-19 PROCEDURE — 85610 PROTHROMBIN TIME: CPT

## 2021-04-19 PROCEDURE — G0463 HOSPITAL OUTPT CLINIC VISIT: HCPCS

## 2021-04-19 PROCEDURE — 36416 COLLJ CAPILLARY BLOOD SPEC: CPT

## 2021-04-19 NOTE — PROGRESS NOTES
Anticoagulation Clinic Progress Note  Indication: atrial fibrillation  Referring Provider: Sravani (8/24/20)  Initial Warfarin Start Date: 2008  Goal INR: 2 - 3  Current Drug Interactions: fluoxetine, glimepiride, melatonin (PRN), mirtazepine  CHADS-VASc: 3 (age, HTN, DM)    EtOH: none  GLV: Salad (Spinach salad 1x weekly or garden salad) and serving of broccoli once a week 1/27/2021  OTC Pain Relief: Uses APAP prn (~1x/week)    Anticoagulation Clinic INR History:  Date 8/2 8/23 9/20 10/11 11/7 11/17 11/28 12/13 12/27   Total Weekly Dose 17.5mg 17.5 mg 17.5 mg 17.5mg 10mg 17.5 mg 17.5 mg 20mg 22.5 mg   INR 2.5 2.3 2.3 2.0 1.2 1.7 1.5 1.7 2.6   Notes     Pre- lumbar inj         Date 1/10/18 1/31 2/28 3/28 4/27 5/30 6/27 7/11 7/23 8/1 8/6 8/13   Total Weekly Dose 22.5 mg 22.5 mg 22.5 mg 22.5 mg 22.5 mg 22.5 mg 22.5 mg 22.5 mg 22.5 mg 20mg 12.5 mg 17.5 mg   INR 2.6 2.5 2.1 2.4 2.4 2.6 3.5 3.4 3.6 5.3, 4.9 1.9 2.7   Notes        Keflex         Date 8/20 9/4 10/2 11/6 11/15 11/29 12/31 1/7/19 1/17 1/29 2/12   Total Weekly Dose 17.5  mg 17.5 mg 17.5 mg 10mg 20mg 17.5mg 17.5mg 18.75 mg 17.5 mg 18.75 mg 20mg   INR 2.3 2.3 2.2 1.3 2.3 2.2 1.7 1.9 1.9 1.9 2.0   Notes    pre- epidural   missed dose?         Date 2/27 3/27 4/10 4/23 5/7 5/28 6/25 7/30 8/28 9/25 10/23   Total Weekly Dose 20mg 20mg 21.25 mg 21.25 mg 21.25 mg 21.25mg 21.25mg 21.25mg 21.25mg 21.25mg 21.25mg   INR 2.2 1.8 2.5 1.8 2.4 2.4 2.5 2.7 2.6 3.0 2.9   Notes sick   post- scope            Date  11/6 11/13 12/4 1/3 1/15 1/20 1/31 2/10 2/28 4/14 5/26   Total Weekly Dose 21.25 mg 18.75 mg 21.25 mg 21.25 mg 21.25 mg 20 mg 21.25 21.25 20mg 21.25mg 21.25mg   INR 4.2 3.2 2.7 2.2 3.1 3.1 2.9 3.5 2.4 2.3 2.5   Notes apap Hold x1, less GLV Inc in GLV  doxy doxy  1x dose cephalexin 1x dose dec; keflex       Date  6/24 7/22 8/24 9/10 10/8 11/5 11/18 12/2 12/30 1/26 2/22 3/22 4/19   Total Weekly Dose 21.25mg 21.25 21.25mg 21.25mg 21.25mg 21.25mg 20mg 20mg 20mg 20mg   20mg 20 mg 20mg   INR 2.4 2.4 3.0 2.8 3.0 3.1 2.5 2.5 2.5 2.6 2.9  2 2.2   Notes      Inc. GLV Dose dec    desvenlafaxine [facility]        Clinic Interview:  Verbal Release Authorization signed on 6/27/18 -- may speak with Sussy (wife), Nikolai (son)  Tablet Strength: pt has 2.5mg tablets  Phone: [phone] (Home), [phone]   Fax: [phone] (Attn: Tasha) [facility] [place]    Patient Findings  Negatives:  Signs/symptoms of thrombosis, Signs/symptoms of bleeding, Laboratory test error suspected, Change in health, Change in alcohol use, Change in activity, Upcoming invasive procedure, Emergency department visit, Upcoming dental procedure, Missed doses, Extra doses, Change in medications, Change in diet/appetite, Hospital admission, Bruising, Other complaints   Comments:  Above findings negative     Plan:   1. INR is therapeutic today at 2.2 (range 2 to 3) (LLN).  Instructed Mr. Wolf and his spouse to continue warfarin 2.5 mg oral daily except warfarin 3.75 mg TuesSat until re-check.   He will resume usual intake of GLV  2. RTC in 4 weeks, 5/17/2021.  3. Verbal and written information was provided to Mr. Wolf and Mrs. Wolf in clinic. Mr. Wolf voiced understanding with teach back and has no further questions at this time.     Ama Mccloud, PharmD  04/19/21   11:28 EDT

## 2021-04-26 ENCOUNTER — OFFICE VISIT (OUTPATIENT)
Dept: ORTHOPEDIC SURGERY | Facility: CLINIC | Age: 76
End: 2021-04-26

## 2021-04-26 VITALS
DIASTOLIC BLOOD PRESSURE: 60 MMHG | HEIGHT: 73 IN | HEART RATE: 80 BPM | WEIGHT: 243.2 LBS | SYSTOLIC BLOOD PRESSURE: 142 MMHG | BODY MASS INDEX: 32.23 KG/M2

## 2021-04-26 DIAGNOSIS — M25.511 CHRONIC RIGHT SHOULDER PAIN: Primary | ICD-10-CM

## 2021-04-26 DIAGNOSIS — G89.29 CHRONIC RIGHT SHOULDER PAIN: Primary | ICD-10-CM

## 2021-04-26 PROCEDURE — 99214 OFFICE O/P EST MOD 30 MIN: CPT | Performed by: ORTHOPAEDIC SURGERY

## 2021-04-26 NOTE — PROGRESS NOTES
[facility] Note    Subjective     Chief Complaint   Patient presents with   • Right Shoulder - Pain        HPI    Tony Wolf is a 75 y.o. male who presents with new problem of right shoulder pain. The patient reports his right shoulder pain onset approximately 1 month ago. However, the patient's wife accompanies him for the visit today and notes a recent incident where the patient was holding on to the towel bar in their bathroom and pulled it out of place. She noticed that the patient began complaining of pain after this incident.    During today's visit, the patient localizes his pain to the posterior aspect of his right shoulder. He uses a walker for balance.    I have reviewed the following portions of the patient's history and agree with: History of Present Illness and Review of Systems    Patient Active Problem List   Diagnosis   • GERD (gastroesophageal reflux disease)   • Essential hypertension   • Obesity   • Obstructive sleep apnea   • Controlled type 2 diabetes mellitus without complication, without long-term current use of insulin (CMS/HCC)   • Generalized anxiety disorder   • Chronic atrial fibrillation (CMS/HCC)   • PVC's (premature ventricular contractions)   • Hyperlipidemia LDL goal <100   • Left ventricular hypertrophy   • Atypical atrial flutter (CMS/HCC)   • Arthritis of knee, right   • Status post right knee replacement   • Thrombocytopenia (CMS/HCC)   • Dyspnea on exertion   • Diabetic foot ulcer (CMS/HCC)   • Type 2 diabetes mellitus without complication, without long-term current use of insulin (CMS/HCC)     Past Medical History:   Diagnosis Date   • Acute bronchitis    • Anxiety    • Arthritis     right knee   • Atrial fibrillation (CMS/HCC)    • Back pain    • Cancer (CMS/HCC)     skin cancer removed from face    • Carpal tunnel syndrome    • Depression    • Derangement, knee internal    • Diabetes mellitus (CMS/HCC)     DX'D TYPE II APPROX 15 YEARS AGO, DOES  NOT CHECK BLOOD SUGARS AT HOME, STARTED INSULIN IN MARCH AFTER SURGERY CANCELLED FOR ELEVATED A1C   • Diabetic foot ulcer (CMS/HCC)    • Drug dependence (CMS/HCC)    • GERD (gastroesophageal reflux disease)    • Glaucoma    • Heart disease    • Hyperlipidemia 11/14/2016   • Hypertension    • Hypertensive disorder    • Hypokalemia, replaced 6/2/2017   • IBS (irritable bowel syndrome)    • Knee pain, right    • Left ventricular hypertrophy 11/14/2016   • Leukocytosis, mild, likely reactive 5/31/2017   • Mixed hyperlipidemia    • Neuropathy, lumbosacral (radicular)    • Obesity     body mass index 30+-obesity   • Osteoporosis    • Postoperative urinary retention 6/1/2017   • Premature ventricular contractions    • PVC's (premature ventricular contractions) 11/14/2016   • Rash 3/18/2019   • Scoliosis    • Screening for prostate cancer 7/28/2016   • Sinusitis    • Sleep apnea    • SOB (shortness of breath)    • Spinal stenosis, lumbar region with neurogenic claudication    • Trigger middle finger of left hand    • Trigger middle finger of right hand    • Type 2 diabetes mellitus (CMS/HCC)    • Wears glasses     readers      Past Surgical History:   Procedure Laterality Date   • BACK SURGERY      injections for back    • CARPAL TUNNEL RELEASE     • CHOLECYSTECTOMY     • COLONOSCOPY      LESS THAN 5 YEARS    • ENDOSCOPY      x3   • FRACTURE SURGERY      right heel   • HERNIA REPAIR      both side inguinal    • LUMBAR EPIDURAL INJECTION     • [place] TOTAL KNEE ARTHROPLASTY Right 5/30/2017    Procedure: RIGHT TOTAL KNEE ARTHROPLASTY;  Surgeon: Simon Interiano MD;  Location: [facility];  Service: Orthopedics   • TOTAL KNEE ARTHROPLASTY REVISION      left   • WRIST SURGERY      carpal tunnel bilat       Family History   Problem Relation Age of Onset   • Cancer Other    • Anemia Other    • Heart attack Other    • Cancer Mother    • Heart disease Mother    • Hypertension Mother    • Heart attack Mother    • Diabetes Mother    • Deep  vein thrombosis Father    • Heart disease Father    • Hypertension Father    • Cancer Other      Social History     Socioeconomic History   • Marital status:      Spouse name: Not on file   • Number of children: Not on file   • Years of education: Not on file   • Highest education level: Not on file   Tobacco Use   • Smoking status: Never Smoker   • Smokeless tobacco: Never Used   Substance and Sexual Activity   • Alcohol use: No   • Drug use: No   • Sexual activity: Defer      Current Outpatient Medications on File Prior to Visit   Medication Sig Dispense Refill   • Accu-Chek Softclix Lancets lancets USE TO TEST BLOOD SUGAR THREE TIMES A  each 2   • acetaminophen (TYLENOL) 500 MG tablet Take 1,000 mg by mouth Every 6 (Six) Hours As Needed for Mild Pain (1-3).     • amLODIPine (NORVASC) 10 MG tablet TAKE ONE TABLET BY MOUTH DAILY AS DIRECTED 30 tablet 10   • bimatoprost (Lumigan) 0.01 % ophthalmic drops Lumigan 0.01 % eye drops     • calcium carbonate (TUMS) 500 MG chewable tablet Chew 2 tablets Daily As Needed for Indigestion or Heartburn.     • Cholecalciferol (VITAMIN D-3) 1000 UNITS capsule Take 1,000 Units by mouth Daily.     • glimepiride (AMARYL) 2 MG tablet TAKE ONE TABLET BY MOUTH DAILY 90 tablet 2   • glucose blood (Accu-Chek Kandis Plus) test strip USE ONE STRIP TO TEST THREE TIMES A  each 11   • latanoprost (XALATAN) 0.005 % ophthalmic solution Administer 1 drop to both eyes Every Night.     • lisinopril (PRINIVIL,ZESTRIL) 40 MG tablet TAKE ONE TABLET BY MOUTH DAILY 90 tablet 2   • Loratadine (CLARITIN) 10 MG capsule Take 1 capsule by mouth Daily.     • Melatonin 2.5 MG chewable tablet Chew 1 tablet At Night As Needed.     • metoprolol succinate XL (TOPROL-XL) 100 MG 24 hr tablet TAKE ONE TABLET BY MOUTH TWICE A  tablet 3   • mirtazapine (REMERON) 45 MG tablet Take 45 mg by mouth Every Night.     • pravastatin (PRAVACHOL) 20 MG tablet TAKE ONE TABLET BY MOUTH DAILY 90 tablet 2    • venlafaxine XR (EFFEXOR-XR) 75 MG 24 hr capsule Take 75 mg by mouth Daily.     • warfarin (COUMADIN) 2.5 MG tablet Take 1 to 1.5 tablets by mouth daily or as directed by the anticoagulation clinic. 120 tablet 1   • desvenlafaxine (PRISTIQ) 50 MG 24 hr tablet Take 50 mg by mouth Daily.       No current facility-administered medications on file prior to visit.      Allergies   Allergen Reactions   • Aspirin Anaphylaxis     Other reaction(s): Hypotension        Review of Systems   Constitutional: Negative.  Negative for activity change, appetite change, chills, diaphoresis, fatigue, fever and unexpected weight change.   HENT: Negative.  Negative for congestion, dental problem, drooling, ear discharge, ear pain, facial swelling, hearing loss, mouth sores, nosebleeds, postnasal drip, rhinorrhea, sinus pressure, sneezing, sore throat, tinnitus, trouble swallowing and voice change.    Eyes: Negative.  Negative for photophobia, pain, discharge, redness, itching and visual disturbance.   Respiratory: Negative.  Negative for apnea, cough, choking, chest tightness, shortness of breath, wheezing and stridor.    Cardiovascular: Negative.  Negative for chest pain, palpitations and leg swelling.   Gastrointestinal: Negative.  Negative for abdominal distention, abdominal pain, anal bleeding, blood in stool, constipation, diarrhea, nausea, rectal pain and vomiting.   Endocrine: Negative.  Negative for cold intolerance, heat intolerance, polydipsia, polyphagia and polyuria.   Genitourinary: Negative.  Negative for decreased urine volume, difficulty urinating, dysuria, enuresis, flank pain, frequency, genital sores, hematuria and urgency.   Musculoskeletal: Positive for arthralgias. Negative for back pain, gait problem, joint swelling, myalgias, neck pain and neck stiffness.   Skin: Negative.  Negative for color change, pallor, rash and wound.   Allergic/Immunologic: Negative.  Negative for environmental allergies, food allergies  "and immunocompromised state.   Neurological: Negative.  Negative for dizziness, tremors, seizures, syncope, facial asymmetry, speech difficulty, weakness, light-headedness, numbness and headaches.   Hematological: Negative.  Negative for adenopathy. Does not bruise/bleed easily.   Psychiatric/Behavioral: Negative.  Negative for agitation, behavioral problems, confusion, decreased concentration, dysphoric mood, hallucinations, self-injury, sleep disturbance and suicidal ideas. The patient is not nervous/anxious and is not hyperactive.         Objective      Physical Exam  /60   Pulse 80   Ht 185.4 cm (72.99\")   Wt 110 kg (243 lb 3.2 oz)   BMI 32.09 kg/m²     Body mass index is 32.09 kg/m².    General:   Mental Status:  Alert   Appearance: Cooperative, in no acute distress   Build and Nutrition: Overweight male   Orientation: Alert and oriented to person, place and time   Posture: Normal   Gait: With a walker, unsteady (balance issues)      Integument       • Right shoulder: No skin lesions, rash, or ecchymosis.    Neurologic  • Sensation:       • Right hand: Intact to light touch in the digits     Motor       • Right upper extremity: 5/5 deltoid, biceps, triceps, wrist flexors, wrist extensors, and interossei     Vascular       • Right upper extremity: 2+ radial pulse. Prompt capillary refill.    Upper Extremities  • Right Shoulder:        • Tenderness: None       • Swelling: None        • Crepitus: None       • Atrophy: None       • Range of motion:             • External rotation: 45°             • Forward flexion: 140°              • Abduction: 100°       • Instability: None       • Deformities: None       •  Functional Testing:             • Drop Arm: Negative             • Lift off: Negative.    Imaging/Studies      Imaging Results (Last 24 Hours)     ** No results found for the last 24 hours. **      EXAMINATION: XR SHOULDER 2+ VW, RIGHT-03/29/2021:      INDICATION: Right shoulder pain; [id number] " in right shoulder.      COMPARISON: NONE.     FINDINGS: Internal rotation, external rotation and scapular-Y views of  the right shoulder reveal moderate-to-severe DJD of the right AC joint  along with degenerative changes at the greater tuberosity humerus level,  however, no acute fracture is visualized. The visualized portions of the  right hemithorax are grossly clear.     IMPRESSION:  Advanced degenerative changes of the AC joint along with  degenerative changes of the greater tuberosity humerus level, however,  no acute fracture of the right shoulder.     D:  03/29/2021  E:  03/29/2021     This report was finalized on 3/29/2021 4:40 PM by Dr. Leroy Milner.     I personally reviewed the radiographs and agree with the results.    Assessment and Plan     Diagnoses and all orders for this visit:    1. Chronic right shoulder pain (Primary)  -     MRI Shoulder Right Without Contrast; Future        1. Chronic right shoulder pain        Plan  The patient's symptoms have been ongoing for at least 1 month with a report of trauma, so I recommend obtaining a right shoulder MRI for evaluation of the rotator cuff.        Return for After Imaging Study.      Scribed for Simon Interiano MD by Beatris Suarez.  04/26/21   12:22 EDT    I have personally performed the services described in this document as scribed by the above individual, and it is both accurate and complete.  Siomn Interiano MD  4/26/2021  12:27 EDT

## 2021-04-30 ENCOUNTER — OFFICE VISIT (OUTPATIENT)
Dept: ENDOCRINOLOGY | Facility: CLINIC | Age: 76
End: 2021-04-30

## 2021-04-30 VITALS
TEMPERATURE: 96.9 F | DIASTOLIC BLOOD PRESSURE: 78 MMHG | WEIGHT: 246.8 LBS | OXYGEN SATURATION: 96 % | HEART RATE: 58 BPM | BODY MASS INDEX: 33.43 KG/M2 | SYSTOLIC BLOOD PRESSURE: 120 MMHG | HEIGHT: 72 IN

## 2021-04-30 DIAGNOSIS — I10 ESSENTIAL HYPERTENSION: ICD-10-CM

## 2021-04-30 DIAGNOSIS — E78.5 HYPERLIPIDEMIA LDL GOAL <100: ICD-10-CM

## 2021-04-30 DIAGNOSIS — E11.9 TYPE 2 DIABETES MELLITUS WITHOUT COMPLICATION, WITHOUT LONG-TERM CURRENT USE OF INSULIN (HCC): Primary | ICD-10-CM

## 2021-04-30 LAB
EXPIRATION DATE: NORMAL
HBA1C MFR BLD: 7 %
Lab: NORMAL

## 2021-04-30 PROCEDURE — 99214 OFFICE O/P EST MOD 30 MIN: CPT | Performed by: INTERNAL MEDICINE

## 2021-04-30 PROCEDURE — 83036 HEMOGLOBIN GLYCOSYLATED A1C: CPT | Performed by: INTERNAL MEDICINE

## 2021-04-30 RX ORDER — KETOCONAZOLE 20 MG/G
CREAM TOPICAL AS NEEDED
COMMUNITY
Start: 2021-04-28 | End: 2022-03-09

## 2021-04-30 NOTE — ASSESSMENT & PLAN NOTE
Diabetes is worsening. But still acceptable.  Continue current treatment regimen.  Diabetes will be reassessed in 3 months.

## 2021-04-30 NOTE — PROGRESS NOTES
"     Office Note      Date: 2021  Patient Name: Tony Wolf  MRN: [id number]  : 1945    Chief Complaint   Patient presents with   • Follow-up   • Diabetes     type2       History of Present Illness:   Tony Wolf is a 75 y.o. male who presents for Diabetes type 2. Diagnosed in: . Treated in past with oral agents. Current treatments: glimepiride. Number of insulin shots per day: none. Checks blood sugar 1 times a day. Has low blood sugar: no. Aspirin use: No - on warfarin. Statin use: Yes. ACE-I/ARB use: Yes. Changes in health since last visit: trouble with balance. Last eye exam .    Subjective      Diabetic Complications:  Eyes: No  Kidneys: No  Feet: No  Heart: No    Diet and Exercise:   Meals per day: 3  Minutes of exercise per week: 0 mins.    Review of Systems:   Review of Systems   Constitutional: Negative.    Cardiovascular: Positive for palpitations.   Gastrointestinal: Negative.    Endocrine: Negative.        The following portions of the patient's history were reviewed and updated as appropriate: allergies, current medications, past family history, past medical history, past social history, past surgical history and problem list.    Objective       Visit Vitals  /78   Pulse 58   Temp 96.9 °F (36.1 °C) (Infrared)   Ht 182.9 cm (72\")   Wt 112 kg (246 lb 12.8 oz)   SpO2 96%   BMI 33.47 kg/m²       Physical Exam:  Physical Exam  Constitutional:       Appearance: Normal appearance.   Neurological:      Mental Status: He is alert.         Labs:    HbA1c  Lab Results   Component Value Date    HGBA1C 7.0 2021       CMP  Lab Results   Component Value Date    GLUCOSE 116 (H) 2021    BUN 13 2021    CREATININE 0.97 2021    EGFRIFNONA 76 2021    BCR 13.4 2021    K 4.3 2021    CO2 29.9 (H) 2021    CALCIUM 9.1 2021    AST 42 (H) 2021    ALT 34 2021        Lipid Panel  Lab Results   Component Value Date    CHLPL 172 " 04/19/2016    HDL 29 (L) 03/16/2021    LDL 91 03/16/2021    TRIG 237 (H) 03/16/2021        TSH  Lab Results   Component Value Date    TSH 2.170 09/11/2020    FREET4 0.98 06/09/2015        Hemoglobin A1C  Lab Results   Component Value Date    HGBA1C 7.0 04/30/2021        Microalbumin/Creatinine  No results found for: MALBCRERATIO, CREATINIURIN, MICROALBUR        Assessment / Plan      Assessment & Plan:  Diagnoses and all orders for this visit:    1. Type 2 diabetes mellitus without complication, without long-term current use of insulin (CMS/[facility]) (Primary)  Assessment & Plan:  Diabetes is worsening. But still acceptable.  Continue current treatment regimen.  Diabetes will be reassessed in 3 months.    Orders:  -     POC Glycosylated Hemoglobin (Hb A1C)    2. Essential hypertension  Assessment & Plan:  Hypertension is unchanged.  Continue current treatment regimen.  Blood pressure will be reassessed at the next regular appointment.      3. Hyperlipidemia LDL goal <100  Assessment & Plan:  Continue statin.        Return in about 3 months (around 7/30/2021) for Recheck with A1c.    Brown Gallo MD   04/30/2021

## 2021-05-10 ENCOUNTER — APPOINTMENT (OUTPATIENT)
Dept: MRI IMAGING | Facility: HOSPITAL | Age: 76
End: 2021-05-10

## 2021-05-12 ENCOUNTER — HOSPITAL ENCOUNTER (OUTPATIENT)
Dept: MRI IMAGING | Facility: HOSPITAL | Age: 76
Discharge: HOME | End: 2021-05-12
Admitting: ORTHOPAEDIC SURGERY

## 2021-05-12 DIAGNOSIS — G89.29 CHRONIC RIGHT SHOULDER PAIN: ICD-10-CM

## 2021-05-12 DIAGNOSIS — M25.511 CHRONIC RIGHT SHOULDER PAIN: ICD-10-CM

## 2021-05-12 PROCEDURE — 73221 MRI JOINT UPR EXTREM W/O DYE: CPT

## 2021-05-17 ENCOUNTER — APPOINTMENT (OUTPATIENT)
Dept: PHARMACY | Facility: HOSPITAL | Age: 76
End: 2021-05-17

## 2021-05-19 ENCOUNTER — ANTICOAGULATION VISIT (OUTPATIENT)
Dept: PHARMACY | Facility: HOSPITAL | Age: 76
End: 2021-05-19

## 2021-05-19 ENCOUNTER — OFFICE VISIT (OUTPATIENT)
Dept: ORTHOPEDIC SURGERY | Facility: CLINIC | Age: 76
End: 2021-05-19

## 2021-05-19 VITALS
SYSTOLIC BLOOD PRESSURE: 162 MMHG | HEART RATE: 75 BPM | WEIGHT: 245 LBS | BODY MASS INDEX: 33.18 KG/M2 | HEIGHT: 72 IN | DIASTOLIC BLOOD PRESSURE: 95 MMHG

## 2021-05-19 DIAGNOSIS — I48.20 CHRONIC ATRIAL FIBRILLATION (HCC): Primary | ICD-10-CM

## 2021-05-19 DIAGNOSIS — S46.011A TRAUMATIC COMPLETE TEAR OF RIGHT ROTATOR CUFF, INITIAL ENCOUNTER: Primary | ICD-10-CM

## 2021-05-19 LAB
INR PPP: 2.5 (ref 0.91–1.09)
PROTHROMBIN TIME: 30.2 SECONDS (ref 10–13.8)

## 2021-05-19 PROCEDURE — 36416 COLLJ CAPILLARY BLOOD SPEC: CPT

## 2021-05-19 PROCEDURE — 99213 OFFICE O/P EST LOW 20 MIN: CPT | Performed by: ORTHOPAEDIC SURGERY

## 2021-05-19 PROCEDURE — 85610 PROTHROMBIN TIME: CPT

## 2021-05-19 PROCEDURE — G0463 HOSPITAL OUTPT CLINIC VISIT: HCPCS

## 2021-05-19 NOTE — PROGRESS NOTES
[facility] Note    Subjective     Chief Complaint   Patient presents with   • Follow-up     Right shoulder MRI follow up. MRI done on 5-12-21        HPI    Tony Wolf is a 75 y.o. male who follows up for his right shoulder MRI done on 05/12/2021. He was last seen on 04/26/2021. He is accompanied by his wife and is on a walker for balance issues today.     He states his shoulder is feeling worse than the last visit, with pain radiating to his right rib area. His shoulder pain began around 1 month ago with an injury and prior to that, he was moving his shoulder without issue.     I have reviewed the following portions of the patient's history and agree with: History of Present Illness and Review of Systems    Patient Active Problem List   Diagnosis   • GERD (gastroesophageal reflux disease)   • Essential hypertension   • Obesity   • Obstructive sleep apnea   • Controlled type 2 diabetes mellitus without complication, without long-term current use of insulin (CMS/HCC)   • Generalized anxiety disorder   • Chronic atrial fibrillation (CMS/HCC)   • PVC's (premature ventricular contractions)   • Hyperlipidemia LDL goal <100   • Left ventricular hypertrophy   • Atypical atrial flutter (CMS/HCC)   • Arthritis of knee, right   • Status post right knee replacement   • Thrombocytopenia (CMS/HCC)   • Dyspnea on exertion   • Diabetic foot ulcer (CMS/HCC)   • Type 2 diabetes mellitus without complication, without long-term current use of insulin (CMS/HCC)     Past Medical History:   Diagnosis Date   • Acute bronchitis    • Anxiety    • Arthritis     right knee   • Atrial fibrillation (CMS/HCC)    • Back pain    • Cancer (CMS/HCC)     skin cancer removed from face    • Carpal tunnel syndrome    • Depression    • Derangement, knee internal    • Diabetes mellitus (CMS/HCC)     DX'D TYPE II APPROX 15 YEARS AGO, DOES NOT CHECK BLOOD SUGARS AT HOME, STARTED INSULIN IN MARCH AFTER SURGERY CANCELLED FOR  ELEVATED A1C   • Diabetic foot ulcer (CMS/HCC)    • Drug dependence (CMS/HCC)    • GERD (gastroesophageal reflux disease)    • Glaucoma    • Heart disease    • Hyperlipidemia 11/14/2016   • Hypertension    • Hypertensive disorder    • Hypokalemia, replaced 6/2/2017   • IBS (irritable bowel syndrome)    • Knee pain, right    • Left ventricular hypertrophy 11/14/2016   • Leukocytosis, mild, likely reactive 5/31/2017   • Mixed hyperlipidemia    • Neuropathy, lumbosacral (radicular)    • Obesity     body mass index 30+-obesity   • Osteoporosis    • Postoperative urinary retention 6/1/2017   • Premature ventricular contractions    • PVC's (premature ventricular contractions) 11/14/2016   • Rash 3/18/2019   • Scoliosis    • Screening for prostate cancer 7/28/2016   • Sinusitis    • Sleep apnea    • SOB (shortness of breath)    • Spinal stenosis, lumbar region with neurogenic claudication    • Trigger middle finger of left hand    • Trigger middle finger of right hand    • Type 2 diabetes mellitus (CMS/HCC)    • Wears glasses     readers      Past Surgical History:   Procedure Laterality Date   • BACK SURGERY      injections for back    • CARPAL TUNNEL RELEASE     • CHOLECYSTECTOMY     • COLONOSCOPY      LESS THAN 5 YEARS    • ENDOSCOPY      x3   • FRACTURE SURGERY      right heel   • HERNIA REPAIR      both side inguinal    • LUMBAR EPIDURAL INJECTION     • [place] TOTAL KNEE ARTHROPLASTY Right 5/30/2017    Procedure: RIGHT TOTAL KNEE ARTHROPLASTY;  Surgeon: Simon Interiano MD;  Location: [facility];  Service: Orthopedics   • TOTAL KNEE ARTHROPLASTY REVISION      left   • WRIST SURGERY      carpal tunnel bilat       Family History   Problem Relation Age of Onset   • Cancer Other    • Anemia Other    • Heart attack Other    • Cancer Mother    • Heart disease Mother    • Hypertension Mother    • Heart attack Mother    • Diabetes Mother    • Deep vein thrombosis Father    • Heart disease Father    • Hypertension Father    • Cancer  Other      Social History     Socioeconomic History   • Marital status:      Spouse name: Not on file   • Number of children: Not on file   • Years of education: Not on file   • Highest education level: Not on file   Tobacco Use   • Smoking status: Never Smoker   • Smokeless tobacco: Never Used   Vaping Use   • Vaping Use: Never used   Substance and Sexual Activity   • Alcohol use: No   • Drug use: No   • Sexual activity: Defer      Current Outpatient Medications on File Prior to Visit   Medication Sig Dispense Refill   • Accu-Chek Softclix Lancets lancets USE TO TEST BLOOD SUGAR THREE TIMES A  each 2   • acetaminophen (TYLENOL) 500 MG tablet Take 1,000 mg by mouth Every 6 (Six) Hours As Needed for Mild Pain (1-3).     • amLODIPine (NORVASC) 10 MG tablet TAKE ONE TABLET BY MOUTH DAILY AS DIRECTED 30 tablet 10   • bimatoprost (Lumigan) 0.01 % ophthalmic drops Lumigan 0.01 % eye drops     • calcium carbonate (TUMS) 500 MG chewable tablet Chew 2 tablets Daily As Needed for Indigestion or Heartburn.     • Cholecalciferol (VITAMIN D-3) 1000 UNITS capsule Take 1,000 Units by mouth Daily.     • glimepiride (AMARYL) 2 MG tablet TAKE ONE TABLET BY MOUTH DAILY 90 tablet 2   • glucose blood (Accu-Chek Kandis Plus) test strip USE ONE STRIP TO TEST THREE TIMES A  each 11   • ketoconazole (NIZORAL) 2 % cream As Needed.     • latanoprost (XALATAN) 0.005 % ophthalmic solution Administer 1 drop to both eyes Every Night.     • lisinopril (PRINIVIL,ZESTRIL) 40 MG tablet TAKE ONE TABLET BY MOUTH DAILY 90 tablet 2   • Loratadine (CLARITIN) 10 MG capsule Take 1 capsule by mouth Daily.     • Melatonin 2.5 MG chewable tablet Chew 1 tablet At Night As Needed.     • metoprolol succinate XL (TOPROL-XL) 100 MG 24 hr tablet TAKE ONE TABLET BY MOUTH TWICE A  tablet 3   • mirtazapine (REMERON) 45 MG tablet Take 45 mg by mouth Every Night.     • pravastatin (PRAVACHOL) 20 MG tablet TAKE ONE TABLET BY MOUTH DAILY 90  "tablet 2   • venlafaxine XR (EFFEXOR-XR) 75 MG 24 hr capsule Take 75 mg by mouth Daily.     • warfarin (COUMADIN) 2.5 MG tablet Take 1 to 1.5 tablets by mouth daily or as directed by the anticoagulation clinic. 120 tablet 1     No current facility-administered medications on file prior to visit.      Allergies   Allergen Reactions   • Aspirin Anaphylaxis     Other reaction(s): Hypotension        Review of Systems   Constitutional: Negative.    HENT: Negative.    Eyes: Negative.    Respiratory: Negative.    Cardiovascular: Negative.    Gastrointestinal: Negative.    Endocrine: Negative.    Genitourinary: Negative.    Musculoskeletal: Positive for arthralgias.   Skin: Negative.    Allergic/Immunologic: Negative.    Neurological: Negative.    Hematological: Negative.    Psychiatric/Behavioral: Negative.         Objective      Physical Exam  /95   Pulse 75   Ht 182.9 cm (72.01\")   Wt 111 kg (245 lb)   BMI 33.22 kg/m²     Body mass index is 33.22 kg/m².    General:   Mental Status:  Alert   Appearance: Cooperative, in no acute distress   Build and Nutrition: Overweight by BMI male   Orientation: Alert and oriented to person, place and time   Posture: Normal   Gait: With a walker    Upper Extremities  • Right Shoulder:        • Tenderness: None       • Swelling: None        • Crepitus: None       • Atrophy: None       • Range of motion:             • External rotation: 30°             • Forward flexion: 90°        • Instability: None       • Deformities: None       •  Functional Testing:             • Drop Arm: Positive             • Lift off: Negative.    Imaging/Studies  MRI Shoulder Right Without Contrast (05/12/2021 15:00)    I personally reviewed the MRI and agree with the interpretation. He does have a large rotator cuff tear with some underlying degeneration in the shoulder joint.    Assessment and Plan     Diagnoses and all orders for this visit:    1. Traumatic complete tear of right rotator cuff, " initial encounter (Primary)  -     Ambulatory Referral to Physical Therapy Evaluate and treat        1. Traumatic complete tear of right rotator cuff, initial encounter        We discussed his right shoulder MRI which demonstrated a large rotator cuff tear. As such, he can either opt for physical therapy or surgical intervention. We discussed how if he does not proceed with surgical intervention at this time, it is possible that it will be more difficult or impossible to repair later and may cause more arthritis. The patient and his wife voiced understanding, but are not interested in surgical intervention at this time. They would like to proceed with physical therapy for around 1 month and then revisit this discussion at that time. I placed a referral to [facility] today. In regards to his chest wall pain, I advised he follow-up with his primary care provider.     Follow up in 6 weeks.       Scribed for Simon Interiano MD by Zoë To.  05/19/21   11:46 EDT    I have personally performed the services described in this document as scribed by the above individual, and it is both accurate and complete.  Simon Interiano MD  5/19/2021  13:04 EDT

## 2021-05-19 NOTE — PROGRESS NOTES
Anticoagulation Clinic Progress Note  Indication: atrial fibrillation  Referring Provider: Sravani (8/24/20)  Initial Warfarin Start Date: 2008  Goal INR: 2 - 3  Current Drug Interactions: fluoxetine, glimepiride, melatonin (PRN), mirtazepine  CHADS-VASc: 3 (age, HTN, DM)    EtOH: none  GLV: Salad (Spinach salad 1x weekly or garden salad) and serving of broccoli once a week 1/27/2021  OTC Pain Relief: Uses APAP prn (~1x/week)    Anticoagulation Clinic INR History:  Date 8/2 8/23 9/20 10/11 11/7 11/17 11/28 12/13 12/27   Total Weekly Dose 17.5mg 17.5 mg 17.5 mg 17.5mg 10mg 17.5 mg 17.5 mg 20mg 22.5 mg   INR 2.5 2.3 2.3 2.0 1.2 1.7 1.5 1.7 2.6   Notes     Pre- lumbar inj         Date 1/10/18 1/31 2/28 3/28 4/27 5/30 6/27 7/11 7/23 8/1 8/6 8/13   Total Weekly Dose 22.5 mg 22.5 mg 22.5 mg 22.5 mg 22.5 mg 22.5 mg 22.5 mg 22.5 mg 22.5 mg 20mg 12.5 mg 17.5 mg   INR 2.6 2.5 2.1 2.4 2.4 2.6 3.5 3.4 3.6 5.3, 4.9 1.9 2.7   Notes        Keflex         Date 8/20 9/4 10/2 11/6 11/15 11/29 12/31 1/7/19 1/17 1/29 2/12   Total Weekly Dose 17.5  mg 17.5 mg 17.5 mg 10mg 20mg 17.5mg 17.5mg 18.75 mg 17.5 mg 18.75 mg 20mg   INR 2.3 2.3 2.2 1.3 2.3 2.2 1.7 1.9 1.9 1.9 2.0   Notes    pre- epidural   missed dose?         Date 2/27 3/27 4/10 4/23 5/7 5/28 6/25 7/30 8/28 9/25 10/23   Total Weekly Dose 20mg 20mg 21.25 mg 21.25 mg 21.25 mg 21.25mg 21.25mg 21.25mg 21.25mg 21.25mg 21.25mg   INR 2.2 1.8 2.5 1.8 2.4 2.4 2.5 2.7 2.6 3.0 2.9   Notes sick   post- scope            Date  11/6 11/13 12/4 1/3 1/15 1/20 1/31 2/10 2/28 4/14 5/26   Total Weekly Dose 21.25 mg 18.75 mg 21.25 mg 21.25 mg 21.25 mg 20 mg 21.25 21.25 20mg 21.25mg 21.25mg   INR 4.2 3.2 2.7 2.2 3.1 3.1 2.9 3.5 2.4 2.3 2.5   Notes apap Hold x1, less GLV Inc in GLV  doxy doxy  1x dose cephalexin 1x dose dec; keflex       Date  6/24 7/22 8/24 9/10 10/8 11/5 11/18 12/2 12/30 1/26 2/22 3/22 4/19   Total Weekly Dose 21.25mg 21.25 21.25mg 21.25mg 21.25mg 21.25mg 20mg 20mg 20mg 20mg   20mg 20 mg 20mg   INR 2.4 2.4 3.0 2.8 3.0 3.1 2.5 2.5 2.5 2.6 2.9  2 2.2   Notes      Inc. GLV Dose dec    desvenlafaxine broccoli      Date 5/13              Total WeeklyDose 20 mg              INR 2.5              Notes                 Clinic Interview:  Verbal Release Authorization signed on 6/27/18 -- may speak with Sussy (wife), Nikolai (son)  Tablet Strength: pt has 2.5mg tablets  Phone: [phone] (Home), [phone]   Fax: [phone] (Attn: Tasha) [facility]    Patient Findings  Positives:  Change in health   Negatives:  Signs/symptoms of thrombosis, Signs/symptoms of bleeding, Laboratory test error suspected, Change in alcohol use, Change in activity, Upcoming invasive procedure, Emergency department visit, Upcoming dental procedure, Missed doses, Extra doses, Change in medications, Change in diet/appetite, Hospital admission, Bruising, Other complaints   Comments:  Patient has a torn rotator cuff. They saw Dr. Interiano to discuss options, but are not currently planning any surgery. They are hoping to set up with physical therapy.   No changes to medications recently though patient reports increased pain. We discussed tylenol as first line option and to call clinic with any new prescriptions.      Plan:   1. INR is therapeutic today at 2.5 (range 2 to 3) (LLN).  Instructed Mr. Wolf and his spouse to continue warfarin 2.5 mg oral daily except warfarin 3.75 mg TuesSat until re-check.    2. RTC in 4 weeks, 6/16/2021.  3. Verbal and written information was provided to Mr. Wolf and Mrs. Wolf in clinic. Mr. Wolf voiced understanding with teach back and has no further questions at this time.     Sita Avitia, PharmD.  Pharmacy Resident  5/19/2021 11:06 EDT

## 2021-05-24 ENCOUNTER — HOSPITAL ENCOUNTER (OUTPATIENT)
Dept: GENERAL RADIOLOGY | Facility: HOSPITAL | Age: 76
Discharge: HOME | End: 2021-05-24
Admitting: INTERNAL MEDICINE

## 2021-05-24 ENCOUNTER — OFFICE VISIT (OUTPATIENT)
Dept: INTERNAL MEDICINE | Facility: CLINIC | Age: 76
End: 2021-05-24

## 2021-05-24 VITALS
HEIGHT: 72 IN | BODY MASS INDEX: 33.46 KG/M2 | DIASTOLIC BLOOD PRESSURE: 80 MMHG | WEIGHT: 247 LBS | TEMPERATURE: 97.8 F | SYSTOLIC BLOOD PRESSURE: 132 MMHG | OXYGEN SATURATION: 96 % | HEART RATE: 64 BPM

## 2021-05-24 DIAGNOSIS — R07.81 RIB PAIN ON RIGHT SIDE: ICD-10-CM

## 2021-05-24 DIAGNOSIS — R07.81 RIB PAIN ON RIGHT SIDE: Primary | ICD-10-CM

## 2021-05-24 PROCEDURE — 99213 OFFICE O/P EST LOW 20 MIN: CPT | Performed by: INTERNAL MEDICINE

## 2021-05-24 PROCEDURE — 71101 X-RAY EXAM UNILAT RIBS/CHEST: CPT

## 2021-05-24 RX ORDER — METOPROLOL SUCCINATE 100 MG/1
TABLET, EXTENDED RELEASE ORAL
Qty: 180 TABLET | Refills: 2 | OUTPATIENT
Start: 2021-05-24

## 2021-05-24 NOTE — PROGRESS NOTES
Subjective   Tony Wolf is a 75 y.o. male.   Chief Complaint   Patient presents with   • Rib Pain     Right Side X 2 weeks       History of Present Illness   Right rib pain x 2 weeks. Did have a fall  Around Easter. Pain is constant. Movement such as reaching makes it worst.  No fever or chills. No nausea or vomiting.   The following portions of the patient's history were reviewed and updated as appropriate: allergies, current medications, past family history, past medical history, past social history, past surgical history and problem list.  Past Medical History:   Diagnosis Date   • Acute bronchitis    • Anxiety    • Arthritis     right knee   • Atrial fibrillation (CMS/HCC)    • Back pain    • Cancer (CMS/HCC)     skin cancer removed from face    • Carpal tunnel syndrome    • Depression    • Derangement, knee internal    • Diabetes mellitus (CMS/[facility])     DX'D TYPE II APPROX 15 YEARS AGO, DOES NOT CHECK BLOOD SUGARS AT HOME, STARTED INSULIN IN MARCH AFTER SURGERY CANCELLED FOR ELEVATED A1C   • Diabetic foot ulcer (CMS/[facility])    • Drug dependence (CMS/[facility])    • GERD (gastroesophageal reflux disease)    • Glaucoma    • Heart disease    • Hyperlipidemia 11/14/2016   • Hypertension    • Hypertensive disorder    • Hypokalemia, replaced 6/2/2017   • IBS (irritable bowel syndrome)    • Knee pain, right    • Left ventricular hypertrophy 11/14/2016   • Leukocytosis, mild, likely reactive 5/31/2017   • Mixed hyperlipidemia    • Neuropathy, lumbosacral (radicular)    • Obesity     body mass index 30+-obesity   • Osteoporosis    • Postoperative urinary retention 6/1/2017   • Premature ventricular contractions    • PVC's (premature ventricular contractions) 11/14/2016   • Rash 3/18/2019   • Scoliosis    • Screening for prostate cancer 7/28/2016   • Sinusitis    • Sleep apnea    • SOB (shortness of breath)    • Spinal stenosis, lumbar region with neurogenic claudication    • Trigger middle finger of left hand    • Trigger  middle finger of right hand    • Type 2 diabetes mellitus (CMS/HCC)    • Wears glasses     readers     Past Surgical History:   Procedure Laterality Date   • BACK SURGERY      injections for back    • CARPAL TUNNEL RELEASE     • CHOLECYSTECTOMY     • COLONOSCOPY      LESS THAN 5 YEARS    • ENDOSCOPY      x3   • FRACTURE SURGERY      right heel   • HERNIA REPAIR      both side inguinal    • LUMBAR EPIDURAL INJECTION     • ID TOTAL KNEE ARTHROPLASTY Right 5/30/2017    Procedure: RIGHT TOTAL KNEE ARTHROPLASTY;  Surgeon: Simon Interiano MD;  Location: [facility];  Service: Orthopedics   • TOTAL KNEE ARTHROPLASTY REVISION      left   • WRIST SURGERY      carpal tunnel bilat      Family History   Problem Relation Age of Onset   • Cancer Other    • Anemia Other    • Heart attack Other    • Cancer Mother    • Heart disease Mother    • Hypertension Mother    • Heart attack Mother    • Diabetes Mother    • Deep vein thrombosis Father    • Heart disease Father    • Hypertension Father    • Cancer Other      Social History     Socioeconomic History   • Marital status:      Spouse name: Not on file   • Number of children: Not on file   • Years of education: Not on file   • Highest education level: Not on file   Tobacco Use   • Smoking status: Never Smoker   • Smokeless tobacco: Never Used   Vaping Use   • Vaping Use: Never used   Substance and Sexual Activity   • Alcohol use: No   • Drug use: No   • Sexual activity: Defer     Current Outpatient Medications on File Prior to Visit   Medication Sig Dispense Refill   • Accu-Chek Softclix Lancets lancets USE TO TEST BLOOD SUGAR THREE TIMES A  each 2   • acetaminophen (TYLENOL) 500 MG tablet Take 1,000 mg by mouth Every 6 (Six) Hours As Needed for Mild Pain (1-3).     • amLODIPine (NORVASC) 10 MG tablet TAKE ONE TABLET BY MOUTH DAILY AS DIRECTED 30 tablet 10   • bimatoprost (Lumigan) 0.01 % ophthalmic drops Lumigan 0.01 % eye drops     • calcium carbonate (TUMS) 500 MG  "chewable tablet Chew 2 tablets Daily As Needed for Indigestion or Heartburn.     • Cholecalciferol (VITAMIN D-3) 1000 UNITS capsule Take 1,000 Units by mouth Daily.     • glimepiride (AMARYL) 2 MG tablet TAKE ONE TABLET BY MOUTH DAILY 90 tablet 2   • glucose blood (Accu-Chek Kandis Plus) test strip USE ONE STRIP TO TEST THREE TIMES A  each 11   • ketoconazole (NIZORAL) 2 % cream As Needed.     • latanoprost (XALATAN) 0.005 % ophthalmic solution Administer 1 drop to both eyes Every Night.     • lisinopril (PRINIVIL,ZESTRIL) 40 MG tablet TAKE ONE TABLET BY MOUTH DAILY 90 tablet 2   • Loratadine (CLARITIN) 10 MG capsule Take 1 capsule by mouth Daily.     • Melatonin 2.5 MG chewable tablet Chew 1 tablet At Night As Needed.     • metoprolol succinate XL (TOPROL-XL) 100 MG 24 hr tablet TAKE ONE TABLET BY MOUTH TWICE A  tablet 3   • mirtazapine (REMERON) 45 MG tablet Take 45 mg by mouth Every Night.     • pravastatin (PRAVACHOL) 20 MG tablet TAKE ONE TABLET BY MOUTH DAILY 90 tablet 2   • venlafaxine XR (EFFEXOR-XR) 75 MG 24 hr capsule Take 75 mg by mouth Daily.     • warfarin (COUMADIN) 2.5 MG tablet Take 1 to 1.5 tablets by mouth daily or as directed by the anticoagulation clinic. 120 tablet 1     No current facility-administered medications on file prior to visit.       Review of Systems   Constitutional: Negative for chills and fever.   Respiratory: Negative for cough and shortness of breath.    Cardiovascular: Negative for chest pain.   Gastrointestinal: Positive for nausea and vomiting.   Musculoskeletal:        Right rib pain   Skin: Negative for rash.   Neurological: Negative for light-headedness and headaches.   Psychiatric/Behavioral: Negative for dysphoric mood. The patient is not nervous/anxious.      /80   Pulse 64   Temp 97.8 °F (36.6 °C)   Ht 182.9 cm (72\")   Wt 112 kg (247 lb)   SpO2 96%   BMI 33.50 kg/m²     Objective   Physical Exam  Vitals and nursing note reviewed. "   Cardiovascular:      Rate and Rhythm: Normal rate and regular rhythm.      Heart sounds: No murmur heard.     Pulmonary:      Effort: No respiratory distress.      Breath sounds: Normal breath sounds. No wheezing or rhonchi (right lower rib(anterior)).   Musculoskeletal:         General: Tenderness present.   Neurological:      General: No focal deficit present.      Mental Status: He is alert and oriented to person, place, and time.   Psychiatric:         Mood and Affect: Mood normal.         Behavior: Behavior normal.         Assessment/Plan   Diagnoses and all orders for this visit:    1. Rib pain on right side (Primary)  -     XR Ribs Right With PA Chest; Future  Further recommendation following xr results

## 2021-05-28 RX ORDER — METOPROLOL SUCCINATE 100 MG/1
TABLET, EXTENDED RELEASE ORAL
Qty: 180 TABLET | Refills: 3 | Status: SHIPPED | OUTPATIENT
Start: 2021-05-28 | End: 2022-08-18

## 2021-06-07 ENCOUNTER — OFFICE VISIT (OUTPATIENT)
Dept: INTERNAL MEDICINE | Facility: CLINIC | Age: 76
End: 2021-06-07

## 2021-06-07 ENCOUNTER — LAB (OUTPATIENT)
Dept: LAB | Facility: HOSPITAL | Age: 76
End: 2021-06-07

## 2021-06-07 VITALS
OXYGEN SATURATION: 95 % | BODY MASS INDEX: 33.59 KG/M2 | DIASTOLIC BLOOD PRESSURE: 80 MMHG | WEIGHT: 248 LBS | HEIGHT: 72 IN | HEART RATE: 90 BPM | SYSTOLIC BLOOD PRESSURE: 128 MMHG

## 2021-06-07 DIAGNOSIS — E66.09 CLASS 1 OBESITY DUE TO EXCESS CALORIES WITH SERIOUS COMORBIDITY AND BODY MASS INDEX (BMI) OF 33.0 TO 33.9 IN ADULT: ICD-10-CM

## 2021-06-07 DIAGNOSIS — I10 ESSENTIAL HYPERTENSION: ICD-10-CM

## 2021-06-07 DIAGNOSIS — I48.20 CHRONIC ATRIAL FIBRILLATION (HCC): ICD-10-CM

## 2021-06-07 DIAGNOSIS — I48.4 ATYPICAL ATRIAL FLUTTER (HCC): ICD-10-CM

## 2021-06-07 DIAGNOSIS — Z00.00 MEDICARE ANNUAL WELLNESS VISIT, SUBSEQUENT: Primary | ICD-10-CM

## 2021-06-07 DIAGNOSIS — G47.33 OBSTRUCTIVE SLEEP APNEA: ICD-10-CM

## 2021-06-07 DIAGNOSIS — F41.1 GENERALIZED ANXIETY DISORDER: ICD-10-CM

## 2021-06-07 DIAGNOSIS — I49.3 PVC'S (PREMATURE VENTRICULAR CONTRACTIONS): ICD-10-CM

## 2021-06-07 DIAGNOSIS — K21.00 GASTROESOPHAGEAL REFLUX DISEASE WITH ESOPHAGITIS WITHOUT HEMORRHAGE: ICD-10-CM

## 2021-06-07 DIAGNOSIS — Z12.5 PROSTATE CANCER SCREENING: ICD-10-CM

## 2021-06-07 DIAGNOSIS — S46.812S INFRASPINATUS TENDON TEAR, LEFT, SEQUELA: ICD-10-CM

## 2021-06-07 DIAGNOSIS — E78.5 HYPERLIPIDEMIA LDL GOAL <100: ICD-10-CM

## 2021-06-07 DIAGNOSIS — M17.11 ARTHRITIS OF KNEE, RIGHT: ICD-10-CM

## 2021-06-07 DIAGNOSIS — E11.9 CONTROLLED TYPE 2 DIABETES MELLITUS WITHOUT COMPLICATION, WITHOUT LONG-TERM CURRENT USE OF INSULIN (HCC): ICD-10-CM

## 2021-06-07 DIAGNOSIS — D69.6 THROMBOCYTOPENIA (HCC): ICD-10-CM

## 2021-06-07 DIAGNOSIS — I51.7 LEFT VENTRICULAR HYPERTROPHY: ICD-10-CM

## 2021-06-07 PROBLEM — R06.09 DYSPNEA ON EXERTION: Status: RESOLVED | Noted: 2019-01-17 | Resolved: 2021-06-07

## 2021-06-07 LAB
ALBUMIN SERPL-MCNC: 4.4 G/DL (ref 3.5–5.2)
ALBUMIN/GLOB SERPL: 2 G/DL
ALP SERPL-CCNC: 63 U/L (ref 39–117)
ALT SERPL W P-5'-P-CCNC: 35 U/L (ref 1–41)
ANION GAP SERPL CALCULATED.3IONS-SCNC: 10 MMOL/L (ref 5–15)
AST SERPL-CCNC: 41 U/L (ref 1–40)
BASOPHILS # BLD AUTO: 0.07 10*3/MM3 (ref 0–0.2)
BASOPHILS NFR BLD AUTO: 0.9 % (ref 0–1.5)
BILIRUB SERPL-MCNC: 0.9 MG/DL (ref 0–1.2)
BUN SERPL-MCNC: 14 MG/DL (ref 8–23)
BUN/CREAT SERPL: 13.6 (ref 7–25)
CALCIUM SPEC-SCNC: 9.3 MG/DL (ref 8.6–10.5)
CHLORIDE SERPL-SCNC: 105 MMOL/L (ref 98–107)
CHOLEST SERPL-MCNC: 178 MG/DL (ref 0–200)
CO2 SERPL-SCNC: 26 MMOL/L (ref 22–29)
CREAT SERPL-MCNC: 1.03 MG/DL (ref 0.76–1.27)
DEPRECATED RDW RBC AUTO: 48.6 FL (ref 37–54)
EOSINOPHIL # BLD AUTO: 0.17 10*3/MM3 (ref 0–0.4)
EOSINOPHIL NFR BLD AUTO: 2.2 % (ref 0.3–6.2)
ERYTHROCYTE [DISTWIDTH] IN BLOOD BY AUTOMATED COUNT: 13.9 % (ref 12.3–15.4)
GFR SERPL CREATININE-BSD FRML MDRD: 71 ML/MIN/1.73
GLOBULIN UR ELPH-MCNC: 2.2 GM/DL
GLUCOSE SERPL-MCNC: 190 MG/DL (ref 65–99)
HCT VFR BLD AUTO: 52.8 % (ref 37.5–51)
HDLC SERPL-MCNC: 30 MG/DL (ref 40–60)
HGB BLD-MCNC: 17.8 G/DL (ref 13–17.7)
IMM GRANULOCYTES # BLD AUTO: 0.02 10*3/MM3 (ref 0–0.05)
IMM GRANULOCYTES NFR BLD AUTO: 0.3 % (ref 0–0.5)
LDLC SERPL CALC-MCNC: 100 MG/DL (ref 0–100)
LDLC/HDLC SERPL: 3.06 {RATIO}
LYMPHOCYTES # BLD AUTO: 2.56 10*3/MM3 (ref 0.7–3.1)
LYMPHOCYTES NFR BLD AUTO: 33.3 % (ref 19.6–45.3)
MCH RBC QN AUTO: 32 PG (ref 26.6–33)
MCHC RBC AUTO-ENTMCNC: 33.7 G/DL (ref 31.5–35.7)
MCV RBC AUTO: 94.8 FL (ref 79–97)
MONOCYTES # BLD AUTO: 0.61 10*3/MM3 (ref 0.1–0.9)
MONOCYTES NFR BLD AUTO: 7.9 % (ref 5–12)
NEUTROPHILS NFR BLD AUTO: 4.25 10*3/MM3 (ref 1.7–7)
NEUTROPHILS NFR BLD AUTO: 55.4 % (ref 42.7–76)
NRBC BLD AUTO-RTO: 0 /100 WBC (ref 0–0.2)
PLATELET # BLD AUTO: 160 10*3/MM3 (ref 140–450)
PMV BLD AUTO: 12.1 FL (ref 6–12)
POTASSIUM SERPL-SCNC: 4.4 MMOL/L (ref 3.5–5.2)
PROT SERPL-MCNC: 6.6 G/DL (ref 6–8.5)
RBC # BLD AUTO: 5.57 10*6/MM3 (ref 4.14–5.8)
SODIUM SERPL-SCNC: 141 MMOL/L (ref 136–145)
TRIGL SERPL-MCNC: 281 MG/DL (ref 0–150)
VLDLC SERPL-MCNC: 48 MG/DL (ref 5–40)
WBC # BLD AUTO: 7.68 10*3/MM3 (ref 3.4–10.8)

## 2021-06-07 PROCEDURE — G0439 PPPS, SUBSEQ VISIT: HCPCS | Performed by: INTERNAL MEDICINE

## 2021-06-07 PROCEDURE — 85025 COMPLETE CBC W/AUTO DIFF WBC: CPT

## 2021-06-07 PROCEDURE — 80053 COMPREHEN METABOLIC PANEL: CPT

## 2021-06-07 PROCEDURE — G0103 PSA SCREENING: HCPCS

## 2021-06-07 PROCEDURE — 80061 LIPID PANEL: CPT

## 2021-06-07 NOTE — PROGRESS NOTES
The ABCs of the Annual Wellness Visit  Subsequent Medicare Wellness Visit    Chief Complaint   Patient presents with   • Medicare Wellness-subsequent       Subjective   History of Present Illness:  Tony Wolf is a 75 y.o. male who presents for a Subsequent Medicare Wellness Visit.    HEALTH RISK ASSESSMENT    Recent Hospitalizations:  No hospitalization(s) within the last year.    Current Medical Providers:  Patient Care Team:  Kay Lopez DO as PCP - General  Kay Lopez DO as PCP - Family Medicine  Elise Portillo, PharmD as Pharmacist (Pharmacy)  Ama Mccloud, PharmD as Pharmacist (Pharmacy)  Sammy Lassiter MD as Consulting Physician (Cardiology)    Smoking Status:  Social History     Tobacco Use   Smoking Status Never Smoker   Smokeless Tobacco Never Used       Alcohol Consumption:  Social History     Substance and Sexual Activity   Alcohol Use No       Depression Screen:   PHQ-2/PHQ-9 Depression Screening 6/7/2021   Little interest or pleasure in doing things 0   Feeling down, depressed, or hopeless 0   Trouble falling or staying asleep, or sleeping too much -   Feeling tired or having little energy -   Poor appetite or overeating -   Feeling bad about yourself - or that you are a failure or have let yourself or your family down -   Trouble concentrating on things, such as reading the newspaper or watching television -   Moving or speaking so slowly that other people could have noticed. Or the opposite - being so fidgety or restless that you have been moving around a lot more than usual -   Thoughts that you would be better off dead, or of hurting yourself in some way -   Total Score 0   If you checked off any problems, how difficult have these problems made it for you to do your work, take care of things at home, or get along with other people? -       Fall Risk Screen:  STEADI Fall Risk Assessment has not been completed.    Health Habits and Functional and Cognitive  Screening:  Functional & Cognitive Status 6/7/2021   Do you have difficulty preparing food and eating? No   Do you have difficulty bathing yourself, getting dressed or grooming yourself? No   Do you have difficulty using the toilet? No   Do you have difficulty moving around from place to place? Yes   Do you have trouble with steps or getting out of a bed or a chair? No   Current Diet Well Balanced Diet   Dental Exam Up to date   Eye Exam Up to date   Exercise (times per week) 2 times per week   Current Exercise Activities Include Walking   Do you need help using the phone?  No   Are you deaf or do you have serious difficulty hearing?  No   Do you need help with transportation? Yes   Do you need help shopping? Yes   Do you need help preparing meals?  Yes   Do you need help with housework?  Yes   Do you need help with laundry? Yes   Do you need help taking your medications? -   Do you need help managing money? Yes   Do you ever drive or ride in a car without wearing a seat belt? No   Have you felt unusual stress, anger or loneliness in the last month? No   Who do you live with? Spouse   If you need help, do you have trouble finding someone available to you? Yes   Have you been bothered in the last four weeks by sexual problems? No   Do you have difficulty concentrating, remembering or making decisions? Yes         Does the patient have evidence of cognitive impairment? No    Asprin use counseling:Does not need ASA (and currently is not on it)    Age-appropriate Screening Schedule:  Refer to the list below for future screening recommendations based on patient's age, sex and/or medical conditions. Orders for these recommended tests are listed in the plan section. The patient has been provided with a written plan.    Health Maintenance   Topic Date Due   • ZOSTER VACCINE (1 of 2) Never done   • DXA SCAN  06/07/2021 (Originally 2/6/2020)   • URINE MICROALBUMIN  06/29/2021 (Originally 4/14/2021)   • INFLUENZA VACCINE   08/01/2021   • HEMOGLOBIN A1C  10/30/2021   • DIABETIC FOOT EXAM  11/04/2021   • DIABETIC EYE EXAM  03/04/2022   • LIPID PANEL  03/16/2022   • TDAP/TD VACCINES (2 - Td or Tdap) 07/06/2027          The following portions of the patient's history were reviewed and updated as appropriate: allergies, current medications, past family history, past medical history, past social history, past surgical history and problem list.    Outpatient Medications Prior to Visit   Medication Sig Dispense Refill   • Accu-Chek Softclix Lancets lancets USE TO TEST BLOOD SUGAR THREE TIMES A  each 2   • acetaminophen (TYLENOL) 500 MG tablet Take 1,000 mg by mouth Every 6 (Six) Hours As Needed for Mild Pain (1-3).     • amLODIPine (NORVASC) 10 MG tablet TAKE ONE TABLET BY MOUTH DAILY AS DIRECTED 30 tablet 10   • bimatoprost (Lumigan) 0.01 % ophthalmic drops Lumigan 0.01 % eye drops     • calcium carbonate (TUMS) 500 MG chewable tablet Chew 2 tablets Daily As Needed for Indigestion or Heartburn.     • Cholecalciferol (VITAMIN D-3) 1000 UNITS capsule Take 1,000 Units by mouth Daily.     • glimepiride (AMARYL) 2 MG tablet TAKE ONE TABLET BY MOUTH DAILY 90 tablet 2   • glucose blood (Accu-Chek Kandis Plus) test strip USE ONE STRIP TO TEST THREE TIMES A  each 11   • ketoconazole (NIZORAL) 2 % cream As Needed.     • latanoprost (XALATAN) 0.005 % ophthalmic solution Administer 1 drop to both eyes Every Night.     • lisinopril (PRINIVIL,ZESTRIL) 40 MG tablet TAKE ONE TABLET BY MOUTH DAILY 90 tablet 2   • Loratadine (CLARITIN) 10 MG capsule Take 1 capsule by mouth Daily.     • Melatonin 2.5 MG chewable tablet Chew 1 tablet At Night As Needed.     • metoprolol succinate XL (TOPROL-XL) 100 MG 24 hr tablet TAKE ONE TABLET BY MOUTH TWICE A  tablet 3   • mirtazapine (REMERON) 45 MG tablet Take 45 mg by mouth Every Night.     • pravastatin (PRAVACHOL) 20 MG tablet TAKE ONE TABLET BY MOUTH DAILY 90 tablet 2   • venlafaxine XR  (EFFEXOR-XR) 75 MG 24 hr capsule Take 75 mg by mouth Daily.     • warfarin (COUMADIN) 2.5 MG tablet Take 1 to 1.5 tablets by mouth daily or as directed by the anticoagulation clinic. 120 tablet 1     No facility-administered medications prior to visit.       Patient Active Problem List   Diagnosis   • GERD (gastroesophageal reflux disease)   • Essential hypertension   • Class 1 obesity due to excess calories with serious comorbidity in adult   • Obstructive sleep apnea   • Controlled type 2 diabetes mellitus without complication, without long-term current use of insulin (CMS/[facility])   • Generalized anxiety disorder   • Chronic atrial fibrillation (CMS/HCC)   • PVC's (premature ventricular contractions)   • Hyperlipidemia LDL goal <100   • Left ventricular hypertrophy   • Atypical atrial flutter (CMS/HCC)   • Arthritis of knee, right   • Status post right knee replacement   • Thrombocytopenia (CMS/[facility])   • Diabetic foot ulcer (CMS/[facility])   • Type 2 diabetes mellitus without complication, without long-term current use of insulin (CMS/[facility])       Advanced Care Planning:  ACP discussion was held with the patient during this visit. Patient has an advance directive (not in EMR), copy requested.    Review of Systems   Constitutional: Negative for activity change and appetite change.   HENT: Negative for congestion, ear pain, nosebleeds, postnasal drip, rhinorrhea, sinus pressure, sneezing and sore throat.    Respiratory: Negative for cough, chest tightness, wheezing and stridor.    Cardiovascular: Negative for palpitations and leg swelling.   Gastrointestinal: Negative for abdominal distention, abdominal pain, constipation, diarrhea, nausea and vomiting.   Endocrine: Negative for cold intolerance, heat intolerance, polydipsia and polyphagia.   Genitourinary: Negative for decreased urine volume, discharge, dysuria, enuresis, flank pain, frequency, genital sores, hematuria, scrotal swelling, testicular pain and urgency.  "  Musculoskeletal:        Shoulder pain   Neurological: Negative for dizziness, syncope, numbness and headaches.   Psychiatric/Behavioral: Negative for agitation, behavioral problems and dysphoric mood. The patient is nervous/anxious.        Compared to one year ago, the patient feels his physical health is the same.  Compared to one year ago, the patient feels his mental health is the same.    Reviewed chart for potential of high risk medication in the elderly: yes  Reviewed chart for potential of harmful drug interactions in the elderly:no    Objective         Vitals:    06/07/21 1310   BP: 128/80   Pulse: 90   SpO2: 95%   Weight: 112 kg (248 lb)   Height: 182.9 cm (72\")   PainSc: 0-No pain       Body mass index is 33.63 kg/m².  Discussed the patient's BMI with him. The BMI is above average; BMI management plan is completed.    Physical Exam  Vitals and nursing note reviewed.   Constitutional:       Appearance: He is obese.   Eyes:      General: No scleral icterus.  Cardiovascular:      Rate and Rhythm: Normal rate and regular rhythm.      Heart sounds: No murmur heard.     Pulmonary:      Effort: Pulmonary effort is normal. No respiratory distress.      Breath sounds: Normal breath sounds. No wheezing, rhonchi or rales.   Abdominal:      General: There is no distension.      Palpations: Abdomen is soft.      Tenderness: There is no abdominal tenderness. There is no right CVA tenderness or guarding.   Neurological:      General: No focal deficit present.      Mental Status: He is alert and oriented to person, place, and time.   Psychiatric:         Mood and Affect: Mood normal.         Behavior: Behavior normal.         Lab Results   Component Value Date    TRIG 237 (H) 03/16/2021    HDL 29 (L) 03/16/2021    LDL 91 03/16/2021    VLDL 40 03/16/2021    HGBA1C 7.0 04/30/2021        Assessment/Plan   Medicare Risks and Personalized Health Plan  CMS Preventative Services Quick Reference  Obesity/Overweight     The " above risks/problems have been discussed with the patient.  Pertinent information has been shared with the patient in the After Visit Summary.  Follow up plans and orders are seen below in the Assessment/Plan Section.    Diagnoses and all orders for this visit:    1. Medicare annual wellness visit, subsequent (Primary)  Done today  2. Chronic atrial fibrillation (CMS/HCC)  Continue coumadin. INR managed by coumadin clinic and cardiology follows.   3. Atypical atrial flutter (CMS/HCC)  Managed by cardiology  4. Essential hypertension  -     CBC & Differential; Future  -     Comprehensive Metabolic Panel; Future  Continue lisinopril 40 mg po qd and Norvasc 10 mg po qd.   5. Hyperlipidemia LDL goal <100  -     Lipid Panel; Future  Continue pravastatin 20 mg po qhs  6. Left ventricular hypertrophy  control BP.   7. PVC's (premature ventricular contractions)  stable  8. Thrombocytopenia (CMS/HCC)  Cbc today  9. Controlled type 2 diabetes mellitus without complication, without long-term current use of insulin (CMS/[facility])  Last A1 c with ENDO was 7.0. Continue to follow with Endo.   10. Class 1 obesity due to excess calories with serious comorbidity and body mass index (BMI) of 33.0 to 33.9 in adult  Low carb diet  11. Gastroesophageal reflux disease with esophagitis without hemorrhage  Stable without medications.  12. Generalized anxiety disorder  Continue Effexor 75 mg po qd  13. Arthritis of knee, right  S/p replacement  14. Obstructive sleep apnea  Continue CPAP  15. Prostate cancer screening  -     PSA Screen; Future    16. Infraspinatus tendon tear, left, sequela  Doing PT and has f/u with Dr. Interiano.     Follow Up:  Return in about 3 months (around 9/7/2021).     An After Visit Summary and PPPS were given to the patient.

## 2021-06-08 ENCOUNTER — TELEPHONE (OUTPATIENT)
Dept: INTERNAL MEDICINE | Facility: CLINIC | Age: 76
End: 2021-06-08

## 2021-06-08 LAB — PSA SERPL-MCNC: 1.03 NG/ML (ref 0–4)

## 2021-06-16 ENCOUNTER — ANTICOAGULATION VISIT (OUTPATIENT)
Dept: PHARMACY | Facility: HOSPITAL | Age: 76
End: 2021-06-16

## 2021-06-16 DIAGNOSIS — I48.20 CHRONIC ATRIAL FIBRILLATION (HCC): Primary | ICD-10-CM

## 2021-06-16 LAB
INR PPP: 3.2 (ref 0.91–1.09)
PROTHROMBIN TIME: 38.2 SECONDS (ref 10–13.8)

## 2021-06-16 PROCEDURE — 85610 PROTHROMBIN TIME: CPT

## 2021-06-16 PROCEDURE — G0463 HOSPITAL OUTPT CLINIC VISIT: HCPCS

## 2021-06-16 PROCEDURE — 36416 COLLJ CAPILLARY BLOOD SPEC: CPT

## 2021-06-16 NOTE — PROGRESS NOTES
Anticoagulation Clinic Progress Note  Indication: atrial fibrillation  Referring Provider: Sravani (8/24/20)  Initial Warfarin Start Date: 2008  Goal INR: 2 - 3  Current Drug Interactions: fluoxetine, glimepiride, melatonin (PRN), mirtazepine  CHADS-VASc: 3 (age, HTN, DM)    EtOH: none  GLV: Salad (Spinach salad 1x weekly or garden salad) and serving of broccoli once a week 3/16/2021  OTC Pain Relief: Uses APAP prn (~1x/week)    Anticoagulation Clinic INR History:  Date 8/2 8/23 9/20 10/11 11/7 11/17 11/28 12/13 12/27   Total Weekly Dose 17.5mg 17.5 mg 17.5 mg 17.5mg 10mg 17.5 mg 17.5 mg 20mg 22.5 mg   INR 2.5 2.3 2.3 2.0 1.2 1.7 1.5 1.7 2.6   Notes     Pre- lumbar inj         Date 1/10/18 1/31 2/28 3/28 4/27 5/30 6/27 7/11 7/23 8/1 8/6 8/13   Total Weekly Dose 22.5 mg 22.5 mg 22.5 mg 22.5 mg 22.5 mg 22.5 mg 22.5 mg 22.5 mg 22.5 mg 20mg 12.5 mg 17.5 mg   INR 2.6 2.5 2.1 2.4 2.4 2.6 3.5 3.4 3.6 5.3, 4.9 1.9 2.7   Notes        Keflex         Date 8/20 9/4 10/2 11/6 11/15 11/29 12/31 1/7/19 1/17 1/29 2/12   Total Weekly Dose 17.5  mg 17.5 mg 17.5 mg 10mg 20mg 17.5mg 17.5mg 18.75 mg 17.5 mg 18.75 mg 20mg   INR 2.3 2.3 2.2 1.3 2.3 2.2 1.7 1.9 1.9 1.9 2.0   Notes    pre- epidural   missed dose?         Date 2/27 3/27 4/10 4/23 5/7 5/28 6/25 7/30 8/28 9/25 10/23   Total Weekly Dose 20mg 20mg 21.25 mg 21.25 mg 21.25 mg 21.25mg 21.25mg 21.25mg 21.25mg 21.25mg 21.25mg   INR 2.2 1.8 2.5 1.8 2.4 2.4 2.5 2.7 2.6 3.0 2.9   Notes sick   post- scope            Date  11/6 11/13 12/4 1/3 1/15 1/20 1/31 2/10 2/28 4/14 5/26   Total Weekly Dose 21.25 mg 18.75 mg 21.25 mg 21.25 mg 21.25 mg 20 mg 21.25 21.25 20mg 21.25mg 21.25mg   INR 4.2 3.2 2.7 2.2 3.1 3.1 2.9 3.5 2.4 2.3 2.5   Notes apap Hold x1, less GLV Inc in GLV  doxy doxy  1x dose cephalexin 1x dose dec; keflex       Date  6/24 7/22 8/24 9/10 10/8 11/5 11/18 12/2 12/30 1/26 2/22 3/22 4/19   Total Weekly Dose 21.25mg 21.25 21.25mg 21.25mg 21.25mg 21.25mg 20mg 20mg 20mg 20mg   20mg 20 mg 20mg   INR 2.4 2.4 3.0 2.8 3.0 3.1 2.5 2.5 2.5 2.6 2.9  2 2.2   Notes      Inc. GLV Dose dec    desvenlafaxine broccoli      Date 5/13 6/16             Total WeeklyDose 20 mg 20mg             INR 2.5 3.2             Notes                 Clinic Interview:  Verbal Release Authorization signed on 6/27/18 -- may speak with Sussy (wife), Nikolai (son)  Tablet Strength: pt has 2.5mg tablets  Phone: [phone] (Home), [phone]   Fax: [phone] (Attn: Tasha) [facility]      Patient Findings  Negatives:  Signs/symptoms of thrombosis, Signs/symptoms of bleeding, Laboratory test error suspected, Change in health, Change in alcohol use, Change in activity, Upcoming invasive procedure, Emergency department visit, Upcoming dental procedure, Missed doses, Extra doses, Change in medications, Change in diet/appetite, Hospital admission, Bruising, Other complaints   Comments:  Patient has a torn rotator cuff. They saw Dr. Interiano to discuss options, but are not currently planning any surgery. They are hoping to set up with physical therapy. He has taken 1-2 tablets of APAP on days he has physical therapy. He goes twice weekly. They report no further falls.     Plan:   1. INR is therapeutic today at 2.5 (range 2 to 3) ([facility]).  Instructed Mr. Wolf and his spouse to have a cup of broccoli tonight and continue warfarin 2.5 mg oral daily except warfarin 3.75 mg TuesSat until re-check.    2. RTC in 2 weeks, however, wife is unable to until 7/7/2021.  3. Verbal and written information was provided to Mr. Wolf and Mrs. Wolf in clinic. Mr. Wolf voiced understanding with teach back and has no further questions at this time.     Ama Mccloud, PharmD  06/16/21  11:02 EDT

## 2021-07-07 ENCOUNTER — ANTICOAGULATION VISIT (OUTPATIENT)
Dept: PHARMACY | Facility: HOSPITAL | Age: 76
End: 2021-07-07

## 2021-07-07 DIAGNOSIS — I48.20 CHRONIC ATRIAL FIBRILLATION (HCC): Primary | ICD-10-CM

## 2021-07-07 LAB
INR PPP: 3.1 (ref 0.91–1.09)
PROTHROMBIN TIME: 37.7 SECONDS (ref 10–13.8)

## 2021-07-07 PROCEDURE — 85610 PROTHROMBIN TIME: CPT

## 2021-07-07 PROCEDURE — G0463 HOSPITAL OUTPT CLINIC VISIT: HCPCS

## 2021-07-07 PROCEDURE — 36416 COLLJ CAPILLARY BLOOD SPEC: CPT

## 2021-07-07 NOTE — PROGRESS NOTES
Anticoagulation Clinic Progress Note  Indication: atrial fibrillation  Referring Provider: Sravani (8/24/20)  Initial Warfarin Start Date: 2008  Goal INR: 2 - 3  Current Drug Interactions: fluoxetine, glimepiride, melatonin (PRN), mirtazepine  CHADS-VASc: 3 (age, HTN, DM)    EtOH: none  GLV: Salad (Spinach salad 1x weekly or garden salad) and serving of broccoli once a week 3/16/2021  OTC Pain Relief: Uses APAP prn (~1x/week)    Anticoagulation Clinic INR History:  Date 8/2 8/23 9/20 10/11 11/7 11/17 11/28 12/13 12/27   Total Weekly Dose 17.5mg 17.5 mg 17.5 mg 17.5mg 10mg 17.5 mg 17.5 mg 20mg 22.5 mg   INR 2.5 2.3 2.3 2.0 1.2 1.7 1.5 1.7 2.6   Notes     Pre- lumbar inj         Date 1/10/18 1/31 2/28 3/28 4/27 5/30 6/27 7/11 7/23 8/1 8/6 8/13   Total Weekly Dose 22.5 mg 22.5 mg 22.5 mg 22.5 mg 22.5 mg 22.5 mg 22.5 mg 22.5 mg 22.5 mg 20mg 12.5 mg 17.5 mg   INR 2.6 2.5 2.1 2.4 2.4 2.6 3.5 3.4 3.6 5.3, 4.9 1.9 2.7   Notes        Keflex         Date 8/20 9/4 10/2 11/6 11/15 11/29 12/31 1/7/19 1/17 1/29 2/12   Total Weekly Dose 17.5  mg 17.5 mg 17.5 mg 10mg 20mg 17.5mg 17.5mg 18.75 mg 17.5 mg 18.75 mg 20mg   INR 2.3 2.3 2.2 1.3 2.3 2.2 1.7 1.9 1.9 1.9 2.0   Notes    pre- epidural   missed dose?         Date 2/27 3/27 4/10 4/23 5/7 5/28 6/25 7/30 8/28 9/25 10/23   Total Weekly Dose 20mg 20mg 21.25 mg 21.25 mg 21.25 mg 21.25mg 21.25mg 21.25mg 21.25mg 21.25mg 21.25mg   INR 2.2 1.8 2.5 1.8 2.4 2.4 2.5 2.7 2.6 3.0 2.9   Notes sick   post- scope            Date  11/6 11/13 12/4 1/3 1/15 1/20 1/31 2/10 2/28 4/14 5/26   Total Weekly Dose 21.25 mg 18.75 mg 21.25 mg 21.25 mg 21.25 mg 20 mg 21.25 21.25 20mg 21.25mg 21.25mg   INR 4.2 3.2 2.7 2.2 3.1 3.1 2.9 3.5 2.4 2.3 2.5   Notes apap Hold x1, less GLV Inc in GLV  doxy doxy  1x dose cephalexin 1x dose dec; keflex       Date  6/24 7/22 8/24 9/10 10/8 11/5 11/18 12/2 12/30 1/26 2/22 3/22 4/19   Total Weekly Dose 21.25mg 21.25 21.25mg 21.25mg 21.25mg 21.25mg 20mg 20mg 20mg 20mg   20mg 20 mg 20mg   INR 2.4 2.4 3.0 2.8 3.0 3.1 2.5 2.5 2.5 2.6 2.9  2 2.2   Notes      Inc. GLV Dose dec    desvenlafaxine broccoli      Date 5/13 6/16  7/7            Total WeeklyDose 20 mg 20mg  20 mg            INR 2.5 3.2 3.1            Notes  apap Inc GLV              Clinic Interview:  Verbal Release Authorization signed on 6/27/18 -- may speak with Sussy (wife), Nikolai (son)  Tablet Strength: pt has 2.5mg tablets  Phone: [phone] (Home), [phone]   Fax: [phone] (Attn: Tasha) [facility] [place]    Patient Findings  Positives:  Change in diet/appetite   Negatives:  Signs/symptoms of thrombosis, Signs/symptoms of bleeding, Laboratory test error suspected, Change in health, Change in alcohol use, Change in activity, Upcoming invasive procedure, Emergency department visit, Upcoming dental procedure, Missed doses, Extra doses, Change in medications, Hospital admission, Bruising, Other complaints   Comments:  He has difficulty breathing with humidity   He has had broccoli once a week along with a spinach salad weekly   He has been avoiding acetaminophen   Above findings negative     Plan:     1. INR is slightly supra therapeutic today at 3.1 (range 2 to 3).  Instructed Mr. Wolf reduce regimen slightly to warfarin 2.5 mg oral daily except warfarin 3.75 mg Tuesday until re-check.    2. RTC in 2 weeks on 7/14  3. Verbal and written information was provided to Mr. Wolf and Mrs. Wolf in clinic. Mr. Wolf voiced understanding with teach back and has no further questions at this time.     Sariah Almodovar, PharmD  07/07/21  11:12 EDT

## 2021-07-21 ENCOUNTER — ANTICOAGULATION VISIT (OUTPATIENT)
Dept: PHARMACY | Facility: HOSPITAL | Age: 76
End: 2021-07-21

## 2021-07-21 DIAGNOSIS — I48.20 CHRONIC ATRIAL FIBRILLATION (HCC): Primary | ICD-10-CM

## 2021-07-21 LAB
INR PPP: 3 (ref 0.91–1.09)
PROTHROMBIN TIME: 36.3 SECONDS (ref 10–13.8)

## 2021-07-21 PROCEDURE — 36416 COLLJ CAPILLARY BLOOD SPEC: CPT

## 2021-07-21 PROCEDURE — G0463 HOSPITAL OUTPT CLINIC VISIT: HCPCS

## 2021-07-21 PROCEDURE — 85610 PROTHROMBIN TIME: CPT

## 2021-07-21 NOTE — PROGRESS NOTES
Anticoagulation Clinic Progress Note  Indication: atrial fibrillation  Referring Provider: Sravani (8/24/20)  Initial Warfarin Start Date: 2008  Goal INR: 2 - 3  Current Drug Interactions: fluoxetine, glimepiride, melatonin (PRN), mirtazepine  CHADS-VASc: 3 (age, HTN, DM)    EtOH: none  GLV: Salad (Spinach salad 1x weekly or garden salad) and serving of broccoli once a week 3/16/2021  OTC Pain Relief: Uses APAP prn (~1x/week)    Anticoagulation Clinic INR History:  Date 8/2 8/23 9/20 10/11 11/7 11/17 11/28 12/13 12/27   Total Weekly Dose 17.5mg 17.5 mg 17.5 mg 17.5mg 10mg 17.5 mg 17.5 mg 20mg 22.5 mg   INR 2.5 2.3 2.3 2.0 1.2 1.7 1.5 1.7 2.6   Notes     Pre- lumbar inj         Date 1/10/18 1/31 2/28 3/28 4/27 5/30 6/27 7/11 7/23 8/1 8/6 8/13   Total Weekly Dose 22.5 mg 22.5 mg 22.5 mg 22.5 mg 22.5 mg 22.5 mg 22.5 mg 22.5 mg 22.5 mg 20mg 12.5 mg 17.5 mg   INR 2.6 2.5 2.1 2.4 2.4 2.6 3.5 3.4 3.6 5.3, 4.9 1.9 2.7   Notes        Keflex         Date 8/20 9/4 10/2 11/6 11/15 11/29 12/31 1/7/19 1/17 1/29 2/12   Total Weekly Dose 17.5  mg 17.5 mg 17.5 mg 10mg 20mg 17.5mg 17.5mg 18.75 mg 17.5 mg 18.75 mg 20mg   INR 2.3 2.3 2.2 1.3 2.3 2.2 1.7 1.9 1.9 1.9 2.0   Notes    pre- epidural   missed dose?         Date 2/27 3/27 4/10 4/23 5/7 5/28 6/25 7/30 8/28 9/25 10/23   Total Weekly Dose 20mg 20mg 21.25 mg 21.25 mg 21.25 mg 21.25mg 21.25mg 21.25mg 21.25mg 21.25mg 21.25mg   INR 2.2 1.8 2.5 1.8 2.4 2.4 2.5 2.7 2.6 3.0 2.9   Notes sick   post- scope            Date  11/6 11/13 12/4 1/3 1/15 1/20 1/31 2/10 2/28 4/14 5/26   Total Weekly Dose 21.25 mg 18.75 mg 21.25 mg 21.25 mg 21.25 mg 20 mg 21.25 21.25 20mg 21.25mg 21.25mg   INR 4.2 3.2 2.7 2.2 3.1 3.1 2.9 3.5 2.4 2.3 2.5   Notes apap Hold x1, less GLV Inc in GLV  doxy doxy  1x dose cephalexin 1x dose dec; keflex       Date  6/24 7/22 8/24 9/10 10/8 11/5 11/18 12/2 12/30 1/26 2/22 3/22 4/19   Total Weekly Dose 21.25mg 21.25 21.25mg 21.25mg 21.25mg 21.25mg 20mg 20mg 20mg 20mg   20mg 20 mg 20mg   INR 2.4 2.4 3.0 2.8 3.0 3.1 2.5 2.5 2.5 2.6 2.9  2 2.2   Notes      Inc. GLV Dose dec    desvenlafaxine broccoli      Date 5/13 6/16 7/7 7/21           Total WeeklyDose 20 mg 20mg  20 mg 18.75mg            INR 2.5 3.2 3.1 3.0           Notes  apap Inc GLV              Clinic Interview:  Verbal Release Authorization signed on 6/27/18 -- may speak with Sussy (wife), Nikolai (son)  Tablet Strength: pt has 2.5mg tablets  Phone: [phone] (Home), [phone]   Fax: [phone] (Attn: Tasha) [facility]    Patient Findings    Negatives:  Signs/symptoms of thrombosis, Signs/symptoms of bleeding, Laboratory test error suspected, Change in health, Change in alcohol use, Change in activity, Upcoming invasive procedure, Emergency department visit, Upcoming dental procedure, Missed doses, Extra doses, Change in medications, Change in diet/appetite, Hospital admission, Bruising, Other complaints   Comments:  Mr. Wolf reports feeling well today. He had more greens this past week than usual. He is fatigued from physical therapy on his right shoulder but feels the PT is improving. He and his wife expressed reservations about having it surgically repaired. They will contact the clinic if anything is scheduled.          Plan:     1. INR is therapeutic at [facility] after increased greens and dose reduction at 3.0 (range 2 to 3).  Instructed Mr. Wolf reduce regimen to warfarin 2.5 mg oral daily  2. RTC in 2 weeks on 8/4  3. Verbal and written information was provided to Mr. Wolf and Mrs. Wolf in clinic. Mr. Wolf voiced understanding with teach back and has no further questions at this time.     Thank you,    Christiano CarboneD, NANCY  7/21/2021  11:34 EDT

## 2021-07-27 RX ORDER — METOPROLOL SUCCINATE 100 MG/1
100 TABLET, EXTENDED RELEASE ORAL 2 TIMES DAILY
Qty: 180 TABLET | Refills: 3 | Status: SHIPPED | OUTPATIENT
Start: 2021-07-27 | End: 2021-09-01 | Stop reason: SDUPTHER

## 2021-08-04 ENCOUNTER — APPOINTMENT (OUTPATIENT)
Dept: PHARMACY | Facility: HOSPITAL | Age: 76
End: 2021-08-04

## 2021-08-11 ENCOUNTER — ANTICOAGULATION VISIT (OUTPATIENT)
Dept: PHARMACY | Facility: HOSPITAL | Age: 76
End: 2021-08-11

## 2021-08-11 DIAGNOSIS — I48.20 CHRONIC ATRIAL FIBRILLATION (HCC): Primary | ICD-10-CM

## 2021-08-11 LAB
INR PPP: 2.2 (ref 0.91–1.09)
PROTHROMBIN TIME: 26.6 SECONDS (ref 10–13.8)

## 2021-08-11 PROCEDURE — 36416 COLLJ CAPILLARY BLOOD SPEC: CPT

## 2021-08-11 PROCEDURE — 85610 PROTHROMBIN TIME: CPT

## 2021-08-11 PROCEDURE — G0463 HOSPITAL OUTPT CLINIC VISIT: HCPCS | Performed by: PHARMACIST

## 2021-08-11 NOTE — PROGRESS NOTES
Anticoagulation Clinic Progress Note  Indication: atrial fibrillation  Referring Provider: Sravani (8/24/20)  Initial Warfarin Start Date: 2008  Goal INR: 2 - 3  Current Drug Interactions: fluoxetine, glimepiride, melatonin (PRN), mirtazepine  CHADS-VASc: 3 (age, HTN, DM)    EtOH: none  GLV: Salad (Spinach salad 1x weekly or garden salad) and serving of broccoli once a week 3/16/2021  OTC Pain Relief: Uses APAP prn (~1x/week)    Anticoagulation Clinic INR History:  Date 8/2 8/23 9/20 10/11 11/7 11/17 11/28 12/13 12/27   Total Weekly Dose 17.5mg 17.5 mg 17.5 mg 17.5mg 10mg 17.5 mg 17.5 mg 20mg 22.5 mg   INR 2.5 2.3 2.3 2.0 1.2 1.7 1.5 1.7 2.6   Notes     Pre- lumbar inj         Date 1/10/18 1/31 2/28 3/28 4/27 5/30 6/27 7/11 7/23 8/1 8/6 8/13   Total Weekly Dose 22.5 mg 22.5 mg 22.5 mg 22.5 mg 22.5 mg 22.5 mg 22.5 mg 22.5 mg 22.5 mg 20mg 12.5 mg 17.5 mg   INR 2.6 2.5 2.1 2.4 2.4 2.6 3.5 3.4 3.6 5.3, 4.9 1.9 2.7   Notes        Keflex         Date 8/20 9/4 10/2 11/6 11/15 11/29 12/31 1/7/19 1/17 1/29 2/12   Total Weekly Dose 17.5  mg 17.5 mg 17.5 mg 10mg 20mg 17.5mg 17.5mg 18.75 mg 17.5 mg 18.75 mg 20mg   INR 2.3 2.3 2.2 1.3 2.3 2.2 1.7 1.9 1.9 1.9 2.0   Notes    pre- epidural   missed dose?         Date 2/27 3/27 4/10 4/23 5/7 5/28 6/25 7/30 8/28 9/25 10/23   Total Weekly Dose 20mg 20mg 21.25 mg 21.25 mg 21.25 mg 21.25mg 21.25mg 21.25mg 21.25mg 21.25mg 21.25mg   INR 2.2 1.8 2.5 1.8 2.4 2.4 2.5 2.7 2.6 3.0 2.9   Notes sick   post- scope            Date  11/6 11/13 12/4 1/3 1/15 1/20 1/31 2/10 2/28 4/14 5/26   Total Weekly Dose 21.25 mg 18.75 mg 21.25 mg 21.25 mg 21.25 mg 20 mg 21.25 21.25 20mg 21.25mg 21.25mg   INR 4.2 3.2 2.7 2.2 3.1 3.1 2.9 3.5 2.4 2.3 2.5   Notes apap Hold x1, less GLV Inc in GLV  doxy doxy  1x dose cephalexin 1x dose dec; keflex       Date  6/24 7/22 8/24 9/10 10/8 11/5 11/18 12/2 12/30 1/26 2/22 3/22 4/19   Total Weekly Dose 21.25mg 21.25 21.25mg 21.25mg 21.25mg 21.25mg 20mg 20mg 20mg 20mg   20mg 20 mg 20mg   INR 2.4 2.4 3.0 2.8 3.0 3.1 2.5 2.5 2.5 2.6 2.9  2 2.2   Notes      Inc. GLV Dose dec    desvenlafaxine broccoli      Date 5/13 6/16 7/7 7/21 8/11          Total WeeklyDose 20 mg 20mg  20 mg 18.75mg  17.5 mg          INR 2.5 3.2 3.1 3.0 2.2          Notes  apap Inc GLV              Clinic Interview:  Verbal Release Authorization signed on 6/27/18 -- may speak with Sussy (wife), Nikolai (son)  Tablet Strength: pt has 2.5mg tablets  Phone: [phone] (Home), [phone]   Fax: [phone] (Attn: Tasha) [facility]    Patient Findings  Negatives:  Signs/symptoms of thrombosis, Signs/symptoms of bleeding, Laboratory test error suspected, Change in health, Change in alcohol use, Change in activity, Upcoming invasive procedure, Emergency department visit, Upcoming dental procedure, Missed doses, Extra doses, Change in medications, Change in diet/appetite, Hospital admission, Bruising, Other complaints   Comments:  Consistent with bi weekly LGV (juan carlos, spinach salad)       Plan:     1. INR is therapeutic at 2.2. (range 2 to 3).  Instructed Mr. Wolf to continue the recently reduced regimen of warfarin 2.5 mg oral daily.  2. RTC in 3 weeks to coordinate with Dr. Lassiter's apptment.   3. Verbal and written information was provided to Mr. Wolf and Mrs. Wolf in clinic. Mr. Wolf voiced understanding with teach back and has no further questions at this time.     Thank you,    Simon Real, PharmD  8/11/2021  13:28 EDT

## 2021-09-01 ENCOUNTER — ANTICOAGULATION VISIT (OUTPATIENT)
Dept: PHARMACY | Facility: HOSPITAL | Age: 76
End: 2021-09-01

## 2021-09-01 ENCOUNTER — OFFICE VISIT (OUTPATIENT)
Dept: CARDIOLOGY | Facility: CLINIC | Age: 76
End: 2021-09-01

## 2021-09-01 VITALS
BODY MASS INDEX: 33.86 KG/M2 | HEART RATE: 71 BPM | OXYGEN SATURATION: 92 % | HEIGHT: 72 IN | WEIGHT: 250 LBS | DIASTOLIC BLOOD PRESSURE: 74 MMHG | SYSTOLIC BLOOD PRESSURE: 138 MMHG

## 2021-09-01 DIAGNOSIS — I49.3 PVC'S (PREMATURE VENTRICULAR CONTRACTIONS): ICD-10-CM

## 2021-09-01 DIAGNOSIS — I48.21 PERMANENT ATRIAL FIBRILLATION (HCC): Primary | ICD-10-CM

## 2021-09-01 DIAGNOSIS — I10 ESSENTIAL HYPERTENSION: ICD-10-CM

## 2021-09-01 DIAGNOSIS — I48.20 CHRONIC ATRIAL FIBRILLATION (HCC): Primary | ICD-10-CM

## 2021-09-01 LAB
INR PPP: 2.4 (ref 0.91–1.09)
PROTHROMBIN TIME: 29.1 SECONDS (ref 10–13.8)

## 2021-09-01 PROCEDURE — 85610 PROTHROMBIN TIME: CPT

## 2021-09-01 PROCEDURE — 99213 OFFICE O/P EST LOW 20 MIN: CPT | Performed by: INTERNAL MEDICINE

## 2021-09-01 PROCEDURE — 36416 COLLJ CAPILLARY BLOOD SPEC: CPT

## 2021-09-01 PROCEDURE — G0463 HOSPITAL OUTPT CLINIC VISIT: HCPCS

## 2021-09-01 NOTE — PROGRESS NOTES
Anticoagulation Clinic Progress Note  Indication: atrial fibrillation  Referring Provider: Sravani (8/24/20)  Initial Warfarin Start Date: 2008  Goal INR: 2 - 3  Current Drug Interactions: fluoxetine, glimepiride, melatonin (PRN), mirtazepine  CHADS-VASc: 3 (age, HTN, DM)    EtOH: none  GLV: Salad (Spinach salad 1x weekly or garden salad) and serving of broccoli once a week 3/16/2021  OTC Pain Relief: Uses APAP prn (~1x/week)    Anticoagulation Clinic INR History:  Date 8/2 8/23 9/20 10/11 11/7 11/17 11/28 12/13 12/27   Total Weekly Dose 17.5mg 17.5 mg 17.5 mg 17.5mg 10mg 17.5 mg 17.5 mg 20mg 22.5 mg   INR 2.5 2.3 2.3 2.0 1.2 1.7 1.5 1.7 2.6   Notes     Pre- lumbar inj         Date 1/10/18 1/31 2/28 3/28 4/27 5/30 6/27 7/11 7/23 8/1 8/6 8/13   Total Weekly Dose 22.5 mg 22.5 mg 22.5 mg 22.5 mg 22.5 mg 22.5 mg 22.5 mg 22.5 mg 22.5 mg 20mg 12.5 mg 17.5 mg   INR 2.6 2.5 2.1 2.4 2.4 2.6 3.5 3.4 3.6 5.3, 4.9 1.9 2.7   Notes        Keflex         Date 8/20 9/4 10/2 11/6 11/15 11/29 12/31 1/7/19 1/17 1/29 2/12   Total Weekly Dose 17.5  mg 17.5 mg 17.5 mg 10mg 20mg 17.5mg 17.5mg 18.75 mg 17.5 mg 18.75 mg 20mg   INR 2.3 2.3 2.2 1.3 2.3 2.2 1.7 1.9 1.9 1.9 2.0   Notes    pre- epidural   missed dose?         Date 2/27 3/27 4/10 4/23 5/7 5/28 6/25 7/30 8/28 9/25 10/23   Total Weekly Dose 20mg 20mg 21.25 mg 21.25 mg 21.25 mg 21.25mg 21.25mg 21.25mg 21.25mg 21.25mg 21.25mg   INR 2.2 1.8 2.5 1.8 2.4 2.4 2.5 2.7 2.6 3.0 2.9   Notes sick   post- scope            Date  11/6 11/13 12/4 1/3 1/15 1/20 1/31 2/10 2/28 4/14 5/26   Total Weekly Dose 21.25 mg 18.75 mg 21.25 mg 21.25 mg 21.25 mg 20 mg 21.25 21.25 20mg 21.25mg 21.25mg   INR 4.2 3.2 2.7 2.2 3.1 3.1 2.9 3.5 2.4 2.3 2.5   Notes apap Hold x1, less GLV Inc in GLV  doxy doxy  1x dose cephalexin 1x dose dec; keflex       Date  6/24 7/22 8/24 9/10 10/8 11/5 11/18 12/2 12/30 1/26 2/22 3/22 4/19   Total Weekly Dose 21.25mg 21.25 21.25mg 21.25mg 21.25mg 21.25mg 20mg 20mg 20mg 20mg   20mg 20 mg 20mg   INR 2.4 2.4 3.0 2.8 3.0 3.1 2.5 2.5 2.5 2.6 2.9  2 2.2   Notes      Inc. GLV Dose dec    desvenlafaxine broccoli      Date 5/13 6/16 7/7 7/21 8/11 9/1         Total WeeklyDose 20 mg 20mg  20 mg 18.75mg  17.5 mg 17.5mg         INR 2.5 3.2 3.1 3.0 2.2 2.4         Notes  apap Inc GLV              Clinic Interview:  Verbal Release Authorization signed on 6/27/18 -- may speak with Sussy (wife), Nikolai (son)  Tablet Strength: pt has 2.5mg tablets  Phone: [phone] (Home), [phone]   Fax: [phone] (Attn: Tasha) [facility]    Patient Findings  Negatives:  Signs/symptoms of thrombosis, Signs/symptoms of bleeding, Laboratory test error suspected, Change in health, Change in alcohol use, Change in activity, Upcoming invasive procedure, Emergency department visit, Upcoming dental procedure, Missed doses, Extra doses, Change in medications, Change in diet/appetite, Hospital admission, Bruising, Other complaints   Comments:  Staying consistent with GLV. Dr. Lassiter's note from today has no changes to medication regimen. He has an upcoming appointment with Endocrinology, Dr. Gallo. They will call with any changes.     Plan:   1. INR is therapeutic at 2.4. (range 2 to 3).  Instructed Mr. Wolf to continue regimen of warfarin 2.5 mg oral daily.  2. RTC in 4 weeks.  3. Verbal and written information was provided to Mr. Wolf and Mrs. Wolf in clinic. Mr. Wolf voiced understanding with teach back and has no further questions at this time.     Thank you,    Ama Mccloud, PharmD  9/1/2021  14:13 EDT

## 2021-09-01 NOTE — PROGRESS NOTES
Tony Wolf  1945  [phone]    09/01/2021    [facility] CARDIOLOGY     Referring Provider: No ref. provider found     Kay LopezDO  [address] [facility] [address]    Chief Complaint   Patient presents with   • Permanent atrial fibrillation (CMS/HCC)       Problem List:   1.  Atrial fibrillation/atrial tachycardia/atrial flutter:  a. Diagnosed in June 2004 by physical examination. Coumadin initiated in June 2004.  b. Echocardiogram on 06/30/2004: LA 5.8 with normal LV size and function.  c. Nuclear GXT on 06/30/2004: Negative study.  d. External cardioversion and maintenance to normal sinus rhythm with sotalol on 08/30/2004.  e. Event recorder in March 2005 showing normal sinus rhythm with occasional PVCs.  f. Echocardiogram/bubble study on 04/08/2005: Septum 1.6, mild MR, trace to mild TR, PI, negative bubble study, EF 68%.  g. Holter monitor, September 2008, with 35 PVCs, 157 PACs.   h. Cardiolite, 12/29/2008, with no ischemia, EF 47%.  i. Tikosyn initiated, 01/04/2010.   j. Event recorder, 01/22/2010, with nonsustained atrial tachycardia and PACs.   k. Stress Cardiolite, 03/31/2011: LVEF (59%), no ischemia.   l. EP study with radiofrequency ablation of left atrial posterior wall atrial tachycardia, 04/28/2014.  m. BRIDGET-guided external cardioversion, 11/25/2014 with BRIDGET demonstrating normal LV size and function and mild MR/TR.   2. Premature ventricular contractions:  a. Event recorder, June 2007, consistent with PVCs with subsequent increase of sotalol therapy to 120 mg p.o. b.i.d.    3. History of dyspnea:  a. Dobutamine Cardiolite in September 1998 with inferolateral reversibility.   b. Left heart catheterization on 02/29/2000, showing normal coronary arteries, normal EF.  c. Echocardiogram, March 2010: Normal LV size and function, moderate left ventricular hypertrophy, mild MR, and AI.   d. Echocardiogram, 01/20/2016: EF 55% to 60%. Mild MR. Mild aortic cusp  sclerosis. Trace TR.  e. CT scan of the chest with and without contrast, 01/20/2016, shows cardiomegaly; no pulmonary embolism, mild pulmonary vascular congestion.  4. Hypertension.  5. Hyperlipidemia.  6. Concentric left ventricular hypertrophy.  7. Diabetes mellitus.  8. Surgical history:  a. Left knee replacement in 2002.  b. Left hand carpal tunnel in 2000.                                                                       C.   Double hernia in 1966.       Allergies  Allergies   Allergen Reactions   • Aspirin Anaphylaxis     Other reaction(s): Hypotension       Current Medications    Current Outpatient Medications:   •  Accu-Chek Softclix Lancets lancets, USE TO TEST BLOOD SUGAR THREE TIMES A DAY, Disp: 100 each, Rfl: 2  •  acetaminophen (TYLENOL) 500 MG tablet, Take 1,000 mg by mouth Every 6 (Six) Hours As Needed for Mild Pain (1-3)., Disp: , Rfl:   •  amLODIPine (NORVASC) 10 MG tablet, TAKE ONE TABLET BY MOUTH DAILY AS DIRECTED, Disp: 30 tablet, Rfl: 10  •  bimatoprost (Lumigan) 0.01 % ophthalmic drops, Administer 1 drop to both eyes Every Night., Disp: , Rfl:   •  calcium carbonate (TUMS) 500 MG chewable tablet, Chew 2 tablets Daily As Needed for Indigestion or Heartburn., Disp: , Rfl:   •  Cholecalciferol (VITAMIN D-3) 1000 UNITS capsule, Take 1,000 Units by mouth Daily., Disp: , Rfl:   •  glimepiride (AMARYL) 2 MG tablet, TAKE ONE TABLET BY MOUTH DAILY, Disp: 90 tablet, Rfl: 2  •  glucose blood (Accu-Chek Kandis Plus) test strip, USE ONE STRIP TO TEST THREE TIMES A DAY, Disp: 100 each, Rfl: 11  •  ketoconazole (NIZORAL) 2 % cream, As Needed., Disp: , Rfl:   •  latanoprost (XALATAN) 0.005 % ophthalmic solution, Administer 1 drop to both eyes Every Night., Disp: , Rfl:   •  lisinopril (PRINIVIL,ZESTRIL) 40 MG tablet, TAKE ONE TABLET BY MOUTH DAILY, Disp: 90 tablet, Rfl: 2  •  Loratadine (CLARITIN) 10 MG capsule, Take 1 capsule by mouth Daily., Disp: , Rfl:   •  Melatonin 2.5 MG chewable tablet, Chew 1  "tablet At Night As Needed., Disp: , Rfl:   •  metoprolol succinate XL (TOPROL-XL) 100 MG 24 hr tablet, TAKE ONE TABLET BY MOUTH TWICE A DAY, Disp: 180 tablet, Rfl: 3  •  mirtazapine (REMERON) 45 MG tablet, Take 45 mg by mouth Every Night., Disp: , Rfl:   •  pravastatin (PRAVACHOL) 20 MG tablet, TAKE ONE TABLET BY MOUTH DAILY, Disp: 90 tablet, Rfl: 2  •  venlafaxine XR (EFFEXOR-XR) 75 MG 24 hr capsule, Take 75 mg by mouth Daily., Disp: , Rfl:   •  warfarin (COUMADIN) 2.5 MG tablet, Take 1 to 1.5 tablets by mouth daily or as directed by the anticoagulation clinic., Disp: 120 tablet, Rfl: 1    History of Present Illness     Pt presents for follow up of AF/HTN/PVC. Since we last saw the pt, pt denies any sustained palps, SOB, CP, LH, and dizziness. [facility] and [facility] right rotator cuff and fractured ribs on right side. He was evaluated by Dr. Interiano and has been going to PT twice weekly with improvement. Denies any hospitalizations, ER visits, bleeding issues on Coumadin, or TIA/CVA symptoms. Overall feels well. PT INR have been stable. BP and HR stable at home.     ROS:  General:  Denies fatigue, weight gain or loss  Cardiovascular:  Denies CP, PND, syncope, near syncope, edema or palpitations.  Pulmonary:  Denies JOSEPH, cough, or wheezing      Vitals:    09/01/21 1316   BP: 138/74   BP Location: Left arm   Patient Position: Sitting   Cuff Size: Adult   Pulse: 71   SpO2: 92%   Weight: 113 kg (250 lb)   Height: 182.9 cm (72\")     Body mass index is 33.91 kg/m².  PE:  General: NAD  Neck: no JVD, no carotid bruits, no TM  Heart irreg irreg rate and rhythm NL S1, S2, no rubs, murmurs  Lungs: CTA, no wheezes, rhonchi, or rales  Abd: soft, non-tender, NL BS  Ext: No musculoskeletal deformities, no edema, cyanosis, or clubbing  Psych: normal mood and affect    Diagnostic Data:      Procedures    1. Permanent atrial fibrillation (CMS/HCC)    2. Essential hypertension    3. PVC's (premature ventricular " contractions)        Plan:  1) Permanent atrial fibrillation:  - Chronic in nature and asymptomatic.  Rate controlled.  - Continue present medications.     2) Anticoagulation:  - Continue warfarin    3) HTN:  - Well controlled on amlodipine, lisinopril  - Wt loss, exercise, salt reduction    4) PVCs:   -asymptomatic      F/up in 12 months    Scribed for Sammy Lassiter MD by YUDITH Breaux. 9/1/2021 13:45 EDT     I, Sammy Lassiter MD, personally performed the services described in this documentation as scribed by the above named individual in my presence, and it is both accurate and complete.  9/1/2021  13:45 EDT

## 2021-09-07 ENCOUNTER — LAB (OUTPATIENT)
Dept: LAB | Facility: HOSPITAL | Age: 76
End: 2021-09-07

## 2021-09-07 ENCOUNTER — OFFICE VISIT (OUTPATIENT)
Dept: INTERNAL MEDICINE | Facility: CLINIC | Age: 76
End: 2021-09-07

## 2021-09-07 VITALS
OXYGEN SATURATION: 94 % | WEIGHT: 251 LBS | DIASTOLIC BLOOD PRESSURE: 77 MMHG | HEIGHT: 72 IN | SYSTOLIC BLOOD PRESSURE: 130 MMHG | BODY MASS INDEX: 34 KG/M2 | HEART RATE: 68 BPM

## 2021-09-07 DIAGNOSIS — E78.5 HYPERLIPIDEMIA LDL GOAL <100: ICD-10-CM

## 2021-09-07 DIAGNOSIS — F41.1 GENERALIZED ANXIETY DISORDER: ICD-10-CM

## 2021-09-07 DIAGNOSIS — I10 ESSENTIAL HYPERTENSION: ICD-10-CM

## 2021-09-07 DIAGNOSIS — I10 ESSENTIAL HYPERTENSION: Primary | ICD-10-CM

## 2021-09-07 LAB
BASOPHILS # BLD AUTO: 0.06 10*3/MM3 (ref 0–0.2)
BASOPHILS NFR BLD AUTO: 0.9 % (ref 0–1.5)
DEPRECATED RDW RBC AUTO: 48.1 FL (ref 37–54)
EOSINOPHIL # BLD AUTO: 0.17 10*3/MM3 (ref 0–0.4)
EOSINOPHIL NFR BLD AUTO: 2.6 % (ref 0.3–6.2)
ERYTHROCYTE [DISTWIDTH] IN BLOOD BY AUTOMATED COUNT: 13.9 % (ref 12.3–15.4)
HCT VFR BLD AUTO: 51.8 % (ref 37.5–51)
HGB BLD-MCNC: 17.5 G/DL (ref 13–17.7)
IMM GRANULOCYTES # BLD AUTO: 0.01 10*3/MM3 (ref 0–0.05)
IMM GRANULOCYTES NFR BLD AUTO: 0.2 % (ref 0–0.5)
LYMPHOCYTES # BLD AUTO: 2.39 10*3/MM3 (ref 0.7–3.1)
LYMPHOCYTES NFR BLD AUTO: 35.9 % (ref 19.6–45.3)
MCH RBC QN AUTO: 31.8 PG (ref 26.6–33)
MCHC RBC AUTO-ENTMCNC: 33.8 G/DL (ref 31.5–35.7)
MCV RBC AUTO: 94 FL (ref 79–97)
MONOCYTES # BLD AUTO: 0.56 10*3/MM3 (ref 0.1–0.9)
MONOCYTES NFR BLD AUTO: 8.4 % (ref 5–12)
NEUTROPHILS NFR BLD AUTO: 3.47 10*3/MM3 (ref 1.7–7)
NEUTROPHILS NFR BLD AUTO: 52 % (ref 42.7–76)
NRBC BLD AUTO-RTO: 0 /100 WBC (ref 0–0.2)
PLATELET # BLD AUTO: 149 10*3/MM3 (ref 140–450)
PMV BLD AUTO: 12.1 FL (ref 6–12)
RBC # BLD AUTO: 5.51 10*6/MM3 (ref 4.14–5.8)
WBC # BLD AUTO: 6.66 10*3/MM3 (ref 3.4–10.8)

## 2021-09-07 PROCEDURE — 99213 OFFICE O/P EST LOW 20 MIN: CPT | Performed by: INTERNAL MEDICINE

## 2021-09-07 PROCEDURE — 80061 LIPID PANEL: CPT

## 2021-09-07 PROCEDURE — 85025 COMPLETE CBC W/AUTO DIFF WBC: CPT

## 2021-09-07 PROCEDURE — 80053 COMPREHEN METABOLIC PANEL: CPT

## 2021-09-07 NOTE — PROGRESS NOTES
Subjective   Tony Wolf is a 76 y.o. male.   Chief Complaint   Patient presents with   • Hypertension   • Hyperlipidemia   • Anxiety       History of Present Illness   Here for f/u on chronic conditions: HTN, HLP, and anxiety. HTN well controlled with lisinopril and toprolol. No CP or SOA. No HA. HLP controlled with Pravachol.  Anxiety controlled with Effexor.  Sees Endo for his diabetes.   The following portions of the patient's history were reviewed and updated as appropriate: allergies, current medications, past family history, past medical history, past social history, past surgical history and problem list.  Past Medical History:   Diagnosis Date   • Acute bronchitis    • Anxiety    • Arthritis     right knee   • Atrial fibrillation (CMS/HCC)    • Back pain    • Cancer (CMS/HCC)     skin cancer removed from face    • Carpal tunnel syndrome    • Depression    • Derangement, knee internal    • Diabetes mellitus (CMS/HCC)     DX'D TYPE II APPROX 15 YEARS AGO, DOES NOT CHECK BLOOD SUGARS AT HOME, STARTED INSULIN IN MARCH AFTER SURGERY CANCELLED FOR ELEVATED A1C   • Diabetic foot ulcer (CMS/HCC)    • Drug dependence (CMS/HCC)    • Dyspnea on exertion 1/17/2019   • GERD (gastroesophageal reflux disease)    • Glaucoma    • Heart disease    • Hyperlipidemia 11/14/2016   • Hypertension    • Hypertensive disorder    • Hypokalemia, replaced 6/2/2017   • IBS (irritable bowel syndrome)    • Knee pain, right    • Left ventricular hypertrophy 11/14/2016   • Leukocytosis, mild, likely reactive 5/31/2017   • Mixed hyperlipidemia    • Neuropathy, lumbosacral (radicular)    • Obesity     body mass index 30+-obesity   • Osteoporosis    • Postoperative urinary retention 6/1/2017   • Premature ventricular contractions    • PVC's (premature ventricular contractions) 11/14/2016   • Rash 3/18/2019   • Scoliosis    • Screening for prostate cancer 7/28/2016   • Sinusitis    • Sleep apnea    • SOB (shortness of breath)    • Spinal  stenosis, lumbar region with neurogenic claudication    • Trigger middle finger of left hand    • Trigger middle finger of right hand    • Type 2 diabetes mellitus (CMS/HCC)    • Wears glasses     readers     Past Surgical History:   Procedure Laterality Date   • BACK SURGERY      injections for back    • CARPAL TUNNEL RELEASE     • CHOLECYSTECTOMY     • COLONOSCOPY      LESS THAN 5 YEARS    • ENDOSCOPY      x3   • FRACTURE SURGERY      right heel   • HERNIA REPAIR      both side inguinal    • LUMBAR EPIDURAL INJECTION     • NJ TOTAL KNEE ARTHROPLASTY Right 5/30/2017    Procedure: RIGHT TOTAL KNEE ARTHROPLASTY;  Surgeon: Simon Interiano MD;  Location: [facility];  Service: Orthopedics   • TOTAL KNEE ARTHROPLASTY REVISION      left   • WRIST SURGERY      carpal tunnel bilat      Family History   Problem Relation Age of Onset   • Cancer Other    • Anemia Other    • Heart attack Other    • Cancer Mother    • Heart disease Mother    • Hypertension Mother    • Heart attack Mother    • Diabetes Mother    • Deep vein thrombosis Father    • Heart disease Father    • Hypertension Father    • Cancer Other      Social History     Socioeconomic History   • Marital status:      Spouse name: Not on file   • Number of children: Not on file   • Years of education: Not on file   • Highest education level: Not on file   Tobacco Use   • Smoking status: Never Smoker   • Smokeless tobacco: Never Used   Vaping Use   • Vaping Use: Never used   Substance and Sexual Activity   • Alcohol use: No   • Drug use: No   • Sexual activity: Defer     Current Outpatient Medications on File Prior to Visit   Medication Sig Dispense Refill   • Accu-Chek Softclix Lancets lancets USE TO TEST BLOOD SUGAR THREE TIMES A  each 2   • acetaminophen (TYLENOL) 500 MG tablet Take 1,000 mg by mouth Every 6 (Six) Hours As Needed for Mild Pain (1-3).     • amLODIPine (NORVASC) 10 MG tablet TAKE ONE TABLET BY MOUTH DAILY AS DIRECTED 30 tablet 10   •  bimatoprost (Lumigan) 0.01 % ophthalmic drops Administer 1 drop to both eyes Every Night.     • calcium carbonate (TUMS) 500 MG chewable tablet Chew 2 tablets Daily As Needed for Indigestion or Heartburn.     • Cholecalciferol (VITAMIN D-3) 1000 UNITS capsule Take 1,000 Units by mouth Daily.     • glimepiride (AMARYL) 2 MG tablet TAKE ONE TABLET BY MOUTH DAILY 90 tablet 2   • glucose blood (Accu-Chek Kandis Plus) test strip USE ONE STRIP TO TEST THREE TIMES A  each 11   • ketoconazole (NIZORAL) 2 % cream As Needed.     • latanoprost (XALATAN) 0.005 % ophthalmic solution Administer 1 drop to both eyes Every Night.     • lisinopril (PRINIVIL,ZESTRIL) 40 MG tablet TAKE ONE TABLET BY MOUTH DAILY 90 tablet 2   • Loratadine (CLARITIN) 10 MG capsule Take 1 capsule by mouth Daily.     • Melatonin 2.5 MG chewable tablet Chew 1 tablet At Night As Needed.     • metoprolol succinate XL (TOPROL-XL) 100 MG 24 hr tablet TAKE ONE TABLET BY MOUTH TWICE A  tablet 3   • mirtazapine (REMERON) 45 MG tablet Take 45 mg by mouth Every Night.     • pravastatin (PRAVACHOL) 20 MG tablet TAKE ONE TABLET BY MOUTH DAILY 90 tablet 2   • venlafaxine XR (EFFEXOR-XR) 75 MG 24 hr capsule Take 75 mg by mouth Daily.     • warfarin (COUMADIN) 2.5 MG tablet Take 1 to 1.5 tablets by mouth daily or as directed by the anticoagulation clinic. 120 tablet 1     No current facility-administered medications on file prior to visit.       Review of Systems   Constitutional: Negative for activity change, appetite change, chills, diaphoresis, fatigue, fever and unexpected weight change.   HENT: Negative for congestion, dental problem, drooling, ear discharge, ear pain, nosebleeds, sinus pressure, sneezing, sore throat and tinnitus.    Respiratory: Negative for cough, choking, chest tightness, shortness of breath, wheezing and stridor.    Cardiovascular: Negative for palpitations and leg swelling.   Gastrointestinal: Negative for abdominal distention,  "abdominal pain, constipation, diarrhea, nausea and vomiting.   Genitourinary: Negative for decreased urine volume, dysuria and urgency.   Allergic/Immunologic: Negative for immunocompromised state.   Neurological: Negative for dizziness, tremors, seizures, facial asymmetry and weakness.   Hematological: Negative for adenopathy. Does not bruise/bleed easily.   Psychiatric/Behavioral: Negative for agitation, confusion, dysphoric mood and sleep disturbance.     /77   Pulse 68   Ht 182.9 cm (72\")   Wt 114 kg (251 lb)   SpO2 94%   BMI 34.04 kg/m²     Objective   Physical Exam  Vitals and nursing note reviewed.   Constitutional:       Appearance: Normal appearance. He is well-developed.   HENT:      Head: Normocephalic.      Right Ear: External ear normal.      Left Ear: External ear normal.      Nose: Nose normal.   Neck:      Thyroid: No thyromegaly.      Vascular: No JVD.   Cardiovascular:      Rate and Rhythm: Normal rate. Rhythm irregular.      Heart sounds: Normal heart sounds. No murmur heard.   No friction rub.   Pulmonary:      Effort: No respiratory distress.      Breath sounds: No wheezing or rales.   Abdominal:      General: There is no distension.      Palpations: Abdomen is soft.      Tenderness: There is no abdominal tenderness.   Lymphadenopathy:      Cervical: No cervical adenopathy.   Skin:     Findings: No rash.   Neurological:      General: No focal deficit present.      Mental Status: He is alert and oriented to person, place, and time.   Psychiatric:         Mood and Affect: Mood normal.         Behavior: Behavior normal.         Assessment/Plan   Diagnoses and all orders for this visit:    1. Essential hypertension (Primary)  -     CBC & Differential; Future  Continue lisinopril 40 mg po qd  2. Hyperlipidemia LDL goal <100  -     Comprehensive Metabolic Panel; Future  -     Lipid Panel; Future  Continue Pravastatin 20 mg po qhs  3. Generalized anxiety disorder  Continue Effexor 75 mg po " qd        Sees Cardio for his a fib

## 2021-09-08 ENCOUNTER — TELEPHONE (OUTPATIENT)
Dept: INTERNAL MEDICINE | Facility: CLINIC | Age: 76
End: 2021-09-08

## 2021-09-08 LAB
ALBUMIN SERPL-MCNC: 4.2 G/DL (ref 3.5–5.2)
ALBUMIN/GLOB SERPL: 1.8 G/DL
ALP SERPL-CCNC: 57 U/L (ref 39–117)
ALT SERPL W P-5'-P-CCNC: 28 U/L (ref 1–41)
ANION GAP SERPL CALCULATED.3IONS-SCNC: 11.4 MMOL/L (ref 5–15)
AST SERPL-CCNC: 35 U/L (ref 1–40)
BILIRUB SERPL-MCNC: 0.8 MG/DL (ref 0–1.2)
BUN SERPL-MCNC: 12 MG/DL (ref 8–23)
BUN/CREAT SERPL: 11.8 (ref 7–25)
CALCIUM SPEC-SCNC: 8.9 MG/DL (ref 8.6–10.5)
CHLORIDE SERPL-SCNC: 104 MMOL/L (ref 98–107)
CHOLEST SERPL-MCNC: 161 MG/DL (ref 0–200)
CO2 SERPL-SCNC: 24.6 MMOL/L (ref 22–29)
CREAT SERPL-MCNC: 1.02 MG/DL (ref 0.76–1.27)
GFR SERPL CREATININE-BSD FRML MDRD: 71 ML/MIN/1.73
GLOBULIN UR ELPH-MCNC: 2.4 GM/DL
GLUCOSE SERPL-MCNC: 173 MG/DL (ref 65–99)
HDLC SERPL-MCNC: 32 MG/DL (ref 40–60)
LDLC SERPL CALC-MCNC: 94 MG/DL (ref 0–100)
LDLC/HDLC SERPL: 2.78 {RATIO}
POTASSIUM SERPL-SCNC: 4.4 MMOL/L (ref 3.5–5.2)
PROT SERPL-MCNC: 6.6 G/DL (ref 6–8.5)
SODIUM SERPL-SCNC: 140 MMOL/L (ref 136–145)
TRIGL SERPL-MCNC: 201 MG/DL (ref 0–150)
VLDLC SERPL-MCNC: 35 MG/DL (ref 5–40)

## 2021-09-08 NOTE — TELEPHONE ENCOUNTER
PATIENT HAD BLOODWORK DONE YESTERDAY AND HE WOULD LIKE A CALLBACK WITH THOSE RESULTS.    CONTACT: [phone]

## 2021-09-08 NOTE — TELEPHONE ENCOUNTER
Called and informed patient that letter was sent explaining the following lab result: Hemoglobin has returned to normal. Very slight increase in hematocrit.  Mild increase in blood sugars and triglycerides. Low carb diet and continue to follow up with your diabetes specialist.

## 2021-09-14 ENCOUNTER — TELEPHONE (OUTPATIENT)
Dept: INTERNAL MEDICINE | Facility: CLINIC | Age: 76
End: 2021-09-14

## 2021-09-14 NOTE — TELEPHONE ENCOUNTER
Caller: KamranTony gregory    Relationship: Self    Best call back number: [phone]    Caller requesting test results: PATIENT IS WANTING TO  HARD COPY OF THE LAB RESULTS     What test was performed: BLOODWORK ON 09-      Additional notes:PATIENT STATED THAT HE AND HIS WIFE LOOK AT THE LAB RESULTS ON MYCHART BUT DOESN'T UNDERSTAND ALL OF THE RESULTS AND WHAT THEY MEAN.  PATIENT WANTS A HARDCOPY OF THE LETTER THAT DR GUILLAUME SENT TO HIM AND WILL BE BACK TO PICK IT UP TOMORROW 09-.      PATIENT WANTS INSTRUCTIONS AS TO WHETHER OR NOT HE NEEDS TO TAKE ANY FURTHER ACTION ON THOSE TEST RESULTS. PLEASE INFORM THANK YOU    GARRISON SPOKE TO CUONG AND HE WILL PRINT DOWN AND LEAVE COPY AT .

## 2021-09-20 ENCOUNTER — OFFICE VISIT (OUTPATIENT)
Dept: ENDOCRINOLOGY | Facility: CLINIC | Age: 76
End: 2021-09-20

## 2021-09-20 VITALS
SYSTOLIC BLOOD PRESSURE: 122 MMHG | OXYGEN SATURATION: 97 % | BODY MASS INDEX: 34.21 KG/M2 | HEART RATE: 83 BPM | HEIGHT: 72 IN | WEIGHT: 252.6 LBS | DIASTOLIC BLOOD PRESSURE: 78 MMHG

## 2021-09-20 DIAGNOSIS — E78.5 HYPERLIPIDEMIA LDL GOAL <100: ICD-10-CM

## 2021-09-20 DIAGNOSIS — I10 ESSENTIAL HYPERTENSION: ICD-10-CM

## 2021-09-20 DIAGNOSIS — E11.9 TYPE 2 DIABETES MELLITUS WITHOUT COMPLICATION, WITHOUT LONG-TERM CURRENT USE OF INSULIN (HCC): Primary | ICD-10-CM

## 2021-09-20 LAB
EXPIRATION DATE: ABNORMAL
EXPIRATION DATE: NORMAL
GLUCOSE BLDC GLUCOMTR-MCNC: 186 MG/DL (ref 70–130)
HBA1C MFR BLD: 6.9 %
Lab: ABNORMAL
Lab: NORMAL

## 2021-09-20 PROCEDURE — 83036 HEMOGLOBIN GLYCOSYLATED A1C: CPT | Performed by: INTERNAL MEDICINE

## 2021-09-20 PROCEDURE — 99214 OFFICE O/P EST MOD 30 MIN: CPT | Performed by: INTERNAL MEDICINE

## 2021-09-20 PROCEDURE — 82947 ASSAY GLUCOSE BLOOD QUANT: CPT | Performed by: INTERNAL MEDICINE

## 2021-09-20 PROCEDURE — 3044F HG A1C LEVEL LT 7.0%: CPT | Performed by: INTERNAL MEDICINE

## 2021-09-20 NOTE — PROGRESS NOTES
"     Office Note      Date: 2021  Patient Name: Tony Wolf  MRN: [id number]  : 1945    Chief Complaint   Patient presents with   • Diabetes       History of Present Illness:   Tony Wofl is a 76 y.o. male who presents for Diabetes type 2. Diagnosed in: . Treated in past with oral agents. Current treatments: glimepiride. Number of insulin shots per day: none. Checks blood sugar 1 times a day. Has low blood sugar: no. Aspirin use: No - on warfarin. Statin use: Yes. ACE-I/ARB use: Yes. Changes in health since last visit: fall with rotator cuff tear. Last eye exam .    Subjective      Diabetic Complications:  Eyes: No  Kidneys: No  Feet: No  Heart: No    Diet and Exercise:  Meals per day: 3  Minutes of exercise per week: 0 mins.    Review of Systems:   Review of Systems   Constitutional: Negative.    Cardiovascular: Negative.    Gastrointestinal: Negative.    Endocrine: Negative.        The following portions of the patient's history were reviewed and updated as appropriate: allergies, current medications, past family history, past medical history, past social history, past surgical history and problem list.    Objective       Visit Vitals  /78 (BP Location: Left arm, Patient Position: Sitting, Cuff Size: Adult)   Pulse 83   Ht 182.9 cm (72\")   Wt 115 kg (252 lb 9.6 oz)   SpO2 97%   BMI 34.26 kg/m²       Physical Exam:  Physical Exam  Constitutional:       Appearance: Normal appearance.   Neurological:      Mental Status: He is alert.         Labs:    HbA1c  Lab Results   Component Value Date    HGBA1C 6.9 2021       CMP  Lab Results   Component Value Date    GLUCOSE 173 (H) 2021    BUN 12 2021    CREATININE 1.02 2021    EGFRIFNONA 71 2021    BCR 11.8 2021    K 4.4 2021    CO2 24.6 2021    CALCIUM 8.9 2021    AST 35 2021    ALT 28 2021        Lipid Panel  Lab Results   Component Value Date    CHLPL 172 2016    " HDL 32 (L) 09/07/2021    LDL 94 09/07/2021    TRIG 201 (H) 09/07/2021        TSH  Lab Results   Component Value Date    TSH 2.170 09/11/2020    FREET4 0.98 06/09/2015        Hemoglobin A1C  Lab Results   Component Value Date    HGBA1C 6.9 09/20/2021        Microalbumin/Creatinine  No results found for: MALBCRERATIO, CREATINIURIN, MICROALBUR        Assessment / Plan      Assessment & Plan:  Diagnoses and all orders for this visit:    1. Type 2 diabetes mellitus without complication, without long-term current use of insulin (CMS/[facility]) (Primary)  Assessment & Plan:  Diabetes is unchanged.   Continue current treatment regimen.  Diabetes will be reassessed in 3 months.    Orders:  -     POC Glucose, Blood  -     POC Glycosylated Hemoglobin (Hb A1C)    2. Essential hypertension  Assessment & Plan:  Hypertension is unchanged.  Continue current treatment regimen.  Blood pressure will be reassessed at the next regular appointment.      3. Hyperlipidemia LDL goal <100  Assessment & Plan:  Recent trigs above goal.  Work on diet/exercise/weight loss.        Return in about 3 months (around 12/20/2021) for Recheck with A1c, TSH, microalbumin, foot exam.    Brown Gallo MD   09/20/2021

## 2021-09-29 ENCOUNTER — ANTICOAGULATION VISIT (OUTPATIENT)
Dept: PHARMACY | Facility: HOSPITAL | Age: 76
End: 2021-09-29

## 2021-09-29 DIAGNOSIS — I48.20 CHRONIC ATRIAL FIBRILLATION (HCC): Primary | ICD-10-CM

## 2021-09-29 DIAGNOSIS — I48.91 ATRIAL FIBRILLATION, UNSPECIFIED TYPE (HCC): ICD-10-CM

## 2021-09-29 LAB
INR PPP: 2.2 (ref 0.91–1.09)
PROTHROMBIN TIME: 26.5 SECONDS (ref 10–13.8)

## 2021-09-29 PROCEDURE — G0463 HOSPITAL OUTPT CLINIC VISIT: HCPCS

## 2021-09-29 PROCEDURE — 85610 PROTHROMBIN TIME: CPT

## 2021-09-29 PROCEDURE — 36416 COLLJ CAPILLARY BLOOD SPEC: CPT

## 2021-09-29 RX ORDER — WARFARIN SODIUM 2.5 MG/1
TABLET ORAL
Qty: 120 TABLET | Refills: 1 | Status: SHIPPED | OUTPATIENT
Start: 2021-09-29 | End: 2022-04-12 | Stop reason: SDUPTHER

## 2021-09-29 NOTE — PROGRESS NOTES
Anticoagulation Clinic Progress Note  Indication: atrial fibrillation  Referring Provider: Sravani (8/24/20)  Initial Warfarin Start Date: 2008  Goal INR: 2 - 3  Current Drug Interactions: fluoxetine, glimepiride, melatonin (PRN), mirtazepine  CHADS-VASc: 3 (age, HTN, DM)    EtOH: none  GLV: Salad (Spinach salad 1x weekly or garden salad) and serving of broccoli once a week 3/16/2021  OTC Pain Relief: Uses APAP prn (~1x/week)    Anticoagulation Clinic INR History:  Date 8/2 8/23 9/20 10/11 11/7 11/17 11/28 12/13 12/27   Total Weekly Dose 17.5mg 17.5 mg 17.5 mg 17.5mg 10mg 17.5 mg 17.5 mg 20mg 22.5 mg   INR 2.5 2.3 2.3 2.0 1.2 1.7 1.5 1.7 2.6   Notes     Pre- lumbar inj         Date 1/10/18 1/31 2/28 3/28 4/27 5/30 6/27 7/11 7/23 8/1 8/6 8/13   Total Weekly Dose 22.5 mg 22.5 mg 22.5 mg 22.5 mg 22.5 mg 22.5 mg 22.5 mg 22.5 mg 22.5 mg 20mg 12.5 mg 17.5 mg   INR 2.6 2.5 2.1 2.4 2.4 2.6 3.5 3.4 3.6 5.3, 4.9 1.9 2.7   Notes        Keflex         Date 8/20 9/4 10/2 11/6 11/15 11/29 12/31 1/7/19 1/17 1/29 2/12   Total Weekly Dose 17.5  mg 17.5 mg 17.5 mg 10mg 20mg 17.5mg 17.5mg 18.75 mg 17.5 mg 18.75 mg 20mg   INR 2.3 2.3 2.2 1.3 2.3 2.2 1.7 1.9 1.9 1.9 2.0   Notes    pre- epidural   missed dose?         Date 2/27 3/27 4/10 4/23 5/7 5/28 6/25 7/30 8/28 9/25 10/23   Total Weekly Dose 20mg 20mg 21.25 mg 21.25 mg 21.25 mg 21.25mg 21.25mg 21.25mg 21.25mg 21.25mg 21.25mg   INR 2.2 1.8 2.5 1.8 2.4 2.4 2.5 2.7 2.6 3.0 2.9   Notes sick   post- scope            Date  11/6 11/13 12/4 1/3 1/15 1/20 1/31 2/10 2/28 4/14 5/26   Total Weekly Dose 21.25 mg 18.75 mg 21.25 mg 21.25 mg 21.25 mg 20 mg 21.25 21.25 20mg 21.25mg 21.25mg   INR 4.2 3.2 2.7 2.2 3.1 3.1 2.9 3.5 2.4 2.3 2.5   Notes apap Hold x1, less GLV Inc in GLV  doxy doxy  1x dose cephalexin 1x dose dec; keflex       Date  6/24 7/22 8/24 9/10 10/8 11/5 11/18 12/2 12/30 1/26 2/22 3/22 4/19   Total Weekly Dose 21.25mg 21.25 21.25mg 21.25mg 21.25mg 21.25mg 20mg 20mg 20mg 20mg   20mg 20 mg 20mg   INR 2.4 2.4 3.0 2.8 3.0 3.1 2.5 2.5 2.5 2.6 2.9  2 2.2   Notes      Inc. GLV Dose dec    desvenlafaxine broccoli      Date 5/13 6/16 7/7 7/21 8/11 9/1 9/29        Total WeeklyDose 20 mg 20mg  20 mg 18.75mg  17.5 mg 17.5mg 17.5        INR 2.5 3.2 3.1 3.0 2.2 2.4 2.2        Notes  apap Inc GLV              Clinic Interview:  Verbal Release Authorization signed on 6/27/18 -- may speak with Sussy (wife), Nikolai (son)  Tablet Strength: pt has 2.5mg tablets  Phone: [phone] (Home), [phone]   Fax: [phone] (Attn: Tasha) [facility]    Patient Findings    Negatives:  Signs/symptoms of thrombosis, Signs/symptoms of bleeding, Laboratory test error suspected, Change in health, Change in alcohol use, Change in activity, Upcoming invasive procedure, Emergency department visit, Upcoming dental procedure, Missed doses, Extra doses, Change in medications, Change in diet/appetite, Hospital admission, Bruising, Other complaints     Plan:   1. INR is therapeutic at 2.2. (range 2 to 3). Instructed Mr. Wolf to continue regimen of warfarin 2.5 mg oral daily.  2. RTC in 4 weeks on 10/27.  3. Verbal and written information was provided to Mr. Wolf and Mrs. Wolf in clinic. Mr. Wolf voiced understanding with teach back and has no further questions at this time.     Thank you,    Trisha Velarde, PharmD  9/29/2021  11:39 EDT

## 2021-10-01 RX ORDER — PRAVASTATIN SODIUM 20 MG
20 TABLET ORAL DAILY
Qty: 90 TABLET | Refills: 1 | Status: SHIPPED | OUTPATIENT
Start: 2021-10-01 | End: 2022-03-31 | Stop reason: SDUPTHER

## 2021-10-01 NOTE — TELEPHONE ENCOUNTER
Caller: Tony Wolf    Relationship: Self      Medication requested (name and dosage): pravastatin (PRAVACHOL) 20 MG tablet  [place]arm[place] where [place] [facility] [place] [address] [address] [facility] - [phone]  - [phone] FX     Additional details provided by patient: HAS ONE DAY LEFT     Best call back number: [phone]     Does the patient have less than a 3 day supply:  [x] Yes  [] No    Mary Vargas Rep   10/01/21 15:18 EDT

## 2021-10-05 DIAGNOSIS — E11.9 TYPE 2 DIABETES MELLITUS WITHOUT COMPLICATION, WITHOUT LONG-TERM CURRENT USE OF INSULIN (HCC): ICD-10-CM

## 2021-10-05 RX ORDER — GLIMEPIRIDE 2 MG/1
2 TABLET ORAL DAILY
Qty: 90 TABLET | Refills: 0 | Status: SHIPPED | OUTPATIENT
Start: 2021-10-05 | End: 2022-01-05 | Stop reason: SDUPTHER

## 2021-10-05 NOTE — TELEPHONE ENCOUNTER
Caller: Tony Wolf    Relationship: Self      Medication requested (name and dosage):    glimepiride (AMARYL) 2 MG tablet         Pharmacy where request should be sent:  [facility] [address] [place] [address] [address] AT [facility]   Additional details provided by patient:     Best call back number: [phone]      Does the patient have less than a 3 day supply:  [x] Yes  [] No    Mary Louie Rep   10/05/21 15:32 EDT

## 2021-10-25 ENCOUNTER — OFFICE VISIT (OUTPATIENT)
Dept: INTERNAL MEDICINE | Facility: CLINIC | Age: 76
End: 2021-10-25

## 2021-10-25 ENCOUNTER — TELEPHONE (OUTPATIENT)
Dept: PHARMACY | Facility: HOSPITAL | Age: 76
End: 2021-10-25

## 2021-10-25 VITALS
HEIGHT: 72 IN | HEART RATE: 68 BPM | WEIGHT: 249 LBS | OXYGEN SATURATION: 96 % | DIASTOLIC BLOOD PRESSURE: 78 MMHG | SYSTOLIC BLOOD PRESSURE: 124 MMHG | TEMPERATURE: 97.9 F | BODY MASS INDEX: 33.72 KG/M2

## 2021-10-25 DIAGNOSIS — H61.22 IMPACTED CERUMEN OF LEFT EAR: ICD-10-CM

## 2021-10-25 DIAGNOSIS — H66.92 ACUTE OTITIS MEDIA, LEFT: Primary | ICD-10-CM

## 2021-10-25 PROCEDURE — 99213 OFFICE O/P EST LOW 20 MIN: CPT | Performed by: INTERNAL MEDICINE

## 2021-10-25 RX ORDER — AMOXICILLIN AND CLAVULANATE POTASSIUM 875; 125 MG/1; MG/1
1 TABLET, FILM COATED ORAL 2 TIMES DAILY
Qty: 20 TABLET | Refills: 0 | Status: SHIPPED | OUTPATIENT
Start: 2021-10-25 | End: 2022-06-09

## 2021-10-25 NOTE — TELEPHONE ENCOUNTER
Patient called to report that today he will start taking Augmentin 875-125mg BID x10 days for ear infection. Have discussed warfarin-Augmentin interaction and s/sx to be aware of. Patient voiced understanding. Patient will try to keep Wed, 10/27, appt in clinic but will let us know if he is unable to show

## 2021-10-25 NOTE — PROGRESS NOTES
Subjective   Tony Wolf is a 76 y.o. male.     History of Present Illness   3 days of pain in left ear that radiates to jaw. No fever or chills. Pain with eating due to the discomfort. No tooth pain. No CP or SOA.   The following portions of the patient's history were reviewed and updated as appropriate: allergies, current medications, past family history, past medical history, past social history, past surgical history and problem list.  Past Medical History:   Diagnosis Date   • Acute bronchitis    • Anxiety    • Arthritis     right knee   • Atrial fibrillation (HCC)    • Back pain    • Cancer (HCC)     skin cancer removed from face    • Carpal tunnel syndrome    • Depression    • Derangement, knee internal    • Diabetes mellitus (HCC)     DX'D TYPE II APPROX 15 YEARS AGO, DOES NOT CHECK BLOOD SUGARS AT HOME, STARTED INSULIN IN MARCH AFTER SURGERY CANCELLED FOR ELEVATED A1C   • Diabetic foot ulcer (HCC)    • Drug dependence (HCC)    • Dyspnea on exertion 1/17/2019   • GERD (gastroesophageal reflux disease)    • Glaucoma    • Heart disease    • Hyperlipidemia 11/14/2016   • Hypertension    • Hypertensive disorder    • Hypokalemia, replaced 6/2/2017   • IBS (irritable bowel syndrome)    • Knee pain, right    • Left ventricular hypertrophy 11/14/2016   • Leukocytosis, mild, likely reactive 5/31/2017   • Mixed hyperlipidemia    • Neuropathy, lumbosacral (radicular)    • Obesity     body mass index 30+-obesity   • Osteoporosis    • Postoperative urinary retention 6/1/2017   • Premature ventricular contractions    • PVC's (premature ventricular contractions) 11/14/2016   • Rash 3/18/2019   • Scoliosis    • Screening for prostate cancer 7/28/2016   • Sinusitis    • Sleep apnea    • SOB (shortness of breath)    • Spinal stenosis, lumbar region with neurogenic claudication    • Trigger middle finger of left hand    • Trigger middle finger of right hand    • Type 2 diabetes mellitus (HCC)    • Wears glasses      readers     Past Surgical History:   Procedure Laterality Date   • BACK SURGERY      injections for back    • CARPAL TUNNEL RELEASE     • CHOLECYSTECTOMY     • COLONOSCOPY      LESS THAN 5 YEARS    • ENDOSCOPY      x3   • FRACTURE SURGERY      right heel   • HERNIA REPAIR      both side inguinal    • LUMBAR EPIDURAL INJECTION     • MI TOTAL KNEE ARTHROPLASTY Right 5/30/2017    Procedure: RIGHT TOTAL KNEE ARTHROPLASTY;  Surgeon: Simon Interiano MD;  Location: [facility];  Service: Orthopedics   • TOTAL KNEE ARTHROPLASTY REVISION      left   • WRIST SURGERY      carpal tunnel bilat      Family History   Problem Relation Age of Onset   • Cancer Other    • Anemia Other    • Heart attack Other    • Cancer Mother    • Heart disease Mother    • Hypertension Mother    • Heart attack Mother    • Diabetes Mother    • Deep vein thrombosis Father    • Heart disease Father    • Hypertension Father    • Cancer Other      Social History     Socioeconomic History   • Marital status:    Tobacco Use   • Smoking status: Never Smoker   • Smokeless tobacco: Never Used   Vaping Use   • Vaping Use: Never used   Substance and Sexual Activity   • Alcohol use: No   • Drug use: No   • Sexual activity: Defer     Current Outpatient Medications on File Prior to Visit   Medication Sig Dispense Refill   • Accu-Chek Softclix Lancets lancets USE TO TEST BLOOD SUGAR THREE TIMES A  each 2   • acetaminophen (TYLENOL) 500 MG tablet Take 1,000 mg by mouth Every 6 (Six) Hours As Needed for Mild Pain (1-3).     • amLODIPine (NORVASC) 10 MG tablet TAKE ONE TABLET BY MOUTH DAILY AS DIRECTED 30 tablet 10   • bimatoprost (Lumigan) 0.01 % ophthalmic drops Administer 1 drop to both eyes Every Night.     • calcium carbonate (TUMS) 500 MG chewable tablet Chew 2 tablets Daily As Needed for Indigestion or Heartburn.     • Cholecalciferol (VITAMIN D-3) 1000 UNITS capsule Take 1,000 Units by mouth Daily.     • glimepiride (AMARYL) 2 MG tablet Take 1  "tablet by mouth Daily. 90 tablet 0   • glucose blood (Accu-Chek Kandis Plus) test strip USE ONE STRIP TO TEST THREE TIMES A  each 11   • ketoconazole (NIZORAL) 2 % cream As Needed.     • latanoprost (XALATAN) 0.005 % ophthalmic solution Administer 1 drop to both eyes Every Night.     • lisinopril (PRINIVIL,ZESTRIL) 40 MG tablet TAKE ONE TABLET BY MOUTH DAILY 90 tablet 2   • Loratadine (CLARITIN) 10 MG capsule Take 1 capsule by mouth Daily.     • Melatonin 2.5 MG chewable tablet Chew 1 tablet At Night As Needed.     • metoprolol succinate XL (TOPROL-XL) 100 MG 24 hr tablet TAKE ONE TABLET BY MOUTH TWICE A  tablet 3   • mirtazapine (REMERON) 45 MG tablet Take 45 mg by mouth Every Night.     • pravastatin (PRAVACHOL) 20 MG tablet Take 1 tablet by mouth Daily. 90 tablet 1   • venlafaxine XR (EFFEXOR-XR) 75 MG 24 hr capsule Take 75 mg by mouth Daily.     • warfarin (COUMADIN) 2.5 MG tablet Take 1 to 1.5 tablets by mouth daily or as directed by the anticoagulation clinic. 120 tablet 1     No current facility-administered medications on file prior to visit.       Review of Systems   Constitutional: Negative for chills, fatigue and fever.   HENT: Positive for ear pain.         Ear pain radiates to jaw   Respiratory: Negative for cough and shortness of breath.    Cardiovascular: Negative for chest pain and leg swelling.   Gastrointestinal: Negative for nausea and vomiting.   Neurological: Negative for dizziness and headaches.     /78   Pulse 68   Temp 97.9 °F (36.6 °C)   Ht 182.9 cm (72\")   Wt 113 kg (249 lb)   SpO2 96%   BMI 33.77 kg/m²     Objective   Physical Exam  Vitals reviewed.   Constitutional:       General: Distressed: able to partially remove but not fullly . Visualization limited but decreased light reflex with moderate erythema.   HENT:      Left Ear: There is impacted cerumen.   Cardiovascular:      Heart sounds: No murmur heard.      Pulmonary:      Effort: No respiratory distress.     "  Breath sounds: No wheezing.   Neurological:      Mental Status: He is alert.   Psychiatric:         Mood and Affect: Mood normal.         Behavior: Behavior normal.         Assessment/Plan   Diagnoses and all orders for this visit:    1. Acute otitis media, left (Primary)  -     Ambulatory Referral to ENT (Otolaryngology)  -     amoxicillin-clavulanate (Augmentin) 875-125 MG per tablet; Take 1 tablet by mouth 2 (Two) Times a Day.  Dispense: 20 tablet; Refill: 0    2. Impacted cerumen of left ear  -     Ambulatory Referral to ENT (Otolaryngology)

## 2021-10-27 ENCOUNTER — ANTICOAGULATION VISIT (OUTPATIENT)
Dept: PHARMACY | Facility: HOSPITAL | Age: 76
End: 2021-10-27

## 2021-10-27 DIAGNOSIS — I48.20 CHRONIC ATRIAL FIBRILLATION (HCC): Primary | ICD-10-CM

## 2021-10-27 LAB
INR PPP: 2.4 (ref 0.91–1.09)
PROTHROMBIN TIME: 29.4 SECONDS (ref 10–13.8)

## 2021-10-27 PROCEDURE — G0463 HOSPITAL OUTPT CLINIC VISIT: HCPCS

## 2021-10-27 PROCEDURE — 36416 COLLJ CAPILLARY BLOOD SPEC: CPT

## 2021-10-27 PROCEDURE — 85610 PROTHROMBIN TIME: CPT

## 2021-10-27 NOTE — PROGRESS NOTES
Anticoagulation Clinic Progress Note  Indication: atrial fibrillation  Referring Provider: Sravani (8/24/20)  Initial Warfarin Start Date: 2008  Goal INR: 2 - 3  Current Drug Interactions: fluoxetine, glimepiride, melatonin (PRN), mirtazepine  CHADS-VASc: 3 (age, HTN, DM)    EtOH: none  GLV: Salad (Spinach salad 1x weekly or garden salad) and serving of broccoli once a week 10/27/2021  OTC Pain Relief: Uses APAP prn (~1x/week)    Anticoagulation Clinic INR History:  Date 8/2 8/23 9/20 10/11 11/7 11/17 11/28 12/13 12/27   Total Weekly Dose 17.5mg 17.5 mg 17.5 mg 17.5mg 10mg 17.5 mg 17.5 mg 20mg 22.5 mg   INR 2.5 2.3 2.3 2.0 1.2 1.7 1.5 1.7 2.6   Notes     Pre- lumbar inj         Date 1/10/18 1/31 2/28 3/28 4/27 5/30 6/27 7/11 7/23 8/1 8/6 8/13   Total Weekly Dose 22.5 mg 22.5 mg 22.5 mg 22.5 mg 22.5 mg 22.5 mg 22.5 mg 22.5 mg 22.5 mg 20mg 12.5 mg 17.5 mg   INR 2.6 2.5 2.1 2.4 2.4 2.6 3.5 3.4 3.6 5.3, 4.9 1.9 2.7   Notes        Keflex         Date 8/20 9/4 10/2 11/6 11/15 11/29 12/31 1/7/19 1/17 1/29 2/12   Total Weekly Dose 17.5  mg 17.5 mg 17.5 mg 10mg 20mg 17.5mg 17.5mg 18.75 mg 17.5 mg 18.75 mg 20mg   INR 2.3 2.3 2.2 1.3 2.3 2.2 1.7 1.9 1.9 1.9 2.0   Notes    pre- epidural   missed dose?         Date 2/27 3/27 4/10 4/23 5/7 5/28 6/25 7/30 8/28 9/25 10/23   Total Weekly Dose 20mg 20mg 21.25 mg 21.25 mg 21.25 mg 21.25mg 21.25mg 21.25mg 21.25mg 21.25mg 21.25mg   INR 2.2 1.8 2.5 1.8 2.4 2.4 2.5 2.7 2.6 3.0 2.9   Notes sick   post- scope            Date  11/6 11/13 12/4 1/3 1/15 1/20 1/31 2/10 2/28 4/14 5/26   Total Weekly Dose 21.25 mg 18.75 mg 21.25 mg 21.25 mg 21.25 mg 20 mg 21.25 21.25 20mg 21.25mg 21.25mg   INR 4.2 3.2 2.7 2.2 3.1 3.1 2.9 3.5 2.4 2.3 2.5   Notes apap Hold x1, less GLV Inc in GLV  doxy doxy  1x dose cephalexin 1x dose dec; keflex       Date  6/24 7/22 8/24 9/10 10/8 11/5 11/18 12/2 12/30 1/26 2/22 3/22 4/19   Total Weekly Dose 21.25mg 21.25 21.25mg 21.25mg 21.25mg 21.25mg 20mg 20mg 20mg 20mg   20mg 20 mg 20mg   INR 2.4 2.4 3.0 2.8 3.0 3.1 2.5 2.5 2.5 2.6 2.9  2 2.2   Notes      Inc. GLV Dose dec    desvenlafaxine broccoli      Date 5/13 6/16  7/7 7/21 8/11 9/1 9/29 10/27       Total WeeklyDose 20 mg 20mg  20 mg 18.75mg  17.5 mg 17.5mg 17.5 17.5mg       INR 2.5 3.2 3.1 3.0 2.2 2.4 2.2 2.4       Notes  apap Inc GLV     augmentin day 3         Clinic Interview:  Verbal Release Authorization signed on 6/27/18 -- may speak with Sussy (wife), Nikolai (son)  Tablet Strength: pt has 2.5mg tablets  Phone: [phone] (Home), [phone]   Fax: [phone] (Attn: Tasha) [facility]    Patient Findings  Negatives:  Signs/symptoms of thrombosis, Signs/symptoms of bleeding, Laboratory test error suspected, Change in health, Change in alcohol use, Change in activity, Upcoming invasive procedure, Emergency department visit, Upcoming dental procedure, Missed doses, Extra doses, Change in medications, Change in diet/appetite, Hospital admission, Bruising, Other complaints   Comments:  Patient began Augmentin Monday for ear infection with last dose being on 11/4. He went to see Dr. Johnson and received ear drops as well. His appetite has decreased since yesterday because it hurts him to chew.  He is still eating his spinach salad and broccoli.     Plan:   1. INR is therapeutic at 2.4. (range 2 to 3). He will continue antibiotic and GLV intake. Instructed Mr. Wolf to continue regimen of warfarin 2.5 mg oral daily.  2. RTC in 1 week on 11/3 to ensure WNL.  3. Verbal and written information was provided to Mr. Wolf and Mrs. Wolf in clinic. Mr. Wolf voiced understanding with teach back and has no further questions at this time.     Thank you,    Ama Mccloud, PharmD  10/27/2021  11:30 EDT

## 2021-11-03 ENCOUNTER — ANTICOAGULATION VISIT (OUTPATIENT)
Dept: PHARMACY | Facility: HOSPITAL | Age: 76
End: 2021-11-03

## 2021-11-03 DIAGNOSIS — I48.20 CHRONIC ATRIAL FIBRILLATION (HCC): Primary | ICD-10-CM

## 2021-11-03 LAB
INR PPP: 2.3 (ref 0.91–1.09)
PROTHROMBIN TIME: 27.3 SECONDS (ref 10–13.8)

## 2021-11-03 PROCEDURE — 85610 PROTHROMBIN TIME: CPT

## 2021-11-03 PROCEDURE — 36416 COLLJ CAPILLARY BLOOD SPEC: CPT

## 2021-11-03 PROCEDURE — G0463 HOSPITAL OUTPT CLINIC VISIT: HCPCS

## 2021-11-03 NOTE — PROGRESS NOTES
Anticoagulation Clinic Progress Note  Indication: atrial fibrillation  Referring Provider: Sravani (8/24/20)  Initial Warfarin Start Date: 2008  Goal INR: 2 - 3  Current Drug Interactions: fluoxetine, glimepiride, melatonin (PRN), mirtazepine  CHADS-VASc: 3 (age, HTN, DM)    EtOH: none  GLV: Salad (Spinach salad 1x weekly or garden salad) and serving of broccoli once a week 10/27/2021  OTC Pain Relief: Uses APAP prn (~1x/week)    Anticoagulation Clinic INR History:      Date 8/20 9/4 10/2 11/6 11/15 11/29 12/31 1/7/19 1/17 1/29 2/12   Total Weekly Dose 17.5  mg 17.5 mg 17.5 mg 10mg 20mg 17.5mg 17.5mg 18.75 mg 17.5 mg 18.75 mg 20mg   INR 2.3 2.3 2.2 1.3 2.3 2.2 1.7 1.9 1.9 1.9 2.0   Notes    pre- epidural   missed dose?         Date 2/27 3/27 4/10 4/23 5/7 5/28 6/25 7/30 8/28 9/25 10/23   Total Weekly Dose 20mg 20mg 21.25 mg 21.25 mg 21.25 mg 21.25mg 21.25mg 21.25mg 21.25mg 21.25mg 21.25mg   INR 2.2 1.8 2.5 1.8 2.4 2.4 2.5 2.7 2.6 3.0 2.9   Notes sick   post- scope            Date  11/6 11/13 12/4 1/3 1/15 1/20 1/31 2/10 2/28 4/14 5/26   Total Weekly Dose 21.25 mg 18.75 mg 21.25 mg 21.25 mg 21.25 mg 20 mg 21.25 21.25 20mg 21.25mg 21.25mg   INR 4.2 3.2 2.7 2.2 3.1 3.1 2.9 3.5 2.4 2.3 2.5   Notes apap Hold x1, less GLV Inc in GLV  doxy doxy  1x dose cephalexin 1x dose dec; keflex       Date  6/24 7/22 8/24 9/10 10/8 11/5 11/18 12/2 12/30 1/26 2/22 3/22 4/19   Total Weekly Dose 21.25mg 21.25 21.25mg 21.25mg 21.25mg 21.25mg 20mg 20mg 20mg 20mg  20mg 20 mg 20mg   INR 2.4 2.4 3.0 2.8 3.0 3.1 2.5 2.5 2.5 2.6 2.9  2 2.2   Notes      Inc. GLV Dose dec    desvenlafaxine broccoli      Date 5/13 6/16  7/7 7/21 8/11 9/1 9/29 10/27 11/3      Total WeeklyDose 20 mg 20mg  20 mg 18.75mg  17.5 mg 17.5mg 17.5 17.5mg 17.5mg      INR 2.5 3.2 3.1 3.0 2.2 2.4 2.2 2.4 2.3      Notes  apap Inc GLV     augmentin day 3 augementin        Clinic Interview:  Verbal Release Authorization signed on 6/27/18 -- may speak with Sussy (wife)Nikolai  (son)  Tablet Strength: pt has 2.5mg tablets  Phone: [phone] (Home), [phone]   Fax: [phone] (Attn: Tasha) [facility]    Patient Findings    Negatives:  Signs/symptoms of thrombosis, Signs/symptoms of bleeding, Laboratory test error suspected, Change in health, Change in alcohol use, Change in activity, Upcoming invasive procedure, Emergency department visit, Upcoming dental procedure, Missed doses, Extra doses, Change in medications, Change in diet/appetite, Hospital admission, Bruising, Other complaints   Comments:  Last dose augmentin today         Plan:   1. INR is therapeutic at 2.3. (range 2 to 3).  Instructed Mr. Wolf to continue regimen of warfarin 2.5 mg oral daily.  2. RTC in 4 weeks  3. Verbal and written information was provided to Mr. Wolf and Mrs. Wolf in clinic. Mr. Wolf voiced understanding with teach back and has no further questions at this time.     Thank you,  Teena Boykin, TonaD.  11/03/21   11:43 EDT

## 2021-12-01 ENCOUNTER — ANTICOAGULATION VISIT (OUTPATIENT)
Dept: PHARMACY | Facility: HOSPITAL | Age: 76
End: 2021-12-01

## 2021-12-01 DIAGNOSIS — I48.20 CHRONIC ATRIAL FIBRILLATION (HCC): Primary | ICD-10-CM

## 2021-12-01 LAB
INR PPP: 1.5 (ref 0.91–1.09)
PROTHROMBIN TIME: 18.4 SECONDS (ref 10–13.8)

## 2021-12-01 PROCEDURE — G0463 HOSPITAL OUTPT CLINIC VISIT: HCPCS

## 2021-12-01 PROCEDURE — 85610 PROTHROMBIN TIME: CPT

## 2021-12-01 PROCEDURE — 36416 COLLJ CAPILLARY BLOOD SPEC: CPT

## 2021-12-01 NOTE — PROGRESS NOTES
Anticoagulation Clinic Progress Note  Indication: atrial fibrillation  Referring Provider: Sravani (8/24/20)  Initial Warfarin Start Date: 2008  Goal INR: 2 - 3  Current Drug Interactions: fluoxetine, glimepiride, melatonin (PRN), mirtazepine, MVI  CHADS-VASc: 3 (age, HTN, DM)    EtOH: none  GLV: Salad (Spinach salad 1x weekly or garden salad) and serving of broccoli once a week 10/27/2021 + glucerna q2-3d 12/1/21  OTC Pain Relief: Uses APAP prn (~1x/week)    Anticoagulation Clinic INR History:      Date 8/20 9/4 10/2 11/6 11/15 11/29 12/31 1/7/19 1/17 1/29 2/12   Total Weekly Dose 17.5  mg 17.5 mg 17.5 mg 10mg 20mg 17.5mg 17.5mg 18.75 mg 17.5 mg 18.75 mg 20mg   INR 2.3 2.3 2.2 1.3 2.3 2.2 1.7 1.9 1.9 1.9 2.0   Notes    pre- epidural   missed dose?         Date 2/27 3/27 4/10 4/23 5/7 5/28 6/25 7/30 8/28 9/25 10/23   Total Weekly Dose 20mg 20mg 21.25 mg 21.25 mg 21.25 mg 21.25mg 21.25mg 21.25mg 21.25mg 21.25mg 21.25mg   INR 2.2 1.8 2.5 1.8 2.4 2.4 2.5 2.7 2.6 3.0 2.9   Notes sick   post- scope            Date  11/6 11/13 12/4 1/3 1/15 1/20 1/31 2/10 2/28 4/14 5/26   Total Weekly Dose 21.25 mg 18.75 mg 21.25 mg 21.25 mg 21.25 mg 20 mg 21.25 21.25 20mg 21.25mg 21.25mg   INR 4.2 3.2 2.7 2.2 3.1 3.1 2.9 3.5 2.4 2.3 2.5   Notes apap Hold x1, less GLV Inc in GLV  doxy doxy  1x dose cephalexin 1x dose dec; keflex       Date  6/24 7/22 8/24 9/10 10/8 11/5 11/18 12/2 12/30 1/26 2/22 3/22 4/19   Total Weekly Dose 21.25mg 21.25 21.25mg 21.25mg 21.25mg 21.25mg 20mg 20mg 20mg 20mg  20mg 20 mg 20mg   INR 2.4 2.4 3.0 2.8 3.0 3.1 2.5 2.5 2.5 2.6 2.9  2 2.2   Notes      Inc. GLV Dose dec    desvenlafaxine broccoli      Date 5/13 6/16  7/7 7/21 8/11 9/1 9/29 10/27 11/3 12/1     Total WeeklyDose 20 mg 20mg  20 mg 18.75mg  17.5 mg 17.5mg 17.5 17.5mg 17.5mg 17.5mg     INR 2.5 3.2 3.1 3.0 2.2 2.4 2.2 2.4 2.3 1.5     Notes  apap Inc GLV     augmentin day 3 augementin glucerna       Clinic Interview:  Verbal Release Authorization signed  on 6/27/18 -- may speak with Sussy (wife), Nikolai (son)  Tablet Strength: pt has 2.5mg tablets  Phone: [phone] (Home), [phone]   Fax: [phone] (Attn: Tasha) [facility]    Patient Findings    Positives:  Change in diet/appetite   Negatives:  Signs/symptoms of thrombosis, Signs/symptoms of bleeding, Laboratory test error suspected, Change in health, Change in alcohol use, Change in activity, Upcoming invasive procedure, Emergency department visit, Upcoming dental procedure, Missed doses, Extra doses, Change in medications, Hospital admission, Bruising, Other complaints   Comments:  Has been drinking a glucerna/boost drink q2-3d. Discussed consistency, plans to continue use. Has had these before but thinks he has been having more now than he used to.          Plan:   1. INR is SUBtherapeutic at 1.5. (range 2 to 3).  Instructed Mr. Wolf to BOOST tonight's dose to 5mg then take increased regimen of warfarin 2.5 mg oral daily except 3.75mg Tuesday.  2. RTC in 1 week  3. Verbal and written information was provided to Mr. Wolf and Mrs. Wolf in clinic. Mr. Wolf voiced understanding with teach back and has no further questions at this time.     Teena Boykin, PharmD.  12/01/21   11:36 EST

## 2021-12-08 ENCOUNTER — OFFICE VISIT (OUTPATIENT)
Dept: INTERNAL MEDICINE | Facility: CLINIC | Age: 76
End: 2021-12-08

## 2021-12-08 ENCOUNTER — LAB (OUTPATIENT)
Dept: LAB | Facility: HOSPITAL | Age: 76
End: 2021-12-08

## 2021-12-08 VITALS
HEART RATE: 76 BPM | WEIGHT: 249.8 LBS | DIASTOLIC BLOOD PRESSURE: 72 MMHG | HEIGHT: 72 IN | BODY MASS INDEX: 33.83 KG/M2 | TEMPERATURE: 98.2 F | OXYGEN SATURATION: 96 % | SYSTOLIC BLOOD PRESSURE: 138 MMHG

## 2021-12-08 DIAGNOSIS — I10 ESSENTIAL HYPERTENSION: Primary | ICD-10-CM

## 2021-12-08 DIAGNOSIS — E78.5 HYPERLIPIDEMIA LDL GOAL <100: ICD-10-CM

## 2021-12-08 DIAGNOSIS — I48.20 CHRONIC ATRIAL FIBRILLATION (HCC): ICD-10-CM

## 2021-12-08 DIAGNOSIS — F33.2 MAJOR DEPRESSIVE DISORDER, RECURRENT SEVERE WITHOUT PSYCHOTIC FEATURES (HCC): ICD-10-CM

## 2021-12-08 DIAGNOSIS — I10 ESSENTIAL HYPERTENSION: ICD-10-CM

## 2021-12-08 PROBLEM — D69.6 THROMBOCYTOPENIA: Status: RESOLVED | Noted: 2017-05-31 | Resolved: 2021-12-08

## 2021-12-08 PROCEDURE — 80053 COMPREHEN METABOLIC PANEL: CPT

## 2021-12-08 PROCEDURE — 80061 LIPID PANEL: CPT

## 2021-12-08 PROCEDURE — 99214 OFFICE O/P EST MOD 30 MIN: CPT | Performed by: INTERNAL MEDICINE

## 2021-12-08 NOTE — PROGRESS NOTES
Subjective   Tony Wolf is a 76 y.o. male.   Chief Complaint   Patient presents with   • Follow-up   • Hypertension   • Hyperlipidemia   • Atrial Fibrillation   • Depression   • Diabetes       History of Present Illness   Here for f/u on chronic conditions: HTN, HLP, afib, and depression. HTN controlled with Novasc, Lisinopril, and Toprolol. No CP or SOA. No HA. HLP controlled with Pravastatin.  Depression controlled with Effexor. Afib controlled with coumadin.   The following portions of the patient's history were reviewed and updated as appropriate: allergies, current medications, past family history, past medical history, past social history, past surgical history and problem list.  Past Medical History:   Diagnosis Date   • Acute bronchitis    • Anxiety    • Arthritis     right knee   • Atrial fibrillation (HCC)    • Back pain    • Cancer (HCC)     skin cancer removed from face    • Carpal tunnel syndrome    • Depression    • Derangement, knee internal    • Diabetes mellitus (HCC)     DX'D TYPE II APPROX 15 YEARS AGO, DOES NOT CHECK BLOOD SUGARS AT HOME, STARTED INSULIN IN MARCH AFTER SURGERY CANCELLED FOR ELEVATED A1C   • Diabetic foot ulcer (HCC)    • Drug dependence (HCC)    • Dyspnea on exertion 1/17/2019   • GERD (gastroesophageal reflux disease)    • Glaucoma    • Heart disease    • Hyperlipidemia 11/14/2016   • Hypertension    • Hypertensive disorder    • Hypokalemia, replaced 6/2/2017   • IBS (irritable bowel syndrome)    • Knee pain, right    • Left ventricular hypertrophy 11/14/2016   • Leukocytosis, mild, likely reactive 5/31/2017   • Mixed hyperlipidemia    • Neuropathy, lumbosacral (radicular)    • Obesity     body mass index 30+-obesity   • Osteoporosis    • Postoperative urinary retention 6/1/2017   • Premature ventricular contractions    • PVC's (premature ventricular contractions) 11/14/2016   • Rash 3/18/2019   • Scoliosis    • Screening for prostate cancer 7/28/2016   • Sinusitis    •  Sleep apnea    • SOB (shortness of breath)    • Spinal stenosis, lumbar region with neurogenic claudication    • Thrombocytopenia (HCC) 5/31/2017   • Trigger middle finger of left hand    • Trigger middle finger of right hand    • Type 2 diabetes mellitus (HCC)    • Wears glasses     readers     Past Surgical History:   Procedure Laterality Date   • BACK SURGERY      injections for back    • CARPAL TUNNEL RELEASE     • CHOLECYSTECTOMY     • COLONOSCOPY      LESS THAN 5 YEARS    • ENDOSCOPY      x3   • FRACTURE SURGERY      right heel   • HERNIA REPAIR      both side inguinal    • LUMBAR EPIDURAL INJECTION     • DE TOTAL KNEE ARTHROPLASTY Right 5/30/2017    Procedure: RIGHT TOTAL KNEE ARTHROPLASTY;  Surgeon: Simno Interiano MD;  Location: [facility];  Service: Orthopedics   • TOTAL KNEE ARTHROPLASTY REVISION      left   • WRIST SURGERY      carpal tunnel bilat      Family History   Problem Relation Age of Onset   • Cancer Other    • Anemia Other    • Heart attack Other    • Cancer Mother    • Heart disease Mother    • Hypertension Mother    • Heart attack Mother    • Diabetes Mother    • Deep vein thrombosis Father    • Heart disease Father    • Hypertension Father    • Cancer Other      Social History     Socioeconomic History   • Marital status:    Tobacco Use   • Smoking status: Never Smoker   • Smokeless tobacco: Never Used   Vaping Use   • Vaping Use: Never used   Substance and Sexual Activity   • Alcohol use: No   • Drug use: No   • Sexual activity: Defer     Current Outpatient Medications on File Prior to Visit   Medication Sig Dispense Refill   • Accu-Chek Softclix Lancets lancets USE TO TEST BLOOD SUGAR THREE TIMES A  each 2   • acetaminophen (TYLENOL) 500 MG tablet Take 1,000 mg by mouth Every 6 (Six) Hours As Needed for Mild Pain (1-3).     • amLODIPine (NORVASC) 10 MG tablet TAKE ONE TABLET BY MOUTH DAILY AS DIRECTED 30 tablet 10   • amoxicillin-clavulanate (Augmentin) 875-125 MG per tablet  Take 1 tablet by mouth 2 (Two) Times a Day. 20 tablet 0   • bimatoprost (Lumigan) 0.01 % ophthalmic drops Administer 1 drop to both eyes Every Night.     • calcium carbonate (TUMS) 500 MG chewable tablet Chew 2 tablets Daily As Needed for Indigestion or Heartburn.     • Cholecalciferol (VITAMIN D-3) 1000 UNITS capsule Take 1,000 Units by mouth Daily.     • glimepiride (AMARYL) 2 MG tablet Take 1 tablet by mouth Daily. 90 tablet 0   • glucose blood (Accu-Chek Kandis Plus) test strip USE ONE STRIP TO TEST THREE TIMES A  each 11   • ketoconazole (NIZORAL) 2 % cream As Needed.     • latanoprost (XALATAN) 0.005 % ophthalmic solution Administer 1 drop to both eyes Every Night.     • lisinopril (PRINIVIL,ZESTRIL) 40 MG tablet TAKE ONE TABLET BY MOUTH DAILY 90 tablet 2   • Loratadine (CLARITIN) 10 MG capsule Take 1 capsule by mouth Daily.     • Melatonin 2.5 MG chewable tablet Chew 1 tablet At Night As Needed.     • metoprolol succinate XL (TOPROL-XL) 100 MG 24 hr tablet TAKE ONE TABLET BY MOUTH TWICE A  tablet 3   • mirtazapine (REMERON) 45 MG tablet Take 45 mg by mouth Every Night.     • pravastatin (PRAVACHOL) 20 MG tablet Take 1 tablet by mouth Daily. 90 tablet 1   • venlafaxine XR (EFFEXOR-XR) 75 MG 24 hr capsule Take 75 mg by mouth Daily.     • warfarin (COUMADIN) 2.5 MG tablet Take 1 to 1.5 tablets by mouth daily or as directed by the anticoagulation clinic. 120 tablet 1     No current facility-administered medications on file prior to visit.       Review of Systems   Constitutional: Negative for chills, fatigue and fever.   Respiratory: Negative for cough, shortness of breath and stridor.    Cardiovascular: Negative for chest pain and palpitations.   Gastrointestinal: Negative for constipation, diarrhea and vomiting.   Neurological: Negative for light-headedness and headaches.   Psychiatric/Behavioral: Negative for dysphoric mood. The patient is not nervous/anxious.      /72   Pulse 76   Temp  "98.2 °F (36.8 °C)   Ht 182.9 cm (72\")   Wt 113 kg (249 lb 12.8 oz)   SpO2 96%   BMI 33.88 kg/m²     Objective   Physical Exam  Vitals reviewed.   Cardiovascular:      Rate and Rhythm: Normal rate. Rhythm irregular.   Pulmonary:      Effort: No respiratory distress.      Breath sounds: Normal breath sounds. No wheezing or rales.   Abdominal:      General: There is no distension.      Tenderness: There is no abdominal tenderness. There is no guarding.   Neurological:      General: No focal deficit present.      Mental Status: He is alert and oriented to person, place, and time.   Psychiatric:         Mood and Affect: Mood normal.         Behavior: Behavior normal.         Assessment/Plan   Diagnoses and all orders for this visit:    1. Essential hypertension (Primary)  -     Comprehensive Metabolic Panel; Future  Continue Norvasc 10 mg po qd, lisinopril 40 mg po qd, and Toprol xl 100 mg po qd  2. Hyperlipidemia LDL goal <100  -     Lipid Panel; Future  Continue Pravastatin 20 mg po qd  3. Chronic atrial fibrillation (HCC)  Continue coumadin 2.5 mg and INR check and coumadin adjustments managed by coumadin clinic.   4. Major depressive disorder, recurrent severe without psychotic features (HCC)  Continue Effexor 75 mg po qd.              "

## 2021-12-09 ENCOUNTER — ANTICOAGULATION VISIT (OUTPATIENT)
Dept: PHARMACY | Facility: HOSPITAL | Age: 76
End: 2021-12-09

## 2021-12-09 DIAGNOSIS — I48.20 CHRONIC ATRIAL FIBRILLATION (HCC): Primary | ICD-10-CM

## 2021-12-09 LAB
ALBUMIN SERPL-MCNC: 4 G/DL (ref 3.5–5.2)
ALBUMIN/GLOB SERPL: 1.4 G/DL
ALP SERPL-CCNC: 58 U/L (ref 39–117)
ALT SERPL W P-5'-P-CCNC: 30 U/L (ref 1–41)
ANION GAP SERPL CALCULATED.3IONS-SCNC: 10.6 MMOL/L (ref 5–15)
AST SERPL-CCNC: 37 U/L (ref 1–40)
BILIRUB SERPL-MCNC: 1.1 MG/DL (ref 0–1.2)
BUN SERPL-MCNC: 11 MG/DL (ref 8–23)
BUN/CREAT SERPL: 11.3 (ref 7–25)
CALCIUM SPEC-SCNC: 9.5 MG/DL (ref 8.6–10.5)
CHLORIDE SERPL-SCNC: 106 MMOL/L (ref 98–107)
CHOLEST SERPL-MCNC: 165 MG/DL (ref 0–200)
CO2 SERPL-SCNC: 25.4 MMOL/L (ref 22–29)
CREAT SERPL-MCNC: 0.97 MG/DL (ref 0.76–1.27)
GFR SERPL CREATININE-BSD FRML MDRD: 76 ML/MIN/1.73
GLOBULIN UR ELPH-MCNC: 2.8 GM/DL
GLUCOSE SERPL-MCNC: 144 MG/DL (ref 65–99)
HDLC SERPL-MCNC: 30 MG/DL (ref 40–60)
INR PPP: 1.6 (ref 0.91–1.09)
LDLC SERPL CALC-MCNC: 102 MG/DL (ref 0–100)
LDLC/HDLC SERPL: 3.23 {RATIO}
POTASSIUM SERPL-SCNC: 4.3 MMOL/L (ref 3.5–5.2)
PROT SERPL-MCNC: 6.8 G/DL (ref 6–8.5)
PROTHROMBIN TIME: 19.1 SECONDS (ref 10–13.8)
SODIUM SERPL-SCNC: 142 MMOL/L (ref 136–145)
TRIGL SERPL-MCNC: 190 MG/DL (ref 0–150)
VLDLC SERPL-MCNC: 33 MG/DL (ref 5–40)

## 2021-12-09 PROCEDURE — 36416 COLLJ CAPILLARY BLOOD SPEC: CPT

## 2021-12-09 PROCEDURE — G0463 HOSPITAL OUTPT CLINIC VISIT: HCPCS

## 2021-12-09 PROCEDURE — 85610 PROTHROMBIN TIME: CPT

## 2021-12-09 NOTE — PROGRESS NOTES
Anticoagulation Clinic Progress Note  Indication: atrial fibrillation  Referring Provider: Sravani (8/24/20)  Initial Warfarin Start Date: 2008  Goal INR: 2 - 3  Current Drug Interactions: fluoxetine, glimepiride, melatonin (PRN), mirtazepine, MVI  CHADS-VASc: 3 (age, HTN, DM)    EtOH: none  GLV: Salad (Spinach salad 1x weekly or garden salad) and serving of broccoli once a week 10/27/2021 + glucerna q2-3d 12/1/21  OTC Pain Relief: Uses APAP prn (~1x/week)    Anticoagulation Clinic INR History:      Date 8/20 9/4 10/2 11/6 11/15 11/29 12/31 1/7/19 1/17 1/29 2/12   Total Weekly Dose 17.5  mg 17.5 mg 17.5 mg 10mg 20mg 17.5mg 17.5mg 18.75 mg 17.5 mg 18.75 mg 20mg   INR 2.3 2.3 2.2 1.3 2.3 2.2 1.7 1.9 1.9 1.9 2.0   Notes    pre- epidural   missed dose?         Date 2/27 3/27 4/10 4/23 5/7 5/28 6/25 7/30 8/28 9/25 10/23   Total Weekly Dose 20mg 20mg 21.25 mg 21.25 mg 21.25 mg 21.25mg 21.25mg 21.25mg 21.25mg 21.25mg 21.25mg   INR 2.2 1.8 2.5 1.8 2.4 2.4 2.5 2.7 2.6 3.0 2.9   Notes sick   post- scope            Date  11/6 11/13 12/4 1/3 1/15 1/20 1/31 2/10 2/28 4/14 5/26   Total Weekly Dose 21.25 mg 18.75 mg 21.25 mg 21.25 mg 21.25 mg 20 mg 21.25 21.25 20mg 21.25mg 21.25mg   INR 4.2 3.2 2.7 2.2 3.1 3.1 2.9 3.5 2.4 2.3 2.5   Notes apap Hold x1, less GLV Inc in GLV  doxy doxy  1x dose cephalexin 1x dose dec; keflex       Date  6/24 7/22 8/24 9/10 10/8 11/5 11/18 12/2 12/30 1/26 2/22 3/22 4/19   Total Weekly Dose 21.25mg 21.25 21.25mg 21.25mg 21.25mg 21.25mg 20mg 20mg 20mg 20mg  20mg 20 mg 20mg   INR 2.4 2.4 3.0 2.8 3.0 3.1 2.5 2.5 2.5 2.6 2.9  2 2.2   Notes      Inc. GLV Dose dec    desvenlafaxine broccoli      Date 5/13 6/16  7/7 7/21 8/11 9/1 9/29 10/27 11/3 12/1 12/9    Total WeeklyDose 20 mg 20mg  20 mg 18.75mg  17.5 mg 17.5mg 17.5 17.5mg 17.5mg 17.5mg 18.75mg    INR 2.5 3.2 3.1 3.0 2.2 2.4 2.2 2.4 2.3 1.5 1.6    Notes  apap Inc GLV     augmentin day 3 augementin glucerna       Clinic Interview:  Verbal Release  Authorization signed on 6/27/18 -- may speak with Sussy (wife), Nikolai (son)  Tablet Strength: pt has 2.5mg tablets  Phone: [phone] (Home), [phone]   Fax: [phone] (Attn: Tasha) [facility]    Patient Findings      Negatives:  Signs/symptoms of thrombosis, Signs/symptoms of bleeding, Laboratory test error suspected, Change in health, Change in alcohol use, Change in activity, Upcoming invasive procedure, Emergency department visit, Upcoming dental procedure, Missed doses, Extra doses, Change in medications, Change in diet/appetite, Hospital admission, Bruising, Other complaints       Plan:   1. INR is again subtherapeutic at 1.6. (range 2 to 3).  Instructed Mr. Wolf to BOOST tonight's dose to 3.75mg then take increased regimen of warfarin 2.5 mg oral daily except 3.75mg MonWedFri  2. RTC in 1 week  3. Verbal and written information was provided to Mr. Wolf and Mrs. Wolf in clinic. Mr. Wolf voiced understanding with teach back and has no further questions at this time.       Teena Boykin, PharmD.  12/09/21   11:37 EST

## 2021-12-14 RX ORDER — AMLODIPINE BESYLATE 10 MG/1
TABLET ORAL
Qty: 30 TABLET | Refills: 10 | Status: SHIPPED | OUTPATIENT
Start: 2021-12-14 | End: 2022-04-13 | Stop reason: SDUPTHER

## 2021-12-16 ENCOUNTER — ANTICOAGULATION VISIT (OUTPATIENT)
Dept: PHARMACY | Facility: HOSPITAL | Age: 76
End: 2021-12-16

## 2021-12-16 DIAGNOSIS — I48.20 CHRONIC ATRIAL FIBRILLATION (HCC): Primary | ICD-10-CM

## 2021-12-16 LAB
INR PPP: 2.2 (ref 0.91–1.09)
PROTHROMBIN TIME: 25.8 SECONDS (ref 10–13.8)

## 2021-12-16 PROCEDURE — 36416 COLLJ CAPILLARY BLOOD SPEC: CPT

## 2021-12-16 PROCEDURE — G0463 HOSPITAL OUTPT CLINIC VISIT: HCPCS

## 2021-12-16 PROCEDURE — 85610 PROTHROMBIN TIME: CPT

## 2021-12-16 NOTE — PROGRESS NOTES
Anticoagulation Clinic Progress Note  Indication: atrial fibrillation  Referring Provider: Sravani [last appt 9/29/21  next: NS]  Initial Warfarin Start Date: 2008  Goal INR: 2 - 3  Current Drug Interactions: fluoxetine, glimepiride, melatonin (PRN), mirtazepine, MVI  CHADS-VASc: 3 (age, HTN, DM)    EtOH: none  GLV: Salad (Spinach salad 1x weekly or garden salad) and serving of broccoli once a week 10/27/2021 + glucerna q2-3d 12/1/21  OTC Pain Relief: Uses APAP prn (~1x/week)    Anticoagulation Clinic INR History:      Date 8/20 9/4 10/2 11/6 11/15 11/29 12/31 1/7/19 1/17 1/29 2/12   Total Weekly Dose 17.5  mg 17.5 mg 17.5 mg 10mg 20mg 17.5mg 17.5mg 18.75 mg 17.5 mg 18.75 mg 20mg   INR 2.3 2.3 2.2 1.3 2.3 2.2 1.7 1.9 1.9 1.9 2.0   Notes    pre- epidural   missed dose?         Date 2/27 3/27 4/10 4/23 5/7 5/28 6/25 7/30 8/28 9/25 10/23   Total Weekly Dose 20mg 20mg 21.25 mg 21.25 mg 21.25 mg 21.25mg 21.25mg 21.25mg 21.25mg 21.25mg 21.25mg   INR 2.2 1.8 2.5 1.8 2.4 2.4 2.5 2.7 2.6 3.0 2.9   Notes sick   post- scope            Date  11/6 11/13 12/4 1/3 1/15 1/20 1/31 2/10 2/28 4/14 5/26   Total Weekly Dose 21.25 mg 18.75 mg 21.25 mg 21.25 mg 21.25 mg 20 mg 21.25 21.25 20mg 21.25mg 21.25mg   INR 4.2 3.2 2.7 2.2 3.1 3.1 2.9 3.5 2.4 2.3 2.5   Notes apap Hold x1, less GLV Inc in GLV  doxy doxy  1x dose cephalexin 1x dose dec; keflex       Date  6/24 7/22 8/24 9/10 10/8 11/5 11/18 12/2 12/30 1/26 2/22 3/22 4/19   Total Weekly Dose 21.25mg 21.25 21.25mg 21.25mg 21.25mg 21.25mg 20mg 20mg 20mg 20mg  20mg 20 mg 20mg   INR 2.4 2.4 3.0 2.8 3.0 3.1 2.5 2.5 2.5 2.6 2.9  2 2.2   Notes      Inc. GLV Dose dec    desvenlafaxine broccoli      Date 5/13 6/16  7/7 7/21 8/11 9/1 9/29 10/27 11/3 12/1 12/9 12/16   Total WeeklyDose 20 mg 20mg  20 mg 18.75mg  17.5 mg 17.5mg 17.5 17.5mg 17.5mg 17.5mg 18.75mg 22.5mg   INR 2.5 3.2 3.1 3.0 2.2 2.4 2.2 2.4 2.3 1.5 1.6 2.2   Notes  apap Inc GLV     augmentin day 3 augementin glucerna  Boost x2      Clinic Interview:  Verbal Release Authorization signed on 6/27/18 -- may speak with Sussy (wife), Nikolai (son)  Tablet Strength: pt has 2.5mg tablets  Phone: [phone] (Home), [phone]   Fax: [phone] (Attn: Tasha) [facility]    Patient Findings  Negatives:  Signs/symptoms of thrombosis, Signs/symptoms of bleeding, Laboratory test error suspected, Change in health, Change in alcohol use, Change in activity, Upcoming invasive procedure, Emergency department visit, Upcoming dental procedure, Missed doses, Extra doses, Change in medications, Change in diet/appetite, Hospital admission, Bruising, Other complaints   Comments:  Patient took warfarin as directed by clinic from last week's visit. Did clarify with Mr and Mrs Wolf that Vitamin K causes blood to get thicker, and that the key is to be consistent with Vitamin K intake from week to week. Otherwise no changes from previous visit.        Plan:   1. INR is back to therapeutic at 2.2 (range 2 to 3).  Instructed Mr. Wolf to continue recently increased regimen of warfarin 2.5 mg oral daily except 3.75mg MonWedFri until recheck. (21.25mg/wk)  2. RTC in ~1.5 weeks, 12/28 to ensure WNL.   3. Verbal and written information was provided to Mr. Wolf and Mrs. Wolf in clinic. Mr. Wolf voiced understanding with teach back and has no further questions at this time.     Yash Mcneil,  PharmD Candidate 2022 12/16/21  11:52 EST    [place], Sariah Almodovar, PharmD, have reviewed the note in full and agree with the assessment and plan.  12/16/21  12:21 EST

## 2021-12-28 ENCOUNTER — ANTICOAGULATION VISIT (OUTPATIENT)
Dept: PHARMACY | Facility: HOSPITAL | Age: 76
End: 2021-12-28

## 2021-12-28 DIAGNOSIS — I48.20 CHRONIC ATRIAL FIBRILLATION (HCC): Primary | ICD-10-CM

## 2021-12-28 LAB
INR PPP: 2.6 (ref 0.91–1.09)
PROTHROMBIN TIME: 31.2 SECONDS (ref 10–13.8)

## 2021-12-28 PROCEDURE — G0463 HOSPITAL OUTPT CLINIC VISIT: HCPCS

## 2021-12-28 PROCEDURE — 85610 PROTHROMBIN TIME: CPT

## 2021-12-28 PROCEDURE — 36416 COLLJ CAPILLARY BLOOD SPEC: CPT

## 2021-12-28 NOTE — PROGRESS NOTES
Anticoagulation Clinic Progress Note  Indication: atrial fibrillation  Referring Provider: Sravani [last appt 9/29/21  next: NS]  Initial Warfarin Start Date: 2008  Goal INR: 2 - 3  Current Drug Interactions: fluoxetine, glimepiride, melatonin (PRN), mirtazepine, MVI  CHADS-VASc: 3 (age, HTN, DM)    EtOH: none  GLV: Salad (Spinach salad 1x weekly or garden salad) and serving of broccoli once a week 12/1/21  OTC Pain Relief: Uses APAP prn (~1x/week)    Anticoagulation Clinic INR History:  Date 8/20 9/4 10/2 11/6 11/15 11/29 12/31 1/7/19 1/17 1/29 2/12   Total Weekly Dose 17.5  mg 17.5 mg 17.5 mg 10mg 20mg 17.5mg 17.5mg 18.75 mg 17.5 mg 18.75 mg 20mg   INR 2.3 2.3 2.2 1.3 2.3 2.2 1.7 1.9 1.9 1.9 2.0   Notes    pre- epidural   missed dose?         Date 2/27 3/27 4/10 4/23 5/7 5/28 6/25 7/30 8/28 9/25 10/23   Total Weekly Dose 20mg 20mg 21.25 mg 21.25 mg 21.25 mg 21.25mg 21.25mg 21.25mg 21.25mg 21.25mg 21.25mg   INR 2.2 1.8 2.5 1.8 2.4 2.4 2.5 2.7 2.6 3.0 2.9   Notes sick   post- scope            Date  11/6 11/13 12/4 1/3 1/15 1/20 1/31 2/10 2/28 4/14 5/26   Total Weekly Dose 21.25 mg 18.75 mg 21.25 mg 21.25 mg 21.25 mg 20 mg 21.25 21.25 20mg 21.25mg 21.25mg   INR 4.2 3.2 2.7 2.2 3.1 3.1 2.9 3.5 2.4 2.3 2.5   Notes apap Hold x1, less GLV Inc in GLV  doxy doxy  1x dose cephalexin 1x dose dec; keflex       Date  6/24 7/22 8/24 9/10 10/8 11/5 11/18 12/2 12/30 1/26 2/22 3/22 4/19   Total Weekly Dose 21.25mg 21.25 21.25mg 21.25mg 21.25mg 21.25mg 20mg 20mg 20mg 20mg  20mg 20 mg 20mg   INR 2.4 2.4 3.0 2.8 3.0 3.1 2.5 2.5 2.5 2.6 2.9  2 2.2   Notes      Inc. GLV Dose dec    desvenlafaxine broccoli      Date 5/13 6/16  7/7 7/21 8/11 9/1 9/29 10/27 11/3 12/1 12/9 12/16   Total WeeklyDose 20 mg 20mg  20 mg 18.75mg  17.5 mg 17.5mg 17.5 17.5mg 17.5mg 17.5mg 18.75mg 22.5mg   INR 2.5 3.2 3.1 3.0 2.2 2.4 2.2 2.4 2.3 1.5 1.6 2.2   Notes  apap Inc GLV     augmentin day 3 augementin glucerna  Boost x2     Date 12/28              Total  WeeklyDose 21.25mg              INR 2.6              Notes                 Clinic Interview:  Verbal Release Authorization signed on 6/27/18 -- may speak with Sussy (wife), Nikolai (son)  Tablet Strength: pt has 2.5mg tablets  Phone: [phone] (Home), [phone]   Fax: [phone] (Attn: Tasha) [facility]    Patient Findings  Negatives:  Signs/symptoms of thrombosis, Signs/symptoms of bleeding, Laboratory test error suspected, Change in health, Change in alcohol use, Change in activity, Upcoming invasive procedure, Emergency department visit, Upcoming dental procedure, Missed doses, Extra doses, Change in medications, Change in diet/appetite, Hospital admission, Bruising, Other complaints   Comments:  Mr. Wolf reports all patient findings to be negative. He did stop Glucerna intake. May begin drinking one every couple of days- discussed need for consistency.     Plan:   1. INR is back to therapeutic at 2.6 (range 2 to 3).  Instructed Mr. Wolf to continue recently increased regimen of warfarin 2.5 mg oral daily except 3.75mg MonWedFri until recheck. (21.25mg/wk)  2. RTC in ~2 weeks to ensure WNL. If WNL- push appointment further.  3. Verbal and written information was provided to Mr. Wolf and Mrs. Wolf in clinic. Mr. Wolf voiced understanding with teach back and has no further questions at this time.     Ama Mccloud, PharmD  12/28/21  11:29 EST

## 2022-01-03 RX ORDER — LISINOPRIL 40 MG/1
TABLET ORAL
Qty: 90 TABLET | Refills: 2 | Status: SHIPPED | OUTPATIENT
Start: 2022-01-03 | End: 2022-09-30

## 2022-01-05 DIAGNOSIS — E11.9 TYPE 2 DIABETES MELLITUS WITHOUT COMPLICATION, WITHOUT LONG-TERM CURRENT USE OF INSULIN: ICD-10-CM

## 2022-01-05 RX ORDER — GLIMEPIRIDE 2 MG/1
2 TABLET ORAL DAILY
Qty: 90 TABLET | Refills: 0 | Status: SHIPPED | OUTPATIENT
Start: 2022-01-05 | End: 2022-04-04 | Stop reason: SDUPTHER

## 2022-01-05 NOTE — TELEPHONE ENCOUNTER
Caller: Tony Wolf    Relationship: Self    Best call back number: [phone]    Requested Prescriptions:   Requested Prescriptions     Pending Prescriptions Disp Refills   • glimepiride (AMARYL) 2 MG tablet 90 tablet 0     Sig: Take 1 tablet by mouth Daily.        Pharmacy where request should be sent: [place] [id number] AT [facility] - [phone]  - [phone] FX     Additional details provided by patient: PATIENT HAS ONE TABLET LEFT    Does the patient have less than a 3 day supply:  [x] Yes  [] No    Mary Kam Rep   01/05/22 14:26 EST

## 2022-01-13 ENCOUNTER — ANTICOAGULATION VISIT (OUTPATIENT)
Dept: PHARMACY | Facility: HOSPITAL | Age: 77
End: 2022-01-13

## 2022-01-13 DIAGNOSIS — I48.20 CHRONIC ATRIAL FIBRILLATION: Primary | ICD-10-CM

## 2022-01-13 LAB
INR PPP: 2.3 (ref 0.91–1.09)
PROTHROMBIN TIME: 27.8 SECONDS (ref 10–13.8)

## 2022-01-13 PROCEDURE — 36416 COLLJ CAPILLARY BLOOD SPEC: CPT

## 2022-01-13 PROCEDURE — 85610 PROTHROMBIN TIME: CPT

## 2022-01-13 PROCEDURE — G0463 HOSPITAL OUTPT CLINIC VISIT: HCPCS

## 2022-01-13 NOTE — PROGRESS NOTES
Anticoagulation Clinic Progress Note  Indication: atrial fibrillation  Referring Provider: Sravani [last appt 9/29/21  next: NS]  Initial Warfarin Start Date: 2008  Goal INR: 2 - 3  Current Drug Interactions: fluoxetine, glimepiride, melatonin (PRN), mirtazepine, MVI  CHADS-VASc: 3 (age, HTN, DM)    EtOH: none  GLV: Salad (Spinach salad 1x weekly or garden salad) and serving of broccoli once a week 12/1/21  OTC Pain Relief: Uses APAP prn (~1x/week)    Anticoagulation Clinic INR History:  Date 8/20 9/4 10/2 11/6 11/15 11/29 12/31 1/7/19 1/17 1/29 2/12   Total Weekly Dose 17.5  mg 17.5 mg 17.5 mg 10mg 20mg 17.5mg 17.5mg 18.75 mg 17.5 mg 18.75 mg 20mg   INR 2.3 2.3 2.2 1.3 2.3 2.2 1.7 1.9 1.9 1.9 2.0   Notes    pre- epidural   missed dose?         Date 2/27 3/27 4/10 4/23 5/7 5/28 6/25 7/30 8/28 9/25 10/23   Total Weekly Dose 20mg 20mg 21.25 mg 21.25 mg 21.25 mg 21.25mg 21.25mg 21.25mg 21.25mg 21.25mg 21.25mg   INR 2.2 1.8 2.5 1.8 2.4 2.4 2.5 2.7 2.6 3.0 2.9   Notes sick   post- scope            Date  11/6 11/13 12/4 1/3 1/15 1/20 1/31 2/10 2/28 4/14 5/26   Total Weekly Dose 21.25 mg 18.75 mg 21.25 mg 21.25 mg 21.25 mg 20 mg 21.25 21.25 20mg 21.25mg 21.25mg   INR 4.2 3.2 2.7 2.2 3.1 3.1 2.9 3.5 2.4 2.3 2.5   Notes apap Hold x1, less GLV Inc in GLV  doxy doxy  1x dose cephalexin 1x dose dec; keflex       Date  6/24 7/22 8/24 9/10 10/8 11/5 11/18 12/2 12/30 1/26 2/22 3/22 4/19   Total Weekly Dose 21.25mg 21.25 21.25mg 21.25mg 21.25mg 21.25mg 20mg 20mg 20mg 20mg  20mg 20 mg 20mg   INR 2.4 2.4 3.0 2.8 3.0 3.1 2.5 2.5 2.5 2.6 2.9  2 2.2   Notes      Inc. GLV Dose dec    desvenlafaxine broccoli      Date 5/13 6/16  7/7 7/21 8/11 9/1 9/29 10/27 11/3 12/1 12/9 12/16   Total WeeklyDose 20 mg 20mg  20 mg 18.75mg  17.5 mg 17.5mg 17.5 17.5mg 17.5mg 17.5mg 18.75mg 22.5mg   INR 2.5 3.2 3.1 3.0 2.2 2.4 2.2 2.4 2.3 1.5 1.6 2.2   Notes  apap Inc GLV     augmentin day 3 augementin glucerna  Boost x2     Date 12/28 1/13              Total WeeklyDose 21.25mg 21.25mg             INR 2.6 2.3             Notes                 Clinic Interview:  Verbal Release Authorization signed on 6/27/18 -- may speak with Sussy (wife), Nikolai (son)  Tablet Strength: pt has 2.5mg tablets  Phone: [phone] (Home), [phone]   Fax: [phone] (Attn: Tasha) [facility]    Patient Findings  Negatives:  Signs/symptoms of thrombosis, Signs/symptoms of bleeding, Laboratory test error suspected, Change in health, Change in alcohol use, Change in activity, Upcoming invasive procedure, Emergency department visit, Upcoming dental procedure, Missed doses, Extra doses, Change in medications, Change in diet/appetite, Hospital admission, Bruising, Other complaints   Comments:   Mr Wolf has taken warfarin as directed. He denies changes in medications or diet, s/s of bleeding. He has no concerns at this visit.       Plan:   1. INR is therapeutic at 2.3 (goal 2.0-3.0).  Instructed Mr. Wolf to continue warfarin 2.5 mg oral daily except 3.75mg MonWedFri until recheck. (21.25mg/wk)  2. RTC in 4 weeks.  3. Verbal and written information was provided to Mr. Wolf and Mrs. Wolf in clinic. Mr. Wolf voiced understanding with teach back and has no further questions at this time.     Kelechi James, PharmD  01/13/22  12:10 EST

## 2022-01-31 ENCOUNTER — OFFICE VISIT (OUTPATIENT)
Dept: ENDOCRINOLOGY | Facility: CLINIC | Age: 77
End: 2022-01-31

## 2022-01-31 VITALS
WEIGHT: 247 LBS | HEART RATE: 81 BPM | OXYGEN SATURATION: 96 % | RESPIRATION RATE: 18 BRPM | SYSTOLIC BLOOD PRESSURE: 124 MMHG | DIASTOLIC BLOOD PRESSURE: 84 MMHG | BODY MASS INDEX: 33.5 KG/M2

## 2022-01-31 DIAGNOSIS — E78.5 HYPERLIPIDEMIA LDL GOAL <100: ICD-10-CM

## 2022-01-31 DIAGNOSIS — I10 ESSENTIAL HYPERTENSION: ICD-10-CM

## 2022-01-31 DIAGNOSIS — R61 DIAPHORESIS: ICD-10-CM

## 2022-01-31 DIAGNOSIS — E11.9 TYPE 2 DIABETES MELLITUS WITHOUT COMPLICATION, WITHOUT LONG-TERM CURRENT USE OF INSULIN: Primary | ICD-10-CM

## 2022-01-31 LAB
EXPIRATION DATE: ABNORMAL
EXPIRATION DATE: NORMAL
GLUCOSE BLDC GLUCOMTR-MCNC: 230 MG/DL (ref 70–130)
HBA1C MFR BLD: 6.6 %
Lab: ABNORMAL
Lab: NORMAL

## 2022-01-31 PROCEDURE — 3044F HG A1C LEVEL LT 7.0%: CPT | Performed by: INTERNAL MEDICINE

## 2022-01-31 PROCEDURE — 82947 ASSAY GLUCOSE BLOOD QUANT: CPT | Performed by: INTERNAL MEDICINE

## 2022-01-31 PROCEDURE — 99214 OFFICE O/P EST MOD 30 MIN: CPT | Performed by: INTERNAL MEDICINE

## 2022-01-31 PROCEDURE — 83036 HEMOGLOBIN GLYCOSYLATED A1C: CPT | Performed by: INTERNAL MEDICINE

## 2022-01-31 NOTE — PROGRESS NOTES
Office Note      Date: 2022  Patient Name: Tony Wolf  MRN: [id number]  : 1945    Chief Complaint   Patient presents with   • Diabetes     no new concerns       History of Present Illness:   Tony Wolf is a 76 y.o. male who presents for Diabetes type 2. Diagnosed in: . Treated in past with oral agents. Current treatments: glimepiride. Number of insulin shots per day: none. Checks blood sugar 1 time a day. Has low blood sugar: no. Aspirin use: No - on warfarin. Statin use: Yes. ACE-I/ARB use: Yes. Changes in health since last visit: none. Last eye exam .    Subjective      Diabetic Complications:  Eyes: No  Kidneys: No  Feet: Yes - h/o ulcer  Heart: No    Diet and Exercise:  Meals per day: 3  Minutes of exercise per week: 0 mins.    Review of Systems:   Review of Systems   Constitutional: Positive for diaphoresis.   Cardiovascular: Negative.    Gastrointestinal: Negative.    Endocrine: Negative.        The following portions of the patient's history were reviewed and updated as appropriate: allergies, current medications, past family history, past medical history, past social history, past surgical history and problem list.    Objective       Visit Vitals  /84 (BP Location: Right arm, Patient Position: Sitting, Cuff Size: Adult)   Pulse 81   Resp 18   Wt 112 kg (247 lb)   SpO2 96%   BMI 33.50 kg/m²       Physical Exam:  Physical Exam  Constitutional:       Appearance: Normal appearance.   Cardiovascular:      Pulses:           Dorsalis pedis pulses are 1+ on the right side and 1+ on the left side.        Posterior tibial pulses are 1+ on the right side and 1+ on the left side.   Feet:      Right foot:      Protective Sensation: 5 sites tested. 2 sites sensed.      Skin integrity: Erythema present.      Toenail Condition: Right toenails are abnormally thick. Fungal disease present.     Left foot:      Protective Sensation: 5 sites tested. 2 sites sensed.      Skin integrity:  Erythema present.      Toenail Condition: Left toenails are abnormally thick. Fungal disease present.  Neurological:      Mental Status: He is alert.         Labs:    HbA1c  Lab Results   Component Value Date    HGBA1C 6.6 01/31/2022       CMP  Lab Results   Component Value Date    GLUCOSE 144 (H) 12/08/2021    BUN 11 12/08/2021    CREATININE 0.97 12/08/2021    EGFRIFNONA 76 12/08/2021    BCR 11.3 12/08/2021    K 4.3 12/08/2021    CO2 25.4 12/08/2021    CALCIUM 9.5 12/08/2021    AST 37 12/08/2021    ALT 30 12/08/2021        Lipid Panel  Lab Results   Component Value Date    CHLPL 172 04/19/2016    HDL 30 (L) 12/08/2021     (H) 12/08/2021    TRIG 190 (H) 12/08/2021        TSH  Lab Results   Component Value Date    TSH 2.170 09/11/2020    FREET4 0.98 06/09/2015        Hemoglobin A1C  Lab Results   Component Value Date    HGBA1C 6.6 01/31/2022        Microalbumin/Creatinine  No results found for: MALBCRERATIO, CREATINIURIN, MICROALBUR        Assessment / Plan      Assessment & Plan:  Diagnoses and all orders for this visit:    1. Type 2 diabetes mellitus without complication, without long-term current use of insulin (HCC) (Primary)  Assessment & Plan:  Diabetes is improving with treatment.   Continue current treatment regimen.  Diabetes will be reassessed in 3 months.    Orders:  -     POC Glycosylated Hemoglobin (Hb A1C)  -     POC Glucose, Blood  -     Cancel: TSH; Future  -     Cancel: Microalbumin / Creatinine Urine Ratio - Urine, Clean Catch; Future    2. Essential hypertension  Assessment & Plan:  Hypertension is unchanged.  Continue current treatment regimen.  Blood pressure will be reassessed at the next regular appointment.      3. Hyperlipidemia LDL goal <100  Assessment & Plan:  Continue statin.  Work on diet/exercise.  Recent LDL and trigs a bit above goal.      4. Diaphoresis  Assessment & Plan:  He notes longstanding issues with excessive sweating.  We discussed checking TSH and testo levels at his  next visit.          Return in about 3 months (around 4/30/2022) for Recheck with A1c, TSH, testo, microalbumin.    Brown Gallo MD   01/31/2022

## 2022-01-31 NOTE — ASSESSMENT & PLAN NOTE
He notes longstanding issues with excessive sweating.  We discussed checking TSH and testo levels at his next visit.

## 2022-02-08 ENCOUNTER — ANTICOAGULATION VISIT (OUTPATIENT)
Dept: PHARMACY | Facility: HOSPITAL | Age: 77
End: 2022-02-08

## 2022-02-08 DIAGNOSIS — I48.20 CHRONIC ATRIAL FIBRILLATION: Primary | ICD-10-CM

## 2022-02-08 LAB
INR PPP: 1.7 (ref 0.91–1.09)
PROTHROMBIN TIME: 20.3 SECONDS (ref 10–13.8)

## 2022-02-08 PROCEDURE — G0463 HOSPITAL OUTPT CLINIC VISIT: HCPCS

## 2022-02-08 PROCEDURE — 85610 PROTHROMBIN TIME: CPT

## 2022-02-08 PROCEDURE — 36416 COLLJ CAPILLARY BLOOD SPEC: CPT

## 2022-02-08 NOTE — PROGRESS NOTES
Anticoagulation Clinic Progress Note  Indication: atrial fibrillation  Referring Provider: Sravani [last appt 9/29/21  next: 9/8/22 (Will Sue)]  Initial Warfarin Start Date: 2008  Goal INR: 2 - 3  Current Drug Interactions: fluoxetine, glimepiride, melatonin (PRN), mirtazepine, MVI  CHADS-VASc: 3 (age, HTN, DM)    EtOH: none  GLV: Salad (Spinach salad 1x weekly or garden salad) and serving of broccoli once a week 12/1/21  OTC Pain Relief: Uses APAP prn (~1x/week)    Anticoagulation Clinic INR History:  Date 8/20 9/4 10/2 11/6 11/15 11/29 12/31 1/7/19 1/17 1/29 2/12   Total Weekly Dose 17.5  mg 17.5 mg 17.5 mg 10mg 20mg 17.5mg 17.5mg 18.75 mg 17.5 mg 18.75 mg 20mg   INR 2.3 2.3 2.2 1.3 2.3 2.2 1.7 1.9 1.9 1.9 2.0   Notes    pre- epidural   missed dose?         Date 2/27 3/27 4/10 4/23 5/7 5/28 6/25 7/30 8/28 9/25 10/23   Total Weekly Dose 20mg 20mg 21.25 mg 21.25 mg 21.25 mg 21.25mg 21.25mg 21.25mg 21.25mg 21.25mg 21.25mg   INR 2.2 1.8 2.5 1.8 2.4 2.4 2.5 2.7 2.6 3.0 2.9   Notes sick   post- scope            Date  11/6 11/13 12/4 1/3 1/15 1/20 1/31 2/10 2/28 4/14 5/26   Total Weekly Dose 21.25 mg 18.75 mg 21.25 mg 21.25 mg 21.25 mg 20 mg 21.25 21.25 20mg 21.25mg 21.25mg   INR 4.2 3.2 2.7 2.2 3.1 3.1 2.9 3.5 2.4 2.3 2.5   Notes apap Hold x1, less GLV Inc in GLV  doxy doxy  1x dose cephalexin 1x dose dec; keflex       Date  6/24 7/22 8/24 9/10 10/8 11/5 11/18 12/2 12/30 1/26 2/22 3/22 4/19   Total Weekly Dose 21.25mg 21.25 21.25mg 21.25mg 21.25mg 21.25mg 20mg 20mg 20mg 20mg  20mg 20 mg 20mg   INR 2.4 2.4 3.0 2.8 3.0 3.1 2.5 2.5 2.5 2.6 2.9  2 2.2   Notes      Inc. GLV Dose dec    desvenlafaxine broccoli      Date 5/13 6/16  7/7 7/21 8/11 9/1 9/29 10/27 11/3 12/1 12/9 12/16   Total WeeklyDose 20 mg 20mg  20 mg 18.75mg  17.5 mg 17.5mg 17.5 17.5mg 17.5mg 17.5mg 18.75mg 22.5mg   INR 2.5 3.2 3.1 3.0 2.2 2.4 2.2 2.4 2.3 1.5 1.6 2.2   Notes  apap Inc GLV     augmentin day 3 augementin glucerna  Boost x2     Date 12/28  1/13/22 2/8            Total WeeklyDose 21.25mg 21.25mg 21.25 mg            INR 2.6 2.3 1.7            Notes                 Clinic Interview:  Verbal Release Authorization signed on 6/27/18 -- may speak with Sussy (wife), Nikolai (son)  Tablet Strength: pt has 2.5mg tablets  Phone: [phone] (Home), [phone]   Fax: [phone] (Attn: Tasha) [facility]    Patient Findings    Negatives:  Signs/symptoms of thrombosis, Signs/symptoms of bleeding, Laboratory test error suspected, Change in health, Change in alcohol use, Change in activity, Upcoming invasive procedure, Emergency department visit, Upcoming dental procedure, Missed doses, Extra doses, Change in medications, Change in diet/appetite, Hospital admission, Bruising, Other complaints   Comments:  He has not had any Glucerna for awhile.   They cannot think of any cause for drop in INR.  He denies any missed doses.   He did have a spinach salad on Saturday but they do not believe it was any larger than usual.   Above findings negative     Plan:     1. INR is SUB therapeutic today at 1.7 (goal 2.0-3.0).  Instructed Mr. Wolf to boost warfarin 3.75 mg tonight, then tomorrow resume warfarin 2.5 mg oral daily except 3.75mg MonWedFri until recheck. (   2. RTC on 2/16 to ensure wnl  3. Verbal and written information was provided to Mr. Wolf and Mrs. Wolf in clinic. Mr. Wolf voiced understanding with teach back and has no further questions at this time.     Sariah Almodovar, PharmD  02/08/22  11:28 EST

## 2022-02-16 ENCOUNTER — ANTICOAGULATION VISIT (OUTPATIENT)
Dept: PHARMACY | Facility: HOSPITAL | Age: 77
End: 2022-02-16

## 2022-02-16 DIAGNOSIS — I48.20 CHRONIC ATRIAL FIBRILLATION: Primary | ICD-10-CM

## 2022-02-16 LAB
INR PPP: 2.1 (ref 0.91–1.09)
PROTHROMBIN TIME: 25.2 SECONDS (ref 10–13.8)

## 2022-02-16 PROCEDURE — 36416 COLLJ CAPILLARY BLOOD SPEC: CPT

## 2022-02-16 PROCEDURE — G0463 HOSPITAL OUTPT CLINIC VISIT: HCPCS

## 2022-02-16 PROCEDURE — 85610 PROTHROMBIN TIME: CPT

## 2022-02-16 NOTE — PROGRESS NOTES
Anticoagulation Clinic Progress Note  Indication: atrial fibrillation  Referring Provider: Sravani [last appt 9/29/21  next: 9/8/22 (Will Sue)]  Initial Warfarin Start Date: 2008  Goal INR: 2 - 3  Current Drug Interactions: fluoxetine, glimepiride, melatonin (PRN), mirtazepine, MVI  CHADS-VASc: 3 (age, HTN, DM)    EtOH: none  GLV: Salad (Spinach salad 1x weekly or garden salad) and serving of broccoli once a week 12/1/21  OTC Pain Relief: Uses APAP prn (~1x/week)    Anticoagulation Clinic INR History:  Date 8/20 9/4 10/2 11/6 11/15 11/29 12/31 1/7/19 1/17 1/29 2/12   Total Weekly Dose 17.5  mg 17.5 mg 17.5 mg 10mg 20mg 17.5mg 17.5mg 18.75 mg 17.5 mg 18.75 mg 20mg   INR 2.3 2.3 2.2 1.3 2.3 2.2 1.7 1.9 1.9 1.9 2.0   Notes    pre- epidural   missed dose?         Date 2/27 3/27 4/10 4/23 5/7 5/28 6/25 7/30 8/28 9/25 10/23   Total Weekly Dose 20mg 20mg 21.25 mg 21.25 mg 21.25 mg 21.25mg 21.25mg 21.25mg 21.25mg 21.25mg 21.25mg   INR 2.2 1.8 2.5 1.8 2.4 2.4 2.5 2.7 2.6 3.0 2.9   Notes sick   post- scope            Date  11/6 11/13 12/4 1/3 1/15 1/20 1/31 2/10 2/28 4/14 5/26   Total Weekly Dose 21.25 mg 18.75 mg 21.25 mg 21.25 mg 21.25 mg 20 mg 21.25 21.25 20mg 21.25mg 21.25mg   INR 4.2 3.2 2.7 2.2 3.1 3.1 2.9 3.5 2.4 2.3 2.5   Notes apap Hold x1, less GLV Inc in GLV  doxy doxy  1x dose cephalexin 1x dose dec; keflex       Date  6/24 7/22 8/24 9/10 10/8 11/5 11/18 12/2 12/30 1/26 2/22 3/22 4/19   Total Weekly Dose 21.25mg 21.25 21.25mg 21.25mg 21.25mg 21.25mg 20mg 20mg 20mg 20mg  20mg 20 mg 20mg   INR 2.4 2.4 3.0 2.8 3.0 3.1 2.5 2.5 2.5 2.6 2.9  2 2.2   Notes      Inc. GLV Dose dec    desvenlafaxine broccoli      Date 5/13 6/16  7/7 7/21 8/11 9/1 9/29 10/27 11/3 12/1 12/9 12/16   Total WeeklyDose 20 mg 20mg  20 mg 18.75mg  17.5 mg 17.5mg 17.5 17.5mg 17.5mg 17.5mg 18.75mg 22.5mg   INR 2.5 3.2 3.1 3.0 2.2 2.4 2.2 2.4 2.3 1.5 1.6 2.2   Notes  apap Inc GLV     augmentin day 3 augementin glucerna  Boost x2     Date 12/28  1/13/22 2/8 2/16           Total WeeklyDose 21.25mg 21.25mg 21.25 mg 21.25mg           INR 2.6 2.3 1.7 2.1           Notes    Dec GLv             Clinic Interview:  Verbal Release Authorization signed on 6/27/18 -- may speak with Sussy (wife), Nikolai (son)  Tablet Strength: pt has 2.5mg tablets  Phone: [phone] (Home), [phone]   Fax: [phone] (Attn: Tasha) [facility] [place]    Patient Findings    Positives:  Change in diet/appetite   Negatives:  Signs/symptoms of thrombosis, Signs/symptoms of bleeding, Laboratory test error suspected, Change in health, Change in alcohol use, Change in activity, Upcoming invasive procedure, Emergency department visit, Upcoming dental procedure, Missed doses, Extra doses, Change in medications, Hospital admission, Bruising, Other complaints   Comments:  Decrease in GLV, did not have his usual spinach salads but plans to resume at this time           Plan:     1. INR is  therapeutic today at 2.1 (goal 2.0-3.0).  Instructed Mr. Wolf to continue higher dose and take warfarin 2.5 mg oral daily except 3.75mg MonWedFriSAT until recheck.   2. RTC in 2 week to ensure WNL  3. Verbal and written information was provided to Mr. Wolf and Mrs. Wolf in clinic. Mr. Wolf voiced understanding with teach back and has no further questions at this time.       Teena Boykin, TonaD.  02/16/22   11:31 EST

## 2022-03-01 ENCOUNTER — ANTICOAGULATION VISIT (OUTPATIENT)
Dept: PHARMACY | Facility: HOSPITAL | Age: 77
End: 2022-03-01

## 2022-03-01 DIAGNOSIS — I48.20 CHRONIC ATRIAL FIBRILLATION: Primary | ICD-10-CM

## 2022-03-01 LAB
INR PPP: 2.5 (ref 0.91–1.09)
PROTHROMBIN TIME: 29.9 SECONDS (ref 10–13.8)

## 2022-03-01 PROCEDURE — 36416 COLLJ CAPILLARY BLOOD SPEC: CPT

## 2022-03-01 PROCEDURE — G0463 HOSPITAL OUTPT CLINIC VISIT: HCPCS

## 2022-03-01 PROCEDURE — 85610 PROTHROMBIN TIME: CPT

## 2022-03-01 NOTE — PROGRESS NOTES
Anticoagulation Clinic Progress Note  Indication: atrial fibrillation  Referring Provider: Sravani [last appt 9/29/21  next: 9/8/22 (Will Sue)]  Initial Warfarin Start Date: 2008  Goal INR: 2 - 3  Current Drug Interactions: fluoxetine, glimepiride, melatonin (PRN), mirtazepine, MVI  CHADS-VASc: 3 (age, HTN, DM)    EtOH: none  GLV: Salad (Spinach salad 1x weekly or garden salad) and serving of broccoli once a week 3/1/2022  OTC Pain Relief: Uses APAP prn (~1x/week)    Anticoagulation Clinic INR History:  Date 8/20 9/4 10/2 11/6 11/15 11/29 12/31 1/7/19 1/17 1/29 2/12   Total Weekly Dose 17.5  mg 17.5 mg 17.5 mg 10mg 20mg 17.5mg 17.5mg 18.75 mg 17.5 mg 18.75 mg 20mg   INR 2.3 2.3 2.2 1.3 2.3 2.2 1.7 1.9 1.9 1.9 2.0   Notes    pre- epidural   missed dose?         Date 2/27 3/27 4/10 4/23 5/7 5/28 6/25 7/30 8/28 9/25 10/23   Total Weekly Dose 20mg 20mg 21.25 mg 21.25 mg 21.25 mg 21.25mg 21.25mg 21.25mg 21.25mg 21.25mg 21.25mg   INR 2.2 1.8 2.5 1.8 2.4 2.4 2.5 2.7 2.6 3.0 2.9   Notes sick   post- scope            Date  11/6 11/13 12/4 1/3 1/15 1/20 1/31 2/10 2/28 4/14 5/26   Total Weekly Dose 21.25 mg 18.75 mg 21.25 mg 21.25 mg 21.25 mg 20 mg 21.25 21.25 20mg 21.25mg 21.25mg   INR 4.2 3.2 2.7 2.2 3.1 3.1 2.9 3.5 2.4 2.3 2.5   Notes apap Hold x1, less GLV Inc in GLV  doxy doxy  1x dose cephalexin 1x dose dec; keflex       Date  6/24 7/22 8/24 9/10 10/8 11/5 11/18 12/2 12/30 1/26 2/22 3/22 4/19   Total Weekly Dose 21.25mg 21.25 21.25mg 21.25mg 21.25mg 21.25mg 20mg 20mg 20mg 20mg  20mg 20 mg 20mg   INR 2.4 2.4 3.0 2.8 3.0 3.1 2.5 2.5 2.5 2.6 2.9  2 2.2   Notes      Inc. GLV Dose dec    desvenlafaxine broccoli      Date 5/13 6/16  7/7 7/21 8/11 9/1 9/29 10/27 11/3 12/1 12/9 12/16   Total WeeklyDose 20 mg 20mg  20 mg 18.75mg  17.5 mg 17.5mg 17.5 17.5mg 17.5mg 17.5mg 18.75mg 22.5mg   INR 2.5 3.2 3.1 3.0 2.2 2.4 2.2 2.4 2.3 1.5 1.6 2.2   Notes  [employer] Inc GLV     augmentin day 3 augementin glucerna  Boost x2     Date  12/28 1/13/22 2/8 2/16 3/1          Total WeeklyDose 21.25mg 21.25mg 21.25 mg 21.25mg 22.5mg          INR 2.6 2.3 1.7 2.1 2.5          Notes    [facility]             Clinic Interview:  Verbal Release Authorization signed on 6/27/18 -- may speak with Sussy (wife), Nikolai (son)  Tablet Strength: pt has 2.5mg tablets  Phone: [phone] (Home), [phone]   Fax: [phone] (Attn: Tasha) [facility] [place]    Patient Findings  Negatives:  Signs/symptoms of thrombosis, Signs/symptoms of bleeding, Laboratory test error suspected, Change in health, Change in alcohol use, Change in activity, Upcoming invasive procedure, Emergency department visit, Upcoming dental procedure, Missed doses, Extra doses, Change in medications, Change in diet/appetite, Hospital admission, Bruising, Other complaints     Plan:   1. INR is  therapeutic today at 2.5 (goal 2.0-3.0).  Instructed Mr. Wolf to continue higher dose and take warfarin 2.5 mg oral daily except 3.75mg MonWedFriSAT until recheck.   2. RTC in 3 week to ensure WNL  3. Verbal and written information was provided to Mr. Wolf and Mrs. Wolf in clinic. Mr. Wolf voiced understanding with teach back and has no further questions at this time.       Ama Mccloud, PharmD  03/01/22   11:20 EST

## 2022-03-09 ENCOUNTER — OFFICE VISIT (OUTPATIENT)
Dept: INTERNAL MEDICINE | Facility: CLINIC | Age: 77
End: 2022-03-09

## 2022-03-09 VITALS
DIASTOLIC BLOOD PRESSURE: 78 MMHG | SYSTOLIC BLOOD PRESSURE: 126 MMHG | HEIGHT: 72 IN | HEART RATE: 70 BPM | OXYGEN SATURATION: 94 % | BODY MASS INDEX: 33.46 KG/M2 | WEIGHT: 247 LBS

## 2022-03-09 DIAGNOSIS — I48.20 CHRONIC ATRIAL FIBRILLATION: ICD-10-CM

## 2022-03-09 DIAGNOSIS — I10 ESSENTIAL HYPERTENSION: ICD-10-CM

## 2022-03-09 DIAGNOSIS — E78.5 HYPERLIPIDEMIA LDL GOAL <100: Primary | ICD-10-CM

## 2022-03-09 PROCEDURE — 99214 OFFICE O/P EST MOD 30 MIN: CPT | Performed by: INTERNAL MEDICINE

## 2022-03-09 NOTE — PROGRESS NOTES
Subjective   Tony Wolf is a 76 y.o. male.   Chief Complaint   Patient presents with   • Hypertension   • Hyperlipidemia   • Atrial Fibrillation       History of Present Illness   Here for f/u on chronic conditions: HTN, HLP, and a.fib. HTN controlled with Lisinopril, Norvasc, and Toprol XL. . HLP controlled with Pravastatin 20 mg po qhs. Afib controlled with coumadin and Toprol XL.    The following portions of the patient's history were reviewed and updated as appropriate: allergies, current medications, past family history, past medical history, past social history, past surgical history and problem list.  Past Medical History:   Diagnosis Date   • Acute bronchitis    • Anxiety    • Arthritis     right knee   • Atrial fibrillation (HCC)    • Back pain    • Cancer (HCC)     skin cancer removed from face    • Carpal tunnel syndrome    • Depression    • Derangement, knee internal    • Diabetes mellitus (HCC)     DX'D TYPE II APPROX 15 YEARS AGO, DOES NOT CHECK BLOOD SUGARS AT HOME, STARTED INSULIN IN MARCH AFTER SURGERY CANCELLED FOR ELEVATED A1C   • Diabetic foot ulcer (HCC)    • Drug dependence (HCC)    • Dyspnea on exertion 1/17/2019   • GERD (gastroesophageal reflux disease)    • Glaucoma    • Heart disease    • Hyperlipidemia 11/14/2016   • Hypertension    • Hypertensive disorder    • Hypokalemia, replaced 6/2/2017   • IBS (irritable bowel syndrome)    • Knee pain, right    • Left ventricular hypertrophy 11/14/2016   • Leukocytosis, mild, likely reactive 5/31/2017   • Mixed hyperlipidemia    • Neuropathy, lumbosacral (radicular)    • Obesity     body mass index 30+-obesity   • Osteoporosis    • Postoperative urinary retention 6/1/2017   • Premature ventricular contractions    • PVC's (premature ventricular contractions) 11/14/2016   • Rash 3/18/2019   • Scoliosis    • Screening for prostate cancer 7/28/2016   • Sinusitis    • Sleep apnea    • SOB (shortness of breath)    • Spinal stenosis, lumbar region  with neurogenic claudication    • Thrombocytopenia (HCC) 5/31/2017   • Trigger middle finger of left hand    • Trigger middle finger of right hand    • Type 2 diabetes mellitus (HCC)    • Wears glasses     readers     Past Surgical History:   Procedure Laterality Date   • BACK SURGERY      injections for back    • CARPAL TUNNEL RELEASE     • CHOLECYSTECTOMY     • COLONOSCOPY      LESS THAN 5 YEARS    • ENDOSCOPY      x3   • FRACTURE SURGERY      right heel   • HERNIA REPAIR      both side inguinal    • LUMBAR EPIDURAL INJECTION     • TX TOTAL KNEE ARTHROPLASTY Right 5/30/2017    Procedure: RIGHT TOTAL KNEE ARTHROPLASTY;  Surgeon: Simon Interiano MD;  Location: [facility];  Service: Orthopedics   • TOTAL KNEE ARTHROPLASTY REVISION      left   • WRIST SURGERY      carpal tunnel bilat      Family History   Problem Relation Age of Onset   • Cancer Other    • Anemia Other    • Heart attack Other    • Cancer Mother    • Heart disease Mother    • Hypertension Mother    • Heart attack Mother    • Diabetes Mother    • Deep vein thrombosis Father    • Heart disease Father    • Hypertension Father    • Cancer Other      Social History     Socioeconomic History   • Marital status:    Tobacco Use   • Smoking status: Never Smoker   • Smokeless tobacco: Never Used   Vaping Use   • Vaping Use: Never used   Substance and Sexual Activity   • Alcohol use: No   • Drug use: No   • Sexual activity: Defer     Current Outpatient Medications on File Prior to Visit   Medication Sig Dispense Refill   • Accu-Chek Softclix Lancets lancets USE TO TEST BLOOD SUGAR THREE TIMES A  each 2   • acetaminophen (TYLENOL) 500 MG tablet Take 1,000 mg by mouth Every 6 (Six) Hours As Needed for Mild Pain (1-3).     • amLODIPine (NORVASC) 10 MG tablet TAKE ONE TABLET BY MOUTH DAILY AS DIRECTED 30 tablet 10   • amoxicillin-clavulanate (Augmentin) 875-125 MG per tablet Take 1 tablet by mouth 2 (Two) Times a Day. 20 tablet 0   • bimatoprost  "(Lumigan) 0.01 % ophthalmic drops Administer 1 drop to both eyes Every Night.     • Cholecalciferol (VITAMIN D-3) 1000 UNITS capsule Take 1,000 Units by mouth Daily.     • glimepiride (AMARYL) 2 MG tablet Take 1 tablet by mouth Daily. 90 tablet 0   • glucose blood (Accu-Chek Kandis Plus) test strip USE ONE STRIP TO TEST THREE TIMES A  each 11   • latanoprost (XALATAN) 0.005 % ophthalmic solution Administer 1 drop to both eyes Every Night.     • lisinopril (PRINIVIL,ZESTRIL) 40 MG tablet TAKE ONE TABLET BY MOUTH DAILY 90 tablet 2   • Loratadine 10 MG capsule Take 1 capsule by mouth Daily.     • Melatonin 2.5 MG chewable tablet Chew 1 tablet At Night As Needed.     • metoprolol succinate XL (TOPROL-XL) 100 MG 24 hr tablet TAKE ONE TABLET BY MOUTH TWICE A  tablet 3   • mirtazapine (REMERON) 45 MG tablet Take 45 mg by mouth Every Night.     • pravastatin (PRAVACHOL) 20 MG tablet Take 1 tablet by mouth Daily. 90 tablet 1   • venlafaxine XR (EFFEXOR-XR) 75 MG 24 hr capsule Take 75 mg by mouth Daily.     • warfarin (COUMADIN) 2.5 MG tablet Take 1 to 1.5 tablets by mouth daily or as directed by the anticoagulation clinic. 120 tablet 1   • [DISCONTINUED] calcium carbonate (TUMS) 500 MG chewable tablet Chew 2 tablets Daily As Needed for Indigestion or Heartburn.     • [DISCONTINUED] ketoconazole (NIZORAL) 2 % cream As Needed.       No current facility-administered medications on file prior to visit.       Review of Systems   Constitutional: Negative for diaphoresis, fatigue and fever.   Respiratory: Negative for cough and shortness of breath.    Cardiovascular: Negative for chest pain and leg swelling.   Gastrointestinal: Negative for diarrhea, nausea and vomiting.   Neurological: Negative for headaches.   Psychiatric/Behavioral: Negative for confusion.     /78   Pulse 70   Ht 182.9 cm (72\")   Wt 112 kg (247 lb)   SpO2 94%   BMI 33.50 kg/m²     Objective   Physical Exam  Vitals reviewed. "   Cardiovascular:      Rate and Rhythm: Normal rate. Rhythm irregular.   Pulmonary:      Effort: No respiratory distress.      Breath sounds: No wheezing or rales.   Neurological:      General: No focal deficit present.      Mental Status: He is alert and oriented to person, place, and time.   Psychiatric:         Mood and Affect: Mood normal.         Behavior: Behavior normal.         Assessment/Plan   Diagnoses and all orders for this visit:    1. Hyperlipidemia LDL goal <100 (Primary)  -     Comprehensive Metabolic Panel; Future  -     Lipid Panel; Future  Continue Pravastatin 20 mg po qhs  2. Essential hypertension  -     Comprehensive Metabolic Panel; Future  -     Lipid Panel; Future  Continue Lisinopril 40 mg po qd and Norvasc 10 mg po qd and Toprol  mg po qd  3. Chronic atrial fibrillation (HCC)  Continue coumadin 2.5 mg INR managed by Coumadin Clinic and Toprol  mg po qd

## 2022-03-22 ENCOUNTER — ANTICOAGULATION VISIT (OUTPATIENT)
Dept: PHARMACY | Facility: HOSPITAL | Age: 77
End: 2022-03-22

## 2022-03-22 DIAGNOSIS — I48.20 CHRONIC ATRIAL FIBRILLATION: Primary | ICD-10-CM

## 2022-03-22 LAB
INR PPP: 2 (ref 0.91–1.09)
PROTHROMBIN TIME: 24.5 SECONDS (ref 10–13.8)

## 2022-03-22 PROCEDURE — 36416 COLLJ CAPILLARY BLOOD SPEC: CPT

## 2022-03-22 PROCEDURE — 85610 PROTHROMBIN TIME: CPT

## 2022-03-22 NOTE — PROGRESS NOTES
Anticoagulation Clinic Progress Note  Indication: atrial fibrillation  Referring Provider: Sravani [last appt 9/29/21  next: 9/8/22 (Will Sue)]  Initial Warfarin Start Date: 2008  Goal INR: 2 - 3  Current Drug Interactions: fluoxetine, glimepiride, melatonin (PRN), mirtazepine, MVI  CHADS-VASc: 3 (age, HTN, DM)    EtOH: none  GLV: Salad (Spinach salad 1x weekly or garden salad) and serving of broccoli once a week 3/1/2022  OTC Pain Relief: Uses APAP prn (~1x/week)    Anticoagulation Clinic INR History:  Date 8/20 9/4 10/2 11/6 11/15 11/29 12/31 1/7/19 1/17 1/29 2/12   Total Weekly Dose 17.5  mg 17.5 mg 17.5 mg 10mg 20mg 17.5mg 17.5mg 18.75 mg 17.5 mg 18.75 mg 20mg   INR 2.3 2.3 2.2 1.3 2.3 2.2 1.7 1.9 1.9 1.9 2.0   Notes    pre- epidural   missed dose?         Date 2/27 3/27 4/10 4/23 5/7 5/28 6/25 7/30 8/28 9/25 10/23   Total Weekly Dose 20mg 20mg 21.25 mg 21.25 mg 21.25 mg 21.25mg 21.25mg 21.25mg 21.25mg 21.25mg 21.25mg   INR 2.2 1.8 2.5 1.8 2.4 2.4 2.5 2.7 2.6 3.0 2.9   Notes sick   post- scope            Date  11/6 11/13 12/4 1/3 1/15 1/20 1/31 2/10 2/28 4/14 5/26   Total Weekly Dose 21.25 mg 18.75 mg 21.25 mg 21.25 mg 21.25 mg 20 mg 21.25 21.25 20mg 21.25mg 21.25mg   INR 4.2 3.2 2.7 2.2 3.1 3.1 2.9 3.5 2.4 2.3 2.5   Notes apap Hold x1, less GLV Inc in GLV  doxy doxy  1x dose cephalexin 1x dose dec; keflex       Date  6/24 7/22 8/24 9/10 10/8 11/5 11/18 12/2 12/30 1/26 2/22 3/22 4/19   Total Weekly Dose 21.25mg 21.25 21.25mg 21.25mg 21.25mg 21.25mg 20mg 20mg 20mg 20mg  20mg 20 mg 20mg   INR 2.4 2.4 3.0 2.8 3.0 3.1 2.5 2.5 2.5 2.6 2.9  2 2.2   Notes      Inc. GLV Dose dec    desvenlafaxine broccoli      Date 5/13 6/16  7/7 7/21 8/11 9/1 9/29 10/27 11/3 12/1 12/9 12/16   Total WeeklyDose 20 mg 20mg  20 mg 18.75mg  17.5 mg 17.5mg 17.5 17.5mg 17.5mg 17.5mg 18.75mg 22.5mg   INR 2.5 3.2 3.1 3.0 2.2 2.4 2.2 2.4 2.3 1.5 1.6 2.2   Notes  apap Inc GLV     augmentin day 3 augementin glucerna  Boost x2     Date  12/28 1/13/22 2/8 2/16 3/1 3/22         Total WeeklyDose 21.25mg 21.25mg 21.25 mg 21.25mg 22.5mg 22.5 mg         INR 2.6 2.3 1.7 2.1 2.5 2.0         Notes    Dec GLV             Clinic Interview:  Verbal Release Authorization signed on 6/27/18 -- may speak with Sussy (wife), Nikolai (son)  Tablet Strength: pt has 2.5mg tablets  Phone: [phone] (Home), [phone]   Fax: [phone] (Attn: Tasha) [facility] [place]    Patient Findings  Negatives:  Signs/symptoms of thrombosis, Signs/symptoms of bleeding, Laboratory test error suspected, Change in health, Change in alcohol use, Change in activity, Upcoming invasive procedure, Emergency department visit, Upcoming dental procedure, Missed doses, Extra doses, Change in medications, Change in diet/appetite, Hospital admission, Bruising, Other complaints    Hes does have broccoli once a week and spinach salad once a week, his wife states hehas been consistent with this GLV intake     Plan:   1. INR is  therapeutic today at 2.0 (goal 2.0-3.0).  Instructed Mr. Wolf to continue higher dose and take warfarin 2.5 mg oral daily except 3.75mg MonWedFriSAT until recheck.   2. RTC in 3 week to ensure WNL  3. Verbal and written information was provided to Mr. Wolf and Mrs. Wolf in clinic. Mr. Wolf voiced understanding with teach back and has no further questions at this time.       Esther Mathews, PharmD, BCPS   3/22/2022  11:51 EDT

## 2022-03-31 RX ORDER — PRAVASTATIN SODIUM 20 MG
20 TABLET ORAL DAILY
Qty: 90 TABLET | Refills: 1 | Status: SHIPPED | OUTPATIENT
Start: 2022-03-31 | End: 2022-09-28 | Stop reason: SDUPTHER

## 2022-04-04 DIAGNOSIS — E11.9 TYPE 2 DIABETES MELLITUS WITHOUT COMPLICATION, WITHOUT LONG-TERM CURRENT USE OF INSULIN: ICD-10-CM

## 2022-04-04 RX ORDER — GLIMEPIRIDE 2 MG/1
2 TABLET ORAL DAILY
Qty: 90 TABLET | Refills: 1 | Status: SHIPPED | OUTPATIENT
Start: 2022-04-04 | End: 2022-10-03 | Stop reason: SDUPTHER

## 2022-04-12 ENCOUNTER — ANTICOAGULATION VISIT (OUTPATIENT)
Dept: PHARMACY | Facility: HOSPITAL | Age: 77
End: 2022-04-12

## 2022-04-12 DIAGNOSIS — I48.20 CHRONIC ATRIAL FIBRILLATION: Primary | ICD-10-CM

## 2022-04-12 DIAGNOSIS — I48.91 ATRIAL FIBRILLATION, UNSPECIFIED TYPE: ICD-10-CM

## 2022-04-12 LAB
INR PPP: 2.3 (ref 0.91–1.09)
PROTHROMBIN TIME: 27.1 SECONDS (ref 10–13.8)

## 2022-04-12 PROCEDURE — 85610 PROTHROMBIN TIME: CPT

## 2022-04-12 PROCEDURE — 36416 COLLJ CAPILLARY BLOOD SPEC: CPT

## 2022-04-12 PROCEDURE — G0463 HOSPITAL OUTPT CLINIC VISIT: HCPCS

## 2022-04-12 RX ORDER — WARFARIN SODIUM 2.5 MG/1
TABLET ORAL
Qty: 120 TABLET | Refills: 1 | Status: SHIPPED | OUTPATIENT
Start: 2022-04-12 | End: 2022-09-29 | Stop reason: SDUPTHER

## 2022-04-12 NOTE — PROGRESS NOTES
Anticoagulation Clinic Progress Note  Indication: atrial fibrillation  Referring Provider: Sravani [last appt 9/29/21  next: 9/8/22 (Will Sue)]  Initial Warfarin Start Date: 2008  Goal INR: 2 - 3  Current Drug Interactions: fluoxetine, glimepiride, melatonin (PRN), mirtazepine, MVI  CHADS-VASc: 3 (age, HTN, DM)    EtOH: none  GLV: Salad (Spinach salad 1x weekly or garden salad) and serving of broccoli once a week 3/1/2022  OTC Pain Relief: Uses APAP prn (~1x/week)    Anticoagulation Clinic INR History:  Date 8/20 9/4 10/2 11/6 11/15 11/29 12/31 1/7/19 1/17 1/29 2/12   Total Weekly Dose 17.5  mg 17.5 mg 17.5 mg 10mg 20mg 17.5mg 17.5mg 18.75 mg 17.5 mg 18.75 mg 20mg   INR 2.3 2.3 2.2 1.3 2.3 2.2 1.7 1.9 1.9 1.9 2.0   Notes    pre- epidural   missed dose?         Date 2/27 3/27 4/10 4/23 5/7 5/28 6/25 7/30 8/28 9/25 10/23   Total Weekly Dose 20mg 20mg 21.25 mg 21.25 mg 21.25 mg 21.25mg 21.25mg 21.25mg 21.25mg 21.25mg 21.25mg   INR 2.2 1.8 2.5 1.8 2.4 2.4 2.5 2.7 2.6 3.0 2.9   Notes sick   post- scope            Date  11/6 11/13 12/4 1/3 1/15 1/20 1/31 2/10 2/28 4/14 5/26   Total Weekly Dose 21.25 mg 18.75 mg 21.25 mg 21.25 mg 21.25 mg 20 mg 21.25 21.25 20mg 21.25mg 21.25mg   INR 4.2 3.2 2.7 2.2 3.1 3.1 2.9 3.5 2.4 2.3 2.5   Notes apap Hold x1, less GLV Inc in GLV  doxy doxy  1x dose cephalexin 1x dose dec; keflex       Date  6/24 7/22 8/24 9/10 10/8 11/5 11/18 12/2 12/30 1/26 2/22 3/22 4/19   Total Weekly Dose 21.25mg 21.25 21.25mg 21.25mg 21.25mg 21.25mg 20mg 20mg 20mg 20mg  20mg 20 mg 20mg   INR 2.4 2.4 3.0 2.8 3.0 3.1 2.5 2.5 2.5 2.6 2.9  2 2.2   Notes      Inc. GLV Dose dec    desvenlafaxine broccoli      Date 5/13 6/16  7/7 7/21 8/11 9/1 9/29 10/27 11/3 12/1 12/9 12/16   Total WeeklyDose 20 mg 20mg  20 mg 18.75mg  17.5 mg 17.5mg 17.5 17.5mg 17.5mg 17.5mg 18.75mg 22.5mg   INR 2.5 3.2 3.1 3.0 2.2 2.4 2.2 2.4 2.3 1.5 1.6 2.2   Notes  ap[employer] GLV     augmentin day 3 augementin glucerna  Boost x2     Date  12/28 1/13/22 2/8 2/16 3/1 3/22 4/12        Total WeeklyDose 21.25mg 21.25mg 21.25 mg 21.25mg 22.5mg 22.5 mg 22.5mg        INR 2.6 2.3 1.7 2.1 2.5 2.0 2.3        Notes    Dec GLV             Clinic Interview:  Verbal Release Authorization signed on 6/27/18 -- may speak with Sussy (wife), Nikolai (son)  Tablet Strength: pt has 2.5mg tablets  Phone: [phone] (Home), [phone]   Fax: [phone] (Attn: Tasha) [facility] [place]    Patient Findings    Negatives:  Signs/symptoms of thrombosis, Signs/symptoms of bleeding, Laboratory test error suspected, Change in health, Change in alcohol use, Change in activity, Upcoming invasive procedure, Emergency department visit, Upcoming dental procedure, Missed doses, Extra doses, Change in medications, Change in diet/appetite, Hospital admission, Bruising, Other complaints         Plan:   1. INR is  therapeutic today at 2.3 (goal 2.0-3.0).  Instructed Mr. Wolf to continue warfarin 2.5 mg oral daily except 3.75mg MonWedFriSAT until recheck.   2. RTC in 4 weeks  3. Verbal and written information was provided to Mr. Wolf and Mrs. Wolf in clinic. Mr. Wolf voiced understanding with teach back and has no further questions at this time.   4. Refill sent per request      Teena Boykin, PharmD.  04/12/22   11:35 EDT

## 2022-04-13 RX ORDER — AMLODIPINE BESYLATE 10 MG/1
10 TABLET ORAL DAILY
Qty: 90 TABLET | Refills: 3 | Status: SHIPPED | OUTPATIENT
Start: 2022-04-13 | End: 2022-11-09

## 2022-05-10 ENCOUNTER — ANTICOAGULATION VISIT (OUTPATIENT)
Dept: PHARMACY | Facility: HOSPITAL | Age: 77
End: 2022-05-10

## 2022-05-10 DIAGNOSIS — I48.20 CHRONIC ATRIAL FIBRILLATION: Primary | ICD-10-CM

## 2022-05-10 LAB
INR PPP: 2.4 (ref 0.91–1.09)
PROTHROMBIN TIME: 28.7 SECONDS (ref 10–13.8)

## 2022-05-10 PROCEDURE — 85610 PROTHROMBIN TIME: CPT

## 2022-05-10 PROCEDURE — G0463 HOSPITAL OUTPT CLINIC VISIT: HCPCS | Performed by: PHARMACIST

## 2022-05-10 PROCEDURE — 36416 COLLJ CAPILLARY BLOOD SPEC: CPT

## 2022-05-10 NOTE — PROGRESS NOTES
Anticoagulation Clinic Progress Note  Indication: atrial fibrillation  Referring Provider: Sravani [last appt 9/29/21  next: 9/8/22 (Will Sue)]  Initial Warfarin Start Date: 2008  Goal INR: 2 - 3  Current Drug Interactions: fluoxetine, glimepiride, melatonin (PRN), mirtazepine, MVI  CHADS-VASc: 3 (age, HTN, DM)    EtOH: none  GLV: Salad (Spinach salad 1x weekly or garden salad) and serving of broccoli once a week 3/1/2022  OTC Pain Relief: Uses APAP prn (~1x/week)    Anticoagulation Clinic INR History:  Date 8/20 9/4 10/2 11/6 11/15 11/29 12/31 1/7/19 1/17 1/29 2/12   Total Weekly Dose 17.5  mg 17.5 mg 17.5 mg 10mg 20mg 17.5mg 17.5mg 18.75 mg 17.5 mg 18.75 mg 20mg   INR 2.3 2.3 2.2 1.3 2.3 2.2 1.7 1.9 1.9 1.9 2.0   Notes    pre- epidural   missed dose?         Date 2/27 3/27 4/10 4/23 5/7 5/28 6/25 7/30 8/28 9/25 10/23   Total Weekly Dose 20mg 20mg 21.25 mg 21.25 mg 21.25 mg 21.25mg 21.25mg 21.25mg 21.25mg 21.25mg 21.25mg   INR 2.2 1.8 2.5 1.8 2.4 2.4 2.5 2.7 2.6 3.0 2.9   Notes sick   post- scope            Date  11/6 11/13 12/4 1/3 1/15 1/20 1/31 2/10 2/28 4/14 5/26   Total Weekly Dose 21.25 mg 18.75 mg 21.25 mg 21.25 mg 21.25 mg 20 mg 21.25 21.25 20mg 21.25mg 21.25mg   INR 4.2 3.2 2.7 2.2 3.1 3.1 2.9 3.5 2.4 2.3 2.5   Notes apap Hold x1, less GLV Inc in GLV  doxy doxy  1x dose cephalexin 1x dose dec; keflex       Date  6/24 7/22 8/24 9/10 10/8 11/5 11/18 12/2 12/30 1/26 2/22 3/22 4/19   Total Weekly Dose 21.25mg 21.25 21.25mg 21.25mg 21.25mg 21.25mg 20mg 20mg 20mg 20mg  20mg 20 mg 20mg   INR 2.4 2.4 3.0 2.8 3.0 3.1 2.5 2.5 2.5 2.6 2.9  2 2.2   Notes      Inc. GLV Dose dec    desvenlafaxine broccoli      Date 5/13 6/16  7/7 7/21 8/11 9/1 9/29 10/27 11/3 12/1 12/9 12/16   Total WeeklyDose 20 mg 20mg  20 mg 18.75mg  17.5 mg 17.5mg 17.5 17.5mg 17.5mg 17.5mg 18.75mg 22.5mg   INR 2.5 3.2 3.1 3.0 2.2 2.4 2.2 2.4 2.3 1.5 1.6 2.2   Notes  [employer] GLV     augmentin day 3 augementin glucerna  Boost x2     Date  12/28 1/13/22 2/8 2/16 3/1 3/22 4/12 5/10       Total WeeklyDose 21.25mg 21.25mg 21.25 mg 21.25mg 22.5mg 22.5 mg 22.5mg 22.5 mg       INR 2.6 2.3 1.7 2.1 2.5 2.0 2.3 2.4       Notes    Dec GLV             Clinic Interview:  Verbal Release Authorization signed on 6/27/18 -- may speak with Sussy (wife), Nikolai (son)  Tablet Strength: pt has 2.5mg tablets  Phone: [phone] (Home), [phone]   Fax: [phone] (Attn: Tasha) [facility]    Patient Findings    Negatives:  Signs/symptoms of thrombosis, Signs/symptoms of bleeding, Laboratory test error suspected, Change in health, Change in alcohol use, Change in activity, Upcoming invasive procedure, Emergency department visit, Upcoming dental procedure, Missed doses, Extra doses, Change in medications, Change in diet/appetite, Hospital admission, Bruising, Other complaints   Comments:  Serving of broccoli and spinach salad once a week.     Plan:   1. INR is therapeutic today at 2.4 (goal 2.0-3.0).  Instructed Mr. Wolf to continue warfarin 2.5 mg oral daily except 3.75mg MonWedFriSAT until recheck.   2. RTC in 4 weeks, 6/7  3. Verbal and written information was provided to Mr. Wolf and Mrs. Wolf in clinic. Mr. Wolf voiced understanding with teach back and has no further questions at this time.       Simon Real, PharmD  05/10/22   11:42 EDT

## 2022-05-27 ENCOUNTER — LAB (OUTPATIENT)
Dept: LAB | Facility: HOSPITAL | Age: 77
End: 2022-05-27

## 2022-05-27 ENCOUNTER — OFFICE VISIT (OUTPATIENT)
Dept: ENDOCRINOLOGY | Facility: CLINIC | Age: 77
End: 2022-05-27

## 2022-05-27 VITALS
HEART RATE: 68 BPM | WEIGHT: 247.8 LBS | SYSTOLIC BLOOD PRESSURE: 130 MMHG | HEIGHT: 72 IN | BODY MASS INDEX: 33.56 KG/M2 | OXYGEN SATURATION: 96 % | DIASTOLIC BLOOD PRESSURE: 72 MMHG

## 2022-05-27 DIAGNOSIS — E78.5 HYPERLIPIDEMIA LDL GOAL <100: ICD-10-CM

## 2022-05-27 DIAGNOSIS — E11.9 TYPE 2 DIABETES MELLITUS WITHOUT COMPLICATION, WITHOUT LONG-TERM CURRENT USE OF INSULIN: Primary | ICD-10-CM

## 2022-05-27 DIAGNOSIS — I10 ESSENTIAL HYPERTENSION: ICD-10-CM

## 2022-05-27 DIAGNOSIS — R61 DIAPHORESIS: ICD-10-CM

## 2022-05-27 DIAGNOSIS — E11.9 TYPE 2 DIABETES MELLITUS WITHOUT COMPLICATION, WITHOUT LONG-TERM CURRENT USE OF INSULIN: ICD-10-CM

## 2022-05-27 LAB
EXPIRATION DATE: ABNORMAL
EXPIRATION DATE: NORMAL
GLUCOSE BLDC GLUCOMTR-MCNC: 142 MG/DL (ref 70–130)
HBA1C MFR BLD: 6.5 %
Lab: ABNORMAL
Lab: NORMAL

## 2022-05-27 PROCEDURE — 84402 ASSAY OF FREE TESTOSTERONE: CPT

## 2022-05-27 PROCEDURE — 82947 ASSAY GLUCOSE BLOOD QUANT: CPT | Performed by: INTERNAL MEDICINE

## 2022-05-27 PROCEDURE — 82043 UR ALBUMIN QUANTITATIVE: CPT

## 2022-05-27 PROCEDURE — 99214 OFFICE O/P EST MOD 30 MIN: CPT | Performed by: INTERNAL MEDICINE

## 2022-05-27 PROCEDURE — 84443 ASSAY THYROID STIM HORMONE: CPT

## 2022-05-27 PROCEDURE — 84403 ASSAY OF TOTAL TESTOSTERONE: CPT

## 2022-05-27 PROCEDURE — 3044F HG A1C LEVEL LT 7.0%: CPT | Performed by: INTERNAL MEDICINE

## 2022-05-27 PROCEDURE — 83036 HEMOGLOBIN GLYCOSYLATED A1C: CPT | Performed by: INTERNAL MEDICINE

## 2022-05-27 PROCEDURE — 82570 ASSAY OF URINE CREATININE: CPT

## 2022-05-27 NOTE — PROGRESS NOTES
"     Office Note      Date: 2022  Patient Name: Tony Wolf  MRN: [id number]  : 1945    Chief Complaint   Patient presents with   • Diabetes       History of Present Illness:   Tony Wolf is a 76 y.o. male who presents for Diabetes type 2. Diagnosed in: . Treated in past with oral agents. Current treatments: glimepiride. Number of insulin shots per day: none. Checks blood sugar 1 time a day. Has low blood sugar: no. Aspirin use: No - on warfarin. Statin use: Yes. ACE-I/ARB use: Yes. Changes in health since last visit: none. Last eye exam 2022.    Subjective      Diabetic Complications:  Eyes: No  Kidneys: No  Feet: Yes - h/o ulcer  Heart: No    Diet and Exercise:  Meals per day: 3  Minutes of exercise per week: 0 mins.    Review of Systems:   Review of Systems   Constitutional: Positive for diaphoresis.   Cardiovascular: Negative.    Gastrointestinal: Negative.    Endocrine: Negative.        The following portions of the patient's history were reviewed and updated as appropriate: allergies, current medications, past family history, past medical history, past social history, past surgical history and problem list.    Objective       Visit Vitals  /72   Pulse 68   Ht 182.9 cm (72\")   Wt 112 kg (247 lb 12.8 oz)   SpO2 96%   BMI 33.61 kg/m²       Physical Exam:  Physical Exam  Constitutional:       Appearance: Normal appearance.   Neurological:      Mental Status: He is alert.         Labs:    HbA1c  Lab Results   Component Value Date    HGBA1C 6.5 2022       CMP  Lab Results   Component Value Date    GLUCOSE 144 (H) 2021    BUN 11 2021    CREATININE 0.97 2021    EGFRIFNONA 76 2021    BCR 11.3 2021    K 4.3 2021    CO2 25.4 2021    CALCIUM 9.5 2021    AST 37 2021    ALT 30 2021        Lipid Panel  Lab Results   Component Value Date    CHLPL 172 2016    HDL 30 (L) 2021     (H) 2021    TRIG 190 (H) " 12/08/2021        TSH  Lab Results   Component Value Date    TSH 2.170 09/11/2020    FREET4 0.98 06/09/2015        Hemoglobin A1C  Lab Results   Component Value Date    HGBA1C 6.5 05/27/2022        Microalbumin/Creatinine  No results found for: MALBCRERATIO, CREATINIURIN, MICROALBUR        Assessment / Plan      Assessment & Plan:  Diagnoses and all orders for this visit:    1. Type 2 diabetes mellitus without complication, without long-term current use of insulin (HCC) (Primary)  Assessment & Plan:  Diabetes is unchanged.   Continue current treatment regimen.  Diabetes will be reassessed in 3 months.    Orders:  -     POC Glucose, Blood  -     POC Glycosylated Hemoglobin (Hb A1C)  -     TSH; Future  -     Testosterone, Free, Total; Future  -     Microalbumin / Creatinine Urine Ratio - Urine, Clean Catch; Future    2. Essential hypertension  Assessment & Plan:  Hypertension is unchanged.  Continue current treatment regimen.  Blood pressure will be reassessed at the next regular appointment.      3. Hyperlipidemia LDL goal <100  Assessment & Plan:  Continue statin.      4. Diaphoresis  Assessment & Plan:  Check TSH and testosterone today.  Will send note about results.        Return in about 3 months (around 8/27/2022) for Recheck with A1c.    Brown Gallo MD   05/27/2022

## 2022-05-28 LAB
ALBUMIN UR-MCNC: 19.1 MG/DL
CREAT UR-MCNC: 115.6 MG/DL
MICROALBUMIN/CREAT UR: 165.2 MG/G
TSH SERPL DL<=0.05 MIU/L-ACNC: 2.42 UIU/ML (ref 0.27–4.2)

## 2022-05-29 LAB
TESTOST FREE SERPL-MCNC: 5.2 PG/ML (ref 6.6–18.1)
TESTOST SERPL-MCNC: 416 NG/DL (ref 264–916)

## 2022-06-07 ENCOUNTER — ANTICOAGULATION VISIT (OUTPATIENT)
Dept: PHARMACY | Facility: HOSPITAL | Age: 77
End: 2022-06-07

## 2022-06-07 DIAGNOSIS — I48.20 CHRONIC ATRIAL FIBRILLATION: Primary | ICD-10-CM

## 2022-06-07 LAB
INR PPP: 2.1 (ref 0.91–1.09)
PROTHROMBIN TIME: 25.3 SECONDS (ref 10–13.8)

## 2022-06-07 PROCEDURE — 36416 COLLJ CAPILLARY BLOOD SPEC: CPT

## 2022-06-07 PROCEDURE — 85610 PROTHROMBIN TIME: CPT

## 2022-06-07 NOTE — PROGRESS NOTES
Anticoagulation Clinic Progress Note  Indication: atrial fibrillation  Referring Provider: Sravani [last appt 9/29/21  next: 9/8/22 (Will Sue)]  Initial Warfarin Start Date: 2008  Goal INR: 2 - 3  Current Drug Interactions: fluoxetine, glimepiride, melatonin (PRN), mirtazepine, MVI  CHADS-VASc: 3 (age, HTN, DM)    EtOH: none  GLV: Salad (Spinach salad 1x weekly or garden salad) and serving of broccoli once a week 3/1/2022  OTC Pain Relief: Uses APAP prn (~1x/week)    Anticoagulation Clinic INR History:  Date 8/20 9/4 10/2 11/6 11/15 11/29 12/31 1/7/19 1/17 1/29 2/12   Total Weekly Dose 17.5  mg 17.5 mg 17.5 mg 10mg 20mg 17.5mg 17.5mg 18.75 mg 17.5 mg 18.75 mg 20mg   INR 2.3 2.3 2.2 1.3 2.3 2.2 1.7 1.9 1.9 1.9 2.0   Notes    pre- epidural   missed dose?         Date 2/27 3/27 4/10 4/23 5/7 5/28 6/25 7/30 8/28 9/25 10/23   Total Weekly Dose 20mg 20mg 21.25 mg 21.25 mg 21.25 mg 21.25mg 21.25mg 21.25mg 21.25mg 21.25mg 21.25mg   INR 2.2 1.8 2.5 1.8 2.4 2.4 2.5 2.7 2.6 3.0 2.9   Notes sick   post- scope            Date  11/6 11/13 12/4 1/3 1/15 1/20 1/31 2/10 2/28 4/14 5/26   Total Weekly Dose 21.25 mg 18.75 mg 21.25 mg 21.25 mg 21.25 mg 20 mg 21.25 21.25 20mg 21.25mg 21.25mg   INR 4.2 3.2 2.7 2.2 3.1 3.1 2.9 3.5 2.4 2.3 2.5   Notes apap Hold x1, less GLV Inc in GLV  doxy doxy  1x dose cephalexin 1x dose dec; keflex       Date  6/24 7/22 8/24 9/10 10/8 11/5 11/18 12/2 12/30 1/26 2/22 3/22 4/19   Total Weekly Dose 21.25mg 21.25 21.25mg 21.25mg 21.25mg 21.25mg 20mg 20mg 20mg 20mg  20mg 20 mg 20mg   INR 2.4 2.4 3.0 2.8 3.0 3.1 2.5 2.5 2.5 2.6 2.9  2 2.2   Notes      Inc. GLV Dose dec    desvenlafaxine broccoli      Date 5/13 6/16  7/7 7/21 8/11 9/1 9/29 10/27 11/3 12/1 12/9 12/16   Total WeeklyDose 20 mg 20mg  20 mg 18.75mg  17.5 mg 17.5mg 17.5 17.5mg 17.5mg 17.5mg 18.75mg 22.5mg   INR 2.5 3.2 3.1 3.0 2.2 2.4 2.2 2.4 2.3 1.5 1.6 2.2   Notes  apap Inc GLV     augmentin day 3 augementin glucerna  Boost x2     Date  12/28 1/13/22 2/8 2/16 3/1 3/22 4/12 5/10 6/7      Total WeeklyDose 21.25mg 21.25mg 21.25 mg 21.25mg 22.5mg 22.5 mg 22.5mg 22.5 mg 22.5 mg      INR 2.6 2.3 1.7 2.1 2.5 2.0 2.3 2.4 2.1      Notes    Dec V             Clinic Interview:  Verbal Release Authorization signed on 6/27/18 -- may speak with Sussy (wife), Nikolai (son)  Tablet Strength: pt has 2.5mg tablets  Phone: [phone] (Home), [phone]   Fax: [phone] (Attn: Tasha) [facility]    Negatives:  Signs/symptoms of thrombosis, Signs/symptoms of bleeding, Laboratory test error suspected, Change in health, Change in alcohol use, Change in activity, Upcoming invasive procedure, Emergency department visit, Upcoming dental procedure, Missed doses, Extra doses, Change in medications, Change in diet/appetite, Hospital admission, Bruising, Other complaints     Plan:   1. INR is therapeutic today at 2.1 (goal 2.0-3.0).  Instructed Mr. Wolf to continue warfarin 2.5 mg oral daily except 3.75mg MonWedFriSAT until recheck.   2. RTC in 4 weeks, 7/5  3. Verbal and written information was provided to Mr. Wolf and Mrs. Wolf in clinic. Mr. Wolf voiced understanding with teach back and has no further questions at this time.     Esther Mathews, PharmD, BCPS   6/7/2022  11:20 EDT

## 2022-06-09 ENCOUNTER — OFFICE VISIT (OUTPATIENT)
Dept: INTERNAL MEDICINE | Facility: CLINIC | Age: 77
End: 2022-06-09

## 2022-06-09 ENCOUNTER — LAB (OUTPATIENT)
Dept: LAB | Facility: HOSPITAL | Age: 77
End: 2022-06-09

## 2022-06-09 VITALS
HEIGHT: 72 IN | BODY MASS INDEX: 33.59 KG/M2 | WEIGHT: 248 LBS | HEART RATE: 62 BPM | TEMPERATURE: 97.5 F | OXYGEN SATURATION: 95 % | SYSTOLIC BLOOD PRESSURE: 118 MMHG | DIASTOLIC BLOOD PRESSURE: 78 MMHG

## 2022-06-09 DIAGNOSIS — R13.19 OTHER DYSPHAGIA: ICD-10-CM

## 2022-06-09 DIAGNOSIS — Z00.00 MEDICARE ANNUAL WELLNESS VISIT, SUBSEQUENT: Primary | ICD-10-CM

## 2022-06-09 DIAGNOSIS — I48.20 CHRONIC ATRIAL FIBRILLATION: ICD-10-CM

## 2022-06-09 DIAGNOSIS — E11.9 TYPE 2 DIABETES MELLITUS WITHOUT COMPLICATION, WITHOUT LONG-TERM CURRENT USE OF INSULIN: ICD-10-CM

## 2022-06-09 DIAGNOSIS — Z12.5 PROSTATE CANCER SCREENING: ICD-10-CM

## 2022-06-09 DIAGNOSIS — E78.5 HYPERLIPIDEMIA LDL GOAL <100: ICD-10-CM

## 2022-06-09 DIAGNOSIS — F41.1 GENERALIZED ANXIETY DISORDER: ICD-10-CM

## 2022-06-09 DIAGNOSIS — I10 ESSENTIAL HYPERTENSION: ICD-10-CM

## 2022-06-09 PROBLEM — L97.509 DIABETIC FOOT ULCER (HCC): Status: RESOLVED | Noted: 2018-07-10 | Resolved: 2022-06-09

## 2022-06-09 PROBLEM — E11.621 DIABETIC FOOT ULCER: Status: RESOLVED | Noted: 2018-07-10 | Resolved: 2022-06-09

## 2022-06-09 LAB
ALBUMIN SERPL-MCNC: 4.3 G/DL (ref 3.5–5.2)
ALBUMIN/GLOB SERPL: 2 G/DL
ALP SERPL-CCNC: 55 U/L (ref 39–117)
ALT SERPL W P-5'-P-CCNC: 29 U/L (ref 1–41)
ANION GAP SERPL CALCULATED.3IONS-SCNC: 12.3 MMOL/L (ref 5–15)
AST SERPL-CCNC: 41 U/L (ref 1–40)
BILIRUB SERPL-MCNC: 1.2 MG/DL (ref 0–1.2)
BUN SERPL-MCNC: 15 MG/DL (ref 8–23)
BUN/CREAT SERPL: 15.8 (ref 7–25)
CALCIUM SPEC-SCNC: 9.2 MG/DL (ref 8.6–10.5)
CHLORIDE SERPL-SCNC: 106 MMOL/L (ref 98–107)
CHOLEST SERPL-MCNC: 166 MG/DL (ref 0–200)
CO2 SERPL-SCNC: 24.7 MMOL/L (ref 22–29)
CREAT SERPL-MCNC: 0.95 MG/DL (ref 0.76–1.27)
EGFRCR SERPLBLD CKD-EPI 2021: 84 ML/MIN/1.73
GLOBULIN UR ELPH-MCNC: 2.1 GM/DL
GLUCOSE SERPL-MCNC: 117 MG/DL (ref 65–99)
HDLC SERPL-MCNC: 34 MG/DL (ref 40–60)
LDLC SERPL CALC-MCNC: 104 MG/DL (ref 0–100)
LDLC/HDLC SERPL: 2.94 {RATIO}
POTASSIUM SERPL-SCNC: 4 MMOL/L (ref 3.5–5.2)
PROT SERPL-MCNC: 6.4 G/DL (ref 6–8.5)
SODIUM SERPL-SCNC: 143 MMOL/L (ref 136–145)
TRIGL SERPL-MCNC: 160 MG/DL (ref 0–150)
VLDLC SERPL-MCNC: 28 MG/DL (ref 5–40)

## 2022-06-09 PROCEDURE — 80061 LIPID PANEL: CPT

## 2022-06-09 PROCEDURE — 1170F FXNL STATUS ASSESSED: CPT | Performed by: INTERNAL MEDICINE

## 2022-06-09 PROCEDURE — 1126F AMNT PAIN NOTED NONE PRSNT: CPT | Performed by: INTERNAL MEDICINE

## 2022-06-09 PROCEDURE — 1160F RVW MEDS BY RX/DR IN RCRD: CPT | Performed by: INTERNAL MEDICINE

## 2022-06-09 PROCEDURE — 80053 COMPREHEN METABOLIC PANEL: CPT

## 2022-06-09 PROCEDURE — 99214 OFFICE O/P EST MOD 30 MIN: CPT | Performed by: INTERNAL MEDICINE

## 2022-06-09 PROCEDURE — G0439 PPPS, SUBSEQ VISIT: HCPCS | Performed by: INTERNAL MEDICINE

## 2022-06-09 NOTE — PROGRESS NOTES
The ABCs of the Annual Wellness Visit  Subsequent Medicare Wellness Visit    Chief Complaint   Patient presents with   • Medicare Wellness-subsequent   • Hypertension   • Hyperlipidemia   • Atrial Fibrillation   • Diabetes   • Anxiety      Subjective    History of Present Illness:  Tony Wolf is a 76 y.o. male who presents for a Subsequent Medicare Wellness Visit.    The following portions of the patient's history were reviewed and   updated as appropriate: allergies, current medications, past family history, past medical history, past social history, past surgical history and problem list.    Compared to one year ago, the patient feels his physical   health is the same.    Compared to one year ago, the patient feels his mental   health is the same.    Recent Hospitalizations:  He was not admitted to the hospital during the last year.       Current Medical Providers:  Patient Care Team:  Kay Lopez DO as PCP - General  Kay Lopez DO as PCP - Family Medicine  Elise Portillo, PharmD as Pharmacist (Pharmacy)  Ama Mccloud, PharmD as Pharmacist (Pharmacy)  Sammy Lassiter MD as Consulting Physician (Cardiology)  Brown Gallo MD as Consulting Physician (Endocrinology)    Outpatient Medications Prior to Visit   Medication Sig Dispense Refill   • Accu-Chek Softclix Lancets lancets USE TO TEST BLOOD SUGAR THREE TIMES A  each 2   • acetaminophen (TYLENOL) 500 MG tablet Take 1,000 mg by mouth Every 6 (Six) Hours As Needed for Mild Pain (1-3).     • amLODIPine (NORVASC) 10 MG tablet Take 1 tablet by mouth Daily. as directed 90 tablet 3   • bimatoprost (Lumigan) 0.01 % ophthalmic drops Administer 1 drop to both eyes Every Night.     • Cholecalciferol (VITAMIN D-3) 1000 UNITS capsule Take 1,000 Units by mouth Daily.     • glimepiride (AMARYL) 2 MG tablet Take 1 tablet by mouth Daily. 90 tablet 1   • glucose blood (Accu-Chek Kandis Plus) test strip USE ONE STRIP TO TEST THREE TIMES A   each 11   • latanoprost (XALATAN) 0.005 % ophthalmic solution Administer 1 drop to both eyes Every Night.     • lisinopril (PRINIVIL,ZESTRIL) 40 MG tablet TAKE ONE TABLET BY MOUTH DAILY 90 tablet 2   • Loratadine 10 MG capsule Take 1 capsule by mouth Daily.     • Melatonin 2.5 MG chewable tablet Chew 1 tablet At Night As Needed.     • metoprolol succinate XL (TOPROL-XL) 100 MG 24 hr tablet TAKE ONE TABLET BY MOUTH TWICE A  tablet 3   • mirtazapine (REMERON) 45 MG tablet Take 45 mg by mouth Every Night.     • pravastatin (PRAVACHOL) 20 MG tablet Take 1 tablet by mouth Daily. 90 tablet 1   • venlafaxine XR (EFFEXOR-XR) 75 MG 24 hr capsule Take 75 mg by mouth Daily.     • warfarin (COUMADIN) 2.5 MG tablet Take 1 to 1.5 tablets by mouth daily or as directed by the anticoagulation clinic. 120 tablet 1   • amoxicillin-clavulanate (Augmentin) 875-125 MG per tablet Take 1 tablet by mouth 2 (Two) Times a Day. 20 tablet 0     No facility-administered medications prior to visit.       No opioid medication identified on active medication list. I have reviewed chart for other potential  high risk medication/s and harmful drug interactions in the elderly.          Aspirin is not on active medication list.  Aspirin use is not indicated based on review of current medical condition/s. Risk of harm outweighs potential benefits.  .    Patient Active Problem List   Diagnosis   • GERD (gastroesophageal reflux disease)   • Essential hypertension   • Class 1 obesity due to excess calories with serious comorbidity in adult   • Obstructive sleep apnea   • Generalized anxiety disorder   • Chronic atrial fibrillation (HCC)   • PVC's (premature ventricular contractions)   • Hyperlipidemia LDL goal <100   • Left ventricular hypertrophy   • Atypical atrial flutter (HCC)   • Arthritis of knee, right   • Status post right knee replacement   • Type 2 diabetes mellitus without complication, without long-term current use of insulin  "([facility])   • Diaphoresis     Advance Care Planning  Advance Directive is not on file.  ACP discussion was held with the patient during this visit. Patient has an advance directive (not in EMR), copy requested.    Review of Systems   Constitutional: Negative for fatigue and fever.   HENT: Negative for sinus pressure and sneezing.    Eyes: Negative for redness.   Gastrointestinal: Negative for abdominal pain, constipation and diarrhea.        Feels like food getting stuck when he swallows   Musculoskeletal: Positive for arthralgias.   Neurological: Negative for light-headedness and confusion.   Psychiatric/Behavioral: The patient is nervous/anxious.         Objective    Vitals:    06/09/22 1113   BP: 118/78   Pulse: 62   Temp: 97.5 °F (36.4 °C)   SpO2: 95%   Weight: 112 kg (248 lb)   Height: 182.9 cm (72.01\")   PainSc: 0-No pain     Estimated body mass index is 33.63 kg/m² as calculated from the following:    Height as of this encounter: 182.9 cm (72.01\").    Weight as of this encounter: 112 kg (248 lb).    BMI is >= 30 and <35. (Class 1 Obesity). The following options were offered after discussion;: nutrition counseling/recommendations      Does the patient have evidence of cognitive impairment? No    Physical Exam  Vitals reviewed.   Cardiovascular:      Rate and Rhythm: Normal rate. Rhythm irregular.   Pulmonary:      Effort: No respiratory distress.      Breath sounds: No wheezing.   Abdominal:      General: There is no distension.      Tenderness: There is no abdominal tenderness.   Neurological:      General: No focal deficit present.      Mental Status: He is alert and oriented to person, place, and time.   Psychiatric:         Mood and Affect: Mood normal.         Behavior: Behavior normal.       Lab Results   Component Value Date    HGBA1C 6.5 05/27/2022            HEALTH RISK ASSESSMENT    Smoking Status:  Social History     Tobacco Use   Smoking Status Never Smoker   Smokeless Tobacco Never Used     Alcohol " Consumption:  Social History     Substance and Sexual Activity   Alcohol Use No     Fall Risk Screen:    FARHATADI Fall Risk Assessment was completed, and patient is at HIGH risk for falls. Assessment completed on:6/9/2022    Depression Screening:  PHQ-2/PHQ-9 Depression Screening 6/9/2022   Retired PHQ-9 Total Score -   Retired Total Score -   Little Interest or Pleasure in Doing Things 0-->not at all   Feeling Down, Depressed or Hopeless 0-->not at all   PHQ-9: Brief Depression Severity Measure Score 0       Health Habits and Functional and Cognitive Screening:  Functional & Cognitive Status 6/9/2022   Do you have difficulty preparing food and eating? No   Do you have difficulty bathing yourself, getting dressed or grooming yourself? No   Do you have difficulty using the toilet? No   Do you have difficulty moving around from place to place? Yes   Do you have trouble with steps or getting out of a bed or a chair? Yes   Current Diet Well Balanced Diet   Dental Exam Not up to date   Eye Exam Up to date   Exercise (times per week) -   Current Exercises Include No Regular Exercise   Current Exercise Activities Include -   Do you need help using the phone?  No   Are you deaf or do you have serious difficulty hearing?  No   Do you need help with transportation? Yes   Do you need help shopping? Yes   Do you need help preparing meals?  Yes   Do you need help with housework?  Yes   Do you need help with laundry? Yes   Do you need help taking your medications? Yes   Do you need help managing money? No   Do you ever drive or ride in a car without wearing a seat belt? No   Have you felt unusual stress, anger or loneliness in the last month? No   Who do you live with? Spouse   If you need help, do you have trouble finding someone available to you? No   Have you been bothered in the last four weeks by sexual problems? No   Do you have difficulty concentrating, remembering or making decisions? No       Age-appropriate Screening  Schedule:  Refer to the list below for future screening recommendations based on patient's age, sex and/or medical conditions. Orders for these recommended tests are listed in the plan section. The patient has been provided with a written plan.    Health Maintenance   Topic Date Due   • ZOSTER VACCINE (1 of 2) Never done   • DXA SCAN  02/06/2020   • DIABETIC FOOT EXAM  06/24/2022 (Originally 11/4/2021)   • INFLUENZA VACCINE  08/01/2022   • HEMOGLOBIN A1C  11/27/2022   • LIPID PANEL  12/08/2022   • DIABETIC EYE EXAM  05/09/2023   • URINE MICROALBUMIN  05/27/2023   • TDAP/TD VACCINES (2 - Td or Tdap) 07/06/2027              Assessment & Plan   CMS Preventative Services Quick Reference  Risk Factors Identified During Encounter  Obesity/Overweight Fall risk  The above risks/problems have been discussed with the patient.  Follow up actions/plans if indicated are seen below in the Assessment/Plan Section.  Pertinent information has been shared with the patient in the After Visit Summary.    Diagnoses and all orders for this visit:    1. Medicare annual wellness visit, subsequent (Primary)  Done today  2. Hyperlipidemia LDL goal <100  Lipids. Continue Pravastatin 20 mg po qhs  3. Essential hypertension  -     CBC & Differential; Future  Continue Norvasc 10 mg po qd and lisinopril 20 mg po qhs  4. Chronic atrial fibrillation (HCC)  Continue Coumadin 2.5 mg po qd, INR monitored in coumadin clinic. Continue Toprol Xl 100 mg po qd  5. Type 2 diabetes mellitus without complication, without long-term current use of insulin (HCC)  Continue Amaryl 2 mg po qd  6. Generalized anxiety disorder  Continue Effexor Er 75 mg po qd  7. Prostate cancer screening  -     PSA Screen; Future    8. Other dysphagia  -     Ambulatory referral for Screening EGD        Follow Up:   Return in about 3 months (around 9/9/2022).     An After Visit Summary and PPPS were made available to the patient.

## 2022-07-05 ENCOUNTER — ANTICOAGULATION VISIT (OUTPATIENT)
Dept: PHARMACY | Facility: HOSPITAL | Age: 77
End: 2022-07-05

## 2022-07-05 DIAGNOSIS — I48.20 CHRONIC ATRIAL FIBRILLATION: Primary | ICD-10-CM

## 2022-07-05 LAB
INR PPP: 2.1 (ref 0.91–1.09)
PROTHROMBIN TIME: 25.1 SECONDS (ref 10–13.8)

## 2022-07-05 PROCEDURE — G0463 HOSPITAL OUTPT CLINIC VISIT: HCPCS

## 2022-07-05 PROCEDURE — 85610 PROTHROMBIN TIME: CPT

## 2022-07-05 PROCEDURE — 36416 COLLJ CAPILLARY BLOOD SPEC: CPT

## 2022-07-05 NOTE — PROGRESS NOTES
Anticoagulation Clinic Progress Note  Indication: atrial fibrillation  Referring Provider: Sravani [last appt 9/29/21  next: 9/8/22 (Will Sue)]  Initial Warfarin Start Date: 2008  Goal INR: 2 - 3  Current Drug Interactions: fluoxetine, glimepiride, melatonin (PRN), mirtazepine, MVI  CHADS-VASc: 3 (age, HTN, DM)    EtOH: none  GLV: Salad (Spinach salad 1x weekly or garden salad) and serving of broccoli once a week 3/1/2022  OTC Pain Relief: Uses APAP prn (~1x/week)    Anticoagulation Clinic INR History:  Date 8/20 9/4 10/2 11/6 11/15 11/29 12/31 1/7/19 1/17 1/29 2/12   Total Weekly Dose 17.5  mg 17.5 mg 17.5 mg 10mg 20mg 17.5mg 17.5mg 18.75 mg 17.5 mg 18.75 mg 20mg   INR 2.3 2.3 2.2 1.3 2.3 2.2 1.7 1.9 1.9 1.9 2.0   Notes    pre- epidural   missed dose?         Date 2/27 3/27 4/10 4/23 5/7 5/28 6/25 7/30 8/28 9/25 10/23   Total Weekly Dose 20mg 20mg 21.25 mg 21.25 mg 21.25 mg 21.25mg 21.25mg 21.25mg 21.25mg 21.25mg 21.25mg   INR 2.2 1.8 2.5 1.8 2.4 2.4 2.5 2.7 2.6 3.0 2.9   Notes sick   post- scope            Date  11/6 11/13 12/4 1/3 1/15 1/20 1/31 2/10 2/28 4/14 5/26   Total Weekly Dose 21.25 mg 18.75 mg 21.25 mg 21.25 mg 21.25 mg 20 mg 21.25 21.25 20mg 21.25mg 21.25mg   INR 4.2 3.2 2.7 2.2 3.1 3.1 2.9 3.5 2.4 2.3 2.5   Notes apap Hold x1, less GLV Inc in GLV  doxy doxy  1x dose cephalexin 1x dose dec; keflex       Date  6/24 7/22 8/24 9/10 10/8 11/5 11/18 12/2 12/30 1/26 2/22 3/22 4/19   Total Weekly Dose 21.25mg 21.25 21.25mg 21.25mg 21.25mg 21.25mg 20mg 20mg 20mg 20mg  20mg 20 mg 20mg   INR 2.4 2.4 3.0 2.8 3.0 3.1 2.5 2.5 2.5 2.6 2.9  2 2.2   Notes      Inc. GLV Dose dec    desvenlafaxine broccoli      Date 5/13 6/16  7/7 7/21 8/11 9/1 9/29 10/27 11/3 12/1 12/9 12/16   Total WeeklyDose 20 mg 20mg  20 mg 18.75mg  17.5 mg 17.5mg 17.5 17.5mg 17.5mg 17.5mg 18.75mg 22.5mg   INR 2.5 3.2 3.1 3.0 2.2 2.4 2.2 2.4 2.3 1.5 1.6 2.2   Notes  apap Inc GLV     augmentin day 3 augementin glucerna  Boost x2     Date  12/28 1/13/22 2/8 2/16 3/1 3/22 4/12 5/10 6/7 7/5     Total WeeklyDose 21.25mg 21.25mg 21.25 mg 21.25mg 22.5mg 22.5 mg 22.5mg 22.5 mg 22.5 mg 22.5mg     INR 2.6 2.3 1.7 2.1 2.5 2.0 2.3 2.4 2.1 2.1     Notes    Dec GLV             Clinic Interview:  Verbal Release Authorization signed on 6/27/18 -- may speak with Sussy (wife), Nikolai (son)  Tablet Strength: pt has 2.5mg tablets  Phone: [phone] (Home), [phone]   Fax: [phone] (Attn: Tasha) [facility] [place] [facility]    Negatives:  Signs/symptoms of thrombosis, Signs/symptoms of bleeding, Laboratory test error suspected, Change in health, Change in alcohol use, Change in activity, Upcoming invasive procedure, Emergency department visit, Upcoming dental procedure, Missed doses, Extra doses, Change in medications, Change in diet/appetite, Hospital admission, Bruising, Other complaints     Plan:   1. INR is therapeutic today at 2.1 (goal 2.0-3.0).  Instructed Mr. Wolf to continue warfarin 2.5 mg oral daily except 3.75mg MonWedFriSAT until recheck.   2. RTC in 4 weeks, 8/2  3. Verbal and written information was provided to Mr. Wolf and Mrs. Wolf in clinic. Mr. Wolf voiced understanding with teach back and has no further questions at this time.     Teena Boykin, TonaD.  07/05/22   11:16 EDT

## 2022-08-02 ENCOUNTER — ANTICOAGULATION VISIT (OUTPATIENT)
Dept: PHARMACY | Facility: HOSPITAL | Age: 77
End: 2022-08-02

## 2022-08-02 ENCOUNTER — LAB (OUTPATIENT)
Dept: LAB | Facility: HOSPITAL | Age: 77
End: 2022-08-02

## 2022-08-02 DIAGNOSIS — I48.20 CHRONIC ATRIAL FIBRILLATION: Primary | ICD-10-CM

## 2022-08-02 LAB
INR PPP: 2.8 (ref 0.91–1.09)
PROTHROMBIN TIME: 33.3 SECONDS (ref 10–13.8)

## 2022-08-02 PROCEDURE — 36416 COLLJ CAPILLARY BLOOD SPEC: CPT

## 2022-08-02 PROCEDURE — 85610 PROTHROMBIN TIME: CPT

## 2022-08-02 NOTE — PROGRESS NOTES
Anticoagulation Clinic Progress Note  Indication: atrial fibrillation  Referring Provider: Sravani [last appt 9/29/21  next: 9/8/22 (Will Sue)]  Initial Warfarin Start Date: 2008  Goal INR: 2 - 3  Current Drug Interactions: fluoxetine, glimepiride, melatonin (PRN), mirtazepine, MVI  CHADS-VASc: 3 (age, HTN, DM)    EtOH: none  GLV: Salad (Spinach salad 1x weekly or garden salad) and serving of broccoli once a week 3/1/2022  OTC Pain Relief: Uses APAP prn (~1x/week)    Anticoagulation Clinic INR History:  Date 8/20 9/4 10/2 11/6 11/15 11/29 12/31 1/7/19 1/17 1/29 2/12   Total Weekly Dose 17.5  mg 17.5 mg 17.5 mg 10mg 20mg 17.5mg 17.5mg 18.75 mg 17.5 mg 18.75 mg 20mg   INR 2.3 2.3 2.2 1.3 2.3 2.2 1.7 1.9 1.9 1.9 2.0   Notes    pre- epidural   missed dose?         Date 2/27 3/27 4/10 4/23 5/7 5/28 6/25 7/30 8/28 9/25 10/23   Total Weekly Dose 20mg 20mg 21.25 mg 21.25 mg 21.25 mg 21.25mg 21.25mg 21.25mg 21.25mg 21.25mg 21.25mg   INR 2.2 1.8 2.5 1.8 2.4 2.4 2.5 2.7 2.6 3.0 2.9   Notes sick   post- scope            Date  11/6 11/13 12/4 1/3 1/15 1/20 1/31 2/10 2/28 4/14 5/26   Total Weekly Dose 21.25 mg 18.75 mg 21.25 mg 21.25 mg 21.25 mg 20 mg 21.25 21.25 20mg 21.25mg 21.25mg   INR 4.2 3.2 2.7 2.2 3.1 3.1 2.9 3.5 2.4 2.3 2.5   Notes apap Hold x1, less GLV Inc in GLV  doxy doxy  1x dose cephalexin 1x dose dec; keflex       Date  6/24 7/22 8/24 9/10 10/8 11/5 11/18 12/2 12/30 1/26 2/22 3/22 4/19   Total Weekly Dose 21.25mg 21.25 21.25mg 21.25mg 21.25mg 21.25mg 20mg 20mg 20mg 20mg  20mg 20 mg 20mg   INR 2.4 2.4 3.0 2.8 3.0 3.1 2.5 2.5 2.5 2.6 2.9  2 2.2   Notes      Inc. GLV Dose dec    desvenlafaxine broccoli      Date 5/13 6/16  7/7 7/21 8/11 9/1 9/29 10/27 11/3 12/1 12/9 12/16   Total WeeklyDose 20 mg 20mg  20 mg 18.75mg  17.5 mg 17.5mg 17.5 17.5mg 17.5mg 17.5mg 18.75mg 22.5mg   INR 2.5 3.2 3.1 3.0 2.2 2.4 2.2 2.4 2.3 1.5 1.6 2.2   Notes  apap Inc GLV     augmentin day 3 augementin glucerna  Boost x2     Date  12/28 1/13/22 2/8 2/16 3/1 3/22 4/12 5/10 6/7 7/5 8/2    Total WeeklyDose 21.25mg 21.25mg 21.25 mg 21.25mg 22.5mg 22.5 mg 22.5mg 22.5 mg 22.5 mg 22.5mg 22.5mg    INR 2.6 2.3 1.7 2.1 2.5 2.0 2.3 2.4 2.1 2.1 2.8    Notes    Dec GLV             Clinic Interview:  Verbal Release Authorization signed on 6/27/18 -- may speak with Sussy (wife), Nikolai (son)  Tablet Strength: pt has 2.5mg tablets  Phone: [phone] (Home), [phone]   Fax: [phone] (Attn: Tasha) [facility] [place]    Patient Findings  Negatives:  Signs/symptoms of thrombosis, Signs/symptoms of bleeding, Laboratory test error suspected, Change in health, Change in alcohol use, Change in activity, Upcoming invasive procedure, Emergency department visit, Upcoming dental procedure, Missed doses, Extra doses, Change in medications, Change in diet/appetite, Hospital admission, Bruising, Other complaints     Plan:   1. INR is therapeutic today at 2.8 (goal 2.0-3.0).  Instructed Mr. Wolf to continue 3.75mg daily oral daily except 2.5mg TueThurSun until recheck.   2. RTC in 5 weeks, 9/8 due to cardiology appointment. OK since INR has been stable.  3. Verbal and written information was provided to Mr. Wolf and Mrs. Wolf in clinic. Mr. Wolf voiced understanding with teach back and has no further questions at this time.     Ama Mccloud, PharmD  08/02/22   11:07 EDT

## 2022-08-05 ENCOUNTER — TELEPHONE (OUTPATIENT)
Dept: PHARMACY | Facility: HOSPITAL | Age: 77
End: 2022-08-05

## 2022-08-05 NOTE — TELEPHONE ENCOUNTER
Dr. Lassiter    We were recently informed that Tony Wolf is undergoing an EGD on 8/22/22 with Dr. Leo Hawtohrne at [facility] (.) Dr. Hawthorne requested that the patient hold warfarin 3 days prior to procedure and resume warfarin night of procedure.     Tony Wolf is a 77 y.o. male on warfarin for a.fib, therefore bridge therapy is not recommended unless otherwise instructed.     8/19: Hold warfarin 3 days   8/22: procedure  resume warfarin [2-3 boosted doses if appropriate]  8/25: recheck INR     Please advise if you are agreeable to plan above or if you prefer an alternative approach to Tony Wolf's anticoagulation plan for the upcoming procedure. In addition, please advise if surgeon's office should contact your office for further cardiac clearance.        Thank you,    [facility] Anticoagulation Team  (t) [phone]  (f) [phone]

## 2022-08-18 RX ORDER — METOPROLOL SUCCINATE 100 MG/1
TABLET, EXTENDED RELEASE ORAL
Qty: 180 TABLET | Refills: 3 | Status: SHIPPED | OUTPATIENT
Start: 2022-08-18

## 2022-08-23 NOTE — TELEPHONE ENCOUNTER
Called patient and he reports he did hold warfarin for 3 days per Dov and then boost dose per instructions post op:    7.5mg 8/22  5mg 8/23    Tomorrow 8/24: 3.75mg  Repeat INR on 8/25

## 2022-08-25 ENCOUNTER — ANTICOAGULATION VISIT (OUTPATIENT)
Dept: PHARMACY | Facility: HOSPITAL | Age: 77
End: 2022-08-25

## 2022-08-25 DIAGNOSIS — I48.20 CHRONIC ATRIAL FIBRILLATION: Primary | ICD-10-CM

## 2022-08-25 LAB
INR PPP: 2.7 (ref 0.91–1.09)
PROTHROMBIN TIME: 31.8 SECONDS (ref 10–13.8)

## 2022-08-25 PROCEDURE — 36416 COLLJ CAPILLARY BLOOD SPEC: CPT

## 2022-08-25 PROCEDURE — G0463 HOSPITAL OUTPT CLINIC VISIT: HCPCS

## 2022-08-25 PROCEDURE — 85610 PROTHROMBIN TIME: CPT

## 2022-08-25 RX ORDER — OMEPRAZOLE 20 MG/1
20 CAPSULE, DELAYED RELEASE ORAL DAILY
COMMUNITY

## 2022-08-25 NOTE — PROGRESS NOTES
Anticoagulation Clinic Progress Note  Indication: atrial fibrillation  Referring Provider: Sravani [last appt 9/29/21  next: 9/8/22 (Will Sue)]  Initial Warfarin Start Date: 2008  Goal INR: 2 - 3  Current Drug Interactions: fluoxetine, glimepiride, melatonin (PRN), mirtazepine, MVI  CHADS-VASc: 3 (age, HTN, DM)    EtOH: none  GLV: Salad (Spinach salad 1x weekly or garden salad) and serving of broccoli once a week 3/1/2022  OTC Pain Relief: Uses APAP prn (~1x/week)    Anticoagulation Clinic INR History:  Date 8/20 9/4 10/2 11/6 11/15 11/29 12/31 1/7/19 1/17 1/29 2/12   Total Weekly Dose 17.5  mg 17.5 mg 17.5 mg 10mg 20mg 17.5mg 17.5mg 18.75 mg 17.5 mg 18.75 mg 20mg   INR 2.3 2.3 2.2 1.3 2.3 2.2 1.7 1.9 1.9 1.9 2.0   Notes    pre- epidural   missed dose?         Date 2/27 3/27 4/10 4/23 5/7 5/28 6/25 7/30 8/28 9/25 10/23   Total Weekly Dose 20mg 20mg 21.25 mg 21.25 mg 21.25 mg 21.25mg 21.25mg 21.25mg 21.25mg 21.25mg 21.25mg   INR 2.2 1.8 2.5 1.8 2.4 2.4 2.5 2.7 2.6 3.0 2.9   Notes sick   post- scope            Date  11/6 11/13 12/4 1/3 1/15 1/20 1/31 2/10 2/28 4/14 5/26   Total Weekly Dose 21.25 mg 18.75 mg 21.25 mg 21.25 mg 21.25 mg 20 mg 21.25 21.25 20mg 21.25mg 21.25mg   INR 4.2 3.2 2.7 2.2 3.1 3.1 2.9 3.5 2.4 2.3 2.5   Notes apap Hold x1, less GLV Inc in GLV  doxy doxy  1x dose cephalexin 1x dose dec; keflex       Date  6/24 7/22 8/24 9/10 10/8 11/5 11/18 12/2 12/30 1/26 2/22 3/22 4/19   Total Weekly Dose 21.25mg 21.25 21.25mg 21.25mg 21.25mg 21.25mg 20mg 20mg 20mg 20mg  20mg 20 mg 20mg   INR 2.4 2.4 3.0 2.8 3.0 3.1 2.5 2.5 2.5 2.6 2.9  2 2.2   Notes      Inc. GLV Dose dec    desvenlafaxine broccoli      Date 5/13 6/16  7/7 7/21 8/11 9/1 9/29 10/27 11/3 12/1 12/9 12/16   Total WeeklyDose 20 mg 20mg  20 mg 18.75mg  17.5 mg 17.5mg 17.5 17.5mg 17.5mg 17.5mg 18.75mg 22.5mg   INR 2.5 3.2 3.1 3.0 2.2 2.4 2.2 2.4 2.3 1.5 1.6 2.2   Notes  apap Inc GLV     augmentin day 3 augementin glucerna  Boost x2     Date  12/28 1/13/22 2/8 2/16 3/1 3/22 4/12 5/10 6/7 7/5 8/2    Total WeeklyDose 21.25mg 21.25mg 21.25 mg 21.25mg 22.5mg 22.5 mg 22.5mg 22.5 mg 22.5 mg 22.5mg 22.5mg EGD x 3 days hold   INR 2.6 2.3 1.7 2.1 2.5 2.0 2.3 2.4 2.1 2.1 2.8    Notes    Dec GLV        Boost x2     Date 8/25              Total WeeklyDose 18.75mg              INR 2.7              Notes omeprazole initi                  Clinic Interview:  Verbal Release Authorization signed on 6/27/18 -- may speak with Sussy (wife), Nikolai (son)  Tablet Strength: pt has 2.5mg tablets  Phone: [phone] (Home), [phone]   Fax: [phone] (Attn: Tasha) [facility]    Patient Findings  Positives:  Change in medications   Negatives:  Signs/symptoms of thrombosis, Signs/symptoms of bleeding, Laboratory test error suspected, Change in health, Change in alcohol use, Change in activity, Upcoming invasive procedure, Emergency department visit, Upcoming dental procedure, Missed doses, Extra doses, Change in diet/appetite, Hospital admission, Bruising, Other complaints   Comments:  Pt had an EGD on Monday. Biopsy on Monday. He was started on omeprazole 20mg   8/19: Hold warfarin 3 days   8/22: procedure  resume warfarin [2-3 boosted doses if appropriate]   7.5mg 8/22   5mg 8/23 8/25: recheck INR      Plan:   1. INR is therapeutic today at 2.7 (goal 2.0-3.0).  Instructed Mr. Wolf to continue 3.75mg daily oral daily except 2.5mg TueThurSun until recheck.   2. RTC in 5 weeks, 9/8 due to cardiology appointment. OK since INR has been stable.  3. Verbal and written information was provided to Mr. Wolf and Mrs. Wolf in clinic. Mr. Wolf voiced understanding with teach back and has no further questions at this time.     Ama Mccloud, PharmD  08/25/22   11:37 EDT

## 2022-09-08 ENCOUNTER — ANTICOAGULATION VISIT (OUTPATIENT)
Dept: PHARMACY | Facility: HOSPITAL | Age: 77
End: 2022-09-08

## 2022-09-08 ENCOUNTER — OFFICE VISIT (OUTPATIENT)
Dept: CARDIOLOGY | Facility: CLINIC | Age: 77
End: 2022-09-08

## 2022-09-08 VITALS
BODY MASS INDEX: 33.24 KG/M2 | HEART RATE: 71 BPM | HEIGHT: 72 IN | OXYGEN SATURATION: 94 % | WEIGHT: 245.4 LBS | DIASTOLIC BLOOD PRESSURE: 86 MMHG | SYSTOLIC BLOOD PRESSURE: 124 MMHG

## 2022-09-08 DIAGNOSIS — I48.4 ATYPICAL ATRIAL FLUTTER: ICD-10-CM

## 2022-09-08 DIAGNOSIS — I49.3 PVC'S (PREMATURE VENTRICULAR CONTRACTIONS): Primary | ICD-10-CM

## 2022-09-08 DIAGNOSIS — I48.20 CHRONIC ATRIAL FIBRILLATION: Primary | ICD-10-CM

## 2022-09-08 DIAGNOSIS — I48.20 CHRONIC ATRIAL FIBRILLATION: ICD-10-CM

## 2022-09-08 LAB
INR PPP: 2.7 (ref 0.91–1.09)
PROTHROMBIN TIME: 32.7 SECONDS (ref 10–13.8)

## 2022-09-08 PROCEDURE — 99214 OFFICE O/P EST MOD 30 MIN: CPT | Performed by: PHYSICIAN ASSISTANT

## 2022-09-08 PROCEDURE — 85610 PROTHROMBIN TIME: CPT

## 2022-09-08 PROCEDURE — 36416 COLLJ CAPILLARY BLOOD SPEC: CPT

## 2022-09-08 PROCEDURE — 93000 ELECTROCARDIOGRAM COMPLETE: CPT | Performed by: PHYSICIAN ASSISTANT

## 2022-09-08 PROCEDURE — G0463 HOSPITAL OUTPT CLINIC VISIT: HCPCS

## 2022-09-08 NOTE — PROGRESS NOTES
Anticoagulation Clinic Progress Note  Indication: atrial fibrillation  Referring Provider: Sravani [last appt 9/29/21  next: 9/8/22 (Will Sue)]  Initial Warfarin Start Date: 2008  Goal INR: 2 - 3  Current Drug Interactions: fluoxetine, glimepiride, melatonin (PRN), mirtazepine, MVI  CHADS-VASc: 3 (age, HTN, DM)    EtOH: none  GLV: Salad (Spinach salad 1x weekly or garden salad) and serving of broccoli once a week 3/1/2022  OTC Pain Relief: Uses APAP prn (~1x/week)    Anticoagulation Clinic INR History:  Date 8/20 9/4 10/2 11/6 11/15 11/29 12/31 1/7/19 1/17 1/29 2/12   Total Weekly Dose 17.5  mg 17.5 mg 17.5 mg 10mg 20mg 17.5mg 17.5mg 18.75 mg 17.5 mg 18.75 mg 20mg   INR 2.3 2.3 2.2 1.3 2.3 2.2 1.7 1.9 1.9 1.9 2.0   Notes    pre- epidural   missed dose?         Date 2/27 3/27 4/10 4/23 5/7 5/28 6/25 7/30 8/28 9/25 10/23   Total Weekly Dose 20mg 20mg 21.25 mg 21.25 mg 21.25 mg 21.25mg 21.25mg 21.25mg 21.25mg 21.25mg 21.25mg   INR 2.2 1.8 2.5 1.8 2.4 2.4 2.5 2.7 2.6 3.0 2.9   Notes sick   post- scope            Date  11/6 11/13 12/4 1/3 1/15 1/20 1/31 2/10 2/28 4/14 5/26   Total Weekly Dose 21.25 mg 18.75 mg 21.25 mg 21.25 mg 21.25 mg 20 mg 21.25 21.25 20mg 21.25mg 21.25mg   INR 4.2 3.2 2.7 2.2 3.1 3.1 2.9 3.5 2.4 2.3 2.5   Notes apap Hold x1, less GLV Inc in GLV  doxy doxy  1x dose cephalexin 1x dose dec; keflex       Date  6/24 7/22 8/24 9/10 10/8 11/5 11/18 12/2 12/30 1/26 2/22 3/22 4/19   Total Weekly Dose 21.25mg 21.25 21.25mg 21.25mg 21.25mg 21.25mg 20mg 20mg 20mg 20mg  20mg 20 mg 20mg   INR 2.4 2.4 3.0 2.8 3.0 3.1 2.5 2.5 2.5 2.6 2.9  2 2.2   Notes      Inc. GLV Dose dec    desvenlafaxine broccoli      Date 5/13 6/16  7/7 7/21 8/11 9/1 9/29 10/27 11/3 12/1 12/9 12/16   Total WeeklyDose 20 mg 20mg  20 mg 18.75mg  17.5 mg 17.5mg 17.5 17.5mg 17.5mg 17.5mg 18.75mg 22.5mg   INR 2.5 3.2 3.1 3.0 2.2 2.4 2.2 2.4 2.3 1.5 1.6 2.2   Notes  apap Inc GLV     augmentin day 3 augementin glucerna  Boost x2     Date  12/28 1/13/22 2/8 2/16 3/1 3/22 4/12 5/10 6/7 7/5 8/2    Total WeeklyDose 21.25mg 21.25mg 21.25 mg 21.25mg 22.5mg 22.5 mg 22.5mg 22.5 mg 22.5 mg 22.5mg 22.5mg EGD x 3 days hold   INR 2.6 2.3 1.7 2.1 2.5 2.0 2.3 2.4 2.1 2.1 2.8    Notes    Dec GLV        Boost x2     Date 8/25 9/8             Total WeeklyDose 18.75mg 22.5mg             INR 2.7 2.7             Notes omeprazole initi                  Clinic Interview:  Verbal Release Authorization signed on 6/27/18 -- may speak with Sussy (wife), Nikolai (son)  Tablet Strength: pt has 2.5mg tablets  Phone: [phone] (Home), [phone]   Fax: [phone] (Attn: Tasha) [facility]    Patient Findings  Negatives:  Signs/symptoms of thrombosis, Signs/symptoms of bleeding, Laboratory test error suspected, Change in health, Change in alcohol use, Change in activity, Upcoming invasive procedure, Emergency department visit, Upcoming dental procedure, Missed doses, Extra doses, Change in medications, Change in diet/appetite, Hospital admission, Bruising, Other complaints   Comments:  Continues omeprazole         Plan:   1. INR is therapeutic today at 2.7 (goal 2.0-3.0).  Instructed Mr. Wolf to continue 3.75mg daily oral daily except 2.5mg TueThurSun until recheck.   2. RTC in 3 weeks.  3. Verbal and written information was provided to Mr. Wolf and Mrs. Wolf in clinic. Mr. Wolf voiced understanding with teach back and has no further questions at this time.       Teena Boykin, PharmD.  09/08/22   12:24 EDT

## 2022-09-08 NOTE — PROGRESS NOTES
Tony Wolf  1945  [phone]    09/01/2021    [facility]     Referring Provider: No ref. provider found     Kay LopezDO  [address] [facility] [address]    Chief Complaint   Patient presents with   • Permanent atrial fibrillation        Problem List:   1.  Atrial fibrillation/atrial tachycardia/atrial flutter:  1. Diagnosed in June 2004 by physical examination. Coumadin initiated in June 2004.  2. Echocardiogram on 06/30/2004: LA 5.8 with normal LV size and function.  3. Nuclear GXT on 06/30/2004: Negative study.  4. External cardioversion and maintenance to normal sinus rhythm with sotalol on 08/30/2004.  5. Event recorder in March 2005 showing normal sinus rhythm with occasional PVCs.  6. Echocardiogram/bubble study on 04/08/2005: Septum 1.6, mild MR, trace to mild TR, PI, negative bubble study, EF 68%.  7. Holter monitor, September 2008, with 35 PVCs, 157 PACs.   8. Cardiolite, 12/29/2008, with no ischemia, EF 47%.  9. Tikosyn initiated, 01/04/2010.   10. Event recorder, 01/22/2010, with nonsustained atrial tachycardia and PACs.   11. Stress Cardiolite, 03/31/2011: LVEF (59%), no ischemia.   12. EP study with radiofrequency ablation of left atrial posterior wall atrial tachycardia, 04/28/2014.  13. BRIDGET-guided external cardioversion, 11/25/2014 with BRIDGET demonstrating normal LV size and function and mild MR/TR.   2. Premature ventricular contractions:  1. Event recorder, June 2007, consistent with PVCs with subsequent increase of sotalol therapy to 120 mg p.o. b.i.d.    3. History of dyspnea:  1. Dobutamine Cardiolite in September 1998 with inferolateral reversibility.   2. Left heart catheterization on 02/29/2000, showing normal coronary arteries, normal EF.  3. Echocardiogram, March 2010: Normal LV size and function, moderate left ventricular hypertrophy, mild MR, and AI.   4. Echocardiogram, 01/20/2016: EF 55% to 60%. Mild MR. Mild aortic cusp  sclerosis. Trace TR.  5. CT scan of the chest with and without contrast, 01/20/2016, shows cardiomegaly; no pulmonary embolism, mild pulmonary vascular congestion.  4. Hypertension.  5. Hyperlipidemia.  6. Concentric left ventricular hypertrophy.  7. Diabetes mellitus.  8. Surgical history:  1. Left knee replacement in 2002.  2. Left hand carpal tunnel in 2000.                                                                              Allergies  Allergies   Allergen Reactions   • Aspirin Anaphylaxis     Other reaction(s): Hypotension       Current Medications    Current Outpatient Medications:   •  Accu-Chek Softclix Lancets lancets, USE TO TEST BLOOD SUGAR THREE TIMES A DAY, Disp: 100 each, Rfl: 2  •  acetaminophen (TYLENOL) 500 MG tablet, Take 1,000 mg by mouth Every 6 (Six) Hours As Needed for Mild Pain (1-3)., Disp: , Rfl:   •  amLODIPine (NORVASC) 10 MG tablet, Take 1 tablet by mouth Daily. as directed, Disp: 90 tablet, Rfl: 3  •  bimatoprost (Lumigan) 0.01 % ophthalmic drops, Administer 1 drop to both eyes Every Night., Disp: , Rfl:   •  Cholecalciferol (VITAMIN D-3) 1000 UNITS capsule, Take 1,000 Units by mouth Daily., Disp: , Rfl:   •  glimepiride (AMARYL) 2 MG tablet, Take 1 tablet by mouth Daily., Disp: 90 tablet, Rfl: 1  •  glucose blood (Accu-Chek Kandis Plus) test strip, USE ONE STRIP TO TEST THREE TIMES A DAY, Disp: 100 each, Rfl: 11  •  latanoprost (XALATAN) 0.005 % ophthalmic solution, Administer 1 drop to both eyes Every Night., Disp: , Rfl:   •  lisinopril (PRINIVIL,ZESTRIL) 40 MG tablet, TAKE ONE TABLET BY MOUTH DAILY, Disp: 90 tablet, Rfl: 2  •  Loratadine 10 MG capsule, Take 1 capsule by mouth Daily., Disp: , Rfl:   •  Melatonin 2.5 MG chewable tablet, Chew 1 tablet At Night As Needed., Disp: , Rfl:   •  metoprolol succinate XL (TOPROL-XL) 100 MG 24 hr tablet, TAKE ONE TABLET BY MOUTH TWICE A DAY, Disp: 180 tablet, Rfl: 3  •  mirtazapine (REMERON) 45 MG tablet, Take 45 mg by mouth Every Night.,  "Disp: , Rfl:   •  omeprazole (priLOSEC) 20 MG capsule, Take 20 mg by mouth Daily., Disp: , Rfl:   •  pravastatin (PRAVACHOL) 20 MG tablet, Take 1 tablet by mouth Daily., Disp: 90 tablet, Rfl: 1  •  venlafaxine XR (EFFEXOR-XR) 75 MG 24 hr capsule, Take 75 mg by mouth Daily., Disp: , Rfl:   •  warfarin (COUMADIN) 2.5 MG tablet, Take 1 to 1.5 tablets by mouth daily or as directed by the anticoagulation clinic., Disp: 120 tablet, Rfl: 1    History of Present Illness     Pt presents for follow up of AF/HTN/PVC.   He states he still struggles with orthopedic issues including rotator cuff and knee pain.  He does see physical therapy on a regular basis and overall feels he is doing well with the standpoint.  He has not had any chest pain chest tightness heart failure symptoms.  He denies any palpitations.  He is tolerating Coumadin therapy well and his INRs have been stable.    Vitals:    09/08/22 1302   BP: 124/86   BP Location: Right arm   Patient Position: Sitting   Cuff Size: Adult   Pulse: 71   SpO2: 94%   Weight: 111 kg (245 lb 6.4 oz)   Height: 182.9 cm (72\")     Body mass index is 33.28 kg/m².  PE:  General: NAD  Neck: no JVD, no carotid bruits, no TM  Heart irreg irreg rate and rhythm NL S1, S2, no rubs, murmurs  Lungs: CTA, no wheezes, rhonchi, or rales  Abd: soft, non-tender, NL BS  Ext: No musculoskeletal deformities, no edema, cyanosis, or clubbing  Psych: normal mood and affect    Diagnostic Data:        ECG 12 Lead    Date/Time: 9/8/2022 3:57 PM  Performed by: John Rogers PA  Authorized by: John Rogers PA   Rhythm: atrial fibrillation  Rate: normal  Conduction: right bundle branch block  T Waves: T waves normal  QRS axis: right  Other: no other findings    Clinical impression: abnormal EKG            1. PVC's (premature ventricular contractions)    2. Chronic atrial fibrillation (HCC)    3. Atypical atrial flutter (HCC)        Plan:  1) Permanent atrial fibrillation:  - Chronic in nature and " asymptomatic.  Rate controlled.  - Continue present medications.     2) Anticoagulation:  - Continue warfarin    3) HTN:  - Well controlled on amlodipine, lisinopril  - Wt loss, exercise, salt reduction    4) PVCs:   -asymptomatic      F/up in 12 months    Electronically signed by KAVON Juarez, 09/08/22, 3:57 PM EDT.

## 2022-09-09 ENCOUNTER — OFFICE VISIT (OUTPATIENT)
Dept: INTERNAL MEDICINE | Facility: CLINIC | Age: 77
End: 2022-09-09

## 2022-09-09 VITALS
DIASTOLIC BLOOD PRESSURE: 88 MMHG | WEIGHT: 235 LBS | SYSTOLIC BLOOD PRESSURE: 130 MMHG | BODY MASS INDEX: 31.83 KG/M2 | OXYGEN SATURATION: 95 % | HEART RATE: 72 BPM | TEMPERATURE: 97 F | HEIGHT: 72 IN

## 2022-09-09 DIAGNOSIS — F41.1 GENERALIZED ANXIETY DISORDER: ICD-10-CM

## 2022-09-09 DIAGNOSIS — E78.5 HYPERLIPIDEMIA LDL GOAL <100: Primary | ICD-10-CM

## 2022-09-09 DIAGNOSIS — K21.9 GASTROESOPHAGEAL REFLUX DISEASE, UNSPECIFIED WHETHER ESOPHAGITIS PRESENT: ICD-10-CM

## 2022-09-09 DIAGNOSIS — I10 ESSENTIAL HYPERTENSION: ICD-10-CM

## 2022-09-09 PROBLEM — F41.9 ANXIETY: Status: RESOLVED | Noted: 2022-09-09 | Resolved: 2022-09-09

## 2022-09-09 PROBLEM — F41.9 ANXIETY: Status: ACTIVE | Noted: 2022-09-09

## 2022-09-09 PROCEDURE — 99214 OFFICE O/P EST MOD 30 MIN: CPT | Performed by: INTERNAL MEDICINE

## 2022-09-09 NOTE — PROGRESS NOTES
Subjective   Tony Wolf is a 77 y.o. male.   Chief Complaint   Patient presents with   • Hyperlipidemia   • Hypertension   • Anxiety   • Heartburn       History of Present Illness   Here for f/u on chronic conditions: HTN, HLP, GERD, and anxiety. HTN controlled with Lisinopril and Toprol XL.. HLP controlled with Pravastatin. GERD controlled with Prilosec.  Anxiety controlled with Effexor.   The following portions of the patient's history were reviewed and updated as appropriate: allergies, current medications, past family history, past medical history, past social history, past surgical history and problem list.  Past Medical History:   Diagnosis Date   • Acute bronchitis    • Anxiety    • Arthritis     right knee   • Atrial fibrillation (HCC)    • Back pain    • Cancer (HCC)     skin cancer removed from face    • Carpal tunnel syndrome    • Depression    • Derangement, knee internal    • Diabetes mellitus (HCC)     DX'D TYPE II APPROX 15 YEARS AGO, DOES NOT CHECK BLOOD SUGARS AT HOME, STARTED INSULIN IN MARCH AFTER SURGERY CANCELLED FOR ELEVATED A1C   • Diabetic foot ulcer (HCC)    • Drug dependence (HCC)    • Dyspnea on exertion 1/17/2019   • GERD (gastroesophageal reflux disease)    • Glaucoma    • Heart disease    • Hyperlipidemia 11/14/2016   • Hypertension    • Hypertensive disorder    • Hypokalemia, replaced 6/2/2017   • IBS (irritable bowel syndrome)    • Knee pain, right    • Left ventricular hypertrophy 11/14/2016   • Leukocytosis, mild, likely reactive 5/31/2017   • Mixed hyperlipidemia    • Neuropathy, lumbosacral (radicular)    • Obesity     body mass index 30+-obesity   • Osteoporosis    • Postoperative urinary retention 6/1/2017   • Premature ventricular contractions    • PVC's (premature ventricular contractions) 11/14/2016   • Rash 3/18/2019   • Scoliosis    • Screening for prostate cancer 7/28/2016   • Sinusitis    • Sleep apnea    • SOB (shortness of breath)    • Spinal stenosis, lumbar  "region with neurogenic claudication    • Thrombocytopenia (HCC) 5/31/2017   • Trigger middle finger of left hand    • Trigger middle finger of right hand    • Type 2 diabetes mellitus (HCC)    • Wears glasses     readers     Past Surgical History:   Procedure Laterality Date   • BACK SURGERY      injections for back    • CARPAL TUNNEL RELEASE     • CHOLECYSTECTOMY     • COLONOSCOPY      LESS THAN 5 YEARS    • ENDOSCOPY  08/22/2022    x4   • FRACTURE SURGERY      right heel   • HERNIA REPAIR      both side inguinal    • LUMBAR EPIDURAL INJECTION     • VT TOTAL KNEE ARTHROPLASTY Right 05/30/2017    Procedure: RIGHT TOTAL KNEE ARTHROPLASTY;  Surgeon: Simon Interiano MD;  Location: [facility];  Service: Orthopedics   • TOTAL KNEE ARTHROPLASTY REVISION      left   • WRIST SURGERY      carpal tunnel bilat      Family History   Problem Relation Age of Onset   • Cancer Other    • Anemia Other    • Heart attack Other    • Cancer Mother    • Heart disease Mother    • Hypertension Mother    • Heart attack Mother    • Diabetes Mother    • Deep vein thrombosis Father    • Heart disease Father    • Hypertension Father    • Cancer Other      Social History     Socioeconomic History   • Marital status:    Tobacco Use   • Smoking status: Never Smoker   • Smokeless tobacco: Never Used   Vaping Use   • Vaping Use: Never used   Substance and Sexual Activity   • Alcohol use: No   • Drug use: No   • Sexual activity: Defer       Review of Systems   Constitutional: Negative for diaphoresis and fever.   Respiratory: Negative for cough and shortness of breath.    Cardiovascular: Negative for chest pain and leg swelling.   Gastrointestinal: Negative for diarrhea, nausea and vomiting.   Musculoskeletal: Positive for arthralgias.   Neurological: Negative for syncope and numbness.   Psychiatric/Behavioral: Negative for confusion. The patient is not nervous/anxious.      /88   Pulse 72   Temp 97 °F (36.1 °C)   Ht 182.9 cm (72.01\")  "  Wt 107 kg (235 lb)   SpO2 95%   BMI 31.86 kg/m²     Objective   Physical Exam  Vitals reviewed.   Cardiovascular:      Rate and Rhythm: Normal rate. Rhythm irregular.      Heart sounds: No murmur heard.  Pulmonary:      Effort: No respiratory distress.      Breath sounds: No wheezing or rales.   Neurological:      General: No focal deficit present.      Mental Status: He is alert and oriented to person, place, and time.   Psychiatric:         Mood and Affect: Mood normal.         Behavior: Behavior normal.         Assessment & Plan   Diagnoses and all orders for this visit:    1. Hyperlipidemia LDL goal <100 (Primary)  Continue Pravastatin 20 mg po qhs  2. Essential hypertension  Continue Lisinopril 10 mg po qd and Toprol  mg po qd  3. Gastroesophageal reflux disease, unspecified whether esophagitis present  Continue Prilosec 20 mg po qd  4. Generalized anxiety disorder  Continue Effexor 75 mg po qd

## 2022-09-19 ENCOUNTER — TELEPHONE (OUTPATIENT)
Dept: PHARMACY | Facility: HOSPITAL | Age: 77
End: 2022-09-19

## 2022-09-19 NOTE — TELEPHONE ENCOUNTER
Pt called to report he has been prescribed xyzal. No DDI found.Instructed pt to take medication as prescribed.

## 2022-09-28 RX ORDER — PRAVASTATIN SODIUM 20 MG
20 TABLET ORAL DAILY
Qty: 90 TABLET | Refills: 1 | Status: SHIPPED | OUTPATIENT
Start: 2022-09-28 | End: 2023-03-27 | Stop reason: SDUPTHER

## 2022-09-29 ENCOUNTER — ANTICOAGULATION VISIT (OUTPATIENT)
Dept: PHARMACY | Facility: HOSPITAL | Age: 77
End: 2022-09-29

## 2022-09-29 DIAGNOSIS — I48.91 ATRIAL FIBRILLATION, UNSPECIFIED TYPE: ICD-10-CM

## 2022-09-29 DIAGNOSIS — I48.20 CHRONIC ATRIAL FIBRILLATION: Primary | ICD-10-CM

## 2022-09-29 LAB
INR PPP: 2.5 (ref 0.91–1.09)
PROTHROMBIN TIME: 30.2 SECONDS (ref 10–13.8)

## 2022-09-29 PROCEDURE — 36416 COLLJ CAPILLARY BLOOD SPEC: CPT

## 2022-09-29 PROCEDURE — G0463 HOSPITAL OUTPT CLINIC VISIT: HCPCS | Performed by: PHARMACIST

## 2022-09-29 PROCEDURE — 85610 PROTHROMBIN TIME: CPT

## 2022-09-29 RX ORDER — WARFARIN SODIUM 2.5 MG/1
TABLET ORAL
Qty: 120 TABLET | Refills: 1 | Status: SHIPPED | OUTPATIENT
Start: 2022-09-29

## 2022-09-30 RX ORDER — LISINOPRIL 40 MG/1
TABLET ORAL
Qty: 90 TABLET | Refills: 2 | Status: SHIPPED | OUTPATIENT
Start: 2022-09-30

## 2022-10-03 DIAGNOSIS — E11.9 TYPE 2 DIABETES MELLITUS WITHOUT COMPLICATION, WITHOUT LONG-TERM CURRENT USE OF INSULIN: ICD-10-CM

## 2022-10-03 RX ORDER — GLIMEPIRIDE 2 MG/1
2 TABLET ORAL DAILY
Qty: 90 TABLET | Refills: 1 | Status: SHIPPED | OUTPATIENT
Start: 2022-10-03 | End: 2023-04-03 | Stop reason: SDUPTHER

## 2022-10-03 NOTE — TELEPHONE ENCOUNTER
Caller: Tony Wolf    Relationship: Self    Best call back number: [phone]    Requested Prescriptions:   Requested Prescriptions     Pending Prescriptions Disp Refills   • glimepiride (AMARYL) 2 MG tablet 90 tablet 1     Sig: Take 1 tablet by mouth Daily.        Pharmacy where request should be sent: [facility] [id number] [place] [address] AT [facility] - [phone]  - [phone] FX     Additional details provided by patient:     Does the patient have less than a 3 day supply:  [x] Yes  [] No    Mary Bueno Rep   10/03/22 09:45 EDT

## 2022-10-26 NOTE — PROGRESS NOTES
Chief complaint/Reason for consult: Type 2 diabetes mellitus    HPI: Mr. Wolf is a 77-year-old man with type 2 diabetes mellitus, GERD, hypertension, PAM and atrial fibrillation who comes for follow-up of type 2 diabetes.  He was previously seen by another provider in this office on 5/27/2022 and reports since that time doing well.     # [id number], Controlled with complications  # Elevated urine microalbumin  - Diagnosed: 2010  - Current regimen includes: Glimepiride 2 mg daily  - Compliance with medications is good  - HbA1c: 6.5% today   - Checks FSBG once daily  - BG review unavailable, meter not brought to clinic, he reports BG is in mid 100s mg/dl   - Hypoglycemia does not occur   - Patient does not have symptoms with lows   - Hyperglycemia occurs with high carbohydrate meals like cereal which he had this morning  - Patient reports very mild neuropathy, he does have a history of foot ulcer  - Patient denies gastroparesis   - Patient without known ASCVD   - taking ACEi; blood pressure today 118/80     DM Health Maintenance:  Ophtho: 7/28/2022 with left-sided cataract and primary open-angle glaucoma  Monofilament / Foot exam: Done today see exam section  Lipids/Statin: taking a statin with last FLP showing , total cholesterol 166, triglycerides 160, HDL 34 done 6/9/2022  KEENA:Cr elevated at 165.2 on 5/27/2022  TSH: 2.42 on 5/27/2022  Aspirin: not indicated as he is on anticoagulation for atrial fibrillation    Past medical history, past surgical history, family history and social history reviewed within this encounter.     Review of Systems   Constitutional: Negative for unexpected weight change.   HENT: Negative for trouble swallowing.    Respiratory: Positive for shortness of breath.    Cardiovascular: Positive for leg swelling. Negative for palpitations.   Gastrointestinal: Negative for abdominal pain.   Endocrine: Negative for cold intolerance and heat intolerance.   Musculoskeletal: Positive for  "arthralgias.   Neurological: Negative for headaches.   Psychiatric/Behavioral: Negative for agitation.        /80 (BP Location: Left arm, Patient Position: Sitting, Cuff Size: Adult)   Pulse 58   Ht 182.9 cm (72.01\")   Wt 113 kg (249 lb)   SpO2 96%   BMI 33.76 kg/m²      Physical Exam  Vitals reviewed.   Constitutional:       General: He is not in acute distress.     Appearance: He is obese.   HENT:      Head: Normocephalic.   Eyes:      Conjunctiva/sclera: Conjunctivae normal.   Cardiovascular:      Rate and Rhythm: Normal rate.      Pulses:           Dorsalis pedis pulses are 2+ on the right side and 2+ on the left side.   Pulmonary:      Effort: Pulmonary effort is normal.   Abdominal:      Tenderness: There is no guarding.   Musculoskeletal:      Right foot: No deformity or bunion.      Left foot: No deformity or bunion.      Comments: 2+ pitting bilateral lower edema up to mid shins   Feet:      Right foot:      Protective Sensation: 8 sites tested. 0 sites sensed.      Skin integrity: Erythema and dry skin present.      Toenail Condition: Right toenails are abnormally thick.      Left foot:      Protective Sensation: 8 sites tested. 0 sites sensed.      Skin integrity: Erythema and dry skin present.      Toenail Condition: Left toenails are abnormally thick.      Comments: Pre-ulcerative callus on right heel; bilateral mild erythema from chronic venous stasis  Skin:     Comments: Signs of chronic venous stasis on bilateral lower extremities   Neurological:      Mental Status: He is alert.   Psychiatric:         Mood and Affect: Mood normal.         Thought Content: Thought content normal.          Labs and images reviewed as noted in the HPI    Assessment and plan:    Diagnoses and all orders for this visit:    1. Type 2 diabetes mellitus with microalbuminuria, without long-term current use of insulin (HCC) (Primary)  Assessment & Plan:  -Patient with reasonable control today given his age and " comorbidities  -Discussed with patient that glimepiride is not a first-line medication but given he is having no hypoglycemia and has relatively good control we will continue on this medication with glimepiride 2 mg daily  -Patient has complications of elevated urine microalbumin and bilateral peripheral neuropathy on his feet up to ankles  -Recommend he continue to check fingerstick blood glucose once daily and bring his meter to the next visit  -Recommend to limit simple carbohydrates like cereal  -Recommend he follow-up with his cardiologist regarding bilateral lower extremity edema and chronic venous stasis    DM Health Maintenance:  Ophtho: 7/28/2022 with left-sided cataract and primary open-angle glaucoma, recommend continued regular follow-up with ophthalmology  Monofilament / Foot exam: Done today see exam section  Lipids/Statin: Continue statin, last FLP showing , total cholesterol 166, triglycerides 160, HDL 34 done 6/9/2022  KEENA:Cr elevated at 165.2 on 5/27/2022, discussed continued good control of blood glucoses and blood pressure, will get repeat urine microalbumin to creatinine ratio at next visit  TSH: 2.42 on 5/27/2022  Aspirin: not indicated as he is on anticoagulation for atrial fibrillation    Orders:  -     POC Glucose Fingerstick  -     POC Glycosylated Hemoglobin (Hb A1C)       Return in about 6 months (around 4/28/2023) for T2DM with JANINE Gallo .     I spent 25 minutes caring for Tony on this date of service. This time includes time spent by me in the following activities: preparing for the visit, reviewing tests, performing a medically appropriate examination and/or evaluation, counseling and educating the patient/family/caregiver and documenting information in the medical record     Electronically signed by: Kyle S Rosenstein, MD

## 2022-10-27 ENCOUNTER — ANTICOAGULATION VISIT (OUTPATIENT)
Dept: PHARMACY | Facility: HOSPITAL | Age: 77
End: 2022-10-27

## 2022-10-27 DIAGNOSIS — I48.20 CHRONIC ATRIAL FIBRILLATION: Primary | ICD-10-CM

## 2022-10-27 LAB
INR PPP: 2.2 (ref 0.91–1.09)
PROTHROMBIN TIME: 26.8 SECONDS (ref 10–13.8)

## 2022-10-27 PROCEDURE — G0463 HOSPITAL OUTPT CLINIC VISIT: HCPCS

## 2022-10-27 PROCEDURE — 85610 PROTHROMBIN TIME: CPT

## 2022-10-27 PROCEDURE — 36416 COLLJ CAPILLARY BLOOD SPEC: CPT

## 2022-10-27 NOTE — PROGRESS NOTES
Anticoagulation Clinic Progress Note  Indication: atrial fibrillation  Referring Provider: Sravani [last appt 9/29/21  next: 9/8/22 (Will Sue)]  Initial Warfarin Start Date: 2008  Goal INR: 2 - 3  Current Drug Interactions: fluoxetine, glimepiride, melatonin (PRN), mirtazepine, MVI  CHADS-VASc: 3 (age, HTN, DM)    EtOH: none  GLV: Salad (Spinach salad 1x weekly or garden salad) and serving of broccoli once a week 3/1/2022  OTC Pain Relief: Uses APAP prn (~1x/week)    Anticoagulation Clinic INR History:  Date 8/20 9/4 10/2 11/6 11/15 11/29 12/31 1/7/19 1/17 1/29 2/12   Total Weekly Dose 17.5  mg 17.5 mg 17.5 mg 10mg 20mg 17.5mg 17.5mg 18.75 mg 17.5 mg 18.75 mg 20mg   INR 2.3 2.3 2.2 1.3 2.3 2.2 1.7 1.9 1.9 1.9 2.0   Notes    pre- epidural   missed dose?         Date 2/27 3/27 4/10 4/23 5/7 5/28 6/25 7/30 8/28 9/25 10/23   Total Weekly Dose 20mg 20mg 21.25 mg 21.25 mg 21.25 mg 21.25mg 21.25mg 21.25mg 21.25mg 21.25mg 21.25mg   INR 2.2 1.8 2.5 1.8 2.4 2.4 2.5 2.7 2.6 3.0 2.9   Notes sick   post- scope            Date  11/6 11/13 12/4 1/3 1/15 1/20 1/31 2/10 2/28 4/14 5/26   Total Weekly Dose 21.25 mg 18.75 mg 21.25 mg 21.25 mg 21.25 mg 20 mg 21.25 21.25 20mg 21.25mg 21.25mg   INR 4.2 3.2 2.7 2.2 3.1 3.1 2.9 3.5 2.4 2.3 2.5   Notes apap Hold x1, less GLV Inc in GLV  doxy doxy  1x dose cephalexin 1x dose dec; keflex       Date  6/24 7/22 8/24 9/10 10/8 11/5 11/18 12/2 12/30 1/26 2/22 3/22 4/19   Total Weekly Dose 21.25mg 21.25 21.25mg 21.25mg 21.25mg 21.25mg 20mg 20mg 20mg 20mg  20mg 20 mg 20mg   INR 2.4 2.4 3.0 2.8 3.0 3.1 2.5 2.5 2.5 2.6 2.9  2 2.2   Notes      Inc. GLV Dose dec    desvenlafaxine broccoli      Date 5/13 6/16  7/7 7/21 8/11 9/1 9/29 10/27 11/3 12/1 12/9 12/16   Total WeeklyDose 20 mg 20mg  20 mg 18.75mg  17.5 mg 17.5mg 17.5 17.5mg 17.5mg 17.5mg 18.75mg 22.5mg   INR 2.5 3.2 3.1 3.0 2.2 2.4 2.2 2.4 2.3 1.5 1.6 2.2   Notes  apap Inc GLV     augmentin day 3 augementin glucerna  Boost x2     Date  12/28 1/13/22 2/8 2/16 3/1 3/22 4/12 5/10 6/7 7/5 8/2    Total WeeklyDose 21.25mg 21.25mg 21.25 mg 21.25mg 22.5mg 22.5 mg 22.5mg 22.5 mg 22.5 mg 22.5mg 22.5mg EGD x 3 days hold   INR 2.6 2.3 1.7 2.1 2.5 2.0 2.3 2.4 2.1 2.1 2.8    Notes    Dec GLV        Boost x2     Date 8/25 9/8 9/29 10/27           Total WeeklyDose 18.75mg 22.5mg 22.5 mg 22.5 mg           INR 2.7 2.7 2.5 2.2           Notes omeprazole initi                  Clinic Interview:  Verbal Release Authorization signed on 6/27/18 -- may speak with Sussy (wife), Nikolai (son)  Tablet Strength: pt has 2.5mg tablets  Phone: [phone] (Home), [phone]  Fax: [phone] (Attn: Tasha) [facility] [place] [facility]    Patient Findings  Negatives:  Signs/symptoms of thrombosis, Signs/symptoms of bleeding, Laboratory test error suspected, Change in health, Change in alcohol use, Change in activity, Upcoming invasive procedure, Emergency department visit, Upcoming dental procedure, Missed doses, Extra doses, Change in medications, Change in diet/appetite, Hospital admission, Bruising, Other complaints   Comments:  Endocrinology appointment rescheduled for tomorrow to look at legs. He has some places on legs and on top of foot that cardiologist said to keep moist so that they don't crack open and bleed. Podiatrist said to follow up with endocrinologist. They will let us know for any medication changes or otherwise. One serving on spinach and another of broccoli a week like normal.       Plan:   1. INR is therapeutic today at 2.5 (goal 2.0-3.0).  Instructed Mr. Wolf to continue warfarin 3.75mg daily oral daily except 2.5mg TueThurSun until recheck.   2. RTC in 5 weeks to avoid Thanksgiving Holiday.   3. Verbal and written information was provided to Mr. Wolf and Mrs. Wolf in clinic. Mr. Wolf voiced understanding with teach back and has no further questions at this time.     Kerwin Perez, PharmD, BCPS  10/27/2022  12:14 EDT

## 2022-10-28 ENCOUNTER — OFFICE VISIT (OUTPATIENT)
Dept: ENDOCRINOLOGY | Facility: CLINIC | Age: 77
End: 2022-10-28

## 2022-10-28 VITALS
BODY MASS INDEX: 33.72 KG/M2 | OXYGEN SATURATION: 96 % | WEIGHT: 249 LBS | SYSTOLIC BLOOD PRESSURE: 118 MMHG | HEART RATE: 58 BPM | HEIGHT: 72 IN | DIASTOLIC BLOOD PRESSURE: 80 MMHG

## 2022-10-28 DIAGNOSIS — E11.29 TYPE 2 DIABETES MELLITUS WITH MICROALBUMINURIA, WITHOUT LONG-TERM CURRENT USE OF INSULIN: Primary | ICD-10-CM

## 2022-10-28 DIAGNOSIS — R80.9 TYPE 2 DIABETES MELLITUS WITH MICROALBUMINURIA, WITHOUT LONG-TERM CURRENT USE OF INSULIN: Primary | ICD-10-CM

## 2022-10-28 LAB
EXPIRATION DATE: NORMAL
GLUCOSE BLDC GLUCOMTR-MCNC: 189 MG/DL (ref 70–130)
HBA1C MFR BLD: 6.5 %
Lab: NORMAL

## 2022-10-28 PROCEDURE — 83036 HEMOGLOBIN GLYCOSYLATED A1C: CPT | Performed by: HOSPITALIST

## 2022-10-28 PROCEDURE — 82962 GLUCOSE BLOOD TEST: CPT | Performed by: HOSPITALIST

## 2022-10-28 PROCEDURE — 99213 OFFICE O/P EST LOW 20 MIN: CPT | Performed by: HOSPITALIST

## 2022-10-28 PROCEDURE — 3044F HG A1C LEVEL LT 7.0%: CPT | Performed by: HOSPITALIST

## 2022-10-28 NOTE — ASSESSMENT & PLAN NOTE
-Patient with reasonable control today given his age and comorbidities  -Discussed with patient that glimepiride is not a first-line medication but given he is having no hypoglycemia and has relatively good control we will continue on this medication with glimepiride 2 mg daily  -Patient has complications of elevated urine microalbumin and bilateral peripheral neuropathy on his feet up to ankles  -Recommend he continue to check fingerstick blood glucose once daily and bring his meter to the next visit  -Recommend to limit simple carbohydrates like cereal  -Recommend he follow-up with his cardiologist regarding bilateral lower extremity edema and chronic venous stasis    DM Health Maintenance:  Ophtho: 7/28/2022 with left-sided cataract and primary open-angle glaucoma, recommend continued regular follow-up with ophthalmology  Monofilament / Foot exam: Done today see exam section  Lipids/Statin: Continue statin, last FLP showing , total cholesterol 166, triglycerides 160, HDL 34 done 6/9/2022  KEENA:Cr elevated at 165.2 on 5/27/2022, discussed continued good control of blood glucoses and blood pressure, will get repeat urine microalbumin to creatinine ratio at next visit  TSH: 2.42 on 5/27/2022  Aspirin: not indicated as he is on anticoagulation for atrial fibrillation

## 2022-11-07 ENCOUNTER — TELEPHONE (OUTPATIENT)
Dept: INTERNAL MEDICINE | Facility: CLINIC | Age: 77
End: 2022-11-07

## 2022-11-07 RX ORDER — LANCETS
EACH MISCELLANEOUS
Qty: 100 EACH | Refills: 2 | Status: SHIPPED | OUTPATIENT
Start: 2022-11-07

## 2022-11-07 NOTE — TELEPHONE ENCOUNTER
Caller:    [facility]UIL[facility] [id number] - [place] [id number] AT [facility] - [phone] [facility] [phone] FX (Pharmacy)       Best call back number: [phone]    What is the best time to reach you: 8AM-8PM    Who are you requesting to speak with (clinical staff, provider,  specific staff member): CLINICAL    Do you know the name of the person who called: AGUILAR MASSEY DR    What was the call regarding: DIAGNOSIS CODE    Do you require a callback: YES

## 2022-11-07 NOTE — TELEPHONE ENCOUNTER
Incoming Refill Request      Medication requested (name and dose): Accu-Chek Softclix Lancets lancets, glucose blood (Accu-Chek Kandis Plus) test strip    Pharmacy where request should be sent: [facility]    Additional details provided by patient: PATIENT HAS 10 STRIPS AND LANCETS LEFT    Best call back number:    [phone]         Does the patient have less than a 3 day supply:  [] Yes  [x] No    Mary Escobar Rep  11/07/22, 10:49 EST

## 2022-11-09 RX ORDER — AMLODIPINE BESYLATE 10 MG/1
TABLET ORAL
Qty: 30 TABLET | Refills: 11 | Status: SHIPPED | OUTPATIENT
Start: 2022-11-09

## 2022-11-10 NOTE — TELEPHONE ENCOUNTER
Caller: Tony Wolf    Relationship: Self    Best call back number: [phone]    Requested Prescriptions:   Requested Prescriptions     Pending Prescriptions Disp Refills   • Accu-Chek Softclix Lancets lancets 100 each 2     Sig: USE TO TEST BLOOD SUGAR THREE TIMES A DAY   • glucose blood (Accu-Chek Kandis Plus) test strip 100 each 11     Sig: USE ONE STRIP TO TEST THREE TIMES A DAY        Pharmacy where request should be sent: [facility] [id number] [place] [address] [address] AT [facility] - [phone] [facility] [phone]      Additional details provided by patient: WILL RUN OUT OVER THE WEEKEND     Does the patient have less than a 3 day supply:  [x] Yes  [] No    Mary Fairchild Rep   11/10/22 16:13 EST

## 2022-11-11 RX ORDER — LANCETS
EACH MISCELLANEOUS
Qty: 100 EACH | Refills: 2 | OUTPATIENT
Start: 2022-11-11

## 2022-12-01 ENCOUNTER — APPOINTMENT (OUTPATIENT)
Dept: PHARMACY | Facility: HOSPITAL | Age: 77
End: 2022-12-01
Payer: MEDICARE

## 2022-12-01 ENCOUNTER — ANTICOAGULATION VISIT (OUTPATIENT)
Dept: PHARMACY | Facility: HOSPITAL | Age: 77
End: 2022-12-01
Payer: MEDICARE

## 2022-12-01 DIAGNOSIS — I48.20 CHRONIC ATRIAL FIBRILLATION: Primary | ICD-10-CM

## 2022-12-01 LAB
INR PPP: 2.3 (ref 0.91–1.09)
PROTHROMBIN TIME: 28.1 SECONDS (ref 10–13.8)

## 2022-12-01 PROCEDURE — 36416 COLLJ CAPILLARY BLOOD SPEC: CPT

## 2022-12-01 PROCEDURE — G0463 HOSPITAL OUTPT CLINIC VISIT: HCPCS

## 2022-12-01 PROCEDURE — 85610 PROTHROMBIN TIME: CPT

## 2022-12-01 NOTE — PROGRESS NOTES
Anticoagulation Clinic Progress Note  Indication: atrial fibrillation  Referring Provider: Sravani [last appt 9/8/22  next:  9/8 /23 (Will Sue)]  Initial Warfarin Start Date: 2008  Goal INR: 2 - 3  Current Drug Interactions: fluoxetine, glimepiride, melatonin (PRN), mirtazepine, MVI  CHADS-VASc: 3 (age, HTN, DM)    EtOH: none  GLV: Salad (Spinach salad 1x weekly or garden salad) and serving of broccoli once a week 3/1/2022  OTC Pain Relief: Uses APAP prn (~1x/week)    Anticoagulation Clinic INR History:  Date 8/20 9/4 10/2 11/6 11/15 11/29 12/31 1/7/19 1/17 1/29 2/12   Total Weekly Dose 17.5  mg 17.5 mg 17.5 mg 10mg 20mg 17.5mg 17.5mg 18.75 mg 17.5 mg 18.75 mg 20mg   INR 2.3 2.3 2.2 1.3 2.3 2.2 1.7 1.9 1.9 1.9 2.0   Notes    pre- epidural   missed dose?         Date 2/27 3/27 4/10 4/23 5/7 5/28 6/25 7/30 8/28 9/25 10/23   Total Weekly Dose 20mg 20mg 21.25 mg 21.25 mg 21.25 mg 21.25mg 21.25mg 21.25mg 21.25mg 21.25mg 21.25mg   INR 2.2 1.8 2.5 1.8 2.4 2.4 2.5 2.7 2.6 3.0 2.9   Notes sick   post- scope            Date  11/6 11/13 12/4 1/3 1/15 1/20 1/31 2/10 2/28 4/14 5/26   Total Weekly Dose 21.25 mg 18.75 mg 21.25 mg 21.25 mg 21.25 mg 20 mg 21.25 21.25 20mg 21.25mg 21.25mg   INR 4.2 3.2 2.7 2.2 3.1 3.1 2.9 3.5 2.4 2.3 2.5   Notes apap Hold x1, less GLV Inc in GLV  doxy doxy  1x dose cephalexin 1x dose dec; keflex       Date  6/24 7/22 8/24 9/10 10/8 11/5 11/18 12/2 12/30 1/26 2/22 3/22 4/19   Total Weekly Dose 21.25mg 21.25 21.25mg 21.25mg 21.25mg 21.25mg 20mg 20mg 20mg 20mg  20mg 20 mg 20mg   INR 2.4 2.4 3.0 2.8 3.0 3.1 2.5 2.5 2.5 2.6 2.9  2 2.2   Notes      Inc. GLV Dose dec    desvenlafaxine broccoli      Date 5/13 6/16  7/7 7/21 8/11 9/1 9/29 10/27 11/3 12/1 12/9 12/16   Total WeeklyDose 20 mg 20mg  20 mg 18.75mg  17.5 mg 17.5mg 17.5 17.5mg 17.5mg 17.5mg 18.75mg 22.5mg   INR 2.5 3.2 3.1 3.0 2.2 2.4 2.2 2.4 2.3 1.5 1.6 2.2   Notes  apap Inc GLV     augmentin day 3 augementin glucerna  Boost x2     Date  12/28 1/13/22 2/8 2/16 3/1 3/22 4/12 5/10 6/7 7/5 8/2    Total WeeklyDose 21.25mg 21.25mg 21.25 mg 21.25mg 22.5mg 22.5 mg 22.5mg 22.5 mg 22.5 mg 22.5mg 22.5mg EGD x 3 days hold   INR 2.6 2.3 1.7 2.1 2.5 2.0 2.3 2.4 2.1 2.1 2.8    Notes    Dec GLV        Boost x2     Date 8/25 9/8 9/29 10/27 12/1          Total WeeklyDose 18.75mg 22.5mg 22.5 mg 22.5 mg 22.5 mg          INR 2.7 2.7 2.5 2.2 2.3          Notes omeprazole initi                Clinic Interview:  Verbal Release Authorization signed on 6/27/18 -- may speak with Sussy (wife), Nikolai (son)  Tablet Strength: pt has 2.5mg tablets  Phone: [phone] (Home), [phone]  Fax: [phone] (Attn: Tasha) [facility]    Patient Findings  Negatives:  Signs/symptoms of thrombosis, Signs/symptoms of bleeding, Laboratory test error suspected, Change in health, Change in alcohol use, Change in activity, Upcoming invasive procedure, Emergency department visit, Upcoming dental procedure, Missed doses, Extra doses, Change in medications, Change in diet/appetite, Hospital admission, Bruising, Other complaints   Comments:  Above findings negative  No changes by endocrinology/ podiatrist     Plan:   1. INR is therapeutic today at 2.3 (goal 2.0-3.0).  Instructed Mr. Wolf to continue warfarin 3.75mg daily oral daily except 2.5mg TueThurSun until recheck.   2. RTC in five weeks, 1/5/23  3. Verbal and written information was provided to Mr. Wolf and Mrs. Wolf in clinic. Mr. Wolf voiced understanding with teach back and has no further questions at this time.     Sariah Almodovar, PharmD  12/1/2022  13:24 EST

## 2023-01-05 ENCOUNTER — ANTICOAGULATION VISIT (OUTPATIENT)
Dept: PHARMACY | Facility: HOSPITAL | Age: 78
End: 2023-01-05
Payer: MEDICARE

## 2023-01-05 DIAGNOSIS — I48.20 CHRONIC ATRIAL FIBRILLATION: Primary | ICD-10-CM

## 2023-01-05 LAB
INR PPP: 2.3 (ref 0.91–1.09)
PROTHROMBIN TIME: 28 SECONDS (ref 10–13.8)

## 2023-01-05 PROCEDURE — G0463 HOSPITAL OUTPT CLINIC VISIT: HCPCS

## 2023-01-05 PROCEDURE — 85610 PROTHROMBIN TIME: CPT

## 2023-01-05 PROCEDURE — 36416 COLLJ CAPILLARY BLOOD SPEC: CPT

## 2023-01-05 NOTE — PROGRESS NOTES
Anticoagulation Clinic Progress Note  Indication: atrial fibrillation  Referring Provider: Sravani [last appt 9/8/22  next:  9/8 /23 (Will Sue)]  Initial Warfarin Start Date: 2008  Goal INR: 2 - 3  Current Drug Interactions: fluoxetine, glimepiride, melatonin (PRN), mirtazepine, MVI  CHADS-VASc: 3 (age, HTN, DM)    EtOH: none  GLV: Salad (Spinach salad 1x weekly or garden salad) and serving of broccoli once a week 3/1/2022  OTC Pain Relief: Uses APAP prn (~1x/week)    Anticoagulation Clinic INR History:  Date 8/20 9/4 10/2 11/6 11/15 11/29 12/31 1/7/19 1/17 1/29 2/12   Total Weekly Dose 17.5  mg 17.5 mg 17.5 mg 10mg 20mg 17.5mg 17.5mg 18.75 mg 17.5 mg 18.75 mg 20mg   INR 2.3 2.3 2.2 1.3 2.3 2.2 1.7 1.9 1.9 1.9 2.0   Notes    pre- epidural   missed dose?         Date 2/27 3/27 4/10 4/23 5/7 5/28 6/25 7/30 8/28 9/25 10/23   Total Weekly Dose 20mg 20mg 21.25 mg 21.25 mg 21.25 mg 21.25mg 21.25mg 21.25mg 21.25mg 21.25mg 21.25mg   INR 2.2 1.8 2.5 1.8 2.4 2.4 2.5 2.7 2.6 3.0 2.9   Notes sick   post- scope            Date  11/6 11/13 12/4 1/3 1/15 1/20 1/31 2/10 2/28 4/14 5/26   Total Weekly Dose 21.25 mg 18.75 mg 21.25 mg 21.25 mg 21.25 mg 20 mg 21.25 21.25 20mg 21.25mg 21.25mg   INR 4.2 3.2 2.7 2.2 3.1 3.1 2.9 3.5 2.4 2.3 2.5   Notes apap Hold x1, less GLV Inc in GLV  doxy doxy  1x dose cephalexin 1x dose dec; keflex       Date  6/24 7/22 8/24 9/10 10/8 11/5 11/18 12/2 12/30 1/26 2/22 3/22 4/19   Total Weekly Dose 21.25mg 21.25 21.25mg 21.25mg 21.25mg 21.25mg 20mg 20mg 20mg 20mg  20mg 20 mg 20mg   INR 2.4 2.4 3.0 2.8 3.0 3.1 2.5 2.5 2.5 2.6 2.9  2 2.2   Notes      Inc. GLV Dose dec    desvenlafaxine broccoli      Date 5/13 6/16  7/7 7/21 8/11 9/1 9/29 10/27 11/3 12/1 12/9 12/16   Total WeeklyDose 20 mg 20mg  20 mg 18.75mg  17.5 mg 17.5mg 17.5 17.5mg 17.5mg 17.5mg 18.75mg 22.5mg   INR 2.5 3.2 3.1 3.0 2.2 2.4 2.2 2.4 2.3 1.5 1.6 2.2   Notes  apap Inc GLV     augmentin day 3 augementin glucerna  Boost x2     Date  12/28 1/13/22 2/8 2/16 3/1 3/22 4/12 5/10 6/7 7/5 8/2    Total WeeklyDose 21.25mg 21.25mg 21.25 mg 21.25mg 22.5mg 22.5 mg 22.5mg 22.5 mg 22.5 mg 22.5mg 22.5mg EGD x 3 days hold   INR 2.6 2.3 1.7 2.1 2.5 2.0 2.3 2.4 2.1 2.1 2.8    Notes    Dec GLV        Boost x2     Date 8/25 9/8 9/29 10/27 12/1 1/5/23         Total WeeklyDose 18.75mg 22.5mg 22.5 mg 22.5 mg 22.5 mg 22.5mg         INR 2.7 2.7 2.5 2.2 2.3 2.3         Notes omeprazole initi                Clinic Interview:  Verbal Release Authorization signed on 6/27/18 -- may speak with Sussy (wife), Nikolai (son)  Tablet Strength: pt has 2.5mg tablets  Phone: [phone] (Home), [phone]  Fax: [phone] (Attn: Tasha) [facility]    Patient Findings  Negatives:  Signs/symptoms of thrombosis, Signs/symptoms of bleeding, Laboratory test error suspected, Change in health, Change in alcohol use, Change in activity, Upcoming invasive procedure, Emergency department visit, Upcoming dental procedure, Missed doses, Extra doses, Change in medications, Change in diet/appetite, Hospital admission, Bruising, Other complaints     Plan:   1. INR is therapeutic today at 2.3 (goal 2.0-3.0).  Instructed Mr. Wolf to continue warfarin 3.75mg daily oral daily except 2.5mg TueThurSun until recheck.   2. RTC in 4 weeks, 2/2/23  3. Verbal and written information was provided to Mr. Wolf and Mrs. Wolf in clinic. Mr. Wolf voiced understanding with teach back and has no further questions at this time.     Ama Mccloud, PharmD  1/5/2023  11:41 EST

## 2023-01-13 ENCOUNTER — OFFICE VISIT (OUTPATIENT)
Dept: INTERNAL MEDICINE | Facility: CLINIC | Age: 78
End: 2023-01-13
Payer: MEDICARE

## 2023-01-13 ENCOUNTER — LAB (OUTPATIENT)
Dept: LAB | Facility: HOSPITAL | Age: 78
End: 2023-01-13
Payer: MEDICARE

## 2023-01-13 VITALS
DIASTOLIC BLOOD PRESSURE: 88 MMHG | HEIGHT: 72 IN | BODY MASS INDEX: 33.59 KG/M2 | TEMPERATURE: 96.4 F | OXYGEN SATURATION: 95 % | SYSTOLIC BLOOD PRESSURE: 124 MMHG | HEART RATE: 87 BPM | WEIGHT: 248 LBS

## 2023-01-13 DIAGNOSIS — Z12.5 PROSTATE CANCER SCREENING: ICD-10-CM

## 2023-01-13 DIAGNOSIS — I48.20 CHRONIC ATRIAL FIBRILLATION: ICD-10-CM

## 2023-01-13 DIAGNOSIS — E78.5 HYPERLIPIDEMIA LDL GOAL <100: ICD-10-CM

## 2023-01-13 DIAGNOSIS — E78.5 HYPERLIPIDEMIA LDL GOAL <100: Primary | ICD-10-CM

## 2023-01-13 DIAGNOSIS — I10 ESSENTIAL HYPERTENSION: ICD-10-CM

## 2023-01-13 DIAGNOSIS — F41.1 GENERALIZED ANXIETY DISORDER: ICD-10-CM

## 2023-01-13 LAB
ALBUMIN SERPL-MCNC: 4.1 G/DL (ref 3.5–5.2)
ALBUMIN/GLOB SERPL: 1.7 G/DL
ALP SERPL-CCNC: 68 U/L (ref 39–117)
ALT SERPL W P-5'-P-CCNC: 25 U/L (ref 1–41)
ANION GAP SERPL CALCULATED.3IONS-SCNC: 12 MMOL/L (ref 5–15)
AST SERPL-CCNC: 41 U/L (ref 1–40)
BASOPHILS # BLD AUTO: 0.06 10*3/MM3 (ref 0–0.2)
BASOPHILS NFR BLD AUTO: 0.7 % (ref 0–1.5)
BILIRUB SERPL-MCNC: 0.8 MG/DL (ref 0–1.2)
BUN SERPL-MCNC: 13 MG/DL (ref 8–23)
BUN/CREAT SERPL: 11.6 (ref 7–25)
CALCIUM SPEC-SCNC: 8.8 MG/DL (ref 8.6–10.5)
CHLORIDE SERPL-SCNC: 104 MMOL/L (ref 98–107)
CHOLEST SERPL-MCNC: 164 MG/DL (ref 0–200)
CO2 SERPL-SCNC: 25 MMOL/L (ref 22–29)
CREAT SERPL-MCNC: 1.12 MG/DL (ref 0.76–1.27)
DEPRECATED RDW RBC AUTO: 47.2 FL (ref 37–54)
EGFRCR SERPLBLD CKD-EPI 2021: 68.5 ML/MIN/1.73
EOSINOPHIL # BLD AUTO: 0.18 10*3/MM3 (ref 0–0.4)
EOSINOPHIL NFR BLD AUTO: 2.2 % (ref 0.3–6.2)
ERYTHROCYTE [DISTWIDTH] IN BLOOD BY AUTOMATED COUNT: 13.8 % (ref 12.3–15.4)
GLOBULIN UR ELPH-MCNC: 2.4 GM/DL
GLUCOSE SERPL-MCNC: 125 MG/DL (ref 65–99)
HCT VFR BLD AUTO: 52.3 % (ref 37.5–51)
HDLC SERPL-MCNC: 31 MG/DL (ref 40–60)
HGB BLD-MCNC: 17.5 G/DL (ref 13–17.7)
IMM GRANULOCYTES # BLD AUTO: 0.02 10*3/MM3 (ref 0–0.05)
IMM GRANULOCYTES NFR BLD AUTO: 0.2 % (ref 0–0.5)
LDLC SERPL CALC-MCNC: 99 MG/DL (ref 0–100)
LDLC/HDLC SERPL: 3.05 {RATIO}
LYMPHOCYTES # BLD AUTO: 2.87 10*3/MM3 (ref 0.7–3.1)
LYMPHOCYTES NFR BLD AUTO: 34.5 % (ref 19.6–45.3)
MCH RBC QN AUTO: 31.6 PG (ref 26.6–33)
MCHC RBC AUTO-ENTMCNC: 33.5 G/DL (ref 31.5–35.7)
MCV RBC AUTO: 94.4 FL (ref 79–97)
MONOCYTES # BLD AUTO: 0.88 10*3/MM3 (ref 0.1–0.9)
MONOCYTES NFR BLD AUTO: 10.6 % (ref 5–12)
NEUTROPHILS NFR BLD AUTO: 4.32 10*3/MM3 (ref 1.7–7)
NEUTROPHILS NFR BLD AUTO: 51.8 % (ref 42.7–76)
NRBC BLD AUTO-RTO: 0 /100 WBC (ref 0–0.2)
PLATELET # BLD AUTO: 167 10*3/MM3 (ref 140–450)
PMV BLD AUTO: 11.6 FL (ref 6–12)
POTASSIUM SERPL-SCNC: 4.3 MMOL/L (ref 3.5–5.2)
PROT SERPL-MCNC: 6.5 G/DL (ref 6–8.5)
RBC # BLD AUTO: 5.54 10*6/MM3 (ref 4.14–5.8)
SODIUM SERPL-SCNC: 141 MMOL/L (ref 136–145)
TRIGL SERPL-MCNC: 193 MG/DL (ref 0–150)
VLDLC SERPL-MCNC: 34 MG/DL (ref 5–40)
WBC NRBC COR # BLD: 8.33 10*3/MM3 (ref 3.4–10.8)

## 2023-01-13 PROCEDURE — 80061 LIPID PANEL: CPT

## 2023-01-13 PROCEDURE — 80053 COMPREHEN METABOLIC PANEL: CPT

## 2023-01-13 PROCEDURE — 85025 COMPLETE CBC W/AUTO DIFF WBC: CPT

## 2023-01-13 PROCEDURE — G0103 PSA SCREENING: HCPCS

## 2023-01-13 PROCEDURE — 99214 OFFICE O/P EST MOD 30 MIN: CPT | Performed by: INTERNAL MEDICINE

## 2023-01-13 RX ORDER — NYSTATIN 100000 U/G
1 CREAM TOPICAL 2 TIMES DAILY
Qty: 30 G | Refills: 0 | Status: SHIPPED | OUTPATIENT
Start: 2023-01-13 | End: 2023-02-02 | Stop reason: SDUPTHER

## 2023-01-13 NOTE — PROGRESS NOTES
"Subjective   Tony Wolf is a 77 y.o. male.   Chief Complaint   Patient presents with   • Hypertension   • Hyperlipidemia   • Anxiety   • Rash     Side of leg close to penis    • Atrial Fibrillation       History of Present Illness   Here for f/u on chronic conditions: HTN, HLP, Anxiety and A. Fib. HTN controlled with Norvasc, Lisinopril, and Toprol XL. HLP controlled with Pravastatin. Anxiety controlled with Effexor. A Fib controlled with Coumadin.   Rash right inner thigh/groin. Itches. Not painful. No fever.   The following portions of the patient's history were reviewed and updated as appropriate: allergies, current medications, past family history, past medical history, past social history, past surgical history and problem list.    Review of Systems   Constitutional: Negative for fatigue and fever.   HENT: Negative for sore throat.    Respiratory: Negative for cough and shortness of breath.    Cardiovascular: Negative for chest pain and leg swelling.   Gastrointestinal: Negative for constipation, diarrhea, nausea and vomiting.   Neurological: Negative for seizures and weakness.   Psychiatric/Behavioral: Negative for confusion. The patient is not nervous/anxious.      /88   Pulse 87   Temp 96.4 °F (35.8 °C)   Ht 182.9 cm (72.01\")   Wt 112 kg (248 lb)   SpO2 95%   BMI 33.63 kg/m²     Objective   Physical Exam  Vitals reviewed.   Cardiovascular:      Rate and Rhythm: Normal rate and regular rhythm.      Heart sounds: No murmur heard.  Pulmonary:      Effort: No respiratory distress.      Breath sounds: No wheezing.   Abdominal:      General: There is no distension.      Tenderness: There is no abdominal tenderness. There is no guarding.   Skin:         Neurological:      Mental Status: He is alert and oriented to person, place, and time.   Psychiatric:         Mood and Affect: Mood normal.         Behavior: Behavior normal.         Assessment & Plan   Diagnoses and all orders for this " visit:    1. Hyperlipidemia LDL goal <100 (Primary)  -     Comprehensive Metabolic Panel; Future  Continue Pravastatin 20 mg po qhs  2. Essential hypertension  -     CBC & Differential; Future  -     Lipid Panel; Future  Continue Norvasc 5 mg po qd, Lisiniopril 40 mg po qd, and Toprol  mg po qd  3. Chronic atrial fibrillation (HCC)  Continue coumadin. INR managed by coumadin clinic  4. Generalized anxiety disorder  Continue Effexor 75 mg po qd    5. Rash  Appears yeast. Aquaphor in crease at bedtime. Nystatin bid.

## 2023-01-14 LAB — PSA SERPL-MCNC: 1.74 NG/ML (ref 0–4)

## 2023-02-01 ENCOUNTER — TELEPHONE (OUTPATIENT)
Dept: INTERNAL MEDICINE | Facility: CLINIC | Age: 78
End: 2023-02-01

## 2023-02-01 NOTE — TELEPHONE ENCOUNTER
Caller: Tony Wolf    Relationship: Self    Best call back number: [phone]    What medication are you requesting: NEW OINTMENT FOR RASH BETWEEN LEGS    What are your current symptoms: RASH    How long have you been experiencing symptoms: 2 WEEKS    Have you had these symptoms before:    [x] Yes  [] No    Have you been treated for these symptoms before:   [x] Yes  [] No    If a prescription is needed, what is your preferred pharmacy and phone number: [facility] [id number] - [place] [address] [address] AT [facility] - [phone] [facility] [phone] FX     Additional notes:THE OINTMENT PREVIOUSLY PRESCRIBED IS NO LONGER WORKING. PATIENT IS REQUESTING SOMETHING STONGER.

## 2023-02-02 ENCOUNTER — OFFICE VISIT (OUTPATIENT)
Dept: INTERNAL MEDICINE | Facility: CLINIC | Age: 78
End: 2023-02-02
Payer: MEDICARE

## 2023-02-02 ENCOUNTER — ANTICOAGULATION VISIT (OUTPATIENT)
Dept: PHARMACY | Facility: HOSPITAL | Age: 78
End: 2023-02-02
Payer: MEDICARE

## 2023-02-02 VITALS
DIASTOLIC BLOOD PRESSURE: 72 MMHG | HEIGHT: 72 IN | OXYGEN SATURATION: 94 % | BODY MASS INDEX: 32.91 KG/M2 | HEART RATE: 70 BPM | SYSTOLIC BLOOD PRESSURE: 136 MMHG | WEIGHT: 243 LBS

## 2023-02-02 DIAGNOSIS — I48.20 CHRONIC ATRIAL FIBRILLATION: Primary | ICD-10-CM

## 2023-02-02 DIAGNOSIS — R21 RASH: Primary | ICD-10-CM

## 2023-02-02 DIAGNOSIS — Z99.89 WALKER AS AMBULATION AID: ICD-10-CM

## 2023-02-02 DIAGNOSIS — R80.9 TYPE 2 DIABETES MELLITUS WITH MICROALBUMINURIA, WITHOUT LONG-TERM CURRENT USE OF INSULIN: ICD-10-CM

## 2023-02-02 DIAGNOSIS — E11.29 TYPE 2 DIABETES MELLITUS WITH MICROALBUMINURIA, WITHOUT LONG-TERM CURRENT USE OF INSULIN: ICD-10-CM

## 2023-02-02 LAB
INR PPP: 2.7 (ref 0.91–1.09)
PROTHROMBIN TIME: 32.6 SECONDS (ref 10–13.8)

## 2023-02-02 PROCEDURE — 85610 PROTHROMBIN TIME: CPT

## 2023-02-02 PROCEDURE — 99213 OFFICE O/P EST LOW 20 MIN: CPT | Performed by: NURSE PRACTITIONER

## 2023-02-02 PROCEDURE — 36416 COLLJ CAPILLARY BLOOD SPEC: CPT

## 2023-02-02 RX ORDER — NYSTATIN 100000 U/G
1 CREAM TOPICAL 2 TIMES DAILY
Qty: 30 G | Refills: 1 | Status: SHIPPED | OUTPATIENT
Start: 2023-02-02

## 2023-02-02 RX ORDER — NYSTATIN 100000 [USP'U]/G
POWDER TOPICAL 3 TIMES DAILY
Qty: 60 G | Refills: 1 | Status: SHIPPED | OUTPATIENT
Start: 2023-02-02 | End: 2023-02-23

## 2023-02-02 NOTE — PROGRESS NOTES
Anticoagulation Clinic Progress Note  Indication: atrial fibrillation  Referring Provider: Sravani [last appt 9/8/22  next:  9/8 /23 (Will Sue)]  Initial Warfarin Start Date: 2008  Goal INR: 2 - 3  Current Drug Interactions: fluoxetine, glimepiride, melatonin (PRN), mirtazepine, MVI  CHADS-VASc: 3 (age, HTN, DM)    EtOH: none  GLV: Salad (Spinach salad 1x weekly or garden salad) and serving of broccoli once a week 3/1/2022  OTC Pain Relief: Uses APAP prn (~1x/week)    Anticoagulation Clinic INR History:  Date 8/20 9/4 10/2 11/6 11/15 11/29 12/31 1/7/19 1/17 1/29 2/12   Total Weekly Dose 17.5  mg 17.5 mg 17.5 mg 10mg 20mg 17.5mg 17.5mg 18.75 mg 17.5 mg 18.75 mg 20mg   INR 2.3 2.3 2.2 1.3 2.3 2.2 1.7 1.9 1.9 1.9 2.0   Notes    pre- epidural   missed dose?         Date 2/27 3/27 4/10 4/23 5/7 5/28 6/25 7/30 8/28 9/25 10/23   Total Weekly Dose 20mg 20mg 21.25 mg 21.25 mg 21.25 mg 21.25mg 21.25mg 21.25mg 21.25mg 21.25mg 21.25mg   INR 2.2 1.8 2.5 1.8 2.4 2.4 2.5 2.7 2.6 3.0 2.9   Notes sick   post- scope            Date  11/6 11/13 12/4 1/3 1/15 1/20 1/31 2/10 2/28 4/14 5/26   Total Weekly Dose 21.25 mg 18.75 mg 21.25 mg 21.25 mg 21.25 mg 20 mg 21.25 21.25 20mg 21.25mg 21.25mg   INR 4.2 3.2 2.7 2.2 3.1 3.1 2.9 3.5 2.4 2.3 2.5   Notes apap Hold x1, less GLV Inc in GLV  doxy doxy  1x dose cephalexin 1x dose dec; keflex       Date  6/24 7/22 8/24 9/10 10/8 11/5 11/18 12/2 12/30 1/26 2/22 3/22 4/19   Total Weekly Dose 21.25mg 21.25 21.25mg 21.25mg 21.25mg 21.25mg 20mg 20mg 20mg 20mg  20mg 20 mg 20mg   INR 2.4 2.4 3.0 2.8 3.0 3.1 2.5 2.5 2.5 2.6 2.9  2 2.2   Notes      Inc. GLV Dose dec    desvenlafaxine broccoli      Date 5/13 6/16  7/7 7/21 8/11 9/1 9/29 10/27 11/3 12/1 12/9 12/16   Total WeeklyDose 20 mg 20mg  20 mg 18.75mg  17.5 mg 17.5mg 17.5 17.5mg 17.5mg 17.5mg 18.75mg 22.5mg   INR 2.5 3.2 3.1 3.0 2.2 2.4 2.2 2.4 2.3 1.5 1.6 2.2   Notes  apap Inc GLV     augmentin day 3 augementin glucerna  Boost x2     Date  12/28 1/13/22 2/8 2/16 3/1 3/22 4/12 5/10 6/7 7/5 8/2    Total WeeklyDose 21.25mg 21.25mg 21.25 mg 21.25mg 22.5mg 22.5 mg 22.5mg 22.5 mg 22.5 mg 22.5mg 22.5mg EGD x 3 days hold   INR 2.6 2.3 1.7 2.1 2.5 2.0 2.3 2.4 2.1 2.1 2.8    Notes    Dec GLV        Boost x2     Date 8/25 9/8 9/29 10/27 12/1 1/5/23 2/2        Total WeeklyDose 18.75mg 22.5mg 22.5 mg 22.5 mg 22.5 mg 22.5mg 22.5 mg        INR 2.7 2.7 2.5 2.2 2.3 2.3 2.7        Notes omeprazole initi                Clinic Interview:  Verbal Release Authorization signed on 6/27/18 -- may speak with Sussy (wife), Nikolai (son)  Tablet Strength: pt has 2.5mg tablets  Phone: [phone] (Home), [phone]  Fax: [phone] (Attn: Tasha) [facility]    Patient Findings    Positives:  Change in medications   Negatives:  Signs/symptoms of thrombosis, Signs/symptoms of bleeding, Laboratory test error suspected, Change in health, Change in alcohol use, Change in activity, Upcoming invasive procedure, Emergency department visit, Upcoming dental procedure, Missed doses, Extra doses, Change in diet/appetite, Hospital admission, Bruising, Other complaints   Comments:  Pt has fungal skin infection and was given topical nystatin.     All findings negative per patient.          Plan:   1. INR is therapeutic today at 2.7 (goal 2.0-3.0).  Instructed Mr. Wolf to continue warfarin 3.75mg daily oral daily except 2.5mg TueThurSun until recheck.  2. RTC in 4 weeks, 3/2/23  3. Verbal and written information was provided to Mr. Wolf and Mrs. Wolf in clinic. Mr. Wolf voiced understanding with teach back and has no further questions at this time.     Xiomy Pizano, PharmD  Pharmacy Resident  02/02/23  13:16 EST

## 2023-02-02 NOTE — PROGRESS NOTES
Answers for HPI/ROS submitted by the patient on 2023  What is the primary reason for your visit?: Rash  Chronicity: recurrent  Onset: 1 to 4 weeks ago  Progression since onset: waxing and waning  Affected locations: groin  Characteristics: pain  Exposed to: nothing  anorexia: No  congestion: No  cough: No  diarrhea: No  eye pain: No  facial edema: No  fatigue: No  fever: No  joint pain: No  nail changes: No  rhinorrhea: No  shortness of breath: No  sore throat: No  vomiting: No        Office Note     Name: Tony Wolf    : 1945     MRN: [id number]     Chief Complaint  Rash    Subjective     History of Present Illness:  Tony Wolf is a 77 y.o. male who presents today for continued concern of rash.  He notes that there is a rash on his legs and in the creases/folds of his hip/thigh.  He recently had an appointment with Dr. Lopez middle  and discussed this concern.  She did prescribe nystatin ointment to place on the area twice daily as well as consider adding Aquaphor to the area.  Patient states he is very sweaty on his legs.  He also has a history of type 2 diabetes.  The area is uncomfortable and feels raw.  He is not sure what is causing it.  He states it did clear up for a little while but then did come back.  He did decline undressing from the waist down today.  He would just prefer to describe the area of concern and discuss further interventions    Review of Systems   Constitutional: Negative for fatigue and fever.   HENT: Negative for congestion, rhinorrhea and sore throat.    Eyes: Negative for pain.   Respiratory: Negative for cough and shortness of breath.    Gastrointestinal: Negative for diarrhea and vomiting.   Skin: Positive for rash.       Past Medical History:   Diagnosis Date   • Acute bronchitis    • Anxiety    • Arthritis     right knee   • Atrial fibrillation (HCC)    • Back pain    • Cancer (HCC)     skin cancer removed from face    • Carpal tunnel syndrome     • Depression    • Derangement, knee internal    • Diabetes mellitus (HCC)     DX'D TYPE II APPROX 15 YEARS AGO, DOES NOT CHECK BLOOD SUGARS AT HOME, STARTED INSULIN IN MARCH AFTER SURGERY CANCELLED FOR ELEVATED A1C   • Diabetic foot ulcer (HCC)    • Drug dependence (HCC)    • Dyspnea on exertion 1/17/2019   • GERD (gastroesophageal reflux disease)    • Glaucoma    • Heart disease    • Hyperlipidemia 11/14/2016   • Hypertension    • Hypertensive disorder    • Hypokalemia, replaced 6/2/2017   • IBS (irritable bowel syndrome)    • Knee pain, right    • Left ventricular hypertrophy 11/14/2016   • Leukocytosis, mild, likely reactive 5/31/2017   • Mixed hyperlipidemia    • Neuropathy, lumbosacral (radicular)    • Obesity     body mass index 30+-obesity   • Osteoporosis    • Postoperative urinary retention 6/1/2017   • Premature ventricular contractions    • PVC's (premature ventricular contractions) 11/14/2016   • Rash 3/18/2019   • Scoliosis    • Screening for prostate cancer 7/28/2016   • Sinusitis    • Sleep apnea    • SOB (shortness of breath)    • Spinal stenosis, lumbar region with neurogenic claudication    • Thrombocytopenia (HCC) 5/31/2017   • Trigger middle finger of left hand    • Trigger middle finger of right hand    • Type 2 diabetes mellitus (HCC)    • Wears glasses     readers       Past Surgical History:   Procedure Laterality Date   • BACK SURGERY      injections for back    • CARPAL TUNNEL RELEASE     • CHOLECYSTECTOMY     • COLONOSCOPY      LESS THAN 5 YEARS    • ENDOSCOPY  08/22/2022    x4   • FRACTURE SURGERY      right heel   • HERNIA REPAIR      both side inguinal    • LUMBAR EPIDURAL INJECTION     • SC ARTHRP KNE CONDYLE&PLATU MEDIAL&LAT COMPARTMENTS Right 05/30/2017    Procedure: RIGHT TOTAL KNEE ARTHROPLASTY;  Surgeon: Simon Interiano MD;  Location: [facility];  Service: Orthopedics   • TOTAL KNEE ARTHROPLASTY REVISION      left   • WRIST SURGERY      carpal tunnel bilat        Social History      Socioeconomic History   • Marital status:    Tobacco Use   • Smoking status: Never   • Smokeless tobacco: Never   Vaping Use   • Vaping Use: Never used   Substance and Sexual Activity   • Alcohol use: No   • Drug use: No   • Sexual activity: Defer         Current Outpatient Medications:   •  Accu-Chek Softclix Lancets lancets, USE TO TEST BLOOD SUGAR THREE TIMES A DAY, Disp: 100 each, Rfl: 2  •  acetaminophen (TYLENOL) 500 MG tablet, Take 1,000 mg by mouth Every 6 (Six) Hours As Needed for Mild Pain (1-3)., Disp: , Rfl:   •  amLODIPine (NORVASC) 10 MG tablet, TAKE ONE TABLET BY MOUTH DAILY AS DIRECTED, Disp: 30 tablet, Rfl: 11  •  bimatoprost (Lumigan) 0.01 % ophthalmic drops, Administer 1 drop to both eyes Every Night., Disp: , Rfl:   •  Cholecalciferol (VITAMIN D-3) 1000 UNITS capsule, Take 1,000 Units by mouth Daily., Disp: , Rfl:   •  glimepiride (AMARYL) 2 MG tablet, Take 1 tablet by mouth Daily., Disp: 90 tablet, Rfl: 1  •  glucose blood (Accu-Chek Kandis Plus) test strip, USE ONE STRIP TO TEST THREE TIMES A DAY, Disp: 100 each, Rfl: 11  •  latanoprost (XALATAN) 0.005 % ophthalmic solution, Administer 1 drop to both eyes Every Night., Disp: , Rfl:   •  lisinopril (PRINIVIL,ZESTRIL) 40 MG tablet, TAKE ONE TABLET BY MOUTH DAILY, Disp: 90 tablet, Rfl: 2  •  Loratadine 10 MG capsule, Take 1 capsule by mouth Daily., Disp: , Rfl:   •  Melatonin 2.5 MG chewable tablet, Chew 1 tablet At Night As Needed., Disp: , Rfl:   •  metoprolol succinate XL (TOPROL-XL) 100 MG 24 hr tablet, TAKE ONE TABLET BY MOUTH TWICE A DAY, Disp: 180 tablet, Rfl: 3  •  mirtazapine (REMERON) 45 MG tablet, Take 45 mg by mouth Every Night., Disp: , Rfl:   •  nystatin (MYCOSTATIN) 120790 UNIT/GM cream, Apply 1 application topically to the appropriate area as directed 2 (Two) Times a Day., Disp: 30 g, Rfl: 1  •  omeprazole (priLOSEC) 20 MG capsule, Take 20 mg by mouth Daily., Disp: , Rfl:   •  pravastatin (PRAVACHOL) 20 MG tablet, Take 1  "tablet by mouth Daily., Disp: 90 tablet, Rfl: 1  •  venlafaxine XR (EFFEXOR-XR) 75 MG 24 hr capsule, Take 75 mg by mouth Daily., Disp: , Rfl:   •  warfarin (COUMADIN) 2.5 MG tablet, Take 1 to 1.5 tablets by mouth daily or as directed by the anticoagulation clinic., Disp: 120 tablet, Rfl: 1  •  nystatin (MYCOSTATIN) 374856 UNIT/GM powder, Apply  topically to the appropriate area as directed 3 (Three) Times a Day for 21 days., Disp: 60 g, Rfl: 1    Objective     Vital Signs  /72   Pulse 70   Ht 182.9 cm (72.01\")   Wt 110 kg (243 lb)   SpO2 94%   BMI 32.95 kg/m²   Estimated body mass index is 32.95 kg/m² as calculated from the following:    Height as of this encounter: 182.9 cm (72.01\").    Weight as of this encounter: 110 kg (243 lb).    BMI is >= 30 and <35. (Class 1 Obesity). The following options were offered after discussion;: Address at next visit           Physical Exam  Vitals and nursing note reviewed.   Constitutional:       Appearance: Normal appearance.   HENT:      Head: Normocephalic and atraumatic.   Eyes:      Extraocular Movements: Extraocular movements intact.      Pupils: Pupils are equal, round, and reactive to light.   Cardiovascular:      Rate and Rhythm: Normal rate and regular rhythm.      Pulses: Normal pulses.      Heart sounds: Normal heart sounds.   Pulmonary:      Effort: Pulmonary effort is normal.      Breath sounds: Normal breath sounds.   Musculoskeletal:         General: Normal range of motion.      Comments: Use of Rollator walker with ambulation   Skin:     General: Skin is warm and dry.      Comments: Patient declined to disrobe for visualization of the area of concern.   Neurological:      Mental Status: He is alert and oriented to person, place, and time.   Psychiatric:         Mood and Affect: Mood normal.         Behavior: Behavior normal.                 Assessment and Plan     Diagnoses and all orders for this visit:    1. Rash (Primary)  -     nystatin (MYCOSTATIN) " 990919 UNIT/GM cream; Apply 1 application topically to the appropriate area as directed 2 (Two) Times a Day.  Dispense: 30 g; Refill: 1  -     nystatin (MYCOSTATIN) 730974 UNIT/GM powder; Apply  topically to the appropriate area as directed 3 (Three) Times a Day for 21 days.  Dispense: 60 g; Refill: 1    2. Walker as ambulation aid    3. Type 2 diabetes mellitus with microalbuminuria, without long-term current use of insulin ([facility])    And  Discussed with patient that the rash she is describing is likely yeast related as described by Dr. Lopez.  I sent in a refill of the nystatin ointment.  I also sent in a nystatin powder as he may prefer powder appointment.  Continue to keep the area clean and dry.  We did discuss that with type 2 diabetes it could put him at increased risk for skin yeast infections.  Pharmacy was confirmed on file.  Medication was sent to the pharmacy.  Go to ER if any condition worsens or severe.  Keep upcoming appointment with Dr. Lopez    Follow Up  Return for as scheduled Dr. Lopez.    YUDITH Lino    Part of this note may be an electronic transcription/translation of spoken language to printed text using the Dragon Dictation System.

## 2023-03-02 ENCOUNTER — ANTICOAGULATION VISIT (OUTPATIENT)
Dept: PHARMACY | Facility: HOSPITAL | Age: 78
End: 2023-03-02
Payer: MEDICARE

## 2023-03-02 DIAGNOSIS — I48.20 CHRONIC ATRIAL FIBRILLATION: Primary | ICD-10-CM

## 2023-03-02 LAB
INR PPP: 3 (ref 0.91–1.09)
PROTHROMBIN TIME: 36.5 SECONDS (ref 10–13.8)

## 2023-03-02 PROCEDURE — 36416 COLLJ CAPILLARY BLOOD SPEC: CPT

## 2023-03-02 PROCEDURE — G0463 HOSPITAL OUTPT CLINIC VISIT: HCPCS | Performed by: PHARMACIST

## 2023-03-02 PROCEDURE — 85610 PROTHROMBIN TIME: CPT

## 2023-03-02 NOTE — PROGRESS NOTES
Anticoagulation Clinic Progress Note  Indication: atrial fibrillation  Referring Provider: Sravani [last appt 9/8/22  next:  9/8 /23 (Will Sue)]  Initial Warfarin Start Date: 2008  Goal INR: 2 - 3  Current Drug Interactions: fluoxetine, glimepiride, melatonin (PRN), mirtazepine, MVI  CHADS-VASc: 3 (age, HTN, DM)    EtOH: none  GLV: Salad (Spinach salad 1x weekly or garden salad) and serving of broccoli once a week 3/1/2022  OTC Pain Relief: Uses APAP prn (~1x/week)    Anticoagulation Clinic INR History:  Date 8/20 9/4 10/2 11/6 11/15 11/29 12/31 1/7/19 1/17 1/29 2/12   Total Weekly Dose 17.5  mg 17.5 mg 17.5 mg 10mg 20mg 17.5mg 17.5mg 18.75 mg 17.5 mg 18.75 mg 20mg   INR 2.3 2.3 2.2 1.3 2.3 2.2 1.7 1.9 1.9 1.9 2.0   Notes    pre- epidural   missed dose?         Date 2/27 3/27 4/10 4/23 5/7 5/28 6/25 7/30 8/28 9/25 10/23   Total Weekly Dose 20mg 20mg 21.25 mg 21.25 mg 21.25 mg 21.25mg 21.25mg 21.25mg 21.25mg 21.25mg 21.25mg   INR 2.2 1.8 2.5 1.8 2.4 2.4 2.5 2.7 2.6 3.0 2.9   Notes sick   post- scope            Date  11/6 11/13 12/4 1/3 1/15 1/20 1/31 2/10 2/28 4/14 5/26   Total Weekly Dose 21.25 mg 18.75 mg 21.25 mg 21.25 mg 21.25 mg 20 mg 21.25 21.25 20mg 21.25mg 21.25mg   INR 4.2 3.2 2.7 2.2 3.1 3.1 2.9 3.5 2.4 2.3 2.5   Notes apap Hold x1, less GLV Inc in GLV  doxy doxy  1x dose cephalexin 1x dose dec; keflex       Date  6/24 7/22 8/24 9/10 10/8 11/5 11/18 12/2 12/30 1/26 2/22 3/22 4/19   Total Weekly Dose 21.25mg 21.25 21.25mg 21.25mg 21.25mg 21.25mg 20mg 20mg 20mg 20mg  20mg 20 mg 20mg   INR 2.4 2.4 3.0 2.8 3.0 3.1 2.5 2.5 2.5 2.6 2.9  2 2.2   Notes      Inc. GLV Dose dec    desvenlafaxine broccoli      Date 5/13 6/16  7/7 7/21 8/11 9/1 9/29 10/27 11/3 12/1 12/9 12/16   Total WeeklyDose 20 mg 20mg  20 mg 18.75mg  17.5 mg 17.5mg 17.5 17.5mg 17.5mg 17.5mg 18.75mg 22.5mg   INR 2.5 3.2 3.1 3.0 2.2 2.4 2.2 2.4 2.3 1.5 1.6 2.2   Notes  apap Inc GLV     augmentin day 3 augementin glucerna  Boost x2     Date  12/28 1/13/22 2/8 2/16 3/1 3/22 4/12 5/10 6/7 7/5 8/2    Total WeeklyDose 21.25mg 21.25mg 21.25 mg 21.25mg 22.5mg 22.5 mg 22.5mg 22.5 mg 22.5 mg 22.5mg 22.5mg EGD x 3 days hold   INR 2.6 2.3 1.7 2.1 2.5 2.0 2.3 2.4 2.1 2.1 2.8    Notes    Dec GLV        Boost x2     Date 8/25 9/8 9/29 10/27 12/1 1/5/23 2/2 3/2       Total WeeklyDose 18.75mg 22.5mg 22.5 mg 22.5 mg 22.5 mg 22.5mg 22.5 mg 22.5 mg       INR 2.7 2.7 2.5 2.2 2.3 2.3 2.7 3.0       Notes omeprazole initi                Clinic Interview:  Verbal Release Authorization signed on 6/27/18 -- may speak with Sussy (wife), Nikolai (son)  Tablet Strength: pt has 2.5mg tablets  Phone: [phone] (Home), [phone]  Fax: [phone] (Attn: Tasha) [facility]    Patient Findings  Negatives:  Signs/symptoms of thrombosis, Signs/symptoms of bleeding, Laboratory test error suspected, Change in health, Change in alcohol use, Change in activity, Upcoming invasive procedure, Emergency department visit, Upcoming dental procedure, Missed doses, Extra doses, Change in medications, Change in diet/appetite, Hospital admission, Bruising, Other complaints   Comments:  No changes       Plan:   1. INR is therapeutic today at 3.0 (goal 2.0-3.0).  Instructed Mr. Wolf to continue warfarin 3.75mg daily oral daily except 2.5mg TueThurSun until recheck.  2. RTC in 4 weeks, 3/30  3. Verbal and written information was provided to Mr. Wolf and Mrs. Wolf in clinic. Mr. Wolf voiced understanding with teach back and has no further questions at this time.     Simon Real, PharmD  03/02/23  11:35 EST

## 2023-03-27 RX ORDER — PRAVASTATIN SODIUM 20 MG
20 TABLET ORAL DAILY
Qty: 90 TABLET | Refills: 1 | Status: SHIPPED | OUTPATIENT
Start: 2023-03-27

## 2023-03-30 ENCOUNTER — ANTICOAGULATION VISIT (OUTPATIENT)
Dept: PHARMACY | Facility: HOSPITAL | Age: 78
End: 2023-03-30
Payer: MEDICARE

## 2023-03-30 DIAGNOSIS — I48.20 CHRONIC ATRIAL FIBRILLATION: Primary | ICD-10-CM

## 2023-03-30 LAB
INR PPP: 2.5 (ref 0.91–1.09)
PROTHROMBIN TIME: 30.5 SECONDS (ref 10–13.8)

## 2023-03-30 PROCEDURE — 36416 COLLJ CAPILLARY BLOOD SPEC: CPT

## 2023-03-30 PROCEDURE — 85610 PROTHROMBIN TIME: CPT

## 2023-03-30 PROCEDURE — G0463 HOSPITAL OUTPT CLINIC VISIT: HCPCS

## 2023-03-30 NOTE — PROGRESS NOTES
Anticoagulation Clinic Progress Note  Indication: atrial fibrillation  Referring Provider: Sravani [last appt 9/8/22  next:  9/8/23 (Will Sue)]  Initial Warfarin Start Date: 2008  Goal INR: 2 - 3  Current Drug Interactions: fluoxetine, glimepiride, melatonin (PRN), mirtazepine, MVI  CHADS-VASc: 3 (age, HTN, DM)    EtOH: none  GLV: Salad (Spinach salad 1x weekly or garden salad) and serving of broccoli once a week 3/1/2022  OTC Pain Relief: Uses APAP prn (~1x/week)    Anticoagulation Clinic INR History:  Date 8/20 9/4 10/2 11/6 11/15 11/29 12/31 1/7/19 1/17 1/29 2/12   Total Weekly Dose 17.5  mg 17.5 mg 17.5 mg 10mg 20mg 17.5mg 17.5mg 18.75 mg 17.5 mg 18.75 mg 20mg   INR 2.3 2.3 2.2 1.3 2.3 2.2 1.7 1.9 1.9 1.9 2.0   Notes    pre- epidural   missed dose?         Date 2/27 3/27 4/10 4/23 5/7 5/28 6/25 7/30 8/28 9/25 10/23   Total Weekly Dose 20mg 20mg 21.25 mg 21.25 mg 21.25 mg 21.25mg 21.25mg 21.25mg 21.25mg 21.25mg 21.25mg   INR 2.2 1.8 2.5 1.8 2.4 2.4 2.5 2.7 2.6 3.0 2.9   Notes sick   post- scope            Date  11/6 11/13 12/4 1/3 1/15 1/20 1/31 2/10 2/28 4/14 5/26   Total Weekly Dose 21.25 mg 18.75 mg 21.25 mg 21.25 mg 21.25 mg 20 mg 21.25 21.25 20mg 21.25mg 21.25mg   INR 4.2 3.2 2.7 2.2 3.1 3.1 2.9 3.5 2.4 2.3 2.5   Notes apap Hold x1, less GLV Inc in GLV  doxy doxy  1x dose cephalexin 1x dose dec; keflex       Date  6/24 7/22 8/24 9/10 10/8 11/5 11/18 12/2 12/30 1/26 2/22 3/22 4/19   Total Weekly Dose 21.25mg 21.25 21.25mg 21.25mg 21.25mg 21.25mg 20mg 20mg 20mg 20mg  20mg 20 mg 20mg   INR 2.4 2.4 3.0 2.8 3.0 3.1 2.5 2.5 2.5 2.6 2.9  2 2.2   Notes      Inc. GLV Dose dec    desvenlafaxine broccoli      Date 5/13 6/16  7/7 7/21 8/11 9/1 9/29 10/27 11/3 12/1 12/9 12/16   Total WeeklyDose 20 mg 20mg  20 mg 18.75mg  17.5 mg 17.5mg 17.5 17.5mg 17.5mg 17.5mg 18.75mg 22.5mg   INR 2.5 3.2 3.1 3.0 2.2 2.4 2.2 2.4 2.3 1.5 1.6 2.2   Notes  apap Inc GLV     augmentin day 3 augementin glucerna  Boost x2     Date  12/28 1/13/22 2/8 2/16 3/1 3/22 4/12 5/10 6/7 7/5 8/2    Total WeeklyDose 21.25mg 21.25mg 21.25 mg 21.25mg 22.5mg 22.5 mg 22.5mg 22.5 mg 22.5 mg 22.5mg 22.5mg EGD x 3 days hold   INR 2.6 2.3 1.7 2.1 2.5 2.0 2.3 2.4 2.1 2.1 2.8    Notes    Dec GLV        Boost x2     Date 8/25 9/8 9/29 10/27 12/1 1/5/23 2/2 3/2 3/30      Total WeeklyDose 18.75mg 22.5mg 22.5 mg 22.5 mg 22.5 mg 22.5mg 22.5 mg 22.5 mg 22.5 mg      INR 2.7 2.7 2.5 2.2 2.3 2.3 2.7 3.0 2.5      Notes omeprazole initi                Clinic Interview:  Verbal Release Authorization signed on 6/27/18 -- may speak with Sussy (wife), Nikolai (son)  Tablet Strength: pt has 2.5mg tablets  Phone: [phone] (Home), [phone]  Fax: [phone] (Attn: Tasha) \[facility]\"" [place]      Patient Findings  Negatives:  Signs/symptoms of thrombosis, Signs/symptoms of bleeding, Laboratory test error suspected, Change in health, Change in alcohol use, Change in activity, Upcoming invasive procedure, Emergency department visit, Upcoming dental procedure, Missed doses, Extra doses, Change in medications, Change in diet/appetite, Hospital admission, Bruising, Other complaints   Comments:  Mr. Wolf denies all the finding listed above.         Plan:   1. INR is therapeutic today at 2.5 (goal 2.0-3.0).  Instructed Mr. Wolf to continue warfarin 3.75mg daily oral daily except 2.5mg TueThurSun until recheck.  2. RTC in 4 weeks, 4/27  3. Verbal and written information was provided to Mr. Wolf and Mrs. Wolf in clinic. Mr. Wolf voiced understanding with teach back and has no further questions at this time.     Mague Mendoza, [facility]   03/30/23  11:42 EDT

## 2023-04-03 DIAGNOSIS — E11.9 TYPE 2 DIABETES MELLITUS WITHOUT COMPLICATION, WITHOUT LONG-TERM CURRENT USE OF INSULIN: ICD-10-CM

## 2023-04-03 RX ORDER — GLIMEPIRIDE 2 MG/1
2 TABLET ORAL DAILY
Qty: 90 TABLET | Refills: 1 | Status: SHIPPED | OUTPATIENT
Start: 2023-04-03

## 2023-04-04 ENCOUNTER — TELEPHONE (OUTPATIENT)
Dept: INTERNAL MEDICINE | Facility: CLINIC | Age: 78
End: 2023-04-04

## 2023-04-04 NOTE — TELEPHONE ENCOUNTER
HUB TO READ     Left message for patient letting him know to schedule an appointment to be evaluated for his rash, since last appointment was in Feb. Office number given.

## 2023-04-04 NOTE — TELEPHONE ENCOUNTER
PATIENT CALLED IN I RELAYED THE HUB TO READ MESSAGE THE PATIENT MADE AN APPOINTMENT FOR 04/11/2023

## 2023-04-04 NOTE — TELEPHONE ENCOUNTER
Caller: Tony Wolf    Relationship: Self    Best call back number:[phone]    What medication are you requesting: ANTIBIOTICS    What are your current symptoms: RASH    How long have you been experiencing symptoms: 1 1/2 MONTHS    Have you had these symptoms before:    [x] Yes  [] No    Have you been treated for these symptoms before:   [x] Yes  [] No    If a prescription is needed, what is your preferred pharmacy and phone number: [facility] [id number] [place] [id number] AT [facility] [phone] [facility] [phone]      Additional notes:PATIENT STATED THAT HE WAS SEEN BY [facility] ON02/02/23 AND THE MEDICATION THAT HE WAS PRESCRIBED AT THAT TIME IS NOT HELPING HIS RASH.

## 2023-04-11 ENCOUNTER — OFFICE VISIT (OUTPATIENT)
Dept: INTERNAL MEDICINE | Facility: CLINIC | Age: 78
End: 2023-04-11
Payer: MEDICARE

## 2023-04-11 ENCOUNTER — TELEPHONE (OUTPATIENT)
Dept: PHARMACY | Facility: HOSPITAL | Age: 78
End: 2023-04-11

## 2023-04-11 VITALS
SYSTOLIC BLOOD PRESSURE: 132 MMHG | HEIGHT: 72 IN | DIASTOLIC BLOOD PRESSURE: 81 MMHG | TEMPERATURE: 97.6 F | BODY MASS INDEX: 33.46 KG/M2 | OXYGEN SATURATION: 95 % | HEART RATE: 70 BPM | WEIGHT: 247 LBS

## 2023-04-11 DIAGNOSIS — R21 RASH: Primary | ICD-10-CM

## 2023-04-11 PROCEDURE — 3079F DIAST BP 80-89 MM HG: CPT | Performed by: INTERNAL MEDICINE

## 2023-04-11 PROCEDURE — 3075F SYST BP GE 130 - 139MM HG: CPT | Performed by: INTERNAL MEDICINE

## 2023-04-11 PROCEDURE — 99213 OFFICE O/P EST LOW 20 MIN: CPT | Performed by: INTERNAL MEDICINE

## 2023-04-11 PROCEDURE — 1159F MED LIST DOCD IN RCRD: CPT | Performed by: INTERNAL MEDICINE

## 2023-04-11 PROCEDURE — 1160F RVW MEDS BY RX/DR IN RCRD: CPT | Performed by: INTERNAL MEDICINE

## 2023-04-11 RX ORDER — DOXYCYCLINE HYCLATE 100 MG/1
100 CAPSULE ORAL 2 TIMES DAILY
Qty: 20 CAPSULE | Refills: 0 | Status: SHIPPED | OUTPATIENT
Start: 2023-04-11

## 2023-04-11 NOTE — PROGRESS NOTES
"Subjective   Tony Wolf is a 77 y.o. male.   Chief Complaint   Patient presents with   • Rash     Inside of right thigh towards groin area       History of Present Illness   Rash inside groin crease. Nystatin cream did not help. No fever. Does not itch.   The following portions of the patient's history were reviewed and updated as appropriate: allergies, current medications, past family history, past medical history, past social history, past surgical history and problem list.    Review of Systems   Constitutional: Negative for fatigue and fever.   HENT: Negative for congestion, rhinorrhea and sore throat.    Eyes: Negative for pain.   Respiratory: Negative for cough and shortness of breath.    Gastrointestinal: Negative for diarrhea and vomiting.   Skin: Positive for rash.     /81   Pulse 70   Temp 97.6 °F (36.4 °C)   Ht 182.9 cm (72.01\")   Wt 112 kg (247 lb)   SpO2 95%   BMI 33.49 kg/m²     Objective   Physical Exam  Vitals reviewed.   Skin:     Findings: Rash (groin creases, erythema, ) present.   Neurological:      Mental Status: He is alert and oriented to person, place, and time.   Psychiatric:         Behavior: Behavior normal.         Assessment & Plan   Diagnoses and all orders for this visit:    1. Rash (Primary)  -     doxycycline (VIBRAMYCIN) 100 MG capsule; Take 1 capsule by mouth 2 (Two) Times a Day.  Dispense: 20 capsule; Refill: 0  If no improvement will need derm eval.              "
no

## 2023-04-14 ENCOUNTER — ANTICOAGULATION VISIT (OUTPATIENT)
Dept: PHARMACY | Facility: HOSPITAL | Age: 78
End: 2023-04-14
Payer: MEDICARE

## 2023-04-14 DIAGNOSIS — I48.20 CHRONIC ATRIAL FIBRILLATION: Primary | ICD-10-CM

## 2023-04-14 LAB
INR PPP: 2.6 (ref 0.91–1.09)
PROTHROMBIN TIME: 31.2 SECONDS (ref 10–13.8)

## 2023-04-14 PROCEDURE — 85610 PROTHROMBIN TIME: CPT

## 2023-04-14 PROCEDURE — 36416 COLLJ CAPILLARY BLOOD SPEC: CPT

## 2023-04-14 PROCEDURE — G0463 HOSPITAL OUTPT CLINIC VISIT: HCPCS

## 2023-04-14 NOTE — PROGRESS NOTES
Anticoagulation Clinic Progress Note  Indication: atrial fibrillation  Referring Provider: Sravani [last appt 9/8/22  next:  9/8/23 (Will Sue)]  Initial Warfarin Start Date: 2008  Goal INR: 2 - 3  Current Drug Interactions: fluoxetine, glimepiride, melatonin (PRN), mirtazepine, MVI  CHADS-VASc: 3 (age, HTN, DM)    EtOH: none  GLV: Salad (Spinach salad 1x weekly or garden salad) and serving of broccoli once a week 3/1/2022  OTC Pain Relief: Uses APAP prn (~1x/week)    Anticoagulation Clinic INR History:  Date 8/20 9/4 10/2 11/6 11/15 11/29 12/31 1/7/19 1/17 1/29 2/12   Total Weekly Dose 17.5  mg 17.5 mg 17.5 mg 10mg 20mg 17.5mg 17.5mg 18.75 mg 17.5 mg 18.75 mg 20mg   INR 2.3 2.3 2.2 1.3 2.3 2.2 1.7 1.9 1.9 1.9 2.0   Notes    pre- epidural   missed dose?         Date 2/27 3/27 4/10 4/23 5/7 5/28 6/25 7/30 8/28 9/25 10/23   Total Weekly Dose 20mg 20mg 21.25 mg 21.25 mg 21.25 mg 21.25mg 21.25mg 21.25mg 21.25mg 21.25mg 21.25mg   INR 2.2 1.8 2.5 1.8 2.4 2.4 2.5 2.7 2.6 3.0 2.9   Notes sick   post- scope            Date  11/6 11/13 12/4 1/3 1/15 1/20 1/31 2/10 2/28 4/14 5/26   Total Weekly Dose 21.25 mg 18.75 mg 21.25 mg 21.25 mg 21.25 mg 20 mg 21.25 21.25 20mg 21.25mg 21.25mg   INR 4.2 3.2 2.7 2.2 3.1 3.1 2.9 3.5 2.4 2.3 2.5   Notes apap Hold x1, less GLV Inc in GLV  doxy doxy  1x dose cephalexin 1x dose dec; keflex       Date  6/24 7/22 8/24 9/10 10/8 11/5 11/18 12/2 12/30 1/26 2/22 3/22 4/19   Total Weekly Dose 21.25mg 21.25 21.25mg 21.25mg 21.25mg 21.25mg 20mg 20mg 20mg 20mg  20mg 20 mg 20mg   INR 2.4 2.4 3.0 2.8 3.0 3.1 2.5 2.5 2.5 2.6 2.9  2 2.2   Notes      Inc. GLV Dose dec    desvenlafaxine broccoli      Date 5/13 6/16  7/7 7/21 8/11 9/1 9/29 10/27 11/3 12/1 12/9 12/16   Total WeeklyDose 20 mg 20mg  20 mg 18.75mg  17.5 mg 17.5mg 17.5 17.5mg 17.5mg 17.5mg 18.75mg 22.5mg   INR 2.5 3.2 3.1 3.0 2.2 2.4 2.2 2.4 2.3 1.5 1.6 2.2   Notes  apap Inc GLV     augmentin day 3 augementin glucerna  Boost x2     Date  12/28 1/13/22 2/8 2/16 3/1 3/22 4/12 5/10 6/7 7/5 8/2    Total WeeklyDose 21.25mg 21.25mg 21.25 mg 21.25mg 22.5mg 22.5 mg 22.5mg 22.5 mg 22.5 mg 22.5mg 22.5mg EGD x 3 days hold   INR 2.6 2.3 1.7 2.1 2.5 2.0 2.3 2.4 2.1 2.1 2.8    Notes    Dec GLV        Boost x2     Date 8/25 9/8 9/29 10/27 12/1 1/5/23 2/2 3/2 3/30 4/14     Total WeeklyDose 18.75mg 22.5mg 22.5 mg 22.5 mg 22.5 mg 22.5mg 22.5 mg 22.5 mg 22.5 mg 22.5mg     INR 2.7 2.7 2.5 2.2 2.3 2.3 2.7 3.0 2.5 2.6     Notes omeprazole initi         doxy       Clinic Interview:  Verbal Release Authorization signed on 6/27/18 -- may speak with Sussy (wife), Nikolai (son)  Tablet Strength: pt has 2.5mg tablets  Phone: [phone] (Home), [phone]  Fax: [phone] (Attn: Tasha) [facility]      Patient Findings  Negatives:  Signs/symptoms of thrombosis, Signs/symptoms of bleeding, Laboratory test error suspected, Change in health, Change in alcohol use, Change in activity, Upcoming invasive procedure, Emergency department visit, Upcoming dental procedure, Missed doses, Extra doses, Change in medications, Change in diet/appetite, Hospital admission, Bruising, Other complaints   Comments:  4/11 started doxycycline 100 mg bid x 10 days         Plan:   1. INR is therapeutic today at 2.6 (goal 2.0-3.0).  Instructed Mr. Wolf to continue warfarin 3.75mg daily oral daily except 2.5mg TueThurSun until recheck.  2. RTC in next week to ensure WNL  3. Verbal and written information was provided to Mr. Wolf and Mrs. Wolf in clinic. Mr. Hamlyn voiced understanding with teach back and has no further questions at this time.     Teena Boykin, PharmD   04/14/23  11:49 EDT

## 2023-04-17 ENCOUNTER — TELEPHONE (OUTPATIENT)
Dept: PHARMACY | Facility: HOSPITAL | Age: 78
End: 2023-04-17
Payer: MEDICARE

## 2023-04-17 NOTE — TELEPHONE ENCOUNTER
Mr. Wolf reports that he missed a whole tablet on Saturday and only took half a tablet of warfarin.     He will boost dose tonight to 2 tablets (5mg) and repeat INR as scheduled on Thursday.     Denies signs or symptoms of clot at this time.

## 2023-04-20 ENCOUNTER — ANTICOAGULATION VISIT (OUTPATIENT)
Dept: PHARMACY | Facility: HOSPITAL | Age: 78
End: 2023-04-20
Payer: MEDICARE

## 2023-04-20 DIAGNOSIS — I48.20 CHRONIC ATRIAL FIBRILLATION: Primary | ICD-10-CM

## 2023-04-20 LAB
INR PPP: 2.6 (ref 0.91–1.09)
PROTHROMBIN TIME: 31.1 SECONDS (ref 10–13.8)

## 2023-04-20 PROCEDURE — 85610 PROTHROMBIN TIME: CPT

## 2023-04-20 PROCEDURE — G0463 HOSPITAL OUTPT CLINIC VISIT: HCPCS

## 2023-04-20 PROCEDURE — 36416 COLLJ CAPILLARY BLOOD SPEC: CPT

## 2023-04-20 NOTE — PROGRESS NOTES
Anticoagulation Clinic Progress Note  Indication: atrial fibrillation  Referring Provider: Sravani [last appt 9/8/22  next:  9/8/23 (Will Sue)]  Initial Warfarin Start Date: 2008  Goal INR: 2 - 3  Current Drug Interactions: fluoxetine, glimepiride, melatonin (PRN), mirtazepine, MVI  CHADS-VASc: 3 (age, HTN, DM)    EtOH: none  GLV: Salad (Spinach salad 1x weekly or garden salad) and serving of broccoli once a week 3/1/2022  OTC Pain Relief: Uses APAP prn (~1x/week)    Anticoagulation Clinic INR History:  Date 8/20 9/4 10/2 11/6 11/15 11/29 12/31 1/7/19 1/17 1/29 2/12   Total Weekly Dose 17.5  mg 17.5 mg 17.5 mg 10mg 20mg 17.5mg 17.5mg 18.75 mg 17.5 mg 18.75 mg 20mg   INR 2.3 2.3 2.2 1.3 2.3 2.2 1.7 1.9 1.9 1.9 2.0   Notes    pre- epidural   missed dose?         Date 2/27 3/27 4/10 4/23 5/7 5/28 6/25 7/30 8/28 9/25 10/23   Total Weekly Dose 20mg 20mg 21.25 mg 21.25 mg 21.25 mg 21.25mg 21.25mg 21.25mg 21.25mg 21.25mg 21.25mg   INR 2.2 1.8 2.5 1.8 2.4 2.4 2.5 2.7 2.6 3.0 2.9   Notes sick   post- scope            Date  11/6 11/13 12/4 1/3 1/15 1/20 1/31 2/10 2/28 4/14 5/26   Total Weekly Dose 21.25 mg 18.75 mg 21.25 mg 21.25 mg 21.25 mg 20 mg 21.25 21.25 20mg 21.25mg 21.25mg   INR 4.2 3.2 2.7 2.2 3.1 3.1 2.9 3.5 2.4 2.3 2.5   Notes apap Hold x1, less GLV Inc in GLV  doxy doxy  1x dose cephalexin 1x dose dec; keflex       Date  6/24 7/22 8/24 9/10 10/8 11/5 11/18 12/2 12/30 1/26 2/22 3/22 4/19   Total Weekly Dose 21.25mg 21.25 21.25mg 21.25mg 21.25mg 21.25mg 20mg 20mg 20mg 20mg  20mg 20 mg 20mg   INR 2.4 2.4 3.0 2.8 3.0 3.1 2.5 2.5 2.5 2.6 2.9  2 2.2   Notes      Inc. GLV Dose dec    desvenlafaxine broccoli      Date 5/13 6/16  7/7 7/21 8/11 9/1 9/29 10/27 11/3 12/1 12/9 12/16   Total WeeklyDose 20 mg 20mg  20 mg 18.75mg  17.5 mg 17.5mg 17.5 17.5mg 17.5mg 17.5mg 18.75mg 22.5mg   INR 2.5 3.2 3.1 3.0 2.2 2.4 2.2 2.4 2.3 1.5 1.6 2.2   Notes  apap Inc GLV     augmentin day 3 augementin glucerna  Boost x2     Date  12/28 1/13/22 2/8 2/16 3/1 3/22 4/12 5/10 6/7 7/5 8/2    Total WeeklyDose 21.25mg 21.25mg 21.25 mg 21.25mg 22.5mg 22.5 mg 22.5mg 22.5 mg 22.5 mg 22.5mg 22.5mg EGD x 3 days hold   INR 2.6 2.3 1.7 2.1 2.5 2.0 2.3 2.4 2.1 2.1 2.8    Notes    Dec GLV        Boost x2     Date 8/25 9/8 9/29 10/27 12/1 1/5/23 2/2 3/2 3/30 4/14 4/20    Total WeeklyDose 18.75mg 22.5mg 22.5 mg 22.5 mg 22.5 mg 22.5mg 22.5 mg 22.5 mg 22.5 mg 22.5mg 22.5 mg     INR 2.7 2.7 2.5 2.2 2.3 2.3 2.7 3.0 2.5 2.6 2.6    Notes omeprazole initi         doxy Doxy       Clinic Interview:  Verbal Release Authorization signed on 6/27/18 -- may speak with Sussy (wife), Nikolai (son)  Tablet Strength: pt has 2.5mg tablets  Phone: [phone] (Home), [phone]  Fax: [phone] (Attn: Tasha) [facility] [place]      Patient Findings    Negatives:  Signs/symptoms of thrombosis, Signs/symptoms of bleeding, Laboratory test error suspected, Change in health, Change in alcohol use, Change in activity, Upcoming invasive procedure, Emergency department visit, Upcoming dental procedure, Missed doses, Extra doses, Change in medications, Change in diet/appetite, Hospital admission, Bruising, Other complaints   Comments:  Denies all findings. Only has 2 doses left of Doxycycline and then will be finished.          Plan:   1. INR is therapeutic today at 2.6 (goal 2.0-3.0).  Instructed Mr. Wolf to continue warfarin 3.75mg daily oral daily except 2.5mg TueThurSun until recheck.  2. RTC in 4 weeks, 5/18/23   3. Verbal and written information was provided to Mr. Wolf and Mrs. Wolf in clinic. Mr. Wolf voiced understanding with teach back and has no further questions at this time.     Yash Mcneil,  TonaD  04/20/23  12:04 EDT

## 2023-04-24 ENCOUNTER — TELEPHONE (OUTPATIENT)
Dept: INTERNAL MEDICINE | Facility: CLINIC | Age: 78
End: 2023-04-24

## 2023-04-24 NOTE — TELEPHONE ENCOUNTER
Spoke to patient, he is scheduled to see AD tomorrow 4/25 @ 12:15. Pt notified and verbalized understanding.

## 2023-04-24 NOTE — TELEPHONE ENCOUNTER
Caller: Tony Wolf    Relationship: Self    Best call back number: [phone]    What orders are you requesting (i.e. lab or imaging): RIGHT ARM XRAY    In what timeframe would the patient need to come in: ASAP    Where will you receive your lab/imaging services: [facility]     Additional notes: PATIENT IS WANTING TO HAVE AN XRAY ON HIS RIGHT ARM.

## 2023-04-25 ENCOUNTER — OFFICE VISIT (OUTPATIENT)
Dept: INTERNAL MEDICINE | Facility: CLINIC | Age: 78
End: 2023-04-25
Payer: MEDICARE

## 2023-04-25 ENCOUNTER — HOSPITAL ENCOUNTER (OUTPATIENT)
Dept: GENERAL RADIOLOGY | Facility: HOSPITAL | Age: 78
Discharge: HOME | End: 2023-04-25
Admitting: NURSE PRACTITIONER
Payer: MEDICARE

## 2023-04-25 VITALS
BODY MASS INDEX: 33.18 KG/M2 | DIASTOLIC BLOOD PRESSURE: 76 MMHG | HEART RATE: 68 BPM | HEIGHT: 72 IN | SYSTOLIC BLOOD PRESSURE: 122 MMHG | OXYGEN SATURATION: 95 % | WEIGHT: 245 LBS

## 2023-04-25 DIAGNOSIS — M25.511 ACUTE PAIN OF RIGHT SHOULDER: ICD-10-CM

## 2023-04-25 DIAGNOSIS — M79.601 RIGHT ARM PAIN: Primary | ICD-10-CM

## 2023-04-25 DIAGNOSIS — M62.838 MUSCLE SPASM: ICD-10-CM

## 2023-04-25 DIAGNOSIS — I48.91 ATRIAL FIBRILLATION, UNSPECIFIED TYPE: ICD-10-CM

## 2023-04-25 PROCEDURE — 73030 X-RAY EXAM OF SHOULDER: CPT

## 2023-04-25 PROCEDURE — 73060 X-RAY EXAM OF HUMERUS: CPT

## 2023-04-25 RX ORDER — METHOCARBAMOL 500 MG/1
500 TABLET, FILM COATED ORAL 4 TIMES DAILY
Qty: 28 TABLET | Refills: 0 | Status: SHIPPED | OUTPATIENT
Start: 2023-04-25 | End: 2023-05-02

## 2023-04-25 RX ORDER — LIDOCAINE 50 MG/G
1 PATCH TOPICAL EVERY 24 HOURS
Qty: 30 PATCH | Refills: 0 | Status: SHIPPED | OUTPATIENT
Start: 2023-04-25 | End: 2023-05-25

## 2023-04-25 RX ORDER — LIDOCAINE 50 MG/G
1 OINTMENT TOPICAL
Qty: 240 G | Refills: 0 | Status: SHIPPED | OUTPATIENT
Start: 2023-04-25 | End: 2023-05-05

## 2023-04-25 NOTE — PROGRESS NOTES
Office Note     Name: Tony Wolf    : 1945     MRN: [id number]     Chief Complaint  Arm Pain (Right arm ) and Shoulder Pain (A couple of weeks )    Subjective     History of Present Illness:  Tony Wolf is a 77 y.o. male who presents today for concerns of right arm and shoulder pain.    Patient regularly sees Dr. Lopez for chronic conditions    Patient states that he has had a right upper arm and shoulder pain starting a few weeks ago.  He states it starts at his elbow and works up.  He is unable to get his arm to his head.  No recent injuries or falls.  He did fall about 2 years ago on that right side and reports he broke his collarbone.  Denies any redness or swelling of the joints.  No specific open areas.  He has been taking Tylenol as needed for the discomfort.  He does feel that there is some muscle tightness in the anterior aspect of that shoulder    Review of Systems   Constitutional: Negative for chills, fatigue and fever.   HENT: Negative for sore throat.    Eyes: Negative for visual disturbance.   Respiratory: Negative for cough and shortness of breath.    Cardiovascular: Negative for chest pain.   Gastrointestinal: Negative for abdominal pain.   Musculoskeletal:        Right arm pain and shoulder pain   Skin: Negative for color change.   Allergic/Immunologic: Negative for immunocompromised state.   Neurological: Negative for headaches.   Psychiatric/Behavioral: Negative for behavioral problems.       Past Medical History:   Diagnosis Date   • Acute bronchitis    • Anxiety    • Arthritis     right knee   • Atrial fibrillation    • Back pain    • Cancer     skin cancer removed from face    • Carpal tunnel syndrome    • Depression    • Derangement, knee internal    • Diabetes mellitus     DX'D TYPE II APPROX 15 YEARS AGO, DOES NOT CHECK BLOOD SUGARS AT HOME, STARTED INSULIN IN MARCH AFTER SURGERY CANCELLED FOR ELEVATED A1C   • Diabetic foot ulcer    • Drug dependence    • Dyspnea  on exertion 1/17/2019   • GERD (gastroesophageal reflux disease)    • Glaucoma    • Heart disease    • Hyperlipidemia 11/14/2016   • Hypertension    • Hypertensive disorder    • Hypokalemia, replaced 6/2/2017   • IBS (irritable bowel syndrome)    • Knee pain, right    • Left ventricular hypertrophy 11/14/2016   • Leukocytosis, mild, likely reactive 5/31/2017   • Mixed hyperlipidemia    • Neuropathy, lumbosacral (radicular)    • Obesity     body mass index 30+-obesity   • Osteoporosis    • Postoperative urinary retention 6/1/2017   • Premature ventricular contractions    • PVC's (premature ventricular contractions) 11/14/2016   • Rash 3/18/2019   • Scoliosis    • Screening for prostate cancer 7/28/2016   • Sinusitis    • Sleep apnea    • SOB (shortness of breath)    • Spinal stenosis, lumbar region with neurogenic claudication    • Thrombocytopenia 5/31/2017   • Trigger middle finger of left hand    • Trigger middle finger of right hand    • Type 2 diabetes mellitus    • Wears glasses     readers       Past Surgical History:   Procedure Laterality Date   • BACK SURGERY      injections for back    • CARPAL TUNNEL RELEASE     • CHOLECYSTECTOMY     • COLONOSCOPY      LESS THAN 5 YEARS    • ENDOSCOPY  08/22/2022    x4   • FRACTURE SURGERY      right heel   • HERNIA REPAIR      both side inguinal    • LUMBAR EPIDURAL INJECTION     • CA ARTHRP KNE CONDYLE&PLATU MEDIAL&LAT COMPARTMENTS Right 05/30/2017    Procedure: RIGHT TOTAL KNEE ARTHROPLASTY;  Surgeon: Simon Interiano MD;  Location: [facility];  Service: Orthopedics   • TOTAL KNEE ARTHROPLASTY REVISION      left   • WRIST SURGERY      carpal tunnel bilat        Social History     Socioeconomic History   • Marital status:    Tobacco Use   • Smoking status: Never   • Smokeless tobacco: Never   Vaping Use   • Vaping Use: Never used   Substance and Sexual Activity   • Alcohol use: No   • Drug use: No   • Sexual activity: Defer         Current Outpatient Medications:    •  Accu-Chek Softclix Lancets lancets, USE TO TEST BLOOD SUGAR THREE TIMES A DAY, Disp: 100 each, Rfl: 2  •  acetaminophen (TYLENOL) 500 MG tablet, Take 2 tablets by mouth Every 6 (Six) Hours As Needed for Mild Pain., Disp: , Rfl:   •  amLODIPine (NORVASC) 10 MG tablet, TAKE ONE TABLET BY MOUTH DAILY AS DIRECTED, Disp: 30 tablet, Rfl: 11  •  bimatoprost (Lumigan) 0.01 % ophthalmic drops, Administer 1 drop to both eyes Every Night., Disp: , Rfl:   •  Cholecalciferol (VITAMIN D-3) 1000 UNITS capsule, Take 1,000 Units by mouth Daily., Disp: , Rfl:   •  doxycycline (VIBRAMYCIN) 100 MG capsule, Take 1 capsule by mouth 2 (Two) Times a Day., Disp: 20 capsule, Rfl: 0  •  glimepiride (AMARYL) 2 MG tablet, Take 1 tablet by mouth Daily., Disp: 90 tablet, Rfl: 1  •  glucose blood (Accu-Chek Kandis Plus) test strip, USE ONE STRIP TO TEST THREE TIMES A DAY, Disp: 100 each, Rfl: 11  •  latanoprost (XALATAN) 0.005 % ophthalmic solution, Administer 1 drop to both eyes Every Night., Disp: , Rfl:   •  lisinopril (PRINIVIL,ZESTRIL) 40 MG tablet, TAKE ONE TABLET BY MOUTH DAILY, Disp: 90 tablet, Rfl: 2  •  Loratadine 10 MG capsule, Take 1 capsule by mouth Daily., Disp: , Rfl:   •  Melatonin 2.5 MG chewable tablet, Chew 1 tablet At Night As Needed., Disp: , Rfl:   •  metoprolol succinate XL (TOPROL-XL) 100 MG 24 hr tablet, TAKE ONE TABLET BY MOUTH TWICE A DAY, Disp: 180 tablet, Rfl: 3  •  mirtazapine (REMERON) 45 MG tablet, Take 1 tablet by mouth Every Night., Disp: , Rfl:   •  pravastatin (PRAVACHOL) 20 MG tablet, Take 1 tablet by mouth Daily., Disp: 90 tablet, Rfl: 1  •  venlafaxine XR (EFFEXOR-XR) 75 MG 24 hr capsule, Take 1 capsule by mouth Daily., Disp: , Rfl:   •  warfarin (COUMADIN) 2.5 MG tablet, Take 1 to 1.5 tablets by mouth daily or as directed by the anticoagulation clinic., Disp: 120 tablet, Rfl: 1  •  lidocaine (LIDODERM) 5 %, Place 1 patch on the skin as directed by provider Daily for 30 days. Remove & Discard patch within  "12 hours or as directed by MD, Disp: 30 patch, Rfl: 0  •  lidocaine (XYLOCAINE) 5 % ointment, Apply 1 application topically to the appropriate area as directed Every 2 (Two) Hours As Needed for Mild Pain for up to 10 days., Disp: 240 g, Rfl: 0  •  methocarbamol (ROBAXIN) 500 MG tablet, Take 1 tablet by mouth 4 (Four) Times a Day for 7 days., Disp: 28 tablet, Rfl: 0    Objective     Vital Signs  /76   Pulse 68   Ht 182.9 cm (72.01\")   Wt 111 kg (245 lb)   SpO2 95%   BMI 33.22 kg/m²   Estimated body mass index is 33.22 kg/m² as calculated from the following:    Height as of this encounter: 182.9 cm (72.01\").    Weight as of this encounter: 111 kg (245 lb).    BMI is >= 30 and <35. (Class 1 Obesity). The following options were offered after discussion;: Not addressed at today's visit           Physical Exam  Vitals and nursing note reviewed.   Constitutional:       Appearance: Normal appearance.   HENT:      Head: Normocephalic and atraumatic.   Eyes:      Extraocular Movements: Extraocular movements intact.      Pupils: Pupils are equal, round, and reactive to light.   Cardiovascular:      Rate and Rhythm: Normal rate and regular rhythm.      Pulses: Normal pulses.      Heart sounds: Normal heart sounds.   Pulmonary:      Effort: Pulmonary effort is normal.      Breath sounds: Normal breath sounds.   Musculoskeletal:      Right shoulder: Tenderness present. No swelling, effusion or laceration. Decreased range of motion.      Comments: Use of rollator walker today  Patient was unable to raise his arm above 45 degrees.  Tenderness at the anterior aspect of the shoulder to palpation.  No redness or swelling of the joints noted.  Tightness to musculature surrounding the shoulder noted no changes to the elbow noted.  No deformities noted   Skin:     General: Skin is warm and dry.   Neurological:      Mental Status: He is alert and oriented to person, place, and time.   Psychiatric:         Mood and Affect: Mood " normal.         Behavior: Behavior normal.                 Assessment and Plan     Diagnoses and all orders for this visit:    1. Right arm pain (Primary)  -     XR Humerus Right (In Office)  -     lidocaine (LIDODERM) 5 %; Place 1 patch on the skin as directed by provider Daily for 30 days. Remove & Discard patch within 12 hours or as directed by MD  Dispense: 30 patch; Refill: 0  -     lidocaine (XYLOCAINE) 5 % ointment; Apply 1 application topically to the appropriate area as directed Every 2 (Two) Hours As Needed for Mild Pain for up to 10 days.  Dispense: 240 g; Refill: 0  -     Ambulatory Referral to Physical Therapy    2. Acute pain of right shoulder  -     XR Shoulder 2+ View Right (In Office)  -     lidocaine (LIDODERM) 5 %; Place 1 patch on the skin as directed by provider Daily for 30 days. Remove & Discard patch within 12 hours or as directed by MD  Dispense: 30 patch; Refill: 0  -     lidocaine (XYLOCAINE) 5 % ointment; Apply 1 application topically to the appropriate area as directed Every 2 (Two) Hours As Needed for Mild Pain for up to 10 days.  Dispense: 240 g; Refill: 0  -     Ambulatory Referral to Physical Therapy    3. Muscle spasm  -     methocarbamol (ROBAXIN) 500 MG tablet; Take 1 tablet by mouth 4 (Four) Times a Day for 7 days.  Dispense: 28 tablet; Refill: 0    Plan  Discussed with patient that I will send in additional medication to the pharmacy to assist with symptoms.  Continue to take Tylenol as needed.  X-ray ordered to be completed today.  Patient will be notified of results.  We will place referral to physical therapy.  Patient will be called with date time and location.  Patient does have an upcoming appointment with Dr. Lopez in about 6 weeks.  Discussed with patient that that will be a good time to have an appointment as this will allow him to complete a few weeks of physical therapy to see how he is doing.  Continue with rest ice compression and elevation at home.  Go to ER if  any condition worsens or severe.  Keep the upcoming appointment with Dr. Lopez as scheduled    Follow Up  Return for as scheduled with aisha.    YUDITH Lino    Part of this note may be an electronic transcription/translation of spoken language to printed text using the Dragon Dictation System.

## 2023-04-26 ENCOUNTER — TELEPHONE (OUTPATIENT)
Dept: INTERNAL MEDICINE | Facility: CLINIC | Age: 78
End: 2023-04-26

## 2023-04-26 RX ORDER — WARFARIN SODIUM 2.5 MG/1
TABLET ORAL
Qty: 120 TABLET | Refills: 1 | Status: SHIPPED | OUTPATIENT
Start: 2023-04-26

## 2023-04-26 NOTE — TELEPHONE ENCOUNTER
Caller: Tony Wolf    Relationship: Self    Best call back number: [phone]    Caller requesting test results: YES    What test was performed: XRAY OF ARM AND SHOULDER    When was the test performed: 04/26/23    Where was the test performed: [facility]    Additional notes:

## 2023-04-26 NOTE — TELEPHONE ENCOUNTER
Notified patient that results have not been reviewed yet and we are still waiting on one xray. I let him know that once reviewed we would give him a call to discuss further. He verbalized understanding.

## 2023-04-27 ENCOUNTER — APPOINTMENT (OUTPATIENT)
Dept: PHARMACY | Facility: HOSPITAL | Age: 78
End: 2023-04-27
Payer: MEDICARE

## 2023-04-27 ENCOUNTER — TELEPHONE (OUTPATIENT)
Dept: INTERNAL MEDICINE | Facility: CLINIC | Age: 78
End: 2023-04-27
Payer: MEDICARE

## 2023-04-27 NOTE — TELEPHONE ENCOUNTER
----- Message from YUDITH Lino sent at 4/27/2023  1:47 PM EDT -----  X-ray of the right humerus/arm did not show any evidence of fracture or dislocation.  No soft tissue abnormalities noted either

## 2023-04-28 ENCOUNTER — TELEPHONE (OUTPATIENT)
Dept: INTERNAL MEDICINE | Facility: CLINIC | Age: 78
End: 2023-04-28
Payer: MEDICARE

## 2023-04-28 ENCOUNTER — OFFICE VISIT (OUTPATIENT)
Dept: ENDOCRINOLOGY | Facility: CLINIC | Age: 78
End: 2023-04-28
Payer: MEDICARE

## 2023-04-28 ENCOUNTER — PRIOR AUTHORIZATION (OUTPATIENT)
Dept: INTERNAL MEDICINE | Facility: CLINIC | Age: 78
End: 2023-04-28
Payer: MEDICARE

## 2023-04-28 VITALS
HEIGHT: 72 IN | BODY MASS INDEX: 32.91 KG/M2 | OXYGEN SATURATION: 98 % | WEIGHT: 243 LBS | SYSTOLIC BLOOD PRESSURE: 128 MMHG | DIASTOLIC BLOOD PRESSURE: 70 MMHG | HEART RATE: 68 BPM

## 2023-04-28 DIAGNOSIS — I10 ESSENTIAL HYPERTENSION: ICD-10-CM

## 2023-04-28 DIAGNOSIS — E78.5 HYPERLIPIDEMIA LDL GOAL <100: ICD-10-CM

## 2023-04-28 DIAGNOSIS — E11.29 TYPE 2 DIABETES MELLITUS WITH MICROALBUMINURIA, WITHOUT LONG-TERM CURRENT USE OF INSULIN: ICD-10-CM

## 2023-04-28 DIAGNOSIS — E11.65 TYPE 2 DIABETES MELLITUS WITH HYPERGLYCEMIA, WITHOUT LONG-TERM CURRENT USE OF INSULIN: Primary | ICD-10-CM

## 2023-04-28 DIAGNOSIS — R80.9 TYPE 2 DIABETES MELLITUS WITH MICROALBUMINURIA, WITHOUT LONG-TERM CURRENT USE OF INSULIN: ICD-10-CM

## 2023-04-28 LAB
EXPIRATION DATE: ABNORMAL
EXPIRATION DATE: NORMAL
GLUCOSE BLDC GLUCOMTR-MCNC: 160 MG/DL (ref 70–130)
HBA1C MFR BLD: 6.4 %
Lab: ABNORMAL
Lab: NORMAL

## 2023-04-28 NOTE — PROGRESS NOTES
"     Office Note      Date: 2023  Patient Name: Tony Wolf  MRN: [id number]  : 1945    Chief Complaint   Patient presents with   • Diabetes       History of Present Illness:   Tony Wolf is a 77 y.o. male who presents for Diabetes type 2. Diagnosed in: . Treated in past with oral agents. Current treatments: glimepiride. Number of insulin shots per day: none. Checks blood sugar 1 time a day. Has low blood sugar: no. Aspirin use: No - on warfarin. Statin use: Yes. ACE-I/ARB use: Yes. Changes in health since last visit: none. Last eye exam 2023.    Subjective      Diabetic Complications:  Eyes: No  Kidneys: Yes - microalbuminuria  Feet: Yes - h/o foot ulcer  Heart: No    Diet and Exercise:  Meals per day: 3  Minutes of exercise per week: 0 mins.    Review of Systems:   Review of Systems   Constitutional: Negative.    Cardiovascular: Negative.    Gastrointestinal: Negative.    Endocrine: Negative.        The following portions of the patient's history were reviewed and updated as appropriate: allergies, current medications, past family history, past medical history, past social history, past surgical history and problem list.    Objective       Visit Vitals  /70   Pulse 68   Ht 182.9 cm (72\")   Wt 110 kg (243 lb)   SpO2 98%   BMI 32.96 kg/m²       Physical Exam:  Physical Exam  Constitutional:       Appearance: Normal appearance.   Neurological:      Mental Status: He is alert.         Labs:    HbA1c  Lab Results   Component Value Date    HGBA1C 6.4 2023       CMP  Lab Results   Component Value Date    GLUCOSE 125 (H) 2023    BUN 13 2023    CREATININE 1.12 2023    EGFRIFNONA 76 2021    BCR 11.6 2023    K 4.3 2023    CO2 25.0 2023    CALCIUM 8.8 2023    AST 41 (H) 2023    ALT 25 2023        Lipid Panel  Lab Results   Component Value Date    CHLPL 172 2016    HDL 31 (L) 2023    LDL 99 2023    TRIG 193 " (H) 01/13/2023        TSH  Lab Results   Component Value Date    TSH 2.420 05/27/2022    FREET4 0.98 06/09/2015        Hemoglobin A1C  Lab Results   Component Value Date    HGBA1C 6.4 04/28/2023        Microalbumin/Creatinine  Lab Results   Component Value Date    SHADIAO 165.2 05/27/2022    MICROALBUR 19.1 05/27/2022           Assessment / Plan      Assessment & Plan:  Diagnoses and all orders for this visit:    1. Type 2 diabetes mellitus with hyperglycemia, without long-term current use of insulin (Primary)  Assessment & Plan:  Diabetes is unchanged.   Continue current treatment regimen.  Diabetes will be reassessed in 3 months.    Orders:  -     Cancel: Microalbumin / Creatinine Urine Ratio - Urine, Clean Catch; Future    2. Type 2 diabetes mellitus with microalbuminuria, without long-term current use of insulin  Assessment & Plan:  Continue ACE-I.  Check microalbumin next visit - he wasn't able to provide a specimen today.    Orders:  -     POC Glucose, Blood  -     POC Glycosylated Hemoglobin (Hb A1C)    3. Essential hypertension  Assessment & Plan:  Hypertension is unchanged.  Continue current treatment regimen.  Blood pressure will be reassessed at the next regular appointment.      4. Hyperlipidemia LDL goal <100  Assessment & Plan:  Continue statin.  Recent lipids okay.      Current Outpatient Medications   Medication Instructions   • Accu-Chek Softclix Lancets lancets USE TO TEST BLOOD SUGAR THREE TIMES A DAY   • acetaminophen (TYLENOL) 1,000 mg, Oral, Every 6 Hours PRN   • amLODIPine (NORVASC) 10 MG tablet TAKE ONE TABLET BY MOUTH DAILY AS DIRECTED   • bimatoprost (Lumigan) 0.01 % ophthalmic drops 1 drop, Both Eyes, Nightly   • doxycycline (VIBRAMYCIN) 100 mg, Oral, 2 Times Daily   • glimepiride (AMARYL) 2 mg, Oral, Daily   • glucose blood (Accu-Chek Kandis Plus) test strip USE ONE STRIP TO TEST THREE TIMES A DAY   • latanoprost (XALATAN) 0.005 % ophthalmic solution 1 drop, Both Eyes, Nightly   •  lidocaine (LIDODERM) 5 % 1 patch, Transdermal, Every 24 Hours, Remove & Discard patch within 12 hours or as directed by MD   • lidocaine (XYLOCAINE) 5 % ointment 1 application, Topical, Every 2 Hours PRN   • lisinopril (PRINIVIL,ZESTRIL) 40 MG tablet TAKE ONE TABLET BY MOUTH DAILY   • Loratadine 10 MG capsule 1 capsule, Oral, Daily   • Melatonin 2.5 MG chewable tablet 1 tablet, Oral, Nightly PRN   • methocarbamol (ROBAXIN) 500 mg, Oral, 4 Times Daily   • metoprolol succinate XL (TOPROL-XL) 100 MG 24 hr tablet TAKE ONE TABLET BY MOUTH TWICE A DAY   • mirtazapine (REMERON) 45 mg, Oral, Nightly   • pravastatin (PRAVACHOL) 20 mg, Oral, Daily   • venlafaxine XR (EFFEXOR-XR) 75 mg, Oral, Daily   • Vitamin D-3 1,000 Units, Oral, Daily   • warfarin (COUMADIN) 2.5 MG tablet Take 1 to 1.5 tablets by mouth daily or as directed by the anticoagulation clinic.      Return in about 3 months (around 7/28/2023) for Recheck with A1c, microalbumin.    Brown Gallo MD   04/28/2023

## 2023-05-08 ENCOUNTER — TELEPHONE (OUTPATIENT)
Dept: INTERNAL MEDICINE | Facility: CLINIC | Age: 78
End: 2023-05-08

## 2023-05-08 NOTE — TELEPHONE ENCOUNTER
Caller: Tony Wolf    Relationship: Self    Best call back number: [phone]    What is the best time to reach you: ANYTIME     Who are you requesting to speak with (clinical staff, provider,  specific staff member): CLINICAL STAFF    What was the call regarding: PATIENT IS CALLING TO SEE IF HE CAN REQUEST A NEW WALKER TO GO THROUGH MEDICARE. HE SAYS THAT IT HAS BEEN A LONG TIME SINCE HE HAS HAD  A NEW ONE AND PHYSICAL THERAPY THINKS THAT THIS WOULD HELP HIM SINCE IT IS OUT DATED AND TOO LOW.     Do you require a callback: YES

## 2023-05-11 ENCOUNTER — TELEPHONE (OUTPATIENT)
Dept: INTERNAL MEDICINE | Facility: CLINIC | Age: 78
End: 2023-05-11

## 2023-05-11 NOTE — TELEPHONE ENCOUNTER
Caller: Tony Wolf    Relationship: Self    Best call back number: [phone]    What is the best time to reach you: ANY    Who are you requesting to speak with (clinical staff, provider,  specific staff member):   CLINALEC    What was the call regarding: PATIENT IS CALLING TO GET AN UPDATE ON THE ROLLATOR WALKER HE HAS REQUESTED     Do you require a callback: YES

## 2023-05-11 NOTE — TELEPHONE ENCOUNTER
Attempted to call patient, no answer. LM for him to return call.  Order was [facility]ed to [facility] yesterday.

## 2023-05-12 NOTE — TELEPHONE ENCOUNTER
I spoke to patient about this. We will get the order resent to appropriate location after he has his visit with Dr. Lopez on Monday so we can provide documentation with order. Pt verbalized understanding.

## 2023-05-12 NOTE — TELEPHONE ENCOUNTER
PATIENT CALLED BACK AND I ADVISED THAT THE ORDER HAD BEEN WRITTEN AND FAXED TO [facility]. PROVIDED HIM WITH THAT PHONE NUMBER (THERE ARE TWO LOCATIONS, [facility]   PATIENT VOICED UNDERSTANDING

## 2023-05-15 ENCOUNTER — OFFICE VISIT (OUTPATIENT)
Dept: INTERNAL MEDICINE | Facility: CLINIC | Age: 78
End: 2023-05-15
Payer: MEDICARE

## 2023-05-15 VITALS
HEART RATE: 88 BPM | OXYGEN SATURATION: 97 % | DIASTOLIC BLOOD PRESSURE: 74 MMHG | BODY MASS INDEX: 32.51 KG/M2 | WEIGHT: 240 LBS | SYSTOLIC BLOOD PRESSURE: 126 MMHG | HEIGHT: 72 IN

## 2023-05-15 DIAGNOSIS — M17.11 ARTHRITIS OF KNEE, RIGHT: Primary | ICD-10-CM

## 2023-05-15 DIAGNOSIS — M48.061 SPINAL STENOSIS OF LUMBAR REGION WITHOUT NEUROGENIC CLAUDICATION: ICD-10-CM

## 2023-05-15 PROCEDURE — 3078F DIAST BP <80 MM HG: CPT | Performed by: INTERNAL MEDICINE

## 2023-05-15 PROCEDURE — 3044F HG A1C LEVEL LT 7.0%: CPT | Performed by: INTERNAL MEDICINE

## 2023-05-15 PROCEDURE — 99213 OFFICE O/P EST LOW 20 MIN: CPT | Performed by: INTERNAL MEDICINE

## 2023-05-15 PROCEDURE — 1159F MED LIST DOCD IN RCRD: CPT | Performed by: INTERNAL MEDICINE

## 2023-05-15 PROCEDURE — 1160F RVW MEDS BY RX/DR IN RCRD: CPT | Performed by: INTERNAL MEDICINE

## 2023-05-15 PROCEDURE — 3074F SYST BP LT 130 MM HG: CPT | Performed by: INTERNAL MEDICINE

## 2023-05-15 NOTE — PROGRESS NOTES
"Subjective   Tony Wolf is a 77 y.o. male.   Chief Complaint   Patient presents with   • Arthritis       History of Present Illness   Face to face visit for Rolator Walker.Uses a Rolator walker because of stenosis in the spine and knee arthritis.  He needs a replacement walker.   The following portions of the patient's history were reviewed and updated as appropriate: allergies, current medications, past family history, past medical history, past social history, past surgical history and problem list.    Review of Systems   Constitutional: Negative for fatigue and fever.   Respiratory: Negative for cough and shortness of breath.    Cardiovascular: Negative for chest pain and leg swelling.   Musculoskeletal: Positive for arthralgias and back pain.     /74   Pulse 88   Ht 182.9 cm (72\")   Wt 109 kg (240 lb)   SpO2 97%   BMI 32.55 kg/m²     Objective   Physical Exam  Vitals reviewed.   Cardiovascular:      Rate and Rhythm: Normal rate.   Pulmonary:      Effort: No respiratory distress.      Breath sounds: No wheezing.   Musculoskeletal:         General: No swelling.      Right lower leg: No edema.      Left lower leg: No edema.   Neurological:      Mental Status: He is alert and oriented to person, place, and time.         Assessment & Plan   Diagnoses and all orders for this visit:    1. Arthritis of knee, right (Primary)  Needs Rolator  2. Spinal stenosis of lumbar region without neurogenic claudication  Needs Rolator             "

## 2023-05-18 ENCOUNTER — ANTICOAGULATION VISIT (OUTPATIENT)
Dept: PHARMACY | Facility: HOSPITAL | Age: 78
End: 2023-05-18
Payer: MEDICARE

## 2023-05-18 DIAGNOSIS — I48.20 CHRONIC ATRIAL FIBRILLATION: Primary | ICD-10-CM

## 2023-05-18 LAB
INR PPP: 3 (ref 0.91–1.09)
PROTHROMBIN TIME: 35.8 SECONDS (ref 10–13.8)

## 2023-05-18 PROCEDURE — 85610 PROTHROMBIN TIME: CPT

## 2023-05-18 PROCEDURE — G0463 HOSPITAL OUTPT CLINIC VISIT: HCPCS

## 2023-05-18 PROCEDURE — 36416 COLLJ CAPILLARY BLOOD SPEC: CPT

## 2023-05-18 NOTE — PROGRESS NOTES
Anticoagulation Clinic Progress Note  Indication: atrial fibrillation  Referring Provider: Sravani [last appt 9/8/22  next:  9/8/23 (Will Sue)]  Initial Warfarin Start Date: 2008  Goal INR: 2 - 3  Current Drug Interactions: fluoxetine, glimepiride, melatonin (PRN), mirtazepine, MVI  CHADS-VASc: 3 (age, HTN, DM)    EtOH: none  GLV: Salad (Spinach salad 1x weekly or garden salad) and serving of broccoli once a week 3/1/2022  OTC Pain Relief: Uses APAP prn (~1x/week)    Anticoagulation Clinic INR History:  Date 8/20 9/4 10/2 11/6 11/15 11/29 12/31 1/7/19 1/17 1/29 2/12   Total Weekly Dose 17.5  mg 17.5 mg 17.5 mg 10mg 20mg 17.5mg 17.5mg 18.75 mg 17.5 mg 18.75 mg 20mg   INR 2.3 2.3 2.2 1.3 2.3 2.2 1.7 1.9 1.9 1.9 2.0   Notes    pre- epidural   missed dose?         Date 2/27 3/27 4/10 4/23 5/7 5/28 6/25 7/30 8/28 9/25 10/23   Total Weekly Dose 20mg 20mg 21.25 mg 21.25 mg 21.25 mg 21.25mg 21.25mg 21.25mg 21.25mg 21.25mg 21.25mg   INR 2.2 1.8 2.5 1.8 2.4 2.4 2.5 2.7 2.6 3.0 2.9   Notes sick   post- scope            Date  11/6 11/13 12/4 1/3 1/15 1/20 1/31 2/10 2/28 4/14 5/26   Total Weekly Dose 21.25 mg 18.75 mg 21.25 mg 21.25 mg 21.25 mg 20 mg 21.25 21.25 20mg 21.25mg 21.25mg   INR 4.2 3.2 2.7 2.2 3.1 3.1 2.9 3.5 2.4 2.3 2.5   Notes apap Hold x1, less GLV Inc in GLV  doxy doxy  1x dose cephalexin 1x dose dec; keflex       Date  6/24 7/22 8/24 9/10 10/8 11/5 11/18 12/2 12/30 1/26 2/22 3/22 4/19   Total Weekly Dose 21.25mg 21.25 21.25mg 21.25mg 21.25mg 21.25mg 20mg 20mg 20mg 20mg  20mg 20 mg 20mg   INR 2.4 2.4 3.0 2.8 3.0 3.1 2.5 2.5 2.5 2.6 2.9  2 2.2   Notes      Inc. GLV Dose dec    desvenlafaxine broccoli      Date 5/13 6/16  7/7 7/21 8/11 9/1 9/29 10/27 11/3 12/1 12/9 12/16   Total WeeklyDose 20 mg 20mg  20 mg 18.75mg  17.5 mg 17.5mg 17.5 17.5mg 17.5mg 17.5mg 18.75mg 22.5mg   INR 2.5 3.2 3.1 3.0 2.2 2.4 2.2 2.4 2.3 1.5 1.6 2.2   Notes  apap Inc GLV     augmentin day 3 augementin glucerna  Boost x2     Date  12/28 1/13/22 2/8 2/16 3/1 3/22 4/12 5/10 6/7 7/5 8/2    Total WeeklyDose 21.25mg 21.25mg 21.25 mg 21.25mg 22.5mg 22.5 mg 22.5mg 22.5 mg 22.5 mg 22.5mg 22.5mg EGD x 3 days hold   INR 2.6 2.3 1.7 2.1 2.5 2.0 2.3 2.4 2.1 2.1 2.8    Notes    Dec GLV        Boost x2     Date 8/25 9/8 9/29 10/27 12/1 1/5/23 2/2 3/2 3/30 4/14 4/20 5/18   Total WeeklyDose 18.75mg 22.5mg 22.5 mg 22.5 mg 22.5 mg 22.5mg 22.5 mg 22.5 mg 22.5 mg 22.5mg 22.5 mg  22.5 mg   INR 2.7 2.7 2.5 2.2 2.3 2.3 2.7 3.0 2.5 2.6 2.6 3.0   Notes omeprazole initi         doxy Doxy       Clinic Interview:  Verbal Release Authorization signed on 6/27/18 -- may speak with Sussy (wife), Nikolai (son)  Tablet Strength: pt has 2.5mg tablets  Phone: [phone] (Home), [phone]  Fax: [phone] (Attn: Tasha) [facility]    Patient Findings  Negatives:  Signs/symptoms of thrombosis, Signs/symptoms of bleeding, Laboratory test error suspected, Change in health, Change in alcohol use, Change in activity, Upcoming invasive procedure, Emergency department visit, Upcoming dental procedure, Missed doses, Extra doses, Change in medications, Change in diet/appetite, Hospital admission, Bruising, Other complaints       Plan:   1. INR is therapeutic today at 3.0 (goal 2.0-3.0).  Instructed Mr. Wolf to continue warfarin 3.75mg daily oral daily except 2.5mg TueThurSun until recheck.  2. RTC in 4 weeks, 5/15/23  3. Verbal and written information was provided to Mr. Wolf and Mrs. Wolf in clinic. Mr. Wolf voiced understanding with teach back and has no further questions at this time.     Yunier Valenzuela, Pharmacy Intern  05/18/23  11:39 EDT    I, Ama Mccloud, PharmD, have reviewed the note in full and agree with the assessment and plan.  05/18/23  12:30 EDT

## 2023-05-19 ENCOUNTER — TELEPHONE (OUTPATIENT)
Dept: INTERNAL MEDICINE | Facility: CLINIC | Age: 78
End: 2023-05-19
Payer: MEDICARE

## 2023-05-19 NOTE — TELEPHONE ENCOUNTER
Caller: Tony Wolf    Relationship to patient: Self    Best call back number: [phone]    Patient is needing: UPDATE ON ORDERS FOR ROTATOR WALKER

## 2023-05-19 NOTE — TELEPHONE ENCOUNTER
Spoke with patient and he would like orders faxed to [facility] instead of [facility].  Faxed new order to [phone].

## 2023-06-13 ENCOUNTER — LAB (OUTPATIENT)
Dept: LAB | Facility: HOSPITAL | Age: 78
End: 2023-06-13
Payer: MEDICARE

## 2023-06-13 ENCOUNTER — OFFICE VISIT (OUTPATIENT)
Dept: INTERNAL MEDICINE | Facility: CLINIC | Age: 78
End: 2023-06-13
Payer: MEDICARE

## 2023-06-13 VITALS
BODY MASS INDEX: 32.72 KG/M2 | SYSTOLIC BLOOD PRESSURE: 120 MMHG | HEIGHT: 72 IN | OXYGEN SATURATION: 95 % | TEMPERATURE: 97.6 F | DIASTOLIC BLOOD PRESSURE: 80 MMHG | HEART RATE: 73 BPM | WEIGHT: 241.6 LBS

## 2023-06-13 DIAGNOSIS — I48.20 CHRONIC ATRIAL FIBRILLATION: ICD-10-CM

## 2023-06-13 DIAGNOSIS — E78.5 HYPERLIPIDEMIA LDL GOAL <100: ICD-10-CM

## 2023-06-13 DIAGNOSIS — I10 ESSENTIAL HYPERTENSION: ICD-10-CM

## 2023-06-13 DIAGNOSIS — E11.29 TYPE 2 DIABETES MELLITUS WITH MICROALBUMINURIA, WITHOUT LONG-TERM CURRENT USE OF INSULIN: ICD-10-CM

## 2023-06-13 DIAGNOSIS — R80.9 TYPE 2 DIABETES MELLITUS WITH MICROALBUMINURIA, WITHOUT LONG-TERM CURRENT USE OF INSULIN: ICD-10-CM

## 2023-06-13 DIAGNOSIS — Z23 NEED FOR VACCINATION: ICD-10-CM

## 2023-06-13 DIAGNOSIS — Z13.29 THYROID DISORDER SCREEN: ICD-10-CM

## 2023-06-13 DIAGNOSIS — L98.9 SKIN LESION OF LEFT ARM: ICD-10-CM

## 2023-06-13 DIAGNOSIS — Z00.00 MEDICARE ANNUAL WELLNESS VISIT, SUBSEQUENT: Primary | ICD-10-CM

## 2023-06-13 LAB
ALBUMIN SERPL-MCNC: 4.3 G/DL (ref 3.5–5.2)
ALBUMIN/GLOB SERPL: 1.8 G/DL
ALP SERPL-CCNC: 66 U/L (ref 39–117)
ALT SERPL W P-5'-P-CCNC: 27 U/L (ref 1–41)
ANION GAP SERPL CALCULATED.3IONS-SCNC: 10 MMOL/L (ref 5–15)
AST SERPL-CCNC: 40 U/L (ref 1–40)
BILIRUB SERPL-MCNC: 0.9 MG/DL (ref 0–1.2)
BUN SERPL-MCNC: 13 MG/DL (ref 8–23)
BUN/CREAT SERPL: 12.9 (ref 7–25)
CALCIUM SPEC-SCNC: 9.2 MG/DL (ref 8.6–10.5)
CHLORIDE SERPL-SCNC: 105 MMOL/L (ref 98–107)
CHOLEST SERPL-MCNC: 162 MG/DL (ref 0–200)
CO2 SERPL-SCNC: 28 MMOL/L (ref 22–29)
CREAT SERPL-MCNC: 1.01 MG/DL (ref 0.76–1.27)
EGFRCR SERPLBLD CKD-EPI 2021: 77.6 ML/MIN/1.73
GLOBULIN UR ELPH-MCNC: 2.4 GM/DL
GLUCOSE SERPL-MCNC: 115 MG/DL (ref 65–99)
HDLC SERPL-MCNC: 30 MG/DL (ref 40–60)
LDLC SERPL CALC-MCNC: 98 MG/DL (ref 0–100)
LDLC/HDLC SERPL: 3.08 {RATIO}
POTASSIUM SERPL-SCNC: 4.6 MMOL/L (ref 3.5–5.2)
PROT SERPL-MCNC: 6.7 G/DL (ref 6–8.5)
SODIUM SERPL-SCNC: 143 MMOL/L (ref 136–145)
TRIGL SERPL-MCNC: 198 MG/DL (ref 0–150)
TSH SERPL DL<=0.05 MIU/L-ACNC: 2.46 UIU/ML (ref 0.27–4.2)
VLDLC SERPL-MCNC: 34 MG/DL (ref 5–40)

## 2023-06-13 PROCEDURE — 84443 ASSAY THYROID STIM HORMONE: CPT

## 2023-06-13 PROCEDURE — 80061 LIPID PANEL: CPT

## 2023-06-13 PROCEDURE — 80053 COMPREHEN METABOLIC PANEL: CPT

## 2023-06-13 PROCEDURE — 85025 COMPLETE CBC W/AUTO DIFF WBC: CPT

## 2023-06-13 NOTE — PROGRESS NOTES
The ABCs of the Annual Wellness Visit  Subsequent Medicare Wellness Visit    Subjective    Tony Wolf is a 77 y.o. male who presents for a Subsequent Medicare Wellness Visit.    The following portions of the patient's history were reviewed and   updated as appropriate: allergies, current medications, past family history, past medical history, past social history, past surgical history, and problem list.    Compared to one year ago, the patient feels his physical   health is the same.    Compared to one year ago, the patient feels his mental   health is the same.    Recent Hospitalizations:  He was not admitted to the hospital during the last year.       Current Medical Providers:  Patient Care Team:  Kay Lopez DO as PCP - General  Kay Lopez DO as PCP - Family Medicine  Elise Portillo, PharmD as Pharmacist (Pharmacy)  Ama Mccloud, PharmD as Pharmacist (Pharmacy)  Sammy Lassiter MD as Consulting Physician (Cardiology)  Brown Gallo MD as Consulting Physician (Endocrinology)    Outpatient Medications Prior to Visit   Medication Sig Dispense Refill   • Accu-Chek Softclix Lancets lancets USE TO TEST BLOOD SUGAR THREE TIMES A  each 2   • acetaminophen (TYLENOL) 500 MG tablet Take 2 tablets by mouth Every 6 (Six) Hours As Needed for Mild Pain.     • amLODIPine (NORVASC) 10 MG tablet TAKE ONE TABLET BY MOUTH DAILY AS DIRECTED 30 tablet 11   • bimatoprost (Lumigan) 0.01 % ophthalmic drops Administer 1 drop to both eyes Every Night.     • Cholecalciferol (VITAMIN D-3) 1000 UNITS capsule Take 1,000 Units by mouth Daily.     • glimepiride (AMARYL) 2 MG tablet Take 1 tablet by mouth Daily. 90 tablet 1   • glucose blood (Accu-Chek Kandis Plus) test strip USE ONE STRIP TO TEST THREE TIMES A  each 11   • latanoprost (XALATAN) 0.005 % ophthalmic solution Administer 1 drop to both eyes Every Night.     • lisinopril (PRINIVIL,ZESTRIL) 40 MG tablet TAKE ONE TABLET BY MOUTH DAILY  90 tablet 2   • Loratadine 10 MG capsule Take 1 capsule by mouth Daily.     • Melatonin 2.5 MG chewable tablet Chew 1 tablet At Night As Needed.     • metoprolol succinate XL (TOPROL-XL) 100 MG 24 hr tablet TAKE ONE TABLET BY MOUTH TWICE A  tablet 3   • mirtazapine (REMERON) 45 MG tablet Take 1 tablet by mouth Every Night.     • pravastatin (PRAVACHOL) 20 MG tablet Take 1 tablet by mouth Daily. 90 tablet 1   • venlafaxine XR (EFFEXOR-XR) 75 MG 24 hr capsule Take 1 capsule by mouth Daily.     • warfarin (COUMADIN) 2.5 MG tablet Take 1 to 1.5 tablets by mouth daily or as directed by the anticoagulation clinic. 120 tablet 1   • doxycycline (VIBRAMYCIN) 100 MG capsule Take 1 capsule by mouth 2 (Two) Times a Day. 20 capsule 0     No facility-administered medications prior to visit.       No opioid medication identified on active medication list. I have reviewed chart for other potential  high risk medication/s and harmful drug interactions in the elderly.        Aspirin is not on active medication list.  Aspirin use is not indicated based on review of current medical condition/s. Risk of harm outweighs potential benefits.  .    Patient Active Problem List   Diagnosis   • GERD (gastroesophageal reflux disease)   • Essential hypertension   • Class 1 obesity due to excess calories with serious comorbidity in adult   • Obstructive sleep apnea   • Generalized anxiety disorder   • Chronic atrial fibrillation   • PVC's (premature ventricular contractions)   • Hyperlipidemia LDL goal <100   • Left ventricular hypertrophy   • Atypical atrial flutter   • Arthritis of knee, right   • Status post right knee replacement   • Type 2 diabetes mellitus with microalbuminuria, without long-term current use of insulin   • Diaphoresis   • Type 2 diabetes mellitus with hyperglycemia, without long-term current use of insulin     Advance Care Planning   Advance Care Planning     Advance Directive is not on file.  ACP discussion was held  "with the patient during this visit. Patient has an advance directive (not in EMR), copy requested.     Objective    Vitals:    23 1103   BP: 120/80   Pulse: 73   Temp: 97.6 °F (36.4 °C)   SpO2: 95%   Weight: 110 kg (241 lb 9.6 oz)   Height: 182.9 cm (72\")   PainSc:   2   PainLoc: Shoulder     Estimated body mass index is 32.77 kg/m² as calculated from the following:    Height as of this encounter: 182.9 cm (72\").    Weight as of this encounter: 110 kg (241 lb 9.6 oz).           Does the patient have evidence of cognitive impairment? No    Lab Results   Component Value Date    HGBA1C 6.4 2023        HEALTH RISK ASSESSMENT    Smoking Status:  Social History     Tobacco Use   Smoking Status Never   Smokeless Tobacco Never     Alcohol Consumption:  Social History     Substance and Sexual Activity   Alcohol Use No     Fall Risk Screen:    STEADI Fall Risk Assessment was completed, and patient is at MODERATE risk for falls. Assessment completed on:2023    Depression Screenin/13/2023    11:13 AM   PHQ-2/PHQ-9 Depression Screening   Little Interest or Pleasure in Doing Things 0-->not at all   Feeling Down, Depressed or Hopeless 0-->not at all   PHQ-9: Brief Depression Severity Measure Score 0       Health Habits and Functional and Cognitive Screenin/13/2023    11:08 AM   Functional & Cognitive Status   Do you have difficulty preparing food and eating? No   Do you have difficulty bathing yourself, getting dressed or grooming yourself? No   Do you have difficulty using the toilet? No   Do you have difficulty moving around from place to place? Yes   Do you have trouble with steps or getting out of a bed or a chair? Yes   Current Diet Diabetic Diet   Dental Exam Up to date   Eye Exam Up to date   Exercise (times per week) 2 times per week   Current Exercises Include Walking   Do you need help using the phone?  No   Are you deaf or do you have serious difficulty hearing?  No   Do you need help " with transportation? Yes   Do you need help shopping? Yes   Do you need help preparing meals?  Yes   Do you need help with housework?  Yes   Do you need help with laundry? No   Do you need help taking your medications? No   Do you need help managing money? No   Do you ever drive or ride in a car without wearing a seat belt? No   Have you felt unusual stress, anger or loneliness in the last month? No   Who do you live with? Spouse   If you need help, do you have trouble finding someone available to you? No   Have you been bothered in the last four weeks by sexual problems? No   Do you have difficulty concentrating, remembering or making decisions? No       Age-appropriate Screening Schedule:  Refer to the list below for future screening recommendations based on patient's age, sex and/or medical conditions. Orders for these recommended tests are listed in the plan section. The patient has been provided with a written plan.    Health Maintenance   Topic Date Due   • ZOSTER VACCINE (1 of 2) Never done   • COVID-19 Vaccine (5 - Pfizer series) 06/17/2022   • URINE MICROALBUMIN  05/27/2023   • DIABETIC FOOT EXAM  08/15/2023 (Originally 11/4/2021)   • DXA SCAN  12/26/2023 (Originally 2/6/2020)   • INFLUENZA VACCINE  10/01/2023   • HEMOGLOBIN A1C  10/28/2023   • LIPID PANEL  01/13/2024   • DIABETIC EYE EXAM  04/19/2024   • ANNUAL WELLNESS VISIT  06/13/2024   • TDAP/TD VACCINES (2 - Td or Tdap) 07/06/2027   • COLORECTAL CANCER SCREENING  04/15/2029   • HEPATITIS C SCREENING  Completed   • Pneumococcal Vaccine 65+  Completed                  CMS Preventative Services Quick Reference  Risk Factors Identified During Encounter  Glaucoma or Family History of Glaucoma:  Condition Stable with Current Medications  The above risks/problems have been discussed with the patient.  Pertinent information has been shared with the patient in the After Visit Summary.  An After Visit Summary and PPPS were made available to the  "patient.    Follow Up:   Next Medicare Wellness visit to be scheduled in 1 year.       Additional E&M Note during same encounter follows:  Patient has multiple medical problems which are significant and separately identifiable that require additional work above and beyond the Medicare Wellness Visit.      Chief Complaint  Medicare Wellness-subsequent, Hyperlipidemia, Hypertension, Atrial Fibrillation, and Diabetes    Subjective        HPI  Tony Wolf is also being seen today for HTN, HLP, DM, and chronic A fib. HTN controlled with Lisinopril, Toprol XL, and Norvasc HLP controlled with Pravastatin. DM controlled with Amaryl. A fib controlled with Coumadin.     Review of Systems   Constitutional:  Negative for fatigue and fever.   HENT:  Negative for sinus pressure.    Respiratory:  Negative for cough and shortness of breath.    Gastrointestinal:  Negative for nausea and vomiting.   Musculoskeletal:  Positive for arthralgias.   Neurological:  Negative for confusion.     Objective   Vital Signs:  /80   Pulse 73   Temp 97.6 °F (36.4 °C)   Ht 182.9 cm (72\")   Wt 110 kg (241 lb 9.6 oz)   SpO2 95%   BMI 32.77 kg/m²     Physical Exam  Vitals reviewed.   Cardiovascular:      Rate and Rhythm: Normal rate. Rhythm irregular.   Pulmonary:      Effort: No respiratory distress.      Breath sounds: No wheezing.   Skin:            Comments: Crusted skin lesion, raised for at least 6 m   Neurological:      Mental Status: He is alert and oriented to person, place, and time.   Psychiatric:         Mood and Affect: Mood normal.         Behavior: Behavior normal.                       Assessment and Plan   Diagnoses and all orders for this visit:    1. Medicare annual wellness visit, subsequent (Primary)  Done today  2. Hyperlipidemia LDL goal <100  Continue Pravastatin 20 mg po qhs  3. Essential hypertension  -     Comprehensive Metabolic Panel; Future  -     Lipid Panel; Future  -     CBC & Differential; Future  -     " Microalbumin / Creatinine Urine Ratio - Urine, Clean Catch  Continue Norvasc 10 mg po qd, Lisinopril 40 mg po qd, and Toprol  mg po qd  4. Chronic atrial fibrillation  Continue Coumadin 2.5 mg po qd  5. Type 2 diabetes mellitus with microalbuminuria, without long-term current use of insulin  Continue Amaryl  6. Thyroid disorder screen  -     TSH; Future      7. Skin lesion of left arm  -     Ambulatory Referral to Dermatology    8. Need for vaccination  -     Pneumococcal Conjugate Vaccine 20-Valent (PCV20)             Follow Up   Return in about 3 months (around 9/13/2023).  Patient was given instructions and counseling regarding his condition or for health maintenance advice. Please see specific information pulled into the AVS if appropriate.

## 2023-06-13 NOTE — PROGRESS NOTES
Immunization  Immunization performed in Left Deltoid by Mariam Moeller MA. Patient tolerated the procedure well without complications.  06/13/23   Mariam Moeller MA

## 2023-06-14 LAB
BASOPHILS # BLD AUTO: 0.08 10*3/MM3 (ref 0–0.2)
BASOPHILS NFR BLD AUTO: 1 % (ref 0–1.5)
DEPRECATED RDW RBC AUTO: 45.7 FL (ref 37–54)
EOSINOPHIL # BLD AUTO: 0.2 10*3/MM3 (ref 0–0.4)
EOSINOPHIL NFR BLD AUTO: 2.6 % (ref 0.3–6.2)
ERYTHROCYTE [DISTWIDTH] IN BLOOD BY AUTOMATED COUNT: 13.5 % (ref 12.3–15.4)
HCT VFR BLD AUTO: 52.1 % (ref 37.5–51)
HGB BLD-MCNC: 17.9 G/DL (ref 13–17.7)
IMM GRANULOCYTES # BLD AUTO: 0.02 10*3/MM3 (ref 0–0.05)
IMM GRANULOCYTES NFR BLD AUTO: 0.3 % (ref 0–0.5)
LYMPHOCYTES # BLD AUTO: 2.48 10*3/MM3 (ref 0.7–3.1)
LYMPHOCYTES NFR BLD AUTO: 31.8 % (ref 19.6–45.3)
MCH RBC QN AUTO: 31.7 PG (ref 26.6–33)
MCHC RBC AUTO-ENTMCNC: 34.4 G/DL (ref 31.5–35.7)
MCV RBC AUTO: 92.2 FL (ref 79–97)
MONOCYTES # BLD AUTO: 0.74 10*3/MM3 (ref 0.1–0.9)
MONOCYTES NFR BLD AUTO: 9.5 % (ref 5–12)
NEUTROPHILS NFR BLD AUTO: 4.28 10*3/MM3 (ref 1.7–7)
NEUTROPHILS NFR BLD AUTO: 54.8 % (ref 42.7–76)
NRBC BLD AUTO-RTO: 0 /100 WBC (ref 0–0.2)
PLATELET # BLD AUTO: 153 10*3/MM3 (ref 140–450)
PMV BLD AUTO: 11.7 FL (ref 6–12)
RBC # BLD AUTO: 5.65 10*6/MM3 (ref 4.14–5.8)
WBC NRBC COR # BLD: 7.8 10*3/MM3 (ref 3.4–10.8)

## 2023-06-15 ENCOUNTER — ANTICOAGULATION VISIT (OUTPATIENT)
Dept: PHARMACY | Facility: HOSPITAL | Age: 78
End: 2023-06-15
Payer: MEDICARE

## 2023-06-15 DIAGNOSIS — I48.20 CHRONIC ATRIAL FIBRILLATION: Primary | ICD-10-CM

## 2023-06-15 LAB
INR PPP: 2.7 (ref 0.91–1.09)
PROTHROMBIN TIME: 32.1 SECONDS (ref 10–13.8)

## 2023-06-15 PROCEDURE — 36416 COLLJ CAPILLARY BLOOD SPEC: CPT

## 2023-06-15 PROCEDURE — 85610 PROTHROMBIN TIME: CPT

## 2023-06-15 PROCEDURE — G0463 HOSPITAL OUTPT CLINIC VISIT: HCPCS

## 2023-06-15 NOTE — PROGRESS NOTES
Anticoagulation Clinic Progress Note  Indication: atrial fibrillation  Referring Provider: Sravani [last appt 9/8/22  next:  9/8/23 (Will Sue)]  Initial Warfarin Start Date: 2008  Goal INR: 2 - 3  Current Drug Interactions: fluoxetine, glimepiride, melatonin (PRN), mirtazepine, MVI  CHADS-VASc: 3 (age, HTN, DM)    EtOH: none  GLV: Salad (Spinach salad 1x weekly or garden salad) and serving of broccoli once a week 3/1/2022  OTC Pain Relief: Uses APAP prn (~1x/week)    Anticoagulation Clinic INR History:  Date 8/20 9/4 10/2 11/6 11/15 11/29 12/31 1/7/19 1/17 1/29 2/12   Total Weekly Dose 17.5  mg 17.5 mg 17.5 mg 10mg 20mg 17.5mg 17.5mg 18.75 mg 17.5 mg 18.75 mg 20mg   INR 2.3 2.3 2.2 1.3 2.3 2.2 1.7 1.9 1.9 1.9 2.0   Notes    pre- epidural   missed dose?         Date 2/27 3/27 4/10 4/23 5/7 5/28 6/25 7/30 8/28 9/25 10/23   Total Weekly Dose 20mg 20mg 21.25 mg 21.25 mg 21.25 mg 21.25mg 21.25mg 21.25mg 21.25mg 21.25mg 21.25mg   INR 2.2 1.8 2.5 1.8 2.4 2.4 2.5 2.7 2.6 3.0 2.9   Notes sick   post- scope            Date  11/6 11/13 12/4 1/3 1/15 1/20 1/31 2/10 2/28 4/14 5/26   Total Weekly Dose 21.25 mg 18.75 mg 21.25 mg 21.25 mg 21.25 mg 20 mg 21.25 21.25 20mg 21.25mg 21.25mg   INR 4.2 3.2 2.7 2.2 3.1 3.1 2.9 3.5 2.4 2.3 2.5   Notes apap Hold x1, less GLV Inc in GLV  doxy doxy  1x dose cephalexin 1x dose dec; keflex       Date  6/24 7/22 8/24 9/10 10/8 11/5 11/18 12/2 12/30 1/26 2/22 3/22 4/19   Total Weekly Dose 21.25mg 21.25 21.25mg 21.25mg 21.25mg 21.25mg 20mg 20mg 20mg 20mg  20mg 20 mg 20mg   INR 2.4 2.4 3.0 2.8 3.0 3.1 2.5 2.5 2.5 2.6 2.9  2 2.2   Notes      Inc. GLV Dose dec    desvenlafaxine broccoli      Date 5/13 6/16  7/7 7/21 8/11 9/1 9/29 10/27 11/3 12/1 12/9 12/16   Total WeeklyDose 20 mg 20mg  20 mg 18.75mg  17.5 mg 17.5mg 17.5 17.5mg 17.5mg 17.5mg 18.75mg 22.5mg   INR 2.5 3.2 3.1 3.0 2.2 2.4 2.2 2.4 2.3 1.5 1.6 2.2   Notes  apap Inc GLV     augmentin day 3 augementin glucerna  Boost x2     Date  12/28 1/13/22 2/8 2/16 3/1 3/22 4/12 5/10 6/7 7/5 8/2    Total WeeklyDose 21.25mg 21.25mg 21.25 mg 21.25mg 22.5mg 22.5 mg 22.5mg 22.5 mg 22.5 mg 22.5mg 22.5mg EGD x 3 days hold   INR 2.6 2.3 1.7 2.1 2.5 2.0 2.3 2.4 2.1 2.1 2.8    Notes    Dec GLV        Boost x2     Date 8/25 9/8 9/29 10/27 12/1 1/5/23 2/2 3/2 3/30 4/14 4/20 5/18   Total WeeklyDose 18.75mg 22.5mg 22.5 mg 22.5 mg 22.5 mg 22.5mg 22.5 mg 22.5 mg 22.5 mg 22.5mg 22.5 mg  22.5 mg   INR 2.7 2.7 2.5 2.2 2.3 2.3 2.7 3.0 2.5 2.6 2.6 3.0   Notes omeprazole initi         doxy Doxy       Clinic Interview:  Verbal Release Authorization signed on 6/27/18 -- may speak with Sussy (wife), Nikolai (son)  Tablet Strength: pt has 2.5mg tablets  Phone: [phone] (Home), [phone]  Fax: [phone] (Attn: Tasha) \[facility] [place] [facility]\"" [place]    Patient Findings  Negatives:  Signs/symptoms of thrombosis, Signs/symptoms of bleeding, Laboratory test error suspected, Change in health, Change in alcohol use, Change in activity, Upcoming invasive procedure, Emergency department visit, Upcoming dental procedure, Missed doses, Extra doses, Change in medications, Change in diet/appetite, Hospital admission, Bruising, Other complaints       Plan:   1. INR is therapeutic today at 2.7 (goal 2.0-3.0).  Instructed Mr. Wolf to continue warfarin 3.75mg daily oral daily except 2.5mg TueThurSun until recheck.  2. RTC in 4 weeks, 7/13  3. Verbal and written information was provided to Mr. Wolf and Mrs. Wolf in clinic. Mr. Wolf voiced understanding with teach back and has no further questions at this time.     Teena Boykin, TonaD.  06/15/23   11:44 EDT

## 2023-06-19 RX ORDER — LISINOPRIL 40 MG/1
TABLET ORAL
Qty: 90 TABLET | Refills: 1 | Status: SHIPPED | OUTPATIENT
Start: 2023-06-19

## 2023-07-13 ENCOUNTER — APPOINTMENT (OUTPATIENT)
Dept: PHARMACY | Facility: HOSPITAL | Age: 78
End: 2023-07-13
Payer: MEDICARE

## 2023-08-09 ENCOUNTER — ANTICOAGULATION VISIT (OUTPATIENT)
Dept: PHARMACY | Facility: HOSPITAL | Age: 78
End: 2023-08-09
Payer: MEDICARE

## 2023-08-09 DIAGNOSIS — I48.20 CHRONIC ATRIAL FIBRILLATION: Primary | ICD-10-CM

## 2023-08-09 LAB
INR PPP: 2.8 (ref 0.91–1.09)
PROTHROMBIN TIME: 33.4 SECONDS (ref 10–13.8)

## 2023-08-09 PROCEDURE — 85610 PROTHROMBIN TIME: CPT

## 2023-08-09 PROCEDURE — 36416 COLLJ CAPILLARY BLOOD SPEC: CPT

## 2023-08-09 PROCEDURE — G0463 HOSPITAL OUTPT CLINIC VISIT: HCPCS

## 2023-08-09 NOTE — PROGRESS NOTES
Anticoagulation Clinic Progress Note  Indication: atrial fibrillation  Referring Provider: Sravani [last appt 9/8/22  next:  9/8/23 (Will Sue)]  Initial Warfarin Start Date: 2008  Goal INR: 2 - 3  Current Drug Interactions: fluoxetine, glimepiride, melatonin (PRN), mirtazepine, MVI  CHADS-VASc: 3 (age, HTN, DM)    EtOH: none  GLV: Salad (Spinach salad 1x weekly or garden salad) and serving of broccoli once a week 3/1/2022  OTC Pain Relief: Uses APAP prn (~1x/week)    Anticoagulation Clinic INR History:  Date 8/20 9/4 10/2 11/6 11/15 11/29 12/31 1/7/19 1/17 1/29 2/12   Total Weekly Dose 17.5  mg 17.5 mg 17.5 mg 10mg 20mg 17.5mg 17.5mg 18.75 mg 17.5 mg 18.75 mg 20mg   INR 2.3 2.3 2.2 1.3 2.3 2.2 1.7 1.9 1.9 1.9 2.0   Notes    pre- epidural   missed dose?         Date 2/27 3/27 4/10 4/23 5/7 5/28 6/25 7/30 8/28 9/25 10/23   Total Weekly Dose 20mg 20mg 21.25 mg 21.25 mg 21.25 mg 21.25mg 21.25mg 21.25mg 21.25mg 21.25mg 21.25mg   INR 2.2 1.8 2.5 1.8 2.4 2.4 2.5 2.7 2.6 3.0 2.9   Notes sick   post- scope            Date  11/6 11/13 12/4 1/3 1/15 1/20 1/31 2/10 2/28 4/14 5/26   Total Weekly Dose 21.25 mg 18.75 mg 21.25 mg 21.25 mg 21.25 mg 20 mg 21.25 21.25 20mg 21.25mg 21.25mg   INR 4.2 3.2 2.7 2.2 3.1 3.1 2.9 3.5 2.4 2.3 2.5   Notes apap Hold x1, less GLV Inc in GLV  doxy doxy  1x dose cephalexin 1x dose dec; keflex       Date  6/24 7/22 8/24 9/10 10/8 11/5 11/18 12/2 12/30 1/26 2/22 3/22 4/19   Total Weekly Dose 21.25mg 21.25 21.25mg 21.25mg 21.25mg 21.25mg 20mg 20mg 20mg 20mg  20mg 20 mg 20mg   INR 2.4 2.4 3.0 2.8 3.0 3.1 2.5 2.5 2.5 2.6 2.9  2 2.2   Notes      Inc. GLV Dose dec    desvenlafaxine broccoli      Date 5/13 6/16  7/7 7/21 8/11 9/1 9/29 10/27 11/3 12/1 12/9 12/16   Total WeeklyDose 20 mg 20mg  20 mg 18.75mg  17.5 mg 17.5mg 17.5 17.5mg 17.5mg 17.5mg 18.75mg 22.5mg   INR 2.5 3.2 3.1 3.0 2.2 2.4 2.2 2.4 2.3 1.5 1.6 2.2   Notes  apap Inc GLV     augmentin day 3 augementin glucerna  Boost x2     Date  12/28 1/13/22 2/8 2/16 3/1 3/22 4/12 5/10 6/7 7/5 8/2    Total WeeklyDose 21.25mg 21.25mg 21.25 mg 21.25mg 22.5mg 22.5 mg 22.5mg 22.5 mg 22.5 mg 22.5mg 22.5mg EGD x 3 days hold   INR 2.6 2.3 1.7 2.1 2.5 2.0 2.3 2.4 2.1 2.1 2.8    Notes    Dec GLV        Boost x2     Date 8/25 9/8 9/29 10/27 12/1 1/5/23 2/2 3/2 3/30 4/14 4/20 5/18   Total WeeklyDose 18.75mg 22.5mg 22.5 mg 22.5 mg 22.5 mg 22.5mg 22.5 mg 22.5 mg 22.5 mg 22.5mg 22.5 mg  22.5 mg   INR 2.7 2.7 2.5 2.2 2.3 2.3 2.7 3.0 2.5 2.6 2.6 3.0   Notes omeprazole initi         doxy Doxy       Date 7/12 8/9          Total Weekly Dose 22.5 mg 22.5 mg          INR 2.7 2.8          Notes                Clinic Interview:  Verbal Release Authorization signed on 6/27/18 -- may speak with Sussy (wife), Nikolai (son)  Tablet Strength: pt has 2.5mg tablets  Phone: [phone] (Home), [phone]  Fax: [phone] (Attn: Tasha) [facility]    Patient Findings  Positives: Upcoming invasive procedure, Change in medications   Negatives: Signs/symptoms of thrombosis, Signs/symptoms of bleeding, Laboratory test error suspected, Change in health, Change in alcohol use, Change in activity, Emergency department visit, Upcoming dental procedure, Missed doses, Extra doses, Change in diet/appetite, Hospital admission, Bruising, Other complaints   Comments: 8/21 - has procedure to remove spot on skin, with Dr Jimena Flores. Per patient and spouse report patient does not have to hold warfarin for procedure per Dermatologist. Patient will be taking Keflex 2000 mg x1 prior to procedure, patient brought prescription bottle for this in to clinic today.       Plan:   1. INR is therapeutic today at 2.8 (goal 2.0-3.0). Instructed Mr. Wolf to continue warfarin 3.75mg daily oral daily except 2.5mg TueThurSun until recheck.  2. RTC in 4 weeks, 9/8 prior to Cardiology appointment.  3. Verbal and written information was provided to Mr. Wolf and Mrs. Wolf in clinic. Jorden Mcclendonquiana  voiced understanding with teach back and has no further questions at this time.     Kerwin Perez, PharmD, BCPS  8/9/2023  13:46 EDT

## 2023-08-10 RX ORDER — METOPROLOL SUCCINATE 100 MG/1
TABLET, EXTENDED RELEASE ORAL
Qty: 180 TABLET | Refills: 3 | Status: SHIPPED | OUTPATIENT
Start: 2023-08-10

## 2023-09-07 ENCOUNTER — OFFICE VISIT (OUTPATIENT)
Dept: INTERNAL MEDICINE | Facility: CLINIC | Age: 78
End: 2023-09-07
Payer: MEDICARE

## 2023-09-07 VITALS
TEMPERATURE: 98.6 F | SYSTOLIC BLOOD PRESSURE: 129 MMHG | WEIGHT: 241 LBS | DIASTOLIC BLOOD PRESSURE: 79 MMHG | HEART RATE: 66 BPM | BODY MASS INDEX: 32.64 KG/M2 | HEIGHT: 72 IN | OXYGEN SATURATION: 92 %

## 2023-09-07 DIAGNOSIS — R80.9 TYPE 2 DIABETES MELLITUS WITH MICROALBUMINURIA, WITHOUT LONG-TERM CURRENT USE OF INSULIN: ICD-10-CM

## 2023-09-07 DIAGNOSIS — R39.198 DECREASED URINE STREAM: ICD-10-CM

## 2023-09-07 DIAGNOSIS — I48.20 CHRONIC ATRIAL FIBRILLATION: ICD-10-CM

## 2023-09-07 DIAGNOSIS — I10 ESSENTIAL HYPERTENSION: ICD-10-CM

## 2023-09-07 DIAGNOSIS — E11.29 TYPE 2 DIABETES MELLITUS WITH MICROALBUMINURIA, WITHOUT LONG-TERM CURRENT USE OF INSULIN: ICD-10-CM

## 2023-09-07 DIAGNOSIS — E78.5 HYPERLIPIDEMIA LDL GOAL <100: Primary | ICD-10-CM

## 2023-09-07 PROCEDURE — 1160F RVW MEDS BY RX/DR IN RCRD: CPT | Performed by: INTERNAL MEDICINE

## 2023-09-07 PROCEDURE — 99214 OFFICE O/P EST MOD 30 MIN: CPT | Performed by: INTERNAL MEDICINE

## 2023-09-07 PROCEDURE — 1159F MED LIST DOCD IN RCRD: CPT | Performed by: INTERNAL MEDICINE

## 2023-09-07 PROCEDURE — 3078F DIAST BP <80 MM HG: CPT | Performed by: INTERNAL MEDICINE

## 2023-09-07 PROCEDURE — 3074F SYST BP LT 130 MM HG: CPT | Performed by: INTERNAL MEDICINE

## 2023-09-07 RX ORDER — METHOCARBAMOL 500 MG/1
TABLET, FILM COATED ORAL EVERY 6 HOURS SCHEDULED
COMMUNITY
End: 2023-09-11

## 2023-09-07 NOTE — PROGRESS NOTES
"Subjective   Tony Wolf is a 78 y.o. male.   Chief Complaint   Patient presents with    Hyperlipidemia    Hypertension    Anxiety    Atrial Fibrillation       History of Present Illness   Here for f/u on chronic conditions: HTN, HLP, Afib, DM, and anxiety. HTN controlled with Norvasc, Lisinopril, and Toprol. HLP controlled with Pravastatin. DM controlled with Amaryl.  Afib controlled with coumadin.  Anxiety controled with Effexor.   For last few months, he noticed to decrease in urine stream. No painful urination. No fever. Nothing makes it worst or better.   Recent skin cancer dx and seeing Dermatology.  The following portions of the patient's history were reviewed and updated as appropriate: allergies, current medications, past family history, past medical history, past social history, past surgical history, and problem list.    Review of Systems   Constitutional:  Negative for diaphoresis and fatigue.   Respiratory:  Negative for cough and shortness of breath.    Cardiovascular:  Negative for chest pain and leg swelling.   Gastrointestinal:  Negative for diarrhea, nausea and vomiting.   Psychiatric/Behavioral:  Negative for hallucinations.    /79   Pulse 66   Temp 98.6 °F (37 °C)   Ht 182.9 cm (72\")   Wt 109 kg (241 lb)   SpO2 92%   BMI 32.69 kg/m²     Objective   Physical Exam  Vitals reviewed.   Cardiovascular:      Rate and Rhythm: Normal rate and regular rhythm.      Heart sounds: No murmur heard.  Pulmonary:      Effort: No respiratory distress.      Breath sounds: No wheezing.   Neurological:      General: No focal deficit present.      Mental Status: He is alert and oriented to person, place, and time.   Psychiatric:         Behavior: Behavior normal.     Assessment & Plan   Diagnoses and all orders for this visit:    1. Hyperlipidemia LDL goal <100 (Primary)  -     Lipid Panel; Future  Continue Pravastatin 20 mg po qhs  2. Essential hypertension  -     Comprehensive Metabolic Panel; " Future  -     CBC & Differential; Future  Continue Lisinopril 40 mg po qd, Norvasc 10 mg po qd, and Toprol  mg po qd  3. Chronic atrial fibrillation  Continue Coumadin 2.5 mg po qd, INR managed by the coumadin clinic  4. Type 2 diabetes mellitus with microalbuminuria, without long-term current use of insulin  Continue Amaryl and f/u with Endo next month.   5. Decreased urine stream  -     Ambulatory Referral to Urology

## 2023-09-11 ENCOUNTER — OFFICE VISIT (OUTPATIENT)
Dept: CARDIOLOGY | Facility: CLINIC | Age: 78
End: 2023-09-11
Payer: MEDICARE

## 2023-09-11 ENCOUNTER — ANTICOAGULATION VISIT (OUTPATIENT)
Dept: PHARMACY | Facility: HOSPITAL | Age: 78
End: 2023-09-11
Payer: MEDICARE

## 2023-09-11 VITALS
DIASTOLIC BLOOD PRESSURE: 78 MMHG | WEIGHT: 241 LBS | HEIGHT: 72 IN | OXYGEN SATURATION: 95 % | HEART RATE: 66 BPM | SYSTOLIC BLOOD PRESSURE: 118 MMHG | BODY MASS INDEX: 32.64 KG/M2

## 2023-09-11 DIAGNOSIS — I48.21 PERMANENT ATRIAL FIBRILLATION: Primary | ICD-10-CM

## 2023-09-11 DIAGNOSIS — I48.20 CHRONIC ATRIAL FIBRILLATION: Primary | ICD-10-CM

## 2023-09-11 DIAGNOSIS — G47.33 OBSTRUCTIVE SLEEP APNEA: ICD-10-CM

## 2023-09-11 DIAGNOSIS — I10 ESSENTIAL HYPERTENSION: ICD-10-CM

## 2023-09-11 DIAGNOSIS — I49.3 PVC'S (PREMATURE VENTRICULAR CONTRACTIONS): ICD-10-CM

## 2023-09-11 LAB
INR PPP: 2.5 (ref 0.91–1.09)
PROTHROMBIN TIME: 30.2 SECONDS (ref 10–13.8)

## 2023-09-11 PROCEDURE — 85610 PROTHROMBIN TIME: CPT

## 2023-09-11 PROCEDURE — 99213 OFFICE O/P EST LOW 20 MIN: CPT | Performed by: PHYSICIAN ASSISTANT

## 2023-09-11 PROCEDURE — 3078F DIAST BP <80 MM HG: CPT | Performed by: PHYSICIAN ASSISTANT

## 2023-09-11 PROCEDURE — 36416 COLLJ CAPILLARY BLOOD SPEC: CPT

## 2023-09-11 PROCEDURE — G0463 HOSPITAL OUTPT CLINIC VISIT: HCPCS

## 2023-09-11 PROCEDURE — 3074F SYST BP LT 130 MM HG: CPT | Performed by: PHYSICIAN ASSISTANT

## 2023-09-11 PROCEDURE — 93000 ELECTROCARDIOGRAM COMPLETE: CPT | Performed by: PHYSICIAN ASSISTANT

## 2023-09-11 NOTE — PROGRESS NOTES
Anticoagulation Clinic Progress Note  Indication: atrial fibrillation  Referring Provider: Sravani [last appt 9/8/22  next:  9/8/23 (Will Sue)]  Initial Warfarin Start Date: 2008  Goal INR: 2 - 3  Current Drug Interactions: fluoxetine, glimepiride, melatonin (PRN), mirtazepine, MVI  CHADS-VASc: 3 (age, HTN, DM)    EtOH: none  GLV: Salad (Spinach salad 1x weekly or garden salad) and serving of broccoli once a week 3/1/2022  OTC Pain Relief: Uses APAP prn (~1x/week)    Anticoagulation Clinic INR History:  Date 8/20 9/4 10/2 11/6 11/15 11/29 12/31 1/7/19 1/17 1/29 2/12   Total Weekly Dose 17.5  mg 17.5 mg 17.5 mg 10mg 20mg 17.5mg 17.5mg 18.75 mg 17.5 mg 18.75 mg 20mg   INR 2.3 2.3 2.2 1.3 2.3 2.2 1.7 1.9 1.9 1.9 2.0   Notes    pre- epidural   missed dose?         Date 2/27 3/27 4/10 4/23 5/7 5/28 6/25 7/30 8/28 9/25 10/23   Total Weekly Dose 20mg 20mg 21.25 mg 21.25 mg 21.25 mg 21.25mg 21.25mg 21.25mg 21.25mg 21.25mg 21.25mg   INR 2.2 1.8 2.5 1.8 2.4 2.4 2.5 2.7 2.6 3.0 2.9   Notes sick   post- scope            Date  11/6 11/13 12/4 1/3 1/15 1/20 1/31 2/10 2/28 4/14 5/26   Total Weekly Dose 21.25 mg 18.75 mg 21.25 mg 21.25 mg 21.25 mg 20 mg 21.25 21.25 20mg 21.25mg 21.25mg   INR 4.2 3.2 2.7 2.2 3.1 3.1 2.9 3.5 2.4 2.3 2.5   Notes apap Hold x1, less GLV Inc in GLV  doxy doxy  1x dose cephalexin 1x dose dec; keflex       Date  6/24 7/22 8/24 9/10 10/8 11/5 11/18 12/2 12/30 1/26 2/22 3/22 4/19   Total Weekly Dose 21.25mg 21.25 21.25mg 21.25mg 21.25mg 21.25mg 20mg 20mg 20mg 20mg  20mg 20 mg 20mg   INR 2.4 2.4 3.0 2.8 3.0 3.1 2.5 2.5 2.5 2.6 2.9  2 2.2   Notes      Inc. GLV Dose dec    desvenlafaxine broccoli      Date 5/13 6/16  7/7 7/21 8/11 9/1 9/29 10/27 11/3 12/1 12/9 12/16   Total WeeklyDose 20 mg 20mg  20 mg 18.75mg  17.5 mg 17.5mg 17.5 17.5mg 17.5mg 17.5mg 18.75mg 22.5mg   INR 2.5 3.2 3.1 3.0 2.2 2.4 2.2 2.4 2.3 1.5 1.6 2.2   Notes  apap Inc GLV     augmentin day 3 augementin glucerna  Boost x2     Date  12/28 1/13/22 2/8 2/16 3/1 3/22 4/12 5/10 6/7 7/5 8/2    Total WeeklyDose 21.25mg 21.25mg 21.25 mg 21.25mg 22.5mg 22.5 mg 22.5mg 22.5 mg 22.5 mg 22.5mg 22.5mg EGD x 3 days hold   INR 2.6 2.3 1.7 2.1 2.5 2.0 2.3 2.4 2.1 2.1 2.8    Notes    Dec GLV        Boost x2     Date 8/25 9/8 9/29 10/27 12/1 1/5/23 2/2 3/2 3/30 4/14 4/20 5/18   Total WeeklyDose 18.75mg 22.5mg 22.5 mg 22.5 mg 22.5 mg 22.5mg 22.5 mg 22.5 mg 22.5 mg 22.5mg 22.5 mg  22.5 mg   INR 2.7 2.7 2.5 2.2 2.3 2.3 2.7 3.0 2.5 2.6 2.6 3.0   Notes omeprazole initi         doxy Doxy       Date 7/12 8/9 9/11         Total Weekly Dose 22.5 mg 22.5 mg 22.5mg         INR 2.7 2.8 2.5         Notes                Clinic Interview:  Verbal Release Authorization signed on 6/27/18 -- may speak with Sussy (wife), Nikolai (son)  Tablet Strength: pt has 2.5mg tablets  Phone: [phone] (Home), [phone]  Fax: [phone] (Attn: Tasha) [facility]      Patient Findings  Negatives: Signs/symptoms of thrombosis, Signs/symptoms of bleeding, Laboratory test error suspected, Change in health, Change in alcohol use, Change in activity, Upcoming invasive procedure, Emergency department visit, Upcoming dental procedure, Missed doses, Extra doses, Change in medications, Change in diet/appetite, Hospital admission, Bruising, Other complaints   Comments: He reports that he will have a spot looked at in his ear.     Plan:   1. INR is therapeutic today at 2.5 (goal 2.0-3.0). Instructed Mr. Wolf to continue warfarin 3.75mg daily oral daily except 2.5mg TueThurSun until recheck.  2. RTC in 4 weeks, 10/9.  3. Saw Will Sue today- no changes at this time. Patient declines DOAC- is aware of INR home monitor option.  4. Verbal and written information was provided to Mr. Wolf and Mrs. Wolf in clinic. Mr. Wolf voiced understanding with teach back and has no further questions at this time.     Ama Mccloud, PharmD  9/11/2023  11:22 EDT

## 2023-09-11 NOTE — PROGRESS NOTES
Tony Wolf  1945  [phone]        [facility] CARDIOLOGY       Kay Lopez DO  [address] [address]    Chief Complaint   Patient presents with    PVC's (premature ventricular contractions)       Problem List:    Atrial fibrillation/atrial tachycardia/atrial flutter:  Diagnosed in June 2004 by physical examination. Coumadin initiated in June 2004.  Echocardiogram on 06/30/2004: LA 5.8 with normal LV size and function.  Nuclear GXT on 06/30/2004: Negative study.  External cardioversion and maintenance to normal sinus rhythm with sotalol on 08/30/2004.  Event recorder in March 2005 showing normal sinus rhythm with occasional PVCs.  Echocardiogram/bubble study on 04/08/2005: Septum 1.6, mild MR, trace to mild TR, PI, negative bubble study, EF 68%.  Holter monitor, September 2008, with 35 PVCs, 157 PACs.   Cardiolite, 12/29/2008, with no ischemia, EF 47%.  Tikosyn initiated, 01/04/2010.   Event recorder, 01/22/2010, with nonsustained atrial tachycardia and PACs.   Stress Cardiolite, 03/31/2011: LVEF (59%), no ischemia.   EP study with radiofrequency ablation of left atrial posterior wall atrial tachycardia, 04/28/2014.  BRIDGET-guided external cardioversion, 11/25/2014 with BRIDGET demonstrating normal LV size and function and mild MR/TR.   Premature ventricular contractions:  Event recorder, June 2007, consistent with PVCs with subsequent increase of sotalol therapy to 120 mg p.o. b.i.d.    History of dyspnea:  Dobutamine Cardiolite in September 1998 with inferolateral reversibility.   Left heart catheterization on 02/29/2000, showing normal coronary arteries, normal EF.  Echocardiogram, March 2010: Normal LV size and function, moderate left ventricular hypertrophy, mild MR, and AI.   Echocardiogram, 01/20/2016: EF 55% to 60%. Mild MR. Mild aortic cusp sclerosis. Trace TR.  CT scan of the chest with and without contrast, 01/20/2016, shows cardiomegaly; no pulmonary  embolism, mild pulmonary vascular congestion.  Hypertension.  Hyperlipidemia.  Concentric left ventricular hypertrophy.  Diabetes mellitus.  Surgical history:  Left knee replacement in 2002.  Left hand carpal tunnel in 2000.                                                                              Allergies  Allergies   Allergen Reactions    Aspirin Anaphylaxis     Other reaction(s): Hypotension       Current Medications    Current Outpatient Medications:     Accu-Chek Softclix Lancets lancets, USE TO TEST BLOOD SUGAR THREE TIMES A DAY, Disp: 100 each, Rfl: 2    acetaminophen (TYLENOL) 500 MG tablet, Take 2 tablets by mouth Every 6 (Six) Hours As Needed for Mild Pain., Disp: , Rfl:     amLODIPine (NORVASC) 10 MG tablet, TAKE ONE TABLET BY MOUTH DAILY AS DIRECTED, Disp: 30 tablet, Rfl: 11    bimatoprost (Lumigan) 0.01 % ophthalmic drops, Administer 1 drop to both eyes Every Night., Disp: , Rfl:     Cholecalciferol (VITAMIN D-3) 1000 UNITS capsule, Take 1,000 Units by mouth Daily., Disp: , Rfl:     glimepiride (AMARYL) 2 MG tablet, Take 1 tablet by mouth Daily., Disp: 90 tablet, Rfl: 1    glucose blood (Accu-Chek Kandis Plus) test strip, USE ONE STRIP TO TEST THREE TIMES A DAY, Disp: 100 each, Rfl: 11    latanoprost (XALATAN) 0.005 % ophthalmic solution, Administer 1 drop to both eyes Every Night., Disp: , Rfl:     lisinopril (PRINIVIL,ZESTRIL) 40 MG tablet, TAKE ONE TABLET BY MOUTH DAILY, Disp: 90 tablet, Rfl: 1    Loratadine 10 MG capsule, Take 1 capsule by mouth Daily., Disp: , Rfl:     Melatonin 2.5 MG chewable tablet, Chew 1 tablet At Night As Needed., Disp: , Rfl:     metoprolol succinate XL (TOPROL-XL) 100 MG 24 hr tablet, TAKE ONE TABLET BY MOUTH TWICE A DAY, Disp: 180 tablet, Rfl: 3    mirtazapine (REMERON) 45 MG tablet, Take 1 tablet by mouth Every Night., Disp: , Rfl:     pravastatin (PRAVACHOL) 20 MG tablet, Take 1 tablet by mouth Daily., Disp: 90 tablet, Rfl: 1    venlafaxine XR (EFFEXOR-XR) 75 MG 24 hr  "capsule, Take 1 capsule by mouth Daily., Disp: , Rfl:     warfarin (COUMADIN) 2.5 MG tablet, Take 1 to 1.5 tablets by mouth daily or as directed by the anticoagulation clinic., Disp: 120 tablet, Rfl: 1    History of Present Illness     Pt presents for follow up of AF/HTN/PVC.  Since we last saw the pt, pt denies any symptomatic palpitation episodes, worsening SOB, CP, LH, and dizziness. Denies any hospitalizations, ER visits, bleeding, or TIA/CVA symptoms. Overall feels well.  He states he is getting his INR today.  He is recently upset has not been to watch football because of the spectrum contract disputes.  Vitals:    09/11/23 1026   BP: 118/78   BP Location: Right arm   Patient Position: Sitting   Pulse: 66   SpO2: 95%   Weight: 109 kg (241 lb)   Height: 182.9 cm (72\")       Body mass index is 32.69 kg/m².  PE:  General: NAD  Neck: no JVD, no carotid bruits, no TM  Heart irreg irreg rate and rhythm NL S1, S2, no rubs, murmurs  Lungs: CTA, no wheezes, rhonchi, or rales  Abd: soft, non-tender, NL BS  Ext: No musculoskeletal deformities, no edema, cyanosis, or clubbing  Psych: normal mood and affect    Diagnostic Data:  9/8/2022      ECG 12 Lead    Date/Time: 9/11/2023 10:53 AM  Performed by: John Rogers PA  Authorized by: John Rogers PA   Comparison: compared with previous ECG from 9/8/2022  Similar to previous ECG  Rhythm: atrial fibrillation  Rate: normal  Conduction: right bundle branch block  T Waves: T waves normal  QRS axis: normal    Clinical impression: non-specific ECG        1. Permanent atrial fibrillation    2. Essential hypertension    3. Obstructive sleep apnea    4. PVC's (premature ventricular contractions)        Plan:  1) Permanent atrial fibrillation:  - Chronic in nature and asymptomatic.  Rate controlled.  - Continue present medications.     2) Anticoagulation:  - Continue warfarin    3) HTN:  - Well controlled on amlodipine, lisinopril  - Wt loss, exercise, salt " reduction    4) PVCs:   -asymptomatic      Electronically signed by KAVON Juarez, 09/11/23, 10:54 AM EDT.

## 2023-09-20 RX ORDER — PRAVASTATIN SODIUM 20 MG
TABLET ORAL
Qty: 90 TABLET | Refills: 0 | Status: SHIPPED | OUTPATIENT
Start: 2023-09-20

## 2023-09-27 DIAGNOSIS — E11.9 TYPE 2 DIABETES MELLITUS WITHOUT COMPLICATION, WITHOUT LONG-TERM CURRENT USE OF INSULIN: ICD-10-CM

## 2023-09-27 RX ORDER — GLIMEPIRIDE 2 MG/1
TABLET ORAL
Qty: 90 TABLET | Refills: 1 | Status: SHIPPED | OUTPATIENT
Start: 2023-09-27

## 2023-10-02 ENCOUNTER — OFFICE VISIT (OUTPATIENT)
Dept: ENDOCRINOLOGY | Facility: CLINIC | Age: 78
End: 2023-10-02
Payer: MEDICARE

## 2023-10-02 VITALS
DIASTOLIC BLOOD PRESSURE: 64 MMHG | SYSTOLIC BLOOD PRESSURE: 124 MMHG | HEART RATE: 68 BPM | WEIGHT: 243 LBS | BODY MASS INDEX: 32.91 KG/M2 | HEIGHT: 72 IN

## 2023-10-02 DIAGNOSIS — E11.65 TYPE 2 DIABETES MELLITUS WITH HYPERGLYCEMIA, WITHOUT LONG-TERM CURRENT USE OF INSULIN: Primary | ICD-10-CM

## 2023-10-02 DIAGNOSIS — R80.9 TYPE 2 DIABETES MELLITUS WITH MICROALBUMINURIA, WITHOUT LONG-TERM CURRENT USE OF INSULIN: ICD-10-CM

## 2023-10-02 DIAGNOSIS — I10 ESSENTIAL HYPERTENSION: ICD-10-CM

## 2023-10-02 DIAGNOSIS — I48.91 ATRIAL FIBRILLATION, UNSPECIFIED TYPE: ICD-10-CM

## 2023-10-02 DIAGNOSIS — E11.29 TYPE 2 DIABETES MELLITUS WITH MICROALBUMINURIA, WITHOUT LONG-TERM CURRENT USE OF INSULIN: ICD-10-CM

## 2023-10-02 DIAGNOSIS — E78.5 HYPERLIPIDEMIA LDL GOAL <100: ICD-10-CM

## 2023-10-02 LAB
ALBUMIN UR-MCNC: 19.4 MG/DL
CREAT UR-MCNC: 98.7 MG/DL
EXPIRATION DATE: ABNORMAL
EXPIRATION DATE: NORMAL
GLUCOSE BLDC GLUCOMTR-MCNC: 156 MG/DL (ref 70–130)
HBA1C MFR BLD: 6.1 %
Lab: ABNORMAL
Lab: NORMAL
MICROALBUMIN/CREAT UR: 196.6 MG/G

## 2023-10-02 PROCEDURE — 3044F HG A1C LEVEL LT 7.0%: CPT | Performed by: INTERNAL MEDICINE

## 2023-10-02 PROCEDURE — 82570 ASSAY OF URINE CREATININE: CPT | Performed by: INTERNAL MEDICINE

## 2023-10-02 PROCEDURE — 83036 HEMOGLOBIN GLYCOSYLATED A1C: CPT | Performed by: INTERNAL MEDICINE

## 2023-10-02 PROCEDURE — 82043 UR ALBUMIN QUANTITATIVE: CPT | Performed by: INTERNAL MEDICINE

## 2023-10-02 PROCEDURE — 3074F SYST BP LT 130 MM HG: CPT | Performed by: INTERNAL MEDICINE

## 2023-10-02 PROCEDURE — 82947 ASSAY GLUCOSE BLOOD QUANT: CPT | Performed by: INTERNAL MEDICINE

## 2023-10-02 PROCEDURE — 1160F RVW MEDS BY RX/DR IN RCRD: CPT | Performed by: INTERNAL MEDICINE

## 2023-10-02 PROCEDURE — 3078F DIAST BP <80 MM HG: CPT | Performed by: INTERNAL MEDICINE

## 2023-10-02 PROCEDURE — 1159F MED LIST DOCD IN RCRD: CPT | Performed by: INTERNAL MEDICINE

## 2023-10-02 PROCEDURE — 99214 OFFICE O/P EST MOD 30 MIN: CPT | Performed by: INTERNAL MEDICINE

## 2023-10-02 RX ORDER — WARFARIN SODIUM 2.5 MG/1
TABLET ORAL
Qty: 120 TABLET | Refills: 1 | Status: SHIPPED | OUTPATIENT
Start: 2023-10-02

## 2023-10-02 NOTE — PROGRESS NOTES
"     Office Note      Date: 10/02/2023  Patient Name: Tony Wolf  MRN: [id number]  : 1945    Chief Complaint   Patient presents with    Diabetes       History of Present Illness:   Tony Wolf is a 78 y.o. male who presents for Diabetes type 2. Diagnosed in: . Treated in past with oral agents. Current treatments: glimepiride. Number of insulin shots per day: none. Checks blood sugar 1 time a day. Has low blood sugar: no. Aspirin use: No - on warfarin. Statin use: Yes. ACE-I/ARB use: Yes. Changes in health since last visit: skin cancer removed. Last eye exam 2023.     Subjective      Diabetic Complications:  Eyes: No  Kidneys: Yes - microalbuminuria  Feet: Yes - h/o foot ulcer  Heart: No    Diet and Exercise:  Meals per day: 3  Minutes of exercise per week: 0 mins.    Review of Systems:   Review of Systems   Constitutional: Negative.    Cardiovascular: Negative.    Gastrointestinal: Negative.    Endocrine: Negative.      The following portions of the patient's history were reviewed and updated as appropriate: allergies, current medications, past family history, past medical history, past social history, past surgical history, and problem list.    Objective     Visit Vitals  /64   Pulse 68   Ht 182.9 cm (72.01\")   Wt 110 kg (243 lb)   BMI 32.95 kg/m²       Physical Exam:  Physical Exam  Constitutional:       Appearance: Normal appearance.   Neurological:      Mental Status: He is alert.       Labs:    HbA1c  Lab Results   Component Value Date    HGBA1C 6.1 10/02/2023       CMP  Lab Results   Component Value Date    GLUCOSE 115 (H) 2023    BUN 13 2023    CREATININE 1.01 2023    EGFRIFNONA 76 2021    BCR 12.9 2023    K 4.6 2023    CO2 28.0 2023    CALCIUM 9.2 2023    AST 40 2023    ALT 27 2023        Lipid Panel  Lab Results   Component Value Date    CHLPL 172 2016    HDL 30 (L) 2023    LDL 98 2023    TRIG 198 (H) " 06/13/2023        TSH  Lab Results   Component Value Date    TSH 2.460 06/13/2023    FREET4 0.98 06/09/2015        Hemoglobin A1C  Lab Results   Component Value Date    HGBA1C 6.1 10/02/2023        Microalbumin/Creatinine  Lab Results   Component Value Date    DALYBCRERATIO 165.2 05/27/2022    MICROALBUR 19.1 05/27/2022           Assessment / Plan      Assessment & Plan:  Diagnoses and all orders for this visit:    1. Type 2 diabetes mellitus with hyperglycemia, without long-term current use of insulin (Primary)  Assessment & Plan:  Diabetes is unchanged.   Continue current treatment regimen.  Diabetes will be reassessed in 3 months.    Orders:  -     POC Glycosylated Hemoglobin (Hb A1C)  -     POC Glucose, Blood  -     Microalbumin / Creatinine Urine Ratio - Urine, Clean Catch; Future  -     Microalbumin / Creatinine Urine Ratio - Urine, Clean Catch    2. Type 2 diabetes mellitus with microalbuminuria, without long-term current use of insulin  Assessment & Plan:  Continue ACE-I. Check microalbumin today.    Orders:  -     Cancel: Microalbumin / Creatinine Urine Ratio - Urine, Clean Catch; Future  -     Microalbumin / Creatinine Urine Ratio - Urine, Clean Catch; Future  -     Microalbumin / Creatinine Urine Ratio - Urine, Clean Catch    3. Essential hypertension  Assessment & Plan:  Hypertension is unchanged.  Continue current treatment regimen.  Blood pressure will be reassessed at the next regular appointment.    Orders:  -     Microalbumin / Creatinine Urine Ratio - Urine, Clean Catch; Future  -     Microalbumin / Creatinine Urine Ratio - Urine, Clean Catch    4. Hyperlipidemia LDL goal <100  Assessment & Plan:  Continue statin.  Lipids okay last visit.    Orders:  -     Microalbumin / Creatinine Urine Ratio - Urine, Clean Catch; Future  -     Microalbumin / Creatinine Urine Ratio - Urine, Clean Catch      Current Outpatient Medications   Medication Instructions    Accu-Chek Softclix Lancets lancets USE TO TEST  BLOOD SUGAR THREE TIMES A DAY    acetaminophen (TYLENOL) 1,000 mg, Oral, Every 6 Hours PRN    amLODIPine (NORVASC) 10 MG tablet TAKE ONE TABLET BY MOUTH DAILY AS DIRECTED    bimatoprost (Lumigan) 0.01 % ophthalmic drops 1 drop, Both Eyes, Nightly    glimepiride (AMARYL) 2 MG tablet TAKE ONE TABLET BY MOUTH DAILY    glucose blood (Accu-Chek Kandis Plus) test strip USE ONE STRIP TO TEST THREE TIMES A DAY    latanoprost (XALATAN) 0.005 % ophthalmic solution 1 drop, Both Eyes, Nightly    lisinopril (PRINIVIL,ZESTRIL) 40 MG tablet TAKE ONE TABLET BY MOUTH DAILY    Loratadine 10 MG capsule 1 capsule, Oral, Daily    Melatonin 2.5 MG chewable tablet 1 tablet, Oral, Nightly PRN    metoprolol succinate XL (TOPROL-XL) 100 MG 24 hr tablet TAKE ONE TABLET BY MOUTH TWICE A DAY    mirtazapine (REMERON) 45 mg, Oral, Nightly    pravastatin (PRAVACHOL) 20 MG tablet TAKE ONE TABLET BY MOUTH DAILY    venlafaxine XR (EFFEXOR-XR) 75 mg, Oral, Daily    Vitamin D-3 1,000 Units, Oral, Daily    warfarin (COUMADIN) 2.5 MG tablet Take 1 to 1.5 tablets by mouth daily or as directed by the anticoagulation clinic.      Return in about 3 months (around 1/2/2024) for Recheck with A1c.    Brown Gallo MD   10/02/2023

## 2023-10-04 ENCOUNTER — OFFICE VISIT (OUTPATIENT)
Dept: UROLOGY | Facility: CLINIC | Age: 78
End: 2023-10-04
Payer: MEDICARE

## 2023-10-04 VITALS — WEIGHT: 243 LBS | HEIGHT: 72 IN | BODY MASS INDEX: 32.91 KG/M2 | OXYGEN SATURATION: 95 % | HEART RATE: 70 BPM

## 2023-10-04 DIAGNOSIS — R39.9 LOWER URINARY TRACT SYMPTOMS (LUTS): Primary | ICD-10-CM

## 2023-10-04 RX ORDER — METHOCARBAMOL 500 MG/1
TABLET, FILM COATED ORAL
COMMUNITY

## 2023-10-04 RX ORDER — DOXYCYCLINE HYCLATE 100 MG/1
CAPSULE ORAL
COMMUNITY

## 2023-10-04 RX ORDER — NICOTINE POLACRILEX 4 MG/1
GUM, CHEWING ORAL
COMMUNITY

## 2023-10-04 NOTE — PROGRESS NOTES
LUTS Male Office Visit      Patient Name: Tony Wolf  : 1945   MRN: [id number]     Chief Complaint: Lower Urinary Tract Symptoms    Referring Provider: Kay Lopez DO    History of Present Illness: Mr. Wolf is a 78 y.o. male with history of lower urinary tract symptoms.  Medical history significant for hypertension, A-fib, type 2 diabetes, GERD, obstructive sleep apnea.  He presents today for evaluation of lower urinary tract symptoms.  He reports bother associate with storage and voiding urinary symptoms.  IPSS 15.  He denies hematuria, history of urinary tract infection.          Subjective      Review of System: Review of Systems   Genitourinary:  Negative for decreased urine volume, difficulty urinating, dysuria, enuresis, flank pain, frequency, hematuria and urgency.        I have reviewed the ROS documented by my clinical staff, updated appropriate and I agree. Demario Lyn MD    Past Medical History:  Past Medical History:   Diagnosis Date    Acute bronchitis     Anxiety     Arthritis     right knee    Atrial fibrillation     Back pain     Cancer     skin cancer removed from face     Carpal tunnel syndrome     Depression     Derangement, knee internal     Diabetes mellitus     DX'D TYPE II APPROX 15 YEARS AGO, DOES NOT CHECK BLOOD SUGARS AT HOME, STARTED INSULIN IN MARCH AFTER SURGERY CANCELLED FOR ELEVATED A1C    Diabetic foot ulcer     Drug dependence     Dyspnea on exertion 2019    GERD (gastroesophageal reflux disease)     Glaucoma     Heart disease     Hyperlipidemia 2016    Hypertension     Hypertensive disorder     Hypokalemia, replaced 2017    IBS (irritable bowel syndrome)     Knee pain, right     Left ventricular hypertrophy 2016    Leukocytosis, mild, likely reactive 2017    Mixed hyperlipidemia     Neuropathy, lumbosacral (radicular)     Obesity     body mass index 30+-obesity    Osteoporosis     Postoperative urinary retention  06/01/2017    Premature ventricular contractions     PVC's (premature ventricular contractions) 11/14/2016    Rash 03/18/2019    Scoliosis     Screening for prostate cancer 07/28/2016    Sinusitis     Skin cancer of arm, left     Sleep apnea     SOB (shortness of breath)     Spinal stenosis, lumbar region with neurogenic claudication     Thrombocytopenia 05/31/2017    Trigger middle finger of left hand     Trigger middle finger of right hand     Type 2 diabetes mellitus     Wears glasses     readers       Past Surgical History:  Past Surgical History:   Procedure Laterality Date    BACK SURGERY      injections for back     CARPAL TUNNEL RELEASE      CHOLECYSTECTOMY      COLONOSCOPY      LESS THAN 5 YEARS     ENDOSCOPY  08/22/2022    x4    FRACTURE SURGERY      right heel    HERNIA REPAIR      both side inguinal     LUMBAR EPIDURAL INJECTION      ID ARTHRP KNE CONDYLE&PLATU MEDIAL&LAT COMPARTMENTS Right 05/30/2017    Procedure: RIGHT TOTAL KNEE ARTHROPLASTY;  Surgeon: Simon Interiano MD;  Location: [facility];  Service: Orthopedics    TOTAL KNEE ARTHROPLASTY REVISION      left    WRIST SURGERY      carpal tunnel bilat        Medications:    Current Outpatient Medications:     Accu-Chek Softclix Lancets lancets, USE TO TEST BLOOD SUGAR THREE TIMES A DAY, Disp: 100 each, Rfl: 2    acetaminophen (TYLENOL) 500 MG tablet, Take 2 tablets by mouth Every 6 (Six) Hours As Needed for Mild Pain., Disp: , Rfl:     amLODIPine (NORVASC) 10 MG tablet, TAKE ONE TABLET BY MOUTH DAILY AS DIRECTED, Disp: 30 tablet, Rfl: 11    bimatoprost (Lumigan) 0.01 % ophthalmic drops, Administer 1 drop to both eyes Every Night., Disp: , Rfl:     Cholecalciferol (VITAMIN D-3) 1000 UNITS capsule, Take 1,000 Units by mouth Daily., Disp: , Rfl:     doxycycline (VIBRAMYCIN) 100 MG capsule, , Disp: , Rfl:     glimepiride (AMARYL) 2 MG tablet, TAKE ONE TABLET BY MOUTH DAILY, Disp: 90 tablet, Rfl: 1    glucose blood (Accu-Chek Kandis Plus) test strip, USE  ONE STRIP TO TEST THREE TIMES A DAY, Disp: 100 each, Rfl: 11    latanoprost (XALATAN) 0.005 % ophthalmic solution, Administer 1 drop to both eyes Every Night., Disp: , Rfl:     lisinopril (PRINIVIL,ZESTRIL) 40 MG tablet, TAKE ONE TABLET BY MOUTH DAILY, Disp: 90 tablet, Rfl: 1    Loratadine 10 MG capsule, Take 1 capsule by mouth Daily., Disp: , Rfl:     Melatonin 2.5 MG chewable tablet, Chew 1 tablet At Night As Needed., Disp: , Rfl:     methocarbamol (ROBAXIN) 500 MG tablet, Take 2 tablets 4 times a day by oral route., Disp: , Rfl:     metoprolol succinate XL (TOPROL-XL) 100 MG 24 hr tablet, TAKE ONE TABLET BY MOUTH TWICE A DAY, Disp: 180 tablet, Rfl: 3    mirtazapine (REMERON) 45 MG tablet, Take 1 tablet by mouth Every Night., Disp: , Rfl:     Omeprazole 20 MG tablet delayed-release, , Disp: , Rfl:     pravastatin (PRAVACHOL) 20 MG tablet, TAKE ONE TABLET BY MOUTH DAILY, Disp: 90 tablet, Rfl: 0    venlafaxine XR (EFFEXOR-XR) 75 MG 24 hr capsule, Take 1 capsule by mouth Daily., Disp: , Rfl:     warfarin (COUMADIN) 2.5 MG tablet, Take 1 to 1.5 tablets by mouth daily or as directed by the anticoagulation clinic., Disp: 120 tablet, Rfl: 1    Allergies:  Allergies   Allergen Reactions    Aspirin Anaphylaxis     Other reaction(s): Hypotension       Social History:  Social History     Socioeconomic History    Marital status:    Tobacco Use    Smoking status: Never     Passive exposure: Never    Smokeless tobacco: Never   Vaping Use    Vaping Use: Never used   Substance and Sexual Activity    Alcohol use: No    Drug use: No    Sexual activity: Defer       Family History:  Family History   Problem Relation Age of Onset    Cancer Other     Anemia Other     Heart attack Other     Cancer Mother     Heart disease Mother     Hypertension Mother     Heart attack Mother     Diabetes Mother     Deep vein thrombosis Father     Heart disease Father     Hypertension Father     Cancer Other        IPSS Questionnaire  "(AUA-7):  Over the past month…    1)  Incomplete Emptying  How often have you had a sensation of not emptying your bladder?  4 - More than half the time   2)  Frequency  How often have you had to urinate less than every two hours? 3 - About half the time   3)  Intermittency  How often have you found you stopped and started again several times when you urinated?  0 - Not at all   4) Urgency  How often have you found it difficult to postpone urination?  4 - More than half the time   5) Weak Stream  How often have you had a weak urinary stream?  0 - Not at all   6) Straining  How often have you had to push or strain to begin urination?  0 - Not at all   7) Nocturia  How many times did you typically get up at night to urinate?  4 - 4 times   Total Score:  15       Quality of life due to urinary symptoms:  If you were to spend the rest of your life with your urinary condition the way it is now, how would you feel about that? 5-Unhappy   Urine Leakage (Incontinence) 0-No Leakage           Objective     Physical Exam:     Vital Signs:   Vitals:    10/04/23 0952   Pulse: 70   SpO2: 95%   Weight: 110 kg (243 lb)   Height: 182.9 cm (72.01\")   PainSc: 0-No pain     Body mass index is 32.95 kg/m².     Physical Exam  Vitals and nursing note reviewed.   Constitutional:       Appearance: Normal appearance.   HENT:      Head: Normocephalic and atraumatic.   Cardiovascular:      Comments: Well perfused  Pulmonary:      Effort: Pulmonary effort is normal.   Abdominal:      General: Abdomen is flat.      Palpations: Abdomen is soft.   Musculoskeletal:         General: Normal range of motion.   Skin:     General: Skin is warm and dry.   Neurological:      General: No focal deficit present.      Mental Status: He is alert and oriented to person, place, and time. Mental status is at baseline.   Psychiatric:         Mood and Affect: Mood normal.         Behavior: Behavior normal.         Thought Content: Thought content normal.         " Judgment: Judgment normal.             Labs:   Lab Results   Component Value Date    PSA 1.740 01/13/2023    PSA 1.030 06/07/2021    PSA 0.626 10/16/2019       Brief Urine Lab Results  (Last result in the past 365 days)        Color   Clarity   Blood   Leuk Est   Nitrite   Protein   CREAT   Urine HCG        10/02/23 1157             98.7                      Lab Results   Component Value Date    GLUCOSE 115 (H) 06/13/2023    CALCIUM 9.2 06/13/2023     06/13/2023    K 4.6 06/13/2023    CO2 28.0 06/13/2023     06/13/2023    BUN 13 06/13/2023    CREATININE 1.01 06/13/2023    EGFRIFNONA 76 12/08/2021    BCR 12.9 06/13/2023    ANIONGAP 10.0 06/13/2023       Lab Results   Component Value Date    WBC 7.80 06/13/2023    HGB 17.9 (H) 06/13/2023    HCT 52.1 (H) 06/13/2023    MCV 92.2 06/13/2023     06/13/2023       Images:   No Images in the past 120 days found..    Measures:   Tobacco:   Tony Wolf  reports that he has never smoked. He has never been exposed to tobacco smoke. He has never used smokeless tobacco.. I have educated him on the risk of diseases from using tobacco products.    Assessment / Plan      Assessment:  Mr. Wolf is a 78 y.o. male who presents with lower urinary tract symptoms. He reports bother associate with storage and voiding urinary symptoms. IPSS today is 15.   Diagnoses and all orders for this visit:    1. Lower urinary tract symptoms (LUTS) (Primary)           Patient Education:     Today we discussed the etiology and management of lower urinary tract symptoms.  We discussed work-up including history and physical exam, symptom questionnaire, PVR. We discussed benign growth of the prostate as men age.  We discussed this occurs mostly in the transition zone of the prostate.  We discussed there is both an increase the in the amount of prostatic stroma as well as the number of alpha-1 receptors in the prostate.  We discussed that as the prostate grows there can be increased  bladder outlet resistance.  We discussed this results in increased smooth muscle muscle tone which can cause long-term changes to the bladder.  We discussed the presentation and symptoms including decreased force of stream, hesitancy, intermittent stream, incomplete emptying, frequency, urgency, incontinence, nocturia. We discussed that his lower urinary tract symptoms are both storage and voiding symptoms.  We discussed that if the symptoms are prolonged the bladder may be decompensated resulting in absent or ineffective contractions.  We discussed that the severity of symptoms do not always correlate with the exact prostate size.  Discussed that the severity of urinary symptoms do not correlate with the degree of bladder outlet obstruction.  We discussed the indication for operative intervention includes hematuria, recurrent urinary tract infection, damage to the kidney and change in renal function, acute urinary retention.  We discussed that the patient does not have any of these for indications symptom bother may direct desire for operative intervention.    We discussed management strategies including conservative measures to improve symptoms.  We discussed the importance of decreasing caffeinated and irritative beverages, increasing fluid hydration.  We discussed the role that obstipation can play in urinary symptoms and the importance of a bowel regimen.  We discussed medical management and medical options to improve urinary symptoms.  We discussed alpha-blocker, risks and benefits of this medication.  We discussed 5 alpha reductase inhibitor, risks and benefits of this medication.     We discussed cystoscopy, transrectal ultrasound of the prostate for volume.  We discussed that cystoscopy will allow evaluation of the urethra, prostatic architecture/anatomy, health of the bladder.  We discussed transrectal ultrasound of the prostate for volume will allow us to measure the exact size of the prostate.  We  discussed the importance of these work-ups in identifying and tailoring a specific treatment strategy to the patient.     We discussed that based upon the work-up described above we will further decide an appropriate treatment plan.  We discussed that these procedures will help us identify whether p.o. medical management versus procedure for bladder outlet obstruction is warranted.    He is understanding and agreeable with plan of care.    Follow Up:   Return in about 2 weeks (around 10/18/2023) for Recheck.    I spent approximately 45 minutes providing clinical care for this patient; including review of patient's chart and provider documentation, face to face time spent with patient in examination room (obtaining history, performing physical exam, discussing diagnosis and management options), placing orders, and completing patient documentation.     Demario Lyn MD  [facility] [place]

## 2023-10-07 PROBLEM — R39.9 LOWER URINARY TRACT SYMPTOMS (LUTS): Status: ACTIVE | Noted: 2023-10-07

## 2023-10-09 ENCOUNTER — ANTICOAGULATION VISIT (OUTPATIENT)
Dept: PHARMACY | Facility: HOSPITAL | Age: 78
End: 2023-10-09
Payer: MEDICARE

## 2023-10-09 DIAGNOSIS — I48.20 CHRONIC ATRIAL FIBRILLATION: Primary | ICD-10-CM

## 2023-10-09 LAB
INR PPP: 2.5 (ref 0.91–1.09)
PROTHROMBIN TIME: 30.3 SECONDS (ref 10–13.8)

## 2023-10-09 PROCEDURE — 36416 COLLJ CAPILLARY BLOOD SPEC: CPT

## 2023-10-09 PROCEDURE — 85610 PROTHROMBIN TIME: CPT

## 2023-10-09 PROCEDURE — G0463 HOSPITAL OUTPT CLINIC VISIT: HCPCS | Performed by: PHARMACIST

## 2023-10-09 NOTE — PROGRESS NOTES
Anticoagulation Clinic Progress Note  Indication: atrial fibrillation  Referring Provider: rSavani [last appt 9/8/23  next:  9/16/24 (Will Sue)]  Initial Warfarin Start Date: 2008  Goal INR: 2 - 3  Current Drug Interactions: fluoxetine, glimepiride, melatonin (PRN), mirtazepine, MVI  CHADS-VASc: 3 (age, HTN, DM)    EtOH: none  GLV: Salad (Spinach salad 1x weekly or garden salad) and serving of broccoli once a week 3/1/2022  OTC Pain Relief: Uses APAP prn (~1x/week)    Anticoagulation Clinic INR History:  Date 8/20 9/4 10/2 11/6 11/15 11/29 12/31 1/7/19 1/17 1/29 2/12   Total Weekly Dose 17.5  mg 17.5 mg 17.5 mg 10mg 20mg 17.5mg 17.5mg 18.75 mg 17.5 mg 18.75 mg 20mg   INR 2.3 2.3 2.2 1.3 2.3 2.2 1.7 1.9 1.9 1.9 2.0   Notes    pre- epidural   missed dose?         Date 2/27 3/27 4/10 4/23 5/7 5/28 6/25 7/30 8/28 9/25 10/23   Total Weekly Dose 20mg 20mg 21.25 mg 21.25 mg 21.25 mg 21.25mg 21.25mg 21.25mg 21.25mg 21.25mg 21.25mg   INR 2.2 1.8 2.5 1.8 2.4 2.4 2.5 2.7 2.6 3.0 2.9   Notes sick   post- scope            Date  11/6 11/13 12/4 1/3 1/15 1/20 1/31 2/10 2/28 4/14 5/26   Total Weekly Dose 21.25 mg 18.75 mg 21.25 mg 21.25 mg 21.25 mg 20 mg 21.25 21.25 20mg 21.25mg 21.25mg   INR 4.2 3.2 2.7 2.2 3.1 3.1 2.9 3.5 2.4 2.3 2.5   Notes apap Hold x1, less GLV Inc in GLV  doxy doxy  1x dose cephalexin 1x dose dec; keflex       Date  6/24 7/22 8/24 9/10 10/8 11/5 11/18 12/2 12/30 1/26 2/22 3/22 4/19   Total Weekly Dose 21.25mg 21.25 21.25mg 21.25mg 21.25mg 21.25mg 20mg 20mg 20mg 20mg  20mg 20 mg 20mg   INR 2.4 2.4 3.0 2.8 3.0 3.1 2.5 2.5 2.5 2.6 2.9  2 2.2   Notes      Inc. GLV Dose dec    desvenlafaxine broccoli      Date 5/13 6/16  7/7 7/21 8/11 9/1 9/29 10/27 11/3 12/1 12/9 12/16   Total WeeklyDose 20 mg 20mg  20 mg 18.75mg  17.5 mg 17.5mg 17.5 17.5mg 17.5mg 17.5mg 18.75mg 22.5mg   INR 2.5 3.2 3.1 3.0 2.2 2.4 2.2 2.4 2.3 1.5 1.6 2.2   Notes  apap Inc GLV     augmentin day 3 augementin glucerna  Boost x2     Date  12/28 1/13/22 2/8 2/16 3/1 3/22 4/12 5/10 6/7 7/5 8/2    Total WeeklyDose 21.25mg 21.25mg 21.25 mg 21.25mg 22.5mg 22.5 mg 22.5mg 22.5 mg 22.5 mg 22.5mg 22.5mg EGD x 3 days hold   INR 2.6 2.3 1.7 2.1 2.5 2.0 2.3 2.4 2.1 2.1 2.8    Notes    Dec GLV        Boost x2     Date 8/25 9/8 9/29 10/27 12/1 1/5/23 2/2 3/2 3/30 4/14 4/20 5/18   Total WeeklyDose 18.75mg 22.5mg 22.5 mg 22.5 mg 22.5 mg 22.5mg 22.5 mg 22.5 mg 22.5 mg 22.5mg 22.5 mg  22.5 mg   INR 2.7 2.7 2.5 2.2 2.3 2.3 2.7 3.0 2.5 2.6 2.6 3.0   Notes omeprazole initi         doxy Doxy       Date 7/12 8/9 9/11 10/9        Total Weekly Dose 22.5 mg 22.5 mg 22.5mg 22.5 mg        INR 2.7 2.8 2.5 2.5        Notes                Clinic Interview:  Verbal Release Authorization signed on 6/27/18 -- may speak with Sussy (wife), Nikolai (son)  Tablet Strength: pt has 2.5mg tablets  Phone: [phone] (Home), [phone]  Fax: [phone] (Attn: Tasha) [facility]      Patient Findings  Negatives: Signs/symptoms of thrombosis, Signs/symptoms of bleeding, Laboratory test error suspected, Change in health, Change in alcohol use, Change in activity, Upcoming invasive procedure, Emergency department visit, Upcoming dental procedure, Missed doses, Extra doses, Change in medications, Change in diet/appetite, Hospital admission, Bruising, Other complaints   Comments: He had a spot removed on his ear a couple of weeks ago.  Bandaging with vinegar and water.       Plan:   1. INR is therapeutic today at 2.5 (goal 2.0-3.0). Instructed Mr. Wolf to continue warfarin 3.75mg daily oral daily except 2.5mg TueThurSun until recheck.  2. RTC in 4 weeks, 11/6.  3. Verbal and written information was provided to Mr. Wolf and Mrs. Wolf in clinic. Mr. Wolf voiced understanding with teach back and has no further questions at this time.     Simon Real, PharmD  10/9/2023  11:13 EDT

## 2023-10-16 ENCOUNTER — TELEPHONE (OUTPATIENT)
Dept: ENDOCRINOLOGY | Facility: CLINIC | Age: 78
End: 2023-10-16

## 2023-10-16 NOTE — TELEPHONE ENCOUNTER
Caller: Tony Wolf    Relationship to patient: Self    Best call back number:[phone]    Patient is needing: PT HAS SPOT ON TOE. PT HAS PODIATRIST APPT TOMORROW. PT WOULD LIKE TO KNOW IF HE SHOULD MAKE APPT WITH DR. SHEETS OR HIS PCP? PLEASE FOLLOW UP WITH PT. THANKS!

## 2023-10-16 NOTE — TELEPHONE ENCOUNTER
Spoke with patient.  He states he has redness, swelling and heat in right great toe for the last week.  He feels like it is infected.  He has an appointment tomorrow with Podiatry but wants to know if he should be seen here or PCP as well.   [facility]

## 2023-11-01 ENCOUNTER — PROCEDURE VISIT (OUTPATIENT)
Dept: UROLOGY | Facility: CLINIC | Age: 78
End: 2023-11-01
Payer: MEDICARE

## 2023-11-01 VITALS — BODY MASS INDEX: 32.91 KG/M2 | WEIGHT: 243 LBS | HEIGHT: 72 IN

## 2023-11-01 DIAGNOSIS — R39.9 LOWER URINARY TRACT SYMPTOMS (LUTS): Primary | ICD-10-CM

## 2023-11-01 RX ORDER — AMLODIPINE BESYLATE 10 MG/1
TABLET ORAL
Qty: 90 TABLET | Refills: 3 | Status: SHIPPED | OUTPATIENT
Start: 2023-11-01

## 2023-11-01 NOTE — PROGRESS NOTES
Preprocedure diagnosis:  LUTS    Postprocedure diagnosis:  LUTS    Procedure:  Diagnostic Cystourethroscopy  Transrectal Ultrasound of the Prostate for Volume    Attending surgeon:  Demario Lyn MD    Anesthesia:  2% lidocaine jelly intraurethrally    Complications:  None    Indications:  78 y.o. male undergoing a diagnostic cystoscopy and transrectal ultrasound of the prostate for volume for the above mentioned indications.  Informed consent was obtained.      Findings:  Cystoscopic findings normal pendulous, bulbar, membranous urethra.  Within the prostatic urethra there was lateral lobe coaptation, with no median lobe.   Within the urinary bladder formal cystoscopy was performed with normal bladder mucosa and no tumors, masses or stones.  Mild trabeculation of the bladder.  Right and left ureteral orifice were identified and in the normal orthotopic position.     The dimensions of the prostate were measured for a calculated volume of 30 g.      Procedure:  The patient was placed in supine position and prepped and draped in sterile fashion with lidocaine jelly instill per the urethra for anesthesia 5 minutes prior to procedure start.  A brief timeout was performed including available nursing staff and the patient.  The 16 Fr digital flexible cystoscope was lubricated and gently advanced into the urethral meatus. The penile, bulbar, and membranous urethra appeared widely patent without obvious stricture or mucosal abnormality. Within the prostatic urethra there was lateral lobe coaptation, with no median lobe. The cytoscope was then advanced into the bladder. The bladder was completely visualized starting with the trigone. There were bilateral orthotopic ureteral orifices. The posterior wall, lateral walls, anterior wall, and dome were completely visualized WITHOUT obvious mucosal lesions, tumors, stones.  Mild trabeculation of the bladder.  The cystoscope was retroflexed and the bladder neck and prostate were  further visualized. The cystoscope was then gently withdrawn while visualizing the urethra and the procedure was then terminated.  The patient tolerated the procedure well.      A digital rectal exam was performed.The rectal ultrasound probe was atraumatically introduced into the rectum.  The prostate and seminal vesicles were inspected systematically using axial and sagittal views with the ultrasound. The ultrasound prove was then removed.     Uroflow:  Average flow rate 0.5 mL/s  Maximum flow rate 2.7 mL/s  Voided time 35 seconds      Follow Up: Today we have discussed findings of cystoscopy and TRUS for volume.  We have discussed obstructive uroflow pattern.  He was provided educational material regarding potential minimally invasive procedural options.  We will follow-up in 2 to 3 weeks for further discussion of management.

## 2023-11-06 ENCOUNTER — ANTICOAGULATION VISIT (OUTPATIENT)
Dept: PHARMACY | Facility: HOSPITAL | Age: 78
End: 2023-11-06
Payer: MEDICARE

## 2023-11-06 DIAGNOSIS — I48.20 CHRONIC ATRIAL FIBRILLATION: Primary | ICD-10-CM

## 2023-11-06 LAB
INR PPP: 2.3 (ref 0.91–1.09)
PROTHROMBIN TIME: 27.4 SECONDS (ref 10–13.8)

## 2023-11-06 PROCEDURE — 85610 PROTHROMBIN TIME: CPT

## 2023-11-06 PROCEDURE — G0463 HOSPITAL OUTPT CLINIC VISIT: HCPCS

## 2023-11-06 PROCEDURE — 36416 COLLJ CAPILLARY BLOOD SPEC: CPT

## 2023-11-06 NOTE — PROGRESS NOTES
Anticoagulation Clinic Progress Note  Indication: atrial fibrillation  Referring Provider: Sravani [last appt 9/8/23  next:  9/16/24 (Will Sue)]  Initial Warfarin Start Date: 2008  Goal INR: 2 - 3  Current Drug Interactions: fluoxetine, glimepiride, melatonin (PRN), mirtazepine, MVI  CHADS-VASc: 3 (age, HTN, DM)    EtOH: none  GLV: Salad (Spinach salad 1x weekly or garden salad) and serving of broccoli once a week 3/1/2022  OTC Pain Relief: Uses APAP prn (~1x/week)    Anticoagulation Clinic INR History:  Date 8/20 9/4 10/2 11/6 11/15 11/29 12/31 1/7/19 1/17 1/29 2/12   Total Weekly Dose 17.5  mg 17.5 mg 17.5 mg 10mg 20mg 17.5mg 17.5mg 18.75 mg 17.5 mg 18.75 mg 20mg   INR 2.3 2.3 2.2 1.3 2.3 2.2 1.7 1.9 1.9 1.9 2.0   Notes    pre- epidural   missed dose?         Date 2/27 3/27 4/10 4/23 5/7 5/28 6/25 7/30 8/28 9/25 10/23   Total Weekly Dose 20mg 20mg 21.25 mg 21.25 mg 21.25 mg 21.25mg 21.25mg 21.25mg 21.25mg 21.25mg 21.25mg   INR 2.2 1.8 2.5 1.8 2.4 2.4 2.5 2.7 2.6 3.0 2.9   Notes sick   post- scope            Date  11/6 11/13 12/4 1/3 1/15 1/20 1/31 2/10 2/28 4/14 5/26   Total Weekly Dose 21.25 mg 18.75 mg 21.25 mg 21.25 mg 21.25 mg 20 mg 21.25 21.25 20mg 21.25mg 21.25mg   INR 4.2 3.2 2.7 2.2 3.1 3.1 2.9 3.5 2.4 2.3 2.5   Notes apap Hold x1, less GLV Inc in GLV  doxy doxy  1x dose cephalexin 1x dose dec; keflex       Date  6/24 7/22 8/24 9/10 10/8 11/5 11/18 12/2 12/30 1/26 2/22 3/22 4/19   Total Weekly Dose 21.25mg 21.25 21.25mg 21.25mg 21.25mg 21.25mg 20mg 20mg 20mg 20mg  20mg 20 mg 20mg   INR 2.4 2.4 3.0 2.8 3.0 3.1 2.5 2.5 2.5 2.6 2.9  2 2.2   Notes      Inc. GLV Dose dec    desvenlafaxine broccoli      Date 5/13 6/16  7/7 7/21 8/11 9/1 9/29 10/27 11/3 12/1 12/9 12/16   Total WeeklyDose 20 mg 20mg  20 mg 18.75mg  17.5 mg 17.5mg 17.5 17.5mg 17.5mg 17.5mg 18.75mg 22.5mg   INR 2.5 3.2 3.1 3.0 2.2 2.4 2.2 2.4 2.3 1.5 1.6 2.2   Notes  apap Inc GLV     augmentin day 3 augementin glucerna  Boost x2     Date  12/28 1/13/22 2/8 2/16 3/1 3/22 4/12 5/10 6/7 7/5 8/2    Total WeeklyDose 21.25mg 21.25mg 21.25 mg 21.25mg 22.5mg 22.5 mg 22.5mg 22.5 mg 22.5 mg 22.5mg 22.5mg EGD x 3 days hold   INR 2.6 2.3 1.7 2.1 2.5 2.0 2.3 2.4 2.1 2.1 2.8    Notes    Dec GLV        Boost x2     Date 8/25 9/8 9/29 10/27 12/1 1/5/23 2/2 3/2 3/30 4/14 4/20 5/18   Total WeeklyDose 18.75mg 22.5mg 22.5 mg 22.5 mg 22.5 mg 22.5mg 22.5 mg 22.5 mg 22.5 mg 22.5mg 22.5 mg  22.5 mg   INR 2.7 2.7 2.5 2.2 2.3 2.3 2.7 3.0 2.5 2.6 2.6 3.0   Notes omeprazole initi         doxy Doxy       Date 7/12 8/9 9/11 10/9 11/6       Total Weekly Dose 22.5 mg 22.5 mg 22.5mg 22.5 mg 22.5mg       INR 2.7 2.8 2.5 2.5 2.3       Notes                Clinic Interview:  Verbal Release Authorization signed on 6/27/18 -- may speak with Sussy (wife), Nikolai (son)  Tablet Strength: pt has 2.5mg tablets  Phone: [phone] (Home), [phone]  Fax: [phone] (Attn: Tasha) [facility]      Patient Findings      Negatives: Signs/symptoms of thrombosis, Signs/symptoms of bleeding, Laboratory test error suspected, Change in health, Change in alcohol use, Change in activity, Upcoming invasive procedure, Emergency department visit, Upcoming dental procedure, Missed doses, Extra doses, Change in medications, Change in diet/appetite, Hospital admission, Bruising, Other complaints   Comments: Had urology procedure, did not hold warfarin. May have and additional procedure, will keep clinic informed     Plan:   1. INR is therapeutic today at 2.3 (goal 2.0-3.0). Instructed Mr. Wolf to continue warfarin 3.75mg daily oral daily except 2.5mg TueThurSun until recheck.  2. RTC in 4 weeks, 12/4  3. Verbal and written information was provided to Mr. Wolf and Mrs. Wolf in clinic. Mr. Wolf voiced understanding with teach back and has no further questions at this time.     Teena Boykin PharmD.  11/06/23   11:24 EST

## 2023-11-14 ENCOUNTER — TELEPHONE (OUTPATIENT)
Dept: PHARMACY | Facility: HOSPITAL | Age: 78
End: 2023-11-14

## 2023-11-14 ENCOUNTER — OFFICE VISIT (OUTPATIENT)
Dept: UROLOGY | Facility: CLINIC | Age: 78
End: 2023-11-14
Payer: MEDICARE

## 2023-11-14 VITALS — WEIGHT: 243 LBS | BODY MASS INDEX: 32.91 KG/M2 | OXYGEN SATURATION: 92 % | HEART RATE: 62 BPM | HEIGHT: 72 IN

## 2023-11-14 DIAGNOSIS — R39.9 LOWER URINARY TRACT SYMPTOMS (LUTS): Primary | ICD-10-CM

## 2023-11-14 DIAGNOSIS — N32.81 OAB (OVERACTIVE BLADDER): ICD-10-CM

## 2023-11-14 NOTE — TELEPHONE ENCOUNTER
Patient called to inform clinic of medication change to myrbetriq 25mg once daily.   After speaking with Teena CarboneD about the medication change she instructed patient to retest in two weeks 11/28/23.     Herbie Abernathy [facility]  11/14/23

## 2023-11-14 NOTE — PROGRESS NOTES
Follow Up Office Visit      Patient Name: Tony Wolf  : 1945   MRN: [id number]     Chief Complaint:    Chief Complaint   Patient presents with    Surgicial discussion       History of Present Illness: Tony Wolf is a 78 y.o. male who presents today for follow up for continue discussion of management of lower urinary tract symptoms.  He has undergone recent anatomic evaluation with cystoscopy and TRUS for volume.  IPSS 27.    Subjective      Review of System: Review of Systems   Genitourinary:  Negative for decreased urine volume, difficulty urinating, dysuria, enuresis, flank pain, frequency, hematuria and urgency.      I have reviewed the ROS documented by my clinical staff, updated as appropriate and I agree. Demario Lyn MD    I have reviewed and the following portions of the patient's history were updated as appropriate: past family history, past medical history, past social history, past surgical history and problem list.    Medications:     Current Outpatient Medications:     Accu-Chek Softclix Lancets lancets, USE TO TEST BLOOD SUGAR THREE TIMES A DAY, Disp: 100 each, Rfl: 2    acetaminophen (TYLENOL) 500 MG tablet, Take 2 tablets by mouth Every 6 (Six) Hours As Needed for Mild Pain., Disp: , Rfl:     amLODIPine (NORVASC) 10 MG tablet, TAKE ONE TABLET BY MOUTH DAILY AS DIRECTED, Disp: 90 tablet, Rfl: 3    bimatoprost (Lumigan) 0.01 % ophthalmic drops, Administer 1 drop to both eyes Every Night., Disp: , Rfl:     Cholecalciferol (VITAMIN D-3) 1000 UNITS capsule, Take 1,000 Units by mouth Daily., Disp: , Rfl:     glimepiride (AMARYL) 2 MG tablet, TAKE ONE TABLET BY MOUTH DAILY, Disp: 90 tablet, Rfl: 1    glucose blood (Accu-Chek Kandis Plus) test strip, USE ONE STRIP TO TEST THREE TIMES A DAY, Disp: 100 each, Rfl: 11    latanoprost (XALATAN) 0.005 % ophthalmic solution, Administer 1 drop to both eyes Every Night., Disp: , Rfl:     lisinopril (PRINIVIL,ZESTRIL) 40 MG tablet, TAKE ONE  TABLET BY MOUTH DAILY, Disp: 90 tablet, Rfl: 1    Loratadine 10 MG capsule, Take 1 capsule by mouth Daily., Disp: , Rfl:     Melatonin 2.5 MG chewable tablet, Chew 1 tablet At Night As Needed., Disp: , Rfl:     methocarbamol (ROBAXIN) 500 MG tablet, Take 2 tablets 4 times a day by oral route., Disp: , Rfl:     metoprolol succinate XL (TOPROL-XL) 100 MG 24 hr tablet, TAKE ONE TABLET BY MOUTH TWICE A DAY, Disp: 180 tablet, Rfl: 3    mirtazapine (REMERON) 45 MG tablet, Take 1 tablet by mouth Every Night., Disp: , Rfl:     Omeprazole 20 MG tablet delayed-release, , Disp: , Rfl:     pravastatin (PRAVACHOL) 20 MG tablet, TAKE ONE TABLET BY MOUTH DAILY, Disp: 90 tablet, Rfl: 0    venlafaxine XR (EFFEXOR-XR) 75 MG 24 hr capsule, Take 1 capsule by mouth Daily., Disp: , Rfl:     warfarin (COUMADIN) 2.5 MG tablet, Take 1 to 1.5 tablets by mouth daily or as directed by the anticoagulation clinic., Disp: 120 tablet, Rfl: 1    Mirabegron ER (Myrbetriq) 25 MG tablet sustained-release 24 hour 24 hr tablet, Take 1 tablet by mouth Daily., Disp: 30 tablet, Rfl: 0    Allergies:   Allergies   Allergen Reactions    Aspirin Anaphylaxis     Other reaction(s): Hypotension       IPSS Questionnaire (AUA-7):  Over the past month…    1)  Incomplete Emptying  How often have you had a sensation of not emptying your bladder?  3 - About half the time   2)  Frequency  How often have you had to urinate less than every two hours? 4 - More than half the time   3)  Intermittency  How often have you found you stopped and started again several times when you urinated?  4 - More than half the time   4) Urgency  How often have you found it difficult to postpone urination?  4 - More than half the time   5) Weak Stream  How often have you had a weak urinary stream?  4 - More than half the time   6) Straining  How often have you had to push or strain to begin urination?  4 - More than half the time   7) Nocturia  How many times did you typically get up at  "night to urinate?  3 - 3 times   Total Score:  27       Quality of life due to urinary symptoms:  If you were to spend the rest of your life with your urinary condition the way it is now, how would you feel about that? 4-Mostly Dissatisfied   Urine Leakage (Incontinence) 0-No Leakage       Objective     Physical Exam:   Vital Signs:   Vitals:    11/14/23 1044   Pulse: 62   SpO2: 92%   Weight: 110 kg (243 lb)  Comment: Patient didn't want to update his weight   Height: 182.9 cm (72.01\")   PainSc: 0-No pain     Body mass index is 32.95 kg/m².     Physical Exam  Vitals and nursing note reviewed.   Constitutional:       Appearance: Normal appearance.   HENT:      Head: Normocephalic and atraumatic.   Cardiovascular:      Comments: Well perfused  Pulmonary:      Effort: Pulmonary effort is normal.   Abdominal:      General: Abdomen is flat.      Palpations: Abdomen is soft.   Musculoskeletal:         General: Normal range of motion.   Skin:     General: Skin is warm and dry.   Neurological:      General: No focal deficit present.      Mental Status: He is alert and oriented to person, place, and time. Mental status is at baseline.   Psychiatric:         Mood and Affect: Mood normal.         Behavior: Behavior normal.         Thought Content: Thought content normal.         Judgment: Judgment normal.             Labs:   Brief Urine Lab Results  (Last result in the past 365 days)        Color   Clarity   Blood   Leuk Est   Nitrite   Protein   CREAT   Urine HCG        10/02/23 1157             98.7                      Lab Results   Component Value Date    GLUCOSE 115 (H) 06/13/2023    CALCIUM 9.2 06/13/2023     06/13/2023    K 4.6 06/13/2023    CO2 28.0 06/13/2023     06/13/2023    BUN 13 06/13/2023    CREATININE 1.01 06/13/2023    EGFRIFNONA 76 12/08/2021    BCR 12.9 06/13/2023    ANIONGAP 10.0 06/13/2023       Lab Results   Component Value Date    WBC 7.80 06/13/2023    HGB 17.9 (H) 06/13/2023    HCT 52.1 (H) " 06/13/2023    MCV 92.2 06/13/2023     06/13/2023       Images:   No Images in the past 120 days found..    Measures:   Tobacco:   Tony Wolf  reports that he has never smoked. He has never been exposed to tobacco smoke. He has never used smokeless tobacco.. I have educated him on the risk of diseases from using tobacco products.     Assessment / Plan      Assessment/Plan:   78 y.o. male is seen today for follow up for continued discussion regarding management of lower urinary tract symptoms.  Recent cystoscopy has demonstrated lateral lobe coaptation, no median lobe, prostate volume 30 g on TRUS.  Primary bother her symptoms are storage based symptoms.  We have discussed, provided education regarding potential bladder outlet procedure.  We have discussed risk and benefits given age and additional medical conditions.  We have discussed the multifactorial nature to symptoms.  We have discussed trial of beta 3 agonist, management for OAB.  He would like to consider desire for intervention, will trial p.o. medication, will follow-up in 2 to 3 months for symptom check, further discussion of management.    Diagnoses and all orders for this visit:    1. Lower urinary tract symptoms (LUTS) (Primary)    2. OAB (overactive bladder)  -     Mirabegron ER (Myrbetriq) 25 MG tablet sustained-release 24 hour 24 hr tablet; Take 1 tablet by mouth Daily.  Dispense: 30 tablet; Refill: 0         Follow Up:   No follow-ups on file.     I spent approximately 30 minutes providing clinical care for this patient; including review of patient's chart and provider documentation, face to face time spent with patient in examination room (obtaining history, performing physical exam, discussing diagnosis and management options), placing orders, and completing patient documentation.     Demario Lyn MD  [facility] [place]

## 2023-11-18 PROBLEM — N32.81 OAB (OVERACTIVE BLADDER): Status: ACTIVE | Noted: 2023-11-18

## 2023-11-28 ENCOUNTER — APPOINTMENT (OUTPATIENT)
Dept: PHARMACY | Facility: HOSPITAL | Age: 78
End: 2023-11-28
Payer: MEDICARE

## 2023-11-30 ENCOUNTER — ANTICOAGULATION VISIT (OUTPATIENT)
Dept: PHARMACY | Facility: HOSPITAL | Age: 78
End: 2023-11-30
Payer: MEDICARE

## 2023-11-30 DIAGNOSIS — I48.20 CHRONIC ATRIAL FIBRILLATION: Primary | ICD-10-CM

## 2023-11-30 LAB
INR PPP: 3.5 (ref 0.91–1.09)
PROTHROMBIN TIME: 42.4 SECONDS (ref 10–13.8)

## 2023-11-30 PROCEDURE — 85610 PROTHROMBIN TIME: CPT

## 2023-11-30 PROCEDURE — 36416 COLLJ CAPILLARY BLOOD SPEC: CPT

## 2023-11-30 PROCEDURE — G0463 HOSPITAL OUTPT CLINIC VISIT: HCPCS

## 2023-11-30 NOTE — PROGRESS NOTES
Anticoagulation Clinic Progress Note  Indication: atrial fibrillation  Referring Provider: Sravani [last appt 9/8/23  next:  9/16/24 (Will Sue)]  Initial Warfarin Start Date: 2008  Goal INR: 2 - 3  Current Drug Interactions: fluoxetine, glimepiride, melatonin (PRN), mirtazepine, MVI  CHADS-VASc: 3 (age, HTN, DM)    EtOH: none  GLV: Salad (Spinach salad 1x weekly or garden salad) and serving of broccoli once a week 3/1/2022  OTC Pain Relief: Uses APAP prn (~1x/week)    Anticoagulation Clinic INR History:  Date 8/20 9/4 10/2 11/6 11/15 11/29 12/31 1/7/19 1/17 1/29 2/12   Total Weekly Dose 17.5  mg 17.5 mg 17.5 mg 10mg 20mg 17.5mg 17.5mg 18.75 mg 17.5 mg 18.75 mg 20mg   INR 2.3 2.3 2.2 1.3 2.3 2.2 1.7 1.9 1.9 1.9 2.0   Notes    pre- epidural   missed dose?         Date 2/27 3/27 4/10 4/23 5/7 5/28 6/25 7/30 8/28 9/25 10/23   Total Weekly Dose 20mg 20mg 21.25 mg 21.25 mg 21.25 mg 21.25mg 21.25mg 21.25mg 21.25mg 21.25mg 21.25mg   INR 2.2 1.8 2.5 1.8 2.4 2.4 2.5 2.7 2.6 3.0 2.9   Notes sick   post- scope            Date  11/6 11/13 12/4 1/3 1/15 1/20 1/31 2/10 2/28 4/14 5/26   Total Weekly Dose 21.25 mg 18.75 mg 21.25 mg 21.25 mg 21.25 mg 20 mg 21.25 21.25 20mg 21.25mg 21.25mg   INR 4.2 3.2 2.7 2.2 3.1 3.1 2.9 3.5 2.4 2.3 2.5   Notes apap Hold x1, less GLV Inc in GLV  doxy doxy  1x dose cephalexin 1x dose dec; keflex       Date  6/24 7/22 8/24 9/10 10/8 11/5 11/18 12/2 12/30 1/26 2/22 3/22 4/19   Total Weekly Dose 21.25mg 21.25 21.25mg 21.25mg 21.25mg 21.25mg 20mg 20mg 20mg 20mg  20mg 20 mg 20mg   INR 2.4 2.4 3.0 2.8 3.0 3.1 2.5 2.5 2.5 2.6 2.9  2 2.2   Notes      Inc. GLV Dose dec    desvenlafaxine broccoli      Date 5/13 6/16  7/7 7/21 8/11 9/1 9/29 10/27 11/3 12/1 12/9 12/16   Total WeeklyDose 20 mg 20mg  20 mg 18.75mg  17.5 mg 17.5mg 17.5 17.5mg 17.5mg 17.5mg 18.75mg 22.5mg   INR 2.5 3.2 3.1 3.0 2.2 2.4 2.2 2.4 2.3 1.5 1.6 2.2   Notes  apap Inc GLV     augmentin day 3 augementin glucerna  Boost x2     Date  12/28 1/13/22 2/8 2/16 3/1 3/22 4/12 5/10 6/7 7/5 8/2    Total WeeklyDose 21.25mg 21.25mg 21.25 mg 21.25mg 22.5mg 22.5 mg 22.5mg 22.5 mg 22.5 mg 22.5mg 22.5mg EGD x 3 days hold   INR 2.6 2.3 1.7 2.1 2.5 2.0 2.3 2.4 2.1 2.1 2.8    Notes    Dec GLV        Boost x2     Date 8/25 9/8 9/29 10/27 12/1 1/5/23 2/2 3/2 3/30 4/14 4/20 5/18   Total WeeklyDose 18.75mg 22.5mg 22.5 mg 22.5 mg 22.5 mg 22.5mg 22.5 mg 22.5 mg 22.5 mg 22.5mg 22.5 mg  22.5 mg   INR 2.7 2.7 2.5 2.2 2.3 2.3 2.7 3.0 2.5 2.6 2.6 3.0   Notes omeprazole initi         doxy Doxy       Date 7/12 8/9 9/11 10/9 11/6 11/30      Total Weekly Dose 22.5 mg 22.5 mg 22.5mg 22.5 mg 22.5mg 22.5 mg      INR 2.7 2.8 2.5 2.5 2.3 3.5      Notes                Clinic Interview:  Verbal Release Authorization signed on 6/27/18 -- may speak with Sussy (wife), Nikolai (son)  Tablet Strength: pt has 2.5mg tablets  Phone: [phone] (Home), [phone]  Fax: [phone] (Attn: Tasha) [facility] [place]      Patient Findings  Positives: Change in medications, Change in diet/appetite   Negatives: Signs/symptoms of thrombosis, Signs/symptoms of bleeding, Laboratory test error suspected, Change in health, Change in alcohol use, Change in activity, Upcoming invasive procedure, Emergency department visit, Upcoming dental procedure, Missed doses, Extra doses, Hospital admission, Bruising, Other complaints   Comments: Had broccoli and salad this past week, typically only has a serving of one in a week. Patient and spouse having a difficult week due to multiple plumbing issues and a broken refrigerator. Patient diet not exact same as normal due to malfunctioning refrigerator but per spouse shouldn't be too different. Patient still taking Myrbetric since 11/14 (see telephone encounter), discussed there should be no DDI with warfarin.       Plan:   1. INR is SUPRAtherapeutic today at 3.5 (goal 2.0-3.0). First time out of range since 2/2022. Instructed Mr. Wolf to take a decreased  dose of warfarin 1.25 mg today, and then tomorrow resume historical dose of warfarin 3.75mg daily oral daily except 2.5mg TueThurSun until recheck.  2. RTC in ~1.5 weeks, 12/12 per patient preference. May need to adjust dose down if INR remains elevated, did not today given nearly 2 year history of therapeutic INR on 22.5 mg weekly dose.  3. Verbal and written information was provided to Mr. Wolf and Mrs. Wolf in clinic. Mr. Wolf voiced understanding with teach back and has no further questions at this time.     Kerwin Perez, PharmD, BCPS  11/30/2023  11:05 EST

## 2023-12-04 ENCOUNTER — APPOINTMENT (OUTPATIENT)
Dept: PHARMACY | Facility: HOSPITAL | Age: 78
End: 2023-12-04
Payer: MEDICARE

## 2023-12-07 ENCOUNTER — LAB (OUTPATIENT)
Dept: LAB | Facility: HOSPITAL | Age: 78
End: 2023-12-07
Payer: MEDICARE

## 2023-12-07 ENCOUNTER — OFFICE VISIT (OUTPATIENT)
Dept: INTERNAL MEDICINE | Facility: CLINIC | Age: 78
End: 2023-12-07
Payer: MEDICARE

## 2023-12-07 VITALS
HEIGHT: 72 IN | SYSTOLIC BLOOD PRESSURE: 132 MMHG | OXYGEN SATURATION: 95 % | BODY MASS INDEX: 32.4 KG/M2 | TEMPERATURE: 97.6 F | DIASTOLIC BLOOD PRESSURE: 82 MMHG | HEART RATE: 64 BPM | WEIGHT: 239.2 LBS

## 2023-12-07 DIAGNOSIS — E78.5 HYPERLIPIDEMIA LDL GOAL <100: ICD-10-CM

## 2023-12-07 DIAGNOSIS — F41.1 GENERALIZED ANXIETY DISORDER: ICD-10-CM

## 2023-12-07 DIAGNOSIS — I10 ESSENTIAL HYPERTENSION: ICD-10-CM

## 2023-12-07 DIAGNOSIS — I48.20 CHRONIC ATRIAL FIBRILLATION: ICD-10-CM

## 2023-12-07 DIAGNOSIS — E78.5 HYPERLIPIDEMIA LDL GOAL <100: Primary | ICD-10-CM

## 2023-12-07 LAB
ALBUMIN SERPL-MCNC: 4.2 G/DL (ref 3.5–5.2)
ALBUMIN/GLOB SERPL: 1.8 G/DL
ALP SERPL-CCNC: 66 U/L (ref 39–117)
ALT SERPL W P-5'-P-CCNC: 28 U/L (ref 1–41)
ANION GAP SERPL CALCULATED.3IONS-SCNC: 14 MMOL/L (ref 5–15)
AST SERPL-CCNC: 39 U/L (ref 1–40)
BASOPHILS # BLD AUTO: 0.06 10*3/MM3 (ref 0–0.2)
BASOPHILS NFR BLD AUTO: 0.7 % (ref 0–1.5)
BILIRUB SERPL-MCNC: 0.8 MG/DL (ref 0–1.2)
BUN SERPL-MCNC: 14 MG/DL (ref 8–23)
BUN/CREAT SERPL: 13.2 (ref 7–25)
CALCIUM SPEC-SCNC: 9.6 MG/DL (ref 8.6–10.5)
CHLORIDE SERPL-SCNC: 107 MMOL/L (ref 98–107)
CHOLEST SERPL-MCNC: 176 MG/DL (ref 0–200)
CO2 SERPL-SCNC: 24 MMOL/L (ref 22–29)
CREAT SERPL-MCNC: 1.06 MG/DL (ref 0.76–1.27)
DEPRECATED RDW RBC AUTO: 49.2 FL (ref 37–54)
EGFRCR SERPLBLD CKD-EPI 2021: 72.7 ML/MIN/1.73
EOSINOPHIL # BLD AUTO: 0.2 10*3/MM3 (ref 0–0.4)
EOSINOPHIL NFR BLD AUTO: 2.3 % (ref 0.3–6.2)
ERYTHROCYTE [DISTWIDTH] IN BLOOD BY AUTOMATED COUNT: 14.1 % (ref 12.3–15.4)
GLOBULIN UR ELPH-MCNC: 2.3 GM/DL
GLUCOSE SERPL-MCNC: 84 MG/DL (ref 65–99)
HCT VFR BLD AUTO: 54.6 % (ref 37.5–51)
HDLC SERPL-MCNC: 33 MG/DL (ref 40–60)
HGB BLD-MCNC: 18.2 G/DL (ref 13–17.7)
IMM GRANULOCYTES # BLD AUTO: 0.02 10*3/MM3 (ref 0–0.05)
IMM GRANULOCYTES NFR BLD AUTO: 0.2 % (ref 0–0.5)
LDLC SERPL CALC-MCNC: 114 MG/DL (ref 0–100)
LDLC/HDLC SERPL: 3.35 {RATIO}
LYMPHOCYTES # BLD AUTO: 2.81 10*3/MM3 (ref 0.7–3.1)
LYMPHOCYTES NFR BLD AUTO: 32.4 % (ref 19.6–45.3)
MCH RBC QN AUTO: 31.9 PG (ref 26.6–33)
MCHC RBC AUTO-ENTMCNC: 33.3 G/DL (ref 31.5–35.7)
MCV RBC AUTO: 95.8 FL (ref 79–97)
MONOCYTES # BLD AUTO: 0.89 10*3/MM3 (ref 0.1–0.9)
MONOCYTES NFR BLD AUTO: 10.3 % (ref 5–12)
NEUTROPHILS NFR BLD AUTO: 4.69 10*3/MM3 (ref 1.7–7)
NEUTROPHILS NFR BLD AUTO: 54.1 % (ref 42.7–76)
NRBC BLD AUTO-RTO: 0.1 /100 WBC (ref 0–0.2)
PLATELET # BLD AUTO: 154 10*3/MM3 (ref 140–450)
PMV BLD AUTO: 12.1 FL (ref 6–12)
POTASSIUM SERPL-SCNC: 4.2 MMOL/L (ref 3.5–5.2)
PROT SERPL-MCNC: 6.5 G/DL (ref 6–8.5)
RBC # BLD AUTO: 5.7 10*6/MM3 (ref 4.14–5.8)
SODIUM SERPL-SCNC: 145 MMOL/L (ref 136–145)
TRIGL SERPL-MCNC: 162 MG/DL (ref 0–150)
VLDLC SERPL-MCNC: 29 MG/DL (ref 5–40)
WBC NRBC COR # BLD AUTO: 8.67 10*3/MM3 (ref 3.4–10.8)

## 2023-12-07 PROCEDURE — 80061 LIPID PANEL: CPT

## 2023-12-07 PROCEDURE — 1160F RVW MEDS BY RX/DR IN RCRD: CPT | Performed by: INTERNAL MEDICINE

## 2023-12-07 PROCEDURE — 3079F DIAST BP 80-89 MM HG: CPT | Performed by: INTERNAL MEDICINE

## 2023-12-07 PROCEDURE — 1159F MED LIST DOCD IN RCRD: CPT | Performed by: INTERNAL MEDICINE

## 2023-12-07 PROCEDURE — 80053 COMPREHEN METABOLIC PANEL: CPT

## 2023-12-07 PROCEDURE — 85025 COMPLETE CBC W/AUTO DIFF WBC: CPT

## 2023-12-07 PROCEDURE — 3075F SYST BP GE 130 - 139MM HG: CPT | Performed by: INTERNAL MEDICINE

## 2023-12-07 PROCEDURE — 99214 OFFICE O/P EST MOD 30 MIN: CPT | Performed by: INTERNAL MEDICINE

## 2023-12-07 NOTE — PROGRESS NOTES
"Subjective   Tony Wolf is a 78 y.o. male.   Chief Complaint   Patient presents with    Hypertension    Hyperlipidemia    Anxiety    Atrial Fibrillation       History of Present Illness   Here for f/u on chronic conditions: HTN, HLP, chronic a fib, and anxiety. HTN controlled with Lisinopril, Toprol, and norvasc. HLP controlled with Pravastatin. A fib controlled with Coumadin. Anxiety controlled with Effexor. Doing well. No CP or SOA.   The following portions of the patient's history were reviewed and updated as appropriate: allergies, current medications, past family history, past medical history, past social history, past surgical history, and problem list.    Review of Systems   Constitutional:  Negative for fatigue and fever.   Respiratory:  Negative for cough and shortness of breath.    Cardiovascular:  Negative for chest pain and leg swelling.   Gastrointestinal:  Negative for abdominal pain.   Neurological:  Negative for weakness and numbness.   Psychiatric/Behavioral:  Negative for behavioral problems.      /82   Pulse 64   Temp 97.6 °F (36.4 °C)   Ht 182.9 cm (72.01\")   Wt 109 kg (239 lb 3.2 oz)   SpO2 95%   BMI 32.43 kg/m²     Objective   Physical Exam  Vitals reviewed.   Cardiovascular:      Rate and Rhythm: Normal rate and regular rhythm.   Pulmonary:      Effort: No respiratory distress.      Breath sounds: No wheezing.   Neurological:      Mental Status: He is alert and oriented to person, place, and time.       Assessment & Plan   Diagnoses and all orders for this visit:    1. Hyperlipidemia LDL goal <100 (Primary)  -     Lipid Panel; Future  -     Comprehensive Metabolic Panel; Future  Continue Pravastatin 20 mg po qhs  2. Essential hypertension  -     Lipid Panel; Future  -     Comprehensive Metabolic Panel; Future  Continue Lisinopril 40 mg po qd, Norvasc 10 mg po qd, and Toprol  mg po qd  3. Chronic atrial fibrillation  Continue Coumadin 2.5 mg po qd  4. Generalized anxiety " disorder  Continue Effexor 75 mg po qd

## 2023-12-11 ENCOUNTER — TELEPHONE (OUTPATIENT)
Dept: INTERNAL MEDICINE | Facility: CLINIC | Age: 78
End: 2023-12-11
Payer: MEDICARE

## 2023-12-11 DIAGNOSIS — R71.8 ELEVATED HEMATOCRIT: Primary | ICD-10-CM

## 2023-12-11 NOTE — TELEPHONE ENCOUNTER
Spoke with patient who had questions about the referral. Informed patient that  had placed the referral and that once approved by insurance. The referral coordinator would reach out to get him on the schedule, patient verbalized understanding.

## 2023-12-11 NOTE — TELEPHONE ENCOUNTER
----- Message from Kay Lopez DO sent at 12/11/2023  7:33 AM EST -----  Hemoglobin and hematocrit are increased. I want him to see hematology. Does he have a preference on who he see?

## 2023-12-11 NOTE — TELEPHONE ENCOUNTER
PATIENT RETURNED CALL FROM [facility], PATIENT STATES HE WOULD LIKE TO SEE A hematology IN THIS AREA. PATIENT WOULD ALSO LIKE A CALL BACK.  PATIENTS CALL BACK NUMBER [phone]

## 2023-12-12 ENCOUNTER — ANTICOAGULATION VISIT (OUTPATIENT)
Dept: PHARMACY | Facility: HOSPITAL | Age: 78
End: 2023-12-12
Payer: MEDICARE

## 2023-12-12 DIAGNOSIS — I48.20 CHRONIC ATRIAL FIBRILLATION: Primary | ICD-10-CM

## 2023-12-12 LAB
INR PPP: 3.2 (ref 0.91–1.09)
PROTHROMBIN TIME: 38.9 SECONDS (ref 10–13.8)

## 2023-12-12 PROCEDURE — 36416 COLLJ CAPILLARY BLOOD SPEC: CPT

## 2023-12-12 PROCEDURE — 85610 PROTHROMBIN TIME: CPT

## 2023-12-12 PROCEDURE — G0463 HOSPITAL OUTPT CLINIC VISIT: HCPCS

## 2023-12-12 NOTE — PROGRESS NOTES
Anticoagulation Clinic Progress Note  Indication: atrial fibrillation  Referring Provider: Sravani [last appt 9/8/23  next:  9/16/24 (Will Sue)]  Initial Warfarin Start Date: 2008  Goal INR: 2 - 3  Current Drug Interactions: fluoxetine, glimepiride, melatonin (PRN), mirtazepine, MVI  CHADS-VASc: 3 (age, HTN, DM)    EtOH: none  GLV: Salad (Spinach salad 1x weekly or garden salad) and serving of broccoli once a week 3/1/2022  OTC Pain Relief: Uses APAP prn (~1x/week)    Anticoagulation Clinic INR History:  Date 8/20 9/4 10/2 11/6 11/15 11/29 12/31 1/7/19 1/17 1/29 2/12   Total Weekly Dose 17.5  mg 17.5 mg 17.5 mg 10mg 20mg 17.5mg 17.5mg 18.75 mg 17.5 mg 18.75 mg 20mg   INR 2.3 2.3 2.2 1.3 2.3 2.2 1.7 1.9 1.9 1.9 2.0   Notes    pre- epidural   missed dose?         Date 2/27 3/27 4/10 4/23 5/7 5/28 6/25 7/30 8/28 9/25 10/23   Total Weekly Dose 20mg 20mg 21.25 mg 21.25 mg 21.25 mg 21.25mg 21.25mg 21.25mg 21.25mg 21.25mg 21.25mg   INR 2.2 1.8 2.5 1.8 2.4 2.4 2.5 2.7 2.6 3.0 2.9   Notes sick   post- scope            Date  11/6 11/13 12/4 1/3 1/15 1/20 1/31 2/10 2/28 4/14 5/26   Total Weekly Dose 21.25 mg 18.75 mg 21.25 mg 21.25 mg 21.25 mg 20 mg 21.25 21.25 20mg 21.25mg 21.25mg   INR 4.2 3.2 2.7 2.2 3.1 3.1 2.9 3.5 2.4 2.3 2.5   Notes apap Hold x1, less GLV Inc in GLV  doxy doxy  1x dose cephalexin 1x dose dec; keflex       Date  6/24 7/22 8/24 9/10 10/8 11/5 11/18 12/2 12/30 1/26 2/22 3/22 4/19   Total Weekly Dose 21.25mg 21.25 21.25mg 21.25mg 21.25mg 21.25mg 20mg 20mg 20mg 20mg  20mg 20 mg 20mg   INR 2.4 2.4 3.0 2.8 3.0 3.1 2.5 2.5 2.5 2.6 2.9  2 2.2   Notes      Inc. GLV Dose dec    desvenlafaxine broccoli      Date 5/13 6/16  7/7 7/21 8/11 9/1 9/29 10/27 11/3 12/1 12/9 12/16   Total WeeklyDose 20 mg 20mg  20 mg 18.75mg  17.5 mg 17.5mg 17.5 17.5mg 17.5mg 17.5mg 18.75mg 22.5mg   INR 2.5 3.2 3.1 3.0 2.2 2.4 2.2 2.4 2.3 1.5 1.6 2.2   Notes  apap Inc GLV     augmentin day 3 augementin glucerna  Boost x2     Date  12/28 1/13/22 2/8 2/16 3/1 3/22 4/12 5/10 6/7 7/5 8/2    Total WeeklyDose 21.25mg 21.25mg 21.25 mg 21.25mg 22.5mg 22.5 mg 22.5mg 22.5 mg 22.5 mg 22.5mg 22.5mg EGD x 3 days hold   INR 2.6 2.3 1.7 2.1 2.5 2.0 2.3 2.4 2.1 2.1 2.8    Notes    Dec GLV        Boost x2     Date 8/25 9/8 9/29 10/27 12/1 1/5/23 2/2 3/2 3/30 4/14 4/20 5/18   Total WeeklyDose 18.75mg 22.5mg 22.5 mg 22.5 mg 22.5 mg 22.5mg 22.5 mg 22.5 mg 22.5 mg 22.5mg 22.5 mg  22.5 mg   INR 2.7 2.7 2.5 2.2 2.3 2.3 2.7 3.0 2.5 2.6 2.6 3.0   Notes omeprazole initi         doxy Doxy       Date 7/12 8/9 9/11 10/9 11/6 11/30 12/12     Total Weekly Dose 22.5 mg 22.5 mg 22.5mg 22.5 mg 22.5mg 22.5 mg 22.5mg     INR 2.7 2.8 2.5 2.5 2.3 3.5 3.2     Notes              Clinic Interview:  Verbal Release Authorization signed on 6/27/18 -- may speak with Sussy (wife), Nikolai (son)  Tablet Strength: pt has 2.5mg tablets  Phone: [phone] (Home), [phone]  Fax: [phone] (Attn: Tasha) \[facility] [place] [facility]\"" [place]    Patient Findings  Negatives: Signs/symptoms of thrombosis, Signs/symptoms of bleeding, Laboratory test error suspected, Change in health, Change in alcohol use, Change in activity, Upcoming invasive procedure, Emergency department visit, Upcoming dental procedure, Missed doses, Extra doses, Change in medications, Change in diet/appetite, Hospital admission, Bruising, Other complaints   Comments: Patient's wife was out of town and patient ate more fast food. He did have spinach salad last night.     Plan:   1. INR is SUPRAtherapeutic today at 3.5 (goal 2.0-3.0). Second time out of range since 2/2022. Instructed Mr. Wolf to decrease dose to 2.5mg daily except 3.75mg MWF.  2. RTC in ~2 weeks.  3. Verbal and written information was provided to Mr. Wolf and Mrs. Wolf in clinic. Mr. Wolf voiced understanding with teach back and has no further questions at this time.     Ama Mccloud, PharmD  12/12/2023  11:38 EST     Simple / Intermediate / Complex Repair - Final Wound Length In Cm: 3.3

## 2023-12-15 RX ORDER — LISINOPRIL 40 MG/1
40 TABLET ORAL DAILY
Qty: 90 TABLET | Refills: 2 | Status: SHIPPED | OUTPATIENT
Start: 2023-12-15

## 2023-12-16 RX ORDER — PRAVASTATIN SODIUM 20 MG
20 TABLET ORAL DAILY
Qty: 90 TABLET | Refills: 0 | Status: SHIPPED | OUTPATIENT
Start: 2023-12-16

## 2023-12-21 ENCOUNTER — ANTICOAGULATION VISIT (OUTPATIENT)
Dept: PHARMACY | Facility: HOSPITAL | Age: 78
End: 2023-12-21
Payer: MEDICARE

## 2023-12-21 DIAGNOSIS — I48.20 CHRONIC ATRIAL FIBRILLATION: Primary | ICD-10-CM

## 2023-12-21 LAB
INR PPP: 2.4 (ref 0.91–1.09)
PROTHROMBIN TIME: 28.6 SECONDS (ref 10–13.8)

## 2023-12-21 PROCEDURE — G0463 HOSPITAL OUTPT CLINIC VISIT: HCPCS

## 2023-12-21 PROCEDURE — 36416 COLLJ CAPILLARY BLOOD SPEC: CPT

## 2023-12-21 PROCEDURE — 85610 PROTHROMBIN TIME: CPT

## 2023-12-21 NOTE — PROGRESS NOTES
Anticoagulation Clinic Progress Note  Indication: atrial fibrillation  Referring Provider: Sravani [last appt 9/8/23  next:  9/16/24 (Will Sue)]  Initial Warfarin Start Date: 2008  Goal INR: 2 - 3  Current Drug Interactions: fluoxetine, glimepiride, melatonin (PRN), mirtazepine, MVI  CHADS-VASc: 3 (age, HTN, DM)    EtOH: none  GLV: Salad (Spinach salad 1x weekly or garden salad) and serving of broccoli once a week 3/1/2022  OTC Pain Relief: Uses APAP prn (~1x/week)    Anticoagulation Clinic INR History:  Date 8/20 9/4 10/2 11/6 11/15 11/29 12/31 1/7/19 1/17 1/29 2/12   Total Weekly Dose 17.5  mg 17.5 mg 17.5 mg 10mg 20mg 17.5mg 17.5mg 18.75 mg 17.5 mg 18.75 mg 20mg   INR 2.3 2.3 2.2 1.3 2.3 2.2 1.7 1.9 1.9 1.9 2.0   Notes    pre- epidural   missed dose?         Date 2/27 3/27 4/10 4/23 5/7 5/28 6/25 7/30 8/28 9/25 10/23   Total Weekly Dose 20mg 20mg 21.25 mg 21.25 mg 21.25 mg 21.25mg 21.25mg 21.25mg 21.25mg 21.25mg 21.25mg   INR 2.2 1.8 2.5 1.8 2.4 2.4 2.5 2.7 2.6 3.0 2.9   Notes sick   post- scope            Date  11/6 11/13 12/4 1/3 1/15 1/20 1/31 2/10 2/28 4/14 5/26   Total Weekly Dose 21.25 mg 18.75 mg 21.25 mg 21.25 mg 21.25 mg 20 mg 21.25 21.25 20mg 21.25mg 21.25mg   INR 4.2 3.2 2.7 2.2 3.1 3.1 2.9 3.5 2.4 2.3 2.5   Notes apap Hold x1, less GLV Inc in GLV  doxy doxy  1x dose cephalexin 1x dose dec; keflex       Date  6/24 7/22 8/24 9/10 10/8 11/5 11/18 12/2 12/30 1/26 2/22 3/22 4/19   Total Weekly Dose 21.25mg 21.25 21.25mg 21.25mg 21.25mg 21.25mg 20mg 20mg 20mg 20mg  20mg 20 mg 20mg   INR 2.4 2.4 3.0 2.8 3.0 3.1 2.5 2.5 2.5 2.6 2.9  2 2.2   Notes      Inc. GLV Dose dec    desvenlafaxine broccoli      Date 5/13 6/16  7/7 7/21 8/11 9/1 9/29 10/27 11/3 12/1 12/9 12/16   Total WeeklyDose 20 mg 20mg  20 mg 18.75mg  17.5 mg 17.5mg 17.5 17.5mg 17.5mg 17.5mg 18.75mg 22.5mg   INR 2.5 3.2 3.1 3.0 2.2 2.4 2.2 2.4 2.3 1.5 1.6 2.2   Notes  apap Inc GLV     augmentin day 3 augementin glucerna  Boost x2     Date  12/28 1/13/22 2/8 2/16 3/1 3/22 4/12 5/10 6/7 7/5 8/2    Total WeeklyDose 21.25mg 21.25mg 21.25 mg 21.25mg 22.5mg 22.5 mg 22.5mg 22.5 mg 22.5 mg 22.5mg 22.5mg EGD x 3 days hold   INR 2.6 2.3 1.7 2.1 2.5 2.0 2.3 2.4 2.1 2.1 2.8    Notes    Dec GLV        Boost x2     Date 8/25 9/8 9/29 10/27 12/1 1/5/23 2/2 3/2 3/30 4/14 4/20 5/18   Total WeeklyDose 18.75mg 22.5mg 22.5 mg 22.5 mg 22.5 mg 22.5mg 22.5 mg 22.5 mg 22.5 mg 22.5mg 22.5 mg  22.5 mg   INR 2.7 2.7 2.5 2.2 2.3 2.3 2.7 3.0 2.5 2.6 2.6 3.0   Notes omeprazole initi         doxy Doxy       Date 7/12 8/9 9/11 10/9 11/6 11/30 12/12 12/21    Total Weekly Dose 22.5 mg 22.5 mg 22.5mg 22.5 mg 22.5mg 22.5 mg 22.5mg 21.25 mg    INR 2.7 2.8 2.5 2.5 2.3 3.5 3.2 2.4    Notes              Clinic Interview:  Verbal Release Authorization signed on 6/27/18 -- may speak with Sussy (wife), Nikolai (son)  Tablet Strength: pt has 2.5mg tablets  Phone: [phone] (Home), [phone]  Fax: [phone] (Attn: Tasha) [facility] [place]    Patient Findings  Negatives: Signs/symptoms of thrombosis, Signs/symptoms of bleeding, Laboratory test error suspected, Change in health, Change in alcohol use, Change in activity, Upcoming invasive procedure, Emergency department visit, Upcoming dental procedure, Missed doses, Extra doses, Change in medications, Change in diet/appetite, Hospital admission, Bruising, Other complaints   Comments: Diet more back to normal now with a functioning fridge. No other changes.     Plan:   1. INR is therapeutic today at 2.4 (goal 2.0-3.0). Instructed Mr. Wolf to continue recently decreased dose of warfarin 2.5mg daily except 3.75mg MWF.  2. RTC in 2 weeks to ensure WNL. Higher potential INR may be low given patient was stable on 22.5 mg weekly dose for nearly 2 years until their refrigerator malfunctioned at the end of last month.  3. Verbal and written information was provided to Mr. Wolf and Mrs. Wolf in clinic. Mr. Wolf voiced understanding  with teach back and has no further questions at this time.    Kerwin Perez, PharmD  12/21/2023  11:21 EST

## 2024-01-02 ENCOUNTER — OFFICE VISIT (OUTPATIENT)
Dept: UROLOGY | Facility: CLINIC | Age: 79
End: 2024-01-02
Payer: MEDICARE

## 2024-01-02 VITALS — HEART RATE: 62 BPM | OXYGEN SATURATION: 95 % | HEIGHT: 72 IN | BODY MASS INDEX: 32.51 KG/M2 | WEIGHT: 240 LBS

## 2024-01-02 DIAGNOSIS — R39.9 LOWER URINARY TRACT SYMPTOMS (LUTS): ICD-10-CM

## 2024-01-02 DIAGNOSIS — N32.81 OAB (OVERACTIVE BLADDER): Primary | ICD-10-CM

## 2024-01-02 NOTE — PROGRESS NOTES
Follow Up Office Visit      Patient Name: Tony Wolf  : 1945   MRN: [id number]     Chief Complaint:    Chief Complaint   Patient presents with    Lower urinary tract symptoms (LUTS)       History of Present Illness: Tony Wolf is a 78 y.o. male who presents today for follow up continue discussion of management of lower urinary tract symptoms/primary irritative based urinary symptoms with frequency and urgency.  He has undergone prior anatomic evaluation.  We have trialed beta 3 agonist, he reports mild improvement in symptoms.  IPSS 17.    Subjective      Review of System: Review of Systems   Genitourinary:  Negative for decreased urine volume, difficulty urinating, dysuria, enuresis, flank pain, frequency, hematuria and urgency.      I have reviewed the ROS documented by my clinical staff, updated as appropriate and I agree. Demario Lyn MD    I have reviewed and the following portions of the patient's history were updated as appropriate: past family history, past medical history, past social history, past surgical history and problem list.    Medications:     Current Outpatient Medications:     Accu-Chek Softclix Lancets lancets, USE TO TEST BLOOD SUGAR THREE TIMES A DAY, Disp: 100 each, Rfl: 2    acetaminophen (TYLENOL) 500 MG tablet, Take 2 tablets by mouth Every 6 (Six) Hours As Needed for Mild Pain., Disp: , Rfl:     amLODIPine (NORVASC) 10 MG tablet, TAKE ONE TABLET BY MOUTH DAILY AS DIRECTED, Disp: 90 tablet, Rfl: 3    bimatoprost (Lumigan) 0.01 % ophthalmic drops, Administer 1 drop to both eyes Every Night., Disp: , Rfl:     Cholecalciferol (VITAMIN D-3) 1000 UNITS capsule, Take 1,000 Units by mouth Daily., Disp: , Rfl:     glimepiride (AMARYL) 2 MG tablet, TAKE ONE TABLET BY MOUTH DAILY, Disp: 90 tablet, Rfl: 1    glucose blood (Accu-Chek Kandis Plus) test strip, USE ONE STRIP TO TEST THREE TIMES A DAY, Disp: 100 each, Rfl: 11    latanoprost (XALATAN) 0.005 % ophthalmic solution,  Administer 1 drop to both eyes Every Night., Disp: , Rfl:     lisinopril (PRINIVIL,ZESTRIL) 40 MG tablet, TAKE 1 TABLET BY MOUTH DAILY, Disp: 90 tablet, Rfl: 2    Loratadine 10 MG capsule, Take 1 capsule by mouth Daily., Disp: , Rfl:     Melatonin 2.5 MG chewable tablet, Chew 1 tablet At Night As Needed., Disp: , Rfl:     metoprolol succinate XL (TOPROL-XL) 100 MG 24 hr tablet, TAKE ONE TABLET BY MOUTH TWICE A DAY, Disp: 180 tablet, Rfl: 3    mirtazapine (REMERON) 45 MG tablet, Take 1 tablet by mouth Every Night., Disp: , Rfl:     pravastatin (PRAVACHOL) 20 MG tablet, TAKE 1 TABLET BY MOUTH DAILY, Disp: 90 tablet, Rfl: 0    venlafaxine XR (EFFEXOR-XR) 75 MG 24 hr capsule, Take 1 capsule by mouth Daily., Disp: , Rfl:     warfarin (COUMADIN) 2.5 MG tablet, Take 1 to 1.5 tablets by mouth daily or as directed by the anticoagulation clinic., Disp: 120 tablet, Rfl: 1    Mirabegron ER (Myrbetriq) 25 MG tablet sustained-release 24 hour 24 hr tablet, Take 2 tablets by mouth Daily., Disp: 30 tablet, Rfl: 0    Allergies:   Allergies   Allergen Reactions    Aspirin Anaphylaxis     Other reaction(s): Hypotension       IPSS Questionnaire (AUA-7):  Over the past month…    1)  Incomplete Emptying  How often have you had a sensation of not emptying your bladder?  4 - More than half the time   2)  Frequency  How often have you had to urinate less than every two hours? 4 - More than half the time   3)  Intermittency  How often have you found you stopped and started again several times when you urinated?  3 - About half the time   4) Urgency  How often have you found it difficult to postpone urination?  3 - About half the time   5) Weak Stream  How often have you had a weak urinary stream?  0 - Not at all   6) Straining  How often have you had to push or strain to begin urination?  0 - Not at all   7) Nocturia  How many times did you typically get up at night to urinate?  3 - 3 times   Total Score:  17       Quality of life due to  "urinary symptoms:  If you were to spend the rest of your life with your urinary condition the way it is now, how would you feel about that? 4-Mostly Dissatisfied   Urine Leakage (Incontinence) 0-No Leakage         Objective     Physical Exam:   Vital Signs:   Vitals:    01/02/24 1001   Pulse: 62   SpO2: 95%   Weight: 109 kg (240 lb)   Height: 182.9 cm (72.01\")   PainSc: 0-No pain     Body mass index is 32.54 kg/m².     Physical Exam  Vitals and nursing note reviewed.   Constitutional:       Appearance: Normal appearance.   HENT:      Head: Normocephalic and atraumatic.   Cardiovascular:      Comments: Well perfused  Pulmonary:      Effort: Pulmonary effort is normal.   Abdominal:      General: Abdomen is flat.      Palpations: Abdomen is soft.   Musculoskeletal:         General: Normal range of motion.   Skin:     General: Skin is warm and dry.   Neurological:      General: No focal deficit present.      Mental Status: He is alert and oriented to person, place, and time. Mental status is at baseline.   Psychiatric:         Mood and Affect: Mood normal.         Behavior: Behavior normal.         Thought Content: Thought content normal.         Judgment: Judgment normal.             Labs:   Brief Urine Lab Results  (Last result in the past 365 days)        Color   Clarity   Blood   Leuk Est   Nitrite   Protein   CREAT   Urine HCG        10/02/23 1157             98.7                      Lab Results   Component Value Date    GLUCOSE 84 12/07/2023    CALCIUM 9.6 12/07/2023     12/07/2023    K 4.2 12/07/2023    CO2 24.0 12/07/2023     12/07/2023    BUN 14 12/07/2023    CREATININE 1.06 12/07/2023    EGFRIFNONA 76 12/08/2021    BCR 13.2 12/07/2023    ANIONGAP 14.0 12/07/2023       Lab Results   Component Value Date    WBC 8.67 12/07/2023    HGB 18.2 (H) 12/07/2023    HCT 54.6 (H) 12/07/2023    MCV 95.8 12/07/2023     12/07/2023       Images:   No Images in the past 120 days found..    Measures: "   Tobacco:   Tony Wolf  reports that he has never smoked. He has never been exposed to tobacco smoke. He has never used smokeless tobacco.. I have educated him on the risk of diseases from using tobacco products.   Assessment / Plan      Assessment/Plan:   78 y.o. male is seen today for follow up continue discussion of management of lower urinary tract symptoms/primary OAB related symptoms.  He has previously undergone anatomic evaluation.  We have discussed management of OAB related symptoms with conservative therapies, p.o. medical management.  He has had mild improvement in Myrbetriq 25 mg.  Will trial increased dosage to 50 mg today.  He will follow-up in 6 to 8 weeks for symptom check.  Pending response to medical therapy we will further discuss options for management.  He is understanding and agreeable.    Diagnoses and all orders for this visit:    1. OAB (overactive bladder) (Primary)  -     Mirabegron ER (Myrbetriq) 25 MG tablet sustained-release 24 hour 24 hr tablet; Take 2 tablets by mouth Daily.  Dispense: 30 tablet; Refill: 0    2. Lower urinary tract symptoms (LUTS)         Follow Up:   Return in about 6 weeks (around 2/13/2024).        Demario Lyn MD  [facility]

## 2024-01-04 ENCOUNTER — ANTICOAGULATION VISIT (OUTPATIENT)
Dept: PHARMACY | Facility: HOSPITAL | Age: 79
End: 2024-01-04
Payer: MEDICARE

## 2024-01-04 DIAGNOSIS — I48.20 CHRONIC ATRIAL FIBRILLATION: Primary | ICD-10-CM

## 2024-01-04 LAB
INR PPP: 2.1 (ref 0.91–1.09)
PROTHROMBIN TIME: 25.2 SECONDS (ref 10–13.8)

## 2024-01-04 PROCEDURE — G0463 HOSPITAL OUTPT CLINIC VISIT: HCPCS

## 2024-01-04 PROCEDURE — 85610 PROTHROMBIN TIME: CPT

## 2024-01-04 PROCEDURE — 36416 COLLJ CAPILLARY BLOOD SPEC: CPT

## 2024-01-04 NOTE — PROGRESS NOTES
Anticoagulation Clinic Progress Note  Indication: atrial fibrillation  Referring Provider: Sravani [last appt 9/8/23  next:  9/16/24 (Will Sue)]  Initial Warfarin Start Date: 2008  Goal INR: 2 - 3  Current Drug Interactions: fluoxetine, glimepiride, melatonin (PRN), mirtazepine, MVI  CHADS-VASc: 3 (age, HTN, DM)    EtOH: none  GLV: Salad (Spinach salad 1x weekly or garden salad) and serving of broccoli once a week 3/1/2022  OTC Pain Relief: Uses APAP prn (~1x/week)    Anticoagulation Clinic INR History:  Date 8/20 9/4 10/2 11/6 11/15 11/29 12/31 1/7/19 1/17 1/29 2/12   Total Weekly Dose 17.5  mg 17.5 mg 17.5 mg 10mg 20mg 17.5mg 17.5mg 18.75 mg 17.5 mg 18.75 mg 20mg   INR 2.3 2.3 2.2 1.3 2.3 2.2 1.7 1.9 1.9 1.9 2.0   Notes    pre- epidural   missed dose?         Date 2/27 3/27 4/10 4/23 5/7 5/28 6/25 7/30 8/28 9/25 10/23   Total Weekly Dose 20mg 20mg 21.25 mg 21.25 mg 21.25 mg 21.25mg 21.25mg 21.25mg 21.25mg 21.25mg 21.25mg   INR 2.2 1.8 2.5 1.8 2.4 2.4 2.5 2.7 2.6 3.0 2.9   Notes sick   post- scope            Date  11/6 11/13 12/4 1/3 1/15 1/20 1/31 2/10 2/28 4/14 5/26   Total Weekly Dose 21.25 mg 18.75 mg 21.25 mg 21.25 mg 21.25 mg 20 mg 21.25 21.25 20mg 21.25mg 21.25mg   INR 4.2 3.2 2.7 2.2 3.1 3.1 2.9 3.5 2.4 2.3 2.5   Notes apap Hold x1, less GLV Inc in GLV  doxy doxy  1x dose cephalexin 1x dose dec; keflex       Date  6/24 7/22 8/24 9/10 10/8 11/5 11/18 12/2 12/30 1/26 2/22 3/22 4/19   Total Weekly Dose 21.25mg 21.25 21.25mg 21.25mg 21.25mg 21.25mg 20mg 20mg 20mg 20mg  20mg 20 mg 20mg   INR 2.4 2.4 3.0 2.8 3.0 3.1 2.5 2.5 2.5 2.6 2.9  2 2.2   Notes      Inc. GLV Dose dec    desvenlafaxine broccoli      Date 5/13 6/16  7/7 7/21 8/11 9/1 9/29 10/27 11/3 12/1 12/9 12/16   Total WeeklyDose 20 mg 20mg  20 mg 18.75mg  17.5 mg 17.5mg 17.5 17.5mg 17.5mg 17.5mg 18.75mg 22.5mg   INR 2.5 3.2 3.1 3.0 2.2 2.4 2.2 2.4 2.3 1.5 1.6 2.2   Notes  apap Inc GLV     augmentin day 3 augementin glucerna  Boost x2     Date  12/28 1/13/22 2/8 2/16 3/1 3/22 4/12 5/10 6/7 7/5 8/2    Total WeeklyDose 21.25mg 21.25mg 21.25 mg 21.25mg 22.5mg 22.5 mg 22.5mg 22.5 mg 22.5 mg 22.5mg 22.5mg EGD x 3 days hold   INR 2.6 2.3 1.7 2.1 2.5 2.0 2.3 2.4 2.1 2.1 2.8    Notes    Dec GLV        Boost x2     Date 8/25 9/8 9/29 10/27 12/1 1/5/23 2/2 3/2 3/30 4/14 4/20 5/18   Total WeeklyDose 18.75mg 22.5mg 22.5 mg 22.5 mg 22.5 mg 22.5mg 22.5 mg 22.5 mg 22.5 mg 22.5mg 22.5 mg  22.5 mg   INR 2.7 2.7 2.5 2.2 2.3 2.3 2.7 3.0 2.5 2.6 2.6 3.0   Notes omeprazole initi         doxy Doxy       Date 7/12 8/9 9/11 10/9 11/6 11/30 12/12 12/21 1/4   Total Weekly Dose 22.5 mg 22.5 mg 22.5mg 22.5 mg 22.5mg 22.5 mg 22.5mg 21.25 mg    INR 2.7 2.8 2.5 2.5 2.3 3.5 3.2 2.4 2.1   Notes              Clinic Interview:  Verbal Release Authorization signed on 6/27/18 -- may speak with Sussy (wife), Nikolai (son)  Tablet Strength: pt has 2.5mg tablets  Phone: [phone] (Home), [phone]  Fax: [phone] (Attn: Tasha) [facility] [place]    Patient Findings      Negatives:  Signs/symptoms of thrombosis, Signs/symptoms of bleeding, Laboratory test error suspected, Change in health, Change in alcohol use, Change in activity, Upcoming invasive procedure, Emergency department visit, Upcoming dental procedure, Missed doses, Extra doses, Change in medications, Change in diet/appetite, Hospital admission, Bruising, Other complaints         Plan:   1. INR is therapeutic today at 2.1 (goal 2.0-3.0). Instructed Mr. Wolf to continue  warfarin 2.5mg daily except 3.75mg MWF.  2. RTC in 3 weeks  3. Verbal and written information was provided to Mr. Wolf and Mrs. Wolf in clinic. Mr. Wolf voiced understanding with teach back and has no further questions at this time.    Teena Boykin PharmD.  01/04/24   12:16 EST

## 2024-01-12 PROBLEM — D75.1 POLYCYTHEMIA: Status: ACTIVE | Noted: 2024-01-12

## 2024-01-12 NOTE — PROGRESS NOTES
DATE OF CONSULTATION: 1/12/2024    REFERRING PHYSICIAN: Kay Lopez DO    Dear Kay Tamayo DO  Thank you for asking for my medical advice on this patient. I saw him in the  [place] office on 1/12/2024    REASON FOR CONSULTATION: Polycythemia    PROBLEM LIST:   1.  Polycythemia:  A.  Presented with hemoglobin 18.2 and hematocrit 54 December 7, 2023  2.  Hypertension  3.  Atrial fibrillation:  A.  Patient is on chronic anticoagulation  4.  Hypercholesteremia    HISTORY OF PRESENT ILLNESS: The patient is a very pleasant 78 y.o.  male   who was in his usual state of health until December 2023.  The patient presented with abnormal CBC.  He had high hemoglobin hematocrit.  This has been elevated on previous labs.  The patient was referred to me for further recommendations.    SUBJECTIVE: When I saw the patient today he is here with his wife.  This was incidentally noted on routine blood work.  No alleviating or exacerbating factors.  He is staying active.  He does complain of difficulty sleeping however this is improved after adding Remeron.  He had a sleep study done but it has been more than 10 years ago.    Review of Systems   Constitutional:  Positive for fatigue.   Eyes: Negative.    Respiratory: Negative.     Musculoskeletal:  Positive for back pain.   Psychiatric/Behavioral:  The patient is nervous/anxious.        Past Medical History:   Diagnosis Date    Acute bronchitis     Anxiety     Arthritis     right knee    Atrial fibrillation     Back pain     Cancer     skin cancer removed from face     Carpal tunnel syndrome     Depression     Derangement, knee internal     Diabetes mellitus     DX'D TYPE II APPROX 15 YEARS AGO, DOES NOT CHECK BLOOD SUGARS AT HOME, STARTED INSULIN IN MARCH AFTER SURGERY CANCELLED FOR ELEVATED A1C    Diabetic foot ulcer     Drug dependence     Dyspnea on exertion 01/17/2019    GERD (gastroesophageal reflux disease)     Glaucoma     Heart disease     Hyperlipidemia  11/14/2016    Hypertension     Hypertensive disorder     Hypokalemia, replaced 06/02/2017    IBS (irritable bowel syndrome)     Knee pain, right     Left ventricular hypertrophy 11/14/2016    Leukocytosis, mild, likely reactive 05/31/2017    Mixed hyperlipidemia     Neuropathy, lumbosacral (radicular)     Obesity     body mass index 30+-obesity    Osteoporosis     Postoperative urinary retention 06/01/2017    Premature ventricular contractions     PVC's (premature ventricular contractions) 11/14/2016    Rash 03/18/2019    Scoliosis     Screening for prostate cancer 07/28/2016    Sinusitis     Skin cancer of arm, left     Sleep apnea     SOB (shortness of breath)     Spinal stenosis, lumbar region with neurogenic claudication     Thrombocytopenia 05/31/2017    Trigger middle finger of left hand     Trigger middle finger of right hand     Type 2 diabetes mellitus     Wears glasses     readers       Social History     Socioeconomic History    Marital status:    Tobacco Use    Smoking status: Never     Passive exposure: Never    Smokeless tobacco: Never   Vaping Use    Vaping Use: Never used   Substance and Sexual Activity    Alcohol use: No    Drug use: No    Sexual activity: Defer       Family History   Problem Relation Age of Onset    Cancer Other     Anemia Other     Heart attack Other     Cancer Mother     Heart disease Mother     Hypertension Mother     Heart attack Mother     Diabetes Mother     Deep vein thrombosis Father     Heart disease Father     Hypertension Father     Cancer Other        Past Surgical History:   Procedure Laterality Date    BACK SURGERY      injections for back     CARPAL TUNNEL RELEASE      CHOLECYSTECTOMY      COLONOSCOPY      LESS THAN 5 YEARS     ENDOSCOPY  08/22/2022    x4    FRACTURE SURGERY      right heel    HERNIA REPAIR      both side inguinal     LUMBAR EPIDURAL INJECTION      TX ARTHRP KNE CONDYLE&PLATU MEDIAL&LAT COMPARTMENTS Right 05/30/2017    Procedure: RIGHT TOTAL  KNEE ARTHROPLASTY;  Surgeon: Simon Interiano MD;  [facility];  Service: Orthopedics    TOTAL KNEE ARTHROPLASTY REVISION      left    WRIST SURGERY      carpal tunnel bilat        Allergies   Allergen Reactions    Aspirin Anaphylaxis     Other reaction(s): Hypotension          Current Outpatient Medications:     Accu-Chek Softclix Lancets lancets, USE TO TEST BLOOD SUGAR THREE TIMES A DAY, Disp: 100 each, Rfl: 2    acetaminophen (TYLENOL) 500 MG tablet, Take 2 tablets by mouth Every 6 (Six) Hours As Needed for Mild Pain., Disp: , Rfl:     amLODIPine (NORVASC) 10 MG tablet, TAKE ONE TABLET BY MOUTH DAILY AS DIRECTED, Disp: 90 tablet, Rfl: 3    bimatoprost (Lumigan) 0.01 % ophthalmic drops, Administer 1 drop to both eyes Every Night., Disp: , Rfl:     Cholecalciferol (VITAMIN D-3) 1000 UNITS capsule, Take 1,000 Units by mouth Daily., Disp: , Rfl:     glimepiride (AMARYL) 2 MG tablet, TAKE ONE TABLET BY MOUTH DAILY, Disp: 90 tablet, Rfl: 1    glucose blood (Accu-Chek Kandis Plus) test strip, USE ONE STRIP TO TEST THREE TIMES A DAY, Disp: 100 each, Rfl: 11    latanoprost (XALATAN) 0.005 % ophthalmic solution, Administer 1 drop to both eyes Every Night., Disp: , Rfl:     lisinopril (PRINIVIL,ZESTRIL) 40 MG tablet, TAKE 1 TABLET BY MOUTH DAILY, Disp: 90 tablet, Rfl: 2    Loratadine 10 MG capsule, Take 1 capsule by mouth Daily., Disp: , Rfl:     Melatonin 2.5 MG chewable tablet, Chew 1 tablet At Night As Needed., Disp: , Rfl:     metoprolol succinate XL (TOPROL-XL) 100 MG 24 hr tablet, TAKE ONE TABLET BY MOUTH TWICE A DAY, Disp: 180 tablet, Rfl: 3    Mirabegron ER (Myrbetriq) 25 MG tablet sustained-release 24 hour 24 hr tablet, Take 2 tablets by mouth Daily., Disp: 30 tablet, Rfl: 0    mirtazapine (REMERON) 45 MG tablet, Take 1 tablet by mouth Every Night., Disp: , Rfl:     pravastatin (PRAVACHOL) 20 MG tablet, TAKE 1 TABLET BY MOUTH DAILY, Disp: 90 tablet, Rfl: 0    venlafaxine XR (EFFEXOR-XR) 75 MG 24 hr capsule,  Take 1 capsule by mouth Daily., Disp: , Rfl:     warfarin (COUMADIN) 2.5 MG tablet, Take 1 to 1.5 tablets by mouth daily or as directed by the anticoagulation clinic., Disp: 120 tablet, Rfl: 1    PHYSICAL EXAMINATION:   There were no vitals taken for this visit.  There were no vitals filed for this visit.                ECOG Performance Status: 1 - Symptomatic but completely ambulatory  General Appearance:  alert, cooperative, no apparent distress and appears stated age   Neurologic/Psychiatric: A&O x 3, gait steady, appropriate affect, strength 5/5 in all muscle groups   HEENT:  Normocephalic, without obvious abnormality, mucous membranes moist   Neck: Supple, symmetrical, trachea midline, no adenopathy;  No thyromegaly, masses, or tenderness   Lungs:   Clear to auscultation bilaterally; respirations regular, even, and unlabored bilaterally   Heart:  Regular rate and rhythm, no murmurs appreciated   Abdomen:   Soft, non-tender, non-distended, and no organomegaly   Lymph nodes: No cervical, supraclavicular, inguinal or axillary adenopathy noted   Extremities: Normal, atraumatic; no clubbing, cyanosis, or edema    Skin: No rashes, ulcers, or suspicious lesions noted       Anticoagulation Visit on 01/04/2024   Component Date Value Ref Range Status    Protime 01/04/2024 25.2 (H)  10.0 - 13.8 seconds Final    INR 01/04/2024 2.1 (H)  0.91 - 1.09 Final        No results found.      DIAGNOSTIC DATA:   Extensive patient's medical records reviewed by me and documented in the patient's chart including doctor notes blood work results and imaging reports.    ASSESSMENT: The patient is a very pleasant 78 y.o.  male  with polycythemia    PLAN:     1.  Polycythemia:  A.  I had a long discussion today with the patient and his wife about his  new diagnosis of polycythemia. I reviewed the patient's documents including refereing provider's notes, lab results, and imaging report.   B.  I explained to the patient that this could be  driven by chronic hypoxia versus sleep apnea versus myeloproliferative neoplasm.  In order to narrow down the differential diagnosis I will proceed with the following workup: Repeat CBC, JAK2 mutation, serum erythropoietin level, reticulocyte count and serum LDH.  C.  The patient will follow-up with me in 2 to 3 weeks to go over the results.    2.  Hypertension:  A.  The patient continue metoprolol and Norvasc    3.  Atrial fibrillation:  A.  The patient is on chronic anticoagulation with warfarin.      FOLLOW UP: 2 weeks to go over the results.    Antonino Molina MD  1/12/2024

## 2024-01-15 ENCOUNTER — CONSULT (OUTPATIENT)
Dept: ONCOLOGY | Facility: CLINIC | Age: 79
End: 2024-01-15
Payer: MEDICARE

## 2024-01-15 ENCOUNTER — LAB (OUTPATIENT)
Dept: LAB | Facility: HOSPITAL | Age: 79
End: 2024-01-15
Payer: MEDICARE

## 2024-01-15 VITALS
BODY MASS INDEX: 32.37 KG/M2 | SYSTOLIC BLOOD PRESSURE: 150 MMHG | WEIGHT: 239 LBS | RESPIRATION RATE: 18 BRPM | TEMPERATURE: 96.7 F | HEIGHT: 72 IN | DIASTOLIC BLOOD PRESSURE: 81 MMHG | HEART RATE: 66 BPM | OXYGEN SATURATION: 95 %

## 2024-01-15 DIAGNOSIS — D75.1 POLYCYTHEMIA: ICD-10-CM

## 2024-01-15 DIAGNOSIS — D75.1 POLYCYTHEMIA: Primary | ICD-10-CM

## 2024-01-15 LAB
BASOPHILS # BLD AUTO: 0.07 10*3/MM3 (ref 0–0.2)
BASOPHILS NFR BLD AUTO: 0.9 % (ref 0–1.5)
DEPRECATED RDW RBC AUTO: 50.3 FL (ref 37–54)
EOSINOPHIL # BLD AUTO: 0.17 10*3/MM3 (ref 0–0.4)
EOSINOPHIL NFR BLD AUTO: 2.3 % (ref 0.3–6.2)
ERYTHROCYTE [DISTWIDTH] IN BLOOD BY AUTOMATED COUNT: 14.1 % (ref 12.3–15.4)
HCT VFR BLD AUTO: 53.9 % (ref 37.5–51)
HGB BLD-MCNC: 17.9 G/DL (ref 13–17.7)
IMM GRANULOCYTES # BLD AUTO: 0.01 10*3/MM3 (ref 0–0.05)
IMM GRANULOCYTES NFR BLD AUTO: 0.1 % (ref 0–0.5)
LDH SERPL-CCNC: 267 U/L (ref 135–225)
LYMPHOCYTES # BLD AUTO: 2.28 10*3/MM3 (ref 0.7–3.1)
LYMPHOCYTES NFR BLD AUTO: 30.3 % (ref 19.6–45.3)
MCH RBC QN AUTO: 31.7 PG (ref 26.6–33)
MCHC RBC AUTO-ENTMCNC: 33.2 G/DL (ref 31.5–35.7)
MCV RBC AUTO: 95.4 FL (ref 79–97)
MONOCYTES # BLD AUTO: 0.68 10*3/MM3 (ref 0.1–0.9)
MONOCYTES NFR BLD AUTO: 9 % (ref 5–12)
NEUTROPHILS NFR BLD AUTO: 4.31 10*3/MM3 (ref 1.7–7)
NEUTROPHILS NFR BLD AUTO: 57.4 % (ref 42.7–76)
PLATELET # BLD AUTO: 170 10*3/MM3 (ref 140–450)
PMV BLD AUTO: 11.3 FL (ref 6–12)
RBC # BLD AUTO: 5.65 10*6/MM3 (ref 4.14–5.8)
RETICS # AUTO: 0.11 10*6/MM3 (ref 0.02–0.13)
RETICS/RBC NFR AUTO: 1.86 % (ref 0.7–1.9)
WBC NRBC COR # BLD AUTO: 7.52 10*3/MM3 (ref 3.4–10.8)

## 2024-01-15 PROCEDURE — 3077F SYST BP >= 140 MM HG: CPT | Performed by: INTERNAL MEDICINE

## 2024-01-15 PROCEDURE — 3079F DIAST BP 80-89 MM HG: CPT | Performed by: INTERNAL MEDICINE

## 2024-01-15 PROCEDURE — 99204 OFFICE O/P NEW MOD 45 MIN: CPT | Performed by: INTERNAL MEDICINE

## 2024-01-15 PROCEDURE — 85025 COMPLETE CBC W/AUTO DIFF WBC: CPT

## 2024-01-15 PROCEDURE — 83615 LACTATE (LD) (LDH) ENZYME: CPT

## 2024-01-15 PROCEDURE — 85045 AUTOMATED RETICULOCYTE COUNT: CPT

## 2024-01-15 PROCEDURE — 1126F AMNT PAIN NOTED NONE PRSNT: CPT | Performed by: INTERNAL MEDICINE

## 2024-01-15 PROCEDURE — 36415 COLL VENOUS BLD VENIPUNCTURE: CPT

## 2024-01-15 PROCEDURE — 82668 ASSAY OF ERYTHROPOIETIN: CPT

## 2024-01-16 LAB — EPO SERPL-ACNC: 13.1 MIU/ML (ref 2.6–18.5)

## 2024-01-23 LAB
CITATION REF LAB TEST: NORMAL
JAK2 P.V617F BLD/T QL: NORMAL
LAB DIRECTOR NAME PROVIDER: NORMAL
LABORATORY COMMENT REPORT: NORMAL
REF LAB TEST METHOD: NORMAL

## 2024-01-25 ENCOUNTER — APPOINTMENT (OUTPATIENT)
Dept: PHARMACY | Facility: HOSPITAL | Age: 79
End: 2024-01-25
Payer: MEDICARE

## 2024-01-26 ENCOUNTER — ANTICOAGULATION VISIT (OUTPATIENT)
Dept: PHARMACY | Facility: HOSPITAL | Age: 79
End: 2024-01-26
Payer: MEDICARE

## 2024-01-26 DIAGNOSIS — I48.20 CHRONIC ATRIAL FIBRILLATION: Primary | ICD-10-CM

## 2024-01-26 LAB
INR PPP: 1.9 (ref 0.91–1.09)
PROTHROMBIN TIME: 23 SECONDS (ref 10–13.8)

## 2024-01-26 PROCEDURE — 36416 COLLJ CAPILLARY BLOOD SPEC: CPT

## 2024-01-26 PROCEDURE — 85610 PROTHROMBIN TIME: CPT

## 2024-01-26 PROCEDURE — G0463 HOSPITAL OUTPT CLINIC VISIT: HCPCS | Performed by: PHARMACIST

## 2024-01-26 NOTE — PROGRESS NOTES
Anticoagulation Clinic Progress Note  Indication: atrial fibrillation  Referring Provider: Sravani [last appt 9/8/23  next:  9/16/24 (Will Sue)]  Initial Warfarin Start Date: 2008  Goal INR: 2 - 3  Current Drug Interactions: fluoxetine, glimepiride, melatonin (PRN), mirtazepine, MVI  CHADS-VASc: 3 (age, HTN, DM)    EtOH: none  GLV: Salad (Spinach salad 1x weekly or garden salad) and serving of broccoli once a week 3/1/2022  OTC Pain Relief: Uses APAP prn (~1x/week)    Anticoagulation Clinic INR History:  Date 8/20 9/4 10/2 11/6 11/15 11/29 12/31 1/7/19 1/17 1/29 2/12   Total Weekly Dose 17.5  mg 17.5 mg 17.5 mg 10mg 20mg 17.5mg 17.5mg 18.75 mg 17.5 mg 18.75 mg 20mg   INR 2.3 2.3 2.2 1.3 2.3 2.2 1.7 1.9 1.9 1.9 2.0   Notes    pre- epidural   missed dose?         Date 2/27 3/27 4/10 4/23 5/7 5/28 6/25 7/30 8/28 9/25 10/23   Total Weekly Dose 20mg 20mg 21.25 mg 21.25 mg 21.25 mg 21.25mg 21.25mg 21.25mg 21.25mg 21.25mg 21.25mg   INR 2.2 1.8 2.5 1.8 2.4 2.4 2.5 2.7 2.6 3.0 2.9   Notes sick   post- scope            Date  11/6 11/13 12/4 1/3 1/15 1/20 1/31 2/10 2/28 4/14 5/26   Total Weekly Dose 21.25 mg 18.75 mg 21.25 mg 21.25 mg 21.25 mg 20 mg 21.25 21.25 20mg 21.25mg 21.25mg   INR 4.2 3.2 2.7 2.2 3.1 3.1 2.9 3.5 2.4 2.3 2.5   Notes apap Hold x1, less GLV Inc in GLV  doxy doxy  1x dose cephalexin 1x dose dec; keflex       Date  6/24 7/22 8/24 9/10 10/8 11/5 11/18 12/2 12/30 1/26 2/22 3/22 4/19   Total Weekly Dose 21.25mg 21.25 21.25mg 21.25mg 21.25mg 21.25mg 20mg 20mg 20mg 20mg  20mg 20 mg 20mg   INR 2.4 2.4 3.0 2.8 3.0 3.1 2.5 2.5 2.5 2.6 2.9  2 2.2   Notes      Inc. GLV Dose dec    desvenlafaxine broccoli      Date 5/13 6/16  7/7 7/21 8/11 9/1 9/29 10/27 11/3 12/1 12/9 12/16   Total WeeklyDose 20 mg 20mg  20 mg 18.75mg  17.5 mg 17.5mg 17.5 17.5mg 17.5mg 17.5mg 18.75mg 22.5mg   INR 2.5 3.2 3.1 3.0 2.2 2.4 2.2 2.4 2.3 1.5 1.6 2.2   Notes  apap Inc GLV     augmentin day 3 augementin glucerna  Boost x2     Date  12/28 1/13/22 2/8 2/16 3/1 3/22 4/12 5/10 6/7 7/5 8/2    Total WeeklyDose 21.25mg 21.25mg 21.25 mg 21.25mg 22.5mg 22.5 mg 22.5mg 22.5 mg 22.5 mg 22.5mg 22.5mg EGD x 3 days hold   INR 2.6 2.3 1.7 2.1 2.5 2.0 2.3 2.4 2.1 2.1 2.8    Notes    Dec GLV        Boost x2     Date 8/25 9/8 9/29 10/27 12/1 1/5/23 2/2 3/2 3/30 4/14 4/20 5/18   Total WeeklyDose 18.75mg 22.5mg 22.5 mg 22.5 mg 22.5 mg 22.5mg 22.5 mg 22.5 mg 22.5 mg 22.5mg 22.5 mg  22.5 mg   INR 2.7 2.7 2.5 2.2 2.3 2.3 2.7 3.0 2.5 2.6 2.6 3.0   Notes omeprazole initi         doxy Doxy       Date 7/12 8/9 9/11 10/9 11/6 11/30 12/12 12/21 1/4 1/26/24     Total Weekly Dose 22.5 mg 22.5 mg 22.5mg 22.5 mg 22.5mg 22.5 mg 22.5mg 21.25 mg  21.25 mg     INR 2.7 2.8 2.5 2.5 2.3 3.5 3.2 2.4 2.1 1.9     Notes          Increase LGV       Clinic Interview:  Verbal Release Authorization signed on 6/27/18 -- may speak with Sussy (wife), Nikolai (son)  Tablet Strength: pt has 2.5mg tablets  Phone: [phone] (Home), [phone]  Fax: [phone] (Attn: Tasha) [facility] [place]    Patient Findings  Negatives: Signs/symptoms of thrombosis, Signs/symptoms of bleeding, Laboratory test error suspected, Change in health, Change in alcohol use, Change in activity, Upcoming invasive procedure, Emergency department visit, Upcoming dental procedure, Missed doses, Extra doses, Change in medications, Change in diet/appetite, Hospital admission, Bruising, Other complaints   Comments: He may have had an extra serving of greens. It could have been sauteed spinach on Wednesday.       Plan:   1. INR is slightly SUBtherapeutic today at 1.9 (goal 2.0-3.0). Instructed Mr. Wolf to continue  warfarin 2.5mg daily except 3.75mg MWF.  2. RTC in 2 weeks to verify back in goal range. Today's result likely attributable to increase spinach.  3. Verbal and written information was provided to Mr. Wolf and Mrs. Wolf in clinic. Mr. Wolf voiced understanding with teach back and has no further  questions at this time.    Simon Real, PharmD  01/26/24   11:37 EST

## 2024-01-29 ENCOUNTER — APPOINTMENT (OUTPATIENT)
Dept: PHARMACY | Facility: HOSPITAL | Age: 79
End: 2024-01-29
Payer: MEDICARE

## 2024-01-29 ENCOUNTER — OFFICE VISIT (OUTPATIENT)
Dept: ONCOLOGY | Facility: CLINIC | Age: 79
End: 2024-01-29
Payer: MEDICARE

## 2024-01-29 VITALS
HEIGHT: 72 IN | RESPIRATION RATE: 18 BRPM | DIASTOLIC BLOOD PRESSURE: 84 MMHG | HEART RATE: 77 BPM | TEMPERATURE: 96.9 F | OXYGEN SATURATION: 95 % | BODY MASS INDEX: 31.83 KG/M2 | WEIGHT: 235 LBS | SYSTOLIC BLOOD PRESSURE: 157 MMHG

## 2024-01-29 DIAGNOSIS — D75.1 POLYCYTHEMIA: Primary | ICD-10-CM

## 2024-01-29 PROCEDURE — 3077F SYST BP >= 140 MM HG: CPT | Performed by: INTERNAL MEDICINE

## 2024-01-29 PROCEDURE — 99214 OFFICE O/P EST MOD 30 MIN: CPT | Performed by: INTERNAL MEDICINE

## 2024-01-29 PROCEDURE — 1159F MED LIST DOCD IN RCRD: CPT | Performed by: INTERNAL MEDICINE

## 2024-01-29 PROCEDURE — 3079F DIAST BP 80-89 MM HG: CPT | Performed by: INTERNAL MEDICINE

## 2024-01-29 PROCEDURE — 1126F AMNT PAIN NOTED NONE PRSNT: CPT | Performed by: INTERNAL MEDICINE

## 2024-01-29 PROCEDURE — 1160F RVW MEDS BY RX/DR IN RCRD: CPT | Performed by: INTERNAL MEDICINE

## 2024-01-29 NOTE — PROGRESS NOTES
DATE OF VISIT: 1/29/2024    REASON FOR VISIT: Followup for polycythemia     PROBLEM LIST:  1.  Secondary polycythemia:  A.  Presented with hemoglobin 18.2 and hematocrit 54 December 7, 2023  B.  Negative JAK2 mutation and normal EPO level January 15, 2024  2.  Hypertension  3.  Atrial fibrillation:  A.  Patient is on chronic anticoagulation  4.  Hypercholesteremia    HISTORY OF PRESENT ILLNESS: The patient is a very pleasant 78 y.o. male  with past medical history significant for secondary polycythemia diagnosed December 2023. The  patient is here today for scheduled follow-up visit.    SUBJECTIVE: The patient is here today with his wife.  All in all he is doing well.  Denies any bleedings.  He does complain of mild daytime fatigue.    Past History:  Medical History: has a past medical history of Acute bronchitis, Anxiety, Arthritis, Atrial fibrillation, Back pain, Cancer, Carpal tunnel syndrome, Depression, Derangement, knee internal, Diabetes mellitus, Diabetic foot ulcer, Drug dependence, Dyspnea on exertion (01/17/2019), GERD (gastroesophageal reflux disease), Glaucoma, Heart disease, Hyperlipidemia (11/14/2016), Hypertension, Hypertensive disorder, Hypokalemia, replaced (06/02/2017), IBS (irritable bowel syndrome), Knee pain, right, Left ventricular hypertrophy (11/14/2016), Leukocytosis, mild, likely reactive (05/31/2017), Mixed hyperlipidemia, Neuropathy, lumbosacral (radicular), Obesity, Osteoporosis, Postoperative urinary retention (06/01/2017), Premature ventricular contractions, PVC's (premature ventricular contractions) (11/14/2016), Rash (03/18/2019), Scoliosis, Screening for prostate cancer (07/28/2016), Sinusitis, Skin cancer of arm, left, Sleep apnea, SOB (shortness of breath), Spinal stenosis, lumbar region with neurogenic claudication, Thrombocytopenia (05/31/2017), Trigger middle finger of left hand, Trigger middle finger of right hand, Type 2 diabetes mellitus, and Wears glasses.   Surgical  "History: has a past surgical history that includes Cholecystectomy; Wrist surgery; Total Knee Arthroplasty Revision; Hernia repair; Colonoscopy; Esophagogastroduodenoscopy (08/22/2022); pr arthrp kne condyle&platu medial&lat compartments (Right, 05/30/2017); Fracture surgery; Lumbar epidural injection; Back surgery; and Carpal tunnel release.   Family History: family history includes Anemia in an other family member; Cancer in his mother and other family members; Deep vein thrombosis in his father; Diabetes in his mother; Heart attack in his mother and another family member; Heart disease in his father and mother; Hypertension in his father and mother.   Social History: reports that he has never smoked. He has never been exposed to tobacco smoke. He has never used smokeless tobacco. He reports that he does not drink alcohol and does not use drugs.    (Not in a hospital admission)     Allergies: Aspirin     Review of Systems   Constitutional:  Positive for fatigue.   Respiratory: Negative.     Cardiovascular: Negative.    Gastrointestinal: Negative.    Musculoskeletal:  Positive for arthralgias.       PHYSICAL EXAMINATION:   /84 Comment: LUE  Pulse 77   Temp 96.9 °F (36.1 °C) (Temporal)   Resp 18   Ht 182.9 cm (72.01\")   Wt 107 kg (235 lb)   SpO2 95%   BMI 31.86 kg/m²    Pain Score    01/29/24 1432   PainSc: 0-No pain        ECOG score: 1            ECOG Performance Status: 1 - Symptomatic but completely ambulatory      General Appearance:      alert, cooperative, no apparent distress and appears stated age   Lungs:   Clear to auscultation bilaterally; respirations regular, even, and unlabored bilaterally   Heart:  Regular rate and rhythm, no murmurs appreciated   Abdomen:   Soft, non-tender, non-distended, and no organomegaly                 Anticoagulation Visit on 01/26/2024   Component Date Value Ref Range Status    Protime 01/26/2024 23.0 (H)  10.0 - 13.8 seconds Final    INR 01/26/2024 1.9 (H)  " 0.91 - 1.09 Final        No results found.    ASSESSMENT: The patient is a very pleasant 78 y.o. male  with secondary polycythemia      PLAN:    1.  Secondary polycythemia:  A.  I did go over the blood work results with the patient from January 15, 2024.  I explained to the patient that his hematocrit was slightly elevated at 54 with normal hemoglobin at 17.9.  His white cells and platelets were normal.  His JAK2 mutation came back negative and his serum electro protein level is normal.  B.  I will continue monitoring.  C.  Will consider phlebotomy for hematocrit more than 55.  D.  The patient was advised to keep himself well-hydrated.  E.  I did recommend repeating a sleep study since it has been more than 10 years since he had 1.  The patient would like to discuss this further with his primary care provider.    2.  Hypertension:  A.  The patient continue metoprolol and Norvasc     3.  Atrial fibrillation:  A.  The patient is on chronic anticoagulation with warfarin.    FOLLOW UP: 6 months with a CBC if it remains stable I will switch him to annual visits.    Antonino Molina MD  1/29/2024

## 2024-01-29 NOTE — LETTER
January 29, 2024     Kay Lopez DO  [address] [facility] [address]    Patient: Tony Wolf   YOB: 1945   Date of Visit: 1/29/2024       Dear Kay Lopez DO    Tony Wolf was in my office today. Below is a copy of my note.    If you have questions, please do not hesitate to call me. I look forward to following Tony along with you.         Sincerely,        Antonino Molina MD        CC: No Recipients    DATE OF VISIT: 1/29/2024    REASON FOR VISIT: Followup for polycythemia     PROBLEM LIST:  1.  Secondary polycythemia:  A.  Presented with hemoglobin 18.2 and hematocrit 54 December 7, 2023  B.  Negative JAK2 mutation and normal EPO level January 15, 2024  2.  Hypertension  3.  Atrial fibrillation:  A.  Patient is on chronic anticoagulation  4.  Hypercholesteremia    HISTORY OF PRESENT ILLNESS: The patient is a very pleasant 78 y.o. male  with past medical history significant for secondary polycythemia diagnosed December 2023. The  patient is here today for scheduled follow-up visit.    SUBJECTIVE: The patient is here today with his wife.  All in all he is doing well.  Denies any bleedings.  He does complain of mild daytime fatigue.    Past History:  Medical History: has a past medical history of Acute bronchitis, Anxiety, Arthritis, Atrial fibrillation, Back pain, Cancer, Carpal tunnel syndrome, Depression, Derangement, knee internal, Diabetes mellitus, Diabetic foot ulcer, Drug dependence, Dyspnea on exertion (01/17/2019), GERD (gastroesophageal reflux disease), Glaucoma, Heart disease, Hyperlipidemia (11/14/2016), Hypertension, Hypertensive disorder, Hypokalemia, replaced (06/02/2017), IBS (irritable bowel syndrome), Knee pain, right, Left ventricular hypertrophy (11/14/2016), Leukocytosis, mild, likely reactive (05/31/2017), Mixed hyperlipidemia, Neuropathy, lumbosacral (radicular), Obesity, Osteoporosis, Postoperative urinary retention (06/01/2017), Premature ventricular  "contractions, PVC's (premature ventricular contractions) (11/14/2016), Rash (03/18/2019), Scoliosis, Screening for prostate cancer (07/28/2016), Sinusitis, Skin cancer of arm, left, Sleep apnea, SOB (shortness of breath), Spinal stenosis, lumbar region with neurogenic claudication, Thrombocytopenia (05/31/2017), Trigger middle finger of left hand, Trigger middle finger of right hand, Type 2 diabetes mellitus, and Wears glasses.   Surgical History: has a past surgical history that includes Cholecystectomy; Wrist surgery; Total Knee Arthroplasty Revision; Hernia repair; Colonoscopy; Esophagogastroduodenoscopy (08/22/2022); pr arthrp kne condyle&platu medial&lat compartments (Right, 05/30/2017); Fracture surgery; Lumbar epidural injection; Back surgery; and Carpal tunnel release.   Family History: family history includes Anemia in an other family member; Cancer in his mother and other family members; Deep vein thrombosis in his father; Diabetes in his mother; Heart attack in his mother and another family member; Heart disease in his father and mother; Hypertension in his father and mother.   Social History: reports that he has never smoked. He has never been exposed to tobacco smoke. He has never used smokeless tobacco. He reports that he does not drink alcohol and does not use drugs.    (Not in a hospital admission)     Allergies: Aspirin     Review of Systems   Constitutional:  Positive for fatigue.   Respiratory: Negative.     Cardiovascular: Negative.    Gastrointestinal: Negative.    Musculoskeletal:  Positive for arthralgias.       PHYSICAL EXAMINATION:   /84 Comment: LUE  Pulse 77   Temp 96.9 °F (36.1 °C) (Temporal)   Resp 18   Ht 182.9 cm (72.01\")   Wt 107 kg (235 lb)   SpO2 95%   BMI 31.86 kg/m²    Pain Score    01/29/24 1432   PainSc: 0-No pain        ECOG score: 1            ECOG Performance Status: 1 - Symptomatic but completely ambulatory      General Appearance:      alert, cooperative, no " apparent distress and appears stated age   Lungs:   Clear to auscultation bilaterally; respirations regular, even, and unlabored bilaterally   Heart:  Regular rate and rhythm, no murmurs appreciated   Abdomen:   Soft, non-tender, non-distended, and no organomegaly                 Anticoagulation Visit on 01/26/2024   Component Date Value Ref Range Status   • Protime 01/26/2024 23.0 (H)  10.0 - 13.8 seconds Final   • INR 01/26/2024 1.9 (H)  0.91 - 1.09 Final        No results found.    ASSESSMENT: The patient is a very pleasant 78 y.o. male  with secondary polycythemia      PLAN:    1.  Secondary polycythemia:  A.  I did go over the blood work results with the patient from January 15, 2024.  I explained to the patient that his hematocrit was slightly elevated at 54 with normal hemoglobin at 17.9.  His white cells and platelets were normal.  His JAK2 mutation came back negative and his serum electro protein level is normal.  B.  I will continue monitoring.  C.  Will consider phlebotomy for hematocrit more than 55.  D.  The patient was advised to keep himself well-hydrated.  E.  I did recommend repeating a sleep study since it has been more than 10 years since he had 1.  The patient would like to discuss this further with his primary care provider.    2.  Hypertension:  A.  The patient continue metoprolol and Norvasc     3.  Atrial fibrillation:  A.  The patient is on chronic anticoagulation with warfarin.    FOLLOW UP: 6 months with a CBC if it remains stable I will switch him to annual visits.    Antonino Molina MD  1/29/2024

## 2024-02-09 ENCOUNTER — ANTICOAGULATION VISIT (OUTPATIENT)
Dept: PHARMACY | Facility: HOSPITAL | Age: 79
End: 2024-02-09
Payer: MEDICARE

## 2024-02-09 DIAGNOSIS — I48.20 CHRONIC ATRIAL FIBRILLATION: Primary | ICD-10-CM

## 2024-02-09 LAB
INR PPP: 1.9 (ref 0.91–1.09)
PROTHROMBIN TIME: 23 SECONDS (ref 10–13.8)

## 2024-02-09 PROCEDURE — 85610 PROTHROMBIN TIME: CPT

## 2024-02-09 PROCEDURE — 36416 COLLJ CAPILLARY BLOOD SPEC: CPT

## 2024-02-09 PROCEDURE — G0463 HOSPITAL OUTPT CLINIC VISIT: HCPCS

## 2024-02-09 NOTE — PROGRESS NOTES
Anticoagulation Clinic Progress Note  Indication: atrial fibrillation  Referring Provider: Sravani [last appt 9/8/23  next:  9/16/24 (Will Sue)]  Initial Warfarin Start Date: 2008  Goal INR: 2 - 3  Current Drug Interactions: fluoxetine, glimepiride, melatonin (PRN), mirtazepine, MVI  CHADS-VASc: 3 (age, HTN, DM)    EtOH: none  GLV: Salad (Spinach salad 1x weekly or garden salad) and serving of broccoli once a week (2/9/24)  OTC Pain Relief: Uses APAP prn (~1x/week)    Anticoagulation Clinic INR History:  Date 8/20 9/4 10/2 11/6 11/15 11/29 12/31 1/7/19 1/17 1/29 2/12   Total Weekly Dose 17.5  mg 17.5 mg 17.5 mg 10mg 20mg 17.5mg 17.5mg 18.75 mg 17.5 mg 18.75 mg 20mg   INR 2.3 2.3 2.2 1.3 2.3 2.2 1.7 1.9 1.9 1.9 2.0   Notes    pre- epidural   missed dose?         Date 2/27 3/27 4/10 4/23 5/7 5/28 6/25 7/30 8/28 9/25 10/23   Total Weekly Dose 20mg 20mg 21.25 mg 21.25 mg 21.25 mg 21.25mg 21.25mg 21.25mg 21.25mg 21.25mg 21.25mg   INR 2.2 1.8 2.5 1.8 2.4 2.4 2.5 2.7 2.6 3.0 2.9   Notes sick   post- scope            Date  11/6 11/13 12/4 1/3 1/15 1/20 1/31 2/10 2/28 4/14 5/26   Total Weekly Dose 21.25 mg 18.75 mg 21.25 mg 21.25 mg 21.25 mg 20 mg 21.25 21.25 20mg 21.25mg 21.25mg   INR 4.2 3.2 2.7 2.2 3.1 3.1 2.9 3.5 2.4 2.3 2.5   Notes apap Hold x1, less GLV Inc in GLV  doxy doxy  1x dose cephalexin 1x dose dec; keflex       Date  6/24 7/22 8/24 9/10 10/8 11/5 11/18 12/2 12/30 1/26 2/22 3/22 4/19   Total Weekly Dose 21.25mg 21.25 21.25mg 21.25mg 21.25mg 21.25mg 20mg 20mg 20mg 20mg  20mg 20 mg 20mg   INR 2.4 2.4 3.0 2.8 3.0 3.1 2.5 2.5 2.5 2.6 2.9  2 2.2   Notes      Inc. GLV Dose dec    desvenlafaxine broccoli      Date 5/13 6/16  7/7 7/21 8/11 9/1 9/29 10/27 11/3 12/1 12/9 12/16   Total WeeklyDose 20 mg 20mg  20 mg 18.75mg  17.5 mg 17.5mg 17.5 17.5mg 17.5mg 17.5mg 18.75mg 22.5mg   INR 2.5 3.2 3.1 3.0 2.2 2.4 2.2 2.4 2.3 1.5 1.6 2.2   Notes  apap Inc GLV     augmentin day 3 augementin glucerna  Boost x2     Date  12/28 1/13/22 2/8 2/16 3/1 3/22 4/12 5/10 6/7 7/5 8/2    Total WeeklyDose 21.25mg 21.25mg 21.25 mg 21.25mg 22.5mg 22.5 mg 22.5mg 22.5 mg 22.5 mg 22.5mg 22.5mg EGD x 3 days hold   INR 2.6 2.3 1.7 2.1 2.5 2.0 2.3 2.4 2.1 2.1 2.8    Notes    Dec GLV        Boost x2     Date 8/25 9/8 9/29 10/27 12/1 1/5/23 2/2 3/2 3/30 4/14 4/20 5/18   Total WeeklyDose 18.75mg 22.5mg 22.5 mg 22.5 mg 22.5 mg 22.5mg 22.5 mg 22.5 mg 22.5 mg 22.5mg 22.5 mg  22.5 mg   INR 2.7 2.7 2.5 2.2 2.3 2.3 2.7 3.0 2.5 2.6 2.6 3.0   Notes omeprazole initi         doxy Doxy       Date 7/12 8/9 9/11 10/9 11/6 11/30 12/12 12/21 1/4 1/26/24 2/9    Total Weekly Dose 22.5 mg 22.5 mg 22.5mg 22.5 mg 22.5mg 22.5 mg 22.5mg 21.25 mg  21.25 mg     INR 2.7 2.8 2.5 2.5 2.3 3.5 3.2 2.4 2.1 1.9     Notes          Increase LGV       Clinic Interview:  Verbal Release Authorization signed on 6/27/18 -- may speak with Sussy (wife), Nikolai (son)  Tablet Strength: pt has 2.5mg tablets  Phone: [phone] (Home), [phone]  Fax: [phone] (Attn: Tasha) [facility]      Patient Findings    Negatives: Signs/symptoms of thrombosis, Signs/symptoms of bleeding, Laboratory test error suspected, Change in health, Change in alcohol use, Change in activity, Upcoming invasive procedure, Emergency department visit, Upcoming dental procedure, Missed doses, Extra doses, Change in medications, Change in diet/appetite, Hospital admission, Bruising, Other complaints   Comments: Mr. Wolf denies all the above listed findings.       Plan:   1. INR slightly Subtherapeutic again today at 1.9 (goal 2.0-3.0). Instructed Mr. Wolf to increase maintenance dose to warfarin 3.75 mg daily except warfarin 2.5 mg on Sunday, Tuesday and Thursday until recheck.   2. RTC in 2 weeks, 2/23 to ensure INR WNL.   3. Verbal and written information was provided to Mr. Wolf and Mrs. Wolf in clinic. Mr. Wolf voiced understanding with teach back and has no further questions at this  time.    Mague Mendoza, [facility]  02/09/24   11:35 EST

## 2024-02-21 ENCOUNTER — OFFICE VISIT (OUTPATIENT)
Dept: ENDOCRINOLOGY | Facility: CLINIC | Age: 79
End: 2024-02-21
Payer: MEDICARE

## 2024-02-21 VITALS
BODY MASS INDEX: 32.1 KG/M2 | SYSTOLIC BLOOD PRESSURE: 130 MMHG | HEIGHT: 72 IN | HEART RATE: 79 BPM | OXYGEN SATURATION: 98 % | WEIGHT: 237 LBS | DIASTOLIC BLOOD PRESSURE: 77 MMHG

## 2024-02-21 DIAGNOSIS — R80.9 TYPE 2 DIABETES MELLITUS WITH MICROALBUMINURIA, WITHOUT LONG-TERM CURRENT USE OF INSULIN: ICD-10-CM

## 2024-02-21 DIAGNOSIS — E11.65 TYPE 2 DIABETES MELLITUS WITH HYPERGLYCEMIA, WITHOUT LONG-TERM CURRENT USE OF INSULIN: Primary | ICD-10-CM

## 2024-02-21 DIAGNOSIS — E11.29 TYPE 2 DIABETES MELLITUS WITH MICROALBUMINURIA, WITHOUT LONG-TERM CURRENT USE OF INSULIN: ICD-10-CM

## 2024-02-21 DIAGNOSIS — E78.5 HYPERLIPIDEMIA LDL GOAL <100: ICD-10-CM

## 2024-02-21 DIAGNOSIS — I10 ESSENTIAL HYPERTENSION: ICD-10-CM

## 2024-02-21 LAB
EXPIRATION DATE: ABNORMAL
EXPIRATION DATE: NORMAL
GLUCOSE BLDC GLUCOMTR-MCNC: 108 MG/DL (ref 70–130)
HBA1C MFR BLD: 6.1 % (ref 4.5–5.7)
Lab: ABNORMAL
Lab: NORMAL

## 2024-02-21 NOTE — ASSESSMENT & PLAN NOTE
Diabetes is stable.   Continue current treatment regimen.  Diabetes will be reassessed in 6 months.

## 2024-02-21 NOTE — PROGRESS NOTES
"     Office Note      Date: 2024  Patient Name: Tony Wlof  MRN: [id number]  : 1945    Chief Complaint   Patient presents with    Diabetes       History of Present Illness:   Tony Wolf is a 78 y.o. male who presents for Diabetes type 2. Diagnosed in: . Treated in past with oral agents. Current treatments: glimepiride. Number of insulin shots per day: none. Checks blood sugar 1 time a day. Has low blood sugar: no. Aspirin use: No - on warfarin. Statin use: Yes. ACE-I/ARB use: Yes. Changes in health since last visit: toe fracture. Last eye exam 2023.      Subjective      Diabetic Complications:  Eyes: No  Kidneys: Yes - microalbuminuria  Feet: Yes - h/o foot ulcer  Heart: No    Diet and Exercise:  Meals per day: 3  Minutes of exercise per week: 0 mins.    Review of Systems:   Review of Systems   Constitutional: Negative.    Cardiovascular: Negative.    Gastrointestinal: Negative.    Endocrine: Negative.        The following portions of the patient's history were reviewed and updated as appropriate: allergies, current medications, past family history, past medical history, past social history, past surgical history, and problem list.    Objective     Visit Vitals  /77   Pulse 79   Ht 182.9 cm (72\")   Wt 108 kg (237 lb)   SpO2 98%   BMI 32.14 kg/m²       Physical Exam:  Physical Exam  Constitutional:       Appearance: Normal appearance.   Neurological:      Mental Status: He is alert.         Labs:    HbA1c  Lab Results   Component Value Date    HGBA1C 6.1 (A) 2024       CMP  Lab Results   Component Value Date    GLUCOSE 84 2023    BUN 14 2023    CREATININE 1.06 2023    EGFRIFNONA 76 2021    BCR 13.2 2023    K 4.2 2023    CO2 24.0 2023    CALCIUM 9.6 2023    AST 39 2023    ALT 28 2023        Lipid Panel  Lab Results   Component Value Date    CHLPL 172 2016    HDL 33 (L) 2023     (H) 2023    " TRIG 162 (H) 12/07/2023        TSH  Lab Results   Component Value Date    TSH 2.460 06/13/2023    FREET4 0.98 06/09/2015        Hemoglobin A1C  Lab Results   Component Value Date    HGBA1C 6.1 (A) 02/21/2024        Microalbumin/Creatinine  Lab Results   Component Value Date    GURJIT 196.6 10/02/2023    MICROALBUR 19.4 10/02/2023           Assessment / Plan      Assessment & Plan:  Diagnoses and all orders for this visit:    1. Type 2 diabetes mellitus with hyperglycemia, without long-term current use of insulin (Primary)  Assessment & Plan:  Diabetes is stable.   Continue current treatment regimen.  Diabetes will be reassessed in 6 months.    Orders:  -     POC Glucose, Blood  -     POC Glycosylated Hemoglobin (Hb A1C)    2. Essential hypertension  Assessment & Plan:  BP improving.  Continue current tx.  Plan to check BP again next visit.      3. Hyperlipidemia LDL goal <100  Assessment & Plan:  Continue statin.  Plan to check lipids next visit.      4. Type 2 diabetes mellitus with microalbuminuria, without long-term current use of insulin  Assessment & Plan:  Continue ACE-I.  Plan to check microalbumin next visit.        Current Outpatient Medications   Medication Instructions    Accu-Chek Softclix Lancets lancets USE TO TEST BLOOD SUGAR THREE TIMES A DAY    acetaminophen (TYLENOL) 1,000 mg, Oral, Every 6 Hours PRN    amLODIPine (NORVASC) 10 MG tablet TAKE ONE TABLET BY MOUTH DAILY AS DIRECTED    bimatoprost (Lumigan) 0.01 % ophthalmic drops 1 drop, Both Eyes, Nightly    glimepiride (AMARYL) 2 MG tablet TAKE ONE TABLET BY MOUTH DAILY    glucose blood (Accu-Chek Kandis Plus) test strip USE ONE STRIP TO TEST THREE TIMES A DAY    latanoprost (XALATAN) 0.005 % ophthalmic solution 1 drop, Both Eyes, Nightly    lisinopril (PRINIVIL,ZESTRIL) 40 mg, Oral, Daily    Loratadine 10 MG capsule 1 capsule, Oral, Daily    Melatonin 2.5 MG chewable tablet 1 tablet, Oral, Nightly PRN    metoprolol succinate XL (TOPROL-XL) 100  MG 24 hr tablet TAKE ONE TABLET BY MOUTH TWICE A DAY    mirtazapine (REMERON) 45 mg, Oral, Nightly    pravastatin (PRAVACHOL) 20 mg, Oral, Daily    venlafaxine XR (EFFEXOR-XR) 75 mg, Oral, Daily    Vitamin D-3 1,000 Units, Oral, Daily    warfarin (COUMADIN) 2.5 MG tablet Take 1 to 1.5 tablets by mouth daily or as directed by the anticoagulation clinic.      Return in about 6 months (around 8/21/2024) for Recheck with A1c, CMP, lipid, TSH, microalbumin, foot exam.    Brown Gallo MD   02/21/2024

## 2024-02-23 ENCOUNTER — ANTICOAGULATION VISIT (OUTPATIENT)
Dept: PHARMACY | Facility: HOSPITAL | Age: 79
End: 2024-02-23
Payer: MEDICARE

## 2024-02-23 DIAGNOSIS — I48.20 CHRONIC ATRIAL FIBRILLATION: Primary | ICD-10-CM

## 2024-02-23 LAB
INR PPP: 1.8 (ref 0.91–1.09)
PROTHROMBIN TIME: 21.3 SECONDS (ref 10–13.8)

## 2024-02-23 PROCEDURE — 36416 COLLJ CAPILLARY BLOOD SPEC: CPT

## 2024-02-23 PROCEDURE — 85610 PROTHROMBIN TIME: CPT

## 2024-02-23 PROCEDURE — G0463 HOSPITAL OUTPT CLINIC VISIT: HCPCS

## 2024-02-23 NOTE — PROGRESS NOTES
Anticoagulation Clinic Progress Note  Indication: atrial fibrillation  Referring Provider: Sravani [last appt 9/8/23  next:  9/16/24 (Will Sue)]  Initial Warfarin Start Date: 2008  Goal INR: 2 - 3  Current Drug Interactions: fluoxetine, glimepiride, melatonin (PRN), mirtazepine, MVI  CHADS-VASc: 3 (age, HTN, DM)    EtOH: none  GLV: Salad (Spinach salad 1x weekly or garden salad) and serving of broccoli once a week (2/9/24)  OTC Pain Relief: Uses APAP prn (~1x/week)    Anticoagulation Clinic INR History:  Date 8/20 9/4 10/2 11/6 11/15 11/29 12/31 1/7/19 1/17 1/29 2/12   Total Weekly Dose 17.5  mg 17.5 mg 17.5 mg 10mg 20mg 17.5mg 17.5mg 18.75 mg 17.5 mg 18.75 mg 20mg   INR 2.3 2.3 2.2 1.3 2.3 2.2 1.7 1.9 1.9 1.9 2.0   Notes    pre- epidural   missed dose?         Date 2/27 3/27 4/10 4/23 5/7 5/28 6/25 7/30 8/28 9/25 10/23   Total Weekly Dose 20mg 20mg 21.25 mg 21.25 mg 21.25 mg 21.25mg 21.25mg 21.25mg 21.25mg 21.25mg 21.25mg   INR 2.2 1.8 2.5 1.8 2.4 2.4 2.5 2.7 2.6 3.0 2.9   Notes sick   post- scope            Date  11/6 11/13 12/4 1/3 1/15 1/20 1/31 2/10 2/28 4/14 5/26   Total Weekly Dose 21.25 mg 18.75 mg 21.25 mg 21.25 mg 21.25 mg 20 mg 21.25 21.25 20mg 21.25mg 21.25mg   INR 4.2 3.2 2.7 2.2 3.1 3.1 2.9 3.5 2.4 2.3 2.5   Notes apap Hold x1, less GLV Inc in GLV  doxy doxy  1x dose cephalexin 1x dose dec; keflex       Date  6/24 7/22 8/24 9/10 10/8 11/5 11/18 12/2 12/30 1/26 2/22 3/22 4/19   Total Weekly Dose 21.25mg 21.25 21.25mg 21.25mg 21.25mg 21.25mg 20mg 20mg 20mg 20mg  20mg 20 mg 20mg   INR 2.4 2.4 3.0 2.8 3.0 3.1 2.5 2.5 2.5 2.6 2.9  2 2.2   Notes      Inc. GLV Dose dec    desvenlafaxine broccoli      Date 5/13 6/16  7/7 7/21 8/11 9/1 9/29 10/27 11/3 12/1 12/9 12/16   Total WeeklyDose 20 mg 20mg  20 mg 18.75mg  17.5 mg 17.5mg 17.5 17.5mg 17.5mg 17.5mg 18.75mg 22.5mg   INR 2.5 3.2 3.1 3.0 2.2 2.4 2.2 2.4 2.3 1.5 1.6 2.2   Notes  apap Inc GLV     augmentin day 3 augementin glucerna  Boost x2     Date  12/28 1/13/22 2/8 2/16 3/1 3/22 4/12 5/10 6/7 7/5 8/2    Total WeeklyDose 21.25mg 21.25mg 21.25 mg 21.25mg 22.5mg 22.5 mg 22.5mg 22.5 mg 22.5 mg 22.5mg 22.5mg EGD x 3 days hold   INR 2.6 2.3 1.7 2.1 2.5 2.0 2.3 2.4 2.1 2.1 2.8    Notes    Dec GLV        Boost x2     Date 8/25 9/8 9/29 10/27 12/1 1/5/23 2/2 3/2 3/30 4/14 4/20 5/18   Total WeeklyDose 18.75mg 22.5mg 22.5 mg 22.5 mg 22.5 mg 22.5mg 22.5 mg 22.5 mg 22.5 mg 22.5mg 22.5 mg  22.5 mg   INR 2.7 2.7 2.5 2.2 2.3 2.3 2.7 3.0 2.5 2.6 2.6 3.0   Notes omeprazole initi         doxy Doxy       Date 7/12 8/9 9/11 10/9 11/6 11/30 12/12 12/21 1/4 1/26/24 2/9 2/23   Total Weekly Dose 22.5 mg 22.5 mg 22.5mg 22.5 mg 22.5mg 22.5 mg 22.5mg 21.25 mg  21.25 mg 21.25mg     INR 2.7 2.8 2.5 2.5 2.3 3.5 3.2 2.4 2.1 1.9 1.9 1.8   Notes          Increase LGV       Clinic Interview:  Verbal Release Authorization signed on 6/27/18 -- may speak with Sussy (wife), Nikolai (son)  Tablet Strength: pt has 2.5mg tablets  Phone: [phone] (Home), [phone]  Fax: [phone] (Attn: Tasha) [facility]      Patient Findings  Negatives: Signs/symptoms of thrombosis, Signs/symptoms of bleeding, Laboratory test error suspected, Change in health, Change in alcohol use, Change in activity, Upcoming invasive procedure, Emergency department visit, Upcoming dental procedure, Missed doses, Extra doses, Change in medications, Change in diet/appetite, Hospital admission, Bruising, Other complaints   Comments: Possibly extra GLV       Plan:   1. INR Subtherapeutic again today at 1.8 (goal 2.0-3.0). Instructed Mr. Wolf to boost tonight's dose to 5 mg then increase maintenance dose to warfarin 3.75 mg daily except warfarin 2.5 mg on SundayThursday until recheck.   2. RTC in 2 weeks to ensure INR WNL.   3. Verbal and written information was provided to Mr. Wolf and Mrs. Wolf in clinic. Mr. Wolf voiced understanding with teach back and has no further questions at this  time.    Teena Boykin, PharmD  02/23/24   11:28 EST

## 2024-03-07 ENCOUNTER — OFFICE VISIT (OUTPATIENT)
Dept: INTERNAL MEDICINE | Facility: CLINIC | Age: 79
End: 2024-03-07
Payer: MEDICARE

## 2024-03-07 VITALS
WEIGHT: 236.2 LBS | OXYGEN SATURATION: 95 % | HEART RATE: 82 BPM | BODY MASS INDEX: 31.99 KG/M2 | HEIGHT: 72 IN | DIASTOLIC BLOOD PRESSURE: 72 MMHG | SYSTOLIC BLOOD PRESSURE: 122 MMHG

## 2024-03-07 DIAGNOSIS — I10 ESSENTIAL HYPERTENSION: ICD-10-CM

## 2024-03-07 DIAGNOSIS — E78.5 HYPERLIPIDEMIA LDL GOAL <100: Primary | ICD-10-CM

## 2024-03-07 NOTE — PROGRESS NOTES
"Subjective   Tony Wolf is a 78 y.o. male.   Chief Complaint   Patient presents with    Hyperlipidemia    Hypertension       History of Present Illness   Here for f/u on chronic conditions: HTN and HLP. HTN controlled with Norvasc, Lisinopril, and Toprol. HLP controlled with Pravastatin. Sees Endo for his DM.   The following portions of the patient's history were reviewed and updated as appropriate: allergies, current medications, past family history, past medical history, past social history, past surgical history, and problem list.    Review of Systems   Constitutional:  Negative for fatigue and fever.   Respiratory:  Negative for cough.    Cardiovascular:  Negative for chest pain and leg swelling.   Psychiatric/Behavioral:  Negative for confusion.      /72   Pulse 82   Ht 182.9 cm (72\")   Wt 107 kg (236 lb 3.2 oz)   SpO2 95%   BMI 32.03 kg/m²     Objective   Physical Exam  Vitals reviewed.   Cardiovascular:      Rate and Rhythm: Normal rate and regular rhythm.   Pulmonary:      Effort: No respiratory distress.      Breath sounds: No wheezing.   Neurological:      Mental Status: He is alert and oriented to person, place, and time.         Assessment & Plan   Diagnoses and all orders for this visit:    1. Hyperlipidemia LDL goal <100 (Primary)  -     Lipid Panel; Future  Continue Pravastatin 20 mg po qhs  2. Essential hypertension  -     Comprehensive Metabolic Panel; Future  Continue Norvasc 10 mg po qd, Lisinopril 40 mg po qd, and Toprol  mg po qd               "

## 2024-03-08 ENCOUNTER — ANTICOAGULATION VISIT (OUTPATIENT)
Dept: PHARMACY | Facility: HOSPITAL | Age: 79
End: 2024-03-08
Payer: MEDICARE

## 2024-03-08 DIAGNOSIS — I48.20 CHRONIC ATRIAL FIBRILLATION: Primary | ICD-10-CM

## 2024-03-08 LAB
INR PPP: 2.2
INR PPP: 2.2 (ref 0.91–1.09)
PROTHROMBIN TIME: 26.8 SECONDS (ref 10–13.8)

## 2024-03-08 PROCEDURE — 36416 COLLJ CAPILLARY BLOOD SPEC: CPT

## 2024-03-08 PROCEDURE — 85610 PROTHROMBIN TIME: CPT

## 2024-03-08 PROCEDURE — G0463 HOSPITAL OUTPT CLINIC VISIT: HCPCS

## 2024-03-08 NOTE — PROGRESS NOTES
Anticoagulation Clinic Progress Note  Indication: atrial fibrillation  Referring Provider: Sravani [last appt 9/8/23  next:  9/16/24 (Will Sue)]  Initial Warfarin Start Date: 2008  Goal INR: 2 - 3  Current Drug Interactions: fluoxetine, glimepiride, melatonin (PRN), mirtazepine, MVI  CHADS-VASc: 3 (age, HTN, DM)    EtOH: none  GLV: Salad (Spinach salad 1x weekly or garden salad) and serving of broccoli once a week (2/9/24)  OTC Pain Relief: Uses APAP prn (~1x/week)    Anticoagulation Clinic INR History:  Date 8/20 9/4 10/2 11/6 11/15 11/29 12/31 1/7/19 1/17 1/29 2/12   Total Weekly Dose 17.5  mg 17.5 mg 17.5 mg 10mg 20mg 17.5mg 17.5mg 18.75 mg 17.5 mg 18.75 mg 20mg   INR 2.3 2.3 2.2 1.3 2.3 2.2 1.7 1.9 1.9 1.9 2.0   Notes    pre- epidural   missed dose?         Date 2/27 3/27 4/10 4/23 5/7 5/28 6/25 7/30 8/28 9/25 10/23   Total Weekly Dose 20mg 20mg 21.25 mg 21.25 mg 21.25 mg 21.25mg 21.25mg 21.25mg 21.25mg 21.25mg 21.25mg   INR 2.2 1.8 2.5 1.8 2.4 2.4 2.5 2.7 2.6 3.0 2.9   Notes sick   post- scope            Date  11/6 11/13 12/4 1/3 1/15 1/20 1/31 2/10 2/28 4/14 5/26   Total Weekly Dose 21.25 mg 18.75 mg 21.25 mg 21.25 mg 21.25 mg 20 mg 21.25 21.25 20mg 21.25mg 21.25mg   INR 4.2 3.2 2.7 2.2 3.1 3.1 2.9 3.5 2.4 2.3 2.5   Notes apap Hold x1, less GLV Inc in GLV  doxy doxy  1x dose cephalexin 1x dose dec; keflex       Date  6/24 7/22 8/24 9/10 10/8 11/5 11/18 12/2 12/30 1/26 2/22 3/22 4/19   Total Weekly Dose 21.25mg 21.25 21.25mg 21.25mg 21.25mg 21.25mg 20mg 20mg 20mg 20mg  20mg 20 mg 20mg   INR 2.4 2.4 3.0 2.8 3.0 3.1 2.5 2.5 2.5 2.6 2.9  2 2.2   Notes      Inc. GLV Dose dec    desvenlafaxine broccoli      Date 5/13 6/16  7/7 7/21 8/11 9/1 9/29 10/27 11/3 12/1 12/9 12/16   Total WeeklyDose 20 mg 20mg  20 mg 18.75mg  17.5 mg 17.5mg 17.5 17.5mg 17.5mg 17.5mg 18.75mg 22.5mg   INR 2.5 3.2 3.1 3.0 2.2 2.4 2.2 2.4 2.3 1.5 1.6 2.2   Notes  apap Inc GLV     augmentin day 3 augementin glucerna  Boost x2     Date  12/28 1/13/22 2/8 2/16 3/1 3/22 4/12 5/10 6/7 7/5 8/2    Total WeeklyDose 21.25mg 21.25mg 21.25 mg 21.25mg 22.5mg 22.5 mg 22.5mg 22.5 mg 22.5 mg 22.5mg 22.5mg EGD x 3 days hold   INR 2.6 2.3 1.7 2.1 2.5 2.0 2.3 2.4 2.1 2.1 2.8    Notes    Dec GLV        Boost x2     Date 8/25 9/8 9/29 10/27 12/1 1/5/23 2/2 3/2 3/30 4/14 4/20 5/18   Total WeeklyDose 18.75mg 22.5mg 22.5 mg 22.5 mg 22.5 mg 22.5mg 22.5 mg 22.5 mg 22.5 mg 22.5mg 22.5 mg  22.5 mg   INR 2.7 2.7 2.5 2.2 2.3 2.3 2.7 3.0 2.5 2.6 2.6 3.0   Notes omeprazole initi         doxy Doxy       Date 7/12 8/9 9/11 10/9 11/6 11/30 12/12 12/21 1/4 1/26/24 2/9 2/23   Total Weekly Dose 22.5 mg 22.5 mg 22.5mg 22.5 mg 22.5mg 22.5 mg 22.5mg 21.25 mg  21.25 mg 21.25mg     INR 2.7 2.8 2.5 2.5 2.3 3.5 3.2 2.4 2.1 1.9 1.9 1.8   Notes          Increase LGV       Date 3/8   Total Weekly Dose 23.75 mg   INR 2.2   Notes        Clinic Interview:  Verbal Release Authorization signed on 6/27/18 -- may speak with Sussy (wife), Nikolai (son)  Tablet Strength: pt has 2.5mg tablets  Phone: [phone] (Home), [phone]  Fax: [phone] (Attn: Tasha) [facility]    Patient Findings    Negatives: Signs/symptoms of thrombosis, Signs/symptoms of bleeding, Laboratory test error suspected, Change in health, Change in alcohol use, Change in activity, Upcoming invasive procedure, Emergency department visit, Upcoming dental procedure, Missed doses, Extra doses, Change in medications, Change in diet/appetite, Hospital admission, Bruising, Other complaints   Comments: All findings negative.     Plan:   1. INR therapeutic again today at 2.2 (goal 2.0-3.0). Instructed Mr. Wolf to continue recently increased maintenance dose to warfarin 3.75 mg daily except warfarin 2.5 mg on SundayThursday until recheck.   2. RTC in 4 weeks per patient preference: 4/5/24  3. Verbal and written information was provided to Mr. Wolf and Mrs. Wolf in clinic. Mr. Wolf voiced understanding with teach  back and has no further questions at this time.    Seema Baron, [facility]  03/08/24   11:35 EST

## 2024-03-14 RX ORDER — PRAVASTATIN SODIUM 20 MG
20 TABLET ORAL DAILY
Qty: 90 TABLET | Refills: 0 | Status: SHIPPED | OUTPATIENT
Start: 2024-03-14

## 2024-03-23 DIAGNOSIS — E11.9 TYPE 2 DIABETES MELLITUS WITHOUT COMPLICATION, WITHOUT LONG-TERM CURRENT USE OF INSULIN: ICD-10-CM

## 2024-03-25 RX ORDER — GLIMEPIRIDE 2 MG/1
2 TABLET ORAL DAILY
Qty: 90 TABLET | Refills: 0 | Status: SHIPPED | OUTPATIENT
Start: 2024-03-25

## 2024-04-02 DIAGNOSIS — I48.91 ATRIAL FIBRILLATION, UNSPECIFIED TYPE: ICD-10-CM

## 2024-04-02 RX ORDER — WARFARIN SODIUM 2.5 MG/1
TABLET ORAL
Qty: 120 TABLET | Refills: 1 | Status: SHIPPED | OUTPATIENT
Start: 2024-04-02

## 2024-04-05 ENCOUNTER — ANTICOAGULATION VISIT (OUTPATIENT)
Dept: PHARMACY | Facility: HOSPITAL | Age: 79
End: 2024-04-05
Payer: MEDICARE

## 2024-04-05 DIAGNOSIS — I48.20 CHRONIC ATRIAL FIBRILLATION: Primary | ICD-10-CM

## 2024-04-05 LAB
INR PPP: 2.3 (ref 0.91–1.09)
PROTHROMBIN TIME: 27.9 SECONDS (ref 10–13.8)

## 2024-04-05 PROCEDURE — G0463 HOSPITAL OUTPT CLINIC VISIT: HCPCS

## 2024-04-05 PROCEDURE — 85610 PROTHROMBIN TIME: CPT

## 2024-04-05 PROCEDURE — 36416 COLLJ CAPILLARY BLOOD SPEC: CPT

## 2024-04-05 NOTE — PROGRESS NOTES
Anticoagulation Clinic Progress Note  Indication: atrial fibrillation  Referring Provider: Sravani [last appt 9/8/23  next:  9/16/24 (Will Sue)]  Initial Warfarin Start Date: 2008  Goal INR: 2 - 3  Current Drug Interactions: fluoxetine, glimepiride, melatonin (PRN), mirtazepine, MVI  CHADS-VASc: 3 (age, HTN, DM)    EtOH: none  GLV: Salad (Spinach salad 1x weekly or garden salad) and serving of broccoli once a week (2/9/24)  OTC Pain Relief: Uses APAP prn (~1x/week)    Anticoagulation Clinic INR History:  Date 8/20 9/4 10/2 11/6 11/15 11/29 12/31 1/7/19 1/17 1/29 2/12   Total Weekly Dose 17.5  mg 17.5 mg 17.5 mg 10mg 20mg 17.5mg 17.5mg 18.75 mg 17.5 mg 18.75 mg 20mg   INR 2.3 2.3 2.2 1.3 2.3 2.2 1.7 1.9 1.9 1.9 2.0   Notes    pre- epidural   missed dose?         Date 2/27 3/27 4/10 4/23 5/7 5/28 6/25 7/30 8/28 9/25 10/23   Total Weekly Dose 20mg 20mg 21.25 mg 21.25 mg 21.25 mg 21.25mg 21.25mg 21.25mg 21.25mg 21.25mg 21.25mg   INR 2.2 1.8 2.5 1.8 2.4 2.4 2.5 2.7 2.6 3.0 2.9   Notes sick   post- scope            Date  11/6 11/13 12/4 1/3 1/15 1/20 1/31 2/10 2/28 4/14 5/26   Total Weekly Dose 21.25 mg 18.75 mg 21.25 mg 21.25 mg 21.25 mg 20 mg 21.25 21.25 20mg 21.25mg 21.25mg   INR 4.2 3.2 2.7 2.2 3.1 3.1 2.9 3.5 2.4 2.3 2.5   Notes apap Hold x1, less GLV Inc in GLV  doxy doxy  1x dose cephalexin 1x dose dec; keflex       Date  6/24 7/22 8/24 9/10 10/8 11/5 11/18 12/2 12/30 1/26 2/22 3/22 4/19   Total Weekly Dose 21.25mg 21.25 21.25mg 21.25mg 21.25mg 21.25mg 20mg 20mg 20mg 20mg  20mg 20 mg 20mg   INR 2.4 2.4 3.0 2.8 3.0 3.1 2.5 2.5 2.5 2.6 2.9  2 2.2   Notes      Inc. GLV Dose dec    desvenlafaxine broccoli      Date 5/13 6/16  7/7 7/21 8/11 9/1 9/29 10/27 11/3 12/1 12/9 12/16   Total WeeklyDose 20 mg 20mg  20 mg 18.75mg  17.5 mg 17.5mg 17.5 17.5mg 17.5mg 17.5mg 18.75mg 22.5mg   INR 2.5 3.2 3.1 3.0 2.2 2.4 2.2 2.4 2.3 1.5 1.6 2.2   Notes  apap Inc GLV     augmentin day 3 augementin glucerna  Boost x2     Date  12/28 1/13/22 2/8 2/16 3/1 3/22 4/12 5/10 6/7 7/5 8/2    Total WeeklyDose 21.25mg 21.25mg 21.25 mg 21.25mg 22.5mg 22.5 mg 22.5mg 22.5 mg 22.5 mg 22.5mg 22.5mg EGD x 3 days hold   INR 2.6 2.3 1.7 2.1 2.5 2.0 2.3 2.4 2.1 2.1 2.8    Notes    Dec GLV        Boost x2     Date 8/25 9/8 9/29 10/27 12/1 1/5/23 2/2 3/2 3/30 4/14 4/20 5/18   Total WeeklyDose 18.75mg 22.5mg 22.5 mg 22.5 mg 22.5 mg 22.5mg 22.5 mg 22.5 mg 22.5 mg 22.5mg 22.5 mg  22.5 mg   INR 2.7 2.7 2.5 2.2 2.3 2.3 2.7 3.0 2.5 2.6 2.6 3.0   Notes omeprazole initi         doxy Doxy       Date 7/12 8/9 9/11 10/9 11/6 11/30 12/12 12/21 1/4 1/26/24 2/9 2/23   Total Weekly Dose 22.5 mg 22.5 mg 22.5mg 22.5 mg 22.5mg 22.5 mg 22.5mg 21.25 mg  21.25 mg 21.25mg     INR 2.7 2.8 2.5 2.5 2.3 3.5 3.2 2.4 2.1 1.9 1.9 1.8   Notes          Increase LGV       Date 3/8 4/5   Total Weekly Dose 23.75 mg 23.75 mg   INR 2.2 2.3   Notes       Clinic Interview:  Verbal Release Authorization signed on 6/27/18 -- may speak with Sussy (wife), Nikolai (son)  Tablet Strength: pt has 2.5mg tablets  Phone: [phone] (Home), [phone]  Fax: [phone] (Attn: Tasha) [facility] [place]    Patient Findings    Negatives: Signs/symptoms of thrombosis, Signs/symptoms of bleeding, Laboratory test error suspected, Change in health, Change in alcohol use, Change in activity, Upcoming invasive procedure, Emergency department visit, Upcoming dental procedure, Missed doses, Extra doses, Change in medications, Change in diet/appetite, Hospital admission, Bruising, Other complaints   Comments: All findings negative.     Plan:   1. INR therapeutic again today at 2.3 (goal 2.0-3.0). Instructed Mr. Wolf to continue recently increased maintenance dose to warfarin 3.75 mg daily except warfarin 2.5 mg on SundayThursday until recheck.   2. RTC in 4 weeks per patient preference: 5/3/24  3. Verbal and written information was provided to Mr. Wolf and Mrs. Wolf in clinic. Mr. Wolf voiced  understanding with teach back and has no further questions at this time.    Seema Baron, [facility]  04/05/24   11:29 EDT

## 2024-05-03 ENCOUNTER — ANTICOAGULATION VISIT (OUTPATIENT)
Dept: PHARMACY | Facility: HOSPITAL | Age: 79
End: 2024-05-03
Payer: MEDICARE

## 2024-05-03 DIAGNOSIS — I48.20 CHRONIC ATRIAL FIBRILLATION: Primary | ICD-10-CM

## 2024-05-03 LAB
INR PPP: 2.7 (ref 0.91–1.09)
PROTHROMBIN TIME: 32.9 SECONDS (ref 10–13.8)

## 2024-05-03 PROCEDURE — 36416 COLLJ CAPILLARY BLOOD SPEC: CPT

## 2024-05-03 PROCEDURE — 85610 PROTHROMBIN TIME: CPT

## 2024-05-03 PROCEDURE — G0463 HOSPITAL OUTPT CLINIC VISIT: HCPCS | Performed by: PHARMACIST

## 2024-05-03 NOTE — PROGRESS NOTES
Anticoagulation Clinic Progress Note  Indication: atrial fibrillation  Referring Provider: Sravani [last appt 9/8/23  next:  9/16/24 (Will Sue)]  Initial Warfarin Start Date: 2008  Goal INR: 2 - 3  Current Drug Interactions: fluoxetine, glimepiride, melatonin (PRN), mirtazepine, MVI  CHADS-VASc: 3 (age, HTN, DM)    EtOH: none  GLV: Salad (Spinach salad 1x weekly or garden salad) and serving of broccoli once a week (2/9/24)  OTC Pain Relief: Uses APAP prn (~1x/week)    Anticoagulation Clinic INR History:  Date 8/20 9/4 10/2 11/6 11/15 11/29 12/31 1/7/19 1/17 1/29 2/12   Total Weekly Dose 17.5  mg 17.5 mg 17.5 mg 10mg 20mg 17.5mg 17.5mg 18.75 mg 17.5 mg 18.75 mg 20mg   INR 2.3 2.3 2.2 1.3 2.3 2.2 1.7 1.9 1.9 1.9 2.0   Notes    pre- epidural   missed dose?         Date 2/27 3/27 4/10 4/23 5/7 5/28 6/25 7/30 8/28 9/25 10/23   Total Weekly Dose 20mg 20mg 21.25 mg 21.25 mg 21.25 mg 21.25mg 21.25mg 21.25mg 21.25mg 21.25mg 21.25mg   INR 2.2 1.8 2.5 1.8 2.4 2.4 2.5 2.7 2.6 3.0 2.9   Notes sick   post- scope            Date  11/6 11/13 12/4 1/3 1/15 1/20 1/31 2/10 2/28 4/14 5/26   Total Weekly Dose 21.25 mg 18.75 mg 21.25 mg 21.25 mg 21.25 mg 20 mg 21.25 21.25 20mg 21.25mg 21.25mg   INR 4.2 3.2 2.7 2.2 3.1 3.1 2.9 3.5 2.4 2.3 2.5   Notes apap Hold x1, less GLV Inc in GLV  doxy doxy  1x dose cephalexin 1x dose dec; keflex       Date  6/24 7/22 8/24 9/10 10/8 11/5 11/18 12/2 12/30 1/26 2/22 3/22 4/19   Total Weekly Dose 21.25mg 21.25 21.25mg 21.25mg 21.25mg 21.25mg 20mg 20mg 20mg 20mg  20mg 20 mg 20mg   INR 2.4 2.4 3.0 2.8 3.0 3.1 2.5 2.5 2.5 2.6 2.9  2 2.2   Notes      Inc. GLV Dose dec    desvenlafaxine broccoli      Date 5/13 6/16  7/7 7/21 8/11 9/1 9/29 10/27 11/3 12/1 12/9 12/16   Total WeeklyDose 20 mg 20mg  20 mg 18.75mg  17.5 mg 17.5mg 17.5 17.5mg 17.5mg 17.5mg 18.75mg 22.5mg   INR 2.5 3.2 3.1 3.0 2.2 2.4 2.2 2.4 2.3 1.5 1.6 2.2   Notes  apap Inc GLV     augmentin day 3 augementin glucerna  Boost x2     Date  12/28 1/13/22 2/8 2/16 3/1 3/22 4/12 5/10 6/7 7/5 8/2    Total WeeklyDose 21.25mg 21.25mg 21.25 mg 21.25mg 22.5mg 22.5 mg 22.5mg 22.5 mg 22.5 mg 22.5mg 22.5mg EGD x 3 days hold   INR 2.6 2.3 1.7 2.1 2.5 2.0 2.3 2.4 2.1 2.1 2.8    Notes    Dec GLV        Boost x2     Date 8/25 9/8 9/29 10/27 12/1 1/5/23 2/2 3/2 3/30 4/14 4/20 5/18   Total WeeklyDose 18.75mg 22.5mg 22.5 mg 22.5 mg 22.5 mg 22.5mg 22.5 mg 22.5 mg 22.5 mg 22.5mg 22.5 mg  22.5 mg   INR 2.7 2.7 2.5 2.2 2.3 2.3 2.7 3.0 2.5 2.6 2.6 3.0   Notes omeprazole initi         doxy Doxy       Date 7/12 8/9 9/11 10/9 11/6 11/30 12/12 12/21 1/4 1/26/24 2/9 2/23   Total Weekly Dose 22.5 mg 22.5 mg 22.5mg 22.5 mg 22.5mg 22.5 mg 22.5mg 21.25 mg  21.25 mg 21.25mg     INR 2.7 2.8 2.5 2.5 2.3 3.5 3.2 2.4 2.1 1.9 1.9 1.8   Notes          Increase LGV       Date 3/8 4/5 5/3   Total Weekly Dose 23.75 mg 23.75 mg 23.75mg   INR 2.2 2.3 2.7   Notes        Clinic Interview:  Verbal Release Authorization signed on 6/27/18 -- may speak with Sussy (wife), Nikolai (son)  Tablet Strength: pt has 2.5mg tablets  Phone: [phone] (Home), [phone]  Fax: [phone] (Attn: Tasha) [facility] [place]    Patient Findings    Negatives: Signs/symptoms of thrombosis, Signs/symptoms of bleeding, Laboratory test error suspected, Change in health, Change in alcohol use, Change in activity, Upcoming invasive procedure, Emergency department visit, Upcoming dental procedure, Missed doses, Extra doses, Change in medications, Change in diet/appetite, Hospital admission, Bruising, Other complaints   Comments: All findings negative.     Plan:   1. INR therapeutic again today at 2.7 (goal 2.0-3.0). Instructed Mr. Wolf to continue recently increased maintenance dose to warfarin 3.75 mg daily except warfarin 2.5 mg on SundayThursday until recheck.   2. RTC in 4 weeks per patient preference: 5/31/24  3. Verbal and written information was provided to Mr. Wolf and Mrs. Wolf in clinic.   Mayrann voiced understanding with teach back and has no further questions at this time.    Christiano Miner, PharmD  05/03/24   11:55 EDT

## 2024-05-30 ENCOUNTER — ANTICOAGULATION VISIT (OUTPATIENT)
Dept: PHARMACY | Facility: HOSPITAL | Age: 79
End: 2024-05-30
Payer: MEDICARE

## 2024-05-30 DIAGNOSIS — I48.20 CHRONIC ATRIAL FIBRILLATION: Primary | ICD-10-CM

## 2024-05-30 LAB
INR PPP: 2.9
INR PPP: 2.9 (ref 0.91–1.09)
PROTHROMBIN TIME: 35.3 SECONDS (ref 10–13.8)

## 2024-05-30 PROCEDURE — 85610 PROTHROMBIN TIME: CPT

## 2024-05-30 PROCEDURE — 36416 COLLJ CAPILLARY BLOOD SPEC: CPT

## 2024-05-30 PROCEDURE — G0463 HOSPITAL OUTPT CLINIC VISIT: HCPCS

## 2024-05-30 NOTE — PROGRESS NOTES
Anticoagulation Clinic Progress Note  Indication: atrial fibrillation  Referring Provider: Sravani [last appt 9/8/23  next:  9/16/24 (Will Sue)]  Initial Warfarin Start Date: 2008  Goal INR: 2 - 3  Current Drug Interactions: fluoxetine, glimepiride, melatonin (PRN), mirtazepine, MVI  CHADS-VASc: 3 (age, HTN, DM)    EtOH: none  GLV: Salad (Spinach salad 1x weekly or garden salad) and serving of broccoli once a week (2/9/24)  OTC Pain Relief: Uses APAP prn (~1x/week)    Anticoagulation Clinic INR History:  Date 8/20 9/4 10/2 11/6 11/15 11/29 12/31 1/7/19 1/17 1/29 2/12   Total Weekly Dose 17.5  mg 17.5 mg 17.5 mg 10mg 20mg 17.5mg 17.5mg 18.75 mg 17.5 mg 18.75 mg 20mg   INR 2.3 2.3 2.2 1.3 2.3 2.2 1.7 1.9 1.9 1.9 2.0   Notes    pre- epidural   missed dose?         Date 2/27 3/27 4/10 4/23 5/7 5/28 6/25 7/30 8/28 9/25 10/23   Total Weekly Dose 20mg 20mg 21.25 mg 21.25 mg 21.25 mg 21.25mg 21.25mg 21.25mg 21.25mg 21.25mg 21.25mg   INR 2.2 1.8 2.5 1.8 2.4 2.4 2.5 2.7 2.6 3.0 2.9   Notes sick   post- scope            Date  11/6 11/13 12/4 1/3 1/15 1/20 1/31 2/10 2/28 4/14 5/26   Total Weekly Dose 21.25 mg 18.75 mg 21.25 mg 21.25 mg 21.25 mg 20 mg 21.25 21.25 20mg 21.25mg 21.25mg   INR 4.2 3.2 2.7 2.2 3.1 3.1 2.9 3.5 2.4 2.3 2.5   Notes apap Hold x1, less GLV Inc in GLV  doxy doxy  1x dose cephalexin 1x dose dec; keflex       Date  6/24 7/22 8/24 9/10 10/8 11/5 11/18 12/2 12/30 1/26 2/22 3/22 4/19   Total Weekly Dose 21.25mg 21.25 21.25mg 21.25mg 21.25mg 21.25mg 20mg 20mg 20mg 20mg  20mg 20 mg 20mg   INR 2.4 2.4 3.0 2.8 3.0 3.1 2.5 2.5 2.5 2.6 2.9  2 2.2   Notes      Inc. GLV Dose dec    desvenlafaxine broccoli      Date 5/13 6/16  7/7 7/21 8/11 9/1 9/29 10/27 11/3 12/1 12/9 12/16   Total WeeklyDose 20 mg 20mg  20 mg 18.75mg  17.5 mg 17.5mg 17.5 17.5mg 17.5mg 17.5mg 18.75mg 22.5mg   INR 2.5 3.2 3.1 3.0 2.2 2.4 2.2 2.4 2.3 1.5 1.6 2.2   Notes  apap Inc GLV     augmentin day 3 augementin glucerna  Boost x2     Date  12/28 1/13/22 2/8 2/16 3/1 3/22 4/12 5/10 6/7 7/5 8/2    Total WeeklyDose 21.25mg 21.25mg 21.25 mg 21.25mg 22.5mg 22.5 mg 22.5mg 22.5 mg 22.5 mg 22.5mg 22.5mg EGD x 3 days hold   INR 2.6 2.3 1.7 2.1 2.5 2.0 2.3 2.4 2.1 2.1 2.8    Notes    Dec GLV        Boost x2     Date 8/25 9/8 9/29 10/27 12/1 1/5/23 2/2 3/2 3/30 4/14 4/20 5/18   Total WeeklyDose 18.75mg 22.5mg 22.5 mg 22.5 mg 22.5 mg 22.5mg 22.5 mg 22.5 mg 22.5 mg 22.5mg 22.5 mg  22.5 mg   INR 2.7 2.7 2.5 2.2 2.3 2.3 2.7 3.0 2.5 2.6 2.6 3.0   Notes omeprazole initi         doxy Doxy       Date 7/12 8/9 9/11 10/9 11/6 11/30 12/12 12/21 1/4 1/26/24 2/9 2/23   Total Weekly Dose 22.5 mg 22.5 mg 22.5mg 22.5 mg 22.5mg 22.5 mg 22.5mg 21.25 mg  21.25 mg 21.25mg     INR 2.7 2.8 2.5 2.5 2.3 3.5 3.2 2.4 2.1 1.9 1.9 1.8   Notes          Increase LGV       Date 3/8 4/5 5/3 5/30    Total Weekly Dose 23.75 mg 23.75 mg 23.75mg 23.75 mg    INR 2.2 2.3 2.7 2.9    Notes          Clinic Interview:  Verbal Release Authorization signed on 6/27/18 -- may speak with Sussy (wife), Nikolai (son)  Tablet Strength: pt has 2.5mg tablets  Phone: [phone] (Home), [phone]  Fax: [phone] (Attn: Tasha) [facility]    Patient Findings  Positives: Missed doses   Negatives: Signs/symptoms of thrombosis, Signs/symptoms of bleeding, Laboratory test error suspected, Change in health, Change in alcohol use, Change in activity, Upcoming invasive procedure, Emergency department visit, Upcoming dental procedure, Extra doses, Change in medications, Change in diet/appetite, Hospital admission, Bruising, Other complaints   Comments: Found 1/2 tablet over a week ago on the floor while sweeping. Did not take any extra warfarin when this was noticed given unclear timing of missed dose. No other changes other than large amount amount of ear wax in ear and seeing ENT tomorrow. Apparently this has been a problem in the past. No other changes.       Plan:   1. INR therapeutic again today at  2.9 (goal 2.0-3.0). Instructed Mr. Wolf to continue recently increased maintenance dose of warfarin 3.75 mg daily except warfarin 2.5 mg on SundayThursday until recheck.   2. RTC in 4 weeks, 6/27.  3. Verbal and written information was provided to Mr. Wolf and Mrs. Wolf in clinic. Mr. Wolf voiced understanding with teach back and has no further questions at this time.    Kerwin Perez, PharmD  05/30/24   11:32 EDT

## 2024-06-12 RX ORDER — PRAVASTATIN SODIUM 20 MG
20 TABLET ORAL DAILY
Qty: 30 TABLET | Refills: 0 | Status: SHIPPED | OUTPATIENT
Start: 2024-06-12

## 2024-06-20 ENCOUNTER — OFFICE VISIT (OUTPATIENT)
Dept: INTERNAL MEDICINE | Facility: CLINIC | Age: 79
End: 2024-06-20
Payer: MEDICARE

## 2024-06-20 ENCOUNTER — LAB (OUTPATIENT)
Dept: LAB | Facility: HOSPITAL | Age: 79
End: 2024-06-20
Payer: MEDICARE

## 2024-06-20 VITALS
BODY MASS INDEX: 31.15 KG/M2 | WEIGHT: 230 LBS | OXYGEN SATURATION: 98 % | HEART RATE: 80 BPM | TEMPERATURE: 96.1 F | DIASTOLIC BLOOD PRESSURE: 72 MMHG | HEIGHT: 72 IN | SYSTOLIC BLOOD PRESSURE: 114 MMHG

## 2024-06-20 DIAGNOSIS — F41.1 GENERALIZED ANXIETY DISORDER: ICD-10-CM

## 2024-06-20 DIAGNOSIS — R80.9 TYPE 2 DIABETES MELLITUS WITH MICROALBUMINURIA, WITHOUT LONG-TERM CURRENT USE OF INSULIN: ICD-10-CM

## 2024-06-20 DIAGNOSIS — D75.1 POLYCYTHEMIA: ICD-10-CM

## 2024-06-20 DIAGNOSIS — I48.20 CHRONIC ATRIAL FIBRILLATION: ICD-10-CM

## 2024-06-20 DIAGNOSIS — I10 ESSENTIAL HYPERTENSION: ICD-10-CM

## 2024-06-20 DIAGNOSIS — Z00.00 MEDICARE ANNUAL WELLNESS VISIT, SUBSEQUENT: Primary | ICD-10-CM

## 2024-06-20 DIAGNOSIS — E11.29 TYPE 2 DIABETES MELLITUS WITH MICROALBUMINURIA, WITHOUT LONG-TERM CURRENT USE OF INSULIN: ICD-10-CM

## 2024-06-20 DIAGNOSIS — Z13.29 THYROID DISORDER SCREEN: ICD-10-CM

## 2024-06-20 DIAGNOSIS — E11.9 TYPE 2 DIABETES MELLITUS WITHOUT COMPLICATION, WITHOUT LONG-TERM CURRENT USE OF INSULIN: ICD-10-CM

## 2024-06-20 DIAGNOSIS — E78.5 HYPERLIPIDEMIA LDL GOAL <100: ICD-10-CM

## 2024-06-20 DIAGNOSIS — N32.81 OAB (OVERACTIVE BLADDER): ICD-10-CM

## 2024-06-20 LAB
BASOPHILS # BLD AUTO: 0.06 10*3/MM3 (ref 0–0.2)
BASOPHILS NFR BLD AUTO: 0.8 % (ref 0–1.5)
DEPRECATED RDW RBC AUTO: 51.8 FL (ref 37–54)
EOSINOPHIL # BLD AUTO: 0.17 10*3/MM3 (ref 0–0.4)
EOSINOPHIL NFR BLD AUTO: 2.3 % (ref 0.3–6.2)
ERYTHROCYTE [DISTWIDTH] IN BLOOD BY AUTOMATED COUNT: 14.6 % (ref 12.3–15.4)
HCT VFR BLD AUTO: 54.2 % (ref 37.5–51)
HGB BLD-MCNC: 17.8 G/DL (ref 13–17.7)
IMM GRANULOCYTES # BLD AUTO: 0.01 10*3/MM3 (ref 0–0.05)
IMM GRANULOCYTES NFR BLD AUTO: 0.1 % (ref 0–0.5)
LYMPHOCYTES # BLD AUTO: 2.7 10*3/MM3 (ref 0.7–3.1)
LYMPHOCYTES NFR BLD AUTO: 36.4 % (ref 19.6–45.3)
MCH RBC QN AUTO: 31.4 PG (ref 26.6–33)
MCHC RBC AUTO-ENTMCNC: 32.8 G/DL (ref 31.5–35.7)
MCV RBC AUTO: 95.8 FL (ref 79–97)
MONOCYTES # BLD AUTO: 0.64 10*3/MM3 (ref 0.1–0.9)
MONOCYTES NFR BLD AUTO: 8.6 % (ref 5–12)
NEUTROPHILS NFR BLD AUTO: 3.84 10*3/MM3 (ref 1.7–7)
NEUTROPHILS NFR BLD AUTO: 51.8 % (ref 42.7–76)
NRBC BLD AUTO-RTO: 0 /100 WBC (ref 0–0.2)
PLATELET # BLD AUTO: 157 10*3/MM3 (ref 140–450)
PMV BLD AUTO: 12.3 FL (ref 6–12)
RBC # BLD AUTO: 5.66 10*6/MM3 (ref 4.14–5.8)
WBC NRBC COR # BLD AUTO: 7.42 10*3/MM3 (ref 3.4–10.8)

## 2024-06-20 PROCEDURE — 3078F DIAST BP <80 MM HG: CPT | Performed by: INTERNAL MEDICINE

## 2024-06-20 PROCEDURE — 3074F SYST BP LT 130 MM HG: CPT | Performed by: INTERNAL MEDICINE

## 2024-06-20 PROCEDURE — 80053 COMPREHEN METABOLIC PANEL: CPT

## 2024-06-20 PROCEDURE — 1170F FXNL STATUS ASSESSED: CPT | Performed by: INTERNAL MEDICINE

## 2024-06-20 PROCEDURE — G0439 PPPS, SUBSEQ VISIT: HCPCS | Performed by: INTERNAL MEDICINE

## 2024-06-20 PROCEDURE — 36415 COLL VENOUS BLD VENIPUNCTURE: CPT

## 2024-06-20 PROCEDURE — 80061 LIPID PANEL: CPT

## 2024-06-20 PROCEDURE — 84443 ASSAY THYROID STIM HORMONE: CPT

## 2024-06-20 PROCEDURE — 1125F AMNT PAIN NOTED PAIN PRSNT: CPT | Performed by: INTERNAL MEDICINE

## 2024-06-20 PROCEDURE — 85025 COMPLETE CBC W/AUTO DIFF WBC: CPT

## 2024-06-20 PROCEDURE — 99214 OFFICE O/P EST MOD 30 MIN: CPT | Performed by: INTERNAL MEDICINE

## 2024-06-20 RX ORDER — BLOOD SUGAR DIAGNOSTIC
STRIP MISCELLANEOUS
Qty: 100 EACH | Refills: 6 | Status: CANCELLED | OUTPATIENT
Start: 2024-06-20

## 2024-06-20 RX ORDER — GLIMEPIRIDE 2 MG/1
2 TABLET ORAL DAILY
Qty: 90 TABLET | Refills: 3 | Status: SHIPPED | OUTPATIENT
Start: 2024-06-20

## 2024-06-20 RX ORDER — LANCETS
EACH MISCELLANEOUS
Qty: 100 EACH | Refills: 6 | Status: CANCELLED | OUTPATIENT
Start: 2024-06-20

## 2024-06-20 NOTE — PROGRESS NOTES
The ABCs of the Annual Wellness Visit  Subsequent Medicare Wellness Visit    Subjective    Tony Wolf is a 78 y.o. male who presents for a Subsequent Medicare Wellness Visit.    The following portions of the patient's history were reviewed and   updated as appropriate: allergies, current medications, past family history, past medical history, past social history, past surgical history, and problem list.    Compared to one year ago, the patient feels his physical   health is the same.    Compared to one year ago, the patient feels his mental   health is the same.    Recent Hospitalizations:  He was not admitted to the hospital during the last year.       Current Medical Providers:  Patient Care Team:  Kay Lopez DO as PCP - Family Medicine  Elise Portillo, PharmD as Pharmacist (Pharmacy)  Ama Mccloud, PharmD as Pharmacist (Pharmacy)  Sammy Lassiter MD as Consulting Physician (Cardiology)  Brown Gallo MD as Consulting Physician (Endocrinology)  Antonino Molina MD as Consulting Physician (Hematology and Oncology)  Demario Lyn MD as Consulting Physician (Urology)    Outpatient Medications Prior to Visit   Medication Sig Dispense Refill    Accu-Chek Softclix Lancets lancets USE TO TEST BLOOD SUGAR THREE TIMES A  each 2    acetaminophen (TYLENOL) 500 MG tablet Take 2 tablets by mouth Every 6 (Six) Hours As Needed for Mild Pain.      amLODIPine (NORVASC) 10 MG tablet TAKE ONE TABLET BY MOUTH DAILY AS DIRECTED 90 tablet 3    bimatoprost (Lumigan) 0.01 % ophthalmic drops Administer 1 drop to both eyes Every Night.      Cholecalciferol (VITAMIN D-3) 1000 UNITS capsule Take 1,000 Units by mouth Daily.      glimepiride (AMARYL) 2 MG tablet TAKE 1 TABLET BY MOUTH DAILY 90 tablet 3    glucose blood (Accu-Chek Kandis Plus) test strip USE ONE STRIP TO TEST THREE TIMES A  each 11    latanoprost (XALATAN) 0.005 % ophthalmic solution Administer 1 drop to both eyes Every Night.       lisinopril (PRINIVIL,ZESTRIL) 40 MG tablet TAKE 1 TABLET BY MOUTH DAILY 90 tablet 2    Loratadine 10 MG capsule Take 1 capsule by mouth Daily.      Melatonin 2.5 MG chewable tablet Chew 1 tablet At Night As Needed.      metoprolol succinate XL (TOPROL-XL) 100 MG 24 hr tablet TAKE ONE TABLET BY MOUTH TWICE A  tablet 3    mirtazapine (REMERON) 45 MG tablet Take 1 tablet by mouth Every Night.      pravastatin (PRAVACHOL) 20 MG tablet TAKE 1 TABLET BY MOUTH DAILY 30 tablet 0    venlafaxine XR (EFFEXOR-XR) 75 MG 24 hr capsule Take 1 capsule by mouth Daily.      warfarin (COUMADIN) 2.5 MG tablet TAKE 1 TO 1 AND 1/2 TABLETS BY MOUTH DAILY OR AS DIRECTED BY THE ANTICOAGULATION CLINIC 120 tablet 1     No facility-administered medications prior to visit.       No opioid medication identified on active medication list. I have reviewed chart for other potential  high risk medication/s and harmful drug interactions in the elderly.        Aspirin is not on active medication list.  Aspirin use is not indicated based on review of current medical condition/s. Risk of harm outweighs potential benefits.  .    Patient Active Problem List   Diagnosis    GERD (gastroesophageal reflux disease)    Essential hypertension    Class 1 obesity due to excess calories with serious comorbidity in adult    Obstructive sleep apnea    Generalized anxiety disorder    Chronic atrial fibrillation    PVC's (premature ventricular contractions)    Hyperlipidemia LDL goal <100    Left ventricular hypertrophy    Atypical atrial flutter    Arthritis of knee, right    Status post right knee replacement    Type 2 diabetes mellitus with microalbuminuria, without long-term current use of insulin    Diaphoresis    Type 2 diabetes mellitus with hyperglycemia, without long-term current use of insulin    Permanent atrial fibrillation    Lower urinary tract symptoms (LUTS)    OAB (overactive bladder)    Polycythemia     Advance Care Planning   Advance Care  "Planning     Advance Directive is not on file.  ACP discussion was held with the patient during this visit. Patient has an advance directive (not in EMR), copy requested.     Objective    Vitals:    24 1000   BP: 114/72   BP Location: Left arm   Patient Position: Sitting   Cuff Size: Adult   Pulse: 80   Temp: 96.1 °F (35.6 °C)   TempSrc: Temporal   SpO2: 98%   Weight: 104 kg (230 lb)   Height: 182.5 cm (71.85\")   PainSc:   3   PainLoc: Back     Estimated body mass index is 31.32 kg/m² as calculated from the following:    Height as of this encounter: 182.5 cm (71.85\").    Weight as of this encounter: 104 kg (230 lb).           Does the patient have evidence of cognitive impairment? No          HEALTH RISK ASSESSMENT    Smoking Status:  Social History     Tobacco Use   Smoking Status Never    Passive exposure: Never   Smokeless Tobacco Never     Alcohol Consumption:  Social History     Substance and Sexual Activity   Alcohol Use No     Fall Risk Screen:    STEADI Fall Risk Assessment was completed, and patient is at MODERATE risk for falls. Assessment completed on:2024    Depression Screenin/20/2024    10:06 AM   PHQ-2/PHQ-9 Depression Screening   Little Interest or Pleasure in Doing Things 1-->several days   Feeling Down, Depressed or Hopeless 1-->several days   Trouble Falling or Staying Asleep, or Sleeping Too Much 0-->not at all   Feeling Tired or Having Little Energy 1-->several days   Poor Appetite or Overeating 0-->not at all   Feeling Bad about Yourself - or that You are a Failure or Have Let Yourself or Your Family Down 0-->not at all   Trouble Concentrating on Things, Such as Reading the Newspaper or Watching Television 0-->not at all   Moving or Speaking So Slowly that Other People Could Have Noticed? Or the Opposite - Being So Fidgety 0-->not at all   Thoughts that You Would be Better Off Dead or of Hurting Yourself in Some Way 0-->not at all   PHQ-9: Brief Depression Severity Measure " Score 3   If You Checked Off Any Problems, How Difficult Have These Problems Made It For You to Do Your Work, Take Care of Things at Home, or Get Along with Other People? not difficult at all       Health Habits and Functional and Cognitive Screenin/20/2024    10:04 AM   Functional & Cognitive Status   Do you have difficulty preparing food and eating? No   Do you have difficulty bathing yourself, getting dressed or grooming yourself? No   Do you have difficulty using the toilet? No   Do you have difficulty moving around from place to place? Yes   Do you have trouble with steps or getting out of a bed or a chair? Yes   Current Diet Well Balanced Diet   Dental Exam Up to date   Eye Exam Up to date   Exercise (times per week) 7 times per week   Current Exercises Include Walking   Do you need help using the phone?  No   Are you deaf or do you have serious difficulty hearing?  No   Do you need help to go to places out of walking distance? Yes   Do you need help shopping? Yes   Do you need help preparing meals?  Yes   Do you need help with housework?  Yes   Do you need help with laundry? No   Do you need help taking your medications? No   Do you need help managing money? No   Do you ever drive or ride in a car without wearing a seat belt? No   Have you felt unusual stress, anger or loneliness in the last month? No   Who do you live with? Spouse   If you need help, do you have trouble finding someone available to you? No   Have you been bothered in the last four weeks by sexual problems? No   Do you have difficulty concentrating, remembering or making decisions? No       Age-appropriate Screening Schedule:  Refer to the list below for future screening recommendations based on patient's age, sex and/or medical conditions. Orders for these recommended tests are listed in the plan section. The patient has been provided with a written plan.    Health Maintenance   Topic Date Due    ZOSTER VACCINE (1 of 2) Never done     DXA SCAN  02/06/2020    HEMOGLOBIN A1C  08/21/2024    COVID-19 Vaccine (5 - 2023-24 season) 09/03/2024 (Originally 9/1/2023)    RSV Vaccine - Adults (1 - 1-dose 60+ series) 06/14/2025 (Originally 6/23/2007)    INFLUENZA VACCINE  08/01/2024    URINE MICROALBUMIN  10/02/2024    LIPID PANEL  12/07/2024    DIABETIC EYE EXAM  01/24/2025    ANNUAL WELLNESS VISIT  06/20/2025    BMI FOLLOWUP  06/20/2025    TDAP/TD VACCINES (2 - Td or Tdap) 07/06/2027    COLORECTAL CANCER SCREENING  04/15/2029    HEPATITIS C SCREENING  Completed    Pneumococcal Vaccine 65+  Completed                  CMS Preventative Services Quick Reference  Risk Factors Identified During Encounter  Fall Risk-High or Moderate: Discussed Fall Prevention in the home  The above risks/problems have been discussed with the patient.  Pertinent information has been shared with the patient in the After Visit Summary.  An After Visit Summary and PPPS were made available to the patient.    Follow Up:   Next Medicare Wellness visit to be scheduled in 1 year.       Additional E&M Note during same encounter follows:  Patient has multiple medical problems which are significant and separately identifiable that require additional work above and beyond the Medicare Wellness Visit.      Chief Complaint  Medicare Wellness-subsequent, Hyperlipidemia, Hypertension, Atrial Fibrillation, Anxiety, and Diabetes    Subjective        HPI  Tony Wolf is also being seen today for HTN, HLP, DM, Afib, and anxiety. HTN controlled with Norvasc, Lisinopril, and Toprol. Anxiety controlled with Effexor. Dm controlled with Amaryl. Afib controlled with Coumadin.    Review of Systems   Constitutional:  Negative for fatigue and fever.   HENT:  Negative for sinus pressure and sneezing.    Respiratory:  Negative for cough and shortness of breath.    Gastrointestinal:  Negative for abdominal pain, constipation and diarrhea.   Musculoskeletal:  Positive for arthralgias.   Neurological:   "Negative for confusion.       Objective   Vital Signs:  /72 (BP Location: Left arm, Patient Position: Sitting, Cuff Size: Adult)   Pulse 80   Temp 96.1 °F (35.6 °C) (Temporal)   Ht 182.5 cm (71.85\")   Wt 104 kg (230 lb)   SpO2 98%   BMI 31.32 kg/m²     Physical Exam  Vitals reviewed.   Cardiovascular:      Rate and Rhythm: Normal rate and regular rhythm.   Pulmonary:      Effort: No respiratory distress.      Breath sounds: No wheezing.   Neurological:      Mental Status: He is alert and oriented to person, place, and time.   Psychiatric:         Behavior: Behavior normal.                         Assessment and Plan   Diagnoses and all orders for this visit:    1. Medicare annual wellness visit, subsequent (Primary)  Done today  2. Hyperlipidemia LDL goal <100  -     Lipid Panel; Future  Continue Pravastatin 20 mg po qhs  3. Chronic atrial fibrillation  Continue coumadin 2.5 mg po qd.  4. Essential hypertension  -     CBC & Differential; Future  -     Comprehensive Metabolic Panel; Future  Continue Norvasc 10 mg po qd, Lisinopril 40 mg po qd and Toprol  mg po qd  5. Type 2 diabetes mellitus with microalbuminuria, without long-term current use of insulin  Continue Amaryl 2mg and F/u with Dr. Gallo  6. Generalized anxiety disorder  Continue Effexor 75 mg po qd  7. Thyroid disorder screen  -     TSH; Future    8. Polycythemia  Follows with Dr. Molina  9. OAB (overactive bladder)  Managed by Urology Dr. Lyn.           Follow Up   No follow-ups on file.  Patient was given instructions and counseling regarding his condition or for health maintenance advice. Please see specific information pulled into the AVS if appropriate.           "

## 2024-06-21 LAB
ALBUMIN SERPL-MCNC: 4.1 G/DL (ref 3.5–5.2)
ALBUMIN/GLOB SERPL: 1.7 G/DL
ALP SERPL-CCNC: 64 U/L (ref 39–117)
ALT SERPL W P-5'-P-CCNC: 24 U/L (ref 1–41)
ANION GAP SERPL CALCULATED.3IONS-SCNC: 12 MMOL/L (ref 5–15)
AST SERPL-CCNC: 34 U/L (ref 1–40)
BILIRUB SERPL-MCNC: 0.9 MG/DL (ref 0–1.2)
BUN SERPL-MCNC: 12 MG/DL (ref 8–23)
BUN/CREAT SERPL: 11.8 (ref 7–25)
CALCIUM SPEC-SCNC: 9.2 MG/DL (ref 8.6–10.5)
CHLORIDE SERPL-SCNC: 105 MMOL/L (ref 98–107)
CHOLEST SERPL-MCNC: 161 MG/DL (ref 0–200)
CO2 SERPL-SCNC: 25 MMOL/L (ref 22–29)
CREAT SERPL-MCNC: 1.02 MG/DL (ref 0.76–1.27)
EGFRCR SERPLBLD CKD-EPI 2021: 76.2 ML/MIN/1.73
GLOBULIN UR ELPH-MCNC: 2.4 GM/DL
GLUCOSE SERPL-MCNC: 134 MG/DL (ref 65–99)
HDLC SERPL-MCNC: 37 MG/DL (ref 40–60)
LDLC SERPL CALC-MCNC: 97 MG/DL (ref 0–100)
LDLC/HDLC SERPL: 2.54 {RATIO}
POTASSIUM SERPL-SCNC: 4.2 MMOL/L (ref 3.5–5.2)
PROT SERPL-MCNC: 6.5 G/DL (ref 6–8.5)
SODIUM SERPL-SCNC: 142 MMOL/L (ref 136–145)
TRIGL SERPL-MCNC: 151 MG/DL (ref 0–150)
TSH SERPL DL<=0.05 MIU/L-ACNC: 2.79 UIU/ML (ref 0.27–4.2)
VLDLC SERPL-MCNC: 27 MG/DL (ref 5–40)

## 2024-07-03 ENCOUNTER — ANTICOAGULATION VISIT (OUTPATIENT)
Dept: PHARMACY | Facility: HOSPITAL | Age: 79
End: 2024-07-03
Payer: MEDICARE

## 2024-07-03 DIAGNOSIS — I48.20 CHRONIC ATRIAL FIBRILLATION: Primary | ICD-10-CM

## 2024-07-03 LAB
INR PPP: 2 (ref 0.91–1.09)
PROTHROMBIN TIME: 24.4 SECONDS (ref 10–13.8)

## 2024-07-03 PROCEDURE — G0463 HOSPITAL OUTPT CLINIC VISIT: HCPCS | Performed by: PHARMACIST

## 2024-07-03 PROCEDURE — 85610 PROTHROMBIN TIME: CPT

## 2024-07-03 PROCEDURE — 36416 COLLJ CAPILLARY BLOOD SPEC: CPT

## 2024-07-03 NOTE — PROGRESS NOTES
Anticoagulation Clinic Progress Note  Indication: atrial fibrillation  Referring Provider: Sravani [last appt 9/8/23  next:  9/16/24 (Will Sue)]  Initial Warfarin Start Date: 2008  Goal INR: 2 - 3  Current Drug Interactions: fluoxetine, glimepiride, melatonin (PRN), mirtazepine, MVI  CHADS-VASc: 3 (age, HTN, DM)    EtOH: none  GLV: Salad (Spinach salad 1x weekly or garden salad) and serving of broccoli once a week (2/9/24)  OTC Pain Relief: Uses APAP prn (~1x/week)    Anticoagulation Clinic INR History:  Date 8/20 9/4 10/2 11/6 11/15 11/29 12/31 1/7/19 1/17 1/29 2/12   Total Weekly Dose 17.5  mg 17.5 mg 17.5 mg 10mg 20mg 17.5mg 17.5mg 18.75 mg 17.5 mg 18.75 mg 20mg   INR 2.3 2.3 2.2 1.3 2.3 2.2 1.7 1.9 1.9 1.9 2.0   Notes    pre- epidural   missed dose?         Date 2/27 3/27 4/10 4/23 5/7 5/28 6/25 7/30 8/28 9/25 10/23   Total Weekly Dose 20mg 20mg 21.25 mg 21.25 mg 21.25 mg 21.25mg 21.25mg 21.25mg 21.25mg 21.25mg 21.25mg   INR 2.2 1.8 2.5 1.8 2.4 2.4 2.5 2.7 2.6 3.0 2.9   Notes sick   post- scope            Date  11/6 11/13 12/4 1/3 1/15 1/20 1/31 2/10 2/28 4/14 5/26   Total Weekly Dose 21.25 mg 18.75 mg 21.25 mg 21.25 mg 21.25 mg 20 mg 21.25 21.25 20mg 21.25mg 21.25mg   INR 4.2 3.2 2.7 2.2 3.1 3.1 2.9 3.5 2.4 2.3 2.5   Notes apap Hold x1, less GLV Inc in GLV  doxy doxy  1x dose cephalexin 1x dose dec; keflex       Date  6/24 7/22 8/24 9/10 10/8 11/5 11/18 12/2 12/30 1/26 2/22 3/22 4/19   Total Weekly Dose 21.25mg 21.25 21.25mg 21.25mg 21.25mg 21.25mg 20mg 20mg 20mg 20mg  20mg 20 mg 20mg   INR 2.4 2.4 3.0 2.8 3.0 3.1 2.5 2.5 2.5 2.6 2.9  2 2.2   Notes      Inc. GLV Dose dec    desvenlafaxine broccoli      Date 5/13 6/16  7/7 7/21 8/11 9/1 9/29 10/27 11/3 12/1 12/9 12/16   Total WeeklyDose 20 mg 20mg  20 mg 18.75mg  17.5 mg 17.5mg 17.5 17.5mg 17.5mg 17.5mg 18.75mg 22.5mg   INR 2.5 3.2 3.1 3.0 2.2 2.4 2.2 2.4 2.3 1.5 1.6 2.2   Notes  apap Inc GLV     augmentin day 3 augementin glucerna  Boost x2     Date  12/28 1/13/22 2/8 2/16 3/1 3/22 4/12 5/10 6/7 7/5 8/2    Total WeeklyDose 21.25mg 21.25mg 21.25 mg 21.25mg 22.5mg 22.5 mg 22.5mg 22.5 mg 22.5 mg 22.5mg 22.5mg EGD x 3 days hold   INR 2.6 2.3 1.7 2.1 2.5 2.0 2.3 2.4 2.1 2.1 2.8    Notes    Dec GLV        Boost x2     Date 8/25 9/8 9/29 10/27 12/1 1/5/23 2/2 3/2 3/30 4/14 4/20 5/18   Total WeeklyDose 18.75mg 22.5mg 22.5 mg 22.5 mg 22.5 mg 22.5mg 22.5 mg 22.5 mg 22.5 mg 22.5mg 22.5 mg  22.5 mg   INR 2.7 2.7 2.5 2.2 2.3 2.3 2.7 3.0 2.5 2.6 2.6 3.0   Notes omeprazole initi         doxy Doxy       Date 7/12 8/9 9/11 10/9 11/6 11/30 12/12 12/21 1/4 1/26/24 2/9 2/23   Total Weekly Dose 22.5 mg 22.5 mg 22.5mg 22.5 mg 22.5mg 22.5 mg 22.5mg 21.25 mg  21.25 mg 21.25mg     INR 2.7 2.8 2.5 2.5 2.3 3.5 3.2 2.4 2.1 1.9 1.9 1.8   Notes          Increase LGV       Date 3/8 4/5 5/3 5/30 7/3   Total Weekly Dose 23.75 mg 23.75 mg 23.75mg 23.75 mg 23.75mg   INR 2.2 2.3 2.7 2.9 2.0   Notes          Clinic Interview:  Verbal Release Authorization signed on 6/27/18 -- may speak with Sussy (wife), Nikolai (son)  Tablet Strength: pt has 2.5mg tablets  Phone: [phone] (Home), [phone]  Fax: [phone] (Attn: Tasha) [facility]    Patient Findings  Positives: Missed doses   Negatives: Signs/symptoms of thrombosis, Signs/symptoms of bleeding, Laboratory test error suspected, Change in health, Change in alcohol use, Change in activity, Upcoming invasive procedure, Emergency department visit, Upcoming dental procedure, Extra doses, Change in medications, Change in diet/appetite, Hospital admission, Bruising, Other complaints   Comments: Found 1/2 tablet over a week ago on the floor while sweeping. Did not take any extra warfarin when this was noticed given unclear timing of missed dose. No other changes other than large amount amount of ear wax in ear and seeing ENT tomorrow. Apparently this has been a problem in the past. No other changes.       Plan:   1. INR therapeutic  again today at 2.0 (goal 2.0-3.0). Instructed Mr. Wolf to continue maintenance dose of warfarin 3.75 mg daily except warfarin 2.5 mg on SundayThursday until recheck.   2. RTC in 4 weeks, 7/31.  3. Verbal and written information was provided to Mr. Wolf and Mrs. Wolf in clinic. Mr. Wolf voiced understanding with teach back and has no further questions at this time.    Christiano Miner, PharmD  07/03/24   11:28 EDT

## 2024-07-12 RX ORDER — PRAVASTATIN SODIUM 20 MG
20 TABLET ORAL DAILY
Qty: 90 TABLET | Refills: 1 | Status: SHIPPED | OUTPATIENT
Start: 2024-07-12

## 2024-07-17 ENCOUNTER — TELEPHONE (OUTPATIENT)
Dept: INTERNAL MEDICINE | Facility: CLINIC | Age: 79
End: 2024-07-17
Payer: MEDICARE

## 2024-07-17 RX ORDER — NYSTATIN 100000 [USP'U]/G
POWDER TOPICAL 3 TIMES DAILY
Qty: 60 G | Refills: 1 | Status: SHIPPED | OUTPATIENT
Start: 2024-07-17

## 2024-07-17 NOTE — TELEPHONE ENCOUNTER
Spoke with pt advised of recommendation below, pt states he is already doing that and he really wants the powder instead

## 2024-07-17 NOTE — TELEPHONE ENCOUNTER
Caller: Tony Wolf    Relationship: Self    Best call back number: [phone]    What medication are you requesting: NYSTATIN TOPICAL POWDER    What are your current symptoms: RASH BETWEEN HIS THIGHS AND ON HIS PRIVATE AREA. UROLOGIST SAID IT IS NOT A YEAST PROBLEM, IT'S A MOISTURE PROBLEM    How long have you been experiencing symptoms: OFF AN ON A FEW WEEKS    Have you had these symptoms before:    [x] Yes  [] No    Have you been treated for these symptoms before:   [x] Yes  [] No    If a prescription is needed, what is your preferred pharmacy and phone number: [facility] [id number] [place] [id number] [address] AT [facility] - [phone] [facility] [phone] FX     Additional notes:

## 2024-07-29 RX ORDER — AMLODIPINE BESYLATE 10 MG/1
10 TABLET ORAL DAILY
Qty: 90 TABLET | Refills: 3 | Status: SHIPPED | OUTPATIENT
Start: 2024-07-29

## 2024-07-30 ENCOUNTER — OFFICE VISIT (OUTPATIENT)
Dept: ONCOLOGY | Facility: CLINIC | Age: 79
End: 2024-07-30
Payer: MEDICARE

## 2024-07-30 VITALS
TEMPERATURE: 97.6 F | OXYGEN SATURATION: 94 % | HEIGHT: 72 IN | WEIGHT: 231 LBS | SYSTOLIC BLOOD PRESSURE: 124 MMHG | RESPIRATION RATE: 18 BRPM | HEART RATE: 77 BPM | DIASTOLIC BLOOD PRESSURE: 77 MMHG | BODY MASS INDEX: 31.29 KG/M2

## 2024-07-30 DIAGNOSIS — D75.1 POLYCYTHEMIA: Primary | ICD-10-CM

## 2024-07-30 PROCEDURE — 99214 OFFICE O/P EST MOD 30 MIN: CPT | Performed by: NURSE PRACTITIONER

## 2024-07-30 PROCEDURE — 1159F MED LIST DOCD IN RCRD: CPT | Performed by: NURSE PRACTITIONER

## 2024-07-30 PROCEDURE — 3078F DIAST BP <80 MM HG: CPT | Performed by: NURSE PRACTITIONER

## 2024-07-30 PROCEDURE — 1160F RVW MEDS BY RX/DR IN RCRD: CPT | Performed by: NURSE PRACTITIONER

## 2024-07-30 PROCEDURE — 3074F SYST BP LT 130 MM HG: CPT | Performed by: NURSE PRACTITIONER

## 2024-07-30 PROCEDURE — 1126F AMNT PAIN NOTED NONE PRSNT: CPT | Performed by: NURSE PRACTITIONER

## 2024-07-30 NOTE — PROGRESS NOTES
DATE OF VISIT: 7/30/2024    REASON FOR VISIT: Followup for polycythemia     PROBLEM LIST:  1.  Secondary polycythemia:  A.  Presented with hemoglobin 18.2 and hematocrit 54 December 7, 2023  B.  Negative JAK2 mutation and normal EPO level January 15, 2024  2.  Hypertension  3.  Atrial fibrillation:  A.  Patient is on chronic anticoagulation  4.  Hypercholesteremia    HISTORY OF PRESENT ILLNESS: The patient is a very pleasant 79 y.o. male  with past medical history significant for secondary polycythemia diagnosed December 2023. The patient is here today for scheduled follow up visit.     SUBJECTIVE: The patient is here today with his wife. He has been doing fairly well since his last visit. He is having issues with overactive bladder symptoms that have been bothersome. He goes to the bathroom and feels like he is unable to empty. He saw Dr. Cheney who started him on Myrbetriq that did not help much. He cancelled his follow up with him. He is not drinking as much fluid due to his urge to void.     Past History:  Medical History: has a past medical history of Acute bronchitis, Anxiety, Arthritis, Atrial fibrillation, Back pain, Cancer, Carpal tunnel syndrome, Depression, Derangement, knee internal, Diabetes mellitus, Diabetic foot ulcer, Drug dependence, Dyspnea on exertion (01/17/2019), GERD (gastroesophageal reflux disease), Glaucoma, Heart disease, Hyperlipidemia (11/14/2016), Hypertension, Hypertensive disorder, Hypokalemia, replaced (06/02/2017), IBS (irritable bowel syndrome), Knee pain, right, Left ventricular hypertrophy (11/14/2016), Leukocytosis, mild, likely reactive (05/31/2017), Mixed hyperlipidemia, Neuropathy, lumbosacral (radicular), Obesity, Osteoporosis, Postoperative urinary retention (06/01/2017), Premature ventricular contractions, PVC's (premature ventricular contractions) (11/14/2016), Rash (03/18/2019), Scoliosis, Screening for prostate cancer (07/28/2016), Sinusitis, Skin cancer of arm, left,  "Sleep apnea, SOB (shortness of breath), Spinal stenosis, lumbar region with neurogenic claudication, Thrombocytopenia (05/31/2017), Trigger middle finger of left hand, Trigger middle finger of right hand, Type 2 diabetes mellitus, and Wears glasses.   Surgical History: has a past surgical history that includes Cholecystectomy; Wrist surgery; Total Knee Arthroplasty Revision; Hernia repair; Colonoscopy; Esophagogastroduodenoscopy (08/22/2022); pr arthrp kne condyle&platu medial&lat compartments (Right, 05/30/2017); Fracture surgery; Lumbar epidural injection; Back surgery; and Carpal tunnel release.   Family History: family history includes Anemia in an other family member; Cancer in his mother and other family members; Deep vein thrombosis in his father; Diabetes in his mother; Heart attack in his mother and another family member; Heart disease in his father and mother; Hypertension in his father and mother.   Social History: reports that he has never smoked. He has never been exposed to tobacco smoke. He has never used smokeless tobacco. He reports that he does not drink alcohol and does not use drugs.    (Not in a hospital admission)     Allergies: Aspirin     Review of Systems   Constitutional:  Positive for fatigue.   Genitourinary:  Positive for frequency and urgency.   Musculoskeletal:  Positive for arthralgias and gait problem.        Ambulates with walker       PHYSICAL EXAMINATION:   /77   Pulse 77   Temp 97.6 °F (36.4 °C)   Resp 18   Ht 182.5 cm (71.85\")   Wt 105 kg (231 lb)   SpO2 94%   BMI 31.46 kg/m²    Pain Score    07/30/24 1454   PainSc: 0-No pain        ECOG score: 1        ECOG Performance Status: 1 - Symptomatic but completely ambulatory      General Appearance:      alert, cooperative, no apparent distress and appears stated age   Lungs:   Clear to auscultation bilaterally; respirations regular, even, and unlabored bilaterally   Heart:  Regular rate and rhythm, no murmurs appreciated "   Abdomen:   Soft, non-tender, non-distended, and no organomegaly                 No visits with results within 2 Week(s) from this visit.   Latest known visit with results is:   Anticoagulation Visit on 07/03/2024   Component Date Value Ref Range Status    Protime 07/03/2024 24.4 (H)  10.0 - 13.8 seconds Final    INR 07/03/2024 2.0 (H)  0.91 - 1.09 Final        No results found.    ASSESSMENT: The patient is a very pleasant 79 y.o. male  with secondary polycythemia      PLAN:    1.  Secondary polycythemia:  A.  I reviewed the lab results from 6/20/2024 with the patient. Her hematocrit at that time was stable at 54.2 with hemoglobin of 17.8. His WBC and platelet count are normal.   B. I encouraged the patient to increase fluid intake to help with polycythemia.   C.  We will consider phlebotomy for hematocrit more than 55.  D. We will see the patient back in 1 year with repeat CBC. I advised he return sooner if he has changes in labs done through is PCP or symptoms of increased fatigue, flushing, or chest pressure for earlier evaluation.     2.  Hypertension:  A.  The patient continue metoprolol and Norvasc. His blood pressure today was 124/77.      3.  Atrial fibrillation:  A.  The patient is on chronic anticoagulation with warfarin.    4. Overactive bladder:  A. I encouraged him to follow up with Dr. Lyn regarding symptom management. He has tried Myrbetriq without improvement. I encouraged him to limit bladder irritants such as caffeine    FOLLOW UP: 1 year with repeat CBC.     Cierra Villasenor, APRN  7/30/2024

## 2024-07-31 ENCOUNTER — ANTICOAGULATION VISIT (OUTPATIENT)
Dept: PHARMACY | Facility: HOSPITAL | Age: 79
End: 2024-07-31
Payer: MEDICARE

## 2024-07-31 DIAGNOSIS — I48.20 CHRONIC ATRIAL FIBRILLATION: Primary | ICD-10-CM

## 2024-07-31 LAB
INR PPP: 3.3
INR PPP: 3.3 (ref 0.91–1.09)
PROTHROMBIN TIME: 39.5 SECONDS (ref 10–13.8)

## 2024-07-31 PROCEDURE — G0463 HOSPITAL OUTPT CLINIC VISIT: HCPCS

## 2024-07-31 PROCEDURE — 85610 PROTHROMBIN TIME: CPT

## 2024-07-31 PROCEDURE — 36416 COLLJ CAPILLARY BLOOD SPEC: CPT

## 2024-07-31 NOTE — PROGRESS NOTES
Anticoagulation Clinic Progress Note  Indication: atrial fibrillation  Referring Provider: Sravani [last appt 9/8/23  next:  9/16/24 (Will Sue)]  Initial Warfarin Start Date: 2008  Goal INR: 2 - 3  Current Drug Interactions: fluoxetine, glimepiride, melatonin (PRN), mirtazepine, MVI  CHADS-VASc: 3 (age, HTN, DM)    EtOH: none  GLV: Salad (Spinach salad 1x weekly or garden salad) and serving of broccoli once a week (2/9/24)  OTC Pain Relief: Uses APAP prn (~1x/week)    Anticoagulation Clinic INR History:  Date 8/20 9/4 10/2 11/6 11/15 11/29 12/31 1/7/19 1/17 1/29 2/12   Total Weekly Dose 17.5  mg 17.5 mg 17.5 mg 10mg 20mg 17.5mg 17.5mg 18.75 mg 17.5 mg 18.75 mg 20mg   INR 2.3 2.3 2.2 1.3 2.3 2.2 1.7 1.9 1.9 1.9 2.0   Notes    pre- epidural   missed dose?         Date 2/27 3/27 4/10 4/23 5/7 5/28 6/25 7/30 8/28 9/25 10/23   Total Weekly Dose 20mg 20mg 21.25 mg 21.25 mg 21.25 mg 21.25mg 21.25mg 21.25mg 21.25mg 21.25mg 21.25mg   INR 2.2 1.8 2.5 1.8 2.4 2.4 2.5 2.7 2.6 3.0 2.9   Notes sick   post- scope            Date  11/6 11/13 12/4 1/3 1/15 1/20 1/31 2/10 2/28 4/14 5/26   Total Weekly Dose 21.25 mg 18.75 mg 21.25 mg 21.25 mg 21.25 mg 20 mg 21.25 21.25 20mg 21.25mg 21.25mg   INR 4.2 3.2 2.7 2.2 3.1 3.1 2.9 3.5 2.4 2.3 2.5   Notes apap Hold x1, less GLV Inc in GLV  doxy doxy  1x dose cephalexin 1x dose dec; keflex       Date  6/24 7/22 8/24 9/10 10/8 11/5 11/18 12/2 12/30 1/26 2/22 3/22 4/19   Total Weekly Dose 21.25mg 21.25 21.25mg 21.25mg 21.25mg 21.25mg 20mg 20mg 20mg 20mg  20mg 20 mg 20mg   INR 2.4 2.4 3.0 2.8 3.0 3.1 2.5 2.5 2.5 2.6 2.9  2 2.2   Notes      Inc. GLV Dose dec    desvenlafaxine broccoli      Date 5/13 6/16  7/7 7/21 8/11 9/1 9/29 10/27 11/3 12/1 12/9 12/16   Total WeeklyDose 20 mg 20mg  20 mg 18.75mg  17.5 mg 17.5mg 17.5 17.5mg 17.5mg 17.5mg 18.75mg 22.5mg   INR 2.5 3.2 3.1 3.0 2.2 2.4 2.2 2.4 2.3 1.5 1.6 2.2   Notes  apap Inc GLV     augmentin day 3 augementin glucerna  Boost x2     Date  12/28 1/13/22 2/8 2/16 3/1 3/22 4/12 5/10 6/7 7/5 8/2    Total WeeklyDose 21.25mg 21.25mg 21.25 mg 21.25mg 22.5mg 22.5 mg 22.5mg 22.5 mg 22.5 mg 22.5mg 22.5mg EGD x 3 days hold   INR 2.6 2.3 1.7 2.1 2.5 2.0 2.3 2.4 2.1 2.1 2.8    Notes    Dec GLV        Boost x2     Date 8/25 9/8 9/29 10/27 12/1 1/5/23 2/2 3/2 3/30 4/14 4/20 5/18   Total WeeklyDose 18.75mg 22.5mg 22.5 mg 22.5 mg 22.5 mg 22.5mg 22.5 mg 22.5 mg 22.5 mg 22.5mg 22.5 mg  22.5 mg   INR 2.7 2.7 2.5 2.2 2.3 2.3 2.7 3.0 2.5 2.6 2.6 3.0   Notes omeprazole initi         doxy Doxy       Date 7/12 8/9 9/11 10/9 11/6 11/30 12/12 12/21 1/4 1/26/24 2/9 2/23   Total Weekly Dose 22.5 mg 22.5 mg 22.5mg 22.5 mg 22.5mg 22.5 mg 22.5mg 21.25 mg  21.25 mg 21.25mg     INR 2.7 2.8 2.5 2.5 2.3 3.5 3.2 2.4 2.1 1.9 1.9 1.8   Notes          Increase LGV       Date 3/8 4/5 5/3 5/30 7/3 7/31   Total Weekly Dose 23.75 mg 23.75 mg 23.75mg 23.75 mg 23.75mg 23.75 mg   INR 2.2 2.3 2.7 2.9 2.0 3.3   Notes           Clinic Interview:  Verbal Release Authorization signed on 6/27/18 -- may speak with Sussy (wife), Nikolai (son)  Tablet Strength: pt has 2.5mg tablets  Phone: [phone] (Home), [phone]  Fax: [phone] (Attn: Tasha) [facility]    Patient Findings    Positives: Signs/symptoms of bleeding   Negatives: Signs/symptoms of thrombosis, Laboratory test error suspected, Change in health, Change in alcohol use, Change in activity, Upcoming invasive procedure, Emergency department visit, Upcoming dental procedure, Missed doses, Extra doses, Change in medications, Change in diet/appetite, Hospital admission, Bruising, Other complaints   Comments: Patient reported bleeding when cut self shaving and bleeding from cut on leg -- he was able to stop both with applying pressure and using a bandage. All other findings negative. No clear explanation for supratherapeutic INR today.     Plan:   1. INR supratherapeutic today at 3.3 (goal 2.0-3.0). Instructed Mr. Wlof to  decrease dose today to warfarin 2.5 mg, then continue maintenance dose of warfarin 3.75 mg daily except 2.5 mg on SunThurs until recheck.   2. RTC in 2 weeks, 8/14/24, to ensure INR back WNL. Otherwise, patient prefers monthly INR checks.   3. Verbal and written information was provided to Mr. Wolf and Mrs. Wolf in clinic. Mr. Wolf voiced understanding with teach back and has no further questions at this time.    Seema Baron [facility]  07/31/24   11:37 EDT

## 2024-08-02 RX ORDER — METOPROLOL SUCCINATE 100 MG/1
100 TABLET, EXTENDED RELEASE ORAL 2 TIMES DAILY
Qty: 180 TABLET | Refills: 3 | Status: SHIPPED | OUTPATIENT
Start: 2024-08-02

## 2024-08-14 ENCOUNTER — APPOINTMENT (OUTPATIENT)
Dept: PHARMACY | Facility: HOSPITAL | Age: 79
End: 2024-08-14
Payer: MEDICARE

## 2024-08-16 ENCOUNTER — ANTICOAGULATION VISIT (OUTPATIENT)
Dept: PHARMACY | Facility: HOSPITAL | Age: 79
End: 2024-08-16
Payer: MEDICARE

## 2024-08-16 DIAGNOSIS — I48.20 CHRONIC ATRIAL FIBRILLATION: Primary | ICD-10-CM

## 2024-08-16 LAB
INR PPP: 3.3 (ref 0.91–1.09)
PROTHROMBIN TIME: 40.1 SECONDS (ref 10–13.8)

## 2024-08-16 PROCEDURE — G0463 HOSPITAL OUTPT CLINIC VISIT: HCPCS

## 2024-08-16 PROCEDURE — 36416 COLLJ CAPILLARY BLOOD SPEC: CPT

## 2024-08-16 PROCEDURE — 85610 PROTHROMBIN TIME: CPT

## 2024-08-16 NOTE — PROGRESS NOTES
Anticoagulation Clinic Progress Note  Indication: atrial fibrillation  Referring Provider: Sravani [last appt 9/8/23  next:  9/16/24 (Will Sue)]  Initial Warfarin Start Date: 2008  Goal INR: 2 - 3  Current Drug Interactions: fluoxetine, glimepiride, melatonin (PRN), mirtazepine, MVI  CHADS-VASc: 3 (age, HTN, DM)    EtOH: none  GLV: Salad (Spinach salad 1x weekly or garden salad) and serving of broccoli once a week (2/9/24)  OTC Pain Relief: Uses APAP prn (~1x/week)    Anticoagulation Clinic INR History:  Date 8/20 9/4 10/2 11/6 11/15 11/29 12/31 1/7/19 1/17 1/29 2/12   Total Weekly Dose 17.5  mg 17.5 mg 17.5 mg 10mg 20mg 17.5mg 17.5mg 18.75 mg 17.5 mg 18.75 mg 20mg   INR 2.3 2.3 2.2 1.3 2.3 2.2 1.7 1.9 1.9 1.9 2.0   Notes    pre- epidural   missed dose?         Date 2/27 3/27 4/10 4/23 5/7 5/28 6/25 7/30 8/28 9/25 10/23   Total Weekly Dose 20mg 20mg 21.25 mg 21.25 mg 21.25 mg 21.25mg 21.25mg 21.25mg 21.25mg 21.25mg 21.25mg   INR 2.2 1.8 2.5 1.8 2.4 2.4 2.5 2.7 2.6 3.0 2.9   Notes sick   post- scope            Date  11/6 11/13 12/4 1/3 1/15 1/20 1/31 2/10 2/28 4/14 5/26   Total Weekly Dose 21.25 mg 18.75 mg 21.25 mg 21.25 mg 21.25 mg 20 mg 21.25 21.25 20mg 21.25mg 21.25mg   INR 4.2 3.2 2.7 2.2 3.1 3.1 2.9 3.5 2.4 2.3 2.5   Notes apap Hold x1, less GLV Inc in GLV  doxy doxy  1x dose cephalexin 1x dose dec; keflex       Date  6/24 7/22 8/24 9/10 10/8 11/5 11/18 12/2 12/30 1/26 2/22 3/22 4/19   Total Weekly Dose 21.25mg 21.25 21.25mg 21.25mg 21.25mg 21.25mg 20mg 20mg 20mg 20mg  20mg 20 mg 20mg   INR 2.4 2.4 3.0 2.8 3.0 3.1 2.5 2.5 2.5 2.6 2.9  2 2.2   Notes      Inc. GLV Dose dec    desvenlafaxine broccoli      Date 5/13 6/16  7/7 7/21 8/11 9/1 9/29 10/27 11/3 12/1 12/9 12/16   Total WeeklyDose 20 mg 20mg  20 mg 18.75mg  17.5 mg 17.5mg 17.5 17.5mg 17.5mg 17.5mg 18.75mg 22.5mg   INR 2.5 3.2 3.1 3.0 2.2 2.4 2.2 2.4 2.3 1.5 1.6 2.2   Notes  apap Inc GLV     augmentin day 3 augementin glucerna  Boost x2     Date  12/28 1/13/22 2/8 2/16 3/1 3/22 4/12 5/10 6/7 7/5 8/2    Total WeeklyDose 21.25mg 21.25mg 21.25 mg 21.25mg 22.5mg 22.5 mg 22.5mg 22.5 mg 22.5 mg 22.5mg 22.5mg EGD x 3 days hold   INR 2.6 2.3 1.7 2.1 2.5 2.0 2.3 2.4 2.1 2.1 2.8    Notes    Dec GLV        Boost x2     Date 8/25 9/8 9/29 10/27 12/1 1/5/23 2/2 3/2 3/30 4/14 4/20 5/18   Total WeeklyDose 18.75mg 22.5mg 22.5 mg 22.5 mg 22.5 mg 22.5mg 22.5 mg 22.5 mg 22.5 mg 22.5mg 22.5 mg  22.5 mg   INR 2.7 2.7 2.5 2.2 2.3 2.3 2.7 3.0 2.5 2.6 2.6 3.0   Notes omeprazole initi         doxy Doxy       Date 7/12 8/9 9/11 10/9 11/6 11/30 12/12 12/21 1/4 1/26/24 2/9 2/23   Total Weekly Dose 22.5 mg 22.5 mg 22.5mg 22.5 mg 22.5mg 22.5 mg 22.5mg 21.25 mg  21.25 mg 21.25mg     INR 2.7 2.8 2.5 2.5 2.3 3.5 3.2 2.4 2.1 1.9 1.9 1.8   Notes          Increase LGV       Date 3/8 4/5 5/3 5/30 7/3 7/31   Total Weekly Dose 23.75 mg 23.75 mg 23.75mg 23.75 mg 23.75mg 23.75 mg   INR 2.2 2.3 2.7 2.9 2.0 3.3   Notes           Date 8/16              Total Weekly Dose 23.75 mg              INR 3.3              Notes                           Clinic Interview:  Verbal Release Authorization signed on 6/27/18 -- may speak with Sussy (wife), Nikolai (son)  Tablet Strength: pt has 2.5mg tablets  Phone: [phone] (Home), [phone]  Fax: [phone] (Attn: Tasha) [facility]    Patient Findings    Positives: Signs/symptoms of bleeding   Negatives: Signs/symptoms of thrombosis, Laboratory test error suspected, Change in health, Change in alcohol use, Change in activity, Upcoming invasive procedure, Emergency department visit, Upcoming dental procedure, Missed doses, Extra doses, Change in medications, Change in diet/appetite, Hospital admission, Bruising, Other complaints   Comments: Patient reported bleeding when cut self shaving and bleeding from cut on leg -- he was able to stop both with applying pressure and using a bandage. All other findings negative. No clear explanation for  supratherapeutic INR today.     Plan:   1. INR supratherapeutic today at 3.3 (goal 2.0-3.0). Instructed Mr. Wolf to decrease dose today to warfarin 2.5 mg, then continue maintenance dose of warfarin 3.75 mg daily except 2.5 mg on SunThurs until recheck.   2. RTC in 2 weeks, 8/30/24, to ensure INR back WNL. Otherwise, patient prefers monthly INR checks.   3. Verbal and written information was provided to Mr. Wolf and Mrs. Wolf in clinic. Mr. Wolf voiced understanding with teach back and has no further questions at this time.    Selam Magallanes, [facility]  08/16/24   11:30 EDT

## 2024-08-16 NOTE — PROGRESS NOTES
Anticoagulation Clinic Progress Note  Indication: atrial fibrillation  Referring Provider: Sravani [last appt 9/8/23  next:  9/16/24 (Will Sue)]  Initial Warfarin Start Date: 2008  Goal INR: 2 - 3  Current Drug Interactions: fluoxetine, glimepiride, melatonin (PRN), mirtazepine, MVI  CHADS-VASc: 3 (age, HTN, DM)    EtOH: none  GLV: Salad (Spinach salad 1x weekly or garden salad) and serving of broccoli once a week (2/9/24)  OTC Pain Relief: Uses APAP prn (~1x/week)    Anticoagulation Clinic INR History:  Date 8/20 9/4 10/2 11/6 11/15 11/29 12/31 1/7/19 1/17 1/29 2/12   Total Weekly Dose 17.5  mg 17.5 mg 17.5 mg 10mg 20mg 17.5mg 17.5mg 18.75 mg 17.5 mg 18.75 mg 20mg   INR 2.3 2.3 2.2 1.3 2.3 2.2 1.7 1.9 1.9 1.9 2.0   Notes    pre- epidural   missed dose?         Date 2/27 3/27 4/10 4/23 5/7 5/28 6/25 7/30 8/28 9/25 10/23   Total Weekly Dose 20mg 20mg 21.25 mg 21.25 mg 21.25 mg 21.25mg 21.25mg 21.25mg 21.25mg 21.25mg 21.25mg   INR 2.2 1.8 2.5 1.8 2.4 2.4 2.5 2.7 2.6 3.0 2.9   Notes sick   post- scope            Date  11/6 11/13 12/4 1/3 1/15 1/20 1/31 2/10 2/28 4/14 5/26   Total Weekly Dose 21.25 mg 18.75 mg 21.25 mg 21.25 mg 21.25 mg 20 mg 21.25 21.25 20mg 21.25mg 21.25mg   INR 4.2 3.2 2.7 2.2 3.1 3.1 2.9 3.5 2.4 2.3 2.5   Notes apap Hold x1, less GLV Inc in GLV  doxy doxy  1x dose cephalexin 1x dose dec; keflex       Date  6/24 7/22 8/24 9/10 10/8 11/5 11/18 12/2 12/30 1/26 2/22 3/22 4/19   Total Weekly Dose 21.25mg 21.25 21.25mg 21.25mg 21.25mg 21.25mg 20mg 20mg 20mg 20mg  20mg 20 mg 20mg   INR 2.4 2.4 3.0 2.8 3.0 3.1 2.5 2.5 2.5 2.6 2.9  2 2.2   Notes      Inc. GLV Dose dec    desvenlafaxine broccoli      Date 5/13 6/16  7/7 7/21 8/11 9/1 9/29 10/27 11/3 12/1 12/9 12/16   Total WeeklyDose 20 mg 20mg  20 mg 18.75mg  17.5 mg 17.5mg 17.5 17.5mg 17.5mg 17.5mg 18.75mg 22.5mg   INR 2.5 3.2 3.1 3.0 2.2 2.4 2.2 2.4 2.3 1.5 1.6 2.2   Notes  apap Inc GLV     augmentin day 3 augementin glucerna  Boost x2     Date  12/28 1/13/22 2/8 2/16 3/1 3/22 4/12 5/10 6/7 7/5 8/2    Total WeeklyDose 21.25mg 21.25mg 21.25 mg 21.25mg 22.5mg 22.5 mg 22.5mg 22.5 mg 22.5 mg 22.5mg 22.5mg EGD x 3 days hold   INR 2.6 2.3 1.7 2.1 2.5 2.0 2.3 2.4 2.1 2.1 2.8    Notes    Dec GLV        Boost x2     Date 8/25 9/8 9/29 10/27 12/1 1/5/23 2/2 3/2 3/30 4/14 4/20 5/18   Total WeeklyDose 18.75mg 22.5mg 22.5 mg 22.5 mg 22.5 mg 22.5mg 22.5 mg 22.5 mg 22.5 mg 22.5mg 22.5 mg  22.5 mg   INR 2.7 2.7 2.5 2.2 2.3 2.3 2.7 3.0 2.5 2.6 2.6 3.0   Notes omeprazole initi         doxy Doxy       Date 7/12 8/9 9/11 10/9 11/6 11/30 12/12 12/21 1/4 1/26/24 2/9 2/23   Total Weekly Dose 22.5 mg 22.5 mg 22.5mg 22.5 mg 22.5mg 22.5 mg 22.5mg 21.25 mg  21.25 mg 21.25mg     INR 2.7 2.8 2.5 2.5 2.3 3.5 3.2 2.4 2.1 1.9 1.9 1.8   Notes          Increase LGV       Date 3/8 4/5 5/3 5/30 7/3 7/31   Total Weekly Dose 23.75 mg 23.75 mg 23.75mg 23.75 mg 23.75mg 23.75 mg   INR 2.2 2.3 2.7 2.9 2.0 3.3   Notes           Date 8/16              Total Weekly Dose 23.75 mg              INR 3.3              Notes                       Clinic Interview:  Verbal Release Authorization signed on 6/27/18 -- may speak with Sussy (wife), Nikolai (son)  Tablet Strength: pt has 2.5mg tablets  Phone: [phone] (Home), [phone]  Fax: [phone] (Attn: Tasha) [facility]    Patient Findings    Negatives: Signs/symptoms of thrombosis, Signs/symptoms of bleeding, Laboratory test error suspected, Change in health, Change in alcohol use, Change in activity, Upcoming invasive procedure, Emergency department visit, Upcoming dental procedure, Missed doses, Extra doses, Change in medications, Change in diet/appetite, Hospital admission, Bruising, Other complaints   Comments: All findings negative per patient     Plan:   1. INR supratherapeutic today at 3.3 (goal 2.0-3.0). Instructed Mr. Wolf to decrease dose today to warfarin 2.5 mg, then continue maintenance dose of warfarin 3.75 mg daily  except 2.5 mg on SunThurs until recheck. Will increase GLV intake over the next 2 weeks  2. RTC in 2 weeks, 8/29/24, to ensure INR back WNL. Otherwise, patient prefers monthly INR checks.   3. Verbal and written information was provided to Mr. Wolf and Mrs. Wolf in clinic. Mr. Wolf voiced understanding with teach back and has no further questions at this time.    Selam Magallanes [facility]  08/16/24   11:43 EDT

## 2024-08-22 ENCOUNTER — TELEPHONE (OUTPATIENT)
Dept: INTERNAL MEDICINE | Facility: CLINIC | Age: 79
End: 2024-08-22

## 2024-08-22 DIAGNOSIS — N32.81 OAB (OVERACTIVE BLADDER): Primary | ICD-10-CM

## 2024-08-23 ENCOUNTER — OFFICE VISIT (OUTPATIENT)
Dept: ENDOCRINOLOGY | Facility: CLINIC | Age: 79
End: 2024-08-23
Payer: MEDICARE

## 2024-08-23 ENCOUNTER — TELEPHONE (OUTPATIENT)
Dept: UROLOGY | Facility: CLINIC | Age: 79
End: 2024-08-23
Payer: MEDICARE

## 2024-08-23 VITALS
DIASTOLIC BLOOD PRESSURE: 82 MMHG | OXYGEN SATURATION: 99 % | WEIGHT: 232 LBS | HEIGHT: 72 IN | SYSTOLIC BLOOD PRESSURE: 124 MMHG | BODY MASS INDEX: 31.42 KG/M2 | HEART RATE: 76 BPM

## 2024-08-23 DIAGNOSIS — I10 ESSENTIAL HYPERTENSION: ICD-10-CM

## 2024-08-23 DIAGNOSIS — E11.29 TYPE 2 DIABETES MELLITUS WITH MICROALBUMINURIA, WITHOUT LONG-TERM CURRENT USE OF INSULIN: ICD-10-CM

## 2024-08-23 DIAGNOSIS — R80.9 TYPE 2 DIABETES MELLITUS WITH MICROALBUMINURIA, WITHOUT LONG-TERM CURRENT USE OF INSULIN: ICD-10-CM

## 2024-08-23 DIAGNOSIS — E11.65 TYPE 2 DIABETES MELLITUS WITH HYPERGLYCEMIA, WITHOUT LONG-TERM CURRENT USE OF INSULIN: Primary | ICD-10-CM

## 2024-08-23 DIAGNOSIS — E78.5 HYPERLIPIDEMIA LDL GOAL <100: ICD-10-CM

## 2024-08-23 LAB
EXPIRATION DATE: ABNORMAL
EXPIRATION DATE: ABNORMAL
GLUCOSE BLDC GLUCOMTR-MCNC: 139 MG/DL (ref 70–130)
HBA1C MFR BLD: 5.9 % (ref 4.5–5.7)
Lab: ABNORMAL
Lab: ABNORMAL

## 2024-08-23 NOTE — TELEPHONE ENCOUNTER
Patient called and wanted  to send in flomax for his urinary symptoms recommended by his pharmacist. I let patient know I would send this over to . Patient was last seen on 01/02/2024 and was prescribed myrbetriq but cancelled his follow up's to see if it helped. Patient also had his PCP put in a referral to [facility] because patient wanted to be seen by a different urologist.

## 2024-08-23 NOTE — PROGRESS NOTES
"     Office Note      Date: 2024  Patient Name: Tony Wolf  MRN: [id number]  : 1945    Chief Complaint   Patient presents with    Diabetes     Type 2 diabetes mellitus with hyperglycemia, without long-term current use of insulin       History of Present [id number]:   Tony Wolf is a 78 y.o. male who presents for Diabetes type 2. Diagnosed in: . Treated in past with oral agents. Current treatments: glimepiride. Number of insulin shots per day: none. Checks blood sugar 1 time a day. Has low blood sugar: no. Aspirin use: No - on warfarin. Statin use: Yes. ACE-I/ARB use: Yes. Changes in health since last visit: none. Last eye exam 2024.      He has had issues with overactive bladder.  He called his PCP yesterday.  They asked him to call the urologist that he is seeing.  He says he can't leave the house.    Subjective      Diabetic Complications:  Eyes: No  Kidneys: Yes - microalbuminuria  Feet: Yes - h/o foot ulcer  Heart: No    Diet and Exercise:  Meals per day: 3  Minutes of exercise per week: 0 mins.    Review of Systems:   Review of Systems   Constitutional: Negative.    Cardiovascular: Negative.    Gastrointestinal: Negative.    Endocrine: Negative.        The following portions of the patient's history were reviewed and updated as appropriate: allergies, current medications, past family history, past medical history, past social history, past surgical history, and problem list.    Objective     Visit Vitals  /82 (BP Location: Left arm, Patient Position: Sitting, Cuff Size: Adult)   Pulse 76   Ht 182.9 cm (72\")   Wt 105 kg (232 lb)   SpO2 99%   BMI 31.46 kg/m²       Physical Exam:  Physical Exam  Constitutional:       Appearance: He is obese.   Cardiovascular:      Pulses:           Dorsalis pedis pulses are 1+ on the right side and 1+ on the left side.        Posterior tibial pulses are 1+ on the right side and 1+ on the left side.   Musculoskeletal:      Right foot: Deformity " present.      Left foot: Deformity present.   Feet:      Right foot:      Protective Sensation: 5 sites tested.  2 sites sensed.      Skin integrity: Erythema and dry skin present.      Toenail Condition: Right toenails are abnormally thick. Fungal disease present.     Left foot:      Protective Sensation: 5 sites tested.  0 sites sensed.      Skin integrity: Erythema and dry skin present.      Toenail Condition: Left toenails are abnormally thick. Fungal disease present.     Comments: Fallen arches, decreased sensation  Neurological:      Mental Status: He is alert.         Labs:    HbA1c  Lab Results   Component Value Date    HGBA1C 5.9 (A) 08/23/2024       CMP  Lab Results   Component Value Date    GLUCOSE 134 (H) 06/20/2024    BUN 12 06/20/2024    CREATININE 1.02 06/20/2024    EGFRIFNONA 76 12/08/2021    BCR 11.8 06/20/2024    K 4.2 06/20/2024    CO2 25.0 06/20/2024    CALCIUM 9.2 06/20/2024    AST 34 06/20/2024    ALT 24 06/20/2024        Lipid Panel  Lab Results   Component Value Date    CHLPL 172 04/19/2016    HDL 37 (L) 06/20/2024    LDL 97 06/20/2024    TRIG 151 (H) 06/20/2024        TSH  Lab Results   Component Value Date    TSH 2.790 06/20/2024    FREET4 0.98 06/09/2015        Hemoglobin A1C  Lab Results   Component Value Date    HGBA1C 5.9 (A) 08/23/2024        Microalbumin/Creatinine  Lab Results   Component Value Date    MALBCRERATIO 196.6 10/02/2023    MICROALBUR 19.4 10/02/2023           Assessment / Plan      Assessment & Plan:  Diagnoses and all orders for this visit:    1. Type 2 diabetes mellitus with hyperglycemia, without long-term current use of insulin (Primary)  Assessment & Plan:  Diabetes is stable.  No hypoglycemia recently.  Continue current treatment regimen.  If any hypos, decrease glimepiride.  Diabetes will be reassessed in 6 months.    Orders:  -     POC Glycosylated Hemoglobin (Hb A1C)  -     POC Glucose, Blood  -     Microalbumin / Creatinine Urine Ratio - Urine, Clean Catch;  Future  -     Urinalysis With Culture If Indicated -; Future    2. Essential hypertension  Assessment & Plan:  Hypertension is stable and controlled  Continue current treatment regimen.  Blood pressure will be reassessed in 6 months.      3. Hyperlipidemia LDL goal <100  Assessment & Plan:  Continue statin.  Recent lipids okay.      4. Type 2 diabetes mellitus with microalbuminuria, without long-term current use of insulin  Assessment & Plan:  He wasn't able to get urine sample today.  Will send a sample cup with him to drop off.        Current Outpatient Medications   Medication Instructions    Accu-Chek Softclix Lancets lancets USE TO TEST BLOOD SUGAR THREE TIMES A DAY    acetaminophen (TYLENOL) 1,000 mg, Oral, Every 6 Hours PRN    amLODIPine (NORVASC) 10 mg, Oral, Daily, as directed    bimatoprost (Lumigan) 0.01 % ophthalmic drops 1 drop, Both Eyes, Nightly    glimepiride (AMARYL) 2 mg, Oral, Daily    glucose blood (Accu-Chek Kandis Plus) test strip USE ONE STRIP TO TEST THREE TIMES A DAY    latanoprost (XALATAN) 0.005 % ophthalmic solution 1 drop, Both Eyes, Nightly    lisinopril (PRINIVIL,ZESTRIL) 40 mg, Oral, Daily    Loratadine 10 MG capsule 1 capsule, Oral, Daily    Melatonin 2.5 MG chewable tablet 1 tablet, Oral, Nightly PRN    metoprolol succinate XL (TOPROL-XL) 100 mg, Oral, 2 Times Daily    mirtazapine (REMERON) 45 mg, Oral, Nightly    nystatin (MYCOSTATIN) 246702 UNIT/GM powder Topical, 3 Times Daily    pravastatin (PRAVACHOL) 20 mg, Oral, Daily    venlafaxine XR (EFFEXOR-XR) 75 mg, Oral, Daily    Vitamin D-3 1,000 Units, Oral, Daily    warfarin (COUMADIN) 2.5 MG tablet TAKE 1 TO 1 AND 1/2 TABLETS BY MOUTH DAILY OR AS DIRECTED BY THE ANTICOAGULATION CLINIC      Return in about 6 months (around 2/23/2025) for Recheck with A1c.    Electronically signed by: Brown Gallo MD  08/23/2024

## 2024-08-23 NOTE — ASSESSMENT & PLAN NOTE
Diabetes is stable.  No hypoglycemia recently.  Continue current treatment regimen.  If any hypos, decrease glimepiride.  Diabetes will be reassessed in 6 months.

## 2024-08-23 NOTE — TELEPHONE ENCOUNTER
"I called and left a  for patient  Relay     \"We are happy to see him to further discuss management but we can not send a medication without seeing him, happy to get him in clinic and further discuss additional options for management. \"      "

## 2024-08-26 ENCOUNTER — LAB (OUTPATIENT)
Dept: ENDOCRINOLOGY | Facility: CLINIC | Age: 79
End: 2024-08-26
Payer: MEDICARE

## 2024-08-26 DIAGNOSIS — E11.65 TYPE 2 DIABETES MELLITUS WITH HYPERGLYCEMIA, WITHOUT LONG-TERM CURRENT USE OF INSULIN: ICD-10-CM

## 2024-08-26 LAB
ALBUMIN UR-MCNC: 11.1 MG/DL
BACTERIA UR QL AUTO: ABNORMAL /HPF
BILIRUB UR QL STRIP: NEGATIVE
CLARITY UR: CLEAR
COD CRY URNS QL: PRESENT /HPF
COLOR UR: YELLOW
CREAT UR-MCNC: 94.6 MG/DL
GLUCOSE UR STRIP-MCNC: NEGATIVE MG/DL
HGB UR QL STRIP.AUTO: ABNORMAL
HOLD SPECIMEN: NORMAL
HYALINE CASTS UR QL AUTO: ABNORMAL /LPF
KETONES UR QL STRIP: NEGATIVE
LEUKOCYTE ESTERASE UR QL STRIP.AUTO: NEGATIVE
MICROALBUMIN/CREAT UR: 117.3 MG/G (ref 0–29)
NITRITE UR QL STRIP: NEGATIVE
PH UR STRIP.AUTO: 6.5 [PH] (ref 5–8)
PROT UR QL STRIP: ABNORMAL
RBC # UR STRIP: ABNORMAL /HPF
REF LAB TEST METHOD: ABNORMAL
SP GR UR STRIP: 1.02 (ref 1–1.03)
SQUAMOUS #/AREA URNS HPF: ABNORMAL /HPF
UROBILINOGEN UR QL STRIP: ABNORMAL
WBC # UR STRIP: ABNORMAL /HPF

## 2024-08-26 PROCEDURE — 82570 ASSAY OF URINE CREATININE: CPT | Performed by: INTERNAL MEDICINE

## 2024-08-26 PROCEDURE — 82043 UR ALBUMIN QUANTITATIVE: CPT | Performed by: INTERNAL MEDICINE

## 2024-08-26 PROCEDURE — 81001 URINALYSIS AUTO W/SCOPE: CPT | Performed by: INTERNAL MEDICINE

## 2024-08-29 ENCOUNTER — ANTICOAGULATION VISIT (OUTPATIENT)
Dept: PHARMACY | Facility: HOSPITAL | Age: 79
End: 2024-08-29
Payer: MEDICARE

## 2024-08-29 DIAGNOSIS — I48.20 CHRONIC ATRIAL FIBRILLATION: Primary | ICD-10-CM

## 2024-08-29 LAB
INR PPP: 3.8 (ref 0.91–1.09)
PROTHROMBIN TIME: 45.7 SECONDS (ref 10–13.8)

## 2024-08-29 PROCEDURE — G0463 HOSPITAL OUTPT CLINIC VISIT: HCPCS

## 2024-08-29 PROCEDURE — 36416 COLLJ CAPILLARY BLOOD SPEC: CPT

## 2024-08-29 PROCEDURE — 85610 PROTHROMBIN TIME: CPT

## 2024-08-29 NOTE — PROGRESS NOTES
Anticoagulation Clinic Progress Note  Indication: atrial fibrillation  Referring Provider: Sravani [last appt 9/8/23  next:  9/16/24 (Will Sue)]  Initial Warfarin Start Date: 2008  Goal INR: 2 - 3  Current Drug Interactions: fluoxetine, glimepiride, melatonin (PRN), mirtazepine, MVI  CHADS-VASc: 3 (age, HTN, DM)    EtOH: none  GLV: Salad (Spinach salad 1x weekly or garden salad) and serving of broccoli once a week (2/9/24)  OTC Pain Relief: Uses APAP prn (~1x/week)    Anticoagulation Clinic INR History:  Date 8/20 9/4 10/2 11/6 11/15 11/29 12/31 1/7/19 1/17 1/29 2/12   Total Weekly Dose 17.5  mg 17.5 mg 17.5 mg 10mg 20mg 17.5mg 17.5mg 18.75 mg 17.5 mg 18.75 mg 20mg   INR 2.3 2.3 2.2 1.3 2.3 2.2 1.7 1.9 1.9 1.9 2.0   Notes    pre- epidural   missed dose?         Date 2/27 3/27 4/10 4/23 5/7 5/28 6/25 7/30 8/28 9/25 10/23   Total Weekly Dose 20mg 20mg 21.25 mg 21.25 mg 21.25 mg 21.25mg 21.25mg 21.25mg 21.25mg 21.25mg 21.25mg   INR 2.2 1.8 2.5 1.8 2.4 2.4 2.5 2.7 2.6 3.0 2.9   Notes sick   post- scope            Date  11/6 11/13 12/4 1/3 1/15 1/20 1/31 2/10 2/28 4/14 5/26   Total Weekly Dose 21.25 mg 18.75 mg 21.25 mg 21.25 mg 21.25 mg 20 mg 21.25 21.25 20mg 21.25mg 21.25mg   INR 4.2 3.2 2.7 2.2 3.1 3.1 2.9 3.5 2.4 2.3 2.5   Notes apap Hold x1, less GLV Inc in GLV  doxy doxy  1x dose cephalexin 1x dose dec; keflex       Date  6/24 7/22 8/24 9/10 10/8 11/5 11/18 12/2 12/30 1/26 2/22 3/22 4/19   Total Weekly Dose 21.25mg 21.25 21.25mg 21.25mg 21.25mg 21.25mg 20mg 20mg 20mg 20mg  20mg 20 mg 20mg   INR 2.4 2.4 3.0 2.8 3.0 3.1 2.5 2.5 2.5 2.6 2.9  2 2.2   Notes      Inc. GLV Dose dec    desvenlafaxine broccoli      Date 5/13 6/16  7/7 7/21 8/11 9/1 9/29 10/27 11/3 12/1 12/9 12/16   Total WeeklyDose 20 mg 20mg  20 mg 18.75mg  17.5 mg 17.5mg 17.5 17.5mg 17.5mg 17.5mg 18.75mg 22.5mg   INR 2.5 3.2 3.1 3.0 2.2 2.4 2.2 2.4 2.3 1.5 1.6 2.2   Notes  apap Inc GLV     augmentin day 3 augementin glucerna  Boost x2     Date  12/28 1/13/22 2/8 2/16 3/1 3/22 4/12 5/10 6/7 7/5 8/2    Total WeeklyDose 21.25mg 21.25mg 21.25 mg 21.25mg 22.5mg 22.5 mg 22.5mg 22.5 mg 22.5 mg 22.5mg 22.5mg EGD x 3 days hold   INR 2.6 2.3 1.7 2.1 2.5 2.0 2.3 2.4 2.1 2.1 2.8    Notes    Dec GLV        Boost x2     Date 8/25 9/8 9/29 10/27 12/1 1/5/23 2/2 3/2 3/30 4/14 4/20 5/18   Total WeeklyDose 18.75mg 22.5mg 22.5 mg 22.5 mg 22.5 mg 22.5mg 22.5 mg 22.5 mg 22.5 mg 22.5mg 22.5 mg  22.5 mg   INR 2.7 2.7 2.5 2.2 2.3 2.3 2.7 3.0 2.5 2.6 2.6 3.0   Notes omeprazole initi         doxy Doxy       Date 7/12 8/9 9/11 10/9 11/6 11/30 12/12 12/21 1/4 1/26/24 2/9 2/23   Total Weekly Dose 22.5 mg 22.5 mg 22.5mg 22.5 mg 22.5mg 22.5 mg 22.5mg 21.25 mg  21.25 mg 21.25mg     INR 2.7 2.8 2.5 2.5 2.3 3.5 3.2 2.4 2.1 1.9 1.9 1.8   Notes          Increase LGV       Date 3/8 4/5 5/3 5/30 7/3 7/31   Total Weekly Dose 23.75 mg 23.75 mg 23.75mg 23.75 mg 23.75mg 23.75 mg   INR 2.2 2.3 2.7 2.9 2.0 3.3   Notes           Date 8/16 8/29             Total Weekly Dose 23.75 mg 23.12             INR 3.3 3.8             Notes                           Clinic Interview:  Verbal Release Authorization signed on 6/27/18 -- may speak with Sussy (wife), Nikolai (son)  Tablet Strength: pt has 2.5mg tablets  Phone: [phone] (Home), [phone]  Fax: [phone] (Attn: Tasha) [facility]    Patient Findings    Negatives: Signs/symptoms of thrombosis, Signs/symptoms of bleeding, Laboratory test error suspected, Change in health, Change in alcohol use, Change in activity, Upcoming invasive procedure, Emergency department visit, Upcoming dental procedure, Missed doses, Extra doses, Change in medications, Change in diet/appetite, Hospital admission, Bruising, Other complaints   Comments: Nothing has changed with diet and continues to eat greens weekly. Will add in more GLV this week. No explanation for supratherapeutic INR.       Plan:   1. INR supratherapeutic today at 3.8 (goal 2.0-3.0).  Instructed Mr. Wolf to decrease Saturday's dose to 2.5 mg and take warfarin 3.75 mg daily except 2.5 mg on Sun/Mon/Thurs until recheck. Consider permanent dose change possibly at next visit as INR supratherapeutic with no explanation.   2. RTC in 9/5/24 to ensure INR back WNL. Otherwise, patient prefers monthly INR checks.   3. Verbal and written information was provided to Mr. Wolf and Mrs. Wolf in clinic. Mr. Wolf voiced understanding with teach back and has no further questions at this time.    Selam Magallanes, [facility]  08/29/24   11:48 EDT

## 2024-09-03 ENCOUNTER — OFFICE VISIT (OUTPATIENT)
Dept: UROLOGY | Facility: CLINIC | Age: 79
End: 2024-09-03
Payer: MEDICARE

## 2024-09-03 DIAGNOSIS — R39.9 LOWER URINARY TRACT SYMPTOMS (LUTS): ICD-10-CM

## 2024-09-03 DIAGNOSIS — N32.81 OAB (OVERACTIVE BLADDER): Primary | ICD-10-CM

## 2024-09-03 PROCEDURE — 1159F MED LIST DOCD IN RCRD: CPT | Performed by: UROLOGY

## 2024-09-03 PROCEDURE — 99214 OFFICE O/P EST MOD 30 MIN: CPT | Performed by: UROLOGY

## 2024-09-03 PROCEDURE — 1160F RVW MEDS BY RX/DR IN RCRD: CPT | Performed by: UROLOGY

## 2024-09-03 NOTE — PROGRESS NOTES
Follow Up Office Visit      Patient Name: Tony Wolf  : 1945   MRN: [id number]     Chief Complaint:    Chief Complaint   Patient presents with    OAB (overactive bladder)       History of Present Illness: Tony Wolf is a 79 y.o. male who presents today for follow up secondary to history of lower urinary tract symptoms/OAB symptoms.  He is undergone prior anatomic evaluation with no significant prostatic obstruction identified.  Reporting primary irritative/storage symptoms.  He has trialed multiple p.o. medical strategies without significant improvement in symptoms.  Does have additional multiple medical comorbidities which are also contributing to urinary symptoms.    Subjective      Review of System: Review of Systems   Genitourinary:  Negative for decreased urine volume, difficulty urinating, dysuria, enuresis, flank pain, frequency, hematuria and urgency.      I have reviewed the ROS documented by my clinical staff, updated as appropriate and I agree. Demario Lyn MD    I have reviewed and the following portions of the patient's history were updated as appropriate: past family history, past medical history, past social history, past surgical history and problem list.    Medications:     Current Outpatient Medications:     Accu-Chek Softclix Lancets lancets, USE TO TEST BLOOD SUGAR THREE TIMES A DAY, Disp: 100 each, Rfl: 2    acetaminophen (TYLENOL) 500 MG tablet, Take 2 tablets by mouth Every 6 (Six) Hours As Needed for Mild Pain., Disp: , Rfl:     amLODIPine (NORVASC) 10 MG tablet, TAKE 1 TABLET BY MOUTH DAILY AS DIRECTED, Disp: 90 tablet, Rfl: 3    bimatoprost (Lumigan) 0.01 % ophthalmic drops, Administer 1 drop to both eyes Every Night., Disp: , Rfl:     Cholecalciferol (VITAMIN D-3) 1000 UNITS capsule, Take 1,000 Units by mouth Daily., Disp: , Rfl:     glimepiride (AMARYL) 2 MG tablet, TAKE 1 TABLET BY MOUTH DAILY, Disp: 90 tablet, Rfl: 3    glucose blood (Accu-Chek Kandis Plus)  test strip, USE ONE STRIP TO TEST THREE TIMES A DAY, Disp: 100 each, Rfl: 11    latanoprost (XALATAN) 0.005 % ophthalmic solution, Administer 1 drop to both eyes Every Night., Disp: , Rfl:     Loratadine 10 MG capsule, Take 1 capsule by mouth Daily., Disp: , Rfl:     Melatonin 2.5 MG chewable tablet, Chew 1 tablet At Night As Needed., Disp: , Rfl:     metoprolol succinate XL (TOPROL-XL) 100 MG 24 hr tablet, TAKE 1 TABLET BY MOUTH TWICE A DAY, Disp: 180 tablet, Rfl: 3    mirtazapine (REMERON) 45 MG tablet, Take 1 tablet by mouth Every Night., Disp: , Rfl:     nystatin (MYCOSTATIN) 117258 UNIT/GM powder, Apply  topically to the appropriate area as directed 3 (Three) Times a Day., Disp: 60 g, Rfl: 1    pravastatin (PRAVACHOL) 20 MG tablet, TAKE 1 TABLET BY MOUTH DAILY, Disp: 90 tablet, Rfl: 1    venlafaxine XR (EFFEXOR-XR) 75 MG 24 hr capsule, Take 1 capsule by mouth Daily., Disp: , Rfl:     lisinopril (PRINIVIL,ZESTRIL) 40 MG tablet, Take 1 tablet by mouth Daily., Disp: 90 tablet, Rfl: 2    warfarin (COUMADIN) 2.5 MG tablet, TAKE 1 TO 1 AND 1/2 TABLETS BY MOUTH DAILY OR AS DIRECTED BY ANTICOAGULATION CLINIC, Disp: 120 tablet, Rfl: 1    Allergies:   Allergies   Allergen Reactions    Aspirin Anaphylaxis     Other reaction(s): Hypotension       IPSS Questionnaire (AUA-7):  Over the past month…    1)  Incomplete Emptying  How often have you had a sensation of not emptying your bladder?  4 - More than half the time   2)  Frequency  How often have you had to urinate less than every two hours? 4 - More than half the time   3)  Intermittency  How often have you found you stopped and started again several times when you urinated?  4 - More than half the time   4) Urgency  How often have you found it difficult to postpone urination?  3 - About half the time   5) Weak Stream  How often have you had a weak urinary stream?  2 - Less than half the time   6) Straining  How often have you had to push or strain to begin urination?  2  - Less than half the time   7) Nocturia  How many times did you typically get up at night to urinate?  3 - 3 times   Total Score:  22       Quality of life due to urinary symptoms:  If you were to spend the rest of your life with your urinary condition the way it is now, how would you feel about that? 4-Mostly Dissatisfied   Urine Leakage (Incontinence) 0-No Leakage           Objective     Physical Exam:   Vital Signs: There were no vitals filed for this visit.  There is no height or weight on file to calculate BMI.     Physical Exam  Vitals and nursing note reviewed.   Constitutional:       Appearance: Normal appearance.   HENT:      Head: Normocephalic and atraumatic.   Cardiovascular:      Comments: Well perfused  Pulmonary:      Effort: Pulmonary effort is normal.   Abdominal:      General: Abdomen is flat.      Palpations: Abdomen is soft.   Musculoskeletal:         General: Normal range of motion.   Skin:     General: Skin is warm and dry.   Neurological:      General: No focal deficit present.      Mental Status: He is alert and oriented to person, place, and time. Mental status is at baseline.   Psychiatric:         Mood and Affect: Mood normal.         Behavior: Behavior normal.         Thought Content: Thought content normal.         Judgment: Judgment normal.           Labs:   Brief Urine Lab Results  (Last result in the past 365 days)        Color   Clarity   Blood   Leuk Est   Nitrite   Protein   CREAT   Urine HCG        08/26/24 1007             94.6         08/26/24 1007 Yellow   Clear   Small (1+)   Negative   Negative   30 mg/dL (1+)                        Lab Results   Component Value Date    GLUCOSE 134 (H) 06/20/2024    CALCIUM 9.2 06/20/2024     06/20/2024    K 4.2 06/20/2024    CO2 25.0 06/20/2024     06/20/2024    BUN 12 06/20/2024    CREATININE 1.02 06/20/2024    EGFRIFNONA 76 12/08/2021    BCR 11.8 06/20/2024    ANIONGAP 12.0 06/20/2024       Lab Results   Component Value Date     WBC 7.42 06/20/2024    HGB 17.8 (H) 06/20/2024    HCT 54.2 (H) 06/20/2024    MCV 95.8 06/20/2024     06/20/2024       Images:   No Images in the past 120 days found..    Measures:   Tobacco:   Tony Wolf  reports that he has never smoked. He has never been exposed to tobacco smoke. He has never used smokeless tobacco. I have educated him on the risk of diseases from using tobacco products,   Assessment / Plan      Assessment/Plan:   79 y.o. male is seen today for follow up continue evaluation secondary history of lower urinary tract symptoms/OAB related symptoms.  He has undergone previous anatomic evaluation with no evidence of prostatic obstruction.  Primary irritative and storage based OAB bladder symptoms.  He has multifactorial nature for urinary symptoms including multiple additional medical comorbidities.  He has trialed multiple conservative strategies, p.o. medical management options without improvement.  Given significant bother we have discussed the consideration for third line options for management.  He was provided educational material today.  He will read, return in 1 to 2 weeks for further discussion..     Diagnoses and all orders for this visit:    1. OAB (overactive bladder) (Primary)    2. Lower urinary tract symptoms (LUTS)         Follow Up:   Return in about 2 weeks (around 9/17/2024) for Recheck.     I spent approximately 30 minutes providing clinical care for this patient; including review of patient's chart and provider documentation, face to face time spent with patient in examination room (obtaining history, performing physical exam, discussing diagnosis and management options), placing orders, and completing patient documentation.     Demario Lyn MD  [facility]

## 2024-09-05 ENCOUNTER — ANTICOAGULATION VISIT (OUTPATIENT)
Dept: PHARMACY | Facility: HOSPITAL | Age: 79
End: 2024-09-05
Payer: MEDICARE

## 2024-09-05 DIAGNOSIS — I48.20 CHRONIC ATRIAL FIBRILLATION: Primary | ICD-10-CM

## 2024-09-05 LAB
INR PPP: 3.1 (ref 0.91–1.09)
INR PPP: 3.1 (ref 0.9–1.1)
PROTHROMBIN TIME: 37.8 SECONDS (ref 10–13.8)

## 2024-09-05 PROCEDURE — G0463 HOSPITAL OUTPT CLINIC VISIT: HCPCS

## 2024-09-05 PROCEDURE — 36416 COLLJ CAPILLARY BLOOD SPEC: CPT

## 2024-09-05 PROCEDURE — 85610 PROTHROMBIN TIME: CPT

## 2024-09-05 NOTE — PROGRESS NOTES
Anticoagulation Clinic Progress Note  Indication: atrial fibrillation  Referring Provider: Sravani [last appt 9/8/23  next:  9/16/24 (Will Sue)]  Initial Warfarin Start Date: 2008  Goal INR: 2 - 3  Current Drug Interactions: fluoxetine, glimepiride, melatonin (PRN), mirtazepine, MVI  CHADS-VASc: 3 (age, HTN, DM)    EtOH: none  GLV: Salad (Spinach salad 1x weekly or garden salad) and serving of broccoli once a week (2/9/24)  OTC Pain Relief: Uses APAP prn (~1x/week)    Anticoagulation Clinic INR History:  Date 8/20 9/4 10/2 11/6 11/15 11/29 12/31 1/7/19 1/17 1/29 2/12   Total Weekly Dose 17.5  mg 17.5 mg 17.5 mg 10mg 20mg 17.5mg 17.5mg 18.75 mg 17.5 mg 18.75 mg 20mg   INR 2.3 2.3 2.2 1.3 2.3 2.2 1.7 1.9 1.9 1.9 2.0   Notes    pre- epidural   missed dose?         Date 2/27 3/27 4/10 4/23 5/7 5/28 6/25 7/30 8/28 9/25 10/23   Total Weekly Dose 20mg 20mg 21.25 mg 21.25 mg 21.25 mg 21.25mg 21.25mg 21.25mg 21.25mg 21.25mg 21.25mg   INR 2.2 1.8 2.5 1.8 2.4 2.4 2.5 2.7 2.6 3.0 2.9   Notes sick   post- scope            Date  11/6 11/13 12/4 1/3 1/15 1/20 1/31 2/10 2/28 4/14 5/26   Total Weekly Dose 21.25 mg 18.75 mg 21.25 mg 21.25 mg 21.25 mg 20 mg 21.25 21.25 20mg 21.25mg 21.25mg   INR 4.2 3.2 2.7 2.2 3.1 3.1 2.9 3.5 2.4 2.3 2.5   Notes apap Hold x1, less GLV Inc in GLV  doxy doxy  1x dose cephalexin 1x dose dec; keflex       Date  6/24 7/22 8/24 9/10 10/8 11/5 11/18 12/2 12/30 1/26 2/22 3/22 4/19   Total Weekly Dose 21.25mg 21.25 21.25mg 21.25mg 21.25mg 21.25mg 20mg 20mg 20mg 20mg  20mg 20 mg 20mg   INR 2.4 2.4 3.0 2.8 3.0 3.1 2.5 2.5 2.5 2.6 2.9  2 2.2   Notes      Inc. GLV Dose dec    desvenlafaxine broccoli      Date 5/13 6/16  7/7 7/21 8/11 9/1 9/29 10/27 11/3 12/1 12/9 12/16   Total WeeklyDose 20 mg 20mg  20 mg 18.75mg  17.5 mg 17.5mg 17.5 17.5mg 17.5mg 17.5mg 18.75mg 22.5mg   INR 2.5 3.2 3.1 3.0 2.2 2.4 2.2 2.4 2.3 1.5 1.6 2.2   Notes  apap Inc GLV     augmentin day 3 augementin glucerna  Boost x2     Date  12/28 1/13/22 2/8 2/16 3/1 3/22 4/12 5/10 6/7 7/5 8/2    Total WeeklyDose 21.25mg 21.25mg 21.25 mg 21.25mg 22.5mg 22.5 mg 22.5mg 22.5 mg 22.5 mg 22.5mg 22.5mg EGD x 3 days hold   INR 2.6 2.3 1.7 2.1 2.5 2.0 2.3 2.4 2.1 2.1 2.8    Notes    Dec GLV        Boost x2     Date 8/25 9/8 9/29 10/27 12/1 1/5/23 2/2 3/2 3/30 4/14 4/20 5/18   Total WeeklyDose 18.75mg 22.5mg 22.5 mg 22.5 mg 22.5 mg 22.5mg 22.5 mg 22.5 mg 22.5 mg 22.5mg 22.5 mg  22.5 mg   INR 2.7 2.7 2.5 2.2 2.3 2.3 2.7 3.0 2.5 2.6 2.6 3.0   Notes omeprazole initi         doxy Doxy       Date 7/12 8/9 9/11 10/9 11/6 11/30 12/12 12/21 1/4 1/26/24 2/9 2/23   Total Weekly Dose 22.5 mg 22.5 mg 22.5mg 22.5 mg 22.5mg 22.5 mg 22.5mg 21.25 mg  21.25 mg 21.25mg     INR 2.7 2.8 2.5 2.5 2.3 3.5 3.2 2.4 2.1 1.9 1.9 1.8   Notes          Increase LGV       Date 3/8 4/5 5/3 5/30 7/3 7/31   Total Weekly Dose 23.75 mg 23.75 mg 23.75mg 23.75 mg 23.75mg 23.75 mg   INR 2.2 2.3 2.7 2.9 2.0 3.3   Notes           Date 8/16 8/29 9/5            Total Weekly Dose 23.75 mg 23.75 22.5            INR 3.3 3.8 3.1            Notes                     Clinic Interview:  Verbal Release Authorization signed on 6/27/18 -- may speak with Sussy (wife), Nikolai (son)  Tablet Strength: pt has 2.5mg tablets  Phone: [phone] (Home), [phone]  Fax: [phone] (Attn: Tasha) [facility]    Patient Findings    Negatives: Signs/symptoms of thrombosis, Signs/symptoms of bleeding, Laboratory test error suspected, Change in health, Change in alcohol use, Change in activity, Upcoming invasive procedure, Emergency department visit, Upcoming dental procedure, Missed doses, Extra doses, Change in medications, Change in diet/appetite, Hospital admission, Bruising, Other complaints   Comments: All findings negative per patient and wife.       Plan:   1. INR supratherapeutic today at 3.1 (goal 2.0-3.0). Instructed Mr. Wolf to take warfarin 3.75mg FriTues and 2.5mg ROW this week. Patients INR  has been elevated for 4 consecutive visits despite a few dose reductions. Will decrease dose slightly and hopefully keep plan consistent going forward if in range at f/u.   2. RTC in 9/12/24 to ensure INR back WNL. Otherwise, patient prefers monthly INR checks.   3. Verbal and written information was provided to Mr. Wolf and Mrs. Wolf in clinic. Mr. Wolf voiced understanding with teach back and has no further questions at this time.    Leona Walter, PharmD  09/05/24   12:03 EDT

## 2024-09-07 DIAGNOSIS — I48.91 ATRIAL FIBRILLATION, UNSPECIFIED TYPE: ICD-10-CM

## 2024-09-09 RX ORDER — WARFARIN SODIUM 2.5 MG/1
TABLET ORAL
Qty: 120 TABLET | Refills: 1 | Status: SHIPPED | OUTPATIENT
Start: 2024-09-09

## 2024-09-09 RX ORDER — LISINOPRIL 40 MG/1
40 TABLET ORAL DAILY
Qty: 90 TABLET | Refills: 2 | Status: SHIPPED | OUTPATIENT
Start: 2024-09-09

## 2024-09-09 RX ORDER — LISINOPRIL 40 MG/1
40 TABLET ORAL DAILY
Qty: 90 TABLET | Refills: 2 | OUTPATIENT
Start: 2024-09-09

## 2024-09-12 ENCOUNTER — ANTICOAGULATION VISIT (OUTPATIENT)
Dept: PHARMACY | Facility: HOSPITAL | Age: 79
End: 2024-09-12
Payer: MEDICARE

## 2024-09-12 DIAGNOSIS — I48.20 CHRONIC ATRIAL FIBRILLATION: Primary | ICD-10-CM

## 2024-09-12 LAB
INR PPP: 2.7 (ref 0.91–1.09)
PROTHROMBIN TIME: 32.7 SECONDS (ref 10–13.8)

## 2024-09-12 PROCEDURE — 36416 COLLJ CAPILLARY BLOOD SPEC: CPT

## 2024-09-12 PROCEDURE — G0463 HOSPITAL OUTPT CLINIC VISIT: HCPCS

## 2024-09-12 PROCEDURE — 85610 PROTHROMBIN TIME: CPT

## 2024-09-12 NOTE — PROGRESS NOTES
Anticoagulation Clinic Progress Note  Indication: atrial fibrillation  Referring Provider: Sravani [last appt 9/8/23  next:  9/16/24 (Will Sue)]  Initial Warfarin Start Date: 2008  Goal INR: 2 - 3  Current Drug Interactions: fluoxetine, glimepiride, melatonin (PRN), mirtazepine, MVI  CHADS-VASc: 3 (age, HTN, DM)    EtOH: none  GLV: Salad (Spinach salad 1x weekly or garden salad) and serving of broccoli once a week (2/9/24)  OTC Pain Relief: Uses APAP prn (~1x/week)    Anticoagulation Clinic INR History:  Date 8/20 9/4 10/2 11/6 11/15 11/29 12/31 1/7/19 1/17 1/29 2/12   Total Weekly Dose 17.5  mg 17.5 mg 17.5 mg 10mg 20mg 17.5mg 17.5mg 18.75 mg 17.5 mg 18.75 mg 20mg   INR 2.3 2.3 2.2 1.3 2.3 2.2 1.7 1.9 1.9 1.9 2.0   Notes    pre- epidural   missed dose?         Date 2/27 3/27 4/10 4/23 5/7 5/28 6/25 7/30 8/28 9/25 10/23   Total Weekly Dose 20mg 20mg 21.25 mg 21.25 mg 21.25 mg 21.25mg 21.25mg 21.25mg 21.25mg 21.25mg 21.25mg   INR 2.2 1.8 2.5 1.8 2.4 2.4 2.5 2.7 2.6 3.0 2.9   Notes sick   post- scope            Date  11/6 11/13 12/4 1/3 1/15 1/20 1/31 2/10 2/28 4/14 5/26   Total Weekly Dose 21.25 mg 18.75 mg 21.25 mg 21.25 mg 21.25 mg 20 mg 21.25 21.25 20mg 21.25mg 21.25mg   INR 4.2 3.2 2.7 2.2 3.1 3.1 2.9 3.5 2.4 2.3 2.5   Notes apap Hold x1, less GLV Inc in GLV  doxy doxy  1x dose cephalexin 1x dose dec; keflex       Date  6/24 7/22 8/24 9/10 10/8 11/5 11/18 12/2 12/30 1/26 2/22 3/22 4/19   Total Weekly Dose 21.25mg 21.25 21.25mg 21.25mg 21.25mg 21.25mg 20mg 20mg 20mg 20mg  20mg 20 mg 20mg   INR 2.4 2.4 3.0 2.8 3.0 3.1 2.5 2.5 2.5 2.6 2.9  2 2.2   Notes      Inc. GLV Dose dec    desvenlafaxine broccoli      Date 5/13 6/16  7/7 7/21 8/11 9/1 9/29 10/27 11/3 12/1 12/9 12/16   Total WeeklyDose 20 mg 20mg  20 mg 18.75mg  17.5 mg 17.5mg 17.5 17.5mg 17.5mg 17.5mg 18.75mg 22.5mg   INR 2.5 3.2 3.1 3.0 2.2 2.4 2.2 2.4 2.3 1.5 1.6 2.2   Notes  apap Inc GLV     augmentin day 3 augementin glucerna  Boost x2     Date  12/28 1/13/22 2/8 2/16 3/1 3/22 4/12 5/10 6/7 7/5 8/2    Total WeeklyDose 21.25mg 21.25mg 21.25 mg 21.25mg 22.5mg 22.5 mg 22.5mg 22.5 mg 22.5 mg 22.5mg 22.5mg EGD x 3 days hold   INR 2.6 2.3 1.7 2.1 2.5 2.0 2.3 2.4 2.1 2.1 2.8    Notes    Dec GLV        Boost x2     Date 8/25 9/8 9/29 10/27 12/1 1/5/23 2/2 3/2 3/30 4/14 4/20 5/18   Total WeeklyDose 18.75mg 22.5mg 22.5 mg 22.5 mg 22.5 mg 22.5mg 22.5 mg 22.5 mg 22.5 mg 22.5mg 22.5 mg  22.5 mg   INR 2.7 2.7 2.5 2.2 2.3 2.3 2.7 3.0 2.5 2.6 2.6 3.0   Notes omeprazole initi         doxy Doxy       Date 7/12 8/9 9/11 10/9 11/6 11/30 12/12 12/21 1/4 1/26/24 2/9 2/23   Total Weekly Dose 22.5 mg 22.5 mg 22.5mg 22.5 mg 22.5mg 22.5 mg 22.5mg 21.25 mg  21.25 mg 21.25mg     INR 2.7 2.8 2.5 2.5 2.3 3.5 3.2 2.4 2.1 1.9 1.9 1.8   Notes          Increase LGV       Date 3/8 4/5 5/3 5/30 7/3 7/31   Total Weekly Dose 23.75 mg 23.75 mg 23.75mg 23.75 mg 23.75mg 23.75 mg   INR 2.2 2.3 2.7 2.9 2.0 3.3   Notes           Date 8/16 8/29 9/5 9/12           Total Weekly Dose 23.75 mg 23.75 22.5 20 mg           INR 3.3 3.8 3.1 2.7           Notes                     Clinic Interview:  Verbal Release Authorization signed on 6/27/18 -- may speak with Sussy (wife), Nikolai (son)  Tablet Strength: pt has 2.5mg tablets  Phone: [phone] (Home), [phone]  Fax: [phone] (Attn: Tasha) [facility] [place]    Patient Findings  Negatives: Signs/symptoms of thrombosis, Signs/symptoms of bleeding, Laboratory test error suspected, Change in health, Change in alcohol use, Change in activity, Upcoming invasive procedure, Emergency department visit, Upcoming dental procedure, Missed doses, Extra doses, Change in medications, Change in diet/appetite, Hospital admission, Bruising, Other complaints   Comments: No changes per patient and spouse. Dosing verified.       Plan:   1. INR therapeutic today at 2.7 (goal 2.0-3.0). Instructed Mr. Wolf to continue recently reduced dose warfarin 3.75mg Leah  and 2.5mg ROW until recheck.  2. RTC in 3 weeks on 10/3 to ensure INR back WNL. Had initially suggested two week recheck but patient resistant to this given numerous weekly checks. Patient strongly prefers monthly INR checks and hopefully will be able to return to this after next encounter.   3. Verbal and written information was provided to Mr. Wolf and Mrs. Wolf in clinic. Mr. Wolf voiced understanding with teach back and has no further questions at this time.    Kerwin Perez, PharmD  09/12/24   12:16 EDT

## 2024-09-13 NOTE — PROGRESS NOTES
This condition is stable, continue with current treatment.   Tony CHARLA Wolf  1945  [phone]      08/02/2017    [facility] CARDIOLOGY     Kay Lopez, DO  [address] [id number]    Chief Complaint   Patient presents with   • Atrial Fibrillation       Problem List:  1.  Atrial fibrillation/atrial tachycardia/atrial flutter:  a. Diagnosed in June 2004 by physical examination. Coumadin initiated in June 2004.  b. Echocardiogram on 06/30/2004: LA 5.8 with normal LV size and function.  c. Nuclear GXT on 06/30/2004: Negative study.  d. External cardioversion and maintenance to normal sinus rhythm with sotalol on 08/30/2004.  e. Event recorder in March 2005 showing normal sinus rhythm with occasional PVCs.  f. Echocardiogram/bubble study on 04/08/2005: Septum 1.6, mild MR, trace to mild TR, PI, negative bubble study, EF 68%.  g. Holter monitor, September 2008, with 35 PVCs, 157 PACs.   h. Cardiolite, 12/29/2008, with no ischemia, EF 47%.  i. Tikosyn initiated, 01/04/2010.   j. Event recorder, 01/22/2010, with nonsustained atrial tachycardia and PACs.   k. Stress Cardiolite, 03/31/2011: LVEF (59%), no ischemia.   l. EP study with radiofrequency ablation of left atrial posterior wall atrial tachycardia, 04/28/2014.  m. BRIDGET-guided external cardioversion, 11/25/2014 with BRIDGET demonstrating normal LV size and function and mild MR/TR.   2. Premature ventricular contractions:  a. Event recorder, June 2007, consistent with PVCs with subsequent increase of sotalol therapy to 120 mg p.o. b.i.d.    3. History of dyspnea:  a. Dobutamine Cardiolite in September 1998 with inferolateral reversibility.   b. Left heart catheterization on 02/29/2000, showing normal coronary arteries, normal EF.  c. Echocardiogram, March 2010: Normal LV size and function, moderate left ventricular hypertrophy, mild MR, and AI.   d. Echocardiogram, 01/20/2016: EF 55% to 60%. Mild MR. Mild aortic cusp sclerosis. Trace TR.  e. CT scan of the chest with and  without contrast, 01/20/2016, shows cardiomegaly; no pulmonary embolism, mild pulmonary vascular congestion.  4. Hypertension.  5. Hyperlipidemia.  6. Concentric left ventricular hypertrophy.  7. Diabetes mellitus.  8. Surgical history:  a. Left knee replacement in 2002.  b. Left hand carpal tunnel in 2000.  c. Right knee replacement 2017    Allergies  Allergies   Allergen Reactions   • Aspirin Anaphylaxis     Other reaction(s): Hypotension       Current Medications    Current Outpatient Prescriptions:   •  acetaminophen (TYLENOL) 500 MG tablet, Take 1,000 mg by mouth Every 6 (Six) Hours As Needed for Mild Pain (1-3)., Disp: , Rfl:   •  amLODIPine (NORVASC) 10 MG tablet, Take 10 mg by mouth Daily., Disp: , Rfl:   •  colestipol (COLESTID) 1 G tablet, Take 1 g by mouth Daily., Disp: , Rfl:   •  docusate sodium 100 MG capsule, Take 100 mg by mouth 2 (Two) Times a Day As Needed for Constipation., Disp: , Rfl: 0  •  FLUoxetine (PROzac) 40 MG capsule, Take 80 mg by mouth Daily., Disp: , Rfl:   •  glimepiride (AMARYL) 2 MG tablet, TAKE 1 TABLET BY MOUTH EVERY DAY, Disp: 30 tablet, Rfl: 0  •  HYDROcodone-acetaminophen (NORCO) 7.5-325 MG per tablet, Take 1 tablet by mouth Every 8 (Eight) Hours As Needed for Moderate Pain (4-6)., Disp: , Rfl:   •  hydrOXYzine (VISTARIL) 50 MG capsule, Take 50 mg by mouth Daily., Disp: , Rfl:   •  lisinopril (PRINIVIL,ZESTRIL) 40 MG tablet, Take 40 mg by mouth Daily., Disp: , Rfl:   •  Melatonin 2.5 MG chewable tablet, Chew 1 tablet At Night As Needed., Disp: , Rfl:   •  metoprolol succinate XL (TOPROL-XL) 100 MG 24 hr tablet, Take 100 mg by mouth Daily., Disp: , Rfl:   •  pravastatin (PRAVACHOL) 20 MG tablet, Take 1 tablet by mouth Every Evening., Disp: 30 tablet, Rfl: 11  •  sitaGLIPtin (JANUVIA) 100 MG tablet, Take 100 mg by mouth Daily., Disp: , Rfl:   •  traZODone (DESYREL) 50 MG tablet, Take 100 mg by mouth Every Night., Disp: , Rfl:   •  warfarin (COUMADIN) 5 MG tablet, 1 tablet daily.  "INR sunday, Disp: , Rfl:   •  Cholecalciferol (VITAMIN D-3) 1000 UNITS capsule, Take 1,000 Units by mouth Daily., Disp: , Rfl:     History of Present Illness   HPI    Pt presents for follow up of atrial flutter/fibrillation and HTN. Since we last saw the pt, pt denies any awareness of AF episodes, SOB, CP, LH, and dizziness. Denies any bleeding, or TIA/CVA symptoms. Overall feels well. INRs have been good recently, although around the time of his surgery, his levels were labile. He had his knee replaced in May and going to rehab.     ROS:  General:  + fatigue, weight gain or loss  Cardiovascular:  Denies CP, PND, syncope, near syncope, edema or palpitations.  Pulmonary:  Denies JOSEPH, cough, or wheezing      Vitals:    08/02/17 1421   BP: 128/86   BP Location: Right arm   Patient Position: Sitting   Pulse: 90   Weight: 240 lb 12.8 oz (109 kg)   Height: 72\" (182.9 cm)     PE:  General: NAD  Neck: no JVD, no carotid bruits, no TM  Heart irr irr, NL S1, S2,  no rubs, murmurs  Lungs: CTA, no wheezes, rhonchi, or rales  Abd: soft, non-tender, NL BS  Ext: No musculoskeletal deformities, no edema, cyanosis, or clubbing  Psych: normal mood and affect    Diagnostic Data:      Procedures    1. Atypical atrial flutter    2. Essential hypertension          Plan:  1) Atypical atrial flutter- rate controlled on metoprolol. Check HRs at home.   Continue present medications.   2) Anticoagulation  Continue warfarin   3) HTN- controlled  Wt loss, exercise, salt reduction    F/up in 6-8 months    [facility]  8/2/2017   2:47 PM  I saw this patient independently.       "

## 2024-09-16 ENCOUNTER — OFFICE VISIT (OUTPATIENT)
Dept: CARDIOLOGY | Facility: CLINIC | Age: 79
End: 2024-09-16
Payer: MEDICARE

## 2024-09-16 VITALS
HEART RATE: 60 BPM | OXYGEN SATURATION: 96 % | WEIGHT: 236 LBS | DIASTOLIC BLOOD PRESSURE: 90 MMHG | BODY MASS INDEX: 31.97 KG/M2 | HEIGHT: 72 IN | SYSTOLIC BLOOD PRESSURE: 142 MMHG

## 2024-09-16 DIAGNOSIS — I10 ESSENTIAL HYPERTENSION: ICD-10-CM

## 2024-09-16 DIAGNOSIS — I49.3 PVC'S (PREMATURE VENTRICULAR CONTRACTIONS): ICD-10-CM

## 2024-09-16 DIAGNOSIS — I48.21 PERMANENT ATRIAL FIBRILLATION: Primary | ICD-10-CM

## 2024-09-16 DIAGNOSIS — I48.4 ATYPICAL ATRIAL FLUTTER: ICD-10-CM

## 2024-09-16 PROCEDURE — 99214 OFFICE O/P EST MOD 30 MIN: CPT | Performed by: PHYSICIAN ASSISTANT

## 2024-09-16 PROCEDURE — 3080F DIAST BP >= 90 MM HG: CPT | Performed by: PHYSICIAN ASSISTANT

## 2024-09-16 PROCEDURE — 3077F SYST BP >= 140 MM HG: CPT | Performed by: PHYSICIAN ASSISTANT

## 2024-09-20 ENCOUNTER — OFFICE VISIT (OUTPATIENT)
Dept: INTERNAL MEDICINE | Facility: CLINIC | Age: 79
End: 2024-09-20
Payer: MEDICARE

## 2024-09-20 VITALS
TEMPERATURE: 95.3 F | BODY MASS INDEX: 32.05 KG/M2 | DIASTOLIC BLOOD PRESSURE: 82 MMHG | WEIGHT: 236.6 LBS | SYSTOLIC BLOOD PRESSURE: 124 MMHG | OXYGEN SATURATION: 96 % | HEIGHT: 72 IN

## 2024-09-20 DIAGNOSIS — I48.20 CHRONIC ATRIAL FIBRILLATION: ICD-10-CM

## 2024-09-20 DIAGNOSIS — I10 ESSENTIAL HYPERTENSION: ICD-10-CM

## 2024-09-20 DIAGNOSIS — Z23 NEED FOR INFLUENZA VACCINATION: ICD-10-CM

## 2024-09-20 DIAGNOSIS — K21.9 GASTROESOPHAGEAL REFLUX DISEASE, UNSPECIFIED WHETHER ESOPHAGITIS PRESENT: ICD-10-CM

## 2024-09-20 DIAGNOSIS — E78.5 HYPERLIPIDEMIA LDL GOAL <100: Primary | ICD-10-CM

## 2024-09-20 DIAGNOSIS — E11.9 TYPE 2 DIABETES MELLITUS WITHOUT COMPLICATION, WITHOUT LONG-TERM CURRENT USE OF INSULIN: ICD-10-CM

## 2024-09-20 PROCEDURE — 90662 IIV NO PRSV INCREASED AG IM: CPT | Performed by: INTERNAL MEDICINE

## 2024-09-20 PROCEDURE — 1160F RVW MEDS BY RX/DR IN RCRD: CPT | Performed by: INTERNAL MEDICINE

## 2024-09-20 PROCEDURE — 99214 OFFICE O/P EST MOD 30 MIN: CPT | Performed by: INTERNAL MEDICINE

## 2024-09-20 PROCEDURE — 1159F MED LIST DOCD IN RCRD: CPT | Performed by: INTERNAL MEDICINE

## 2024-09-20 PROCEDURE — G0008 ADMIN INFLUENZA VIRUS VAC: HCPCS | Performed by: INTERNAL MEDICINE

## 2024-09-20 PROCEDURE — 3074F SYST BP LT 130 MM HG: CPT | Performed by: INTERNAL MEDICINE

## 2024-09-20 PROCEDURE — 3079F DIAST BP 80-89 MM HG: CPT | Performed by: INTERNAL MEDICINE

## 2024-09-20 PROCEDURE — 1126F AMNT PAIN NOTED NONE PRSNT: CPT | Performed by: INTERNAL MEDICINE

## 2024-09-20 RX ORDER — GLIMEPIRIDE 2 MG/1
2 TABLET ORAL DAILY
Qty: 90 TABLET | Refills: 3 | Status: CANCELLED | OUTPATIENT
Start: 2024-09-20

## 2024-09-20 RX ORDER — PRAVASTATIN SODIUM 20 MG
20 TABLET ORAL DAILY
Qty: 90 TABLET | Refills: 1 | Status: SHIPPED | OUTPATIENT
Start: 2024-09-20

## 2024-09-20 RX ORDER — BLOOD SUGAR DIAGNOSTIC
STRIP MISCELLANEOUS
Qty: 100 EACH | Refills: 11 | Status: CANCELLED | OUTPATIENT
Start: 2024-09-20

## 2024-09-20 RX ORDER — NYSTATIN 100000 [USP'U]/G
POWDER TOPICAL 3 TIMES DAILY
Qty: 60 G | Refills: 1 | Status: SHIPPED | OUTPATIENT
Start: 2024-09-20

## 2024-09-30 ENCOUNTER — HOSPITAL ENCOUNTER (OUTPATIENT)
Dept: CARDIOLOGY | Facility: HOSPITAL | Age: 79
Discharge: HOME | End: 2024-09-30
Admitting: PHYSICIAN ASSISTANT
Payer: MEDICARE

## 2024-09-30 DIAGNOSIS — I48.4 ATYPICAL ATRIAL FLUTTER: ICD-10-CM

## 2024-09-30 DIAGNOSIS — I48.21 PERMANENT ATRIAL FIBRILLATION: ICD-10-CM

## 2024-09-30 DIAGNOSIS — I10 ESSENTIAL HYPERTENSION: ICD-10-CM

## 2024-09-30 DIAGNOSIS — I49.3 PVC'S (PREMATURE VENTRICULAR CONTRACTIONS): ICD-10-CM

## 2024-09-30 LAB
BH CV ECHO MEAS - AO MAX PG: 7.4 MMHG
BH CV ECHO MEAS - AO MEAN PG: 4 MMHG
BH CV ECHO MEAS - AO ROOT DIAM: 3.4 CM
BH CV ECHO MEAS - AO V2 MAX: 136 CM/SEC
BH CV ECHO MEAS - AO V2 VTI: 22.6 CM
BH CV ECHO MEAS - AVA(I,D): 2.28 CM2
BH CV ECHO MEAS - EDV(CUBED): 140.6 ML
BH CV ECHO MEAS - EDV(MOD-SP2): 77.8 ML
BH CV ECHO MEAS - EDV(MOD-SP4): 68.7 ML
BH CV ECHO MEAS - EF(MOD-BP): 53.3 %
BH CV ECHO MEAS - EF(MOD-SP2): 51.7 %
BH CV ECHO MEAS - EF(MOD-SP4): 55.2 %
BH CV ECHO MEAS - ESV(CUBED): 29.8 ML
BH CV ECHO MEAS - ESV(MOD-SP2): 37.6 ML
BH CV ECHO MEAS - ESV(MOD-SP4): 30.8 ML
BH CV ECHO MEAS - FS: 40.4 %
BH CV ECHO MEAS - IVS/LVPW: 1 CM
BH CV ECHO MEAS - IVSD: 1.2 CM
BH CV ECHO MEAS - LA DIMENSION: 7.1 CM
BH CV ECHO MEAS - LAT PEAK E' VEL: 9.7 CM/SEC
BH CV ECHO MEAS - LV MASS(C)D: 248.8 GRAMS
BH CV ECHO MEAS - LV MAX PG: 3.2 MMHG
BH CV ECHO MEAS - LV MEAN PG: 2 MMHG
BH CV ECHO MEAS - LV V1 MAX: 88.8 CM/SEC
BH CV ECHO MEAS - LV V1 VTI: 16.4 CM
BH CV ECHO MEAS - LVIDD: 5.2 CM
BH CV ECHO MEAS - LVIDS: 3.1 CM
BH CV ECHO MEAS - LVOT AREA: 3.1 CM2
BH CV ECHO MEAS - LVOT DIAM: 2 CM
BH CV ECHO MEAS - LVPWD: 1.2 CM
BH CV ECHO MEAS - MED PEAK E' VEL: 6.7 CM/SEC
BH CV ECHO MEAS - MV DEC SLOPE: 644 CM/SEC2
BH CV ECHO MEAS - MV DEC TIME: 0.17 SEC
BH CV ECHO MEAS - MV E MAX VEL: 123 CM/SEC
BH CV ECHO MEAS - MV P1/2T: 61.9 MSEC
BH CV ECHO MEAS - MVA(P1/2T): 3.6 CM2
BH CV ECHO MEAS - SV(LVOT): 51.5 ML
BH CV ECHO MEAS - SV(MOD-SP2): 40.2 ML
BH CV ECHO MEAS - SV(MOD-SP4): 37.9 ML
BH CV ECHO MEASUREMENTS AVERAGE E/E' RATIO: 15
BH CV XLRA - RV BASE: 7.1 CM
BH CV XLRA - RV LENGTH: 5.9 CM
BH CV XLRA - RV MID: 2.6 CM
BH CV XLRA - TDI S': 8.7 CM/SEC
LEFT ATRIUM VOLUME INDEX: 59.8 ML/M2

## 2024-09-30 PROCEDURE — 93306 TTE W/DOPPLER COMPLETE: CPT | Performed by: INTERNAL MEDICINE

## 2024-09-30 PROCEDURE — 93306 TTE W/DOPPLER COMPLETE: CPT

## 2024-10-03 ENCOUNTER — ANTICOAGULATION VISIT (OUTPATIENT)
Dept: PHARMACY | Facility: HOSPITAL | Age: 79
End: 2024-10-03
Payer: MEDICARE

## 2024-10-03 DIAGNOSIS — I48.20 CHRONIC ATRIAL FIBRILLATION: Primary | ICD-10-CM

## 2024-10-03 LAB
INR PPP: 2 (ref 0.91–1.09)
PROTHROMBIN TIME: 23.7 SECONDS (ref 10–13.8)

## 2024-10-03 PROCEDURE — 85610 PROTHROMBIN TIME: CPT

## 2024-10-03 PROCEDURE — 36416 COLLJ CAPILLARY BLOOD SPEC: CPT

## 2024-10-03 PROCEDURE — G0463 HOSPITAL OUTPT CLINIC VISIT: HCPCS

## 2024-10-03 NOTE — PROGRESS NOTES
Anticoagulation Clinic Progress Note  Indication: atrial fibrillation  Referring Provider: Sravani [last appt 9/8/23  next:  9/16/24 (Will Sue)]  Initial Warfarin Start Date: 2008  Goal INR: 2 - 3  Current Drug Interactions: fluoxetine, glimepiride, melatonin (PRN), mirtazepine, MVI  CHADS-VASc: 3 (age, HTN, DM)    EtOH: none  GLV: Salad (Spinach salad 1x weekly or garden salad) and serving of broccoli once a week (2/9/24)  OTC Pain Relief: Uses APAP prn (~1x/week)    Anticoagulation Clinic INR History:  Date 8/20 9/4 10/2 11/6 11/15 11/29 12/31 1/7/19 1/17 1/29 2/12   Total Weekly Dose 17.5  mg 17.5 mg 17.5 mg 10mg 20mg 17.5mg 17.5mg 18.75 mg 17.5 mg 18.75 mg 20mg   INR 2.3 2.3 2.2 1.3 2.3 2.2 1.7 1.9 1.9 1.9 2.0   Notes    pre- epidural   missed dose?         Date 2/27 3/27 4/10 4/23 5/7 5/28 6/25 7/30 8/28 9/25 10/23   Total Weekly Dose 20mg 20mg 21.25 mg 21.25 mg 21.25 mg 21.25mg 21.25mg 21.25mg 21.25mg 21.25mg 21.25mg   INR 2.2 1.8 2.5 1.8 2.4 2.4 2.5 2.7 2.6 3.0 2.9   Notes sick   post- scope            Date  11/6 11/13 12/4 1/3 1/15 1/20 1/31 2/10 2/28 4/14 5/26   Total Weekly Dose 21.25 mg 18.75 mg 21.25 mg 21.25 mg 21.25 mg 20 mg 21.25 21.25 20mg 21.25mg 21.25mg   INR 4.2 3.2 2.7 2.2 3.1 3.1 2.9 3.5 2.4 2.3 2.5   Notes apap Hold x1, less GLV Inc in GLV  doxy doxy  1x dose cephalexin 1x dose dec; keflex       Date  6/24 7/22 8/24 9/10 10/8 11/5 11/18 12/2 12/30 1/26 2/22 3/22 4/19   Total Weekly Dose 21.25mg 21.25 21.25mg 21.25mg 21.25mg 21.25mg 20mg 20mg 20mg 20mg  20mg 20 mg 20mg   INR 2.4 2.4 3.0 2.8 3.0 3.1 2.5 2.5 2.5 2.6 2.9  2 2.2   Notes      Inc. GLV Dose dec    desvenlafaxine broccoli      Date 5/13 6/16  7/7 7/21 8/11 9/1 9/29 10/27 11/3 12/1 12/9 12/16   Total WeeklyDose 20 mg 20mg  20 mg 18.75mg  17.5 mg 17.5mg 17.5 17.5mg 17.5mg 17.5mg 18.75mg 22.5mg   INR 2.5 3.2 3.1 3.0 2.2 2.4 2.2 2.4 2.3 1.5 1.6 2.2   Notes  apap Inc GLV     augmentin day 3 augementin glucerna  Boost x2     Date  12/28 1/13/22 2/8 2/16 3/1 3/22 4/12 5/10 6/7 7/5 8/2    Total WeeklyDose 21.25mg 21.25mg 21.25 mg 21.25mg 22.5mg 22.5 mg 22.5mg 22.5 mg 22.5 mg 22.5mg 22.5mg EGD x 3 days hold   INR 2.6 2.3 1.7 2.1 2.5 2.0 2.3 2.4 2.1 2.1 2.8    Notes    Dec GLV        Boost x2     Date 8/25 9/8 9/29 10/27 12/1 1/5/23 2/2 3/2 3/30 4/14 4/20 5/18   Total WeeklyDose 18.75mg 22.5mg 22.5 mg 22.5 mg 22.5 mg 22.5mg 22.5 mg 22.5 mg 22.5 mg 22.5mg 22.5 mg  22.5 mg   INR 2.7 2.7 2.5 2.2 2.3 2.3 2.7 3.0 2.5 2.6 2.6 3.0   Notes omeprazole initi         doxy Doxy       Date 7/12 8/9 9/11 10/9 11/6 11/30 12/12 12/21 1/4 1/26/24 2/9 2/23   Total Weekly Dose 22.5 mg 22.5 mg 22.5mg 22.5 mg 22.5mg 22.5 mg 22.5mg 21.25 mg  21.25 mg 21.25mg     INR 2.7 2.8 2.5 2.5 2.3 3.5 3.2 2.4 2.1 1.9 1.9 1.8   Notes          Increase LGV       Date 3/8 4/5 5/3 5/30 7/3 7/31   Total Weekly Dose 23.75 mg 23.75 mg 23.75mg 23.75 mg 23.75mg 23.75 mg   INR 2.2 2.3 2.7 2.9 2.0 3.3   Notes           Date 8/16 8/29 9/5 9/12 10/3          Total Weekly Dose 23.75 mg 23.75 22.5 20 mg 20mg           INR 3.3 3.8 3.1 2.7 2.0          Notes                     Clinic Interview:  Verbal Release Authorization signed on 6/27/18 -- may speak with Sussy (wife)Nikolai (son)  Tablet Strength: pt has 2.5mg tablets  Phone: [phone] (Home), [phone]  Fax: [phone] (Attn: Tasha) [facility]      Patient Findings:  Negatives: Signs/symptoms of thrombosis, Signs/symptoms of bleeding, Laboratory test error suspected, Change in health, Change in alcohol use, Change in activity, Upcoming invasive procedure, Emergency department visit, Upcoming dental procedure, Missed doses, Extra doses, Change in medications, Change in diet/appetite, Hospital admission, Bruising, Other complaints   Comments: No changes per patient and wife.       Plan:   1. INR therapeutic today at 2.0 (goal 2.0-3.0). Instructed Mr. Wolf to increase warfarin dose to 3.75mg today only, then continue  warfarin 3.75mg FriTues and 2.5mg ROW until recheck.  2. RTC in 4 weeks on 10/31 to ensure INR back WNL.   3. Verbal and written information was provided to Mr. Wolf and Mrs. Wolf in clinic. Mr. Wolf voiced understanding with teach back and has no further questions at this time.    Leona Walter, PharmD  10/03/24   12:08 EDT

## 2024-10-13 ENCOUNTER — APPOINTMENT (OUTPATIENT)
Dept: CARDIOLOGY | Facility: HOSPITAL | Age: 79
End: 2024-10-13
Payer: MEDICARE

## 2024-10-13 ENCOUNTER — HOSPITAL ENCOUNTER (EMERGENCY)
Facility: HOSPITAL | Age: 79
Discharge: HOME | End: 2024-10-13
Attending: EMERGENCY MEDICINE | Admitting: EMERGENCY MEDICINE
Payer: MEDICARE

## 2024-10-13 ENCOUNTER — APPOINTMENT (OUTPATIENT)
Dept: GENERAL RADIOLOGY | Facility: HOSPITAL | Age: 79
End: 2024-10-13
Payer: MEDICARE

## 2024-10-13 VITALS
BODY MASS INDEX: 31.95 KG/M2 | DIASTOLIC BLOOD PRESSURE: 95 MMHG | RESPIRATION RATE: 16 BRPM | HEIGHT: 72 IN | HEART RATE: 69 BPM | TEMPERATURE: 98.1 F | SYSTOLIC BLOOD PRESSURE: 136 MMHG | WEIGHT: 235.89 LBS | OXYGEN SATURATION: 93 %

## 2024-10-13 DIAGNOSIS — Z86.79 HISTORY OF ATRIAL FIBRILLATION: ICD-10-CM

## 2024-10-13 DIAGNOSIS — Z86.39 HISTORY OF DIABETES MELLITUS: ICD-10-CM

## 2024-10-13 DIAGNOSIS — M79.605 ACUTE LEG PAIN, LEFT: Primary | ICD-10-CM

## 2024-10-13 DIAGNOSIS — Z86.79 HISTORY OF HYPERTENSION: ICD-10-CM

## 2024-10-13 DIAGNOSIS — R53.1 GENERALIZED WEAKNESS: ICD-10-CM

## 2024-10-13 LAB
ALBUMIN SERPL-MCNC: 3.9 G/DL (ref 3.5–5.2)
ALBUMIN/GLOB SERPL: 1.6 G/DL
ALP SERPL-CCNC: 62 U/L (ref 39–117)
ALT SERPL W P-5'-P-CCNC: 21 U/L (ref 1–41)
ANION GAP SERPL CALCULATED.3IONS-SCNC: 11 MMOL/L (ref 5–15)
AST SERPL-CCNC: 35 U/L (ref 1–40)
BACTERIA UR QL AUTO: ABNORMAL /HPF
BASOPHILS # BLD AUTO: 0.07 10*3/MM3 (ref 0–0.2)
BASOPHILS NFR BLD AUTO: 0.9 % (ref 0–1.5)
BH CV LOWER VASCULAR LEFT COMMON FEMORAL AUGMENT: NORMAL
BH CV LOWER VASCULAR LEFT COMMON FEMORAL COMPRESS: NORMAL
BH CV LOWER VASCULAR LEFT COMMON FEMORAL PHASIC: NORMAL
BH CV LOWER VASCULAR LEFT COMMON FEMORAL SPONT: NORMAL
BH CV LOWER VASCULAR LEFT DISTAL FEMORAL COMPRESS: NORMAL
BH CV LOWER VASCULAR LEFT GASTRONEMIUS COMPRESS: NORMAL
BH CV LOWER VASCULAR LEFT GREATER SAPH AK COMPRESS: NORMAL
BH CV LOWER VASCULAR LEFT GREATER SAPH BK COMPRESS: NORMAL
BH CV LOWER VASCULAR LEFT LESSER SAPH COMPRESS: NORMAL
BH CV LOWER VASCULAR LEFT MID FEMORAL AUGMENT: NORMAL
BH CV LOWER VASCULAR LEFT MID FEMORAL COMPRESS: NORMAL
BH CV LOWER VASCULAR LEFT MID FEMORAL PHASIC: NORMAL
BH CV LOWER VASCULAR LEFT MID FEMORAL SPONT: NORMAL
BH CV LOWER VASCULAR LEFT PERONEAL COMPRESS: NORMAL
BH CV LOWER VASCULAR LEFT POPLITEAL AUGMENT: NORMAL
BH CV LOWER VASCULAR LEFT POPLITEAL COMPRESS: NORMAL
BH CV LOWER VASCULAR LEFT POPLITEAL PHASIC: NORMAL
BH CV LOWER VASCULAR LEFT POPLITEAL SPONT: NORMAL
BH CV LOWER VASCULAR LEFT POSTERIOR TIBIAL COMPRESS: NORMAL
BH CV LOWER VASCULAR LEFT PROFUNDA FEMORAL COMPRESS: NORMAL
BH CV LOWER VASCULAR LEFT PROXIMAL FEMORAL COMPRESS: NORMAL
BH CV LOWER VASCULAR LEFT SAPHENOFEMORAL JUNCTION COMPRESS: NORMAL
BH CV LOWER VASCULAR RIGHT COMMON FEMORAL AUGMENT: NORMAL
BH CV LOWER VASCULAR RIGHT COMMON FEMORAL COMPRESS: NORMAL
BH CV LOWER VASCULAR RIGHT COMMON FEMORAL PHASIC: NORMAL
BH CV LOWER VASCULAR RIGHT COMMON FEMORAL SPONT: NORMAL
BH CV VAS PRELIMINARY FINDINGS SCRIPTING: 1
BILIRUB SERPL-MCNC: 1.2 MG/DL (ref 0–1.2)
BILIRUB UR QL STRIP: NEGATIVE
BUN SERPL-MCNC: 13 MG/DL (ref 8–23)
BUN/CREAT SERPL: 13.3 (ref 7–25)
CALCIUM SPEC-SCNC: 8.8 MG/DL (ref 8.6–10.5)
CHLORIDE SERPL-SCNC: 108 MMOL/L (ref 98–107)
CLARITY UR: ABNORMAL
CO2 SERPL-SCNC: 25 MMOL/L (ref 22–29)
COLOR UR: YELLOW
CREAT SERPL-MCNC: 0.98 MG/DL (ref 0.76–1.27)
DEPRECATED RDW RBC AUTO: 50.2 FL (ref 37–54)
EGFRCR SERPLBLD CKD-EPI 2021: 79.4 ML/MIN/1.73
EOSINOPHIL # BLD AUTO: 0.1 10*3/MM3 (ref 0–0.4)
EOSINOPHIL NFR BLD AUTO: 1.3 % (ref 0.3–6.2)
ERYTHROCYTE [DISTWIDTH] IN BLOOD BY AUTOMATED COUNT: 14.4 % (ref 12.3–15.4)
FLUAV SUBTYP SPEC NAA+PROBE: NOT DETECTED
FLUBV RNA ISLT QL NAA+PROBE: NOT DETECTED
GLOBULIN UR ELPH-MCNC: 2.4 GM/DL
GLUCOSE BLDC GLUCOMTR-MCNC: 109 MG/DL (ref 70–130)
GLUCOSE SERPL-MCNC: 120 MG/DL (ref 65–99)
GLUCOSE UR STRIP-MCNC: NEGATIVE MG/DL
HCT VFR BLD AUTO: 50.9 % (ref 37.5–51)
HGB BLD-MCNC: 16.9 G/DL (ref 13–17.7)
HGB UR QL STRIP.AUTO: NEGATIVE
HOLD SPECIMEN: NORMAL
HYALINE CASTS UR QL AUTO: ABNORMAL /LPF
IMM GRANULOCYTES # BLD AUTO: 0.02 10*3/MM3 (ref 0–0.05)
IMM GRANULOCYTES NFR BLD AUTO: 0.3 % (ref 0–0.5)
INR PPP: 1.81 (ref 0.89–1.12)
KETONES UR QL STRIP: NEGATIVE
LEUKOCYTE ESTERASE UR QL STRIP.AUTO: NEGATIVE
LYMPHOCYTES # BLD AUTO: 2 10*3/MM3 (ref 0.7–3.1)
LYMPHOCYTES NFR BLD AUTO: 26.1 % (ref 19.6–45.3)
MAGNESIUM SERPL-MCNC: 2.1 MG/DL (ref 1.6–2.4)
MCH RBC QN AUTO: 31.6 PG (ref 26.6–33)
MCHC RBC AUTO-ENTMCNC: 33.2 G/DL (ref 31.5–35.7)
MCV RBC AUTO: 95.1 FL (ref 79–97)
MONOCYTES # BLD AUTO: 0.7 10*3/MM3 (ref 0.1–0.9)
MONOCYTES NFR BLD AUTO: 9.1 % (ref 5–12)
NEUTROPHILS NFR BLD AUTO: 4.77 10*3/MM3 (ref 1.7–7)
NEUTROPHILS NFR BLD AUTO: 62.3 % (ref 42.7–76)
NITRITE UR QL STRIP: NEGATIVE
NRBC BLD AUTO-RTO: 0 /100 WBC (ref 0–0.2)
PH UR STRIP.AUTO: 7.5 [PH] (ref 5–8)
PLATELET # BLD AUTO: 148 10*3/MM3 (ref 140–450)
PMV BLD AUTO: 11.6 FL (ref 6–12)
POTASSIUM SERPL-SCNC: 3.9 MMOL/L (ref 3.5–5.2)
PROT SERPL-MCNC: 6.3 G/DL (ref 6–8.5)
PROT UR QL STRIP: ABNORMAL
PROTHROMBIN TIME: 21.1 SECONDS (ref 12.2–14.5)
QT INTERVAL: 428 MS
QTC INTERVAL: 462 MS
RBC # BLD AUTO: 5.35 10*6/MM3 (ref 4.14–5.8)
RBC # UR STRIP: ABNORMAL /HPF
REF LAB TEST METHOD: ABNORMAL
SARS-COV-2 RNA RESP QL NAA+PROBE: NOT DETECTED
SODIUM SERPL-SCNC: 144 MMOL/L (ref 136–145)
SP GR UR STRIP: 1.01 (ref 1–1.03)
SQUAMOUS #/AREA URNS HPF: ABNORMAL /HPF
TROPONIN T SERPL HS-MCNC: 31 NG/L
TROPONIN T SERPL HS-MCNC: 38 NG/L
UROBILINOGEN UR QL STRIP: ABNORMAL
WBC # UR STRIP: ABNORMAL /HPF
WBC NRBC COR # BLD AUTO: 7.66 10*3/MM3 (ref 3.4–10.8)
WHOLE BLOOD HOLD COAG: NORMAL
WHOLE BLOOD HOLD SPECIMEN: NORMAL

## 2024-10-13 PROCEDURE — 81001 URINALYSIS AUTO W/SCOPE: CPT | Performed by: EMERGENCY MEDICINE

## 2024-10-13 PROCEDURE — 84484 ASSAY OF TROPONIN QUANT: CPT | Performed by: EMERGENCY MEDICINE

## 2024-10-13 PROCEDURE — 99284 EMERGENCY DEPT VISIT MOD MDM: CPT

## 2024-10-13 PROCEDURE — 93971 EXTREMITY STUDY: CPT | Performed by: INTERNAL MEDICINE

## 2024-10-13 PROCEDURE — P9612 CATHETERIZE FOR URINE SPEC: HCPCS

## 2024-10-13 PROCEDURE — 71045 X-RAY EXAM CHEST 1 VIEW: CPT

## 2024-10-13 PROCEDURE — 85610 PROTHROMBIN TIME: CPT | Performed by: EMERGENCY MEDICINE

## 2024-10-13 PROCEDURE — 93005 ELECTROCARDIOGRAM TRACING: CPT | Performed by: EMERGENCY MEDICINE

## 2024-10-13 PROCEDURE — 85025 COMPLETE CBC W/AUTO DIFF WBC: CPT | Performed by: EMERGENCY MEDICINE

## 2024-10-13 PROCEDURE — 80053 COMPREHEN METABOLIC PANEL: CPT | Performed by: EMERGENCY MEDICINE

## 2024-10-13 PROCEDURE — 83735 ASSAY OF MAGNESIUM: CPT | Performed by: EMERGENCY MEDICINE

## 2024-10-13 PROCEDURE — 82948 REAGENT STRIP/BLOOD GLUCOSE: CPT

## 2024-10-13 PROCEDURE — 93971 EXTREMITY STUDY: CPT

## 2024-10-13 PROCEDURE — 36415 COLL VENOUS BLD VENIPUNCTURE: CPT

## 2024-10-13 PROCEDURE — 87636 SARSCOV2 & INF A&B AMP PRB: CPT | Performed by: EMERGENCY MEDICINE

## 2024-10-13 RX ORDER — SODIUM CHLORIDE 0.9 % (FLUSH) 0.9 %
10 SYRINGE (ML) INJECTION AS NEEDED
Status: DISCONTINUED | OUTPATIENT
Start: 2024-10-13 | End: 2024-10-13 | Stop reason: HOSPADM

## 2024-10-13 NOTE — ED PROVIDER NOTES
[place]    EMERGENCY DEPARTMENT ENCOUNTER      Pt Name: Tony Wolf  MRN: [id number]  YOB: 1945  Date of evaluation: 10/13/2024  Provider: Colin Lawson MD    CHIEF COMPLAINT       Chief Complaint   Patient presents with    Weakness - Generalized         HISTORY OF PRESENT ILLNESS   Tony Wolf is a 79 y.o. male who presents to the emergency department with primary complaint of left inner leg pain without any associated trauma.  Has noticed no swelling as well as no fever, chills, redness, or distal tingling, weakness, or numbness.  Also complains of mild generalized weakness intermittently over the last couple of days but no other specific events including no chest pain, shortness of breath, cough, congestion, congestion, rhinorrhea, abdominal pain vomiting, diarrhea, or urinary symptoms.      Nursing notes were reviewed.    REVIEW OF SYSTEMS     ROS:  A chief complaint appropriate review of systems was completed and is negative except as noted in the HPI.      PAST MEDICAL HISTORY     Past Medical History:   Diagnosis Date    Acute bronchitis     Anxiety     Arthritis     right knee    Atrial fibrillation     Back pain     Cancer     skin cancer removed from face     Carpal tunnel syndrome     Depression     Derangement, knee internal     Diabetes mellitus     DX'D TYPE II APPROX 15 YEARS AGO, DOES NOT CHECK BLOOD SUGARS AT HOME, STARTED INSULIN IN MARCH AFTER SURGERY CANCELLED FOR ELEVATED A1C    Diabetic foot ulcer     Drug dependence     Dyspnea on exertion 01/17/2019    GERD (gastroesophageal reflux disease)     Glaucoma     Heart disease     Hyperlipidemia 11/14/2016    Hypertension     Hypertensive disorder     Hypokalemia, replaced 06/02/2017    IBS (irritable bowel syndrome)     Knee pain, right     Left ventricular hypertrophy 11/14/2016    Leukocytosis, mild, likely reactive 05/31/2017    Mixed hyperlipidemia     Neuropathy, lumbosacral (radicular)     Obesity     body mass  index 30+-obesity    Osteoporosis     Postoperative urinary retention 06/01/2017    Premature ventricular contractions     PVC's (premature ventricular contractions) 11/14/2016    Rash 03/18/2019    Scoliosis     Screening for prostate cancer 07/28/2016    Sinusitis     Skin cancer of arm, left     Sleep apnea     SOB (shortness of breath)     Spinal stenosis, lumbar region with neurogenic claudication     Thrombocytopenia 05/31/2017    Trigger middle finger of left hand     Trigger middle finger of right hand     Type 2 diabetes mellitus     Wears glasses     readers         SURGICAL HISTORY       Past Surgical History:   Procedure Laterality Date    BACK SURGERY      injections for back     CARPAL TUNNEL RELEASE      CHOLECYSTECTOMY      COLONOSCOPY      LESS THAN 5 YEARS     ENDOSCOPY  08/22/2022    x4    FRACTURE SURGERY      right heel    HERNIA REPAIR      both side inguinal     LUMBAR EPIDURAL INJECTION      TN ARTHRP KNE CONDYLE&PLATU MEDIAL&LAT COMPARTMENTS Right 05/30/2017    Procedure: RIGHT TOTAL KNEE ARTHROPLASTY;  Surgeon: Simon Interiano MD;  Location: [facility];  Service: Orthopedics    TOTAL KNEE ARTHROPLASTY REVISION      left    WRIST SURGERY      carpal tunnel bilat          CURRENT MEDICATIONS       Current Facility-Administered Medications:     sodium chloride 0.9 % flush 10 mL, 10 mL, Intravenous, PRN, Colin Lawson MD    Current Outpatient Medications:     Accu-Chek Softclix Lancets lancets, USE TO TEST BLOOD SUGAR THREE TIMES A DAY, Disp: 100 each, Rfl: 2    acetaminophen (TYLENOL) 500 MG tablet, Take 2 tablets by mouth Every 6 (Six) Hours As Needed for Mild Pain., Disp: , Rfl:     amLODIPine (NORVASC) 10 MG tablet, TAKE 1 TABLET BY MOUTH DAILY AS DIRECTED, Disp: 90 tablet, Rfl: 3    bimatoprost (Lumigan) 0.01 % ophthalmic drops, Administer 1 drop to both eyes Every Night., Disp: , Rfl:     Cholecalciferol (VITAMIN D-3) 1000 UNITS capsule, Take 1,000 Units by mouth Daily., Disp: , Rfl:      glimepiride (AMARYL) 2 MG tablet, TAKE 1 TABLET BY MOUTH DAILY, Disp: 90 tablet, Rfl: 3    glucose blood (Accu-Chek Kandis Plus) test strip, USE ONE STRIP TO TEST THREE TIMES A DAY, Disp: 100 each, Rfl: 11    latanoprost (XALATAN) 0.005 % ophthalmic solution, Administer 1 drop to both eyes Every Night., Disp: , Rfl:     lisinopril (PRINIVIL,ZESTRIL) 40 MG tablet, Take 1 tablet by mouth Daily., Disp: 90 tablet, Rfl: 2    Loratadine 10 MG capsule, Take 1 capsule by mouth Daily., Disp: , Rfl:     Melatonin 2.5 MG chewable tablet, Chew 1 tablet At Night As Needed., Disp: , Rfl:     metoprolol succinate XL (TOPROL-XL) 100 MG 24 hr tablet, TAKE 1 TABLET BY MOUTH TWICE A DAY, Disp: 180 tablet, Rfl: 3    mirtazapine (REMERON) 45 MG tablet, Take 1 tablet by mouth Every Night., Disp: , Rfl:     nystatin (MYCOSTATIN) 025539 UNIT/GM powder, Apply  topically to the appropriate area as directed 3 (Three) Times a Day., Disp: 60 g, Rfl: 1    pravastatin (PRAVACHOL) 20 MG tablet, Take 1 tablet by mouth Daily., Disp: 90 tablet, Rfl: 1    venlafaxine XR (EFFEXOR-XR) 75 MG 24 hr capsule, Take 1 capsule by mouth Daily., Disp: , Rfl:     warfarin (COUMADIN) 2.5 MG tablet, TAKE 1 TO 1 AND 1/2 TABLETS BY MOUTH DAILY OR AS DIRECTED BY ANTICOAGULATION CLINIC, Disp: 120 tablet, Rfl: 1    ALLERGIES     Aspirin    FAMILY HISTORY       Family History   Problem Relation Age of Onset    Cancer Other     Anemia Other     Heart attack Other     Cancer Mother     Heart disease Mother     Hypertension Mother     Heart attack Mother     Diabetes Mother     Deep vein thrombosis Father     Heart disease Father     Hypertension Father     Cancer Other           SOCIAL HISTORY       Social History     Socioeconomic History    Marital status:    Tobacco Use    Smoking status: Never     Passive exposure: Never    Smokeless tobacco: Never   Vaping Use    Vaping status: Never Used   Substance and Sexual Activity    Alcohol use: No    Drug use: No     Sexual activity: Not Currently         PHYSICAL EXAM    (up to 7 for level 4, 8 or more for level 5)     Vitals:    10/13/24 1500 10/13/24 1530 10/13/24 1600 10/13/24 1700   BP: 137/87 138/85 133/97 136/95   Pulse: 72 74 65 69   Resp:       Temp:       TempSrc:       SpO2: 93% 93% 92% 93%   Weight:       Height:           General: Awake, alert, no acute distress.  HEENT: Conjunctivae normal.  Neck: Trachea midline.  Cardiac: Heart regular rate, rhythm, no murmurs, rubs, or gallops  Lungs: Lungs are clear to auscultation, there is no wheezing, rhonchi, or rales. There is no use of accessory muscles.  Chest wall: There is no tenderness to palpation over the chest wall or over ribs  Abdomen: Abdomen is soft, nontender, nondistended. There are no firm or pulsatile masses, no rebound rigidity or guarding.   Musculoskeletal: No deformity.  Compartments of the left lower leg are soft and compressible.  Thighs nontender.  No focal bone tenderness.  DP and PT pulse 1+ and comparable to the opposite side.  Motor and sensory function intact in all distributions.  Neuro: Alert and oriented x 4.  Dermatology: Skin is warm and dry  Psych: Mentation is grossly normal, cognition is grossly normal. Affect is appropriate.        DIAGNOSTIC RESULTS     EKG: All EKGs are interpreted by the Emergency Department Physician who either signs or Co-signs this chart in the absence of a cardiologist.    ECG 12 Lead ED Triage Standing Order; Weak / Dizzy / AMS   Preliminary Result   Test Reason : ED Triage Standing Order~   Blood Pressure :   */*   mmHG   Vent. Rate :  70 BPM     Atrial Rate :  63 BPM      P-R Int :   * ms          QRS Dur : 156 ms       QT Int : 428 ms       P-R-T Axes :   *  35 202 degrees      QTc Int : 462 ms      Atrial fibrillation   Right bundle branch block   T wave abnormality, consider lateral ischemia   Abnormal ECG   When compared with ECG of 21-MAR-2017 11:01,   Atrial fibrillation has replaced Atrial flutter       Referred By: EDMD           Confirmed By:             RADIOLOGY:   [x] Radiologist's Report Reviewed:  XR Chest 1 View   Final Result   Impression:   1.No acute cardiopulmonary abnormality is identified.   2.Stable elevation of the left hemidiaphragm with left basilar chronic atelectasis/scarring.         Electronically Signed: Sylvia Gonzales     10/13/2024 1:24 PM EDT     Workstation ID: [id number]          I ordered and independently reviewed the above noted radiographic studies.        LABS:    I have reviewed and interpreted all of the currently available lab results from this visit (if applicable):  Results for orders placed or performed during the hospital encounter of 10/13/24   Comprehensive Metabolic Panel    Collection Time: 10/13/24 12:46 PM    Specimen: Blood   Result Value Ref Range    Glucose 120 (H) 65 - 99 mg/dL    BUN 13 8 - 23 mg/dL    Creatinine 0.98 0.76 - 1.27 mg/dL    Sodium 144 136 - 145 mmol/L    Potassium 3.9 3.5 - 5.2 mmol/L    Chloride 108 (H) 98 - 107 mmol/L    CO2 25.0 22.0 - 29.0 mmol/L    Calcium 8.8 8.6 - 10.5 mg/dL    Total Protein 6.3 6.0 - 8.5 g/dL    Albumin 3.9 3.5 - 5.2 g/dL    ALT (SGPT) 21 1 - 41 U/L    AST (SGOT) 35 1 - 40 U/L    Alkaline Phosphatase 62 39 - 117 U/L    Total Bilirubin 1.2 0.0 - 1.2 mg/dL    Globulin 2.4 gm/dL    A/G Ratio 1.6 g/dL    BUN/Creatinine Ratio 13.3 7.0 - 25.0    Anion Gap 11.0 5.0 - 15.0 mmol/L    eGFR 79.4 >60.0 mL/min/1.73   Single High Sensitivity Troponin T    Collection Time: 10/13/24 12:46 PM    Specimen: Blood   Result Value Ref Range    HS Troponin T 38 (H) <22 ng/L   Magnesium    Collection Time: 10/13/24 12:46 PM    Specimen: Blood   Result Value Ref Range    Magnesium 2.1 1.6 - 2.4 mg/dL   CBC Auto Differential    Collection Time: 10/13/24 12:46 PM    Specimen: Blood   Result Value Ref Range    WBC 7.66 3.40 - 10.80 10*3/mm3    RBC 5.35 4.14 - 5.80 10*6/mm3    Hemoglobin 16.9 13.0 - 17.7 g/dL    Hematocrit 50.9 37.5 - 51.0 %    MCV 95.1  79.0 - 97.0 fL    MCH 31.6 26.6 - 33.0 pg    MCHC 33.2 31.5 - 35.7 g/dL    RDW 14.4 12.3 - 15.4 %    RDW-SD 50.2 37.0 - 54.0 fl    MPV 11.6 6.0 - 12.0 fL    Platelets 148 140 - 450 10*3/mm3    Neutrophil % 62.3 42.7 - 76.0 %    Lymphocyte % 26.1 19.6 - 45.3 %    Monocyte % 9.1 5.0 - 12.0 %    Eosinophil % 1.3 0.3 - 6.2 %    Basophil % 0.9 0.0 - 1.5 %    Immature Grans % 0.3 0.0 - 0.5 %    Neutrophils, Absolute 4.77 1.70 - 7.00 10*3/mm3    Lymphocytes, Absolute 2.00 0.70 - 3.10 10*3/mm3    Monocytes, Absolute 0.70 0.10 - 0.90 10*3/mm3    Eosinophils, Absolute 0.10 0.00 - 0.40 10*3/mm3    Basophils, Absolute 0.07 0.00 - 0.20 10*3/mm3    Immature Grans, Absolute 0.02 0.00 - 0.05 10*3/mm3    nRBC 0.0 0.0 - 0.2 /100 WBC   Protime-INR    Collection Time: 10/13/24 12:46 PM    Specimen: Blood   Result Value Ref Range    Protime 21.1 (H) 12.2 - 14.5 Seconds    INR 1.81 (H) 0.89 - 1.12   Green Top (Gel)    Collection Time: 10/13/24 12:46 PM   Result Value Ref Range    Extra Tube Hold for add-ons.    Lavender Top    Collection Time: 10/13/24 12:46 PM   Result Value Ref Range    Extra Tube hold for add-on    Gold Top - SST    Collection Time: 10/13/24 12:46 PM   Result Value Ref Range    Extra Tube Hold for add-ons.    Gray Top    Collection Time: 10/13/24 12:46 PM   Result Value Ref Range    Extra Tube Hold for add-ons.    Light Blue Top    Collection Time: 10/13/24 12:46 PM   Result Value Ref Range    Extra Tube Hold for add-ons.    POC Glucose Once    Collection Time: 10/13/24 12:50 PM    Specimen: Blood   Result Value Ref Range    Glucose 109 70 - 130 mg/dL   ECG 12 Lead ED Triage Standing Order; Weak / Dizzy / AMS    Collection Time: 10/13/24 12:56 PM   Result Value Ref Range    QT Interval 428 ms    QTC Interval 462 ms   Urinalysis With Microscopic If Indicated (No Culture) - Straight Cath    Collection Time: 10/13/24  1:29 PM    Specimen: Straight Cath; Urine   Result Value Ref Range    Color, UA Yellow Yellow, Straw     Appearance, UA Turbid (A) Clear    pH, UA 7.5 5.0 - 8.0    Specific Gravity, UA 1.014 1.001 - 1.030    Glucose, UA Negative Negative    Ketones, UA Negative Negative    Bilirubin, UA Negative Negative    Blood, UA Negative Negative    Protein, UA 30 mg/dL (1+) (A) Negative    Leuk Esterase, UA Negative Negative    Nitrite, UA Negative Negative    Urobilinogen, UA 1.0 E.U./dL 0.2 - 1.0 E.U./dL   Urinalysis, Microscopic Only - Straight Cath    Collection Time: 10/13/24  1:29 PM    Specimen: Straight Cath; Urine   Result Value Ref Range    RBC, UA 6-10 (A) None Seen, 0-2 /HPF    WBC, UA 0-2 None Seen, 0-2 /HPF    Bacteria, UA None Seen None Seen, Trace /HPF    Squamous Epithelial Cells, UA 0-2 None Seen, 0-2 /HPF    Hyaline Casts, UA 0-6 0 - 6 /LPF    Methodology Automated Microscopy    COVID-19 and FLU A/B PCR, 1 HR TAT - Swab, Nasopharynx    Collection Time: 10/13/24  1:30 PM    Specimen: Nasopharynx; Swab   Result Value Ref Range    COVID19 Not Detected Not Detected - Ref. Range    Influenza A PCR Not Detected Not Detected    Influenza B PCR Not Detected Not Detected   Duplex Venous Lower Extremity - Left CAR    Collection Time: 10/13/24  2:03 PM   Result Value Ref Range    Right Common Femoral Spont Y     Right Common Femoral Phasic Y     Right Common Femoral Compress C     Right Common Femoral Augment Y     Left Common Femoral Spont Y     Left Common Femoral Phasic Y     Left Common Femoral Compress C     Left Common Femoral Augment Y     Left Saphenofemoral Junction Compress C     Left Profunda Femoral Compress C     Left Proximal Femoral Compress C     Left Mid Femoral Spont Y     Left Mid Femoral Phasic Y     Left Mid Femoral Compress C     Left Mid Femoral Augment Y     Left Distal Femoral Compress C     Left Popliteal Spont Y     Left Popliteal Phasic Y     Left Popliteal Compress C     Left Popliteal Augment Y     Left Posterior Tibial Compress C     Left Peroneal Compress C     Left Gastronemius Compress C      Left Greater Saph AK Compress C     Left Greater Saph BK Compress C     Left Lesser Saph Compress C     BH CV VAS PRELIMINARY FINDINGS SCRIPTING 1.0    Single High Sensitivity Troponin T    Collection Time: 10/13/24  4:46 PM    Specimen: Blood   Result Value Ref Range    HS Troponin T 31 (H) <22 ng/L     *Note: Due to a large number of results and/or encounters for the requested time period, some results have not been displayed. A complete set of results can be found in Results Review.        If labs were ordered, I independently reviewed the results and considered them in treating the patient.      EMERGENCY DEPARTMENT COURSE and DIFFERENTIAL DIAGNOSIS/MDM:   Vitals:  AS OF 19:14 EDT    BP - 136/95  HR - 69  TEMP - 98.1 °F (36.7 °C) (Oral)  O2 SATS - 93%        Discussion below represents my analysis of pertinent findings related to patient's condition, differential diagnosis, treatment plan and final disposition.      Differential diagnosis:  The differential diagnosis associated with the patient's presentation includes: DVT, compartment syndrome, cellulitis, arterial compromise, UTI, electrolyte derangement      Independent interpretations (ECG/rhythm strip/X-ray/US/CT scan): I independently interpreted the patient's chest x-ray and cardiac monitor.  Patient is in sinus rhythm and there is no evidence of pulmonary infiltrate.      Patient's care impacted by:   [x] Diabetes   [x] Hypertension   [] Coronary Artery Disease   [] Cancer   [x] Other: Atrial fibrillation    Care significantly affected by Social Determinants of Health (housing and economic circumstances, unemployment)    [] Yes     [x] No   If yes, Patient's care significantly limited by  Social Determinants of Health including:    [] Inadequate housing    [] Low income    [] Alcoholism and drug addiction in family    [] Problems related to primary support group    [] Unemployment    [] Problems related to employment    [] Other Social Determinants of  Health:       ED Course:    ED Course as of 10/13/24 1914   Sun Oct 13, 2024   6461 On reexamination, patient joel very well-appearing nontoxic retracing currently in bed with no acute complaints.  Left leg exam is benign.  There is no clinical evidence of infection.  He has strong distal pulses comparable to the opposite side without any clinical findings concerning for arterial compromise.  Exam is not consistent with compartment syndrome.  DVT ultrasound is negative.  Suspect likely muscular strain.  No fall or focal bony tenderness to suggest fracture and do not feel that x-ray imaging of the area is needed.  Patient reports that over the last day he had generally felt unwell but had no other specific complaints.  Extensive workup is unremarkable without any evidence of COVID or flu, urinary tract infection cardiac abnormality, electrolyte derangement, or other acute process.  Feel he is stable for discharge home with close follow-up with his PCP. [NS]      ED Course User Index  [NS] Colin Lawson MD             I had a discussion with the patient/family regarding diagnosis, diagnostic results, treatment plan, and medications.  The patient/family indicated understanding of these instructions.  I spent adequate time at the bedside preceding discharge necessary to personally discuss the aftercare instructions, giving patient education, providing explanations of the results of our evaluations/findings, and my decision making to assure that the patient/family understand the plan of care.  Time was allotted to answer questions at that time and throughout the ED course.  Emphasis was placed on timely follow-up after discharge.  I also discussed the potential for the development of an acute emergent condition requiring further evaluation, admission, or even surgical intervention. I discussed that we found nothing during the visit today indicating the need for further workup, admission, or the presence of an  unstable medical condition.  I encouraged the patient to return to the emergency department immediately for ANY concerns, worsening, new complaints, or if symptoms persist and unable to seek follow-up in a timely fashion.  The patient/family expressed understanding and agreement with this plan.  The patient will follow-up with their PCP in 1-2 days for reevaluation.           PROCEDURES:  Procedures    CRITICAL CARE TIME        FINAL IMPRESSION      1. Acute leg pain, left    2. Generalized weakness    3. History of diabetes mellitus    4. History of atrial fibrillation    5. History of hypertension          DISPOSITION/PLAN     ED Disposition       ED Disposition   Discharge    Condition   Stable    Comment   --                 Comment: Please note this report has been produced using speech recognition software.      Colin Lawson MD  Attending Emergency Physician             Colin Lawson MD  10/13/24 6910

## 2024-10-23 ENCOUNTER — TELEPHONE (OUTPATIENT)
Dept: INTERNAL MEDICINE | Facility: CLINIC | Age: 79
End: 2024-10-23
Payer: MEDICARE

## 2024-10-23 DIAGNOSIS — R21 RASH: Primary | ICD-10-CM

## 2024-10-23 NOTE — TELEPHONE ENCOUNTER
Spoke with patient who returned call about referral  Patient would prefer someone in [employer] or someone who has an office with easy access. Patient is having mobility issues

## 2024-10-23 NOTE — TELEPHONE ENCOUNTER
Caller: Tony Wolf    Relationship: Self    Best call back number: [phone]    What medication are you requesting: CREAM OR LOTION    What are your current symptoms: RASH IN UPPER RIGHT THIGH AND PERSONAL AREA    How long have you been experiencing symptoms: OFF AND ON FOR 3 WEEKS    Have you had these symptoms before:    [x] Yes  [] No    Have you been treated for these symptoms before:   [x] Yes  [] No    If a prescription is needed, what is your preferred pharmacy and phone number: [facility] [id number] - [place] [id number] AT [facility] - [phone] [facility] [phone] FX     Additional notes:  PATIENT WAS PRESCRIBED A POWDER BUT IT DOESN'T SEEM TO BE WORKING AND THE PHARMACIST RECOMMENDED  TO KEEP IT DRY, WEAR LOOSE FITTING CLOTHES OR A POSSIBLE CREAM OR LOTION; PATIENT ALSO IS STILL HAVING ISSUES WITH FREQUENCY BUT WANTS TO FOCUS ON THE RASH; HE ADVISED THE AREA IS GETTING BIGGER AND SPREADING AND CAN'T HARDLY WALK EVEN WITH HIS WALKER; PATIENT WAS OFFERED AN APPT BUT HE ADVISED THAT DR GUILLAUME IS AWARE OF THE ISSUE (HE HAS BEEN IN ER 3 WEEKS AGO ALSO)    PLEASE CALL TO ADVISE

## 2024-10-24 NOTE — TELEPHONE ENCOUNTER
Spoke with patient regarding requested medication  Patient has gone to pharmacy who recommended asking PCP for a cream. Patient has the   nystatin (MYCOSTATIN) 357466 UNIT/GM powder but states the powder is not helping.   Patient has appointment with Dermatology on November 4th but is asking for a cream to get to this appointment  Patient's rash is on right upper inner side of leg.

## 2024-10-24 NOTE — TELEPHONE ENCOUNTER
PATIENT CALLED UPSET THAT THERE WAS NO MEDICATION CALLED INTO THE PHARMACY.  I EXPLAINED TO THE PATIENT THAT DR. GUILLAUME HAS PLACED A REFERRAL. I DID LET THE PATIENT KNOW THAT THE REFERRAL WAS PLACED YESTERDAY AND THAT IT CAN SOMETIMES TAKE 2 WEEKS.  PATIENT WAS UPSET AND STATED THAT HE CAN HARDLY WALK.    PATIENT REQUEST A PHONE SO HE CAN GET THIS APPT SCHEDULED.     CALL BACK:[phone]

## 2024-10-25 RX ORDER — NYSTATIN 100000 U/G
1 CREAM TOPICAL 2 TIMES DAILY
Qty: 30 G | Refills: 1 | Status: SHIPPED | OUTPATIENT
Start: 2024-10-25

## 2024-10-25 NOTE — TELEPHONE ENCOUNTER
Spoke with patient and scheduled for a Monday 30 minute appointment. Patient verbalized understanding and verbalized that wife would be able to join for appointment

## 2024-10-25 NOTE — TELEPHONE ENCOUNTER
Called patients number regarding cream sent and spoke with patients wife per  Verbal  Wife is quite concerned about patient   Patient does have pain running up inside of leg and around stomach but does not have a rash.  Patient is having difficulty walking  Patients wife would like patient to be evaluated because of inconsistencies that are showing up such as the date in the appointment book for the dermatology appointment is November 12th not the 4th

## 2024-10-27 LAB
QT INTERVAL: 428 MS
QTC INTERVAL: 462 MS

## 2024-10-31 ENCOUNTER — ANTICOAGULATION VISIT (OUTPATIENT)
Dept: PHARMACY | Facility: HOSPITAL | Age: 79
End: 2024-10-31
Payer: MEDICARE

## 2024-10-31 DIAGNOSIS — I48.20 CHRONIC ATRIAL FIBRILLATION: Primary | ICD-10-CM

## 2024-10-31 LAB
INR PPP: 2.7 (ref 0.91–1.09)
PROTHROMBIN TIME: 32.7 SECONDS (ref 10–13.8)

## 2024-10-31 PROCEDURE — 85610 PROTHROMBIN TIME: CPT

## 2024-10-31 PROCEDURE — G0463 HOSPITAL OUTPT CLINIC VISIT: HCPCS

## 2024-10-31 PROCEDURE — 36416 COLLJ CAPILLARY BLOOD SPEC: CPT

## 2024-10-31 NOTE — PROGRESS NOTES
Anticoagulation Clinic Progress Note  Indication: atrial fibrillation  Referring Provider: Sravani [last appt 9/8/23  next:  9/16/24 (Will Sue)]  Initial Warfarin Start Date: 2008  Goal INR: 2 - 3  Current Drug Interactions: fluoxetine, glimepiride, melatonin (PRN), mirtazepine, MVI  CHADS-VASc: 3 (age, HTN, DM)    EtOH: none  GLV: Salad (Spinach salad 1x weekly or garden salad) and serving of broccoli once a week (2/9/24)  OTC Pain Relief: Uses APAP prn (~1x/week)    Anticoagulation Clinic INR History:  Date 8/20 9/4 10/2 11/6 11/15 11/29 12/31 1/7/19 1/17 1/29 2/12   Total Weekly Dose 17.5  mg 17.5 mg 17.5 mg 10mg 20mg 17.5mg 17.5mg 18.75 mg 17.5 mg 18.75 mg 20mg   INR 2.3 2.3 2.2 1.3 2.3 2.2 1.7 1.9 1.9 1.9 2.0   Notes    pre- epidural   missed dose?         Date 2/27 3/27 4/10 4/23 5/7 5/28 6/25 7/30 8/28 9/25 10/23   Total Weekly Dose 20mg 20mg 21.25 mg 21.25 mg 21.25 mg 21.25mg 21.25mg 21.25mg 21.25mg 21.25mg 21.25mg   INR 2.2 1.8 2.5 1.8 2.4 2.4 2.5 2.7 2.6 3.0 2.9   Notes sick   post- scope            Date  11/6 11/13 12/4 1/3 1/15 1/20 1/31 2/10 2/28 4/14 5/26   Total Weekly Dose 21.25 mg 18.75 mg 21.25 mg 21.25 mg 21.25 mg 20 mg 21.25 21.25 20mg 21.25mg 21.25mg   INR 4.2 3.2 2.7 2.2 3.1 3.1 2.9 3.5 2.4 2.3 2.5   Notes apap Hold x1, less GLV Inc in GLV  doxy doxy  1x dose cephalexin 1x dose dec; keflex       Date  6/24 7/22 8/24 9/10 10/8 11/5 11/18 12/2 12/30 1/26 2/22 3/22 4/19   Total Weekly Dose 21.25mg 21.25 21.25mg 21.25mg 21.25mg 21.25mg 20mg 20mg 20mg 20mg  20mg 20 mg 20mg   INR 2.4 2.4 3.0 2.8 3.0 3.1 2.5 2.5 2.5 2.6 2.9  2 2.2   Notes      Inc. GLV Dose dec    desvenlafaxine broccoli      Date 5/13 6/16  7/7 7/21 8/11 9/1 9/29 10/27 11/3 12/1 12/9 12/16   Total WeeklyDose 20 mg 20mg  20 mg 18.75mg  17.5 mg 17.5mg 17.5 17.5mg 17.5mg 17.5mg 18.75mg 22.5mg   INR 2.5 3.2 3.1 3.0 2.2 2.4 2.2 2.4 2.3 1.5 1.6 2.2   Notes  apap Inc GLV     augmentin day 3 augementin glucerna  Boost x2     Date  12/28 1/13/22 2/8 2/16 3/1 3/22 4/12 5/10 6/7 7/5 8/2    Total WeeklyDose 21.25mg 21.25mg 21.25 mg 21.25mg 22.5mg 22.5 mg 22.5mg 22.5 mg 22.5 mg 22.5mg 22.5mg EGD x 3 days hold   INR 2.6 2.3 1.7 2.1 2.5 2.0 2.3 2.4 2.1 2.1 2.8    Notes    Dec GLV        Boost x2     Date 8/25 9/8 9/29 10/27 12/1 1/5/23 2/2 3/2 3/30 4/14 4/20 5/18   Total WeeklyDose 18.75mg 22.5mg 22.5 mg 22.5 mg 22.5 mg 22.5mg 22.5 mg 22.5 mg 22.5 mg 22.5mg 22.5 mg  22.5 mg   INR 2.7 2.7 2.5 2.2 2.3 2.3 2.7 3.0 2.5 2.6 2.6 3.0   Notes omeprazole initi         doxy Doxy       Date 7/12 8/9 9/11 10/9 11/6 11/30 12/12 12/21 1/4 1/26/24 2/9 2/23   Total Weekly Dose 22.5 mg 22.5 mg 22.5mg 22.5 mg 22.5mg 22.5 mg 22.5mg 21.25 mg  21.25 mg 21.25mg     INR 2.7 2.8 2.5 2.5 2.3 3.5 3.2 2.4 2.1 1.9 1.9 1.8   Notes          Increase LGV       Date 3/8 4/5 5/3 5/30 7/3 7/31   Total Weekly Dose 23.75 mg 23.75 mg 23.75mg 23.75 mg 23.75mg 23.75 mg   INR 2.2 2.3 2.7 2.9 2.0 3.3   Notes           Date 8/16 8/29 9/5 9/12 10/3 10/31         Total Weekly Dose 23.75 mg 23.75 22.5 20 mg 20mg  20 mg         INR 3.3 3.8 3.1 2.7 2.0 2.7         Notes                     Clinic Interview:  Verbal Release Authorization signed on 6/27/18 -- may speak with Sussy (wife), Nikolai (son)  Tablet Strength: pt has 2.5mg tablets  Phone: [phone] (Home), [phone]  Fax: [phone] (Attn: Tasha) [facility] [place]      Patient Findings:  Negatives: Signs/symptoms of thrombosis, Signs/symptoms of bleeding, Laboratory test error suspected, Change in health, Change in alcohol use, Change in activity, Upcoming invasive procedure, Emergency department visit, Upcoming dental procedure, Missed doses, Extra doses, Change in medications, Change in diet/appetite, Hospital admission, Bruising, Other complaints   Comments: No changes per patient and wife.       Plan:   1. INR therapeutic today at 2.7 (goal 2.0-3.0). Instructed Mr. Wolf to continue warfarin 3.75mg FriTues and 2.5mg  ROW until recheck.  2. RTC in 11/19  3. Verbal and written information was provided to Mr. Wolf and Mrs. Wolf in clinic. Mr. Wolf voiced understanding with teach back and has no further questions at this time.    Selam Magallanes [facility]  10/31/24   11:41 EDT

## 2024-11-11 DIAGNOSIS — I48.91 ATRIAL FIBRILLATION, UNSPECIFIED TYPE: ICD-10-CM

## 2024-11-11 RX ORDER — WARFARIN SODIUM 2.5 MG/1
TABLET ORAL
Qty: 120 TABLET | Refills: 1 | Status: SHIPPED | OUTPATIENT
Start: 2024-11-11

## 2024-11-19 ENCOUNTER — ANTICOAGULATION VISIT (OUTPATIENT)
Dept: PHARMACY | Facility: HOSPITAL | Age: 79
End: 2024-11-19
Payer: MEDICARE

## 2024-11-19 DIAGNOSIS — I48.20 CHRONIC ATRIAL FIBRILLATION: Primary | ICD-10-CM

## 2024-11-19 LAB
INR PPP: 1.8 (ref 0.91–1.09)
PROTHROMBIN TIME: 22.2 SECONDS (ref 10–13.8)

## 2024-11-19 PROCEDURE — 36416 COLLJ CAPILLARY BLOOD SPEC: CPT

## 2024-11-19 PROCEDURE — G0463 HOSPITAL OUTPT CLINIC VISIT: HCPCS

## 2024-11-19 PROCEDURE — 85610 PROTHROMBIN TIME: CPT

## 2024-11-19 NOTE — PROGRESS NOTES
Anticoagulation Clinic Progress Note  Indication: atrial fibrillation  Referring Provider: Sravani [last appt 9/8/23  next:  9/16/24 (Will Sue)]  Initial Warfarin Start Date: 2008  Goal INR: 2 - 3  Current Drug Interactions: fluoxetine, glimepiride, melatonin (PRN), mirtazepine, MVI  CHADS-VASc: 3 (age, HTN, DM)    EtOH: none  GLV: Salad (Spinach salad 1x weekly or garden salad) and serving of broccoli once a week (2/9/24)  OTC Pain Relief: Uses APAP prn (~1x/week)    Anticoagulation Clinic INR History:  Date 8/20 9/4 10/2 11/6 11/15 11/29 12/31 1/7/19 1/17 1/29 2/12   Total Weekly Dose 17.5  mg 17.5 mg 17.5 mg 10mg 20mg 17.5mg 17.5mg 18.75 mg 17.5 mg 18.75 mg 20mg   INR 2.3 2.3 2.2 1.3 2.3 2.2 1.7 1.9 1.9 1.9 2.0   Notes    pre- epidural   missed dose?         Date 2/27 3/27 4/10 4/23 5/7 5/28 6/25 7/30 8/28 9/25 10/23   Total Weekly Dose 20mg 20mg 21.25 mg 21.25 mg 21.25 mg 21.25mg 21.25mg 21.25mg 21.25mg 21.25mg 21.25mg   INR 2.2 1.8 2.5 1.8 2.4 2.4 2.5 2.7 2.6 3.0 2.9   Notes sick   post- scope            Date  11/6 11/13 12/4 1/3 1/15 1/20 1/31 2/10 2/28 4/14 5/26   Total Weekly Dose 21.25 mg 18.75 mg 21.25 mg 21.25 mg 21.25 mg 20 mg 21.25 21.25 20mg 21.25mg 21.25mg   INR 4.2 3.2 2.7 2.2 3.1 3.1 2.9 3.5 2.4 2.3 2.5   Notes apap Hold x1, less GLV Inc in GLV  doxy doxy  1x dose cephalexin 1x dose dec; keflex       Date  6/24 7/22 8/24 9/10 10/8 11/5 11/18 12/2 12/30 1/26 2/22 3/22 4/19   Total Weekly Dose 21.25mg 21.25 21.25mg 21.25mg 21.25mg 21.25mg 20mg 20mg 20mg 20mg  20mg 20 mg 20mg   INR 2.4 2.4 3.0 2.8 3.0 3.1 2.5 2.5 2.5 2.6 2.9  2 2.2   Notes      Inc. GLV Dose dec    desvenlafaxine broccoli      Date 5/13 6/16  7/7 7/21 8/11 9/1 9/29 10/27 11/3 12/1 12/9 12/16   Total WeeklyDose 20 mg 20mg  20 mg 18.75mg  17.5 mg 17.5mg 17.5 17.5mg 17.5mg 17.5mg 18.75mg 22.5mg   INR 2.5 3.2 3.1 3.0 2.2 2.4 2.2 2.4 2.3 1.5 1.6 2.2   Notes  apap Inc GLV     augmentin day 3 augementin glucerna  Boost x2     Date  12/28 1/13/22 2/8 2/16 3/1 3/22 4/12 5/10 6/7 7/5 8/2    Total WeeklyDose 21.25mg 21.25mg 21.25 mg 21.25mg 22.5mg 22.5 mg 22.5mg 22.5 mg 22.5 mg 22.5mg 22.5mg EGD x 3 days hold   INR 2.6 2.3 1.7 2.1 2.5 2.0 2.3 2.4 2.1 2.1 2.8    Notes    Dec GLV        Boost x2     Date 8/25 9/8 9/29 10/27 12/1 1/5/23 2/2 3/2 3/30 4/14 4/20 5/18   Total WeeklyDose 18.75mg 22.5mg 22.5 mg 22.5 mg 22.5 mg 22.5mg 22.5 mg 22.5 mg 22.5 mg 22.5mg 22.5 mg  22.5 mg   INR 2.7 2.7 2.5 2.2 2.3 2.3 2.7 3.0 2.5 2.6 2.6 3.0   Notes omeprazole initi         doxy Doxy       Date 7/12 8/9 9/11 10/9 11/6 11/30 12/12 12/21 1/4 1/26/24 2/9 2/23   Total Weekly Dose 22.5 mg 22.5 mg 22.5mg 22.5 mg 22.5mg 22.5 mg 22.5mg 21.25 mg  21.25 mg 21.25mg     INR 2.7 2.8 2.5 2.5 2.3 3.5 3.2 2.4 2.1 1.9 1.9 1.8   Notes          Increase LGV       Date 3/8 4/5 5/3 5/30 7/3 7/31   Total Weekly Dose 23.75 mg 23.75 mg 23.75mg 23.75 mg 23.75mg 23.75 mg   INR 2.2 2.3 2.7 2.9 2.0 3.3   Notes           Date 8/16 8/29 9/5 9/12 10/3 10/31 11/19        Total Weekly Dose 23.75 mg 23.75 22.5 20 mg 20mg  20 mg 20mg         INR 3.3 3.8 3.1 2.7 2.0 2.7 1.8        Notes                   Clinic Interview:  Verbal Release Authorization signed on 6/27/18 -- may speak with Sussy (wife), Nikolai (son)  Tablet Strength: pt has 2.5mg tablets  Phone: [phone] (Home), [phone]  Fax: [phone] (Attn: Tasha) [facility] [place]    Patient Findings:  Negatives: Signs/symptoms of thrombosis, Signs/symptoms of bleeding, Laboratory test error suspected, Change in health, Change in alcohol use, Change in activity, Upcoming invasive procedure, Emergency department visit, Upcoming dental procedure, Missed doses, Extra doses, Change in medications, Change in diet/appetite, Hospital admission, Bruising, Other complaints   Comments: Patient and wife have had several stressful life situations lately, grandson got in a car accident, best friend of 65 years passed away. Life has been  chaotic for them but states he has taken his medication daily but diet may have been off.       Plan:   1. INR subtherapeutic today at 1.8 (goal 2.0-3.0). Instructed Mr. Wolf to boost today's dose to warfarin 5mg, then continue warfarin 3.75mg FriTues and 2.5mg ROW until recheck.  2. RTC in 12/17/24 per patient preference   3. Verbal and written information was provided to Mr. Wolf and Mrs. Wolf in clinic. Mr. Wolf voiced understanding with teach back and has no further questions at this time.    Leona Walter, PharmD  11/19/24   11:50 EST

## 2024-12-17 ENCOUNTER — ANTICOAGULATION VISIT (OUTPATIENT)
Dept: PHARMACY | Facility: HOSPITAL | Age: 79
End: 2024-12-17
Payer: MEDICARE

## 2024-12-17 DIAGNOSIS — I48.20 CHRONIC ATRIAL FIBRILLATION: Primary | ICD-10-CM

## 2024-12-17 LAB
INR PPP: 1.8
INR PPP: 1.8 (ref 0.91–1.09)
PROTHROMBIN TIME: 21.2 SECONDS (ref 10–13.8)

## 2024-12-17 PROCEDURE — G0463 HOSPITAL OUTPT CLINIC VISIT: HCPCS

## 2024-12-17 PROCEDURE — 85610 PROTHROMBIN TIME: CPT

## 2024-12-17 PROCEDURE — 36416 COLLJ CAPILLARY BLOOD SPEC: CPT

## 2024-12-17 NOTE — PROGRESS NOTES
Anticoagulation Clinic Progress Note  Indication: atrial fibrillation  Referring Provider: Sravani [last appt 9/8/23  next:  9/16/24 (Will Sue)]  Initial Warfarin Start Date: 2008  Goal INR: 2 - 3  Current Drug Interactions: fluoxetine, glimepiride, melatonin (PRN), mirtazepine, MVI  CHADS-VASc: 3 (age, HTN, DM)    EtOH: none  GLV: Salad (Spinach salad 1x weekly or garden salad) and serving of broccoli once a week (2/9/24)  OTC Pain Relief: Uses APAP prn (~1x/week)    Anticoagulation Clinic INR History:  Date 8/20 9/4 10/2 11/6 11/15 11/29 12/31 1/7/19 1/17 1/29 2/12   Total Weekly Dose 17.5  mg 17.5 mg 17.5 mg 10mg 20mg 17.5mg 17.5mg 18.75 mg 17.5 mg 18.75 mg 20mg   INR 2.3 2.3 2.2 1.3 2.3 2.2 1.7 1.9 1.9 1.9 2.0   Notes    pre- epidural   missed dose?         Date 2/27 3/27 4/10 4/23 5/7 5/28 6/25 7/30 8/28 9/25 10/23   Total Weekly Dose 20mg 20mg 21.25 mg 21.25 mg 21.25 mg 21.25mg 21.25mg 21.25mg 21.25mg 21.25mg 21.25mg   INR 2.2 1.8 2.5 1.8 2.4 2.4 2.5 2.7 2.6 3.0 2.9   Notes sick   post- scope            Date  11/6 11/13 12/4 1/3 1/15 1/20 1/31 2/10 2/28 4/14 5/26   Total Weekly Dose 21.25 mg 18.75 mg 21.25 mg 21.25 mg 21.25 mg 20 mg 21.25 21.25 20mg 21.25mg 21.25mg   INR 4.2 3.2 2.7 2.2 3.1 3.1 2.9 3.5 2.4 2.3 2.5   Notes apap Hold x1, less GLV Inc in GLV  doxy doxy  1x dose cephalexin 1x dose dec; keflex       Date  6/24 7/22 8/24 9/10 10/8 11/5 11/18 12/2 12/30 1/26 2/22 3/22 4/19   Total Weekly Dose 21.25mg 21.25 21.25mg 21.25mg 21.25mg 21.25mg 20mg 20mg 20mg 20mg  20mg 20 mg 20mg   INR 2.4 2.4 3.0 2.8 3.0 3.1 2.5 2.5 2.5 2.6 2.9  2 2.2   Notes      Inc. GLV Dose dec    desvenlafaxine broccoli      Date 5/13 6/16  7/7 7/21 8/11 9/1 9/29 10/27 11/3 12/1 12/9 12/16   Total WeeklyDose 20 mg 20mg  20 mg 18.75mg  17.5 mg 17.5mg 17.5 17.5mg 17.5mg 17.5mg 18.75mg 22.5mg   INR 2.5 3.2 3.1 3.0 2.2 2.4 2.2 2.4 2.3 1.5 1.6 2.2   Notes  apap Inc GLV     augmentin day 3 augementin glucerna  Boost x2     Date  12/28 1/13/22 2/8 2/16 3/1 3/22 4/12 5/10 6/7 7/5 8/2    Total WeeklyDose 21.25mg 21.25mg 21.25 mg 21.25mg 22.5mg 22.5 mg 22.5mg 22.5 mg 22.5 mg 22.5mg 22.5mg EGD x 3 days hold   INR 2.6 2.3 1.7 2.1 2.5 2.0 2.3 2.4 2.1 2.1 2.8    Notes    Dec GLV        Boost x2     Date 8/25 9/8 9/29 10/27 12/1 1/5/23 2/2 3/2 3/30 4/14 4/20 5/18   Total WeeklyDose 18.75mg 22.5mg 22.5 mg 22.5 mg 22.5 mg 22.5mg 22.5 mg 22.5 mg 22.5 mg 22.5mg 22.5 mg  22.5 mg   INR 2.7 2.7 2.5 2.2 2.3 2.3 2.7 3.0 2.5 2.6 2.6 3.0   Notes omeprazole initi         doxy Doxy       Date 7/12 8/9 9/11 10/9 11/6 11/30 12/12 12/21 1/4 1/26/24 2/9 2/23   Total Weekly Dose 22.5 mg 22.5 mg 22.5mg 22.5 mg 22.5mg 22.5 mg 22.5mg 21.25 mg  21.25 mg 21.25mg     INR 2.7 2.8 2.5 2.5 2.3 3.5 3.2 2.4 2.1 1.9 1.9 1.8   Notes          Increase LGV       Date 3/8 4/5 5/3 5/30 7/3 7/31   Total Weekly Dose 23.75 mg 23.75 mg 23.75mg 23.75 mg 23.75mg 23.75 mg   INR 2.2 2.3 2.7 2.9 2.0 3.3   Notes           Date 8/16 8/29 9/5 9/12 10/3 10/31 11/19        Total Weekly Dose 23.75 mg 23.75 22.5 20 mg 20mg  20 mg 20mg         INR 3.3 3.8 3.1 2.7 2.0 2.7 1.8        Notes                   Clinic Interview:  Verbal Release Authorization signed on 6/27/18 -- may speak with Sussy (wife), Nikolai (son)  Tablet Strength: pt has 2.5mg tablets  Phone: [phone] (Home), [phone]  Fax: [phone] (Attn: Tasha) [facility] [place]      Patient Findings    Negatives: Signs/symptoms of thrombosis, Signs/symptoms of bleeding, Laboratory test error suspected, Change in health, Change in alcohol use, Change in activity, Upcoming invasive procedure, Emergency department visit, Upcoming dental procedure, Missed doses, Extra doses, Change in medications, Change in diet/appetite, Hospital admission, Bruising, Other complaints   Comments: Patient and wife unsure why INR is low again today. Did some re-education in regards to GLV and that they will decrease INR (they thought it would  increase).  Due to low INR levels lately will add extra dose in weekly (increase TWD from 20 mg to 21.25 mg). Will trial this and re-evaluate at next visit. Due to holiday's patient is not able to come in clinic until after the new year.    All other findings negative per patient         Plan:   1. INR subtherapeutic today at 1.8 (goal 2.0-3.0). Instructed Mr. Wolf to take warfarin 3.75mg Tues/Wed/Fri and 2.5mg ROW until recheck. Will spread out 3.75 mg at next visit if patient is back WNL.   2. RTC in 1/7/25 per patient preference   3. Verbal and written information was provided to Mr. Wolf and Mrs. Wolf in clinic. Mr. Wolf voiced understanding with teach back and has no further questions at this time.    Selam Magallanes [facility]  12/17/24   11:32 EST

## 2024-12-18 ENCOUNTER — OFFICE VISIT (OUTPATIENT)
Dept: INTERNAL MEDICINE | Facility: CLINIC | Age: 79
End: 2024-12-18
Payer: MEDICARE

## 2024-12-18 VITALS
TEMPERATURE: 96.3 F | OXYGEN SATURATION: 95 % | BODY MASS INDEX: 32.34 KG/M2 | DIASTOLIC BLOOD PRESSURE: 82 MMHG | HEIGHT: 72 IN | SYSTOLIC BLOOD PRESSURE: 134 MMHG | WEIGHT: 238.8 LBS

## 2024-12-18 DIAGNOSIS — R80.9 TYPE 2 DIABETES MELLITUS WITH MICROALBUMINURIA, WITHOUT LONG-TERM CURRENT USE OF INSULIN: ICD-10-CM

## 2024-12-18 DIAGNOSIS — E78.5 HYPERLIPIDEMIA LDL GOAL <100: ICD-10-CM

## 2024-12-18 DIAGNOSIS — I10 ESSENTIAL HYPERTENSION: Primary | ICD-10-CM

## 2024-12-18 DIAGNOSIS — I48.20 CHRONIC ATRIAL FIBRILLATION: ICD-10-CM

## 2024-12-18 DIAGNOSIS — E11.29 TYPE 2 DIABETES MELLITUS WITH MICROALBUMINURIA, WITHOUT LONG-TERM CURRENT USE OF INSULIN: ICD-10-CM

## 2024-12-18 PROCEDURE — 3075F SYST BP GE 130 - 139MM HG: CPT | Performed by: INTERNAL MEDICINE

## 2024-12-18 PROCEDURE — 99214 OFFICE O/P EST MOD 30 MIN: CPT | Performed by: INTERNAL MEDICINE

## 2024-12-18 PROCEDURE — G2211 COMPLEX E/M VISIT ADD ON: HCPCS | Performed by: INTERNAL MEDICINE

## 2024-12-18 PROCEDURE — 1160F RVW MEDS BY RX/DR IN RCRD: CPT | Performed by: INTERNAL MEDICINE

## 2024-12-18 PROCEDURE — 1159F MED LIST DOCD IN RCRD: CPT | Performed by: INTERNAL MEDICINE

## 2024-12-18 PROCEDURE — 1125F AMNT PAIN NOTED PAIN PRSNT: CPT | Performed by: INTERNAL MEDICINE

## 2024-12-18 PROCEDURE — 3079F DIAST BP 80-89 MM HG: CPT | Performed by: INTERNAL MEDICINE

## 2024-12-18 NOTE — PROGRESS NOTES
"Subjective   Tony Wolf is a 79 y.o. male.   Chief Complaint   Patient presents with    Hyperlipidemia    Hypertension    Atrial Fibrillation    Ear Fullness       History of Present Illness   F/u on chronic conditions: HTN, HLP, and Afib. HTN controlled with Lisinopril, Norvasc, and Toprol. HLP controlled with Pravastatin.  Afib controlled with coumadin. DM managed by Endo.  Ear fullness right ear. No pain  The following portions of the patient's history were reviewed and updated as appropriate: allergies, current medications, past family history, past medical history, past social history, past surgical history, and problem list.    Review of Systems   Constitutional:  Negative for fever.   HENT:          Ear fullness   Respiratory:  Negative for cough.    Cardiovascular:  Negative for chest pain.   Gastrointestinal:  Negative for abdominal pain.   Musculoskeletal:  Positive for arthralgias.   Psychiatric/Behavioral:  Negative for confusion.      /82 (BP Location: Left arm, Patient Position: Sitting, Cuff Size: Adult)   Temp 96.3 °F (35.7 °C) (Temporal)   Ht 182.9 cm (72.01\")   Wt 108 kg (238 lb 12.8 oz)   SpO2 95%   BMI 32.38 kg/m²     Objective   Physical Exam  Vitals reviewed.   HENT:      Right Ear: Tympanic membrane normal. There is impacted cerumen (s/p removal by MA).   Cardiovascular:      Rate and Rhythm: Normal rate.   Pulmonary:      Effort: No respiratory distress.      Breath sounds: No wheezing.   Neurological:      Mental Status: He is alert.       Assessment & Plan   Diagnoses and all orders for this visit:    1. Essential hypertension (Primary)  Continue Lisinopril 40 mg po qd, Norvasc, 10 mg po qd, and Toprol  mg po qd  2. Hyperlipidemia LDL goal <100  Continue Pravastatin 20 mg po qhs  3. Chronic atrial fibrillation  Continue Coumadin. INR managed by coumadin clinic.   4. Type 2 diabetes mellitus with microalbuminuria, without long-term current use of insulin  Continue " Amaryl and has f/u with Endo.

## 2025-01-07 ENCOUNTER — APPOINTMENT (OUTPATIENT)
Dept: PHARMACY | Facility: HOSPITAL | Age: 80
End: 2025-01-07
Payer: MEDICARE

## 2025-01-09 ENCOUNTER — APPOINTMENT (OUTPATIENT)
Dept: PHARMACY | Facility: HOSPITAL | Age: 80
End: 2025-01-09
Payer: MEDICARE

## 2025-01-31 ENCOUNTER — APPOINTMENT (OUTPATIENT)
Dept: PHARMACY | Facility: HOSPITAL | Age: 80
End: 2025-01-31
Payer: MEDICARE

## 2025-02-03 ENCOUNTER — TELEPHONE (OUTPATIENT)
Dept: INTERNAL MEDICINE | Facility: CLINIC | Age: 80
End: 2025-02-03
Payer: MEDICARE

## 2025-02-03 NOTE — TELEPHONE ENCOUNTER
Called and spoke to pt. Advised pt to go to ER to have imaging done. Pt asked if he can go to a [facility], advised pt they will be able to do Xrays there, but if he needs any other imaging he will have to go to the ER.    Pt verbalized understanding. Reports he will got to [facility] first.

## 2025-02-03 NOTE — TELEPHONE ENCOUNTER
PATIENT CALLED AND STATES THAT HE FELL AND THINKS THAT HE BROKE HIS LEFT SHOULDER/ARM.  PATIENT STATES THAT ITS REALLY BLACK AND BLUE.        CALL BACK: [phone]

## 2025-02-04 ENCOUNTER — ANTICOAGULATION VISIT (OUTPATIENT)
Dept: PHARMACY | Facility: HOSPITAL | Age: 80
End: 2025-02-04
Payer: MEDICARE

## 2025-02-04 ENCOUNTER — TELEPHONE (OUTPATIENT)
Dept: PHARMACY | Facility: HOSPITAL | Age: 80
End: 2025-02-04
Payer: MEDICARE

## 2025-02-04 DIAGNOSIS — M25.512 ACUTE PAIN OF LEFT SHOULDER: Primary | ICD-10-CM

## 2025-02-04 DIAGNOSIS — M79.622 LEFT UPPER ARM PAIN: ICD-10-CM

## 2025-02-04 DIAGNOSIS — I48.20 CHRONIC ATRIAL FIBRILLATION: Primary | ICD-10-CM

## 2025-02-04 LAB
INR PPP: 3 (ref 0.91–1.09)
PROTHROMBIN TIME: 36.3 SECONDS (ref 10–13.8)

## 2025-02-04 PROCEDURE — G0463 HOSPITAL OUTPT CLINIC VISIT: HCPCS

## 2025-02-04 PROCEDURE — 85610 PROTHROMBIN TIME: CPT

## 2025-02-04 PROCEDURE — 36416 COLLJ CAPILLARY BLOOD SPEC: CPT

## 2025-02-04 NOTE — PROGRESS NOTES
Anticoagulation Clinic Progress Note  Indication: atrial fibrillation  Referring Provider: Sravani [last appt 9/8/23  next:  9/16/24 (Will Sue)]  Initial Warfarin Start Date: 2008  Goal INR: 2 - 3  Current Drug Interactions: fluoxetine, glimepiride, melatonin (PRN), mirtazepine, MVI  CHADS-VASc: 3 (age, HTN, DM)    EtOH: none  GLV: Salad (Spinach salad 1x weekly or garden salad) and serving of broccoli once a week (2/9/24)  OTC Pain Relief: Uses APAP prn (~1x/week)    Anticoagulation Clinic INR History:  Date 8/20 9/4 10/2 11/6 11/15 11/29 12/31 1/7/19 1/17 1/29 2/12   Total Weekly Dose 17.5  mg 17.5 mg 17.5 mg 10mg 20mg 17.5mg 17.5mg 18.75 mg 17.5 mg 18.75 mg 20mg   INR 2.3 2.3 2.2 1.3 2.3 2.2 1.7 1.9 1.9 1.9 2.0   Notes    pre- epidural   missed dose?         Date 2/27 3/27 4/10 4/23 5/7 5/28 6/25 7/30 8/28 9/25 10/23   Total Weekly Dose 20mg 20mg 21.25 mg 21.25 mg 21.25 mg 21.25mg 21.25mg 21.25mg 21.25mg 21.25mg 21.25mg   INR 2.2 1.8 2.5 1.8 2.4 2.4 2.5 2.7 2.6 3.0 2.9   Notes sick   post- scope            Date  11/6 11/13 12/4 1/3 1/15 1/20 1/31 2/10 2/28 4/14 5/26   Total Weekly Dose 21.25 mg 18.75 mg 21.25 mg 21.25 mg 21.25 mg 20 mg 21.25 21.25 20mg 21.25mg 21.25mg   INR 4.2 3.2 2.7 2.2 3.1 3.1 2.9 3.5 2.4 2.3 2.5   Notes apap Hold x1, less GLV Inc in GLV  doxy doxy  1x dose cephalexin 1x dose dec; keflex       Date  6/24 7/22 8/24 9/10 10/8 11/5 11/18 12/2 12/30 1/26 2/22 3/22 4/19   Total Weekly Dose 21.25mg 21.25 21.25mg 21.25mg 21.25mg 21.25mg 20mg 20mg 20mg 20mg  20mg 20 mg 20mg   INR 2.4 2.4 3.0 2.8 3.0 3.1 2.5 2.5 2.5 2.6 2.9  2 2.2   Notes      Inc. GLV Dose dec    desvenlafaxine broccoli      Date 5/13 6/16  7/7 7/21 8/11 9/1 9/29 10/27 11/3 12/1 12/9 12/16   Total WeeklyDose 20 mg 20mg  20 mg 18.75mg  17.5 mg 17.5mg 17.5 17.5mg 17.5mg 17.5mg 18.75mg 22.5mg   INR 2.5 3.2 3.1 3.0 2.2 2.4 2.2 2.4 2.3 1.5 1.6 2.2   Notes  apap Inc GLV     augmentin day 3 augementin glucerna  Boost x2     Date  12/28 1/13/22 2/8 2/16 3/1 3/22 4/12 5/10 6/7 7/5 8/2    Total WeeklyDose 21.25mg 21.25mg 21.25 mg 21.25mg 22.5mg 22.5 mg 22.5mg 22.5 mg 22.5 mg 22.5mg 22.5mg EGD x 3 days hold   INR 2.6 2.3 1.7 2.1 2.5 2.0 2.3 2.4 2.1 2.1 2.8    Notes    Dec GLV        Boost x2     Date 8/25 9/8 9/29 10/27 12/1 1/5/23 2/2 3/2 3/30 4/14 4/20 5/18   Total WeeklyDose 18.75mg 22.5mg 22.5 mg 22.5 mg 22.5 mg 22.5mg 22.5 mg 22.5 mg 22.5 mg 22.5mg 22.5 mg  22.5 mg   INR 2.7 2.7 2.5 2.2 2.3 2.3 2.7 3.0 2.5 2.6 2.6 3.0   Notes omeprazole initi         doxy Doxy       Date 7/12 8/9 9/11 10/9 11/6 11/30 12/12 12/21 1/4 1/26/24 2/9 2/23   Total Weekly Dose 22.5 mg 22.5 mg 22.5mg 22.5 mg 22.5mg 22.5 mg 22.5mg 21.25 mg  21.25 mg 21.25mg     INR 2.7 2.8 2.5 2.5 2.3 3.5 3.2 2.4 2.1 1.9 1.9 1.8   Notes          Increase LGV       Date 3/8 4/5 5/3 5/30 7/3 7/31   Total Weekly Dose 23.75 mg 23.75 mg 23.75mg 23.75 mg 23.75mg 23.75 mg   INR 2.2 2.3 2.7 2.9 2.0 3.3   Notes           Date 8/16 8/29 9/5 9/12 10/3 10/31 11/19 2/4       Total Weekly Dose 23.75 mg 23.75 22.5 20 mg 20mg  20 mg 20mg  21.25 mg        INR 3.3 3.8 3.1 2.7 2.0 2.7 1.8 3.0       Notes                   Clinic Interview:  Verbal Release Authorization signed on 6/27/18 -- may speak with Sussy (wife)Nikolai (son)  Tablet Strength: pt has 2.5mg tablets  Phone: [phone] (Home), [phone]  Fax: [phone] (Attn: Tasha) [facility] [place]    Patient Findings:  Positives: Bruising   Negatives: Signs/symptoms of thrombosis, Signs/symptoms of bleeding, Laboratory test error suspected, Change in health, Change in alcohol use, Change in activity, Upcoming invasive procedure, Emergency department visit, Upcoming dental procedure, Missed doses, Extra doses, Change in medications, Change in diet/appetite, Hospital admission, Other complaints   Comments: Patient had fall yesterday, hit shoulder very hard on handlebars of walker. Called PCP, was advised by MA to get Xray, but no  order for Xray was placed. Does not want to go to ED for imaging. Messaged Dr. Lopez.  All other findings negative today.         Plan:   1. INR therapeutic today at 3.0 (goal 2.0-3.0). Instructed Mr. Wolf to continue to take warfarin 3.75mg Tues/Wed/Fri and 2.5mg ROW until recheck.   2. RTC in 3/3/25.  3. Verbal and written information was provided to Mr. Wolf and Mrs. Wolf in clinic. Mr. oWlf voiced understanding with teach back and has no further questions at this time.      Leona Walter, PharmD  02/04/25   11:41 EST

## 2025-02-04 NOTE — TELEPHONE ENCOUNTER
Felix Lopez, this patient is in [facility] clinic today, his wife called your office I believe yesterday regarding a fall the patient had. They were advised by an MA to go get Xray done, but there is no order for X-ray so they were sent home when they went to lab. Would you be willing to place an order for this patient for outpatient Xray? (telephone note from yesterday).

## 2025-02-04 NOTE — TELEPHONE ENCOUNTER
Left message for patient to return call to follow up on fall.  Per John if he hit head will need to be evaluated for STAT CT.  Can go to ED.

## 2025-02-05 ENCOUNTER — HOSPITAL ENCOUNTER (OUTPATIENT)
Dept: GENERAL RADIOLOGY | Facility: HOSPITAL | Age: 80
Discharge: HOME | End: 2025-02-05
Admitting: INTERNAL MEDICINE
Payer: MEDICARE

## 2025-02-05 DIAGNOSIS — M79.622 LEFT UPPER ARM PAIN: ICD-10-CM

## 2025-02-05 DIAGNOSIS — M25.512 ACUTE PAIN OF LEFT SHOULDER: ICD-10-CM

## 2025-02-05 PROCEDURE — 73030 X-RAY EXAM OF SHOULDER: CPT

## 2025-02-05 PROCEDURE — 73060 X-RAY EXAM OF HUMERUS: CPT

## 2025-02-06 ENCOUNTER — TELEPHONE (OUTPATIENT)
Dept: INTERNAL MEDICINE | Facility: CLINIC | Age: 80
End: 2025-02-06
Payer: MEDICARE

## 2025-02-06 NOTE — TELEPHONE ENCOUNTER
Caller: Tony Wolf    Relationship: Self    Best call back number: [phone]     What test was performed: X-RAY    When was the test performed: 2/5/25    Where was the test performed: [facility]    Additional notes:

## 2025-02-06 NOTE — TELEPHONE ENCOUNTER
Returned call to patient and explained that the results get sent to the patient before the provider has a chance to review them. Advised to expect a call once the provider has looked at results  Patient understanding and appreciative

## 2025-02-07 ENCOUNTER — TELEPHONE (OUTPATIENT)
Dept: INTERNAL MEDICINE | Facility: CLINIC | Age: 80
End: 2025-02-07
Payer: MEDICARE

## 2025-02-07 NOTE — TELEPHONE ENCOUNTER
Patient wife states that they would prefer physical therapy and he seen the PT in the [facility]. Is unsure of that providers name. ----- Message from Kay Lopez sent at 2/7/2025  7:23 AM EST -----  No fracture. Has arthritis. Possible rotator tear. Does he have preference on who he sees for ortho?

## 2025-02-26 ENCOUNTER — OFFICE VISIT (OUTPATIENT)
Dept: ENDOCRINOLOGY | Facility: CLINIC | Age: 80
End: 2025-02-26
Payer: MEDICARE

## 2025-02-26 VITALS
OXYGEN SATURATION: 96 % | BODY MASS INDEX: 31.83 KG/M2 | HEIGHT: 72 IN | HEART RATE: 84 BPM | WEIGHT: 235 LBS | SYSTOLIC BLOOD PRESSURE: 110 MMHG | DIASTOLIC BLOOD PRESSURE: 70 MMHG

## 2025-02-26 DIAGNOSIS — I10 ESSENTIAL HYPERTENSION: ICD-10-CM

## 2025-02-26 DIAGNOSIS — E78.5 HYPERLIPIDEMIA LDL GOAL <100: ICD-10-CM

## 2025-02-26 DIAGNOSIS — R80.9 TYPE 2 DIABETES MELLITUS WITH MICROALBUMINURIA, WITHOUT LONG-TERM CURRENT USE OF INSULIN: ICD-10-CM

## 2025-02-26 DIAGNOSIS — E11.29 TYPE 2 DIABETES MELLITUS WITH MICROALBUMINURIA, WITHOUT LONG-TERM CURRENT USE OF INSULIN: ICD-10-CM

## 2025-02-26 DIAGNOSIS — E11.65 TYPE 2 DIABETES MELLITUS WITH HYPERGLYCEMIA, WITHOUT LONG-TERM CURRENT USE OF INSULIN: Primary | ICD-10-CM

## 2025-02-26 LAB
EXPIRATION DATE: ABNORMAL
EXPIRATION DATE: ABNORMAL
GLUCOSE BLDC GLUCOMTR-MCNC: 137 MG/DL (ref 70–130)
HBA1C MFR BLD: 6.2 % (ref 4.5–5.7)
Lab: ABNORMAL
Lab: ABNORMAL

## 2025-02-26 NOTE — ASSESSMENT & PLAN NOTE
Diabetes is stable.   Continue current treatment regimen.  Diabetes will be reassessed in 6 months.    Encouraged ophthalmology follow up.

## 2025-02-26 NOTE — ASSESSMENT & PLAN NOTE
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  Biopsy          What type of biopsy is needed?  Liver          Type of Biopsy?  Transplant          Patients clinical information/history?  LFTs are stable. Patient needs surveillance biopsy.          Is this biopsy procedure for research?  No          Specimen orders should be placed per site protocol  Acknowledge          Call Back #  [phone]          Is the patient on aspirin, Plavix or blood thinners?  Yes - Patient may be asked to stop taking medications            Continue statin.  Plan to check lipids next visit.

## 2025-02-26 NOTE — PROGRESS NOTES
"     Office Note      Date: 2025  Patient Name: Tony Wolf  MRN: [id number]  : 1945    Chief Complaint   Patient presents with    Diabetes       History of Present Illness:   Tony Wolf is a 79 y.o. male who presents for Diabetes type 2. Diagnosed in: . Treated in past with oral agents. Current treatments: glimepiride. Number of insulin shots per day: none. Checks blood sugar 1 time a day. Has low blood sugar: no. Aspirin use: No - on warfarin. Statin use: Yes. ACE-I/ARB use: Yes. Changes in health since last visit: none. Last eye exam 2024.      Subjective      Diabetic Complications:  Eyes: No  Kidneys: Yes - microalbuminuria  Feet: Yes - h/o foot ulcer  Heart: No    Diet and Exercise:  Meals per day: 3  Minutes of exercise per week: 0 mins.    Review of Systems:   Review of Systems   Constitutional: Negative.    Cardiovascular: Negative.    Gastrointestinal: Negative.    Endocrine: Negative.        The following portions of the patient's history were reviewed and updated as appropriate: allergies, current medications, past family history, past medical history, past social history, past surgical history, and problem list.    Objective     Visit Vitals  /70   Pulse 84   Ht 182.9 cm (72\")   Wt 107 kg (235 lb)   SpO2 96%   BMI 31.87 kg/m²       Physical Exam:  Physical Exam  Constitutional:       Appearance: Normal appearance.   Neurological:      Mental Status: He is alert.         Labs:    HbA1c  Lab Results   Component Value Date    HGBA1C 6.2 (A) 2025       CMP  Lab Results   Component Value Date    GLUCOSE 120 (H) 10/13/2024    BUN 13 10/13/2024    CREATININE 0.98 10/13/2024    EGFRIFNONA 76 2021    BCR 13.3 10/13/2024    K 3.9 10/13/2024    CO2 25.0 10/13/2024    CALCIUM 8.8 10/13/2024    AST 35 10/13/2024    ALT 21 10/13/2024        Lipid Panel  Lab Results   Component Value Date    HDL Cholesterol 37 (L) 2024    LDL Cholesterol  97 2024    LDL/HDL " "Ratio 2.54 06/20/2024    Triglycerides 151 (H) 06/20/2024        TSH  Lab Results   Component Value Date    TSH 2.790 06/20/2024    FREET4 0.98 06/09/2015        Hemoglobin A1C  No components found for: \"HGBA1C\"     Microalbumin/Creatinine  Lab Results   Component Value Date    SHADIAO 117.3 (H) 08/26/2024    MICROALBUR 11.1 08/26/2024           Assessment / Plan      Assessment & Plan:  Diagnoses and all orders for this visit:    1. Type 2 diabetes mellitus with hyperglycemia, without long-term current use of insulin (Primary)  Assessment & Plan:  Diabetes is stable.   Continue current treatment regimen.  Diabetes will be reassessed in 6 months.    Encouraged ophthalmology follow up.    Orders:  -     POC Glucose, Blood  -     POC Glycosylated Hemoglobin (Hb A1C)    2. Essential hypertension  Assessment & Plan:  BP okay.  Continue current meds.  Monitor BP at home.      3. Hyperlipidemia LDL goal <100  Assessment & Plan:  Continue statin.  Plan to check lipids next visit.      4. Type 2 diabetes mellitus with microalbuminuria, without long-term current use of insulin  Assessment & Plan:  Microalbuminuria had improved last visit.  Continue ACE-I.        Current Outpatient Medications   Medication Instructions    Accu-Chek Softclix Lancets lancets USE TO TEST BLOOD SUGAR THREE TIMES A DAY    acetaminophen (TYLENOL) 1,000 mg, Every 6 Hours PRN    amLODIPine (NORVASC) 10 mg, Oral, Daily, as directed    bimatoprost (Lumigan) 0.01 % ophthalmic drops 1 drop, Nightly    glimepiride (AMARYL) 2 mg, Oral, Daily    glucose blood (Accu-Chek Kandis Plus) test strip USE ONE STRIP TO TEST THREE TIMES A DAY    latanoprost (XALATAN) 0.005 % ophthalmic solution 1 drop, Nightly    lisinopril (PRINIVIL,ZESTRIL) 40 mg, Oral, Daily    Loratadine 10 MG capsule 1 capsule, Daily    Melatonin 2.5 MG chewable tablet 1 tablet, Nightly PRN    metoprolol succinate XL (TOPROL-XL) 100 mg, Oral, 2 Times Daily    mirtazapine (REMERON) 45 mg, " Nightly    pravastatin (PRAVACHOL) 20 mg, Oral, Daily    venlafaxine XR (EFFEXOR-XR) 75 mg, Daily    Vitamin D-3 1,000 Units, Daily    warfarin (COUMADIN) 2.5 MG tablet TAKE 1 TO 1 AND 1/2 TABLETS BY MOUTH DAILY OR AS DIRECTED BY ANTICOAGULATION CLINIC      Return in about 6 months (around 8/26/2025) for Recheck with A1c, CMP, lipid, TSH, microalbumin, foot exam.    Electronically signed by: Brown Gallo MD  02/26/2025

## 2025-03-04 ENCOUNTER — ANTICOAGULATION VISIT (OUTPATIENT)
Dept: PHARMACY | Facility: HOSPITAL | Age: 80
End: 2025-03-04
Payer: MEDICARE

## 2025-03-04 DIAGNOSIS — I48.20 CHRONIC ATRIAL FIBRILLATION: Primary | ICD-10-CM

## 2025-03-04 LAB
INR PPP: 2.9 (ref 0.91–1.09)
PROTHROMBIN TIME: 35.3 SECONDS (ref 10–13.8)

## 2025-03-04 PROCEDURE — 85610 PROTHROMBIN TIME: CPT

## 2025-03-04 PROCEDURE — G0463 HOSPITAL OUTPT CLINIC VISIT: HCPCS

## 2025-03-04 PROCEDURE — 36416 COLLJ CAPILLARY BLOOD SPEC: CPT

## 2025-03-04 NOTE — PROGRESS NOTES
Anticoagulation Clinic Progress Note  Indication: atrial fibrillation  Referring Provider: Sravani [last appt 9/8/23  next:  9/16/24 (Will Sue)]  Initial Warfarin Start Date: 2008  Goal INR: 2 - 3  Current Drug Interactions: fluoxetine, glimepiride, melatonin (PRN), mirtazepine, MVI  CHADS-VASc: 3 (age, HTN, DM)    EtOH: none  GLV: Salad (Spinach salad 1x weekly or garden salad) and serving of broccoli once a week (2/9/24)  OTC Pain Relief: Uses APAP prn (~1x/week)    Anticoagulation Clinic INR History:  Date 8/20 9/4 10/2 11/6 11/15 11/29 12/31 1/7/19 1/17 1/29 2/12   Total Weekly Dose 17.5  mg 17.5 mg 17.5 mg 10mg 20mg 17.5mg 17.5mg 18.75 mg 17.5 mg 18.75 mg 20mg   INR 2.3 2.3 2.2 1.3 2.3 2.2 1.7 1.9 1.9 1.9 2.0   Notes    pre- epidural   missed dose?         Date 2/27 3/27 4/10 4/23 5/7 5/28 6/25 7/30 8/28 9/25 10/23   Total Weekly Dose 20mg 20mg 21.25 mg 21.25 mg 21.25 mg 21.25mg 21.25mg 21.25mg 21.25mg 21.25mg 21.25mg   INR 2.2 1.8 2.5 1.8 2.4 2.4 2.5 2.7 2.6 3.0 2.9   Notes sick   post- scope            Date  11/6 11/13 12/4 1/3 1/15 1/20 1/31 2/10 2/28 4/14 5/26   Total Weekly Dose 21.25 mg 18.75 mg 21.25 mg 21.25 mg 21.25 mg 20 mg 21.25 21.25 20mg 21.25mg 21.25mg   INR 4.2 3.2 2.7 2.2 3.1 3.1 2.9 3.5 2.4 2.3 2.5   Notes apap Hold x1, less GLV Inc in GLV  doxy doxy  1x dose cephalexin 1x dose dec; keflex       Date  6/24 7/22 8/24 9/10 10/8 11/5 11/18 12/2 12/30 1/26 2/22 3/22 4/19   Total Weekly Dose 21.25mg 21.25 21.25mg 21.25mg 21.25mg 21.25mg 20mg 20mg 20mg 20mg  20mg 20 mg 20mg   INR 2.4 2.4 3.0 2.8 3.0 3.1 2.5 2.5 2.5 2.6 2.9  2 2.2   Notes      Inc. GLV Dose dec    desvenlafaxine broccoli      Date 5/13 6/16  7/7 7/21 8/11 9/1 9/29 10/27 11/3 12/1 12/9 12/16   Total WeeklyDose 20 mg 20mg  20 mg 18.75mg  17.5 mg 17.5mg 17.5 17.5mg 17.5mg 17.5mg 18.75mg 22.5mg   INR 2.5 3.2 3.1 3.0 2.2 2.4 2.2 2.4 2.3 1.5 1.6 2.2   Notes  apap Inc GLV     augmentin day 3 augementin glucerna  Boost x2     Date  12/28 1/13/22 2/8 2/16 3/1 3/22 4/12 5/10 6/7 7/5 8/2    Total WeeklyDose 21.25mg 21.25mg 21.25 mg 21.25mg 22.5mg 22.5 mg 22.5mg 22.5 mg 22.5 mg 22.5mg 22.5mg EGD x 3 days hold   INR 2.6 2.3 1.7 2.1 2.5 2.0 2.3 2.4 2.1 2.1 2.8    Notes    Dec GLV        Boost x2     Date 8/25 9/8 9/29 10/27 12/1 1/5/23 2/2 3/2 3/30 4/14 4/20 5/18   Total WeeklyDose 18.75mg 22.5mg 22.5 mg 22.5 mg 22.5 mg 22.5mg 22.5 mg 22.5 mg 22.5 mg 22.5mg 22.5 mg  22.5 mg   INR 2.7 2.7 2.5 2.2 2.3 2.3 2.7 3.0 2.5 2.6 2.6 3.0   Notes omeprazole initi         doxy Doxy       Date 7/12 8/9 9/11 10/9 11/6 11/30 12/12 12/21 1/4 1/26/24 2/9 2/23   Total Weekly Dose 22.5 mg 22.5 mg 22.5mg 22.5 mg 22.5mg 22.5 mg 22.5mg 21.25 mg  21.25 mg 21.25mg     INR 2.7 2.8 2.5 2.5 2.3 3.5 3.2 2.4 2.1 1.9 1.9 1.8   Notes          Increase LGV       Date 3/8 4/5 5/3 5/30 7/3 7/31   Total Weekly Dose 23.75 mg 23.75 mg 23.75mg 23.75 mg 23.75mg 23.75 mg   INR 2.2 2.3 2.7 2.9 2.0 3.3   Notes           Date 8/16 8/29 9/5 9/12 10/3 10/31 11/19 2/4 3/4      Total Weekly Dose 23.75 mg 23.75 22.5 20 mg 20mg  20 mg 20mg  21.25 mg  21.25 mg       INR 3.3 3.8 3.1 2.7 2.0 2.7 1.8 3.0 2.9      Notes                   Clinic Interview:  Verbal Release Authorization signed on 6/27/18 -- may speak with Sussy (wife), Nikolai (son)  Tablet Strength: pt has 2.5mg tablets  Phone: [phone] (Home), [phone]  Fax: [phone] (Attn: Tasha) [facility] [place]    Patient Findings:  Negatives: Signs/symptoms of thrombosis, Signs/symptoms of bleeding, Laboratory test error suspected, Change in health, Change in alcohol use, Change in activity, Upcoming invasive procedure, Emergency department visit, Upcoming dental procedure, Missed doses, Extra doses, Change in medications, Change in diet/appetite, Hospital admission, Bruising, Other complaints   Comments: All findings negative per patient and wife.       Plan:   1. INR therapeutic today at 2.9 (goal 2.0-3.0). Instructed   Maryann to continue to take warfarin 3.75mg Tues/Wed/Fri and 2.5mg ROW until recheck.   2. RTC in 4 weeks, 4/1/25.  3. Verbal and written information was provided to Mr. Wolf and Mrs. Wolf in clinic. Mr. Wolf voiced understanding with teach back and has no further questions at this time.      Leona Walter, PharmD  03/04/25   11:24 EST

## 2025-03-18 ENCOUNTER — LAB (OUTPATIENT)
Dept: LAB | Facility: HOSPITAL | Age: 80
End: 2025-03-18
Payer: MEDICARE

## 2025-03-18 ENCOUNTER — OFFICE VISIT (OUTPATIENT)
Dept: INTERNAL MEDICINE | Facility: CLINIC | Age: 80
End: 2025-03-18
Payer: MEDICARE

## 2025-03-18 VITALS
BODY MASS INDEX: 31.51 KG/M2 | HEIGHT: 72 IN | DIASTOLIC BLOOD PRESSURE: 82 MMHG | SYSTOLIC BLOOD PRESSURE: 126 MMHG | WEIGHT: 232.6 LBS | TEMPERATURE: 97.1 F | OXYGEN SATURATION: 95 %

## 2025-03-18 DIAGNOSIS — I48.20 CHRONIC ATRIAL FIBRILLATION: ICD-10-CM

## 2025-03-18 DIAGNOSIS — E78.5 HYPERLIPIDEMIA LDL GOAL <100: ICD-10-CM

## 2025-03-18 DIAGNOSIS — I10 ESSENTIAL HYPERTENSION: ICD-10-CM

## 2025-03-18 DIAGNOSIS — I10 ESSENTIAL HYPERTENSION: Primary | ICD-10-CM

## 2025-03-18 DIAGNOSIS — D75.1 POLYCYTHEMIA: ICD-10-CM

## 2025-03-18 LAB
BASOPHILS # BLD AUTO: 0.05 10*3/MM3 (ref 0–0.2)
BASOPHILS NFR BLD AUTO: 0.5 % (ref 0–1.5)
DEPRECATED RDW RBC AUTO: 48.7 FL (ref 37–54)
EOSINOPHIL # BLD AUTO: 0.14 10*3/MM3 (ref 0–0.4)
EOSINOPHIL NFR BLD AUTO: 1.3 % (ref 0.3–6.2)
ERYTHROCYTE [DISTWIDTH] IN BLOOD BY AUTOMATED COUNT: 14.1 % (ref 12.3–15.4)
HCT VFR BLD AUTO: 51.6 % (ref 37.5–51)
HGB BLD-MCNC: 16.8 G/DL (ref 13–17.7)
IMM GRANULOCYTES # BLD AUTO: 0.04 10*3/MM3 (ref 0–0.05)
IMM GRANULOCYTES NFR BLD AUTO: 0.4 % (ref 0–0.5)
LYMPHOCYTES # BLD AUTO: 3.12 10*3/MM3 (ref 0.7–3.1)
LYMPHOCYTES NFR BLD AUTO: 29.8 % (ref 19.6–45.3)
MCH RBC QN AUTO: 30.8 PG (ref 26.6–33)
MCHC RBC AUTO-ENTMCNC: 32.6 G/DL (ref 31.5–35.7)
MCV RBC AUTO: 94.5 FL (ref 79–97)
MONOCYTES # BLD AUTO: 0.92 10*3/MM3 (ref 0.1–0.9)
MONOCYTES NFR BLD AUTO: 8.8 % (ref 5–12)
NEUTROPHILS NFR BLD AUTO: 59.2 % (ref 42.7–76)
NEUTROPHILS NFR BLD AUTO: 6.19 10*3/MM3 (ref 1.7–7)
NRBC BLD AUTO-RTO: 0 /100 WBC (ref 0–0.2)
PLATELET # BLD AUTO: 195 10*3/MM3 (ref 140–450)
PMV BLD AUTO: 12.6 FL (ref 6–12)
RBC # BLD AUTO: 5.46 10*6/MM3 (ref 4.14–5.8)
WBC NRBC COR # BLD AUTO: 10.46 10*3/MM3 (ref 3.4–10.8)

## 2025-03-18 PROCEDURE — 85025 COMPLETE CBC W/AUTO DIFF WBC: CPT

## 2025-03-18 PROCEDURE — 36415 COLL VENOUS BLD VENIPUNCTURE: CPT

## 2025-03-18 PROCEDURE — 80053 COMPREHEN METABOLIC PANEL: CPT

## 2025-03-18 PROCEDURE — 80061 LIPID PANEL: CPT

## 2025-03-18 RX ORDER — PRAVASTATIN SODIUM 20 MG
20 TABLET ORAL DAILY
Qty: 90 TABLET | Refills: 1 | Status: SHIPPED | OUTPATIENT
Start: 2025-03-18

## 2025-03-18 RX ORDER — GLIMEPIRIDE 2 MG/1
2 TABLET ORAL DAILY
Qty: 90 TABLET | Refills: 0 | Status: CANCELLED | OUTPATIENT
Start: 2025-03-18

## 2025-03-18 RX ORDER — NYSTATIN 100000 [USP'U]/G
POWDER TOPICAL
COMMUNITY
Start: 2025-03-03 | End: 2025-03-21 | Stop reason: SDUPTHER

## 2025-03-18 RX ORDER — PRAVASTATIN SODIUM 20 MG
20 TABLET ORAL DAILY
Qty: 90 TABLET | Refills: 0 | Status: CANCELLED | OUTPATIENT
Start: 2025-03-18

## 2025-03-18 NOTE — PROGRESS NOTES
"Subjective   Tony Wolf is a 79 y.o. male.   Chief Complaint   Patient presents with    Fatigue    Hyperlipidemia    Hypertension    Atrial Fibrillation    Anxiety       History of Present Illness   Here for f/u on chronic conditions: HTN, HLP, A fib, and anxiety. HTN controlled with Lisinopril. HLP controlled with Pravastatin.  Afib controlled with Coumadin. Anxiety controlled with Effexor.   The following portions of the patient's history were reviewed and updated as appropriate: allergies, current medications, past family history, past medical history, past social history, past surgical history, and problem list.    Review of Systems   Constitutional:  Negative for fatigue and fever.   Respiratory:  Negative for cough and shortness of breath.    Cardiovascular:  Negative for chest pain and leg swelling.   Gastrointestinal:  Negative for abdominal pain.     /82 (BP Location: Left arm, Patient Position: Sitting, Cuff Size: Adult)   Temp 97.1 °F (36.2 °C) (Temporal)   Ht 182 cm (71.65\")   Wt 106 kg (232 lb 9.6 oz)   SpO2 95%   BMI 31.85 kg/m²     Objective   Physical Exam  Vitals reviewed.   Cardiovascular:      Rate and Rhythm: Normal rate and regular rhythm.   Pulmonary:      Effort: No respiratory distress.      Breath sounds: No wheezing.   Abdominal:      General: There is no distension.      Tenderness: There is no abdominal tenderness.   Neurological:      Mental Status: He is alert and oriented to person, place, and time.       Assessment & Plan   Diagnoses and all orders for this visit:    1. Essential hypertension (Primary)  -     Comprehensive Metabolic Panel; Future  Continue Lisinopril 40 mg po qhs  2. Hyperlipidemia LDL goal <100  -     Lipid Panel; Future  Continue Pravastatin 20 mg po qhs  3. Chronic atrial fibrillation  Continue Coumadin, INR managed by coumadin clinic.               "

## 2025-03-19 ENCOUNTER — RESULTS FOLLOW-UP (OUTPATIENT)
Dept: INTERNAL MEDICINE | Facility: CLINIC | Age: 80
End: 2025-03-19
Payer: MEDICARE

## 2025-03-19 LAB
ALBUMIN SERPL-MCNC: 3.4 G/DL (ref 3.5–5.2)
ALBUMIN/GLOB SERPL: 1 G/DL
ALP SERPL-CCNC: 181 U/L (ref 39–117)
ALT SERPL W P-5'-P-CCNC: 17 U/L (ref 1–41)
ANION GAP SERPL CALCULATED.3IONS-SCNC: 14.6 MMOL/L (ref 5–15)
AST SERPL-CCNC: 29 U/L (ref 1–40)
BILIRUB SERPL-MCNC: 0.7 MG/DL (ref 0–1.2)
BUN SERPL-MCNC: 14 MG/DL (ref 8–23)
BUN/CREAT SERPL: 12.4 (ref 7–25)
CALCIUM SPEC-SCNC: 9.7 MG/DL (ref 8.6–10.5)
CHLORIDE SERPL-SCNC: 103 MMOL/L (ref 98–107)
CHOLEST SERPL-MCNC: 164 MG/DL (ref 0–200)
CO2 SERPL-SCNC: 26.4 MMOL/L (ref 22–29)
CREAT SERPL-MCNC: 1.13 MG/DL (ref 0.76–1.27)
EGFRCR SERPLBLD CKD-EPI 2021: 66.9 ML/MIN/1.73
GLOBULIN UR ELPH-MCNC: 3.3 GM/DL
GLUCOSE SERPL-MCNC: 106 MG/DL (ref 65–99)
HDLC SERPL-MCNC: 31 MG/DL (ref 40–60)
LDLC SERPL CALC-MCNC: 105 MG/DL (ref 0–100)
LDLC/HDLC SERPL: 3.28 {RATIO}
POTASSIUM SERPL-SCNC: 4.1 MMOL/L (ref 3.5–5.2)
PROT SERPL-MCNC: 6.7 G/DL (ref 6–8.5)
SODIUM SERPL-SCNC: 144 MMOL/L (ref 136–145)
TRIGL SERPL-MCNC: 156 MG/DL (ref 0–150)
VLDLC SERPL-MCNC: 28 MG/DL (ref 5–40)

## 2025-03-21 RX ORDER — NYSTATIN 100000 [USP'U]/G
POWDER TOPICAL
Status: CANCELLED | OUTPATIENT
Start: 2025-03-21

## 2025-03-21 NOTE — TELEPHONE ENCOUNTER
Last appointment: 3/18/2025  Next appointment: 6/18/2025       Last Refill: filled by historical provider.     Please advise on any additional medication for diarrhea.

## 2025-03-21 NOTE — TELEPHONE ENCOUNTER
Caller: Tony Wolf    Relationship: Self    Best call back number: [phone]     What medication are you requesting: FOR SEVERE DIARRHEA    What are your current symptoms: SEVERE DIARRHEA    How long have you been experiencing symptoms: COUPLE WEEKS OFF AND ON    Have you had these symptoms before:    [x] Yes  [] No    Have you been treated for these symptoms before:   [] Yes  [x] No    If a prescription is needed, what is your preferred pharmacy and phone number:    [facility] [id number] - [place] [address] [address] AT [facility] [phone] [facility] [phone]         Additional notes: PATIENT WOULD LIKE THE DOCTOR TO CALL IN SOMETHING FOR HIS SEVERE DIARRHEA

## 2025-03-21 NOTE — TELEPHONE ENCOUNTER
Caller: Tony Wolf    Relationship: Self    Best call back number:[phone]     Requested Prescriptions:   Requested Prescriptions     Pending Prescriptions Disp Refills    nystatin (MYCOSTATIN) [id number] UNIT/GM powder          Pharmacy where request should be sent: [facility] [id number] [place] [id number] AT [facility] [phone] [facility] [phone] FX     Last office visit with prescribing clinician: 3/18/2025   Last telemedicine visit with prescribing clinician: Visit date not found   Next office visit with prescribing clinician: 6/18/2025     Additional details provided by patient:     Does the patient have less than a 3 day supply:  [x] Yes  [] No    Would you like a call back once the refill request has been completed: [] Yes [x] No    If the office needs to give you a call back, can they leave a voicemail: [] Yes [x] No    Mary Adhikari Rep   03/21/25 14:39 EDT

## 2025-03-24 RX ORDER — NYSTATIN 100000 [USP'U]/G
POWDER TOPICAL 2 TIMES DAILY
Qty: 30 G | Refills: 1 | Status: SHIPPED | OUTPATIENT
Start: 2025-03-24

## 2025-03-25 NOTE — TELEPHONE ENCOUNTER
PATIENT CALLED AND STATES THAT HE WOULD LIKE TO SPEAK WITH SOMEONE IN THE OFFICE. PATIENT STATES THAT HE IS STILL HAVING DIARRHEA AND WHEN HE WENT TO THE PHARMACY THERE WAS NO MEDICATION TO .    PHARM: [facility] -[phone]    CALL BACK: [phone]

## 2025-03-25 NOTE — TELEPHONE ENCOUNTER
Patient called wanting to check on status of request  Per verbal from Dr Lopez patient needs to be seen for symptoms  Spoke with patient and relayed message about needing appointment. Scheduled patient to be seen  Diarrhea started three days ago

## 2025-03-26 ENCOUNTER — TELEPHONE (OUTPATIENT)
Dept: INTERNAL MEDICINE | Facility: CLINIC | Age: 80
End: 2025-03-26

## 2025-03-26 DIAGNOSIS — R19.7 DIARRHEA, UNSPECIFIED TYPE: Primary | ICD-10-CM

## 2025-03-26 DIAGNOSIS — E11.43 TYPE 2 DIABETES MELLITUS WITH DIABETIC AUTONOMIC NEUROPATHY, WITHOUT LONG-TERM CURRENT USE OF INSULIN: ICD-10-CM

## 2025-03-26 NOTE — TELEPHONE ENCOUNTER
Returned call to patient and relayed the lab has the supplies ready for pickup  Patient requested appointment be canceled due to diarrhea being worse and not feeling like coming in to be seen  Canceled appointment  No further questions at this time

## 2025-03-26 NOTE — TELEPHONE ENCOUNTER
KENJI 1:45     PT WIFE ON THE PHONE TO SPEAK WITH NURSE BC PATIENT IS REFUSING TO COME TO APPT AND SHE WANTS TO KNOW WHAT TO DO    Spoke with spouse Patient is still having diarrhea but is refusing to come to appointment due to net seeing a need  Patients wife is asking if there was anything that could be done without an appointment  Asked provider and relayed that stool cultures will be at the lab to be picked up   Patients wife is appreciative

## 2025-03-27 ENCOUNTER — LAB (OUTPATIENT)
Dept: LAB | Facility: HOSPITAL | Age: 80
End: 2025-03-27
Payer: MEDICARE

## 2025-03-27 DIAGNOSIS — R19.7 DIARRHEA, UNSPECIFIED TYPE: ICD-10-CM

## 2025-03-27 DIAGNOSIS — E11.43 TYPE 2 DIABETES MELLITUS WITH DIABETIC AUTONOMIC NEUROPATHY, WITHOUT LONG-TERM CURRENT USE OF INSULIN: ICD-10-CM

## 2025-03-27 LAB
ADV 40+41 DNA STL QL NAA+NON-PROBE: NOT DETECTED
ASTRO TYP 1-8 RNA STL QL NAA+NON-PROBE: NOT DETECTED
C CAYETANENSIS DNA STL QL NAA+NON-PROBE: NOT DETECTED
C COLI+JEJ+UPSA DNA STL QL NAA+NON-PROBE: NOT DETECTED
C DIFF TOX GENS STL QL NAA+PROBE: NOT DETECTED
CRYPTOSP DNA STL QL NAA+NON-PROBE: NOT DETECTED
E HISTOLYT DNA STL QL NAA+NON-PROBE: NOT DETECTED
EAEC PAA PLAS AGGR+AATA ST NAA+NON-PRB: NOT DETECTED
EC STX1+STX2 GENES STL QL NAA+NON-PROBE: NOT DETECTED
EPEC EAE GENE STL QL NAA+NON-PROBE: NOT DETECTED
ETEC LTA+ST1A+ST1B TOX ST NAA+NON-PROBE: NOT DETECTED
G LAMBLIA DNA STL QL NAA+NON-PROBE: NOT DETECTED
NOROVIRUS GI+II RNA STL QL NAA+NON-PROBE: NOT DETECTED
P SHIGELLOIDES DNA STL QL NAA+NON-PROBE: NOT DETECTED
RVA RNA STL QL NAA+NON-PROBE: NOT DETECTED
S ENT+BONG DNA STL QL NAA+NON-PROBE: NOT DETECTED
SAPO I+II+IV+V RNA STL QL NAA+NON-PROBE: NOT DETECTED
SHIGELLA SP+EIEC IPAH ST NAA+NON-PROBE: NOT DETECTED
V CHOL+PARA+VUL DNA STL QL NAA+NON-PROBE: NOT DETECTED
V CHOLERAE DNA STL QL NAA+NON-PROBE: NOT DETECTED
Y ENTEROCOL DNA STL QL NAA+NON-PROBE: NOT DETECTED

## 2025-03-27 PROCEDURE — 87507 IADNA-DNA/RNA PROBE TQ 12-25: CPT

## 2025-03-27 PROCEDURE — 87493 C DIFF AMPLIFIED PROBE: CPT

## 2025-03-28 ENCOUNTER — RESULTS FOLLOW-UP (OUTPATIENT)
Dept: LAB | Facility: HOSPITAL | Age: 80
End: 2025-03-28
Payer: MEDICARE

## 2025-03-28 NOTE — TELEPHONE ENCOUNTER
Called and relayed message to spouse (in  [place])  Spouse stated that had just picked up another bottle of imodium but will try the change in diet  Advised giving a call if the change doesn't help

## 2025-04-01 ENCOUNTER — ANTICOAGULATION VISIT (OUTPATIENT)
Dept: PHARMACY | Facility: HOSPITAL | Age: 80
End: 2025-04-01
Payer: MEDICARE

## 2025-04-01 DIAGNOSIS — I48.20 CHRONIC ATRIAL FIBRILLATION: Primary | ICD-10-CM

## 2025-04-01 LAB
INR PPP: 3.6 (ref 0.91–1.09)
PROTHROMBIN TIME: 43.4 SECONDS (ref 10–13.8)

## 2025-04-01 PROCEDURE — 36416 COLLJ CAPILLARY BLOOD SPEC: CPT

## 2025-04-01 PROCEDURE — 85610 PROTHROMBIN TIME: CPT

## 2025-04-01 PROCEDURE — G0463 HOSPITAL OUTPT CLINIC VISIT: HCPCS

## 2025-04-01 NOTE — PROGRESS NOTES
Anticoagulation Clinic Progress Note  Indication: atrial fibrillation  Referring Provider: Sravani [last appt 9/8/23  next:  9/16/24 (Will Sue)]  Initial Warfarin Start Date: 2008  Goal INR: 2 - 3  Current Drug Interactions: fluoxetine, glimepiride, melatonin (PRN), mirtazepine, MVI  CHADS-VASc: 3 (age, HTN, DM)    EtOH: none  GLV: Salad (Spinach salad 1x weekly or garden salad) and serving of broccoli once a week (2/9/24)  OTC Pain Relief: Uses APAP prn (~1x/week)    Anticoagulation Clinic INR History:  Date 8/20 9/4 10/2 11/6 11/15 11/29 12/31 1/7/19 1/17 1/29 2/12   Total Weekly Dose 17.5  mg 17.5 mg 17.5 mg 10mg 20mg 17.5mg 17.5mg 18.75 mg 17.5 mg 18.75 mg 20mg   INR 2.3 2.3 2.2 1.3 2.3 2.2 1.7 1.9 1.9 1.9 2.0   Notes    pre- epidural   missed dose?         Date 2/27 3/27 4/10 4/23 5/7 5/28 6/25 7/30 8/28 9/25 10/23   Total Weekly Dose 20mg 20mg 21.25 mg 21.25 mg 21.25 mg 21.25mg 21.25mg 21.25mg 21.25mg 21.25mg 21.25mg   INR 2.2 1.8 2.5 1.8 2.4 2.4 2.5 2.7 2.6 3.0 2.9   Notes sick   post- scope            Date  11/6 11/13 12/4 1/3 1/15 1/20 1/31 2/10 2/28 4/14 5/26   Total Weekly Dose 21.25 mg 18.75 mg 21.25 mg 21.25 mg 21.25 mg 20 mg 21.25 21.25 20mg 21.25mg 21.25mg   INR 4.2 3.2 2.7 2.2 3.1 3.1 2.9 3.5 2.4 2.3 2.5   Notes apap Hold x1, less GLV Inc in GLV  doxy doxy  1x dose cephalexin 1x dose dec; keflex       Date  6/24 7/22 8/24 9/10 10/8 11/5 11/18 12/2 12/30 1/26 2/22 3/22 4/19   Total Weekly Dose 21.25mg 21.25 21.25mg 21.25mg 21.25mg 21.25mg 20mg 20mg 20mg 20mg  20mg 20 mg 20mg   INR 2.4 2.4 3.0 2.8 3.0 3.1 2.5 2.5 2.5 2.6 2.9  2 2.2   Notes      Inc. GLV Dose dec    desvenlafaxine broccoli      Date 5/13 6/16  7/7 7/21 8/11 9/1 9/29 10/27 11/3 12/1 12/9 12/16   Total WeeklyDose 20 mg 20mg  20 mg 18.75mg  17.5 mg 17.5mg 17.5 17.5mg 17.5mg 17.5mg 18.75mg 22.5mg   INR 2.5 3.2 3.1 3.0 2.2 2.4 2.2 2.4 2.3 1.5 1.6 2.2   Notes  apap Inc GLV     augmentin day 3 augementin glucerna  Boost x2     Date  12/28 1/13/22 2/8 2/16 3/1 3/22 4/12 5/10 6/7 7/5 8/2    Total WeeklyDose 21.25mg 21.25mg 21.25 mg 21.25mg 22.5mg 22.5 mg 22.5mg 22.5 mg 22.5 mg 22.5mg 22.5mg EGD x 3 days hold   INR 2.6 2.3 1.7 2.1 2.5 2.0 2.3 2.4 2.1 2.1 2.8    Notes    Dec GLV        Boost x2     Date 8/25 9/8 9/29 10/27 12/1 1/5/23 2/2 3/2 3/30 4/14 4/20 5/18   Total WeeklyDose 18.75mg 22.5mg 22.5 mg 22.5 mg 22.5 mg 22.5mg 22.5 mg 22.5 mg 22.5 mg 22.5mg 22.5 mg  22.5 mg   INR 2.7 2.7 2.5 2.2 2.3 2.3 2.7 3.0 2.5 2.6 2.6 3.0   Notes omeprazole initi         doxy Doxy       Date 7/12 8/9 9/11 10/9 11/6 11/30 12/12 12/21 1/4 1/26/24 2/9 2/23   Total Weekly Dose 22.5 mg 22.5 mg 22.5mg 22.5 mg 22.5mg 22.5 mg 22.5mg 21.25 mg  21.25 mg 21.25mg     INR 2.7 2.8 2.5 2.5 2.3 3.5 3.2 2.4 2.1 1.9 1.9 1.8   Notes          Increase LGV       Date 3/8 4/5 5/3 5/30 7/3 7/31   Total Weekly Dose 23.75 mg 23.75 mg 23.75mg 23.75 mg 23.75mg 23.75 mg   INR 2.2 2.3 2.7 2.9 2.0 3.3   Notes           Date 8/16 8/29 9/5 9/12 10/3 10/31 11/19 2/4 3/4 4/1     Total Weekly Dose 23.75 mg 23.75 22.5 20 mg 20mg  20 mg 20mg  21.25 mg  21.25 mg  21.25 mg      INR 3.3 3.8 3.1 2.7 2.0 2.7 1.8 3.0 2.9 3.6     Notes                   Clinic Interview:  Verbal Release Authorization signed on 6/27/18 -- may speak with Sussy (wife), Nikolai (son)  Tablet Strength: pt has 2.5mg tablets  Phone: [phone] (Home), [phone]  Fax: [phone] (Attn: Tasha) [facility] [place]    Patient Findings:  Positives: Change in health, Change in diet/appetite   Negatives: Signs/symptoms of thrombosis, Signs/symptoms of bleeding, Laboratory test error suspected, Change in alcohol use, Change in activity, Upcoming invasive procedure, Emergency department visit, Upcoming dental procedure, Missed doses, Extra doses, Change in medications, Hospital admission, Bruising, Other complaints   Comments: Patient has had severe diarrhea, has been taking immodium. Patients wife expressed frustration over  severity of diarrhea. Has given stool sample to PCP which was unremarkable per patient. Was advised to start a sensitive stomach diet (rice, chicken, bananas).  Had a small serving of broccoli last night with salmon and rice - diarrhea has caused a decrease in appetite.     Plan:   1. INR supratherapeutic today at 3.6 (goal 2.0-3.0). Instructed Mr. Wolf to take warfarin 3.75mg Wed/Fri and 2.5mg ROW until recheck. This is a slight dose decrease due to persistent diarrhea.   2. RTC in 4 weeks, 4/15/25.  3. Verbal and written information was provided to Mr. Wolf and Mrs. Wolf in clinic. Mr. Wolf voiced understanding with teach back and has no further questions at this time.      Leona Walter, PharmD  04/01/25   11:33 EDT

## 2025-04-15 ENCOUNTER — TELEPHONE (OUTPATIENT)
Dept: INTERNAL MEDICINE | Facility: CLINIC | Age: 80
End: 2025-04-15
Payer: MEDICARE

## 2025-04-15 ENCOUNTER — ANTICOAGULATION VISIT (OUTPATIENT)
Dept: PHARMACY | Facility: HOSPITAL | Age: 80
End: 2025-04-15
Payer: MEDICARE

## 2025-04-15 DIAGNOSIS — I48.20 CHRONIC ATRIAL FIBRILLATION: Primary | ICD-10-CM

## 2025-04-15 DIAGNOSIS — R19.7 DIARRHEA, UNSPECIFIED TYPE: Primary | ICD-10-CM

## 2025-04-15 LAB
INR PPP: 3.6 (ref 0.91–1.09)
PROTHROMBIN TIME: 42.8 SECONDS (ref 10–13.8)

## 2025-04-15 PROCEDURE — 36416 COLLJ CAPILLARY BLOOD SPEC: CPT

## 2025-04-15 PROCEDURE — G0463 HOSPITAL OUTPT CLINIC VISIT: HCPCS

## 2025-04-15 PROCEDURE — 85610 PROTHROMBIN TIME: CPT

## 2025-04-15 NOTE — TELEPHONE ENCOUNTER
Caller: Tony Wolf    Relationship: Self    Best call back number: [phone]     What was the call regarding: PATIENT WOULD LIKE TO LET DR. GUILLAUME KNOW THAT HE HAS BEEN CONTINUING TO HAVE DIARRHEA. HE WOULD LIKE HER TO BE AWARE OF THIS.

## 2025-04-16 ENCOUNTER — HOSPITAL ENCOUNTER (OUTPATIENT)
Dept: CT IMAGING | Facility: HOSPITAL | Age: 80
Discharge: HOME | End: 2025-04-16
Admitting: INTERNAL MEDICINE
Payer: MEDICARE

## 2025-04-16 ENCOUNTER — TELEPHONE (OUTPATIENT)
Dept: INTERNAL MEDICINE | Facility: CLINIC | Age: 80
End: 2025-04-16
Payer: MEDICARE

## 2025-04-16 DIAGNOSIS — R19.7 DIARRHEA, UNSPECIFIED TYPE: ICD-10-CM

## 2025-04-16 PROCEDURE — 74176 CT ABD & PELVIS W/O CONTRAST: CPT

## 2025-04-16 NOTE — TELEPHONE ENCOUNTER
Patients wife Sussy called into the office wanting to know the STAT CT scan that was done today 04/16. Patient is feeling anxious and worried. I let the wife know that I will send a note back to the clinical pool so they are aware. She preferred to get a call on her cell phone. I also stated she may not get a call back today, but tomorrow for sure and she understood.     Sussy Maryann call back: [phone]

## 2025-04-17 ENCOUNTER — TELEPHONE (OUTPATIENT)
Dept: PHARMACY | Facility: HOSPITAL | Age: 80
End: 2025-04-17

## 2025-04-17 ENCOUNTER — RESULTS FOLLOW-UP (OUTPATIENT)
Dept: INTERNAL MEDICINE | Facility: CLINIC | Age: 80
End: 2025-04-17
Payer: MEDICARE

## 2025-04-17 DIAGNOSIS — R19.7 DIARRHEA, UNSPECIFIED TYPE: Primary | ICD-10-CM

## 2025-04-17 NOTE — TELEPHONE ENCOUNTER
Called patient and advised that unfortunately we do not know how far out speciality offices are scheduling. If the patient would like to be seen sooner he can call gastroenterologists in his network and see who has the soonest appointment available and call us back to let us know. Advised our office can send the referral anywhere the patient requests, just need to know where he would like referral sent. Patient states he may call around or just keep appt with Dr. Hawthorne.

## 2025-04-17 NOTE — TELEPHONE ENCOUNTER
Returned call and relayed recommendation  Patient has been using otc immodium and notes it is not helping   States is looking foreword to something changing   No further questions

## 2025-04-17 NOTE — TELEPHONE ENCOUNTER
Spoke with patient and relayed results  Pain only in rectal area, patient denies pain anywhere else  Diarrhea has not resolved and patient is weak and nauseated  Patient is wondering if there is any recommended medication that could resolve the issue    Patient talked to Dr Vincent and will see him the 27th of May  Patient is wondering if needs a colonoscopy

## 2025-04-17 NOTE — TELEPHONE ENCOUNTER
Spoke with patient regarding colonoscopy and appointment with Dr Hawthorne  Patient is anxious about the diarrhea and is asking if there is any medication that could help?

## 2025-04-17 NOTE — TELEPHONE ENCOUNTER
Patient returned call and is asking if there is another provider besides Dr Hawthorne that patient could see sooner

## 2025-04-17 NOTE — TELEPHONE ENCOUNTER
Patient called to inform clinic of new diagnosis of Diverticulitis. Patient instructed to continue warfarin regimen from previous encounter  warfarin 2.5 mg daily except 3.75 mg Friday until recheck 5/6.       Herbie Abernathy   [facility]  4/17/2025 15:33 EDT

## 2025-04-18 DIAGNOSIS — I48.91 ATRIAL FIBRILLATION, UNSPECIFIED TYPE: ICD-10-CM

## 2025-04-18 RX ORDER — WARFARIN SODIUM 2.5 MG/1
TABLET ORAL
Qty: 120 TABLET | Refills: 1 | Status: SHIPPED | OUTPATIENT
Start: 2025-04-18

## 2025-05-06 ENCOUNTER — ANTICOAGULATION VISIT (OUTPATIENT)
Dept: PHARMACY | Facility: HOSPITAL | Age: 80
End: 2025-05-06
Payer: MEDICARE

## 2025-05-06 DIAGNOSIS — I48.20 CHRONIC ATRIAL FIBRILLATION: Primary | ICD-10-CM

## 2025-05-16 ENCOUNTER — ANTICOAGULATION VISIT (OUTPATIENT)
Dept: PHARMACY | Facility: HOSPITAL | Age: 80
End: 2025-05-16
Payer: MEDICARE

## 2025-05-16 DIAGNOSIS — I48.20 CHRONIC ATRIAL FIBRILLATION: Primary | ICD-10-CM

## 2025-05-16 LAB
INR PPP: 2.8 (ref 0.91–1.09)
PROTHROMBIN TIME: 34.1 SECONDS (ref 10–13.8)

## 2025-05-16 PROCEDURE — 36416 COLLJ CAPILLARY BLOOD SPEC: CPT

## 2025-05-16 PROCEDURE — G0463 HOSPITAL OUTPT CLINIC VISIT: HCPCS

## 2025-05-16 PROCEDURE — 85610 PROTHROMBIN TIME: CPT

## 2025-05-16 NOTE — PROGRESS NOTES
Anticoagulation Clinic Progress Note  Indication: atrial fibrillation  Referring Provider: Sravani [last appt 9/8/23  next:  9/16/24 (Will Sue)]  Initial Warfarin Start Date: 2008  Goal INR: 2 - 3  Current Drug Interactions: fluoxetine, glimepiride, melatonin (PRN), mirtazepine, MVI  CHADS-VASc: 3 (age, HTN, DM)    EtOH: none  GLV: Salad (Spinach salad 1x weekly or garden salad) and serving of broccoli once a week (2/9/24)  OTC Pain Relief: Uses APAP prn (~1x/week)    Anticoagulation Clinic INR History:    Date 8/25 9/8 9/29 10/27 12/1 1/5/23 2/2 3/2 3/30 4/14 4/20 5/18   Total WeeklyDose 18.75mg 22.5mg 22.5 mg 22.5 mg 22.5 mg 22.5mg 22.5 mg 22.5 mg 22.5 mg 22.5mg 22.5 mg  22.5 mg   INR 2.7 2.7 2.5 2.2 2.3 2.3 2.7 3.0 2.5 2.6 2.6 3.0   Notes omeprazole initi         doxy Doxy       Date 7/12 8/9 9/11 10/9 11/6 11/30 12/12 12/21 1/4 1/26/24 2/9 2/23   Total Weekly Dose 22.5 mg 22.5 mg 22.5mg 22.5 mg 22.5mg 22.5 mg 22.5mg 21.25 mg  21.25 mg 21.25mg     INR 2.7 2.8 2.5 2.5 2.3 3.5 3.2 2.4 2.1 1.9 1.9 1.8   Notes          Increase LGV       Date 3/8 4/5 5/3 5/30 7/3 7/31   Total Weekly Dose 23.75 mg 23.75 mg 23.75mg 23.75 mg 23.75mg 23.75 mg   INR 2.2 2.3 2.7 2.9 2.0 3.3   Notes           Date 8/16 8/29 9/5 9/12 10/3 10/31 11/19 2/4 3/4 4/1 4/15 5/16   Total Weekly Dose 23.75 mg 23.75 22.5 20 mg 20mg  20 mg 20mg  21.25 mg  21.25 mg  21.25 mg  20 mg  18.75 mg    INR 3.3 3.8 3.1 2.7 2.0 2.7 1.8 3.0 2.9 3.6 3.6 2.8   Notes                   Clinic Interview:  Verbal Release Authorization signed on 6/27/18 -- may speak with Sussy (wife), Nikolai (son)  Tablet Strength: pt has 2.5mg tablets  Phone: [phone] (Home), [phone]  Fax: [phone] (Attn: Tasha) [facility]      Patient Findings:  Positives: Change in health   Negatives: Signs/symptoms of thrombosis, Signs/symptoms of bleeding, Laboratory test error suspected, Change in alcohol use, Change in activity, Upcoming invasive procedure,  Emergency department visit, Upcoming dental procedure, Missed doses, Extra doses, Change in medications, Change in diet/appetite, Hospital admission, Bruising, Other complaints   Comments: Patient still experiencing very bothersome diarrhea - was visibly upset again in clinic today.  Started cholestyramine - states it has not helped but it causing more nausea. Did f/u with GI and per patient, they only want to do colonoscopy as last resort.  Patient and wife report sores on arms and feet that are random. Patient has fu with podiatrist next week.       Plan:   1. INR therapeutic today at 2.8 (goal 2.0-3.0). Instructed Mr. Wolf to take warfarin 2.5 mg daily except 3.75 mg Friday only until recheck. Patient still experiencing diarrhea and therefore likely dehydration - advised patient to follow up with GI due to continued issues despite additional medications. Also recommended fiber supplementation + electrolyte replacement in the meantime.   2. RTC in 4 weeks, 6/13/25.  3. Verbal and written information was provided to Mr. Wolf and Mrs. Wolf in clinic. Mr. Wolf voiced understanding with teach back and has no further questions at this time.      Leona Walter, PharmD  05/16/25   11:36 EDT

## 2025-06-03 ENCOUNTER — TELEPHONE (OUTPATIENT)
Dept: PHARMACY | Facility: HOSPITAL | Age: 80
End: 2025-06-03

## 2025-06-03 NOTE — TELEPHONE ENCOUNTER
Anticoagulation Clinic Drug-Drug Interaction Call Intake    Tony Wolf called to report starting a new medication: Xifaxan 150 mg TID.    Start date: 6.04.25    Planned duration: Unknown, patient is being given samples from his doctor     Prescribed by: Leo Hawthorne M.D.    Indication: diarrhea     Warfarin Management:    Discussed the drug-drug interaction (DDI) with Leona Walter, PharmD.    Pharmacist response:     Dose adjustment required: new dose 2.5 mg daily except 3.75 mg Tues/Fri; re-check INR in 10 days    Discussed to monitor for s/sx of bleeding including epistaxis, hematuria, unusual bruising, hemoptysis, hematochezia as well as s/sx of stroke including impaired speech, unilateral paralysis, blurry vision and when to seek medical attention.      Additional Notes:

## 2025-06-11 ENCOUNTER — TELEPHONE (OUTPATIENT)
Dept: CARDIOLOGY | Facility: CLINIC | Age: 80
End: 2025-06-11
Payer: MEDICARE

## 2025-06-11 RX ORDER — LISINOPRIL 40 MG/1
40 TABLET ORAL DAILY
Qty: 90 TABLET | Refills: 1 | Status: SHIPPED | OUTPATIENT
Start: 2025-06-11

## 2025-06-11 NOTE — TELEPHONE ENCOUNTER
Caller: Tony Wolf    Relationship: Self    Best call back number: [phone]    Requested Prescriptions:   Requested Prescriptions     Pending Prescriptions Disp Refills    lisinopril (PRINIVIL,ZESTRIL) 40 MG tablet 90 tablet 2     Sig: Take 1 tablet by mouth Daily.        Pharmacy where request should be sent: [facility] [id number] [address]  AT [facility] [phone] [facility] [phone]      Last office visit with prescribing clinician: 9/16/2024   Last telemedicine visit with prescribing clinician: Visit date not found   Next office visit with prescribing clinician: Visit date not found     Additional details provided by patient:     Does the patient have less than a 3 day supply:  [] Yes  [x] No    Would you like a call back once the refill request has been completed: [] Yes [x] No    If the office needs to give you a call back, can they leave a voicemail: [] Yes [x] No    Mary Rooney Rep   06/11/25 09:40 EDT

## 2025-06-12 DIAGNOSIS — E11.9 TYPE 2 DIABETES MELLITUS WITHOUT COMPLICATION, WITHOUT LONG-TERM CURRENT USE OF INSULIN: ICD-10-CM

## 2025-06-12 RX ORDER — GLIMEPIRIDE 2 MG/1
2 TABLET ORAL DAILY
Qty: 90 TABLET | Refills: 1 | Status: SHIPPED | OUTPATIENT
Start: 2025-06-12

## 2025-06-13 ENCOUNTER — TELEPHONE (OUTPATIENT)
Dept: INTERNAL MEDICINE | Facility: CLINIC | Age: 80
End: 2025-06-13
Payer: MEDICARE

## 2025-06-13 NOTE — TELEPHONE ENCOUNTER
Returned call to spouse and inquired if the GI doctor was aware that the medication has made things worse  Spouse states no, was wanting to get advise from Dr Lopez first  Advised spouse to call the GI doctor and get some advise from them on how to handle the antibiotic making things worse due to GI being the people who placed patient on the medication   Spouse verbalized frustration that no one has found a reason why the diarrhea was happening and that the patients refusing to go to ED for the worsening situation and is refusing to get INR today  Apologized for the lack of answers and again recommended calling GI for the medication was through them, but offered to help with other issues   Spouse understanding

## 2025-06-13 NOTE — TELEPHONE ENCOUNTER
Caller: Sussy Wolf     Relationship: [unfilled]     Best call back number:[phone]     What is your medical concern? WIFE STATES THE PATIENT WAS PUT ON AN ANTIBIOTIC BY HIS GASTRO DRJorden AND SAYS IT HAS MADE THE DIARRHEA WORSE. SHE STATES HIS MENTAL STATUS IS ALTERED AND SHE IS VERY WORRIED.    How long has this issue been going on? NEARLY 2 WEEKS    Is your provider already aware of this issue? NO    Have you been treated for this issue? NO

## 2025-06-23 ENCOUNTER — ANTICOAGULATION VISIT (OUTPATIENT)
Dept: PHARMACY | Facility: HOSPITAL | Age: 80
End: 2025-06-23
Payer: MEDICARE

## 2025-06-23 DIAGNOSIS — I48.20 CHRONIC ATRIAL FIBRILLATION: Primary | ICD-10-CM

## 2025-06-23 LAB
INR PPP: 2.4 (ref 0.91–1.09)
PROTHROMBIN TIME: 29 SECONDS (ref 10–13.8)

## 2025-06-23 PROCEDURE — 36416 COLLJ CAPILLARY BLOOD SPEC: CPT

## 2025-06-23 PROCEDURE — 85610 PROTHROMBIN TIME: CPT

## 2025-06-23 PROCEDURE — G0463 HOSPITAL OUTPT CLINIC VISIT: HCPCS

## 2025-06-23 NOTE — PROGRESS NOTES
Anticoagulation Clinic Progress Note  Indication: atrial fibrillation  Referring Provider: Sravani [last appt 9/8/23  next:  9/16/24 (Will Sue)]  Initial Warfarin Start Date: 2008  Goal INR: 2 - 3  Current Drug Interactions: fluoxetine, glimepiride, melatonin (PRN), mirtazepine, MVI  CHADS-VASc: 3 (age, HTN, DM)    EtOH: none  GLV: Salad (Spinach salad 1x weekly or garden salad) and serving of broccoli once a week (2/9/24)  OTC Pain Relief: Uses APAP prn (~1x/week)    Anticoagulation Clinic INR History:    Date 8/25 9/8 9/29 10/27 12/1 1/5/23 2/2 3/2 3/30 4/14 4/20 5/18   Total WeeklyDose 18.75mg 22.5mg 22.5 mg 22.5 mg 22.5 mg 22.5mg 22.5 mg 22.5 mg 22.5 mg 22.5mg 22.5 mg  22.5 mg   INR 2.7 2.7 2.5 2.2 2.3 2.3 2.7 3.0 2.5 2.6 2.6 3.0   Notes omeprazole initi         doxy Doxy       Date 7/12 8/9 9/11 10/9 11/6 11/30 12/12 12/21 1/4 1/26/24 2/9 2/23   Total Weekly Dose 22.5 mg 22.5 mg 22.5mg 22.5 mg 22.5mg 22.5 mg 22.5mg 21.25 mg  21.25 mg 21.25mg     INR 2.7 2.8 2.5 2.5 2.3 3.5 3.2 2.4 2.1 1.9 1.9 1.8   Notes          Increase LGV       Date 3/8 4/5 5/3 5/30 7/3 7/31   Total Weekly Dose 23.75 mg 23.75 mg 23.75mg 23.75 mg 23.75mg 23.75 mg   INR 2.2 2.3 2.7 2.9 2.0 3.3   Notes           Date 8/16 8/29 9/5 9/12 10/3 10/31 11/19 2/4 3/4 4/1 4/15 5/16   Total Weekly Dose 23.75 mg 23.75 22.5 20 mg 20mg  20 mg 20mg  21.25 mg  21.25 mg  21.25 mg  20 mg  18.75 mg    INR 3.3 3.8 3.1 2.7 2.0 2.7 1.8 3.0 2.9 3.6 3.6 2.8   Notes                 Date 6/23              Total Weekly Dose 20 mg               INR 2.4              Notes                   Clinic Interview:  Verbal Release Authorization signed on 6/27/18 -- may speak with Sussy (wife), Nikolai (son)  Tablet Strength: pt has 2.5mg tablets  Phone: [phone] (Home), [phone]  Fax: [phone] (Attn: Tasha) [facility]      Patient Findings:  Negatives: Signs/symptoms of thrombosis, Signs/symptoms of bleeding, Laboratory test error suspected,  Change in health, Change in alcohol use, Change in activity, Upcoming invasive procedure, Emergency department visit, Upcoming dental procedure, Missed doses, Extra doses, Change in medications, Change in diet/appetite, Hospital admission, Bruising, Other complaints   Comments: Patient still having diarrhea, finished Xifaxan. Plans to f/u with GI, states PCP turned over this issue to GI and is not managing further.  All other findings negative.       Plan:   1. INR therapeutic today at 2.4 (goal 2.0-3.0). Instructed Mr. Wolf to continue warfarin 2.5 mg daily except 3.75 mg Tues and Fri until recheck.   2. RTC in 4 weeks, 7/21/25  3. Verbal and written information was provided to Mr. Wolf and Mrs. Wolf in clinic. Mr. Wolf voiced understanding with teach back and has no further questions at this time.      Leona Walter, PharmD  06/23/25   11:44 EDT

## 2025-07-07 ENCOUNTER — TELEPHONE (OUTPATIENT)
Dept: INTERNAL MEDICINE | Facility: CLINIC | Age: 80
End: 2025-07-07
Payer: MEDICARE

## 2025-07-07 NOTE — TELEPHONE ENCOUNTER
Caller: Sussy Wolf     Relationship: [unfilled]     Best call back number: [phone]    What is your medical concern? PT HANDICAP ROBERTA HAS  WILL NEED PCP SIGNATURE FOR NEW ONE

## 2025-07-07 NOTE — TELEPHONE ENCOUNTER
Returned call and advised provider was out of office until next week  Spouse understanding and scheduled appointment in case it was needed. Did request a return call in case the visit was able to be avoided.  Spouse states the return call could wait until provider returned

## 2025-07-08 ENCOUNTER — TELEPHONE (OUTPATIENT)
Dept: ENDOCRINOLOGY | Facility: CLINIC | Age: 80
End: 2025-07-08
Payer: MEDICARE

## 2025-07-08 NOTE — TELEPHONE ENCOUNTER
PATIENT CALLED BACK AND ADVISED DR. SHEETS CAN SIGN IT. HE STATES HIS WIFE WILL BE BY TO DROP OFF THE FORM EITHER TODAY OR TOMORROW.

## 2025-07-08 NOTE — TELEPHONE ENCOUNTER
Caller: Tony Wolf    Relationship: Self    Best call back number:   Telephone Information:   Mobile [phone]     What was the call regarding: PT CALLED WONDERING IF PROVIDER CAN RENEW PT HANDICAPPED PLAQUED. PLEASE ADVISE.

## 2025-07-09 ENCOUNTER — TELEPHONE (OUTPATIENT)
Dept: INTERNAL MEDICINE | Facility: CLINIC | Age: 80
End: 2025-07-09
Payer: MEDICARE

## 2025-07-09 NOTE — TELEPHONE ENCOUNTER
Returned call to patient and spoke with spouse (in  [facility])  Patient had form filled out by another provider and was requesting appointment with Dr Lopez be canceled  Canceled appointment  No further questions

## 2025-07-09 NOTE — TELEPHONE ENCOUNTER
Caller: Tony Wolf    Relationship: Self    Best call back number: [phone]       Who are you requesting to speak with (clinical staff, provider,  specific staff member): [facility]        What was the call regarding: PATIENT WANTS A CALL BACK BUT DID NOT STATE WHY    Is it okay if the provider responds through MyChart:

## 2025-07-21 ENCOUNTER — TELEPHONE (OUTPATIENT)
Dept: PHARMACY | Facility: HOSPITAL | Age: 80
End: 2025-07-21
Payer: MEDICARE

## 2025-07-21 ENCOUNTER — ANTICOAGULATION VISIT (OUTPATIENT)
Dept: PHARMACY | Facility: HOSPITAL | Age: 80
End: 2025-07-21
Payer: MEDICARE

## 2025-07-21 DIAGNOSIS — I48.20 CHRONIC ATRIAL FIBRILLATION: Primary | ICD-10-CM

## 2025-07-21 LAB
INR PPP: 2.3 (ref 0.91–1.09)
PROTHROMBIN TIME: 27.6 SECONDS (ref 10–13.8)

## 2025-07-21 PROCEDURE — G0463 HOSPITAL OUTPT CLINIC VISIT: HCPCS

## 2025-07-21 PROCEDURE — 85610 PROTHROMBIN TIME: CPT

## 2025-07-21 PROCEDURE — 36416 COLLJ CAPILLARY BLOOD SPEC: CPT

## 2025-07-21 NOTE — TELEPHONE ENCOUNTER
Mr. John JACOBSON,     We were recently informed that Tony Wolf is undergoing colonoscopy on 7/29/25 with Dr. Leo Hawthorne at  [facility]. We are requesting that the patient hold warfarin for 3 days prior to procedure      Tony Wolf is on warfarin for chronic atrial fibrillation; therefore, bridge therapy is not recommended.        7/26: Hold warfarin 3 days  7/29: procedure, warfarin 5 mg that evening (if ok with surgeon)   7/30: Resume maintenance warfarin regimen    8/4: INR recheck        Please advise if you are agreeable to plan above or if you prefer an alternative approach to Tony Wolf anticoagulation plan for the upcoming procedure.      Selam Magallanes, PharmD  7/21/2025   12:06 EDT    [facility]    [phone] phone  [phone] fax

## 2025-07-21 NOTE — PROGRESS NOTES
Anticoagulation Clinic Progress Note  Indication: atrial fibrillation  Referring Provider: Sravani [last appt 9/8/23  next:  9/16/24 (Will Sue)]  Initial Warfarin Start Date: 2008  Goal INR: 2 - 3  Current Drug Interactions: fluoxetine, glimepiride, melatonin (PRN), mirtazepine, MVI  CHADS-VASc: 3 (age, HTN, DM)    EtOH: none  GLV: Salad (Spinach salad 1x weekly or garden salad) and serving of broccoli once a week (2/9/24)  OTC Pain Relief: Uses APAP prn (~1x/week)    Anticoagulation Clinic INR History:    Date 8/25 9/8 9/29 10/27 12/1 1/5/23 2/2 3/2 3/30 4/14 4/20 5/18   Total WeeklyDose 18.75mg 22.5mg 22.5 mg 22.5 mg 22.5 mg 22.5mg 22.5 mg 22.5 mg 22.5 mg 22.5mg 22.5 mg  22.5 mg   INR 2.7 2.7 2.5 2.2 2.3 2.3 2.7 3.0 2.5 2.6 2.6 3.0   Notes omeprazole initi         doxy Doxy       Date 7/12 8/9 9/11 10/9 11/6 11/30 12/12 12/21 1/4 1/26/24 2/9 2/23   Total Weekly Dose 22.5 mg 22.5 mg 22.5mg 22.5 mg 22.5mg 22.5 mg 22.5mg 21.25 mg  21.25 mg 21.25mg     INR 2.7 2.8 2.5 2.5 2.3 3.5 3.2 2.4 2.1 1.9 1.9 1.8   Notes          Increase LGV       Date 3/8 4/5 5/3 5/30 7/3 7/31   Total Weekly Dose 23.75 mg 23.75 mg 23.75mg 23.75 mg 23.75mg 23.75 mg   INR 2.2 2.3 2.7 2.9 2.0 3.3   Notes           Date 8/16 8/29 9/5 9/12 10/3 10/31 11/19 2/4 3/4 4/1 4/15 5/16   Total Weekly Dose 23.75 mg 23.75 22.5 20 mg 20mg  20 mg 20mg  21.25 mg  21.25 mg  21.25 mg  20 mg  18.75 mg    INR 3.3 3.8 3.1 2.7 2.0 2.7 1.8 3.0 2.9 3.6 3.6 2.8   Notes                 Date 6/23 7/21             Total Weekly Dose 20 mg  20 mg             INR 2.4 2.3             Notes                   Clinic Interview:  Verbal Release Authorization signed on 6/27/18 -- may speak with Sussy (wife), Nikolai (son)  Tablet Strength: pt has 2.5mg tablets  Phone: [phone] (Home), [phone]  Fax: [phone] (Attn: Tasha) [facility]      Patient Findings:  Positives: Upcoming invasive procedure   Negatives: Signs/symptoms of thrombosis,  Signs/symptoms of bleeding, Laboratory test error suspected, Change in health, Change in alcohol use, Change in activity, Emergency department visit, Upcoming dental procedure, Missed doses, Extra doses, Change in medications, Change in diet/appetite, Hospital admission, Bruising, Other complaints   Comments: Colonoscopy 7/29 Dr. Leo Hawthorne    Still having diarrhea and nausea. So far everything patient has tried has not helped. Hoping to get some answers from colonoscopy.    All other findings negative per patient and spouse       Plan:   1. INR therapeutic today at 2.3 (goal 2.0-3.0). Instructed Mr. Wolf to continue warfarin 2.5 mg daily except 3.75 mg Tues and Fri until recheck.   2. RTC in 8/4/25--after procedure   3. Verbal and written information was provided to Mr. Wolf and Mrs. Wolf in clinic. Mr. Wolf voiced understanding with teach back and has no further questions at this time.      Selam Magallanes, PharmD  07/21/25   11:32 EDT

## 2025-07-22 RX ORDER — AMLODIPINE BESYLATE 10 MG/1
10 TABLET ORAL DAILY
Qty: 90 TABLET | Refills: 3 | Status: SHIPPED | OUTPATIENT
Start: 2025-07-22

## 2025-07-30 RX ORDER — METOPROLOL SUCCINATE 100 MG/1
100 TABLET, EXTENDED RELEASE ORAL 2 TIMES DAILY
Qty: 180 TABLET | Refills: 0 | Status: SHIPPED | OUTPATIENT
Start: 2025-07-30

## 2025-08-07 ENCOUNTER — ANTICOAGULATION VISIT (OUTPATIENT)
Dept: PHARMACY | Facility: HOSPITAL | Age: 80
End: 2025-08-07
Payer: MEDICARE

## 2025-08-07 DIAGNOSIS — I48.20 CHRONIC ATRIAL FIBRILLATION: Primary | ICD-10-CM

## 2025-08-07 LAB
INR PPP: 2.6 (ref 0.91–1.09)
PROTHROMBIN TIME: 30.8 SECONDS (ref 10–13.8)

## 2025-08-07 PROCEDURE — G0463 HOSPITAL OUTPT CLINIC VISIT: HCPCS

## 2025-08-07 PROCEDURE — 36416 COLLJ CAPILLARY BLOOD SPEC: CPT

## 2025-08-07 PROCEDURE — 85610 PROTHROMBIN TIME: CPT

## 2025-08-07 RX ORDER — COLESTIPOL HYDROCHLORIDE 1 G/1
2 TABLET ORAL 2 TIMES DAILY
COMMUNITY
Start: 2025-07-29

## (undated) DEVICE — NDL HYPO ECLPS SFTY 18G 1 1/2IN

## (undated) DEVICE — ENCORE® LATEX MICRO SIZE 8.5, STERILE LATEX POWDER-FREE SURGICAL GLOVE: Brand: ENCORE

## (undated) DEVICE — STRYKER PERFORMANCE SERIES SAGITTAL BLADE: Brand: STRYKER PERFORMANCE SERIES

## (undated) DEVICE — SIGMA LCS HIGH PERFORMANCE INSTRUMENTS STERILE THREADED PINS: Brand: SIGMA LCS HIGH PERFORMANCE

## (undated) DEVICE — FLTR HME STR UNIV W/SMPL PORT

## (undated) DEVICE — CVR HNDL LT SURG ACCSSRY BLU STRL

## (undated) DEVICE — NERVE BLOCK SUPPORT KIT/BLUE: Brand: MEDLINE INDUSTRIES, INC.

## (undated) DEVICE — PK KN TOTL 10

## (undated) DEVICE — BNDG ELAS W/CLIP 6IN 10YD LF STRL

## (undated) DEVICE — SIGMA LCS HIGH PERFORMANCE STERILE THREADED HEADED PINS: Brand: SIGMA LCS HIGH PERFORMANCE

## (undated) DEVICE — SPNG GZ WOVN 4X4IN 12PLY 10/BX STRL

## (undated) DEVICE — SOL LR 1000ML

## (undated) DEVICE — DRSNG PAD ABD 8X10IN STRL

## (undated) DEVICE — GLV SURG SENSICARE W/ALOE PF LF 9 STRL

## (undated) DEVICE — ST NERV BLCK CONT CONTIPLEX ECHO CLSD 18G 4IN

## (undated) DEVICE — AIRWY SZ11

## (undated) DEVICE — ANTIBACTERIAL UNDYED BRAIDED (POLYGLACTIN 910), SYNTHETIC ABSORBABLE SUTURE: Brand: COATED VICRYL

## (undated) DEVICE — UNDERCAST PADDING: Brand: DEROYAL

## (undated) DEVICE — RECIPROCATING BLADE, DOUBLE SIDED, OFFSET  (70.0 X 0.8 X 12.5MM)

## (undated) DEVICE — MEDI-VAC NON-CONDUCTIVE SUCTION TUBING: Brand: CARDINAL HEALTH

## (undated) DEVICE — MEDI-VAC YANKAUER SUCTION HANDLE W/BULBOUS TIP: Brand: CARDINAL HEALTH

## (undated) DEVICE — 50" SINGLE PATIENT USE HOVERMATT: Brand: SINGLE PATIENT USE HOVERMATT

## (undated) DEVICE — SYS SKIN CLS DERMABOND PRINEO W/22CM MESH TP